# Patient Record
Sex: FEMALE | Race: WHITE | NOT HISPANIC OR LATINO | Employment: OTHER | URBAN - NONMETROPOLITAN AREA
[De-identification: names, ages, dates, MRNs, and addresses within clinical notes are randomized per-mention and may not be internally consistent; named-entity substitution may affect disease eponyms.]

---

## 2017-02-25 RX ORDER — AMLODIPINE BESYLATE 5 MG/1
TABLET ORAL
Qty: 30 TABLET | Refills: 6 | Status: CANCELLED | OUTPATIENT
Start: 2017-02-25

## 2017-02-27 RX ORDER — AMLODIPINE BESYLATE 5 MG/1
5 TABLET ORAL DAILY
Qty: 30 TABLET | Refills: 4 | Status: SHIPPED | OUTPATIENT
Start: 2017-02-27 | End: 2017-08-23 | Stop reason: SDUPTHER

## 2017-08-22 RX ORDER — AMLODIPINE BESYLATE 5 MG/1
TABLET ORAL
Qty: 30 TABLET | Refills: 4 | Status: CANCELLED | OUTPATIENT
Start: 2017-08-22

## 2017-08-23 RX ORDER — AMLODIPINE BESYLATE 5 MG/1
5 TABLET ORAL DAILY
Qty: 30 TABLET | Refills: 1 | Status: SHIPPED | OUTPATIENT
Start: 2017-08-23 | End: 2018-10-03

## 2017-11-20 ENCOUNTER — TRANSCRIBE ORDERS (OUTPATIENT)
Dept: PHYSICAL THERAPY | Facility: HOSPITAL | Age: 77
End: 2017-11-20

## 2017-11-20 DIAGNOSIS — M54.2 CERVICALGIA: Primary | ICD-10-CM

## 2017-11-30 ENCOUNTER — HOSPITAL ENCOUNTER (OUTPATIENT)
Dept: PHYSICAL THERAPY | Facility: HOSPITAL | Age: 77
Setting detail: THERAPIES SERIES
Discharge: HOME | End: 2017-11-30

## 2017-11-30 DIAGNOSIS — M54.2 CERVICALGIA: Primary | ICD-10-CM

## 2017-11-30 PROCEDURE — G8990 OTHER PT/OT CURRENT STATUS: HCPCS | Performed by: PHYSICAL THERAPIST

## 2017-11-30 PROCEDURE — G8991 OTHER PT/OT GOAL STATUS: HCPCS | Performed by: PHYSICAL THERAPIST

## 2017-11-30 PROCEDURE — 97012 MECHANICAL TRACTION THERAPY: CPT | Performed by: PHYSICAL THERAPIST

## 2017-11-30 PROCEDURE — 97162 PT EVAL MOD COMPLEX 30 MIN: CPT | Performed by: PHYSICAL THERAPIST

## 2017-12-01 NOTE — THERAPY EVALUATION
Outpatient Physical Therapy Ortho Initial Evaluation  [facility]     Patient Name: Carol Sullivan  : 1940  MRN: [id number]  Today's Date: 2017      Visit Date: 2017  Attendance:  (Medicare)  Subjective Improvement: n/a  Next MD Appt: ??  Recert Date: 17    Therapy Diagnosis: Cervicalgia    Patient Active Problem List   Diagnosis   • Hypertension   • Hyperlipidemia   • Near syncope   • GERD (gastroesophageal reflux disease)   • Palpitation   • PVC (premature ventricular contraction)        Past Medical History:   Diagnosis Date   • Arthritis    • Depression    • GERD (gastroesophageal reflux disease)    • Hyperlipidemia    • Hypertension    • Near syncope    • Vascular disease         No past surgical history on file.    Visit Dx:     ICD-10-CM ICD-9-CM   1. Cervicalgia M54.2 723.1             Patient History       17 0900       History    Chief Complaint Pain;Tingling;Muscle weakness  -     Date Current Problem(s) Began --   2017  -     Brief Description of Current Complaint Pt reports having left shoulder surgery for bone spurs on 3/2/17 and did not have any symptoms post-op. Starting in October, pt woke up with pain affecting top of left shoulder down to fingers. Since the pain began, the level of pain has remained the same. Shortly after initial pain, tingling in fingers began mildly and progressed to more noticeable which is still prevalent intermittently.  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 Moving it, lifting any object  -SS     What Performance Factors Make the Current Problem(s) BETTER? Pain medication, resting arm, holding arm still  -SS     Is your sleep disturbed? Yes  -SS     Is medication used to assist with sleep? No  -SS     Fall Risk Assessment    Any falls in the past year: Yes  -SS     Number of falls reported in the last 12 months 1  -SS     Factors that contributed to the fall: Slippery surface  -SS     Does patient have a fear of falling No  -SS     Daily Activities    Primary Language English  -SS     Safety    Are you being hurt, hit, or frightened by anyone at home or in your life? No  -SS     Are you being neglected by a caregiver No  -SS       User Key  (r) = Recorded By, (t) = Taken By, (c) = Cosigned By    Initials Name Provider Type    SS Dong Nelson, PT DPT Physical Therapist                PT Ortho       11/30/17 0900    Subjective Comments    Subjective Comments see patient history  -SS    Precautions and Contraindications    Precautions/Limitations no known precautions/limitations  -SS    Precautions none  -SS    Contraindications none  -SS    Subjective Pain    Able to rate subjective pain? yes  -SS    Pre-Treatment Pain Level 6  -SS    Post-Treatment Pain Level 0  -SS    Posture/Observations    Forward Head Increased;Mild  -SS    Thoracic Kyphosis Mild;Increased  -SS    Rounded Shoulders Mild;Bilateral:  -SS    Scapular winging Bilateral:;Mild  -SS    Lumbar lordosis Mild;Increased  -SS    Sensory Screen for Light Touch- Upper Quarter Clearing    C4 (posterior shoulder) Bilateral:;Intact  -SS    C5 (lateral upper arm) Bilateral:;Intact  -SS    C6 (tip of thumb) Bilateral:;Intact  -SS    C7 (tip of 3rd finger) Left:;Diminished;Right:;Intact  -SS    C8 (tip of 5th finger) Bilateral:;Intact  -SS    T1 (medial lower arm)  Bilateral:;Intact  -SS    Special Tests/Palpation    Special Tests/Palpation --   Tight posterior cervical muscles; Pos. cervical distraction  -SS    ROM (Range of Motion)    General ROM Detail all cervical motions hypomobile  -SS    Left Shoulder    Flexion AROM Deficit 120   shoulder pain  -SS    Extension AROM Deficit 121   shoulder pain  -SS    ABduction AROM Deficit 40   shoulder pain  -SS    External Rotation AROM Deficit 73   shoulder pain  -SS    Internal Rotation AROM Deficit 60   shoulder pain  -SS    Right Shoulder    Flexion AROM Deficit 147  -SS    Extension AROM Deficit 140  -SS    ABduction AROM Deficit 40  -SS    External Rotation AROM Deficit 75  -SS    Internal Rotation AROM Deficit 74  -SS    Left Shoulder    Flexion Gross Movement (4/5) good;(4+/5) good plus   shoulder pain  -SS    Extension Gross Movement (4+/5) good plus   shoulder pain  -SS    ABduction Gross Movement (4+/5) good plus   shoulder pain  -SS    Int Rotation Gross Movement (4+/5) good plus   shoulder pain  -SS    Ext Rotation Gross Movement (4+/5) good plus   shoulder pain  -SS    Right Shoulder    Flexion Gross Movement (4+/5) good plus  -SS    Extension Gross Movement (4+/5) good plus  -SS    ABduction Gross Movement (5/5) normal  -SS    Int Rotation Gross Movement (4+/5) good plus  -SS    Ext Rotation Gross Movement (5/5) normal  -SS      User Key  (r) = Recorded By, (t) = Taken By, (c) = Cosigned By    Initials Name Provider Type    ARGELIA Nelson, CHRIS DPT Physical Therapist                            Therapy Education       11/30/17 0900          Therapy Education    Given Other (comment)   therapy POC  -SS      How Provided Verbal  -SS      Provided to Patient  -SS      Level of Understanding Verbalized  -SS        User Key  (r) = Recorded By, (t) = Taken By, (c) = Cosigned By    Initials Name Provider Type    CHRIS HurtadoT Physical Therapist                PT OP Goals       11/30/17 0900       PT  Short Term Goals    STG Date to Achieve 12/21/17  -     STG 1 Decrease NDI score to </= 20/50=40%  -     STG 2 L shoulder flexion >/= 130 degrees  -     STG 3 L shoulder abduction >/= 130 degrees  -     STG 4 Report 30% subjective improvement  -     Long Term Goals    LTG Date to Achieve 01/11/18  -     LTG 1 Decrease NDI score to </= 10/50 = 20%  -     LTG 2 Independent with HEP.  -     LTG 3 Sleep with no disturbances.  -SS     LTG 4 L shoulder AROM WNL  -SS     LTG 5 L Shoulder MMT 5/5  -     LTG 6 CROM WNLs  -     Time Calculation    PT Goal Re-Cert Due Date 12/21/17  -       User Key  (r) = Recorded By, (t) = Taken By, (c) = Cosigned By    Initials Name Provider Type     Dong Nelson, PT DPT Physical Therapist                PT Assessment/Plan       11/30/17 1500       PT Assessment    Functional Limitations Limitations in functional capacity and performance;Limitation in home management;Decreased safety during functional activities;Limitations in community activities  -     Impairments Pain;Range of motion;Muscle strength;Posture  -     Assessment Comments Pt diagnosed with cervicalgia with increased pain symptoms in neck and shoulder. Pt would benefit from skilled physical therapy in order to increase range of motion, increase strength, and attenuation of pain in order to perform daily activities without limitation.  -     Rehab Potential Good  -     Patient/caregiver participated in establishment of treatment plan and goals Yes  -     Patient would benefit from skilled therapy intervention Yes  -SS     PT Plan    PT Frequency 2x/week  -     Predicted Duration of Therapy Intervention (days/wks) 6 weeks  -     Planned CPT's? PT EVAL MOD COMPLEXITY: 18640;PT THER PROC EA 15 MIN: 11436;PT THER ACT EA 15 MIN: 13728;PT MANUAL THERAPY EA 15 MIN: 37592;PT HOT OR COLD PACK TREAT MCARE;PT ELECTRICAL STIM UNATTEND: ;PT TRACTION CERVICAL: 59867;PT THER SUPP EA 15 MIN   -SS     Physical Therapy Interventions (Optional Details) modalities;strengthening;stretching;ROM (Range of Motion);postural re-education;manual therapy techniques;home exercise program  -SS     PT Plan Comments Cervical traction, shoulder stretching, ROM, and strengthening, heat as needed for pain, manual techniques, IFC estim as needed for pain  -SS       User Key  (r) = Recorded By, (t) = Taken By, (c) = Cosigned By    Initials Name Provider Type     Dong Nelson, CHRIS DPT Physical Therapist                Modalities       11/30/17 0900       Moist Heat    MH Applied      Location cervical spine concurrent with traction  -SS     Rx Minutes 10 mins  -SS     Traction 01727    Traction Type Cervical  -SS     Rx Minutes 10 minutes  -SS     Duration      Position Supine  -SS     Weight --   15# hold, 8# rest  -SS     Hold 60  -SS     Relax 20  -SS     Progression 2  -SS     Regression 2  -SS       User Key  (r) = Recorded By, (t) = Taken By, (c) = Cosigned By    Initials Name Provider Type     Dong Nelson, PT DPT Physical Therapist              Exercises       11/30/17 0900       Subjective Comments    Subjective Comments see patient history  -     Subjective Pain    Able to rate subjective pain? yes  -SS     Pre-Treatment Pain Level 6  -SS     Post-Treatment Pain Level 0  -SS       User Key  (r) = Recorded By, (t) = Taken By, (c) = Cosigned By    Initials Name Provider Type     Dong Nelson, CHRIS REYNA Physical Therapist                              Outcome Measures       11/30/17 0900          Neck Disability Index    Section 1 - Pain Intensity 3  -SS      Section 2 - Personal Care 1  -SS      Section 3 - Lifting 3  -SS      Section 4 - Work 3  -SS      Section 5 - Headaches 2  -SS      Section 6 - Concentration 0  -SS      Section 7 - Sleeping 5  -SS      Section 8 - Driving 4  -SS      Section 9 - Reading 2  -SS      Section 10 - Recreation 3  -SS      Neck Disability Index Score 26   -SS      Functional Assessment    Outcome Measure Options Neck Disability Index (NDI)  -        User Key  (r) = Recorded By, (t) = Taken By, (c) = Cosigned By    Initials Name Provider Type    SS Dong Nelson, PT DPT Physical Therapist            Time Calculation:   Start Time: 0920  Stop Time: 1025  Time Calculation (min): 65 min     Therapy Charges for Today     Code Description Service Date Service Provider Modifiers Qty    97152326833 HC PT OTHER PRIME FUNCT CURRENT 11/30/2017 Dong Nelson, PT DPT GP, CK 1    22931204648 HC PT OTHER PRIME FUNCT PROJECTED 11/30/2017 Dong Nelson PT DPT GP, CI 1    76250568496 HC PT TRACTION CERVICAL 11/30/2017 Dong Nelson PT DPT GP 1    58510963286 HC PT EVAL MOD COMPLEXITY 3 11/30/2017 Dong Nelson PT DPT GP 1          PT G-Codes  Outcome Measure Options: Neck Disability Index (NDI)  Score: 26/50 = 52%  Functional Limitation: Other PT primary  Other PT Primary Current Status (): At least 40 percent but less than 60 percent impaired, limited or restricted  Other PT Primary Goal Status (): At least 1 percent but less than 20 percent impaired, limited or restricted         Dong Nelson, PT, DPT, CHT  11/30/2017

## 2017-12-04 ENCOUNTER — HOSPITAL ENCOUNTER (OUTPATIENT)
Dept: PHYSICAL THERAPY | Facility: HOSPITAL | Age: 77
Setting detail: THERAPIES SERIES
Discharge: HOME | End: 2017-12-04

## 2017-12-04 DIAGNOSIS — M54.2 CERVICALGIA: Primary | ICD-10-CM

## 2017-12-04 PROCEDURE — 97110 THERAPEUTIC EXERCISES: CPT

## 2017-12-04 PROCEDURE — 97012 MECHANICAL TRACTION THERAPY: CPT

## 2017-12-04 NOTE — PROGRESS NOTES
Outpatient Physical Therapy Ortho Treatment Note   [facility]     Patient Name: Carol Sullivan  : 1940  MRN: [id number]  Today's Date: 2017      Visit Date: 2017    Subjective Improvement:    0%   Attendance:   Approved:   Per Medicare cap        MD follow up:      ?      date:  17       Visit Dx:    ICD-10-CM ICD-9-CM   1. Cervicalgia M54.2 723.1       Patient Active Problem List   Diagnosis   • Hypertension   • Hyperlipidemia   • Near syncope   • GERD (gastroesophageal reflux disease)   • Palpitation   • PVC (premature ventricular contraction)        Past Medical History:   Diagnosis Date   • Arthritis    • Depression    • GERD (gastroesophageal reflux disease)    • Hyperlipidemia    • Hypertension    • Near syncope    • Vascular disease         No past surgical history on file.          PT Ortho       17 1500    Subjective Comments    Subjective Comments Reports no pain currently, but tends to come and go.  -TM    Precautions and Contraindications    Precautions/Limitations no known precautions/limitations  -TM    Precautions none  -TM    Contraindications none  -TM    Subjective Pain    Able to rate subjective pain? yes  -TM    Pre-Treatment Pain Level 0  -TM    Post-Treatment Pain Level 0  -TM      User Key  (r) = Recorded By, (t) = Taken By, (c) = Cosigned By    Initials Name Provider Type    TM Kandis Mcgrath PTA Physical Therapy Assistant                            PT Assessment/Plan       17 1500       PT Assessment    Functional Limitations Limitations in functional capacity and performance;Limitation in home management;Decreased safety during functional activities;Limitations in community activities  -TM     Impairments Pain;Range of motion;Muscle strength;Posture  -TM     Assessment Comments Pt tolerated therex well.  Responded positively to manual treatment & traction.    -TM     Rehab Potential Good  -TM     Patient/caregiver participated  in establishment of treatment plan and goals Yes  -TM     Patient would benefit from skilled therapy intervention Yes  -TM     PT Plan    PT Frequency 2x/week  -TM     Predicted Duration of Therapy Intervention (days/wks) 6 weeks  -TM     PT Plan Comments wand shoulder Abd & flex  -TM       User Key  (r) = Recorded By, (t) = Taken By, (c) = Cosigned By    Initials Name Provider Type     Kandis Mcgrath PTA Physical Therapy Assistant                Modalities       12/04/17 1500          Moist Heat    Location cervical spine concurrent with traction  -TM      Rx Minutes 10 mins  -TM      Traction 44198    Traction Type Cervical  -TM      Rx Minutes 10 minutes  -TM      Position Supine  -TM      Weight --   18#  -TM      Hold 60  -TM      Relax 10  -TM        User Key  (r) = Recorded By, (t) = Taken By, (c) = Cosigned By    Initials Name Provider Type     Kandis Mcgrath PTA Physical Therapy Assistant                Exercises       12/04/17 1500          Subjective Comments    Subjective Comments Reports no pain currently, but tends to come and go.  -TM      Subjective Pain    Able to rate subjective pain? yes  -TM      Pre-Treatment Pain Level 0  -TM      Post-Treatment Pain Level 0  -TM      Exercise 1    Exercise Name 1 UT stretch B  -TM      Reps 1 2  -TM      Time (Seconds) 1 30  -TM      Exercise 2    Exercise Name 2 levator stretch  -TM      Reps 2 2  -TM      Time (Seconds) 2 30  -TM      Exercise 3    Exercise Name 3 AROM cervical rotation  -TM      Sets 3 1  -TM      Reps 3 10  -TM      Exercise 4    Exercise Name 4 chin tucks  -TM      Sets 4 1  -TM      Reps 4 15  -TM        User Key  (r) = Recorded By, (t) = Taken By, (c) = Cosigned By    Initials Name Provider Type     Kandis Mcgrath PTA Physical Therapy Assistant                        Manual Rx (last 36 hours)      Manual Treatments       12/04/17 1500          Manual Rx 1    Manual Rx 1 Location cervical  -TM      Manual Rx 1 Type  suboccipital release  -TM      Manual Rx 1 Duration 5 min  -TM        User Key  (r) = Recorded By, (t) = Taken By, (c) = Cosigned By    Initials Name Provider Type     Kandis Mcgarth PTA Physical Therapy Assistant                PT OP Goals       12/04/17 1500       PT Short Term Goals    STG Date to Achieve 12/21/17  -TM     STG 1 Decrease NDI score to </= 20/50=40%  -TM     STG 1 Progress Ongoing  -TM     STG 2 L shoulder flexion >/= 130 degrees  -TM     STG 2 Progress Ongoing  -TM     STG 3 L shoulder abduction >/= 130 degrees  -TM     STG 3 Progress Ongoing  -TM     STG 4 Report 30% subjective improvement  -TM     STG 4 Progress Ongoing  -TM     Long Term Goals    LTG Date to Achieve 01/11/18  -TM     LTG 1 Decrease NDI score to </= 10/50 = 20%  -TM     LTG 1 Progress Ongoing  -TM     LTG 2 Independent with HEP.  -TM     LTG 2 Progress Ongoing  -TM     LTG 3 Sleep with no disturbances.  -TM     LTG 3 Progress Ongoing  -TM     LTG 4 L shoulder AROM WNL  -TM     LTG 4 Progress Ongoing  -TM     LTG 5 L Shoulder MMT 5/5  -TM     LTG 5 Progress Ongoing  -TM     LTG 6 CROM WNLs  -TM     LTG 6 Progress Ongoing  -TM       User Key  (r) = Recorded By, (t) = Taken By, (c) = Cosigned By    Initials Name Provider Type    TM Kandis Mcgrath PTA Physical Therapy Assistant                Therapy Education       12/04/17 1500          Therapy Education    Education Details HEP: UT & levator stretch, chin tuck, AROM   -TM      Given HEP  -TM      How Provided Demonstration;Written  -TM      Provided to Patient  -TM      Level of Understanding Teach back education performed  -TM        User Key  (r) = Recorded By, (t) = Taken By, (c) = Cosigned By    Initials Name Provider Type     Kandis Mcgrath PTA Physical Therapy Assistant                Time Calculation:   Start Time: 1515  Stop Time: 1600  Time Calculation (min): 45 min  Total Timed Code Minutes- PT: 45 minute(s)    Therapy Charges for Today     Code  Description Service Date Service Provider Modifiers Qty    69579601287 HC PT THER PROC EA 15 MIN 12/4/2017 Kandis Mcgrath, PTA GP 2    05018696692 HC PT TRACTION CERVICAL 12/4/2017 Kandis Mcgrath, PTA GP 1                    Kandis Mcgrath, PTA  12/4/2017

## 2017-12-07 ENCOUNTER — HOSPITAL ENCOUNTER (OUTPATIENT)
Dept: PHYSICAL THERAPY | Facility: HOSPITAL | Age: 77
Setting detail: THERAPIES SERIES
Discharge: HOME | End: 2017-12-07

## 2017-12-07 DIAGNOSIS — M54.2 CERVICALGIA: Primary | ICD-10-CM

## 2017-12-07 PROCEDURE — 97012 MECHANICAL TRACTION THERAPY: CPT

## 2017-12-07 PROCEDURE — 97110 THERAPEUTIC EXERCISES: CPT

## 2017-12-07 NOTE — PROGRESS NOTES
Outpatient Physical Therapy Ortho Treatment Note   [facility]     Patient Name: Carol Sullivan  : 1940  MRN: [id number]  Today's Date: 2017      Visit Date: 2017    Subjective Improvement:  20%    Attendance: 3/3  Approved:   Per Medicare cap        MD follow up:      17      date:   17      Visit Dx:    ICD-10-CM ICD-9-CM   1. Cervicalgia M54.2 723.1       Patient Active Problem List   Diagnosis   • Hypertension   • Hyperlipidemia   • Near syncope   • GERD (gastroesophageal reflux disease)   • Palpitation   • PVC (premature ventricular contraction)        Past Medical History:   Diagnosis Date   • Arthritis    • Depression    • GERD (gastroesophageal reflux disease)    • Hyperlipidemia    • Hypertension    • Near syncope    • Vascular disease         No past surgical history on file.          PT Ortho       17 1000    Subjective Comments    Subjective Comments Has been sore this week.  -TM    Precautions and Contraindications    Precautions/Limitations no known precautions/limitations  -TM    Subjective Pain    Able to rate subjective pain? yes  -TM    Pre-Treatment Pain Level 0  -TM      17 1500    Subjective Comments    Subjective Comments Reports no pain currently, but tends to come and go.  -TM    Precautions and Contraindications    Precautions/Limitations no known precautions/limitations  -TM    Precautions none  -TM    Contraindications none  -TM    Subjective Pain    Able to rate subjective pain? yes  -TM    Pre-Treatment Pain Level 0  -TM    Post-Treatment Pain Level 0  -TM      User Key  (r) = Recorded By, (t) = Taken By, (c) = Cosigned By    Initials Name Provider Type    TM Kandis Mcgrath PTA Physical Therapy Assistant                            PT Assessment/Plan       17 1000       PT Assessment    Functional Limitations Limitations in functional capacity and performance;Limitation in home management;Decreased safety during  functional activities;Limitations in community activities  -TM     Impairments Pain;Range of motion;Muscle strength;Posture  -TM     Assessment Comments Pt required cues for levator stretch.  Did well with therex.  Showing improvement in postural awareness.  -TM     Rehab Potential Good  -TM     Patient/caregiver participated in establishment of treatment plan and goals Yes  -TM     Patient would benefit from skilled therapy intervention Yes  -TM     PT Plan    PT Frequency 2x/week  -TM     Predicted Duration of Therapy Intervention (days/wks) 6 weeks  -TM     PT Plan Comments wall slides flex, Abd  -TM       User Key  (r) = Recorded By, (t) = Taken By, (c) = Cosigned By    Initials Name Provider Type     Kandis Mcgrath PTA Physical Therapy Assistant                Modalities       12/07/17 1000          Traction 20475    Traction Type Cervical  -TM      Rx Minutes 10 minutes  -TM      Position Supine  -TM      Weight 20  -TM      Hold 60  -TM      Relax 10  -TM        User Key  (r) = Recorded By, (t) = Taken By, (c) = Cosigned By    Initials Name Provider Type     Kandis Mcgrath PTA Physical Therapy Assistant                Exercises       12/07/17 1000          Subjective Comments    Subjective Comments Has been sore this week.  -TM      Subjective Pain    Able to rate subjective pain? yes  -TM      Pre-Treatment Pain Level 0  -TM      Exercise 1    Exercise Name 1 UT stretch B  -TM      Reps 1 2  -TM      Time (Seconds) 1 30  -TM      Exercise 2    Exercise Name 2 levator stretch  -TM      Reps 2 2  -TM      Time (Seconds) 2 30  -TM      Exercise 3    Exercise Name 3 AROM cervical rotation  -TM      Sets 3 1  -TM      Reps 3 10  -TM      Exercise 4    Exercise Name 4 chin tucks  -TM      Sets 4 1  -TM      Reps 4 15  -TM      Exercise 5    Exercise Name 5 AAROM wand shoulder flexion  -TM      Sets 5 2  -TM      Reps 5 10  -TM      Exercise 6    Exercise Name 6 AAROM wand shoulder Abd B  -TM       Sets 6 2  -TM      Reps 6 10  -TM      Exercise 7    Exercise Name 7 scap squeezes  -TM      Sets 7 2  -TM      Reps 7 10  -TM        User Key  (r) = Recorded By, (t) = Taken By, (c) = Cosigned By    Initials Name Provider Type    TM Kandis Mcgrath PTA Physical Therapy Assistant                               PT OP Goals       12/07/17 1000       PT Short Term Goals    STG Date to Achieve 12/21/17  -TM     STG 1 Decrease NDI score to </= 20/50=40%  -TM     STG 1 Progress Ongoing  -TM     STG 2 L shoulder flexion >/= 130 degrees  -TM     STG 2 Progress Ongoing  -TM     STG 3 L shoulder abduction >/= 130 degrees  -TM     STG 3 Progress Ongoing  -TM     STG 4 Report 30% subjective improvement  -TM     STG 4 Progress Ongoing  -TM     Long Term Goals    LTG Date to Achieve 01/11/18  -TM     LTG 1 Decrease NDI score to </= 10/50 = 20%  -TM     LTG 1 Progress Ongoing  -TM     LTG 2 Independent with HEP.  -TM     LTG 2 Progress Ongoing  -TM     LTG 3 Sleep with no disturbances.  -TM     LTG 3 Progress Ongoing  -TM     LTG 4 L shoulder AROM WNL  -TM     LTG 4 Progress Progressing  -TM     LTG 5 L Shoulder MMT 5/5  -TM     LTG 5 Progress Ongoing  -TM     LTG 6 CROM WNLs  -TM     LTG 6 Progress Progressing  -TM       User Key  (r) = Recorded By, (t) = Taken By, (c) = Cosigned By    Initials Name Provider Type    TM Kandis Mcgrath PTA Physical Therapy Assistant                    Time Calculation:   Start Time: 1015  Stop Time: 1055  Time Calculation (min): 40 min  Total Timed Code Minutes- PT: 40 minute(s)    Therapy Charges for Today     Code Description Service Date Service Provider Modifiers Qty    57369044297 HC PT TRACTION CERVICAL 12/7/2017 Kandis Mcgrath, PTA GP 1    65367919565 HC PT THER PROC EA 15 MIN 12/7/2017 Kandis Mcgrath PTA GP 2                    Kandis Mcgrath PTA  12/7/2017

## 2017-12-11 ENCOUNTER — HOSPITAL ENCOUNTER (OUTPATIENT)
Dept: PHYSICAL THERAPY | Facility: HOSPITAL | Age: 77
Setting detail: THERAPIES SERIES
Discharge: HOME | End: 2017-12-11

## 2017-12-11 DIAGNOSIS — M54.2 CERVICALGIA: Primary | ICD-10-CM

## 2017-12-11 PROCEDURE — 97110 THERAPEUTIC EXERCISES: CPT

## 2017-12-11 PROCEDURE — 97012 MECHANICAL TRACTION THERAPY: CPT

## 2017-12-11 NOTE — PROGRESS NOTES
Outpatient Physical Therapy Ortho Treatment Note   Juliocesar Back     Patient Name: Carol Sullivan  : 1940  MRN: [id number]  Today's Date: 2017      Visit Date: 2017    Subjective Improvement:    20%   Attendance:   Approved:   Per Medicare cap        MD follow up:   17         date:    17     Visit Dx:    ICD-10-CM ICD-9-CM   1. Cervicalgia M54.2 723.1       Patient Active Problem List   Diagnosis   • Hypertension   • Hyperlipidemia   • Near syncope   • GERD (gastroesophageal reflux disease)   • Palpitation   • PVC (premature ventricular contraction)        Past Medical History:   Diagnosis Date   • Arthritis    • Depression    • GERD (gastroesophageal reflux disease)    • Hyperlipidemia    • Hypertension    • Near syncope    • Vascular disease         No past surgical history on file.          PT Ortho       17 0900    Subjective Comments    Subjective Comments Had a rough weekend.  Neck & L UE pain.  Did get better when she does exercises.   -TM    Precautions and Contraindications    Precautions/Limitations no known precautions/limitations  -TM    Subjective Pain    Pre-Treatment Pain Level 5  -    Pain Assessment    Pain Assessment --   L UE pain  -TM    Left Shoulder    Flexion AROM Deficit 140  -TM    ABduction AROM Deficit 110  -TM      User Key  (r) = Recorded By, (t) = Taken By, (c) = Cosigned By    Initials Name Provider Type     Kandis Mcgrath PTA Physical Therapy Assistant                            PT Assessment/Plan       17 0900       PT Assessment    Functional Limitations Limitations in functional capacity and performance;Limitation in home management;Decreased safety during functional activities;Limitations in community activities  -TM     Impairments Pain;Range of motion;Muscle strength;Posture  -TM     Assessment Comments Pt tolerated all therex well.  Has made nice improvement in shoulder AROM.    -TM     Rehab Potential  Good  -TM     Patient/caregiver participated in establishment of treatment plan and goals Yes  -TM     Patient would benefit from skilled therapy intervention Yes  -TM     PT Plan    PT Frequency 2x/week  -TM     Predicted Duration of Therapy Intervention (days/wks) 6 weeks  -TM     PT Plan Comments T Band rows  -TM       User Key  (r) = Recorded By, (t) = Taken By, (c) = Cosigned By    Initials Name Provider Type    TM Kandis Mcgrath PTA Physical Therapy Assistant                Modalities       12/11/17 0900          Moist Heat    Location cervical spine concurrent with traction  -TM      Rx Minutes Other:   14 min  -TM      Traction 94280    Traction Type Cervical  -TM      Rx Minutes 14 min  -TM      Position Hook-lying  -TM      Weight 22  -TM      Hold 60  -TM      Relax 10  -TM        User Key  (r) = Recorded By, (t) = Taken By, (c) = Cosigned By    Initials Name Provider Type    TM Kandis Jennifer Mcgrath PTA Physical Therapy Assistant                Exercises       12/11/17 0900          Subjective Comments    Subjective Comments Had a rough weekend.  Neck & L UE pain.  Did get better when she does exercises.   -TM      Subjective Pain    Pre-Treatment Pain Level 5  -TM      Exercise 1    Exercise Name 1 UT stretch B  -TM      Reps 1 2  -TM      Time (Seconds) 1 30  -TM      Exercise 2    Exercise Name 2 levator stretch  -TM      Reps 2 2  -TM      Time (Seconds) 2 30  -TM      Exercise 3    Exercise Name 3 AROM cervical rotation  -TM      Sets 3 1  -TM      Reps 3 15  -TM      Exercise 4    Exercise Name 4 chin tucks  -TM      Sets 4 1  -TM      Reps 4 15  -TM      Exercise 5    Exercise Name 5 scap squeezes  -TM      Sets 5 2  -TM      Reps 5 10  -TM      Exercise 6    Exercise Name 6 AAROM shoulder flex slides  -TM      Sets 6 1  -TM      Reps 6 15  -TM      Exercise 7    Exercise Name 7 AAROM shoulder Abd  -TM      Sets 7 1  -TM      Reps 7 15  -TM        User Key  (r) = Recorded By, (t) = Taken By,  (c) = Cosigned By    Initials Name Provider Type    TM Kandis Mcgrath PTA Physical Therapy Assistant                               PT OP Goals       12/11/17 0900       PT Short Term Goals    STG Date to Achieve 12/21/17  -TM     STG 1 Decrease NDI score to </= 20/50=40%  -TM     STG 1 Progress Ongoing  -TM     STG 2 L shoulder flexion >/= 130 degrees  -TM     STG 2 Progress Met  -TM     STG 3 L shoulder abduction >/= 130 degrees  -TM     STG 3 Progress Progressing  -TM     STG 4 Report 30% subjective improvement  -TM     STG 4 Progress Progressing  -TM     Long Term Goals    LTG Date to Achieve 01/11/18  -TM     LTG 1 Decrease NDI score to </= 10/50 = 20%  -TM     LTG 1 Progress Ongoing  -TM     LTG 2 Independent with HEP.  -TM     LTG 2 Progress Ongoing  -TM     LTG 3 Sleep with no disturbances.  -TM     LTG 3 Progress Ongoing  -TM     LTG 4 L shoulder AROM WNL  -TM     LTG 4 Progress Progressing  -TM     LTG 5 L Shoulder MMT 5/5  -TM     LTG 5 Progress Ongoing  -TM     LTG 6 CROM WNLs  -TM     LTG 6 Progress Progressing  -TM       User Key  (r) = Recorded By, (t) = Taken By, (c) = Cosigned By    Initials Name Provider Type    TM Kandis Mcgrath PTA Physical Therapy Assistant                    Time Calculation:   Start Time: 0930  Stop Time: 1008  Time Calculation (min): 38 min  Total Timed Code Minutes- PT: 38 minute(s)    Therapy Charges for Today     Code Description Service Date Service Provider Modifiers Qty    26659918453 HC PT THER PROC EA 15 MIN 12/11/2017 Kandis Mcgrath PTA GP 2    44724017805 HC PT TRACTION CERVICAL 12/11/2017 Kandis Mcgrath PTA GP 1                    Kandis Mcgrath, AUDREY  12/11/2017

## 2017-12-14 ENCOUNTER — HOSPITAL ENCOUNTER (OUTPATIENT)
Dept: PHYSICAL THERAPY | Facility: HOSPITAL | Age: 77
Setting detail: THERAPIES SERIES
Discharge: HOME | End: 2017-12-14

## 2017-12-14 DIAGNOSIS — M54.2 CERVICALGIA: Primary | ICD-10-CM

## 2017-12-14 PROCEDURE — 97012 MECHANICAL TRACTION THERAPY: CPT

## 2017-12-14 PROCEDURE — 97110 THERAPEUTIC EXERCISES: CPT

## 2017-12-14 NOTE — PROGRESS NOTES
Outpatient Physical Therapy Ortho Treatment Note   Juliocesar Back     Patient Name: Carol Sullivan  : 1940  MRN: [id number]  Today's Date: 2017      Visit Date: 2017    Subjective Improvement:  20%     Attendance:   Approved:   Per Medicare cap        MD follow up:  17   date:  17    Visit Dx:    ICD-10-CM ICD-9-CM   1. Cervicalgia M54.2 723.1       Patient Active Problem List   Diagnosis   • Hypertension   • Hyperlipidemia   • Near syncope   • GERD (gastroesophageal reflux disease)   • Palpitation   • PVC (premature ventricular contraction)        Past Medical History:   Diagnosis Date   • Arthritis    • Depression    • GERD (gastroesophageal reflux disease)    • Hyperlipidemia    • Hypertension    • Near syncope    • Vascular disease         No past surgical history on file.          PT Ortho       17 0900    Subjective Comments    Subjective Comments Didn't do anything yesterday because she waas hurting & had a headache.  -TM    Precautions and Contraindications    Precautions/Limitations no known precautions/limitations  -TM    Subjective Pain    Able to rate subjective pain? yes  -TM    Pre-Treatment Pain Level 0  -TM      User Key  (r) = Recorded By, (t) = Taken By, (c) = Cosigned By    Initials Name Provider Type    TM Kandis Mcgrath PTA Physical Therapy Assistant                            PT Assessment/Plan       17 09       PT Assessment    Functional Limitations Limitations in functional capacity and performance;Limitation in home management;Decreased safety during functional activities;Limitations in community activities  -TM     Impairments Pain;Range of motion;Muscle strength;Posture  -TM     Assessment Comments Pt conts to report occasional pain.  Usually occurs after she has been really active.  Tolerated therex well today with addition of T Band.  -TM     Rehab Potential Good  -TM     Patient/caregiver participated in  establishment of treatment plan and goals Yes  -TM     Patient would benefit from skilled therapy intervention Yes  -TM     PT Plan    PT Frequency 2x/week  -TM     Predicted Duration of Therapy Intervention (days/wks) 6 weeks  -TM     PT Plan Comments AROM B shoulders 3 way  -TM       User Key  (r) = Recorded By, (t) = Taken By, (c) = Cosigned By    Initials Name Provider Type    TM Kandis Mcgrath PTA Physical Therapy Assistant                Modalities       12/14/17 0900          Moist Heat    Location cervical spine concurrent with traction  -TM      Rx Minutes 15 mins  -TM      Traction 83565    Traction Type Cervical  -TM      Rx Minutes 15 min  -TM      Position Supine  -TM      Weight 22  -TM      Hold 60  -TM      Relax 10  -TM        User Key  (r) = Recorded By, (t) = Taken By, (c) = Cosigned By    Initials Name Provider Type    TM Kandis Rodriguez AUDREY Mcgrath Physical Therapy Assistant                Exercises       12/14/17 0900          Subjective Comments    Subjective Comments Didn't do anything yesterday because she waas hurting & had a headache.  -TM      Subjective Pain    Able to rate subjective pain? yes  -TM      Pre-Treatment Pain Level 0  -TM      Exercise 1    Exercise Name 1 UT stretch B  -TM      Reps 1 2  -TM      Time (Seconds) 1 30  -TM      Exercise 2    Exercise Name 2 levator stretch  -TM      Reps 2 2  -TM      Time (Seconds) 2 30  -TM      Exercise 3    Exercise Name 3 AROM cervical rotation  -TM      Sets 3 1  -TM      Reps 3 15  -TM      Exercise 4    Exercise Name 4 T Band rows  -TM      Sets 4 2  -TM      Reps 4 10  -TM      Additional Comments red  -TM      Exercise 5    Exercise Name 5 T Band ext  -TM      Sets 5 2  -TM      Reps 5 10  -TM      Additional Comments red  -TM      Exercise 6    Exercise Name 6 AAROM shoulder flex slides  -TM      Sets 6 1  -TM      Reps 6 15  -TM      Exercise 7    Exercise Name 7 AAROM shoulder Abd  -TM      Sets 7 1  -TM      Reps 7 15  -TM         User Key  (r) = Recorded By, (t) = Taken By, (c) = Cosigned By    Initials Name Provider Type    TM Kandis Mcgrath PTA Physical Therapy Assistant                               PT OP Goals       12/14/17 0900       PT Short Term Goals    STG Date to Achieve 12/21/17  -TM     STG 1 Decrease NDI score to </= 20/50=40%  -TM     STG 1 Progress Ongoing  -TM     STG 2 L shoulder flexion >/= 130 degrees  -TM     STG 2 Progress Met  -TM     STG 3 L shoulder abduction >/= 130 degrees  -TM     STG 3 Progress Progressing  -TM     STG 4 Report 30% subjective improvement  -TM     STG 4 Progress Progressing  -TM     Long Term Goals    LTG Date to Achieve 01/11/18  -TM     LTG 1 Decrease NDI score to </= 10/50 = 20%  -TM     LTG 1 Progress Ongoing  -TM     LTG 2 Independent with HEP.  -TM     LTG 2 Progress Ongoing  -TM     LTG 3 Sleep with no disturbances.  -TM     LTG 3 Progress Ongoing  -TM     LTG 4 L shoulder AROM WNL  -TM     LTG 4 Progress Progressing  -TM     LTG 5 L Shoulder MMT 5/5  -TM     LTG 5 Progress Ongoing  -TM     LTG 6 CROM WNLs  -TM     LTG 6 Progress Progressing  -TM       User Key  (r) = Recorded By, (t) = Taken By, (c) = Cosigned By    Initials Name Provider Type    TM Kandis Mcgrath PTA Physical Therapy Assistant                         Time Calculation:   Start Time: 0925  Stop Time: 1005  Time Calculation (min): 40 min  Total Timed Code Minutes- PT: 25 minute(s)    Therapy Charges for Today     Code Description Service Date Service Provider Modifiers Qty    56033917694 HC PT TRACTION CERVICAL 12/14/2017 Kandis Mcgrath PTA GP 1    15440900493 HC PT THER PROC EA 15 MIN 12/14/2017 Kandis Mcgrath PTA GP 2                    Kandis Mcgrath PTA  12/14/2017

## 2017-12-18 ENCOUNTER — HOSPITAL ENCOUNTER (OUTPATIENT)
Dept: PHYSICAL THERAPY | Facility: HOSPITAL | Age: 77
Setting detail: THERAPIES SERIES
Discharge: HOME | End: 2017-12-18

## 2017-12-18 DIAGNOSIS — M54.2 CERVICALGIA: Primary | ICD-10-CM

## 2017-12-18 PROCEDURE — 97012 MECHANICAL TRACTION THERAPY: CPT

## 2017-12-18 PROCEDURE — 97110 THERAPEUTIC EXERCISES: CPT

## 2017-12-18 NOTE — PROGRESS NOTES
Outpatient Physical Therapy Ortho Treatment Note   Juliocesar Back     Patient Name: Carol uSllivan  : 1940  MRN: [id number]  Today's Date: 2017      Visit Date: 2017    Subjective Improvement:    50%   Attendance:  per Medicare cap  MD follow up:17   date:  17     Visit Dx:    ICD-10-CM ICD-9-CM   1. Cervicalgia M54.2 723.1       Patient Active Problem List   Diagnosis   • Hypertension   • Hyperlipidemia   • Near syncope   • GERD (gastroesophageal reflux disease)   • Palpitation   • PVC (premature ventricular contraction)        Past Medical History:   Diagnosis Date   • Arthritis    • Depression    • GERD (gastroesophageal reflux disease)    • Hyperlipidemia    • Hypertension    • Near syncope    • Vascular disease         No past surgical history on file.          PT Ortho       17 0900    Subjective Comments    Subjective Comments Had a good weekend.  Cleaned house & didn't have pain.  -TM    Precautions and Contraindications    Precautions/Limitations no known precautions/limitations  -TM    Subjective Pain    Able to rate subjective pain? yes  -TM    Pre-Treatment Pain Level 0  -TM      User Key  (r) = Recorded By, (t) = Taken By, (c) = Cosigned By    Initials Name Provider Type    TM Kandis Mcgrath PTA Physical Therapy Assistant                            PT Assessment/Plan       17 0900       PT Assessment    Functional Limitations Limitations in functional capacity and performance;Limitation in home management;Decreased safety during functional activities;Limitations in community activities  -TM     Impairments Pain;Range of motion;Muscle strength;Posture  -TM     Assessment Comments Pt reporting improved functional ability without resulting in pain.    -TM     Rehab Potential Good  -TM     Patient/caregiver participated in establishment of treatment plan and goals Yes  -TM     Patient would benefit from skilled therapy intervention Yes  -TM      PT Plan    PT Frequency 2x/week  -TM     Predicted Duration of Therapy Intervention (days/wks) 6 weeks  -TM     PT Plan Comments wall push ups  -TM       User Key  (r) = Recorded By, (t) = Taken By, (c) = Cosigned By    Initials Name Provider Type    TM Kandis Mcgrath PTA Physical Therapy Assistant                    Exercises       12/18/17 0900          Subjective Comments    Subjective Comments Had a good weekend.  Cleaned house & didn't have pain.  -TM      Subjective Pain    Able to rate subjective pain? yes  -TM      Pre-Treatment Pain Level 0  -TM      Exercise 1    Exercise Name 1 UT stretch B  -TM      Reps 1 2  -TM      Time (Seconds) 1 30  -TM      Exercise 2    Exercise Name 2 levator stretch  -TM      Reps 2 2  -TM      Time (Seconds) 2 30  -TM      Exercise 3    Exercise Name 3 AROM cervical rotation  -TM      Sets 3 1  -TM      Reps 3 15  -TM      Exercise 4    Exercise Name 4 no money  -TM      Sets 4 2  -TM      Reps 4 10  -TM      Exercise 5    Exercise Name 5 T Band ext  -TM      Sets 5 2  -TM      Reps 5 10  -TM      Additional Comments red  -TM      Exercise 6    Exercise Name 6 T Band rows  -TM      Sets 6 2  -TM      Reps 6 10  -TM      Additional Comments red  -TM      Exercise 7    Exercise Name 7 AROM shoulder flex to 90°  -TM      Reps 7 10  -TM      Exercise 8    Exercise Name 8 AROM shoulder ext  -TM      Reps 8 10  -TM        User Key  (r) = Recorded By, (t) = Taken By, (c) = Cosigned By    Initials Name Provider Type    TM Kandis Mcgrath PTA Physical Therapy Assistant                               PT OP Goals       12/18/17 1000       PT Short Term Goals    STG Date to Achieve 12/21/17  -TM     STG 1 Decrease NDI score to </= 20/50=40%  -TM     STG 1 Progress Ongoing  -TM     STG 2 L shoulder flexion >/= 130 degrees  -TM     STG 2 Progress Met  -TM     STG 3 L shoulder abduction >/= 130 degrees  -TM     STG 3 Progress Progressing  -TM     STG 4 Report 30% subjective  improvement  -TM     STG 4 Progress Progressing  -TM     Long Term Goals    LTG Date to Achieve 01/11/18  -TM     LTG 1 Decrease NDI score to </= 10/50 = 20%  -TM     LTG 1 Progress Ongoing  -TM     LTG 2 Independent with HEP.  -TM     LTG 2 Progress Ongoing  -TM     LTG 3 Sleep with no disturbances.  -TM     LTG 3 Progress Ongoing  -TM     LTG 4 L shoulder AROM WNL  -TM     LTG 4 Progress Progressing  -TM     LTG 5 L Shoulder MMT 5/5  -TM     LTG 5 Progress Ongoing  -TM     LTG 6 CROM WNLs  -TM     LTG 6 Progress Progressing  -TM       User Key  (r) = Recorded By, (t) = Taken By, (c) = Cosigned By    Initials Name Provider Type    TM Kandis Mcgrath PTA Physical Therapy Assistant                         Time Calculation:   Start Time: 0930  Stop Time: 1016  Time Calculation (min): 46 min  Total Timed Code Minutes- PT: 46 minute(s)    Therapy Charges for Today     Code Description Service Date Service Provider Modifiers Qty    99984534089 HC PT THER PROC EA 15 MIN 12/18/2017 Kandis Mcgrath PTA GP 2    83610851959 HC PT TRACTION CERVICAL 12/18/2017 Kandis Mcgrath PTA GP 1                    Kandis Mcgrath PTA  12/18/2017

## 2017-12-21 ENCOUNTER — HOSPITAL ENCOUNTER (OUTPATIENT)
Dept: PHYSICAL THERAPY | Facility: HOSPITAL | Age: 77
Setting detail: THERAPIES SERIES
Discharge: HOME | End: 2017-12-21

## 2017-12-21 DIAGNOSIS — M54.2 CERVICALGIA: Primary | ICD-10-CM

## 2017-12-21 PROCEDURE — 97110 THERAPEUTIC EXERCISES: CPT

## 2017-12-21 NOTE — PROGRESS NOTES
Outpatient Physical Therapy Ortho Treatment Note   Juliocesar Back     Patient Name: Carol Sullivan  : 1940  MRN: [id number]  Today's Date: 2017      Visit Date: 2017    Subjective Improvement:    50%   Attendance:   Approved:     Per Medicare cap      MD follow up:     17       date:   17      Visit Dx:    ICD-10-CM ICD-9-CM   1. Cervicalgia M54.2 723.1       Patient Active Problem List   Diagnosis   • Hypertension   • Hyperlipidemia   • Near syncope   • GERD (gastroesophageal reflux disease)   • Palpitation   • PVC (premature ventricular contraction)        Past Medical History:   Diagnosis Date   • Arthritis    • Depression    • GERD (gastroesophageal reflux disease)    • Hyperlipidemia    • Hypertension    • Near syncope    • Vascular disease         No past surgical history on file.          PT Ortho       17 0900    Subjective Pain    Post-Treatment Pain Level 0  -TM    Left Shoulder    Flexion AROM Deficit 140°  -TM    ABduction AROM Deficit 130°  -TM      User Key  (r) = Recorded By, (t) = Taken By, (c) = Cosigned By    Initials Name Provider Type    TM Kandis Mcgrath PTA Physical Therapy Assistant                            PT Assessment/Plan       17 0900       PT Assessment    Functional Limitations Limitations in functional capacity and performance;Limitation in home management;Decreased safety during functional activities;Limitations in community activities  -TM     Impairments Pain;Range of motion;Muscle strength;Posture  -TM     Assessment Comments Pt has met ROM goals.  Tolerated therex well.  -TM     Rehab Potential Good  -TM     Patient/caregiver participated in establishment of treatment plan and goals Yes  -TM     Patient would benefit from skilled therapy intervention Yes  -TM     PT Plan    PT Frequency 2x/week  -TM     Predicted Duration of Therapy Intervention (days/wks) 6 weeks  -TM     PT Plan Comments wall push ups  -TM        User Key  (r) = Recorded By, (t) = Taken By, (c) = Cosigned By    Initials Name Provider Type    TM Kandis Mcgrath PTA Physical Therapy Assistant                Modalities       12/21/17 0900          Moist Heat    Location cervical spine concurrent with traction  -TM      Rx Minutes 15 mins  -TM      Traction 87325    Traction Type Cervical  -TM      Rx Minutes 15 min  -TM      Position Supine  -TM      Weight 22  -TM      Hold 60  -TM      Relax 10  -TM        User Key  (r) = Recorded By, (t) = Taken By, (c) = Cosigned By    Initials Name Provider Type    TM Kandis Martinfrankie Mcgrath PTA Physical Therapy Assistant                Exercises       12/21/17 0900          Subjective Comments    Subjective Comments MD appt has been cancelled & she has to reschedule.  -TM      Subjective Pain    Able to rate subjective pain? yes  -TM      Pre-Treatment Pain Level 0  -TM      Post-Treatment Pain Level 0  -TM      Exercise 1    Exercise Name 1 PRO II UE fwd/bwd for ROM  -TM      Time (Minutes) 1 8  -TM      Additional Comments level 1  -TM      Exercise 2    Exercise Name 2 levator stretch  -TM      Reps 2 2  -TM      Time (Seconds) 2 30  -TM      Exercise 3    Exercise Name 3 UT stretch  -TM      Reps 3 2  -TM      Time (Seconds) 3 30  -TM      Exercise 4    Exercise Name 4 AROM cervical rotation  -TM      Exercise 5    Exercise Name 5 no money  -TM      Sets 5 2  -TM      Reps 5 10  -TM      Exercise 6    Exercise Name 6 T Band rows  -TM      Sets 6 2  -TM      Reps 6 10  -TM      Additional Comments red  -TM      Exercise 7    Exercise Name 7 T Band shoulder ext  -TM      Sets 7 2  -TM      Reps 7 10  -TM      Additional Comments red  -TM      Exercise 8    Exercise Name 8 AROM shoulder flex to 90°  -TM      Sets 8 2  -TM      Reps 8 10  -TM      Exercise 9    Exercise Name 9 AROM shoulder Abd  -TM      Sets 9 2  -TM      Reps 9 10  -TM        User Key  (r) = Recorded By, (t) = Taken By, (c) = Cosigned By    Initials  Name Provider Type    TM Kandis Mcgrath PTA Physical Therapy Assistant                               PT OP Goals       12/21/17 0900       PT Short Term Goals    STG Date to Achieve 12/21/17  -TM     STG 1 Decrease NDI score to </= 20/50=40%  -TM     STG 1 Progress Ongoing  -TM     STG 2 L shoulder flexion >/= 130 degrees  -TM     STG 2 Progress Met  -TM     STG 3 L shoulder abduction >/= 130 degrees  -TM     STG 3 Progress Met  -TM     STG 4 Report 30% subjective improvement  -TM     STG 4 Progress Met  -TM     Long Term Goals    LTG Date to Achieve 01/11/18  -TM     LTG 1 Decrease NDI score to </= 10/50 = 20%  -TM     LTG 1 Progress Ongoing  -TM     LTG 2 Independent with HEP.  -TM     LTG 2 Progress Ongoing  -TM     LTG 3 Sleep with no disturbances.  -TM     LTG 3 Progress Ongoing  -TM     LTG 4 L shoulder AROM WNL  -TM     LTG 4 Progress Progressing  -TM     LTG 5 L Shoulder MMT 5/5  -TM     LTG 5 Progress Ongoing  -TM     LTG 6 CROM WNLs  -TM     LTG 6 Progress Progressing  -TM       User Key  (r) = Recorded By, (t) = Taken By, (c) = Cosigned By    Initials Name Provider Type    TM Kandis Mcgrath PTA Physical Therapy Assistant                         Time Calculation:   Start Time: 0930  Stop Time: 1015  Time Calculation (min): 45 min  Total Timed Code Minutes- PT: 45 minute(s)    Therapy Charges for Today     Code Description Service Date Service Provider Modifiers Qty    52722794211 HC PT THER PROC EA 15 MIN 12/21/2017 Kandis Mcgrath PTA GP 3                    Kandis Mcgrath PTA  12/21/2017

## 2017-12-28 ENCOUNTER — HOSPITAL ENCOUNTER (OUTPATIENT)
Dept: PHYSICAL THERAPY | Facility: HOSPITAL | Age: 77
Setting detail: THERAPIES SERIES
Discharge: HOME | End: 2017-12-28

## 2017-12-28 DIAGNOSIS — M54.2 CERVICALGIA: Primary | ICD-10-CM

## 2017-12-28 PROCEDURE — 97110 THERAPEUTIC EXERCISES: CPT | Performed by: PHYSICAL THERAPIST

## 2017-12-28 PROCEDURE — 97012 MECHANICAL TRACTION THERAPY: CPT

## 2017-12-28 PROCEDURE — 97110 THERAPEUTIC EXERCISES: CPT

## 2017-12-28 NOTE — PROGRESS NOTES
Outpatient Physical Therapy Ortho Progress Note  [facility]     Patient Name: Carol Sullivan  : 1940  MRN: [id number]  Today's Date: 2017      Visit Date: 2017      Attendance    Authorized Medicare   Pre Rx pain    Post Rx pain    % improvement 50%   MD follow up 18   Recert date            Visit Dx:    ICD-10-CM ICD-9-CM   1. Cervicalgia M54.2 723.1       Patient Active Problem List   Diagnosis   • Hypertension   • Hyperlipidemia   • Near syncope   • GERD (gastroesophageal reflux disease)   • Palpitation   • PVC (premature ventricular contraction)        Past Medical History:   Diagnosis Date   • Arthritis    • Depression    • GERD (gastroesophageal reflux disease)    • Hyperlipidemia    • Hypertension    • Near syncope    • Vascular disease         No past surgical history on file.          PT Ortho       17 1600    ROM (Range of Motion)    General ROM Detail cervical rotation 64/56. ER 80 in scapular plane, IR 65. Cervical lateral flexion 37/36 degrees  -DD    Left Shoulder    Flexion AROM Deficit 155  -DD    ABduction AROM Deficit 135  -DD    MMT (Manual Muscle Testing)    General MMT Assessment Detail  63 Right and 50 left handheld dynamometer  -DD    Left Shoulder    Flexion Gross Movement (4/5) good  -DD    Extension Gross Movement (4+/5) good plus  -DD    ABduction Gross Movement (4+/5) good plus  -DD    Int Rotation Gross Movement (4+/5) good plus  -DD    Ext Rotation Gross Movement (4+/5) good plus  -DD      User Key  (r) = Recorded By, (t) = Taken By, (c) = Cosigned By    Initials Name Provider Type    LUIS ANTONIO Parisi, PT Physical Therapist                            PT Assessment/Plan       17 1627       PT Assessment    Impairments Pain;Muscle strength  -DD     Assessment Comments Pt has progressed well with ROM cervical and shoulder. Pain has not changed. MD appointment was changed to next week,he is out of office tomorrow.  -DD      Please refer to paper survey for additional self-reported information Yes  -DD     Rehab Potential Good  -DD     Patient/caregiver participated in establishment of treatment plan and goals Yes  -DD     Patient would benefit from skilled therapy intervention Yes  -DD     PT Plan    PT Frequency 2x/week  -DD     Predicted Duration of Therapy Intervention (days/wks) 3 weeks  -DD     PT Plan Comments Traction, manual, stretching, postural /scapular stabilization. modalities prn  -DD       User Key  (r) = Recorded By, (t) = Taken By, (c) = Cosigned By    Initials Name Provider Type    DD Nallely Parisi, PT Physical Therapist                Modalities       12/28/17 1500          Traction 45307    Traction Type Cervical  -TM      Rx Minutes 15 min  -TM      Position Supine  -TM      Weight 22  -TM      Hold 60  -TM      Relax 10  -TM        User Key  (r) = Recorded By, (t) = Taken By, (c) = Cosigned By    Initials Name Provider Type    TM Kandis Mcgrath, PTA Physical Therapy Assistant                Exercises       12/28/17 1600          Exercise 1    Exercise Name 1 PRO II UE fwd/bwd for ROM  -DD      Time (Minutes) 1 8  -DD      Exercise 2    Exercise Name 2 levator stretch  -DD      Reps 2 2  -DD      Time (Seconds) 2 30  -DD      Exercise 3    Exercise Name 3 UT stretch  -DD      Reps 3 2  -DD      Time (Seconds) 3 30  -DD      Exercise 4    Exercise Name 4 AROM cervical rotation  -TM      Sets 4 1  -TM      Reps 4 15  -TM      Exercise 5    Exercise Name 5 no money  -TM      Sets 5 2  -TM      Reps 5 10  -TM      Additional Comments red  -TM      Exercise 6    Exercise Name 6 T Band rows  -TM      Sets 6 2  -TM      Reps 6 10  -TM      Additional Comments red  -TM      Exercise 7    Exercise Name 7 T Band shoulder ext  -TM      Sets 7 2  -TM      Reps 7 10  -TM      Additional Comments red  -TM      Exercise 8    Exercise Name 8 Cervical ROM  -DD      Time (Minutes) 8 3  -DD      Exercise 9    Exercise  Name 9 isometric resisted MMT  -DD      Time (Minutes) 9 3  -DD      Exercise 10    Exercise Name 10 AROM shoulder flex to 90°  -TM      Sets 10 2  -TM      Reps 10 10  -TM      Exercise 11    Exercise Name 11 AROM shoulder Abd to 90°  -TM      Sets 11 2  -TM      Reps 11 10  -TM        User Key  (r) = Recorded By, (t) = Taken By, (c) = Cosigned By    Initials Name Provider Type    TM Kandis Mcgrath PTA Physical Therapy Assistant    DD Nallely Parisi, PT Physical Therapist                        Manual Rx (last 36 hours)      Manual Treatments       12/28/17 1500          Manual Rx 1    Manual Rx 1 Location cervical   -TM      Manual Rx 1 Type PA  & lateral glides  -TM      Manual Rx 1 Duration 5 min  -TM        User Key  (r) = Recorded By, (t) = Taken By, (c) = Cosigned By    Initials Name Provider Type    TM Kandis Jennifer Mcgrath PTA Physical Therapy Assistant                PT OP Goals       12/28/17 1629 12/28/17 1600    PT Short Term Goals    STG Date to Achieve  12/21/17  -DD    STG 1  Decrease NDI score to </= 20/50=40%  -DD    STG 1 Progress  Ongoing  -DD    STG 1 Progress Comments  46%  -DD    STG 2  L shoulder flexion >/= 130 degrees  -DD    STG 2 Progress  Met  -DD    STG 2 Progress Comments  155  -DD    STG 3  L shoulder abduction >/= 130 degrees  -DD    STG 3 Progress  Met  -DD    STG 3 Progress Comments  135  -DD    STG 4  Report 30% subjective improvement  -DD    STG 4 Progress  Met  -DD    Long Term Goals    LTG Date to Achieve  01/11/18  -DD    LTG 1  Decrease NDI score to </= 10/50 = 20%  -DD    LTG 1 Progress  Ongoing  -DD    LTG 2  Independent with HEP.  -DD    LTG 2 Progress  Ongoing  -DD    LTG 3  Sleep with no disturbances.  -DD    LTG 3 Progress  Ongoing  -DD    LTG 4  L shoulder AROM WNL  -DD    LTG 4 Progress  Progressing  -DD    LTG 5  L Shoulder MMT 5/5  -DD    LTG 5 Progress  Not Met  -DD    LTG 6  CROM WNLs  -DD    LTG 6 Progress  Progressing  -DD    Time Calculation    PT  Goal Re-Cert Due Date 01/18/18  -LUIS ANTONIO       User Key  (r) = Recorded By, (t) = Taken By, (c) = Cosigned By    Initials Name Provider Type    DD Nallely Parisi PT Physical Therapist               Outcome Measure Options: Neck Disability Index (NDI)  Neck Disability Index  Section 1 - Pain Intensity: The pain is very mild at the moment.  Section 2 - Personal Care: I can look after myself normally, but it causes extra pain.  Section 3 - Lifting: I can lift only very light weights.  Section 4 - Work: I can do most of my usual work, but no more  Section 5 - Headaches: I have moderate headaches that come frequently  Section 6 - Concentration: I can concentrate fully without difficulty.  Section 7 - Sleeping: My sleep is completely disturbed for up to 5-7 hours.  Section 8 - Driving: I can drive as long as I want with slight neck pain.  Section 9 - Reading: I can't read as much as I want because of severe neck pain.  Section 10 - Recreation: I have some neck pain with a few recreational activities.  Neck Disability Index Score: 23  Neck Disability Index Comments: 46%      Time Calculation:   Start Time: 1600  Stop Time: 1702  Time Calculation (min): 62 min  Total Timed Code Minutes- PT: 42 minute(s)    Therapy Charges for Today     Code Description Service Date Service Provider Modifiers Qty    48110397013 HC PT THER PROC EA 15 MIN 12/28/2017 Nallely Parisi, PT GP 1        Kandis Mcgrath traction and therapeutic exercises for remaining treatment.  PT G-Codes  Outcome Measure Options: Neck Disability Index (NDI)         Nallely Parisi, PT, ATC, DPT  12/28/2017

## 2018-02-12 ENCOUNTER — DOCUMENTATION (OUTPATIENT)
Dept: PHYSICAL THERAPY | Facility: HOSPITAL | Age: 78
End: 2018-02-12

## 2018-10-01 ENCOUNTER — CONSULT (OUTPATIENT)
Dept: SURGERY | Facility: CLINIC | Age: 78
End: 2018-10-01

## 2018-10-01 VITALS
HEIGHT: 65 IN | HEART RATE: 76 BPM | BODY MASS INDEX: 26.64 KG/M2 | SYSTOLIC BLOOD PRESSURE: 142 MMHG | WEIGHT: 159.9 LBS | TEMPERATURE: 97.7 F | DIASTOLIC BLOOD PRESSURE: 84 MMHG

## 2018-10-01 DIAGNOSIS — R92.1 BREAST CALCIFICATIONS ON MAMMOGRAM: Primary | ICD-10-CM

## 2018-10-01 PROCEDURE — 99203 OFFICE O/P NEW LOW 30 MIN: CPT | Performed by: SURGERY

## 2018-10-01 RX ORDER — GABAPENTIN 300 MG/1
1 CAPSULE ORAL 3 TIMES DAILY
Refills: 2 | COMMUNITY
Start: 2018-09-26

## 2018-10-01 RX ORDER — METHYLPREDNISOLONE 4 MG/1
TABLET ORAL
COMMUNITY
Start: 2018-09-24 | End: 2019-04-30

## 2018-10-01 RX ORDER — LIDOCAINE HYDROCHLORIDE AND EPINEPHRINE 10; 10 MG/ML; UG/ML
30 INJECTION, SOLUTION INFILTRATION; PERINEURAL ONCE
Status: CANCELLED | OUTPATIENT
Start: 2018-10-02 | End: 2018-10-02

## 2018-10-01 RX ORDER — ESOMEPRAZOLE MAGNESIUM 40 MG/1
40 CAPSULE, DELAYED RELEASE ORAL DAILY
COMMUNITY
End: 2018-10-03

## 2018-10-01 RX ORDER — ALBUTEROL SULFATE 90 UG/1
2 AEROSOL, METERED RESPIRATORY (INHALATION)
COMMUNITY
Start: 2018-04-26 | End: 2019-04-27

## 2018-10-01 NOTE — PROGRESS NOTES
Chief Complaint   Patient presents with   • Abnormal Breast Imaging     Abnormal breast and Mammograms.        HPI  No palpable masses, skin dimpling, nipple discharge, axillary adenopathy.       Suspicious findings BI-RADS 4.     Recommendation:  Stereotactic right breast biopsy.    [id number]   Result Narrative   DIAGNOSTIC DIGITAL RIGHT MAMMOGRAPHY AND LIMITED RIGHT BREAST ULTRASOUND    Indication:  Increasing microcalcifications in the right breast with nodular asymmetry noted.    Breast Composition:  The breast tissue is heterogeneously dense which lowers mammographic sensitivity.      Technique:  The exam was done with digital mammography.    Findings:  Magnification views show the group of slightly irregular clustered calcifications in the right breast. These are slightly pleomorphic and have slightly increased in number compared with previous mammography. No associated mass is seen. This   may represent benign fibrocystic disease, however, DCIS should be excluded and stereotactic biopsy is recommended.    The diagnostic views show the previously noted asymmetry is less apparent and essentially resolves. Targeted right breast ultrasound demonstrates vague shadowing in the breast that is not reproducible. No solid nor cystic mass is seen. No suspicious   sonographic findings are evident.    The results of the study were discussed with the patient as well as recommendations for stereotactic right breast biopsy. The patient reports she currently takes no anticoagulants.   ( I have personally reviewed the breast imaging and concur with the findings of the radiologist- BiRADS 4)    Past Medical History:   Diagnosis Date   • Arthritis    • Depression    • GERD (gastroesophageal reflux disease)    • Hyperlipidemia    • Hypertension    • Near syncope    • Vascular disease        Past Surgical History:   Procedure Laterality Date   • BLADDER SURGERY     • GALLBLADDER SURGERY     • PARTIAL HYSTERECTOMY            Current Outpatient Prescriptions:   •  esomeprazole (NexIUM) 40 MG capsule, Take 40 mg by mouth Every Morning Before Breakfast., Disp: , Rfl:   •  furosemide (LASIX) 20 MG tablet, Take 20 mg by mouth 2 (Two) Times a Day., Disp: , Rfl:   •  gabapentin (NEURONTIN) 300 MG capsule, Take 1 capsule by mouth 3 (Three) Times a Day., Disp: , Rfl: 2  •  losartan-hydrochlorothiazide (HYZAAR) 100-25 MG per tablet, Take 1 tablet by mouth Daily., Disp: , Rfl:   •  meclizine (ANTIVERT) 25 MG tablet, Take 25 mg by mouth 3 (Three) Times a Day As Needed for dizziness., Disp: , Rfl:   •  MethylPREDNISolone (MEDROL, NAVNEET,) 4 MG tablet, follow package directions, Disp: , Rfl:   •  potassium chloride (KLOR-CON) 20 MEQ packet, Take 20 mEq by mouth 2 (Two) Times a Day., Disp: , Rfl:   •  traMADol (ULTRAM) 50 MG tablet, , Disp: , Rfl: 2  •  venlafaxine XR (EFFEXOR-XR) 150 MG 24 hr capsule, Take 150 mg by mouth Daily., Disp: , Rfl:   •  albuterol (PROVENTIL HFA;VENTOLIN HFA) 108 (90 Base) MCG/ACT inhaler, Inhale 2 puffs., Disp: , Rfl:   No current facility-administered medications for this visit.     Allergies   Allergen Reactions   • Lortab [Hydrocodone-Acetaminophen] GI Intolerance   • Tuberculin Tests Swelling       Family History   Problem Relation Age of Onset   • Hypertension Mother    • Diabetes Paternal Aunt    • Diabetes Paternal Grandmother    • Hypertension Paternal Grandmother    • Hypertension Paternal Grandfather        Social History     Social History   • Marital status:      Spouse name: N/A   • Number of children: N/A   • Years of education: N/A     Occupational History   • Not on file.     Social History Main Topics   • Smoking status: Never Smoker   • Smokeless tobacco: Never Used   • Alcohol use No   • Drug use: No   • Sexual activity: Not on file     Other Topics Concern   • Not on file     Social History Narrative   • No narrative on file       Review of Systems   Constitutional: Negative.    HENT:  Negative.    Eyes: Negative.    Respiratory: Negative.    Cardiovascular: Negative.    Gastrointestinal:        Heartburn.   Endocrine:        Hot  Flashes.   Genitourinary: Negative.    Musculoskeletal: Negative.    Skin: Negative.    Allergic/Immunologic: Negative.    Neurological: Negative.    Hematological: Negative.    Psychiatric/Behavioral: Negative.        Physical Exam   Constitutional: She is oriented to person, place, and time. She appears well-developed and well-nourished. No distress.   HENT:   Head: Normocephalic and atraumatic.   Mouth/Throat: No oropharyngeal exudate.   Eyes: Pupils are equal, round, and reactive to light. EOM are normal. No scleral icterus.   Neck: Normal range of motion. Neck supple. No JVD present. No tracheal deviation present. No thyromegaly present.   Cardiovascular: Normal rate, regular rhythm and normal heart sounds.    Pulmonary/Chest: Effort normal and breath sounds normal. No stridor. No respiratory distress. She has no wheezes. She has no rales. She exhibits no tenderness. Right breast exhibits no inverted nipple, no mass, no nipple discharge, no skin change and no tenderness. Left breast exhibits no inverted nipple, no mass, no nipple discharge, no skin change and no tenderness. Breasts are symmetrical. There is no breast swelling.   Abdominal: Soft. Bowel sounds are normal. She exhibits no distension and no mass. There is no tenderness. There is no rebound and no guarding. No hernia.   Genitourinary: No breast tenderness, discharge or bleeding.   Musculoskeletal: Normal range of motion. She exhibits no edema, tenderness or deformity.   Lymphadenopathy:     She has no cervical adenopathy.     She has no axillary adenopathy.   Neurological: She is alert and oriented to person, place, and time.   Skin: Skin is warm and dry. No rash noted. She is not diaphoretic. No erythema. No pallor.   Psychiatric: She has a normal mood and affect. Her behavior is normal. Judgment  normal.   Vitals reviewed.        ASSESSMENT    Carol was seen today for abnormal breast imaging.    Diagnoses and all orders for this visit:    Breast calcifications on mammogram  -     Cancel: Obtain Informed Consent; Standing  -     Mammo Stereotactic Breast Biopsy Initial With & Without Device Right; Future  -     Tissue Pathology Exam; Standing  -     lidocaine-EPINEPHrine (XYLOCAINE W/EPI) 1 %-1:[id number] injection 30 mL; 30 mL by Infiltration route 1 (One) Time.        PLAN    1. Stereotactic mammotome RIGHT breast    What are the indications that have led your doctor to the opinion that an operation is necessary?    Breast imaging has determined an abnormal area requiring biopsy.    What, if any, alternative treatments are available for your condition?    Alternatively, open biopsy in the operating room may be performed under sedation or general anesthesia. Observation is not a good option.    What will be the likely result if you don't have the operation?    There is a possibility of missing a breast cancer diagnosis.    What are the basic procedures involved in the operation?    The patient will be placed prone for the procedure. A small incision will be made under local anesthesia, and a special, large needle will be used to perform the biopsy.   A permanent titanium clip will be placed in the breast to alissa the site of biopsy should further surgery be necessary.    What are the risks?    The risks of bleeding, infection, the possible need for open biopsy or other procedure, scarring, and poor wound healing are explained. Bruising almost always occurs. There is a low transfusion risk. The risks of chronic pain are, likewise, low.     How is the operation expected to improve your health or quality of life?    The lesion can usually be determined to be benign or malignant. Follow up xrays or further open excision may be necessary depending on the pathology.    Is hospitalization necessary and, if so, how long  can you expect to be hospitalized?    This procedure is performed on an outpatient basis.    What can you expect during your recovery period?    Minimal pain is usually controlled with non-narcotic analgesia.    When can you expect to resume normal activities?    Normal activity may be resumed the following day.    Are there likely to be residual effects from the operation?    Usually, there are no residual effects following biiopsy.    .   All questions were answered. The patient agrees to operation.              This document has been electronically signed by Dimitri Peterson MD on October 3, 2018 6:58 AM

## 2018-10-01 NOTE — PATIENT INSTRUCTIONS

## 2018-10-03 ENCOUNTER — HOSPITAL ENCOUNTER (OUTPATIENT)
Dept: MAMMOGRAPHY | Facility: HOSPITAL | Age: 78
Discharge: HOME | End: 2018-10-03
Admitting: SURGERY

## 2018-10-03 VITALS
BODY MASS INDEX: 26.41 KG/M2 | HEIGHT: 65 IN | OXYGEN SATURATION: 93 % | WEIGHT: 158.51 LBS | RESPIRATION RATE: 18 BRPM | HEART RATE: 64 BPM | SYSTOLIC BLOOD PRESSURE: 196 MMHG | DIASTOLIC BLOOD PRESSURE: 80 MMHG | TEMPERATURE: 98 F

## 2018-10-03 DIAGNOSIS — R92.1 BREAST CALCIFICATIONS ON MAMMOGRAM: ICD-10-CM

## 2018-10-03 PROCEDURE — 63710000001 OXYCODONE-ACETAMINOPHEN 5-325 MG TABLET: Performed by: SURGERY

## 2018-10-03 PROCEDURE — A9270 NON-COVERED ITEM OR SERVICE: HCPCS | Performed by: SURGERY

## 2018-10-03 PROCEDURE — 76098 X-RAY EXAM SURGICAL SPECIMEN: CPT | Performed by: SURGERY

## 2018-10-03 PROCEDURE — 63710000001 DIAZEPAM 5 MG TABLET: Performed by: SURGERY

## 2018-10-03 PROCEDURE — 88305 TISSUE EXAM BY PATHOLOGIST: CPT | Performed by: PATHOLOGY

## 2018-10-03 PROCEDURE — 88305 TISSUE EXAM BY PATHOLOGIST: CPT | Performed by: SURGERY

## 2018-10-03 PROCEDURE — 19081 BX BREAST 1ST LESION STRTCTC: CPT | Performed by: SURGERY

## 2018-10-03 PROCEDURE — A4648 IMPLANTABLE TISSUE MARKER: HCPCS

## 2018-10-03 RX ORDER — DIAZEPAM 5 MG/1
5 TABLET ORAL ONCE
Status: COMPLETED | OUTPATIENT
Start: 2018-10-03 | End: 2018-10-03

## 2018-10-03 RX ORDER — OXYCODONE HYDROCHLORIDE AND ACETAMINOPHEN 5; 325 MG/1; MG/1
1 TABLET ORAL ONCE
Status: COMPLETED | OUTPATIENT
Start: 2018-10-03 | End: 2018-10-03

## 2018-10-03 RX ORDER — LIDOCAINE HYDROCHLORIDE AND EPINEPHRINE 10; 10 MG/ML; UG/ML
20 INJECTION, SOLUTION INFILTRATION; PERINEURAL ONCE
Status: COMPLETED | OUTPATIENT
Start: 2018-10-03 | End: 2018-10-03

## 2018-10-03 RX ADMIN — LIDOCAINE HYDROCHLORIDE,EPINEPHRINE BITARTRATE 20 ML: 10; .01 INJECTION, SOLUTION INFILTRATION; PERINEURAL at 07:39

## 2018-10-03 RX ADMIN — OXYCODONE HYDROCHLORIDE AND ACETAMINOPHEN 1 TABLET: 5; 325 TABLET ORAL at 06:11

## 2018-10-03 RX ADMIN — DIAZEPAM 5 MG: 5 TABLET ORAL at 06:11

## 2018-10-04 LAB
LAB AP CASE REPORT: NORMAL
PATH REPORT.FINAL DX SPEC: NORMAL
PATH REPORT.GROSS SPEC: NORMAL

## 2018-10-08 ENCOUNTER — OFFICE VISIT (OUTPATIENT)
Dept: SURGERY | Facility: CLINIC | Age: 78
End: 2018-10-08

## 2018-10-08 VITALS
TEMPERATURE: 97 F | HEIGHT: 65 IN | BODY MASS INDEX: 26.56 KG/M2 | HEART RATE: 82 BPM | DIASTOLIC BLOOD PRESSURE: 78 MMHG | WEIGHT: 159.4 LBS | SYSTOLIC BLOOD PRESSURE: 122 MMHG

## 2018-10-08 DIAGNOSIS — R92.1 BREAST CALCIFICATIONS ON MAMMOGRAM: Primary | ICD-10-CM

## 2018-10-08 PROCEDURE — 99212 OFFICE O/P EST SF 10 MIN: CPT | Performed by: SURGERY

## 2018-10-08 NOTE — PATIENT INSTRUCTIONS

## 2018-10-10 DIAGNOSIS — R92.8 ABNORMAL MAMMOGRAM: Primary | ICD-10-CM

## 2018-10-12 NOTE — PROGRESS NOTES
CHIEF COMPLAINT:   Chief Complaint   Patient presents with   • Follow-up     Recheck right breast Mammotome.       HPI: This patient presents for a post-operative visit after undergoing a right stereotactic or needle biopsy. Patient reports no problems.  Minimal pain.  Minimal bruising.    PATHOLOGY:   Tissue Pathology Exam   Order: [id number]   Status:  Final result   Visible to patient:  No (Not Released)   Dx:  Breast calcifications on mammogram   Component 8d ago   Final Diagnosis   MAMMOTOME CORES, RIGHT BREAST:  LARGE CALCIFIC DEPOSITS INVOLVING BENIGN DUCTS AND LOBULES AND FOCAL FIBROSIS OF STROMA.   Electronically signed by Alistair Thorne MD on 10/4/2018 at 1421             Physical Exam   Pulmonary/Chest: Right breast exhibits no inverted nipple, no mass, no nipple discharge, no skin change and no tenderness. Left breast exhibits no inverted nipple, no mass, no nipple discharge, no skin change and no tenderness. Breasts are symmetrical. There is no breast swelling.   Genitourinary: No breast tenderness, discharge or bleeding.       ASSESSMENT:    Carol was seen today for follow-up.    Diagnoses and all orders for this visit:    Breast calcifications on mammogram  Comments:  Biopsy proven benign        PLAN:    1. The patient will follow-up in 6 week for repeat imaging  2. May shower.   3. May return to normal activity without restrictions.          This document has been electronically signed by Dimitri Peterson MD on October 11, 2018 7:54 PM

## 2018-11-21 DIAGNOSIS — R92.8 ABNORMAL MAMMOGRAM: ICD-10-CM

## 2018-11-28 ENCOUNTER — OFFICE VISIT (OUTPATIENT)
Dept: SURGERY | Facility: CLINIC | Age: 78
End: 2018-11-28

## 2018-11-28 VITALS
BODY MASS INDEX: 26.53 KG/M2 | HEART RATE: 88 BPM | TEMPERATURE: 98 F | DIASTOLIC BLOOD PRESSURE: 80 MMHG | HEIGHT: 65 IN | SYSTOLIC BLOOD PRESSURE: 180 MMHG

## 2018-11-28 DIAGNOSIS — R92.1 BREAST CALCIFICATIONS ON MAMMOGRAM: Primary | ICD-10-CM

## 2018-11-28 PROCEDURE — 99212 OFFICE O/P EST SF 10 MIN: CPT | Performed by: SURGERY

## 2018-11-28 RX ORDER — CEFPROZIL 500 MG/1
TABLET, FILM COATED ORAL
Refills: 0 | COMMUNITY
Start: 2018-11-20 | End: 2019-04-30

## 2018-11-28 RX ORDER — DEXTROMETHORPHAN HYDROBROMIDE AND PROMETHAZINE HYDROCHLORIDE 15; 6.25 MG/5ML; MG/5ML
SYRUP ORAL
Refills: 0 | COMMUNITY
Start: 2018-11-20 | End: 2019-04-30

## 2018-12-02 NOTE — PROGRESS NOTES
Chief Complaint   Patient presents with   • Follow-up     Recheck breast and mammograms        HPI  No newly palpable masses, skin dimpling, nipple discharge, axillary adenopathy. Benign mammotome 10/2018  Mammograms:  BiRADS 2- personally reviewed  Past Medical History:   Diagnosis Date   • Arthritis    • Depression    • GERD (gastroesophageal reflux disease)    • Hyperlipidemia    • Hypertension    • Near syncope    • Vascular disease        Past Surgical History:   Procedure Laterality Date   • BLADDER SURGERY     • GALLBLADDER SURGERY     • PARTIAL HYSTERECTOMY           Current Outpatient Medications:   •  albuterol (PROVENTIL HFA;VENTOLIN HFA) 108 (90 Base) MCG/ACT inhaler, Inhale 2 puffs., Disp: , Rfl:   •  cefprozil (CEFZIL) 500 MG tablet, , Disp: , Rfl: 0  •  esomeprazole (NexIUM) 40 MG capsule, Take 40 mg by mouth Every Morning Before Breakfast., Disp: , Rfl:   •  furosemide (LASIX) 20 MG tablet, Take 20 mg by mouth 2 (Two) Times a Day., Disp: , Rfl:   •  gabapentin (NEURONTIN) 300 MG capsule, Take 1 capsule by mouth 3 (Three) Times a Day., Disp: , Rfl: 2  •  losartan-hydrochlorothiazide (HYZAAR) 100-25 MG per tablet, Take 1 tablet by mouth Daily., Disp: , Rfl:   •  meclizine (ANTIVERT) 25 MG tablet, Take 25 mg by mouth 3 (Three) Times a Day As Needed for dizziness., Disp: , Rfl:   •  MethylPREDNISolone (MEDROL, NAVNEET,) 4 MG tablet, follow package directions, Disp: , Rfl:   •  MUCINEX 600 MG 12 hr tablet, , Disp: , Rfl: 0  •  potassium chloride (KLOR-CON) 20 MEQ packet, Take 20 mEq by mouth 2 (Two) Times a Day., Disp: , Rfl:   •  promethazine-dextromethorphan (PROMETHAZINE-DM) 6.25-15 MG/5ML syrup, , Disp: , Rfl: 0  •  traMADol (ULTRAM) 50 MG tablet, , Disp: , Rfl: 2  •  venlafaxine XR (EFFEXOR-XR) 150 MG 24 hr capsule, Take 150 mg by mouth Daily., Disp: , Rfl:     Allergies   Allergen Reactions   • Lortab [Hydrocodone-Acetaminophen] GI Intolerance   • Tuberculin Tests Swelling       Family History   Problem  Relation Age of Onset   • Hypertension Mother    • Diabetes Paternal Aunt    • Diabetes Paternal Grandmother    • Hypertension Paternal Grandmother    • Hypertension Paternal Grandfather        Social History     Socioeconomic History   • Marital status:      Spouse name: Not on file   • Number of children: Not on file   • Years of education: Not on file   • Highest education level: Not on file   Social Needs   • Financial resource strain: Not on file   • Food insecurity - worry: Not on file   • Food insecurity - inability: Not on file   • Transportation needs - medical: Not on file   • Transportation needs - non-medical: Not on file   Occupational History   • Not on file   Tobacco Use   • Smoking status: Never Smoker   • Smokeless tobacco: Never Used   Substance and Sexual Activity   • Alcohol use: No   • Drug use: No   • Sexual activity: Not on file   Other Topics Concern   • Not on file   Social History Narrative   • Not on file       Physical Exam   Pulmonary/Chest: Right breast exhibits no inverted nipple, no mass, no nipple discharge, no skin change and no tenderness. Left breast exhibits no inverted nipple, no mass, no nipple discharge, no skin change and no tenderness. Breasts are symmetrical. There is no breast swelling.   Genitourinary: No breast tenderness, discharge or bleeding.         ASSESSMENT    Carol was seen today for follow-up.    Diagnoses and all orders for this visit:    Breast calcifications on mammogram        PLAN    1. Recheck yearly mammograms and exam              This document has been electronically signed by Dimitri Peterson MD on December 1, 2018 10:39 PM

## 2019-04-30 RX ORDER — SUCRALFATE 1 G/1
1 TABLET ORAL 4 TIMES DAILY
COMMUNITY
End: 2020-07-17

## 2019-04-30 RX ORDER — AMLODIPINE BESYLATE 5 MG/1
5 TABLET ORAL DAILY
COMMUNITY
End: 2020-07-17

## 2019-04-30 RX ORDER — RABEPRAZOLE SODIUM 20 MG/1
20 TABLET, DELAYED RELEASE ORAL DAILY
COMMUNITY
End: 2020-07-17

## 2019-05-06 ENCOUNTER — ANESTHESIA EVENT (OUTPATIENT)
Dept: GASTROENTEROLOGY | Facility: HOSPITAL | Age: 79
End: 2019-05-06

## 2019-05-06 ENCOUNTER — HOSPITAL ENCOUNTER (OUTPATIENT)
Facility: HOSPITAL | Age: 79
Setting detail: HOSPITAL OUTPATIENT SURGERY
Discharge: HOME | End: 2019-05-06
Attending: INTERNAL MEDICINE | Admitting: INTERNAL MEDICINE

## 2019-05-06 ENCOUNTER — ANESTHESIA (OUTPATIENT)
Dept: GASTROENTEROLOGY | Facility: HOSPITAL | Age: 79
End: 2019-05-06

## 2019-05-06 VITALS
OXYGEN SATURATION: 94 % | HEIGHT: 65 IN | SYSTOLIC BLOOD PRESSURE: 166 MMHG | BODY MASS INDEX: 26.63 KG/M2 | TEMPERATURE: 97.2 F | RESPIRATION RATE: 18 BRPM | DIASTOLIC BLOOD PRESSURE: 79 MMHG | HEART RATE: 74 BPM | WEIGHT: 159.83 LBS

## 2019-05-06 DIAGNOSIS — K21.9 ESOPHAGEAL REFLUX: ICD-10-CM

## 2019-05-06 PROCEDURE — 25010000002 PROPOFOL 10 MG/ML EMULSION: Performed by: NURSE ANESTHETIST, CERTIFIED REGISTERED

## 2019-05-06 PROCEDURE — 88305 TISSUE EXAM BY PATHOLOGIST: CPT | Performed by: INTERNAL MEDICINE

## 2019-05-06 PROCEDURE — 88305 TISSUE EXAM BY PATHOLOGIST: CPT | Performed by: PATHOLOGY

## 2019-05-06 RX ORDER — PROPOFOL 10 MG/ML
VIAL (ML) INTRAVENOUS AS NEEDED
Status: DISCONTINUED | OUTPATIENT
Start: 2019-05-06 | End: 2019-05-06 | Stop reason: SURG

## 2019-05-06 RX ORDER — DEXTROSE AND SODIUM CHLORIDE 5; .45 G/100ML; G/100ML
20 INJECTION, SOLUTION INTRAVENOUS CONTINUOUS
Status: DISCONTINUED | OUTPATIENT
Start: 2019-05-06 | End: 2019-05-06 | Stop reason: HOSPADM

## 2019-05-06 RX ORDER — LIDOCAINE HYDROCHLORIDE 10 MG/ML
INJECTION, SOLUTION INFILTRATION; PERINEURAL AS NEEDED
Status: DISCONTINUED | OUTPATIENT
Start: 2019-05-06 | End: 2019-05-06 | Stop reason: SURG

## 2019-05-06 RX ADMIN — PROPOFOL 90 MG: 10 INJECTION, EMULSION INTRAVENOUS at 16:13

## 2019-05-06 RX ADMIN — LIDOCAINE HYDROCHLORIDE 80 MG: 10 INJECTION, SOLUTION INFILTRATION; PERINEURAL at 16:13

## 2019-05-06 RX ADMIN — DEXTROSE AND SODIUM CHLORIDE 20 ML/HR: 5; 450 INJECTION, SOLUTION INTRAVENOUS at 14:59

## 2019-05-06 NOTE — H&P
" Rodriguez Morris DO,[facility]  Gastroenterology  Hepatology  Endoscopy  Board Certified in Internal Medicine and gastroenterology  [address], [address]  [place]. [address]  - [phone]   F - [phone]     GASTROENTEROLOGY HISTORY AND PHYSICAL  NOTE   RODRIGUEZ MORRIS DO.         SUBJECTIVE:   5/6/2019    Name: Carol Sullivan  DOD: 1940      Chief Complaint:     Subjective : Dysphasia and odynophagia    Patient is 78 y.o. female presents with desire for elective EGD with biopsies and dilation.      ROS/HISTORY/ CURRENT MEDICATIONS/OBJECTIVE/VS/PE:   Review of Systems:  All systems unremarkable unless specified below.  Constitutional   HENT  Eyes   Respiratory    Cardiovascular  Gastrointestinal   Endocrine  Genitourinary    Musculoskeletal   Skin  Allergic/Immunologic    Neurological    Hematological  Psychiatric/Behavioral    History:     Past Medical History:   Diagnosis Date   • Arthritis    • Depression    • GERD (gastroesophageal reflux disease)    • Hyperlipidemia    • Hypertension    • Near syncope    • Vascular disease      Past Surgical History:   Procedure Laterality Date   • BLADDER SURGERY     • GALLBLADDER SURGERY     • PARTIAL HYSTERECTOMY     • SHOULDER SURGERY      \"bone spurs\"     Family History   Problem Relation Age of Onset   • Hypertension Mother    • Diabetes Paternal Aunt    • Diabetes Paternal Grandmother    • Hypertension Paternal Grandmother    • Hypertension Paternal Grandfather      Social History     Tobacco Use   • Smoking status: Never Smoker   • Smokeless tobacco: Never Used   Substance Use Topics   • Alcohol use: No   • Drug use: No     Medications Prior to Admission   Medication Sig Dispense Refill Last Dose   • amLODIPine (NORVASC) 5 MG tablet Take 5 mg by mouth Daily.   5/6/2019 at Unknown time   • furosemide (LASIX) 20 MG tablet Take 20 mg by mouth 2 (Two) Times a Day.   5/5/2019 at Unknown time   • gabapentin (NEURONTIN) 300 MG capsule Take 1 capsule " by mouth 3 (Three) Times a Day.  2 5/5/2019 at Unknown time   • losartan-hydrochlorothiazide (HYZAAR) 100-25 MG per tablet Take 1 tablet by mouth Daily.   5/5/2019 at Unknown time   • meclizine (ANTIVERT) 25 MG tablet Take 25 mg by mouth 3 (Three) Times a Day As Needed for dizziness.   5/5/2019 at Unknown time   • potassium chloride (KLOR-CON) 20 MEQ packet Take 20 mEq by mouth 2 (Two) Times a Day.   5/5/2019 at Unknown time   • RABEprazole (ACIPHEX) 20 MG EC tablet Take 20 mg by mouth Daily.   5/5/2019 at Unknown time   • sucralfate (CARAFATE) 1 g tablet Take 1 g by mouth 4 (Four) Times a Day.   5/5/2019 at Unknown time   • traMADol (ULTRAM) 50 MG tablet Take 50 mg by mouth Every 8 (Eight) Hours As Needed for Moderate Pain .  2 5/5/2019 at Unknown time   • venlafaxine XR (EFFEXOR-XR) 150 MG 24 hr capsule Take 150 mg by mouth Daily.   5/6/2019 at Unknown time     Allergies:  Lortab [hydrocodone-acetaminophen] and Tuberculin tests    I have reviewed the patients medical history, surgical history and family history in the available medical record system.     Current Medications:     Current Facility-Administered Medications   Medication Dose Route Frequency Provider Last Rate Last Dose   • dextrose 5 % and sodium chloride 0.45 % infusion  20 mL/hr Intravenous Continuous Alberto Sanchez DO 20 mL/hr at 05/06/19 1459 20 mL/hr at 05/06/19 1459       Objective     Physical Exam:   Temp:  [97.6 °F (36.4 °C)] 97.6 °F (36.4 °C)  Heart Rate:  [72] 72  Resp:  [18] 18  BP: (194)/(85) 194/85    Physical Exam:  General Appearance:    Alert, cooperative, in no acute distress   Head:    Normocephalic, without obvious abnormality, atraumatic   Eyes:            Lids and lashes normal, conjunctivae and sclerae normal, no   icterus, no pallor, corneas clear, PERRLA   Ears:    Ears appear intact with no abnormalities noted   Throat:   No oral lesions, no thrush, oral mucosa moist   Neck:   No adenopathy, supple, trachea midline, no  thyromegaly, no     carotid bruit, no JVD   Back:     No kyphosis present, no scoliosis present, no skin lesions,       erythema or scars, no tenderness to percussion or                   palpation,   range of motion normal   Lungs:     Clear to auscultation,respirations regular, even and                   unlabored    Heart:    Regular rhythm and normal rate, normal S1 and S2, no            murmur, no gallop, no rub, no click   Breast Exam:    Deferred   Abdomen:     Normal bowel sounds, no masses, no organomegaly, soft        non-tender, non-distended, no guarding, no rebound                 tenderness   Genitalia:    Deferred   Extremities:   Moves all extremities well, no edema, no cyanosis, no              redness   Pulses:   Pulses palpable and equal bilaterally   Skin:   No bleeding, bruising or rash   Lymph nodes:   No palpable adenopathy   Neurologic:   Cranial nerves 2 - 12 grossly intact, sensation intact, DTR        present and equal bilaterally      Results Review:     Lab Results   Component Value Date    WBC 7.2 09/30/2015    HGB 12.2 09/30/2015    HCT 35.8 09/30/2015     09/30/2015             No results found for: LIPASE  No results found for: INR       Radiology Review:  Imaging Results (last 72 hours)     ** No results found for the last 72 hours. **           I reviewed the patient's new clinical results.  I reviewed the patient's new imaging results and agree with the interpretation.     ASSESSMENT/PLAN:   ASSESSMENT:  1.  Dysphagia  2.  Odynophagia, probably related to acid reflux.  Exclude the possibility of infectious esophagitis or neoplasm    PLAN:  1.  Esophagogastroduodenoscopy with biopsies and dilation as required    Risk and benefits associated with the procedure are reviewed with the patient.  The patient wished to proceed     Alberto Sanchez DO  05/06/19  3:06 PM      [id number]

## 2019-05-06 NOTE — ANESTHESIA POSTPROCEDURE EVALUATION
Patient: Carol Sullivan    Procedure Summary     Date:  05/06/19 Room / Location:  [facility] ENDOSCOPY 2 / [facility] ENDOSCOPY    Anesthesia Start:  1611 Anesthesia Stop:  1626    Procedure:  ESOPHAGOGASTRODUODENOSCOPY (N/A ) Diagnosis:       Esophageal reflux      (Esophageal reflux [K21.9])    Surgeon:  Alberto Sanchez DO Provider:  Isaías Gomez CRNA    Anesthesia Type:  MAC ASA Status:  3          Anesthesia Type: MAC  Last vitals  BP   (!) 194/85 (05/06/19 1448)   Temp   97.6 °F (36.4 °C) (05/06/19 1448)   Pulse   72 (05/06/19 1448)   Resp   18 (05/06/19 1448)     SpO2   96 % (05/06/19 1448)     Post Anesthesia Care and Evaluation    Patient location during evaluation: bedside  Patient participation: complete - patient participated  Level of consciousness: awake  Pain score: 0  Pain management: adequate  Airway patency: patent  Anesthetic complications: No anesthetic complications  PONV Status: none  Cardiovascular status: acceptable  Respiratory status: acceptable  Hydration status: acceptable

## 2019-05-06 NOTE — ANESTHESIA PREPROCEDURE EVALUATION
Anesthesia Evaluation     Patient summary reviewed and Nursing notes reviewed   NPO Solid Status: > 8 hours  NPO Liquid Status: > 2 hours           Airway   No difficulty expected  Dental      Pulmonary - negative pulmonary ROS and normal exam   Cardiovascular     Rhythm: regular  Rate: normal        Neuro/Psych  GI/Hepatic/Renal/Endo      Musculoskeletal (-) negative ROS    Abdominal    Substance History      OB/GYN          Other                        Anesthesia Plan    ASA 3     MAC     intravenous induction   Anesthetic plan, all risks, benefits, and alternatives have been provided, discussed and informed consent has been obtained with: patient.    Plan discussed with CRNA.

## 2019-05-08 LAB
LAB AP CASE REPORT: NORMAL
PATH REPORT.FINAL DX SPEC: NORMAL
PATH REPORT.GROSS SPEC: NORMAL

## 2020-01-08 DIAGNOSIS — Z12.31 SCREENING MAMMOGRAM FOR HIGH-RISK PATIENT: Primary | ICD-10-CM

## 2020-01-27 ENCOUNTER — OFFICE VISIT (OUTPATIENT)
Dept: SURGERY | Facility: CLINIC | Age: 80
End: 2020-01-27

## 2020-01-27 VITALS
WEIGHT: 162 LBS | DIASTOLIC BLOOD PRESSURE: 88 MMHG | SYSTOLIC BLOOD PRESSURE: 162 MMHG | BODY MASS INDEX: 26.99 KG/M2 | TEMPERATURE: 98.2 F | HEIGHT: 65 IN | HEART RATE: 66 BPM

## 2020-01-27 DIAGNOSIS — Z12.39 ENCOUNTER FOR SCREENING BREAST EXAMINATION: ICD-10-CM

## 2020-01-27 DIAGNOSIS — Z12.31 ENCOUNTER FOR SCREENING MAMMOGRAM FOR MALIGNANT NEOPLASM OF BREAST: Primary | ICD-10-CM

## 2020-01-27 PROCEDURE — 99212 OFFICE O/P EST SF 10 MIN: CPT | Performed by: NURSE PRACTITIONER

## 2020-01-27 RX ORDER — LOSARTAN POTASSIUM 100 MG/1
TABLET ORAL
COMMUNITY
Start: 2019-12-07 | End: 2020-08-19 | Stop reason: SDUPTHER

## 2020-01-27 RX ORDER — LANSOPRAZOLE 30 MG/1
CAPSULE, DELAYED RELEASE ORAL
COMMUNITY
Start: 2020-01-08 | End: 2021-10-08 | Stop reason: SDUPTHER

## 2020-01-27 RX ORDER — MIRABEGRON 25 MG/1
TABLET, FILM COATED, EXTENDED RELEASE ORAL
COMMUNITY
Start: 2019-12-23 | End: 2020-07-17

## 2020-01-27 RX ORDER — KETOCONAZOLE 20 MG/ML
SHAMPOO TOPICAL
COMMUNITY
Start: 2019-12-10 | End: 2020-07-17

## 2020-01-27 RX ORDER — ALBUTEROL SULFATE 90 UG/1
AEROSOL, METERED RESPIRATORY (INHALATION)
COMMUNITY
Start: 2019-10-23

## 2020-01-27 RX ORDER — DULOXETIN HYDROCHLORIDE 60 MG/1
CAPSULE, DELAYED RELEASE ORAL
COMMUNITY
Start: 2020-01-20

## 2020-01-27 NOTE — PROGRESS NOTES
"Chief Complaint: This is a 79 y.o. woman seen in consultation for routine followup benign breast disease    History of Present Illness:  Patient has noted no new masses, skin changes, nipple discharge, nipple changes prior to her most recent imaging.  Her most recent imaging was brought in by disc from [facility].  She does have a history of benign mammotome imaging findings suggest the following from report:    Fibroglandular pattern is stable. No new or increasing suspicious mass, calcification or area of architectural distortion is demonstrated.  There has been no significant interval change. Marker clip from prior biopsy again noted in the   posterior right breast. A few scattered groups of benign-appearing calcifications remain stable in both breasts.    The breast tissue is heterogeneously dense.    ( I have personally reviewed the breast imaging along with Dr. Peterson and concur with the findings of the radiologist- BiRADS 2)        Past Medical History:   Diagnosis Date   • Arthritis    • Depression    • GERD (gastroesophageal reflux disease)    • Hyperlipidemia    • Hypertension    • Near syncope    • Vascular disease      Past Surgical History:   Procedure Laterality Date   • BLADDER SURGERY     • ENDOSCOPY N/A 5/6/2019    Procedure: ESOPHAGOGASTRODUODENOSCOPY;  Surgeon: Alberto Sanchez DO;  Location: [facility] ENDOSCOPY;  Service: Gastroenterology   • GALLBLADDER SURGERY     • SHOULDER SURGERY      \"bone spurs\"   • SUBTOTAL HYSTERECTOMY       Prior to Admission medications    Medication Sig Start Date End Date Taking? Authorizing Provider   amLODIPine (NORVASC) 5 MG tablet Take 5 mg by mouth Daily.    ProviderRubi MD   DULoxetine (CYMBALTA) 60 MG capsule  1/20/20   ProviderRubi MD   furosemide (LASIX) 20 MG tablet Take 20 mg by mouth 2 (Two) Times a Day.    Rubi Seymour MD   gabapentin (NEURONTIN) 300 MG capsule Take 1 capsule by mouth 3 (Three) Times a Day. 9/26/18   " Rubi Seymour MD   ketoconazole (NIZORAL) 2 % shampoo  12/10/19   Rubi Seymour MD   lansoprazole (PREVACID) 30 MG capsule  1/8/20   Rubi Seymour MD   losartan (COZAAR) 100 MG tablet  12/7/19   Rubi Seymour MD   meclizine (ANTIVERT) 25 MG tablet Take 25 mg by mouth 3 (Three) Times a Day As Needed for dizziness.    Rubi Seymour MD   MYRBETRIQ 25 MG tablet sustained-release 24 hour 24 hr tablet  12/23/19   Rubi Seymuor MD   potassium chloride (KLOR-CON) 20 MEQ packet Take 20 mEq by mouth 2 (Two) Times a Day.    Rubi Seymour MD   RABEprazole (ACIPHEX) 20 MG EC tablet Take 20 mg by mouth Daily.    Rubi Seymour MD   sucralfate (CARAFATE) 1 g tablet Take 1 g by mouth 4 (Four) Times a Day.    Rubi Seymour MD   traMADol (ULTRAM) 50 MG tablet Take 50 mg by mouth Every 8 (Eight) Hours As Needed for Moderate Pain . 9/15/16   Rubi Seymour MD   venlafaxine XR (EFFEXOR-XR) 150 MG 24 hr capsule Take 150 mg by mouth Daily.    Rubi Seymour MD   VENTOLIN  (90 Base) MCG/ACT inhaler  10/23/19   Rubi Seymour MD   losartan-hydrochlorothiazide (HYZAAR) 100-25 MG per tablet Take 1 tablet by mouth Daily.  1/27/20  Rubi Seymour MD     Allergies   Allergen Reactions   • Tuberculin Tests Swelling   • Lortab [Hydrocodone-Acetaminophen] GI Intolerance     Family History   Problem Relation Age of Onset   • Hypertension Mother    • Diabetes Paternal Aunt    • Diabetes Paternal Grandmother    • Hypertension Paternal Grandmother    • Hypertension Paternal Grandfather      Social History     Socioeconomic History   • Marital status:      Spouse name: Not on file   • Number of children: Not on file   • Years of education: Not on file   • Highest education level: Not on file   Tobacco Use   • Smoking status: Never Smoker   • Smokeless tobacco: Never Used   Substance and Sexual Activity   • Alcohol use: No   • Drug use: No   •  Sexual activity: Defer     Review of Systems   Constitutional: Negative for activity change, chills and fatigue.   HENT: Negative for sore throat and trouble swallowing.    Respiratory: Negative for chest tightness and shortness of breath.    Cardiovascular: Negative for chest pain and leg swelling.   Gastrointestinal: Negative for abdominal distention, constipation and diarrhea.   Endocrine: Negative for cold intolerance and heat intolerance.   Musculoskeletal: Negative for arthralgias and myalgias.   Skin: Negative for rash and wound.   Neurological: Negative for dizziness and syncope.   Hematological: Negative for adenopathy. Does not bruise/bleed easily.   Psychiatric/Behavioral: Negative for self-injury. The patient is not nervous/anxious.      Physical Exam   Constitutional: She is oriented to person, place, and time. She appears well-developed and well-nourished.   HENT:   Head: Normocephalic.   Eyes: No scleral icterus.   Neck: Normal range of motion. No tracheal deviation present. No thyromegaly present.   Cardiovascular: Normal rate and regular rhythm. Exam reveals no friction rub.   No murmur heard.  Pulmonary/Chest: Effort normal and breath sounds normal. She has no wheezes. She exhibits no tenderness. Right breast exhibits no inverted nipple, no mass, no nipple discharge, no skin change and no tenderness. Left breast exhibits no inverted nipple, no mass, no nipple discharge, no skin change and no tenderness. No breast swelling, tenderness, discharge or bleeding. Breasts are symmetrical.   Abdominal: Soft. She exhibits no distension. There is no tenderness.   Musculoskeletal: Normal range of motion. She exhibits no edema or deformity.   Lymphadenopathy:     She has no cervical adenopathy.     She has no axillary adenopathy.   Neurological: She is alert and oriented to person, place, and time.   Skin: Skin is warm and dry. No erythema. No pallor.   Psychiatric: She has a normal mood and affect.   Vitals  reviewed.    Vitals:    01/27/20 1019   BP: 162/88   Pulse: 66   Temp: 98.2 °F (36.8 °C)     Assessment:    Carol was seen today for follow-up.    Diagnoses and all orders for this visit:    Encounter for screening mammogram for malignant neoplasm of breast    Encounter for screening breast examination        Plan:  1.  Encourage self breast exams  2.  Continue annual mammography and follow-up exam              This document has been electronically signed by COY Parson on January 27, 2020 11:45 AM

## 2020-05-31 ENCOUNTER — APPOINTMENT (OUTPATIENT)
Dept: GENERAL RADIOLOGY | Facility: HOSPITAL | Age: 80
End: 2020-05-31

## 2020-05-31 ENCOUNTER — HOSPITAL ENCOUNTER (EMERGENCY)
Facility: HOSPITAL | Age: 80
Discharge: ANOTHER HOSPITAL | End: 2020-05-31
Attending: EMERGENCY MEDICINE | Admitting: EMERGENCY MEDICINE

## 2020-05-31 ENCOUNTER — APPOINTMENT (OUTPATIENT)
Dept: CT IMAGING | Facility: HOSPITAL | Age: 80
End: 2020-05-31

## 2020-05-31 VITALS
HEIGHT: 65 IN | WEIGHT: 170.5 LBS | BODY MASS INDEX: 28.41 KG/M2 | TEMPERATURE: 99.1 F | OXYGEN SATURATION: 97 % | RESPIRATION RATE: 18 BRPM | SYSTOLIC BLOOD PRESSURE: 116 MMHG | DIASTOLIC BLOOD PRESSURE: 56 MMHG | HEART RATE: 88 BPM

## 2020-05-31 DIAGNOSIS — I71.019 ACUTE THORACIC AORTIC DISSECTION (HCC): Primary | ICD-10-CM

## 2020-05-31 LAB
ALBUMIN SERPL-MCNC: 4 G/DL (ref 3.5–5.2)
ALBUMIN/GLOB SERPL: 1.3 G/DL
ALP SERPL-CCNC: 131 U/L (ref 39–117)
ALT SERPL W P-5'-P-CCNC: 9 U/L (ref 1–33)
ANION GAP SERPL CALCULATED.3IONS-SCNC: 11 MMOL/L (ref 5–15)
AST SERPL-CCNC: 21 U/L (ref 1–32)
BASOPHILS # BLD AUTO: 0.09 10*3/MM3 (ref 0–0.2)
BASOPHILS NFR BLD AUTO: 0.8 % (ref 0–1.5)
BILIRUB SERPL-MCNC: 0.2 MG/DL (ref 0.2–1.2)
BUN BLD-MCNC: 13 MG/DL (ref 8–23)
BUN/CREAT SERPL: 16.5 (ref 7–25)
CALCIUM SPEC-SCNC: 8.7 MG/DL (ref 8.6–10.5)
CHLORIDE SERPL-SCNC: 104 MMOL/L (ref 98–107)
CO2 SERPL-SCNC: 25 MMOL/L (ref 22–29)
CREAT BLD-MCNC: 0.79 MG/DL (ref 0.57–1)
DEPRECATED RDW RBC AUTO: 42.5 FL (ref 37–54)
EOSINOPHIL # BLD AUTO: 0.4 10*3/MM3 (ref 0–0.4)
EOSINOPHIL NFR BLD AUTO: 3.5 % (ref 0.3–6.2)
ERYTHROCYTE [DISTWIDTH] IN BLOOD BY AUTOMATED COUNT: 13.8 % (ref 12.3–15.4)
GFR SERPL CREATININE-BSD FRML MDRD: 70 ML/MIN/1.73
GLOBULIN UR ELPH-MCNC: 3 GM/DL
GLUCOSE BLD-MCNC: 167 MG/DL (ref 65–99)
HCT VFR BLD AUTO: 38.3 % (ref 34–46.6)
HGB BLD-MCNC: 12.5 G/DL (ref 12–15.9)
HOLD SPECIMEN: NORMAL
IMM GRANULOCYTES # BLD AUTO: 0.04 10*3/MM3 (ref 0–0.05)
IMM GRANULOCYTES NFR BLD AUTO: 0.3 % (ref 0–0.5)
LYMPHOCYTES # BLD AUTO: 1.52 10*3/MM3 (ref 0.7–3.1)
LYMPHOCYTES NFR BLD AUTO: 13.3 % (ref 19.6–45.3)
MCH RBC QN AUTO: 27.5 PG (ref 26.6–33)
MCHC RBC AUTO-ENTMCNC: 32.6 G/DL (ref 31.5–35.7)
MCV RBC AUTO: 84.4 FL (ref 79–97)
MONOCYTES # BLD AUTO: 1.11 10*3/MM3 (ref 0.1–0.9)
MONOCYTES NFR BLD AUTO: 9.7 % (ref 5–12)
NEUTROPHILS # BLD AUTO: 8.3 10*3/MM3 (ref 1.7–7)
NEUTROPHILS NFR BLD AUTO: 72.4 % (ref 42.7–76)
NRBC BLD AUTO-RTO: 0 /100 WBC (ref 0–0.2)
NT-PROBNP SERPL-MCNC: 288.7 PG/ML (ref 5–1800)
PLATELET # BLD AUTO: 425 10*3/MM3 (ref 140–450)
PMV BLD AUTO: 9.3 FL (ref 6–12)
POTASSIUM BLD-SCNC: 3.8 MMOL/L (ref 3.5–5.2)
PROT SERPL-MCNC: 7 G/DL (ref 6–8.5)
RBC # BLD AUTO: 4.54 10*6/MM3 (ref 3.77–5.28)
SODIUM BLD-SCNC: 140 MMOL/L (ref 136–145)
TROPONIN T SERPL-MCNC: <0.01 NG/ML (ref 0–0.03)
WBC NRBC COR # BLD: 11.46 10*3/MM3 (ref 3.4–10.8)
WHOLE BLOOD HOLD SPECIMEN: NORMAL

## 2020-05-31 PROCEDURE — 25010000002 HYDRALAZINE PER 20 MG: Performed by: EMERGENCY MEDICINE

## 2020-05-31 PROCEDURE — 80053 COMPREHEN METABOLIC PANEL: CPT | Performed by: EMERGENCY MEDICINE

## 2020-05-31 PROCEDURE — 25010000002 MORPHINE PER 10 MG: Performed by: EMERGENCY MEDICINE

## 2020-05-31 PROCEDURE — 99285 EMERGENCY DEPT VISIT HI MDM: CPT

## 2020-05-31 PROCEDURE — 74174 CTA ABD&PLVS W/CONTRAST: CPT

## 2020-05-31 PROCEDURE — 96376 TX/PRO/DX INJ SAME DRUG ADON: CPT

## 2020-05-31 PROCEDURE — 71275 CT ANGIOGRAPHY CHEST: CPT

## 2020-05-31 PROCEDURE — 93010 ELECTROCARDIOGRAM REPORT: CPT | Performed by: INTERNAL MEDICINE

## 2020-05-31 PROCEDURE — 96374 THER/PROPH/DIAG INJ IV PUSH: CPT

## 2020-05-31 PROCEDURE — 96375 TX/PRO/DX INJ NEW DRUG ADDON: CPT

## 2020-05-31 PROCEDURE — 85025 COMPLETE CBC W/AUTO DIFF WBC: CPT | Performed by: EMERGENCY MEDICINE

## 2020-05-31 PROCEDURE — 0 IOPAMIDOL PER 1 ML: Performed by: EMERGENCY MEDICINE

## 2020-05-31 PROCEDURE — 93005 ELECTROCARDIOGRAM TRACING: CPT | Performed by: EMERGENCY MEDICINE

## 2020-05-31 PROCEDURE — 25010000002 ONDANSETRON PER 1 MG: Performed by: EMERGENCY MEDICINE

## 2020-05-31 PROCEDURE — 84484 ASSAY OF TROPONIN QUANT: CPT | Performed by: EMERGENCY MEDICINE

## 2020-05-31 PROCEDURE — 83880 ASSAY OF NATRIURETIC PEPTIDE: CPT | Performed by: EMERGENCY MEDICINE

## 2020-05-31 PROCEDURE — 71045 X-RAY EXAM CHEST 1 VIEW: CPT

## 2020-05-31 RX ORDER — ONDANSETRON 2 MG/ML
4 INJECTION INTRAMUSCULAR; INTRAVENOUS ONCE
Status: COMPLETED | OUTPATIENT
Start: 2020-05-31 | End: 2020-05-31

## 2020-05-31 RX ORDER — HYDRALAZINE HYDROCHLORIDE 20 MG/ML
10 INJECTION INTRAMUSCULAR; INTRAVENOUS ONCE
Status: COMPLETED | OUTPATIENT
Start: 2020-05-31 | End: 2020-05-31

## 2020-05-31 RX ORDER — LABETALOL HYDROCHLORIDE 5 MG/ML
20 INJECTION, SOLUTION INTRAVENOUS ONCE
Status: COMPLETED | OUTPATIENT
Start: 2020-05-31 | End: 2020-05-31

## 2020-05-31 RX ORDER — SODIUM CHLORIDE 0.9 % (FLUSH) 0.9 %
10 SYRINGE (ML) INJECTION AS NEEDED
Status: DISCONTINUED | OUTPATIENT
Start: 2020-05-31 | End: 2020-05-31 | Stop reason: HOSPADM

## 2020-05-31 RX ORDER — SODIUM CHLORIDE 9 MG/ML
INJECTION, SOLUTION INTRAVENOUS
Status: DISCONTINUED
Start: 2020-05-31 | End: 2020-05-31 | Stop reason: HOSPADM

## 2020-05-31 RX ADMIN — MORPHINE SULFATE 4 MG: 4 INJECTION INTRAVENOUS at 04:49

## 2020-05-31 RX ADMIN — ONDANSETRON 4 MG: 2 INJECTION INTRAMUSCULAR; INTRAVENOUS at 05:36

## 2020-05-31 RX ADMIN — HYDRALAZINE HYDROCHLORIDE 10 MG: 20 INJECTION INTRAMUSCULAR; INTRAVENOUS at 04:32

## 2020-05-31 RX ADMIN — ONDANSETRON 4 MG: 2 INJECTION INTRAMUSCULAR; INTRAVENOUS at 04:48

## 2020-05-31 RX ADMIN — MORPHINE SULFATE 4 MG: 4 INJECTION INTRAVENOUS at 05:37

## 2020-05-31 RX ADMIN — IOPAMIDOL 90 ML: 755 INJECTION, SOLUTION INTRAVENOUS at 05:34

## 2020-05-31 RX ADMIN — LABETALOL HYDROCHLORIDE 20 MG: 5 INJECTION INTRAVENOUS at 06:11

## 2020-07-17 ENCOUNTER — OFFICE VISIT (OUTPATIENT)
Dept: CARDIOLOGY | Facility: CLINIC | Age: 80
End: 2020-07-17

## 2020-07-17 VITALS
SYSTOLIC BLOOD PRESSURE: 144 MMHG | DIASTOLIC BLOOD PRESSURE: 90 MMHG | OXYGEN SATURATION: 93 % | HEIGHT: 65 IN | BODY MASS INDEX: 27.16 KG/M2 | WEIGHT: 163 LBS | HEART RATE: 64 BPM

## 2020-07-17 DIAGNOSIS — I48.91 ATRIAL FIBRILLATION, UNSPECIFIED TYPE (HCC): Primary | ICD-10-CM

## 2020-07-17 DIAGNOSIS — I48.0 PAROXYSMAL ATRIAL FIBRILLATION (HCC): ICD-10-CM

## 2020-07-17 DIAGNOSIS — I10 ESSENTIAL HYPERTENSION: ICD-10-CM

## 2020-07-17 DIAGNOSIS — R00.2 PALPITATION: ICD-10-CM

## 2020-07-17 DIAGNOSIS — R55 NEAR SYNCOPE: ICD-10-CM

## 2020-07-17 DIAGNOSIS — E78.5 HYPERLIPIDEMIA, UNSPECIFIED HYPERLIPIDEMIA TYPE: ICD-10-CM

## 2020-07-17 DIAGNOSIS — I49.3 PVC (PREMATURE VENTRICULAR CONTRACTION): Primary | ICD-10-CM

## 2020-07-17 PROCEDURE — 99204 OFFICE O/P NEW MOD 45 MIN: CPT | Performed by: INTERNAL MEDICINE

## 2020-07-17 PROCEDURE — 93000 ELECTROCARDIOGRAM COMPLETE: CPT | Performed by: INTERNAL MEDICINE

## 2020-07-17 RX ORDER — POTASSIUM CHLORIDE 20 MEQ/1
TABLET, EXTENDED RELEASE ORAL
COMMUNITY
Start: 2020-07-16

## 2020-07-17 RX ORDER — ROSUVASTATIN CALCIUM 40 MG/1
TABLET, COATED ORAL
COMMUNITY
Start: 2020-06-03 | End: 2020-08-19 | Stop reason: SDUPTHER

## 2020-07-17 RX ORDER — METOPROLOL SUCCINATE 50 MG/1
50 TABLET, EXTENDED RELEASE ORAL DAILY
COMMUNITY
Start: 2020-06-04 | End: 2020-08-19 | Stop reason: SDUPTHER

## 2020-07-17 RX ORDER — APIXABAN 5 MG/1
TABLET, FILM COATED ORAL
COMMUNITY
Start: 2020-06-03 | End: 2020-08-19 | Stop reason: SDUPTHER

## 2020-07-17 RX ORDER — NIFEDIPINE 60 MG/1
TABLET, EXTENDED RELEASE ORAL
COMMUNITY
Start: 2020-06-03 | End: 2020-08-26 | Stop reason: SDUPTHER

## 2020-07-17 RX ORDER — ISOSORBIDE MONONITRATE 30 MG/1
TABLET, EXTENDED RELEASE ORAL
COMMUNITY
Start: 2020-06-03 | End: 2020-08-19 | Stop reason: SDUPTHER

## 2020-07-17 NOTE — PROGRESS NOTES
Carol Sullivan  79 y.o. female    07/17/2020  1. PVC (premature ventricular contraction)    2. Palpitation    3. Near syncope    4. Hyperlipidemia, unspecified hyperlipidemia type    5. Essential hypertension    6. Paroxysmal atrial fibrillation (CMS/HCC)        History of Present Illness:  Body mass index is 27.12 kg/m². BMI is above normal parameters. Recommendations include: exercise counseling, nutrition counseling and referral to primary care.    79 years old patient with background history of hypertension, hyperlipidemia, premature ventricular complex with history of postural dizziness was life flighted in May 21, 2022 [facility] after diagnosis at our facility proximal aortic dissection on CT chest.  CT was read at [facility] and have their own CT scan performed no dissection was reported there is some calcified plaque and moderate CAD.  During hospitalization patient developed paroxysmal atrial fibrillation spontaneously converted to sinus rhythm she is on oral anticoagulations.  She want to follow-up with our office as she was last evaluated by me in 2016.  No orthopnea no PND no fever cough or chills dysuria hematuria was reported.  No claudications palpitation fluttering.  She is hemodynamically stable at the time of evaluations.  EG sinus rhythm without acute ST-T wave changes and normal interval.    CT coronary angiogram at [facility] 5/31/2020  IMPRESSION:    Aorta: No evidence of thoracic aortic dissection or intramural   hematoma. 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 Advanced atherosclerotic disease within   the descending thoracic aorta and transverse arch.    Coronary arteries: CAD-RADS 3    Degree of maximal stenosis: 50%-69%  Moderate stenosis  Consider functional evaluation and serial troponin levels    Additional Findings:  1.  Nonobstructing nephrolith within the left kidney.  2.  Large hiatal hernia containing majority of the fundus of  "stomach.  3.  Numerous additional chronic and incidental findings as noted   above.    Electronically signed by  Reynaldo Urban on 5/31/2020 11:31 AM  CT scan of the chest 5/31/2020  IMPRESSION:     1.  Juan Pablo A dissection involving ascending thoracic aorta as  above.  Preliminary report was called to Dr. Hays upon  interpretation.     2.  Diffuse calcified and noncalcified atheromatous plaques  without hemodynamically significant stenosis otherwise as above.     3.  Incidental mesenteric fat stranding and shotty adenopathy.   3-6 month follow-up abdominal CT is recommended to ensure  stability.     4.  Incidental moderate hiatal hernia and nonobstructing left  renal stone.    Lipids 7/16/2020    Total Cholesterol  <200 mg/dL 129    Triglycerides  30 - 150 mg/dL 145    HDL Cholesterol  39 - 96 mg/dL 36Low     LDL Cholesterol   5 - 99 mg/dL 68    Chol/HDL Ratio  3.5 - 5.3 3.6        SUBJECTIVE:    Allergies   Allergen Reactions   • Tuberculin Tests Swelling   • Lortab [Hydrocodone-Acetaminophen] GI Intolerance         Past Medical History:   Diagnosis Date   • Arthritis    • Depression    • GERD (gastroesophageal reflux disease)    • Hyperlipidemia    • Hypertension    • Near syncope    • Vascular disease          Past Surgical History:   Procedure Laterality Date   • BLADDER SURGERY     • ENDOSCOPY N/A 5/6/2019    Procedure: ESOPHAGOGASTRODUODENOSCOPY;  Surgeon: Alberto Sanchez DO;  Location: [facility] ENDOSCOPY;  Service: Gastroenterology   • GALLBLADDER SURGERY     • SHOULDER SURGERY      \"bone spurs\"   • SUBTOTAL HYSTERECTOMY           Family History   Problem Relation Age of Onset   • Hypertension Mother    • Diabetes Paternal Aunt    • Diabetes Paternal Grandmother    • Hypertension Paternal Grandmother    • Hypertension Paternal Grandfather          Social History     Socioeconomic History   • Marital status:      Spouse name: Not on file   • Number of children: Not on file   • Years of education: " Not on file   • Highest education level: Not on file   Tobacco Use   • Smoking status: Never Smoker   • Smokeless tobacco: Never Used   Substance and Sexual Activity   • Alcohol use: No   • Drug use: No   • Sexual activity: Defer         Current Outpatient Medications   Medication Sig Dispense Refill   • DULoxetine (CYMBALTA) 60 MG capsule      • ELIQUIS 5 MG tablet tablet      • furosemide (LASIX) 20 MG tablet Take 20 mg by mouth 2 (Two) Times a Day.     • gabapentin (NEURONTIN) 300 MG capsule Take 1 capsule by mouth 3 (Three) Times a Day.  2   • isosorbide mononitrate (IMDUR) 30 MG 24 hr tablet      • lansoprazole (PREVACID) 30 MG capsule      • losartan (COZAAR) 100 MG tablet      • metoprolol succinate XL (TOPROL-XL) 50 MG 24 hr tablet Take 50 mg by mouth Daily.     • NIFEdipine XL (PROCARDIA XL) 60 MG 24 hr tablet      • potassium chloride (K-DUR,KLOR-CON) 20 MEQ CR tablet TAKE ONE TABLET BY MOUTH EVERY DAY     • rosuvastatin (CRESTOR) 40 MG tablet      • VENTOLIN  (90 Base) MCG/ACT inhaler        No current facility-administered medications for this visit.            Review of Systems:     Constitutional:  Denies recent weight loss, weight gain, fever or chills, no change in exercise tolerance.     HENT:  Denies any hearing loss, epistaxis, hoarseness, or difficulty speaking.     Eyes: No blurred    Respiratory:  Denies dyspnea with exertion,no cough, wheezing, or hemoptysis.     Cardiovascular: See H&P    Gastrointestinal:  Denies change in bowel habits, dyspepsia, ulcer disease, hematochezia, or melena.     Endocrine: Negative for cold intolerance, heat intolerance, polydipsia, polyphagia and polyuria. Denies any history of weight change, polydipsia, polyuria.     Genitourinary: Negative.      Musculoskeletal: Denies any history of arthritic symptoms or back problems.     Skin:  Denies any change in hair or nails, rashes, or skin lesions.     Allergic/Immunologic: Negative.  Negative for environmental  "allergies, food allergies and immunocompromised state.     Neurological:  Denies any history of recurrent headaches, strokes, TIA, or seizure disorder.     Hematological: Denies any food allergies, seasonal allergies, bleeding disorders, or lymphadenopathy.     Psychiatric/Behavioral: Denies any history of depression, substance abuse, or change in cognitive function.       OBJECTIVE:    /90 (BP Location: Right arm, Patient Position: Sitting, Cuff Size: Adult)   Pulse 64   Ht 165.1 cm (65\")   Wt 73.9 kg (163 lb)   SpO2 93%   BMI 27.12 kg/m²       Physical Exam:     Constitutional: Cooperative, alert and oriented, well-developed, well-nourished, in no acute distress.     HENT:   Head: Normocephalic, normal hair patterns, no masses or tenderness.  Ears, Nose, and Throat: No gross abnormalities. No pallor or cyanosis. Dentition good.   Eyes: EOMS intact, PERRL, conjunctivae and lids unremarkable. Fundoscopic exam and visual fields not performed.   Neck: No palpable masses or adenopathy, no thyromegaly, no JVD, carotid pulses are full and equal bilaterally and without  Bruits.     Cardiovascular: Regular rhythm, S1 and S2 normal, no S3 or S4. Apical impulse not displaced. No murmurs, gallops, or rubs detected.     Pulmonary/Chest: Chest: normal symmetry, no tenderness to palpation, normal respiratory excursion, no intercostal retraction, no use of accessory muscles. Pulmonary: Normal breath sounds. No rales or rhonchi.    Abdominal: Abdomen soft, bowel sounds normoactive, no masses, no hepatosplenomegaly, non-tender, no bruits.     Musculoskeletal: No deformities, clubbing, cyanosis, erythema, or edema observed. There are no spinal abnormalities noted. Normal muscle strength and tone. Pulses full and equal in all extremities, no bruits auscultated.     Neurological: No gross motor or sensory deficits noted, affect appropriate, oriented to time, person, place.     Skin: Warm and dry to the touch, no apparent " skin lesions or masses noted.     Psychiatric: She has a normal mood and affect. Her behavior is normal. Judgment and thought content normal.         Procedures      Lab Results   Component Value Date    WBC 11.46 (H) 05/31/2020    HGB 12.5 05/31/2020    HCT 38.3 05/31/2020    MCV 84.4 05/31/2020     05/31/2020     Lab Results   Component Value Date    GLUCOSE 167 (H) 05/31/2020    BUN 13 05/31/2020    CREATININE 0.79 05/31/2020    EGFRIFNONA 70 05/31/2020    BCR 16.5 05/31/2020    CO2 25.0 05/31/2020    CALCIUM 8.7 05/31/2020    ALBUMIN 4.00 05/31/2020    AST 21 05/31/2020    ALT 9 05/31/2020     Lab Results   Component Value Date    CHOL 129 07/16/2020     Lab Results   Component Value Date    TRIG 145 07/16/2020    TRIG 84 05/31/2020     Lab Results   Component Value Date    HDL 36 (L) 07/16/2020    HDL 42 (L) 05/31/2020     No components found for: LDLCALC  Lab Results   Component Value Date    LDL 68 07/16/2020     05/31/2020     No results found for: HDLLDLRATIO  No components found for: CHOLHDL  Lab Results   Component Value Date    HGBA1C 6.5 (H) 07/16/2020     Lab Results   Component Value Date    TSH 1.359 05/31/2020           ASSESSMENT AND PLAN:  #1 paroxysmal atrial fibrillation    2 hypertension with hypertensive heart disease    3 hyperlipidemia    4  history of premature ventricular complex    79 years old patient who [facility]light at [place] after suspected diagnosis of type aortic dissection when she presented to [facility].  The CT scan was read at [place] and there is on further evaluations reveals no evidence of aortic dissection there was atherosclerotic plaque.  Patient also developed paroxysmal atrial fibrillation during hospitalization but spontaneously converted to sinus rhythm started on oral anticoagulation.  She preferred to be followed up with our office as she was previously evaluated by me in 2016.  Her current medications Eliquis to decrease risk of  cardiac embolization, isosorbide with history of nonobstructive CAD on CT coronary angiogram, metoprolol for management of hypertension with hypertensive heart disease along with the losartan.  Crestor for management of hyperlipidemia with good lipid status.  Significantly low carbohydrate, low-fat, DASH diet discussed with the patient.  We will see her back in 6 months undependable patient clinical condition      Ivnicole was seen today for follow-up.    Diagnoses and all orders for this visit:    PVC (premature ventricular contraction)    Palpitation    Near syncope    Hyperlipidemia, unspecified hyperlipidemia type  -     Adult Transthoracic Echo Complete W/ Cont if Necessary Per Protocol; Future    Essential hypertension  -     Adult Transthoracic Echo Complete W/ Cont if Necessary Per Protocol; Future    Paroxysmal atrial fibrillation (CMS/HCC)  -     Adult Transthoracic Echo Complete W/ Cont if Necessary Per Protocol; Future          Odette Alves MD  7/17/2020  08:46

## 2020-08-18 ENCOUNTER — TELEPHONE (OUTPATIENT)
Dept: CARDIOLOGY | Facility: CLINIC | Age: 80
End: 2020-08-18

## 2020-08-18 NOTE — TELEPHONE ENCOUNTER
Called patient to talk about what medication she needed refilled and to move her appointment.     There was no answer. Left voice mail

## 2020-08-19 ENCOUNTER — DOCUMENTATION (OUTPATIENT)
Dept: CARDIOLOGY | Facility: CLINIC | Age: 80
End: 2020-08-19

## 2020-08-19 RX ORDER — ISOSORBIDE MONONITRATE 30 MG/1
30 TABLET, EXTENDED RELEASE ORAL DAILY
Qty: 30 TABLET | Refills: 3 | Status: SHIPPED | OUTPATIENT
Start: 2020-08-19 | End: 2020-12-01

## 2020-08-19 RX ORDER — APIXABAN 5 MG/1
5 TABLET, FILM COATED ORAL EVERY 12 HOURS SCHEDULED
Qty: 60 TABLET | Refills: 9 | Status: SHIPPED | OUTPATIENT
Start: 2020-08-19 | End: 2021-06-21

## 2020-08-19 RX ORDER — ROSUVASTATIN CALCIUM 40 MG/1
40 TABLET, COATED ORAL DAILY
Qty: 30 TABLET | Refills: 3 | Status: SHIPPED | OUTPATIENT
Start: 2020-08-19 | End: 2020-12-01

## 2020-08-19 RX ORDER — METOPROLOL SUCCINATE 50 MG/1
50 TABLET, EXTENDED RELEASE ORAL DAILY
Qty: 30 TABLET | Refills: 11 | Status: SHIPPED | OUTPATIENT
Start: 2020-08-19 | End: 2021-07-19

## 2020-08-19 RX ORDER — LOSARTAN POTASSIUM 100 MG/1
100 TABLET ORAL DAILY
Qty: 30 TABLET | Refills: 3 | Status: SHIPPED | OUTPATIENT
Start: 2020-08-19

## 2020-08-19 RX ORDER — FUROSEMIDE 20 MG/1
20 TABLET ORAL 2 TIMES DAILY
Qty: 60 TABLET | Refills: 3 | Status: SHIPPED | OUTPATIENT
Start: 2020-08-19 | End: 2021-06-21

## 2020-08-19 NOTE — PROGRESS NOTES
Patient called needing her medications refilled.   She made an appointment for Friday, but was just seen in July.   Will refill the meds Dr. Alves allows and then her PCP needs to fill the rest.

## 2020-08-26 RX ORDER — NIFEDIPINE 60 MG/1
60 TABLET, EXTENDED RELEASE ORAL DAILY
Qty: 30 TABLET | Refills: 1 | Status: SHIPPED | OUTPATIENT
Start: 2020-08-26 | End: 2020-09-23

## 2020-09-11 ENCOUNTER — TELEPHONE (OUTPATIENT)
Dept: CARDIOLOGY | Facility: CLINIC | Age: 80
End: 2020-09-11

## 2020-09-23 RX ORDER — NIFEDIPINE 60 MG/1
60 TABLET, EXTENDED RELEASE ORAL DAILY
Qty: 30 TABLET | Refills: 1 | Status: SHIPPED | OUTPATIENT
Start: 2020-09-23 | End: 2020-12-01

## 2020-11-17 ENCOUNTER — OFFICE VISIT (OUTPATIENT)
Dept: CARDIOLOGY | Facility: CLINIC | Age: 80
End: 2020-11-17

## 2020-11-17 VITALS
HEART RATE: 56 BPM | OXYGEN SATURATION: 96 % | WEIGHT: 160 LBS | BODY MASS INDEX: 26.66 KG/M2 | DIASTOLIC BLOOD PRESSURE: 70 MMHG | HEIGHT: 65 IN | SYSTOLIC BLOOD PRESSURE: 130 MMHG

## 2020-11-17 DIAGNOSIS — I10 ESSENTIAL HYPERTENSION: Primary | ICD-10-CM

## 2020-11-17 DIAGNOSIS — R00.2 PALPITATION: ICD-10-CM

## 2020-11-17 DIAGNOSIS — I48.0 PAROXYSMAL ATRIAL FIBRILLATION (HCC): ICD-10-CM

## 2020-11-17 DIAGNOSIS — E78.5 HYPERLIPIDEMIA, UNSPECIFIED HYPERLIPIDEMIA TYPE: ICD-10-CM

## 2020-11-17 DIAGNOSIS — I49.3 PVC (PREMATURE VENTRICULAR CONTRACTION): ICD-10-CM

## 2020-11-17 DIAGNOSIS — R55 NEAR SYNCOPE: ICD-10-CM

## 2020-11-17 PROCEDURE — 99213 OFFICE O/P EST LOW 20 MIN: CPT | Performed by: INTERNAL MEDICINE

## 2020-11-17 NOTE — PROGRESS NOTES
Carol Sullivan  80 y.o. female      1. Essential hypertension    2. Hyperlipidemia, unspecified hyperlipidemia type    3. Near syncope    4. PVC (premature ventricular contraction)    5. Paroxysmal atrial fibrillation (CMS/HCC)    6. Palpitation        History of Present Illness:  Body mass index is 26.63 kg/m². BMI is above normal parameters. Recommendations include: exercise counseling, nutrition counseling and referral to primary care.    79 years old patient had a routine follow-up and no symptoms distal cardiac decompensation reported by the patient with background history of hypertension, hyperlipidemia, premature ventricular complex with history of postural dizziness was life flighted in May 21, 2020 to [place] after diagnosis at our facility proximal aortic dissection on CT chest.  CT was read at [facility] and have their own CT scan performed no dissection was reported there is some calcified plaque and moderate CAD.  During hospitalization patient developed paroxysmal atrial fibrillation spontaneously converted to sinus rhythm she is on oral anticoagulations.  She want to follow-up with our office as she was last evaluated by me in 2016.  No orthopnea no PND no fever cough or chills dysuria hematuria was reported.  No claudications palpitation fluttering.  She is hemodynamically stable at the time of evaluations.  EG sinus rhythm without acute ST-T wave changes and normal interval.    CT coronary angiogram at [facility] 5/31/2020  IMPRESSION:    Aorta: No evidence of thoracic aortic dissection or intramural   hematoma. 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 Advanced atherosclerotic disease within   the descending thoracic aorta and transverse arch.    Coronary arteries: CAD-RADS 3    Degree of maximal stenosis: 50%-69%  Moderate stenosis  Consider functional evaluation and serial troponin levels    Additional Findings:  1.  Nonobstructing nephrolith  "within the left kidney.  2.  Large hiatal hernia containing majority of the fundus of stomach.  3.  Numerous additional chronic and incidental findings as noted   above.    Electronically signed by  Reynaldo Urban on 5/31/2020 11:31 AM  CT scan of the chest 5/31/2020  IMPRESSION:     1.  Juan Pablo A dissection involving ascending thoracic aorta as  above.  Preliminary report was called to Dr. Hays upon  interpretation.     2.  Diffuse calcified and noncalcified atheromatous plaques  without hemodynamically significant stenosis otherwise as above.     3.  Incidental mesenteric fat stranding and shotty adenopathy.   3-6 month follow-up abdominal CT is recommended to ensure  stability.     4.  Incidental moderate hiatal hernia and nonobstructing left  renal stone.    Lipids 7/16/2020    Total Cholesterol  <200 mg/dL 129    Triglycerides  30 - 150 mg/dL 145    HDL Cholesterol  39 - 96 mg/dL 36Low     LDL Cholesterol   5 - 99 mg/dL 68    Chol/HDL Ratio  3.5 - 5.3 3.6        SUBJECTIVE:    Allergies   Allergen Reactions   • Tuberculin Tests Swelling   • Lortab [Hydrocodone-Acetaminophen] GI Intolerance         Past Medical History:   Diagnosis Date   • Arthritis    • Depression    • GERD (gastroesophageal reflux disease)    • Hyperlipidemia    • Hypertension    • Near syncope    • Vascular disease          Past Surgical History:   Procedure Laterality Date   • BLADDER SURGERY     • ENDOSCOPY N/A 5/6/2019    Procedure: ESOPHAGOGASTRODUODENOSCOPY;  Surgeon: Alberto Sanchez DO;  Location: [facility] ENDOSCOPY;  Service: Gastroenterology   • GALLBLADDER SURGERY     • SHOULDER SURGERY      \"bone spurs\"   • SUBTOTAL HYSTERECTOMY           Family History   Problem Relation Age of Onset   • Hypertension Mother    • Diabetes Paternal Aunt    • Diabetes Paternal Grandmother    • Hypertension Paternal Grandmother    • Hypertension Paternal Grandfather          Social History     Socioeconomic History   • Marital status:      " Spouse name: Not on file   • Number of children: Not on file   • Years of education: Not on file   • Highest education level: Not on file   Tobacco Use   • Smoking status: Never Smoker   • Smokeless tobacco: Never Used   Substance and Sexual Activity   • Alcohol use: No   • Drug use: No   • Sexual activity: Defer         Current Outpatient Medications   Medication Sig Dispense Refill   • DULoxetine (CYMBALTA) 60 MG capsule      • ELIQUIS 5 MG tablet tablet Take 1 tablet by mouth Every 12 (Twelve) Hours. 60 tablet 9   • furosemide (LASIX) 20 MG tablet Take 1 tablet by mouth 2 (Two) Times a Day. 60 tablet 3   • gabapentin (NEURONTIN) 300 MG capsule Take 1 capsule by mouth 3 (Three) Times a Day.  2   • isosorbide mononitrate (IMDUR) 30 MG 24 hr tablet Take 1 tablet by mouth Daily. 30 tablet 3   • lansoprazole (PREVACID) 30 MG capsule      • losartan (COZAAR) 100 MG tablet Take 1 tablet by mouth Daily. 30 tablet 3   • metoprolol succinate XL (TOPROL-XL) 50 MG 24 hr tablet Take 1 tablet by mouth Daily. 30 tablet 11   • NIFEdipine XL (PROCARDIA XL) 60 MG 24 hr tablet TAKE 1 TABLET BY MOUTH DAILY. 30 tablet 1   • potassium chloride (K-DUR,KLOR-CON) 20 MEQ CR tablet TAKE ONE TABLET BY MOUTH EVERY DAY     • rosuvastatin (CRESTOR) 40 MG tablet Take 1 tablet by mouth Daily. 30 tablet 3   • VENTOLIN  (90 Base) MCG/ACT inhaler        No current facility-administered medications for this visit.            Review of Systems:     Constitutional:  Denies recent weight loss, weight gain, fever or chills, no change in exercise tolerance.     HENT:  Denies any hearing loss, epistaxis, hoarseness, or difficulty speaking.     Eyes: No blurred    Respiratory:  Denies dyspnea with exertion,no cough, wheezing, or hemoptysis.     Cardiovascular: See H&P    Gastrointestinal:  Denies change in bowel habits, dyspepsia, ulcer disease, hematochezia, or melena.     Endocrine: Negative for cold intolerance, heat intolerance, polydipsia,  "polyphagia and polyuria. Denies any history of weight change, polydipsia, polyuria.     Genitourinary: Negative.      Musculoskeletal: Denies any history of arthritic symptoms or back problems.     Skin:  Denies any change in hair or nails, rashes, or skin lesions.     Allergic/Immunologic: Negative.  Negative for environmental allergies, food allergies and immunocompromised state.     Neurological:  Denies any history of recurrent headaches, strokes, TIA, or seizure disorder.     Hematological: Denies any food allergies, seasonal allergies, bleeding disorders, or lymphadenopathy.     Psychiatric/Behavioral: Denies any history of depression, substance abuse, or change in cognitive function.       OBJECTIVE:    /70 (BP Location: Left arm, Patient Position: Sitting, Cuff Size: Adult)   Pulse 56   Ht 165.1 cm (65\")   Wt 72.6 kg (160 lb)   SpO2 96%   BMI 26.63 kg/m²       Physical Exam:     Constitutional: Cooperative, alert and oriented, well-developed, well-nourished, in no acute distress.     HENT:   Head: Normocephalic, normal hair patterns, no masses or tenderness.  Ears, Nose, and Throat: No gross abnormalities. No pallor or cyanosis. Dentition good.   Eyes: EOMS intact, PERRL, conjunctivae and lids unremarkable. Fundoscopic exam and visual fields not performed.   Neck: No palpable masses or adenopathy, no thyromegaly, no JVD, carotid pulses are full and equal bilaterally and without  Bruits.     Cardiovascular: Regular rhythm, S1 and S2 normal, no S3 or S4. Apical impulse not displaced. No murmurs, gallops, or rubs detected.     Pulmonary/Chest: Chest: normal symmetry, no tenderness to palpation, normal respiratory excursion, no intercostal retraction, no use of accessory muscles. Pulmonary: Normal breath sounds. No rales or rhonchi.    Abdominal: Abdomen soft, bowel sounds normoactive, no masses, no hepatosplenomegaly, non-tender, no bruits.     Musculoskeletal: No deformities, clubbing, cyanosis, " erythema, or edema observed. There are no spinal abnormalities noted. Normal muscle strength and tone. Pulses full and equal in all extremities, no bruits auscultated.     Neurological: No gross motor or sensory deficits noted, affect appropriate, oriented to time, person, place.     Skin: Warm and dry to the touch, no apparent skin lesions or masses noted.     Psychiatric: She has a normal mood and affect. Her behavior is normal. Judgment and thought content normal.         Procedures      Lab Results   Component Value Date    WBC 11.46 (H) 05/31/2020    HGB 12.5 05/31/2020    HCT 38.3 05/31/2020    MCV 84.4 05/31/2020     05/31/2020     Lab Results   Component Value Date    GLUCOSE 167 (H) 05/31/2020    BUN 13 05/31/2020    CREATININE 0.79 05/31/2020    EGFRIFNONA 70 05/31/2020    BCR 16.5 05/31/2020    CO2 25.0 05/31/2020    CALCIUM 8.7 05/31/2020    ALBUMIN 4.00 05/31/2020    AST 21 05/31/2020    ALT 9 05/31/2020     Lab Results   Component Value Date    CHOL 129 07/16/2020     Lab Results   Component Value Date    TRIG 145 07/16/2020    TRIG 84 05/31/2020     Lab Results   Component Value Date    HDL 36 (L) 07/16/2020    HDL 42 (L) 05/31/2020     No components found for: LDLCALC  Lab Results   Component Value Date    LDL 68 07/16/2020     05/31/2020     No results found for: HDLLDLRATIO  No components found for: CHOLHDL  Lab Results   Component Value Date    HGBA1C 6.5 (H) 07/16/2020     Lab Results   Component Value Date    TSH 1.359 05/31/2020           ASSESSMENT AND PLAN:  #1 paroxysmal atrial fibrillation directors and she will continue Eliquis to decrease risk of cardiac embolization    2 hypertension with hypertensive heart disease good blood pressure will continue losartan 100 mg, metoprolol 50 mg    3 hyperlipidemia continue Crestor and will check the hepatic and lipid profile prior to the next visit with good lipid profile in July 2020    4  history of premature ventricular complex for  the records she is on metoprolol    80 years old patient was previously life flighted to [place] with possible dialysis and stenosis of aortic dissection and further review of the CT scan and repeat CT scan at [facility] no evidence of dissection there was plaque the CT scan was read at [facility] , reveals no evidence of aortic dissection  was atherosclerotic plaque.  Patient also developed paroxysmal atrial fibrillation during hospitalization but spontaneously converted to sinus rhythm started on oral anticoagulation.  She preferred to be followed up with our office as she was previously evaluated by me in 2016.  Her current medications Eliquis to decrease risk of cardiac embolization, isosorbide with history of nonobstructive CAD on CT coronary angiogram, metoprolol for management of hypertension with hypertensive heart disease along with the losartan.  Crestor for management of hyperlipidemia with good lipid status.  Significantly low carbohydrate, low-fat, DASH diet discussed with the patient.  We will see her back in 6 month or depending upon patient clinical condition      Diagnoses and all orders for this visit:    1. Essential hypertension (Primary)    2. Hyperlipidemia, unspecified hyperlipidemia type    3. Near syncope    4. PVC (premature ventricular contraction)    5. Paroxysmal atrial fibrillation (CMS/HCC)    6. Palpitation          Odette Alves MD  11/17/2020  13:30 CST

## 2020-12-01 RX ORDER — ROSUVASTATIN CALCIUM 40 MG/1
40 TABLET, COATED ORAL DAILY
Qty: 30 TABLET | Refills: 3 | Status: SHIPPED | OUTPATIENT
Start: 2020-12-01 | End: 2021-03-24

## 2020-12-01 RX ORDER — ISOSORBIDE MONONITRATE 30 MG/1
30 TABLET, EXTENDED RELEASE ORAL DAILY
Qty: 30 TABLET | Refills: 3 | Status: SHIPPED | OUTPATIENT
Start: 2020-12-01 | End: 2021-03-26 | Stop reason: SDUPTHER

## 2020-12-01 RX ORDER — NIFEDIPINE 60 MG/1
60 TABLET, EXTENDED RELEASE ORAL DAILY
Qty: 30 TABLET | Refills: 1 | Status: SHIPPED | OUTPATIENT
Start: 2020-12-01 | End: 2021-01-29

## 2021-01-18 ENCOUNTER — OFFICE VISIT (OUTPATIENT)
Dept: SURGERY | Facility: CLINIC | Age: 81
End: 2021-01-18

## 2021-01-18 VITALS
BODY MASS INDEX: 26.96 KG/M2 | HEART RATE: 73 BPM | WEIGHT: 161.8 LBS | HEIGHT: 65 IN | TEMPERATURE: 98.4 F | DIASTOLIC BLOOD PRESSURE: 82 MMHG | SYSTOLIC BLOOD PRESSURE: 140 MMHG

## 2021-01-18 DIAGNOSIS — I73.9 PERIPHERAL VASCULAR DISEASE, UNSPECIFIED (HCC): ICD-10-CM

## 2021-01-18 DIAGNOSIS — E11.69 TYPE 2 DIABETES MELLITUS WITH OTHER SPECIFIED COMPLICATION, UNSPECIFIED WHETHER LONG TERM INSULIN USE (HCC): ICD-10-CM

## 2021-01-18 DIAGNOSIS — N60.12 FIBROCYSTIC DISEASE OF BOTH BREASTS: Primary | ICD-10-CM

## 2021-01-18 DIAGNOSIS — I48.0 PAROXYSMAL ATRIAL FIBRILLATION (HCC): ICD-10-CM

## 2021-01-18 DIAGNOSIS — N60.11 FIBROCYSTIC DISEASE OF BOTH BREASTS: Primary | ICD-10-CM

## 2021-01-18 DIAGNOSIS — I50.32 CHRONIC HEART FAILURE WITH PRESERVED EJECTION FRACTION (HCC): ICD-10-CM

## 2021-01-18 PROCEDURE — 99213 OFFICE O/P EST LOW 20 MIN: CPT | Performed by: SURGERY

## 2021-01-18 RX ORDER — DOXYCYCLINE 100 MG/1
CAPSULE ORAL DAILY
COMMUNITY
Start: 2020-12-28 | End: 2022-05-25

## 2021-01-18 RX ORDER — TRAMADOL HYDROCHLORIDE 50 MG/1
50 TABLET ORAL AS NEEDED
COMMUNITY
Start: 2020-12-23

## 2021-01-18 NOTE — PATIENT INSTRUCTIONS
"BMI for Adults  What is BMI?  Body mass index (BMI) is a number that is calculated from a person's weight and height. BMI can help estimate how much of a person's weight is composed of fat. BMI does not measure body fat directly. Rather, it is an alternative to procedures that directly measure body fat, which can be difficult and expensive.  BMI can help identify people who may be at higher risk for certain medical problems.  What are BMI measurements used for?  BMI is used as a screening tool to identify possible weight problems. It helps determine whether a person is obese, overweight, a healthy weight, or underweight.  BMI is useful for:  · Identifying a weight problem that may be related to a medical condition or may increase the risk for medical problems.  · Promoting changes, such as changes in diet and exercise, to help reach a healthy weight. BMI screening can be repeated to see if these changes are working.  How is BMI calculated?  BMI involves measuring your weight in relation to your height. Both height and weight are measured, and the BMI is calculated from those numbers. This can be done either in English (U.S.) or metric measurements. Note that charts and online BMI calculators are available to help you find your BMI quickly and easily without having to do these calculations yourself.  To calculate your BMI in English (U.S.) measurements:    1. Measure your weight in pounds (lb).  2. Multiply the number of pounds by 703.  ? For example, for a person who weighs 180 lb, multiply that number by 703, which equals 126,540.  3. Measure your height in inches. Then multiply that number by itself to get a measurement called \"inches squared.\"  ? For example, for a person who is 70 inches tall, the \"inches squared\" measurement is 70 inches x 70 inches, which equals 4,900 inches squared.  4. Divide the total from step 2 (number of lb x 703) by the total from step 3 (inches squared): 126,540 ÷ 4,900 = 25.8. This is " "your BMI.  To calculate your BMI in metric measurements:  1. Measure your weight in kilograms (kg).  2. Measure your height in meters (m). Then multiply that number by itself to get a measurement called \"meters squared.\"  ? For example, for a person who is 1.75 m tall, the \"meters squared\" measurement is 1.75 m x 1.75 m, which is equal to 3.1 meters squared.  3. Divide the number of kilograms (your weight) by the meters squared number. In this example: 70 ÷ 3.1 = 22.6. This is your BMI.  What do the results mean?  BMI charts are used to identify whether you are underweight, normal weight, overweight, or obese. The following guidelines will be used:  · Underweight: BMI less than 18.5.  · Normal weight: BMI between 18.5 and 24.9.  · Overweight: BMI between 25 and 29.9.  · Obese: BMI of 30 or above.  Keep these notes in mind:  · Weight includes both fat and muscle, so someone with a muscular build, such as an athlete, may have a BMI that is higher than 24.9. In cases like these, BMI is not an accurate measure of body fat.  · To determine if excess body fat is the cause of a BMI of 25 or higher, further assessments may need to be done by a health care provider.  · BMI is usually interpreted in the same way for men and women.  Where to find more information  For more information about BMI, including tools to quickly calculate your BMI, go to these websites:  · Centers for Disease Control and Prevention: www.cdc.gov  · American Heart Association: www.heart.org  · National Heart, Lung, and Blood [place]: www.nhlbi.nih.gov  Summary  · Body mass index (BMI) is a number that is calculated from a person's weight and height.  · BMI may help estimate how much of a person's weight is composed of fat. BMI can help identify those who may be at higher risk for certain medical problems.  · BMI can be measured using English measurements or metric measurements.  · BMI charts are used to identify whether you are underweight, normal " weight, overweight, or obese.  This information is not intended to replace advice given to you by your health care provider. Make sure you discuss any questions you have with your health care provider.  Document Revised: 09/09/2020 Document Reviewed: 07/17/2020  Elsevier Patient Education © 2020 [employer] Inc.

## 2021-01-18 NOTE — PROGRESS NOTES
"Chief Complaint   Patient presents with   • Follow-up     Recheck Breast and Mammogram        HPI  This woman is 80 years old and is seen for routine breast check and mammogram check.  Over the last several weeks, she has developed desquamation of the left breast as well as redness and pain.  She was recently treated for COVID-19.  She has no other skin changes on any other part of her body.  Most recent imaging demonstrates the following:    Result Impression      Benign findings (BI-RADS 2).     Recommendation:  Normal return date in one year unless otherwise clinically indicated.      [id number]   Result Narrative   SCREENING DIGITAL BILATERAL MAMMOGRAPHY WITH DIGITAL BREAST TOMOSYNTHESIS (2D/3D)    Indication:  Routine screening evaluation.     Comparison:  12/18/2019, 11/20/2018, 6/26/2017, 12/11/2012.    Breast Composition:  The breast tissue is heterogeneously dense which lowers mammographic sensitivity.      Technique:  The exam was done with digital mammography (2D), plus tomography (3D), and was reviewed with computer-aided detection (CAD).    Findings:  No dominant or spiculated mass is seen in either breast.  No suspicious clustered microcalcifications or architectural distortion is identified.  The glandular pattern is stable. There has been no suspicious change. Biopsy clip is again seen   in the right breast.     I have personally reviewed the imaging and concur with the radiologist's findings.    Past Medical History:   Diagnosis Date   • Arthritis    • Depression    • GERD (gastroesophageal reflux disease)    • Hyperlipidemia    • Hypertension    • Near syncope    • Vascular disease        Past Surgical History:   Procedure Laterality Date   • BLADDER SURGERY     • ENDOSCOPY N/A 5/6/2019    Procedure: ESOPHAGOGASTRODUODENOSCOPY;  Surgeon: Alberto Sanchez DO;  Location: [facility] ENDOSCOPY;  Service: Gastroenterology   • GALLBLADDER SURGERY     • SHOULDER SURGERY      \"bone spurs\"   • SUBTOTAL " HYSTERECTOMY           Current Outpatient Medications:   •  doxycycline (MONODOX) 100 MG capsule, Daily., Disp: , Rfl:   •  DULoxetine (CYMBALTA) 60 MG capsule, , Disp: , Rfl:   •  ELIQUIS 5 MG tablet tablet, Take 1 tablet by mouth Every 12 (Twelve) Hours., Disp: 60 tablet, Rfl: 9  •  furosemide (LASIX) 20 MG tablet, Take 1 tablet by mouth 2 (Two) Times a Day., Disp: 60 tablet, Rfl: 3  •  gabapentin (NEURONTIN) 300 MG capsule, Take 1 capsule by mouth 3 (Three) Times a Day., Disp: , Rfl: 2  •  isosorbide mononitrate (IMDUR) 30 MG 24 hr tablet, TAKE 1 TABLET BY MOUTH DAILY., Disp: 30 tablet, Rfl: 3  •  lansoprazole (PREVACID) 30 MG capsule, , Disp: , Rfl:   •  losartan (COZAAR) 100 MG tablet, Take 1 tablet by mouth Daily., Disp: 30 tablet, Rfl: 3  •  metoprolol succinate XL (TOPROL-XL) 50 MG 24 hr tablet, Take 1 tablet by mouth Daily., Disp: 30 tablet, Rfl: 11  •  NIFEdipine XL (PROCARDIA XL) 60 MG 24 hr tablet, TAKE 1 TABLET BY MOUTH DAILY., Disp: 30 tablet, Rfl: 1  •  potassium chloride (K-DUR,KLOR-CON) 20 MEQ CR tablet, TAKE ONE TABLET BY MOUTH EVERY DAY, Disp: , Rfl:   •  rosuvastatin (CRESTOR) 40 MG tablet, TAKE 1 TABLET BY MOUTH DAILY., Disp: 30 tablet, Rfl: 3  •  traMADol (ULTRAM) 50 MG tablet, Take 50 mg by mouth As Needed., Disp: , Rfl:   •  VENTOLIN  (90 Base) MCG/ACT inhaler, , Disp: , Rfl:     Allergies   Allergen Reactions   • Tuberculin Tests Swelling   • Lortab [Hydrocodone-Acetaminophen] GI Intolerance       Family History   Problem Relation Age of Onset   • Hypertension Mother    • Diabetes Paternal Aunt    • Diabetes Paternal Grandmother    • Hypertension Paternal Grandmother    • Hypertension Paternal Grandfather        Social History     Socioeconomic History   • Marital status:      Spouse name: Not on file   • Number of children: Not on file   • Years of education: Not on file   • Highest education level: Not on file   Tobacco Use   • Smoking status: Never Smoker   • Smokeless  tobacco: Never Used   Substance and Sexual Activity   • Alcohol use: No   • Drug use: No   • Sexual activity: Defer       Review of Systems   Constitutional: Negative for appetite change, chills, fever and unexpected weight change.   HENT: Negative for hearing loss, nosebleeds and trouble swallowing.    Eyes: Negative for visual disturbance.   Respiratory: Negative for apnea, cough, choking, chest tightness, shortness of breath, wheezing and stridor.    Cardiovascular: Negative for chest pain, palpitations and leg swelling.   Gastrointestinal: Negative for abdominal distention, abdominal pain, blood in stool, constipation, diarrhea, nausea and vomiting.   Endocrine: Negative for cold intolerance, heat intolerance, polydipsia, polyphagia and polyuria.   Genitourinary: Negative for difficulty urinating, dysuria, frequency, hematuria and urgency.   Musculoskeletal: Negative for arthralgias, back pain, myalgias and neck pain.   Skin: Negative for color change, pallor and rash.   Allergic/Immunologic: Negative for immunocompromised state.   Neurological: Negative for dizziness, seizures, syncope, light-headedness, numbness and headaches.   Hematological: Negative for adenopathy.   Psychiatric/Behavioral: Negative for suicidal ideas. The patient is not nervous/anxious.        Physical Exam  Vitals signs and nursing note reviewed. Exam conducted with a chaperone present.   Constitutional:       Appearance: Normal appearance.   HENT:      Head: Atraumatic.      Nose: Nose normal.   Eyes:      Extraocular Movements: Extraocular movements intact.      Pupils: Pupils are equal, round, and reactive to light.   Neck:      Musculoskeletal: Normal range of motion and neck supple.   Cardiovascular:      Rate and Rhythm: Normal rate and regular rhythm.   Pulmonary:      Effort: Pulmonary effort is normal. No respiratory distress.   Abdominal:      General: There is no distension.   Musculoskeletal: Normal range of motion.          General: No swelling.   Skin:     General: Skin is warm and dry.      Coloration: Skin is not jaundiced or pale.   Neurological:      General: No focal deficit present.      Mental Status: She is alert and oriented to person, place, and time.   Psychiatric:         Mood and Affect: Mood normal.         Behavior: Behavior normal.           ASSESSMENT    Diagnoses and all orders for this visit:    1. Fibrocystic disease of both breasts (Primary)    2. Chronic heart failure with preserved ejection fraction (CMS/HCC)    3. Peripheral vascular disease, unspecified (CMS/HCC)    4. Paroxysmal atrial fibrillation (CMS/[facility])    5. Type 2 diabetes mellitus with other specified complication, unspecified whether long term insulin use (CMS/[facility])        PLAN    1.  Cortisone cream to the breast    2.  Please recheck in 2 weeks              This document has been electronically signed by Dimitri Peterson MD on January 19, 2021 21:46 CST

## 2021-01-19 PROBLEM — E11.69 TYPE 2 DIABETES MELLITUS WITH OTHER SPECIFIED COMPLICATION: Status: ACTIVE | Noted: 2021-01-19

## 2021-01-19 PROBLEM — I73.9 PERIPHERAL VASCULAR DISEASE, UNSPECIFIED: Status: ACTIVE | Noted: 2021-01-19

## 2021-01-19 PROBLEM — I50.32 CHRONIC HEART FAILURE WITH PRESERVED EJECTION FRACTION: Status: ACTIVE | Noted: 2021-01-19

## 2021-01-29 ENCOUNTER — TELEPHONE (OUTPATIENT)
Dept: CARDIOLOGY | Facility: CLINIC | Age: 81
End: 2021-01-29

## 2021-01-29 DIAGNOSIS — I50.32 CHRONIC HEART FAILURE WITH PRESERVED EJECTION FRACTION (HCC): Primary | ICD-10-CM

## 2021-01-29 RX ORDER — NIFEDIPINE 60 MG/1
60 TABLET, EXTENDED RELEASE ORAL DAILY
Qty: 30 TABLET | Refills: 1 | Status: SHIPPED | OUTPATIENT
Start: 2021-01-29 | End: 2021-04-13

## 2021-01-29 NOTE — TELEPHONE ENCOUNTER
Called patient to let her know that we will have some samples upfront for her. Left voicemail       ----- Message from Marry Gaona sent at 1/29/2021 10:39 AM [facility] -----  Contact: [phone]  Do you have any samples of Eloquis?      [facility] number [phone]

## 2021-02-03 ENCOUNTER — OFFICE VISIT (OUTPATIENT)
Dept: SURGERY | Facility: CLINIC | Age: 81
End: 2021-02-03

## 2021-02-03 VITALS
SYSTOLIC BLOOD PRESSURE: 130 MMHG | WEIGHT: 161.6 LBS | DIASTOLIC BLOOD PRESSURE: 68 MMHG | OXYGEN SATURATION: 97 % | HEIGHT: 65 IN | HEART RATE: 65 BPM | TEMPERATURE: 98.8 F | BODY MASS INDEX: 26.92 KG/M2

## 2021-02-03 DIAGNOSIS — Z12.31 SCREENING MAMMOGRAM FOR HIGH-RISK PATIENT: Primary | ICD-10-CM

## 2021-02-03 DIAGNOSIS — R21 RASH, SKIN: Primary | ICD-10-CM

## 2021-02-03 PROCEDURE — 99212 OFFICE O/P EST SF 10 MIN: CPT | Performed by: SURGERY

## 2021-02-03 NOTE — PATIENT INSTRUCTIONS
"BMI for Adults  What is BMI?  Body mass index (BMI) is a number that is calculated from a person's weight and height. BMI can help estimate how much of a person's weight is composed of fat. BMI does not measure body fat directly. Rather, it is an alternative to procedures that directly measure body fat, which can be difficult and expensive.  BMI can help identify people who may be at higher risk for certain medical problems.  What are BMI measurements used for?  BMI is used as a screening tool to identify possible weight problems. It helps determine whether a person is obese, overweight, a healthy weight, or underweight.  BMI is useful for:  · Identifying a weight problem that may be related to a medical condition or may increase the risk for medical problems.  · Promoting changes, such as changes in diet and exercise, to help reach a healthy weight. BMI screening can be repeated to see if these changes are working.  How is BMI calculated?  BMI involves measuring your weight in relation to your height. Both height and weight are measured, and the BMI is calculated from those numbers. This can be done either in English (U.S.) or metric measurements. Note that charts and online BMI calculators are available to help you find your BMI quickly and easily without having to do these calculations yourself.  To calculate your BMI in English ([place].S.) measurements:    1. Measure your weight in pounds (lb).  2. Multiply the number of pounds by 703.  ? For example, for a person who weighs 180 lb, multiply that number by 703, which equals 126,540.  3. Measure your height in inches. Then multiply that number by itself to get a measurement called \"inches squared.\"  ? For example, for a person who is 70 inches tall, the \"inches squared\" measurement is 70 inches x 70 inches, which equals 4,900 inches squared.  4. Divide the total from step 2 (number of lb x 703) by the total from step 3 (inches squared): 126,540 ÷ 4,900 = 25.8. This is " "your BMI.  To calculate your BMI in metric measurements:  1. Measure your weight in kilograms (kg).  2. Measure your height in meters (m). Then multiply that number by itself to get a measurement called \"meters squared.\"  ? For example, for a person who is 1.75 m tall, the \"meters squared\" measurement is 1.75 m x 1.75 m, which is equal to 3.1 meters squared.  3. Divide the number of kilograms (your weight) by the meters squared number. In this example: 70 ÷ 3.1 = 22.6. This is your BMI.  What do the results mean?  BMI charts are used to identify whether you are underweight, normal weight, overweight, or obese. The following guidelines will be used:  · Underweight: BMI less than 18.5.  · Normal weight: BMI between 18.5 and 24.9.  · Overweight: BMI between 25 and 29.9.  · Obese: BMI of 30 or above.  Keep these notes in mind:  · Weight includes both fat and muscle, so someone with a muscular build, such as an athlete, may have a BMI that is higher than 24.9. In cases like these, BMI is not an accurate measure of body fat.  · To determine if excess body fat is the cause of a BMI of 25 or higher, further assessments may need to be done by a health care provider.  · BMI is usually interpreted in the same way for men and women.  Where to find more information  For more information about BMI, including tools to quickly calculate your BMI, go to these websites:  · Centers for Disease Control and Prevention: www.cdc.gov  · American Heart Association: www.heart.org  · National Heart, Lung, and Blood [place]: www.nhlbi.nih.gov  Summary  · Body mass index (BMI) is a number that is calculated from a person's weight and height.  · BMI may help estimate how much of a person's weight is composed of fat. BMI can help identify those who may be at higher risk for certain medical problems.  · BMI can be measured using English measurements or metric measurements.  · BMI charts are used to identify whether you are underweight, normal " weight, overweight, or obese.  This information is not intended to replace advice given to you by your health care provider. Make sure you discuss any questions you have with your health care provider.  Document Revised: 09/09/2020 Document Reviewed: 07/17/2020  Elsevier Patient Education © 2020 [employer]vier Inc.

## 2021-02-05 ENCOUNTER — TELEPHONE (OUTPATIENT)
Dept: CARDIOLOGY | Facility: CLINIC | Age: 81
End: 2021-02-05

## 2021-02-05 NOTE — TELEPHONE ENCOUNTER
Called patient to see what was going on with her eliquis.   She has a high out of pocket with her insurance being the first of the year,   Will try to figure out the best option for her.  She was understanding     ----- Message from Roman Wyatt sent at 2/5/2021  9:30 AM CST -----  Regarding: NABEEL PT  Contact: [phone]  Rachael with Dr. Killian asking for you to return her call. [phone]

## 2021-02-07 NOTE — PROGRESS NOTES
"Chief Complaint   Patient presents with   • Follow-up     Left Breast mammogram        HPI  80-year-old woman seen last month for routine breast check and mammogram.  She was noted to have a rash on her left breast that has remarkably improved with cortisone.  She is having no other masses noted.  Past Medical History:   Diagnosis Date   • Arthritis    • Depression    • GERD (gastroesophageal reflux disease)    • Hyperlipidemia    • Hypertension    • Near syncope    • Vascular disease        Past Surgical History:   Procedure Laterality Date   • BLADDER SURGERY     • ENDOSCOPY N/A 5/6/2019    Procedure: ESOPHAGOGASTRODUODENOSCOPY;  Surgeon: Alberto Sanchez DO;  Location: [facility] ENDOSCOPY;  Service: Gastroenterology   • GALLBLADDER SURGERY     • SHOULDER SURGERY      \"bone spurs\"   • SUBTOTAL HYSTERECTOMY           Current Outpatient Medications:   •  doxycycline (MONODOX) 100 MG capsule, Daily., Disp: , Rfl:   •  DULoxetine (CYMBALTA) 60 MG capsule, , Disp: , Rfl:   •  ELIQUIS 5 MG tablet tablet, Take 1 tablet by mouth Every 12 (Twelve) Hours., Disp: 60 tablet, Rfl: 9  •  furosemide (LASIX) 20 MG tablet, Take 1 tablet by mouth 2 (Two) Times a Day., Disp: 60 tablet, Rfl: 3  •  gabapentin (NEURONTIN) 300 MG capsule, Take 1 capsule by mouth 3 (Three) Times a Day., Disp: , Rfl: 2  •  isosorbide mononitrate (IMDUR) 30 MG 24 hr tablet, TAKE 1 TABLET BY MOUTH DAILY., Disp: 30 tablet, Rfl: 3  •  lansoprazole (PREVACID) 30 MG capsule, , Disp: , Rfl:   •  losartan (COZAAR) 100 MG tablet, Take 1 tablet by mouth Daily., Disp: 30 tablet, Rfl: 3  •  metoprolol succinate XL (TOPROL-XL) 50 MG 24 hr tablet, Take 1 tablet by mouth Daily., Disp: 30 tablet, Rfl: 11  •  NIFEdipine XL (PROCARDIA XL) 60 MG 24 hr tablet, TAKE 1 TABLET BY MOUTH DAILY., Disp: 30 tablet, Rfl: 1  •  potassium chloride (K-DUR,KLOR-CON) 20 MEQ CR tablet, TAKE ONE TABLET BY MOUTH EVERY DAY, Disp: , Rfl:   •  rosuvastatin (CRESTOR) 40 MG tablet, TAKE 1 TABLET " BY MOUTH DAILY., Disp: 30 tablet, Rfl: 3  •  traMADol (ULTRAM) 50 MG tablet, Take 50 mg by mouth As Needed., Disp: , Rfl:   •  VENTOLIN  (90 Base) MCG/ACT inhaler, , Disp: , Rfl:     Allergies   Allergen Reactions   • Tuberculin Tests Swelling   • Lortab [Hydrocodone-Acetaminophen] GI Intolerance       Family History   Problem Relation Age of Onset   • Hypertension Mother    • Diabetes Paternal Aunt    • Diabetes Paternal Grandmother    • Hypertension Paternal Grandmother    • Hypertension Paternal Grandfather        Social History     Socioeconomic History   • Marital status:      Spouse name: Not on file   • Number of children: Not on file   • Years of education: Not on file   • Highest education level: Not on file   Tobacco Use   • Smoking status: Never Smoker   • Smokeless tobacco: Never Used   Substance and Sexual Activity   • Alcohol use: No   • Drug use: No   • Sexual activity: Defer       Physical Exam  Chest:      Breasts: Breasts are symmetrical.         Right: No inverted nipple, mass, nipple discharge, skin change or tenderness.         Left: No inverted nipple, mass, nipple discharge, skin change or tenderness.     Previously noted rash has completely resolved    ASSESSMENT    Diagnoses and all orders for this visit:    1. Rash, skin (Primary)        PLAN    1.  Recheck 1 year mammograms and examination              This document has been electronically signed by Dimitri Peterson MD on February 6, 2021 18:04 CST

## 2021-03-24 RX ORDER — ROSUVASTATIN CALCIUM 40 MG/1
40 TABLET, COATED ORAL DAILY
Qty: 30 TABLET | Refills: 3 | Status: SHIPPED | OUTPATIENT
Start: 2021-03-24 | End: 2021-07-19

## 2021-03-26 RX ORDER — ISOSORBIDE MONONITRATE 30 MG/1
30 TABLET, EXTENDED RELEASE ORAL DAILY
Qty: 30 TABLET | Refills: 3 | Status: SHIPPED | OUTPATIENT
Start: 2021-03-26 | End: 2021-07-19

## 2021-04-06 DIAGNOSIS — I50.32 CHRONIC HEART FAILURE WITH PRESERVED EJECTION FRACTION (HCC): ICD-10-CM

## 2021-04-13 RX ORDER — NIFEDIPINE 60 MG/1
60 TABLET, EXTENDED RELEASE ORAL DAILY
Qty: 30 TABLET | Refills: 1 | Status: SHIPPED | OUTPATIENT
Start: 2021-04-13 | End: 2021-05-24

## 2021-05-17 PROBLEM — I50.32 CHRONIC HEART FAILURE WITH PRESERVED EJECTION FRACTION: Status: RESOLVED | Noted: 2021-01-19 | Resolved: 2021-05-17

## 2021-05-17 NOTE — PROGRESS NOTES
"Atrial Fibrillation (chief complaint)      History of Present Illness    Ms. Sullivan is a 80 year old patient who is seen today in the absence of Dr. Alves for AF and CHF.   She was last seen 11/2020 following discharge from [facility]. She had went into AF while there and spontaneously converted prior to DC. She was started on Eliquis and referred to Dr. Alves. While there she had abnormal CTA coronary and LHC was preformed. This was without obstructive disease but some moderate disease was noted. She did have elevated filling pressures at that time. She was told she had a heart attack, sounds like a Type II NSTEMI.     She has been without cardiac issues. Denies chest pain, palpitations, orthopnea, and syncope.   She is on Eliquis 5mg BID, Losartan 100mg, Idur 30mg, Toprol XL 50mg, Crestor 40mg, and Procardia 60mg.     EKG today with some PVCs. HR is 58. This is consistent with her HR at home. No symptoms.     Cardiac Risk Factors:  The ASCVD Risk score (Kayley HUMA Jr., et al., 2013) failed to calculate for the following reasons:    The valid total cholesterol range is 130 to 320 mg/dL      Past Medical History:   Diagnosis Date   • Arthritis    • Depression    • GERD (gastroesophageal reflux disease)    • Hyperlipidemia    • Hypertension    • Near syncope    • Vascular disease      Past Surgical History:   Procedure Laterality Date   • BLADDER SURGERY     • ENDOSCOPY N/A 5/6/2019    Procedure: ESOPHAGOGASTRODUODENOSCOPY;  Surgeon: Alberto Sanchez DO;  Location: [facility] ENDOSCOPY;  Service: Gastroenterology   • GALLBLADDER SURGERY     • SHOULDER SURGERY      \"bone spurs\"   • SUBTOTAL HYSTERECTOMY       Social History     Socioeconomic History   • Marital status:      Spouse name: Not on file   • Number of children: Not on file   • Years of education: Not on file   • Highest education level: Not on file   Tobacco Use   • Smoking status: Never Smoker   • Smokeless tobacco: Never Used   Substance and Sexual " Activity   • Alcohol use: No   • Drug use: No   • Sexual activity: Defer     Family History   Problem Relation Age of Onset   • Hypertension Mother    • Diabetes Paternal Aunt    • Diabetes Paternal Grandmother    • Hypertension Paternal Grandmother    • Hypertension Paternal Grandfather        ALLERGIES:  Allergies   Allergen Reactions   • Tuberculin Tests Swelling   • Lortab [Hydrocodone-Acetaminophen] GI Intolerance       Advance Care Planning   ACP discussion was declined by the patient. Patient does not have an advance directive, declines further assistance.       Review of Systems   Constitutional: Negative for chills, decreased appetite and fever.   HENT: Negative.    Eyes: Negative.    Cardiovascular: Negative for chest pain, claudication, dyspnea on exertion, irregular heartbeat, leg swelling and palpitations.   Respiratory: Negative for cough, shortness of breath and wheezing.    Endocrine: Negative.    Skin: Negative for dry skin, flushing and rash.   Musculoskeletal: Negative for falls and myalgias.   Gastrointestinal: Negative for abdominal pain, change in bowel habit and melena.   Genitourinary: Negative for frequency and hematuria.   Neurological: Negative for dizziness, light-headedness, loss of balance and weakness.   Psychiatric/Behavioral: Negative for altered mental status and memory loss. The patient is not nervous/anxious.        Current Outpatient Medications   Medication Sig Dispense Refill   • doxycycline (MONODOX) 100 MG capsule Daily.     • DULoxetine (CYMBALTA) 60 MG capsule      • ELIQUIS 5 MG tablet tablet Take 1 tablet by mouth Every 12 (Twelve) Hours. 60 tablet 9   • furosemide (LASIX) 20 MG tablet Take 1 tablet by mouth 2 (Two) Times a Day. 60 tablet 3   • gabapentin (NEURONTIN) 300 MG capsule Take 1 capsule by mouth 3 (Three) Times a Day.  2   • isosorbide mononitrate (IMDUR) 30 MG 24 hr tablet TAKE 1 TABLET BY MOUTH DAILY. 30 tablet 3   • lansoprazole (PREVACID) 30 MG capsule     "  • losartan (COZAAR) 100 MG tablet Take 1 tablet by mouth Daily. 30 tablet 3   • metoprolol succinate XL (TOPROL-XL) 50 MG 24 hr tablet Take 1 tablet by mouth Daily. 30 tablet 11   • Myrbetriq 25 MG tablet sustained-release 24 hour 24 hr tablet      • NIFEdipine XL (PROCARDIA XL) 60 MG 24 hr tablet TAKE 1 TABLET BY MOUTH DAILY. 30 tablet 1   • potassium chloride (K-DUR,KLOR-CON) 20 MEQ CR tablet TAKE ONE TABLET BY MOUTH EVERY DAY     • rosuvastatin (CRESTOR) 40 MG tablet TAKE 1 TABLET BY MOUTH DAILY 30 tablet 3   • traMADol (ULTRAM) 50 MG tablet Take 50 mg by mouth As Needed.     • VENTOLIN  (90 Base) MCG/ACT inhaler        No current facility-administered medications for this visit.       OBJECTIVE:    Physical Exam:   Constitutional:       General: Not in acute distress.     Appearance: Well-developed.   HENT:      Head: Normocephalic and atraumatic.   Neck:      Vascular: No JVD.   Pulmonary:      Effort: Pulmonary effort is normal. No respiratory distress.      Breath sounds: Normal breath sounds.   Cardiovascular:      Normal rate. Regular rhythm.   Pulses:     Intact distal pulses.   Abdominal:      General: Bowel sounds are normal.      Palpations: Abdomen is soft.   Musculoskeletal: Normal range of motion.      Cervical back: Normal range of motion. Skin:     General: Skin is warm and dry.      Findings: No erythema.   Neurological:      Mental Status: Alert and oriented to person, place, and time.   Psychiatric:         Behavior: Behavior normal.         Thought Content: Thought content normal.         Judgment: Judgment normal.       Vitals:    05/18/21 1104   Pulse: 72   Temp: 97.3 °F (36.3 °C)   SpO2: 93%   Weight: 71.7 kg (158 lb)   Height: 165.1 cm (65\")       DATA REVIEWED by me:     ECG 12 Lead  Order: [id number]  Status:  Preliminary result   Visible to patient:  No (not released)   Next appt:  None   Dx:  Paroxysmal atrial fibrillation (CMS/HCC)  Component   Ref Range & Units 5/18/21 1113 "   QT Interval   ms 386 P    QTC Interval   ms 378 P       Narrative & Impression    Test Reason : AFIB  Blood Pressure :   */*   mmHG  Vent. Rate :  58 BPM     Atrial Rate :  58 BPM     P-R Int : 188 ms          QRS Dur :  78 ms      QT Int : 386 ms       P-R-T Axes :  11  51  28 degrees     QTc Int : 378 ms     Sinus bradycardia with occasional Premature ventricular complexes  Septal infarct , age undetermined  Abnormal ECG  When compared with ECG of 17-JUL-2020 08:05,  Premature ventricular complexes are now Present                Results for orders placed during the hospital encounter of 08/26/20    Adult Transthoracic Echo Complete W/ Cont if Necessary Per Protocol    Interpretation Summary  · Estimated EF = 61%.  · Left ventricular systolic function is normal.  · Left ventricular diastolic dysfunction (grade I) consistent with impaired relaxation.  · Left atrial cavity size is mildly dilated.  · Normal left atrial volume noted. Left atrial cavity size is mildly dilated. No evidence of a left atrial thrombus present. No evidence of a left atrial mass present. No evidence of a patent foramen ovale. Saline test results are negative.      No radiology results for the last 30 days.    CTA Coronary at [facility]  CT coronary angiogram at [place] 5/31/2020  IMPRESSION:    Aorta: No evidence of thoracic aortic dissection or intramural   hematoma. 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 Advanced atherosclerotic disease within   the descending thoracic aorta and transverse arch.    Coronary arteries: CAD-RADS 3    Degree of maximal stenosis: 50%-69%  Moderate stenosis  Consider functional evaluation and serial troponin levels     Labs Reviewed by me: BMP, CBC, LIPID, TSH  Lab Results   Component Value Date    GLUCOSE 167 (H) 05/31/2020    CALCIUM 8.7 05/31/2020     05/31/2020    K 3.8 05/31/2020    CO2 25.0 05/31/2020     05/31/2020    BUN 13 05/31/2020     CREATININE 0.79 2020    EGFRIFNONA 70 2020    BCR 16.5 2020    ANIONGAP 11.0 2020     Lab Results   Component Value Date    WBC 11.46 (H) 2020    HGB 12.5 2020    HCT 38.3 2020    MCV 84.4 2020     2020     Lab Results   Component Value Date    CHOL 129 2020     Lab Results   Component Value Date    TRIG 145 2020    TRIG 84 2020     Lab Results   Component Value Date    HDL 36 (L) 2020    HDL 42 (L) 2020     No components found for: LDLCALC  Lab Results   Component Value Date    LDL 68 2020     2020     No results found for: HDLLDLRATIO  No components found for: CHOLHDL  Lab Results   Component Value Date    TSH 1.359 2020     Lab Results   Component Value Date    PROBNP 288.7 2020     [facility] at [facility]        The following portions of the patient's history were reviewed and updated as appropriate: allergies, current medications, past family history, past medical history, past social history, past surgical history and problem list.  Old records that were reviewed and pertinent information is included in the above objective data.     ASSESSMENT/PLAN:       Diagnosis Plan   1. Paroxysmal atrial fibrillation (CMS/[facility])  Paroxysmal atrial fibrillation undetermined  CHADSVASC: HTN, Age >75 (2) and Female  EF is 60%. LA size is normal   There is not evidence of structural heart disease    Rate is controlled at this time. Patient is in NSR    Anticoagulation:ordered  - Eliquis 5mg BID   - co pay high with her Medicare. We sample when we can.  - Tier exception was granted due to pandemic. It will  21 and go back to tier 3 and then can consider patient assistance as she also take Myrbetric which is expensive.     Rate control agents:ordered  - Toprol XL 50mg  - Procardia 60mg    Rhythm control agents:not indicated       2. HTN/HLD   - stable on current medications.   - no ASA due to stomach issues    - Crestor 40mg    Lab Results   Component Value Date    CHOL 129 07/16/2020    CHLPL 160 05/31/2020    TRIG 145 07/16/2020    HDL 36 (L) 07/16/2020    LDL 68 07/16/2020          Patient's Body mass index is 26.29 kg/m². indicating that she is within normal range (BMI 18.5-24.9). No BMI management plan needed..    Follow up in 6 months with Dr. Alves.             This document has been electronically signed by COY Abernathy on May 18, 2021 11:08 CDT

## 2021-05-18 ENCOUNTER — OFFICE VISIT (OUTPATIENT)
Dept: CARDIOLOGY | Facility: CLINIC | Age: 81
End: 2021-05-18

## 2021-05-18 VITALS
TEMPERATURE: 97.3 F | HEART RATE: 72 BPM | OXYGEN SATURATION: 93 % | SYSTOLIC BLOOD PRESSURE: 115 MMHG | DIASTOLIC BLOOD PRESSURE: 70 MMHG | BODY MASS INDEX: 26.33 KG/M2 | HEIGHT: 65 IN | WEIGHT: 158 LBS

## 2021-05-18 DIAGNOSIS — I48.0 PAROXYSMAL A-FIB (HCC): ICD-10-CM

## 2021-05-18 DIAGNOSIS — I48.0 PAROXYSMAL ATRIAL FIBRILLATION (HCC): Primary | ICD-10-CM

## 2021-05-18 PROCEDURE — 99214 OFFICE O/P EST MOD 30 MIN: CPT | Performed by: NURSE PRACTITIONER

## 2021-05-18 PROCEDURE — 93000 ELECTROCARDIOGRAM COMPLETE: CPT | Performed by: INTERNAL MEDICINE

## 2021-05-18 RX ORDER — MIRABEGRON 25 MG/1
TABLET, FILM COATED, EXTENDED RELEASE ORAL
COMMUNITY
Start: 2021-04-27 | End: 2023-03-06 | Stop reason: DRUGHIGH

## 2021-05-19 LAB
QT INTERVAL: 386 MS
QTC INTERVAL: 378 MS

## 2021-05-21 DIAGNOSIS — I50.32 CHRONIC HEART FAILURE WITH PRESERVED EJECTION FRACTION (HCC): ICD-10-CM

## 2021-05-24 RX ORDER — NIFEDIPINE 60 MG/1
60 TABLET, EXTENDED RELEASE ORAL DAILY
Qty: 30 TABLET | Refills: 1 | Status: SHIPPED | OUTPATIENT
Start: 2021-05-24 | End: 2021-07-19

## 2021-06-21 RX ORDER — FUROSEMIDE 20 MG/1
TABLET ORAL
Qty: 60 TABLET | Refills: 3 | Status: SHIPPED | OUTPATIENT
Start: 2021-06-21

## 2021-06-21 RX ORDER — APIXABAN 5 MG/1
TABLET, FILM COATED ORAL
Qty: 60 TABLET | Refills: 9 | Status: SHIPPED | OUTPATIENT
Start: 2021-06-21 | End: 2021-12-01 | Stop reason: SDUPTHER

## 2021-07-19 DIAGNOSIS — I50.32 CHRONIC HEART FAILURE WITH PRESERVED EJECTION FRACTION (HCC): ICD-10-CM

## 2021-07-21 RX ORDER — ISOSORBIDE MONONITRATE 30 MG/1
30 TABLET, EXTENDED RELEASE ORAL DAILY
Qty: 30 TABLET | Refills: 4 | Status: SHIPPED | OUTPATIENT
Start: 2021-07-21 | End: 2022-08-18

## 2021-07-21 RX ORDER — NIFEDIPINE 60 MG/1
60 TABLET, EXTENDED RELEASE ORAL DAILY
Qty: 30 TABLET | Refills: 4 | Status: SHIPPED | OUTPATIENT
Start: 2021-07-21 | End: 2022-01-31

## 2021-07-21 RX ORDER — METOPROLOL SUCCINATE 50 MG/1
50 TABLET, EXTENDED RELEASE ORAL DAILY
Qty: 30 TABLET | Refills: 4 | Status: SHIPPED | OUTPATIENT
Start: 2021-07-21 | End: 2022-01-31

## 2021-07-21 RX ORDER — ROSUVASTATIN CALCIUM 40 MG/1
40 TABLET, COATED ORAL DAILY
Qty: 30 TABLET | Refills: 4 | Status: SHIPPED | OUTPATIENT
Start: 2021-07-21 | End: 2022-01-31

## 2021-07-27 ENCOUNTER — HOSPITAL ENCOUNTER (EMERGENCY)
Facility: HOSPITAL | Age: 81
Discharge: HOME | End: 2021-07-28
Attending: EMERGENCY MEDICINE | Admitting: EMERGENCY MEDICINE

## 2021-07-27 ENCOUNTER — APPOINTMENT (OUTPATIENT)
Dept: GENERAL RADIOLOGY | Facility: HOSPITAL | Age: 81
End: 2021-07-27

## 2021-07-27 VITALS
SYSTOLIC BLOOD PRESSURE: 134 MMHG | DIASTOLIC BLOOD PRESSURE: 65 MMHG | BODY MASS INDEX: 26.74 KG/M2 | TEMPERATURE: 98.4 F | HEART RATE: 63 BPM | WEIGHT: 160.5 LBS | OXYGEN SATURATION: 94 % | RESPIRATION RATE: 18 BRPM | HEIGHT: 65 IN

## 2021-07-27 DIAGNOSIS — N39.0 UTI (URINARY TRACT INFECTION), BACTERIAL: ICD-10-CM

## 2021-07-27 DIAGNOSIS — M54.16 LUMBAR RADICULOPATHY: Primary | ICD-10-CM

## 2021-07-27 DIAGNOSIS — A49.9 UTI (URINARY TRACT INFECTION), BACTERIAL: ICD-10-CM

## 2021-07-27 LAB
BASOPHILS # BLD AUTO: 0.09 10*3/MM3 (ref 0–0.2)
BASOPHILS NFR BLD AUTO: 1.1 % (ref 0–1.5)
BILIRUB UR QL STRIP: NEGATIVE
CLARITY UR: ABNORMAL
COLOR UR: YELLOW
DEPRECATED RDW RBC AUTO: 42.8 FL (ref 37–54)
EOSINOPHIL # BLD AUTO: 0.44 10*3/MM3 (ref 0–0.4)
EOSINOPHIL NFR BLD AUTO: 5.3 % (ref 0.3–6.2)
ERYTHROCYTE [DISTWIDTH] IN BLOOD BY AUTOMATED COUNT: 13.4 % (ref 12.3–15.4)
GLUCOSE UR STRIP-MCNC: NEGATIVE MG/DL
HCT VFR BLD AUTO: 38 % (ref 34–46.6)
HGB BLD-MCNC: 12.2 G/DL (ref 12–15.9)
HGB UR QL STRIP.AUTO: ABNORMAL
IMM GRANULOCYTES # BLD AUTO: 0.03 10*3/MM3 (ref 0–0.05)
IMM GRANULOCYTES NFR BLD AUTO: 0.4 % (ref 0–0.5)
KETONES UR QL STRIP: NEGATIVE
LEUKOCYTE ESTERASE UR QL STRIP.AUTO: ABNORMAL
LYMPHOCYTES # BLD AUTO: 2.36 10*3/MM3 (ref 0.7–3.1)
LYMPHOCYTES NFR BLD AUTO: 28.5 % (ref 19.6–45.3)
MCH RBC QN AUTO: 28 PG (ref 26.6–33)
MCHC RBC AUTO-ENTMCNC: 32.1 G/DL (ref 31.5–35.7)
MCV RBC AUTO: 87.2 FL (ref 79–97)
MONOCYTES # BLD AUTO: 0.91 10*3/MM3 (ref 0.1–0.9)
MONOCYTES NFR BLD AUTO: 11 % (ref 5–12)
NEUTROPHILS NFR BLD AUTO: 4.45 10*3/MM3 (ref 1.7–7)
NEUTROPHILS NFR BLD AUTO: 53.7 % (ref 42.7–76)
NITRITE UR QL STRIP: NEGATIVE
NRBC BLD AUTO-RTO: 0 /100 WBC (ref 0–0.2)
PH UR STRIP.AUTO: 5.5 [PH] (ref 5–9)
PLATELET # BLD AUTO: 370 10*3/MM3 (ref 140–450)
PMV BLD AUTO: 9.1 FL (ref 6–12)
PROT UR QL STRIP: ABNORMAL
RBC # BLD AUTO: 4.36 10*6/MM3 (ref 3.77–5.28)
SP GR UR STRIP: 1.01 (ref 1–1.03)
UROBILINOGEN UR QL STRIP: ABNORMAL
WBC # BLD AUTO: 8.28 10*3/MM3 (ref 3.4–10.8)

## 2021-07-27 PROCEDURE — 99283 EMERGENCY DEPT VISIT LOW MDM: CPT

## 2021-07-27 PROCEDURE — 81001 URINALYSIS AUTO W/SCOPE: CPT | Performed by: EMERGENCY MEDICINE

## 2021-07-27 PROCEDURE — 72100 X-RAY EXAM L-S SPINE 2/3 VWS: CPT

## 2021-07-27 PROCEDURE — 85025 COMPLETE CBC W/AUTO DIFF WBC: CPT | Performed by: EMERGENCY MEDICINE

## 2021-07-27 RX ORDER — LIDOCAINE 50 MG/G
1 PATCH TOPICAL EVERY 24 HOURS
Qty: 5 PATCH | Refills: 0 | Status: SHIPPED | OUTPATIENT
Start: 2021-07-27 | End: 2021-11-23

## 2021-07-27 RX ORDER — SULFAMETHOXAZOLE AND TRIMETHOPRIM 800; 160 MG/1; MG/1
1 TABLET ORAL 2 TIMES DAILY
Qty: 6 TABLET | Refills: 0 | Status: SHIPPED | OUTPATIENT
Start: 2021-07-27 | End: 2021-07-30

## 2021-07-27 RX ORDER — METHOCARBAMOL 500 MG/1
500 TABLET, FILM COATED ORAL 2 TIMES DAILY PRN
Qty: 15 TABLET | Refills: 0 | Status: SHIPPED | OUTPATIENT
Start: 2021-07-27 | End: 2021-10-05

## 2021-07-27 RX ORDER — TRAMADOL HYDROCHLORIDE 50 MG/1
50 TABLET ORAL ONCE
Status: COMPLETED | OUTPATIENT
Start: 2021-07-27 | End: 2021-07-27

## 2021-07-27 RX ADMIN — TRAMADOL HYDROCHLORIDE 50 MG: 50 TABLET, FILM COATED ORAL at 23:13

## 2021-07-28 LAB
BACTERIA UR QL AUTO: ABNORMAL /HPF
HOLD SPECIMEN: NORMAL
HYALINE CASTS UR QL AUTO: ABNORMAL /LPF
RBC # UR: ABNORMAL /HPF
REF LAB TEST METHOD: ABNORMAL
SQUAMOUS #/AREA URNS HPF: ABNORMAL /HPF
WBC UR QL AUTO: ABNORMAL /HPF

## 2021-10-05 ENCOUNTER — OFFICE VISIT (OUTPATIENT)
Dept: SURGERY | Facility: CLINIC | Age: 81
End: 2021-10-05

## 2021-10-05 VITALS
BODY MASS INDEX: 25.79 KG/M2 | HEIGHT: 65 IN | WEIGHT: 154.8 LBS | DIASTOLIC BLOOD PRESSURE: 70 MMHG | TEMPERATURE: 98.4 F | SYSTOLIC BLOOD PRESSURE: 136 MMHG | HEART RATE: 64 BPM

## 2021-10-05 DIAGNOSIS — N61.0 CELLULITIS OF LEFT BREAST: Primary | ICD-10-CM

## 2021-10-05 PROCEDURE — 99212 OFFICE O/P EST SF 10 MIN: CPT | Performed by: NURSE PRACTITIONER

## 2021-10-05 RX ORDER — SULFAMETHOXAZOLE AND TRIMETHOPRIM 800; 160 MG/1; MG/1
1 TABLET ORAL 2 TIMES DAILY
Qty: 10 TABLET | Refills: 0 | Status: SHIPPED | OUTPATIENT
Start: 2021-10-05 | End: 2021-10-08 | Stop reason: SDUPTHER

## 2021-10-05 RX ORDER — TRAMADOL HYDROCHLORIDE 50 MG/1
50 TABLET ORAL TAKE AS DIRECTED
COMMUNITY
Start: 2021-08-17 | End: 2021-10-05 | Stop reason: SDUPTHER

## 2021-10-05 RX ORDER — ACETAMINOPHEN 500 MG
500 TABLET ORAL AS NEEDED
COMMUNITY

## 2021-10-05 RX ORDER — LANSOPRAZOLE 30 MG/1
1 CAPSULE, DELAYED RELEASE ORAL EVERY 12 HOURS
COMMUNITY
Start: 2021-06-21

## 2021-10-05 NOTE — PATIENT INSTRUCTIONS
"BMI for Adults  What is BMI?  Body mass index (BMI) is a number that is calculated from a person's weight and height. BMI can help estimate how much of a person's weight is composed of fat. BMI does not measure body fat directly. Rather, it is an alternative to procedures that directly measure body fat, which can be difficult and expensive.  BMI can help identify people who may be at higher risk for certain medical problems.  What are BMI measurements used for?  BMI is used as a screening tool to identify possible weight problems. It helps determine whether a person is obese, overweight, a healthy weight, or underweight.  BMI is useful for:  · Identifying a weight problem that may be related to a medical condition or may increase the risk for medical problems.  · Promoting changes, such as changes in diet and exercise, to help reach a healthy weight. BMI screening can be repeated to see if these changes are working.  How is BMI calculated?  BMI involves measuring your weight in relation to your height. Both height and weight are measured, and the BMI is calculated from those numbers. This can be done either in English ([place] or metric measurements. Note that charts and online BMI calculators are available to help you find your BMI quickly and easily without having to do these calculations yourself.  To calculate your BMI in English ([place] measurements:    1. Measure your weight in pounds (lb).  2. Multiply the number of pounds by 703.  ? For example, for a person who weighs 180 lb, multiply that number by 703, which equals 126,540.  3. Measure your height in inches. Then multiply that number by itself to get a measurement called \"inches squared.\"  ? For example, for a person who is 70 inches tall, the \"inches squared\" measurement is 70 inches x 70 inches, which equals 4,900 inches squared.  4. Divide the total from step 2 (number of lb x 703) by the total from step 3 (inches squared): 126,540 ÷ 4,900 = 25.8. This is " "your BMI.    To calculate your BMI in metric measurements:  1. Measure your weight in kilograms (kg).  2. Measure your height in meters (m). Then multiply that number by itself to get a measurement called \"meters squared.\"  ? For example, for a person who is 1.75 m tall, the \"meters squared\" measurement is 1.75 m x 1.75 m, which is equal to 3.1 meters squared.  3. Divide the number of kilograms (your weight) by the meters squared number. In this example: 70 ÷ 3.1 = 22.6. This is your BMI.  What do the results mean?  BMI charts are used to identify whether you are underweight, normal weight, overweight, or obese. The following guidelines will be used:  · Underweight: BMI less than 18.5.  · Normal weight: BMI between 18.5 and 24.9.  · Overweight: BMI between 25 and 29.9.  · Obese: BMI of 30 or above.  Keep these notes in mind:  · Weight includes both fat and muscle, so someone with a muscular build, such as an athlete, may have a BMI that is higher than 24.9. In cases like these, BMI is not an accurate measure of body fat.  · To determine if excess body fat is the cause of a BMI of 25 or higher, further assessments may need to be done by a health care provider.  · BMI is usually interpreted in the same way for men and women.  Where to find more information  For more information about BMI, including tools to quickly calculate your BMI, go to these websites:  · Centers for Disease Control and Prevention: www.cdc.gov  · American Heart Association: www.heart.org  · National Heart, Lung, and Blood [place]: www.nhlbi.nih.gov  Summary  · Body mass index (BMI) is a number that is calculated from a person's weight and height.  · BMI may help estimate how much of a person's weight is composed of fat. BMI can help identify those who may be at higher risk for certain medical problems.  · BMI can be measured using English measurements or metric measurements.  · BMI charts are used to identify whether you are underweight, normal " weight, overweight, or obese.  This information is not intended to replace advice given to you by your health care provider. Make sure you discuss any questions you have with your health care provider.  Document Revised: 09/09/2020 Document Reviewed: 07/17/2020  Elsevier Patient Education © 2021 [employer]    Calorie Counting for Weight Loss  Calories are units of energy. Your body needs a certain number of calories from food to keep going throughout the day. When you eat or drink more calories than your body needs, your body stores the extra calories mostly as fat. When you eat or drink fewer calories than your body needs, your body burns fat to get the energy it needs.  Calorie counting means keeping track of how many calories you eat and drink each day. Calorie counting can be helpful if you need to lose weight. If you eat fewer calories than your body needs, you should lose weight. Ask your health care provider what a healthy weight is for you.  For calorie counting to work, you will need to eat the right number of calories each day to lose a healthy amount of weight per week. A dietitian can help you figure out how many calories you need in a day and will suggest ways to reach your calorie goal.  · A healthy amount of weight to lose each week is usually 1-2 lb (0.5-0.9 kg). This usually means that your daily calorie intake should be reduced by 500-750 calories.  · Eating 1,200-1,500 calories a day can help most women lose weight.  · Eating 1,500-1,800 calories a day can help most men lose weight.  What do I need to know about calorie counting?  Work with your health care provider or dietitian to determine how many calories you should get each day. To meet your daily calorie goal, you will need to:  · Find out how many calories are in each food that you would like to eat. Try to do this before you eat.  · Decide how much of the food you plan to eat.  · Keep a food log. Do this by writing down what you ate and  how many calories it had.  To successfully lose weight, it is important to balance calorie counting with a healthy lifestyle that includes regular activity.  Where do I find calorie information?    The number of calories in a food can be found on a Nutrition Facts label. If a food does not have a Nutrition Facts label, try to look up the calories online or ask your dietitian for help.  Remember that calories are listed per serving. If you choose to have more than one serving of a food, you will have to multiply the calories per serving by the number of servings you plan to eat. For example, the label on a package of bread might say that a serving size is 1 slice and that there are 90 calories in a serving. If you eat 1 slice, you will have eaten 90 calories. If you eat 2 slices, you will have eaten 180 calories.  How do I keep a food log?  After each time that you eat, record the following in your food log as soon as possible:  · What you ate. Be sure to include toppings, sauces, and other extras on the food.  · How much you ate. This can be measured in cups, ounces, or number of items.  · How many calories were in each food and drink.  · The total number of calories in the food you ate.  Keep your food log near you, such as in a pocket-sized notebook or on an mary or website on your mobile phone. Some programs will calculate calories for you and show you how many calories you have left to meet your daily goal.  What are some portion-control tips?  · Know how many calories are in a serving. This will help you know how many servings you can have of a certain food.  · Use a measuring cup to measure serving sizes. You could also try weighing out portions on a kitchen scale. With time, you will be able to estimate serving sizes for some foods.  · Take time to put servings of different foods on your favorite plates or in your favorite bowls and cups so you know what a serving looks like.  · Try not to eat straight from a  food's packaging, such as from a bag or box. Eating straight from the package makes it hard to see how much you are eating and can lead to overeating. Put the amount you would like to eat in a cup or on a plate to make sure you are eating the right portion.  · Use smaller plates, glasses, and bowls for smaller portions and to prevent overeating.  · Try not to multitask. For example, avoid watching TV or using your computer while eating. If it is time to eat, sit down at a table and enjoy your food. This will help you recognize when you are full. It will also help you be more mindful of what and how much you are eating.  What are tips for following this plan?  Reading food labels  · Check the calorie count compared with the serving size. The serving size may be smaller than what you are used to eating.  · Check the source of the calories. Try to choose foods that are high in protein, fiber, and vitamins, and low in saturated fat, trans fat, and sodium.  Shopping  · Read nutrition labels while you shop. This will help you make healthy decisions about which foods to buy.  · Pay attention to nutrition labels for low-fat or fat-free foods. These foods sometimes have the same number of calories or more calories than the full-fat versions. They also often have added sugar, starch, or salt to make up for flavor that was removed with the fat.  · Make a grocery list of lower-calorie foods and stick to it.  Cooking  · Try to cook your favorite foods in a healthier way. For example, try baking instead of frying.  · Use low-fat dairy products.  Meal planning  · Use more fruits and vegetables. One-half of your plate should be fruits and vegetables.  · Include lean proteins, such as chicken, turkey, and fish.  Lifestyle  Each week, aim to do one of the following:  · 150 minutes of moderate exercise, such as walking.  · 75 minutes of vigorous exercise, such as running.  General information  · Know how many calories are in the foods  you eat most often. This will help you calculate calorie counts faster.  · Find a way of tracking calories that works for you. Get creative. Try different apps or programs if writing down calories does not work for you.  What foods should I eat?    · Eat nutritious foods. It is better to have a nutritious, high-calorie food, such as an avocado, than a food with few nutrients, such as a bag of potato chips.  · Use your calories on foods and drinks that will fill you up and will not leave you hungry soon after eating.  ? Examples of foods that fill you up are nuts and nut butters, vegetables, lean proteins, and high-fiber foods such as whole grains. High-fiber foods are foods with more than 5 g of fiber per serving.  · Pay attention to calories in drinks. Low-calorie drinks include water and unsweetened drinks.  The items listed above may not be a complete list of foods and beverages you can eat. Contact a dietitian for more information.  What foods should I limit?  Limit foods or drinks that are not good sources of vitamins, minerals, or protein or that are high in unhealthy fats. These include:  · Candy.  · Other sweets.  · Sodas, specialty coffee drinks, alcohol, and juice.  The items listed above may not be a complete list of foods and beverages you should avoid. Contact a dietitian for more information.  How do I count calories when eating out?  · Pay attention to portions. Often, portions are much larger when eating out. Try these tips to keep portions smaller:  ? Consider sharing a meal instead of getting your own.  ? If you get your own meal, eat only half of it. Before you start eating, ask for a container and put half of your meal into it.  ? When available, consider ordering smaller portions from the menu instead of full portions.  · Pay attention to your food and drink choices. Knowing the way food is cooked and what is included with the meal can help you eat fewer calories.  ? If calories are listed on  the menu, choose the lower-calorie options.  ? Choose dishes that include vegetables, fruits, whole grains, low-fat dairy products, and lean proteins.  ? Choose items that are boiled, broiled, grilled, or steamed. Avoid items that are buttered, battered, fried, or served with cream sauce. Items labeled as crispy are usually fried, unless stated otherwise.  ? Choose water, low-fat milk, unsweetened iced tea, or other drinks without added sugar. If you want an alcoholic beverage, choose a lower-calorie option, such as a glass of wine or light beer.  ? Ask for dressings, sauces, and syrups on the side. These are usually high in calories, so you should limit the amount you eat.  ? If you want a salad, choose a garden salad and ask for grilled meats. Avoid extra toppings such as luke, cheese, or fried items. Ask for the dressing on the side, or ask for olive oil and vinegar or lemon to use as dressing.  · Estimate how many servings of a food you are given. Knowing serving sizes will help you be aware of how much food you are eating at restaurants.  Where to find more information  · Centers for Disease Control and Prevention: www.cdc.gov  · [place]. Department of Agriculture: myplate.gov  Summary  · Calorie counting means keeping track of how many calories you eat and drink each day. If you eat fewer calories than your body needs, you should lose weight.  · A healthy amount of weight to lose per week is usually 1-2 lb (0.5-0.9 kg). This usually means reducing your daily calorie intake by 500-750 calories.  · The number of calories in a food can be found on a Nutrition Facts label. If a food does not have a Nutrition Facts label, try to look up the calories online or ask your dietitian for help.  · Use smaller plates, glasses, and bowls for smaller portions and to prevent overeating.  · Use your calories on foods and drinks that will fill you up and not leave you hungry shortly after a meal.  This information is not intended  to replace advice given to you by your health care provider. Make sure you discuss any questions you have with your health care provider.  Document Revised: 01/28/2021 Document Reviewed: 01/28/2021  Elsevier Patient Education © 2021 [employer].

## 2021-10-05 NOTE — PROGRESS NOTES
"Chief Complaint  Breast Pain (Possible Left Breast Infection)    Subjective          Carol Sullivan presents to [facility] GENERAL SUR[facility]ERY  History of Present Illness  Ms. Sullivan presents today for concerns of left breast infection. She states 2 days ago she woke up with fever and chills and noticed her left breast was red and warm. She reports fever up to 101. She states the breast is very tender to palpation. She states she has had infection in the left breast several times before.  She does have a history of benign breast biopsies in past, last mammography in December 2020 was reported as benign.    Denies any nipple discharge, lumps, masses or lymph node enlargement.       Objective   Vital Signs:   /70   Pulse 64   Temp 98.4 °F (36.9 °C) (Oral)   Ht 165.1 cm (65\")   Wt 70.2 kg (154 lb 12.8 oz)   BMI 25.76 kg/m²     Physical Exam  Vitals reviewed.   Constitutional:       General: She is not in acute distress.     Appearance: Normal appearance. She is not ill-appearing, toxic-appearing or diaphoretic.   HENT:      Head: Normocephalic and atraumatic.   Chest:      Breasts:         Left: Swelling, skin change and tenderness present. No bleeding, inverted nipple, mass or nipple discharge.       Lymphadenopathy:      Upper Body:      Left upper body: No supraclavicular or axillary adenopathy.   Skin:     General: Skin is warm and dry.   Neurological:      General: No focal deficit present.      Mental Status: She is alert and oriented to person, place, and time.   Psychiatric:         Mood and Affect: Mood normal.         Behavior: Behavior normal.         Thought Content: Thought content normal.         Judgment: Judgment normal.     Patient's Body mass index is 25.76 kg/m². indicating that she is overweight (BMI 25-29.9). Obesity-related health conditions include the following: hypertension, diabetes mellitus, dyslipidemias, GERD and peripheral vascular disease. Obesity is " unchanged. BMI is is above average; BMI management plan is completed. Information added to AVS.        Result Review :       Data reviewed: Radiologic studies prior mammography           Assessment and Plan    Diagnoses and all orders for this visit:    1. Cellulitis of left breast (Primary)  -     sulfamethoxazole-trimethoprim (Bactrim DS) 800-160 MG per tablet; Take 1 tablet by mouth 2 (Two) Times a Day for 5 days.  Dispense: 10 tablet; Refill: 0    Begin antibiotics as prescribed.  May apply warm compresses to area. Site is marked for reference. If fever greater than 100 returns or persists or symptoms worsen she is instructed to return to office prior to appointment or present to ED.     I spent 18 minutes caring for Ivyal on this date of service. This time includes time spent by me in the following activities:preparing for the visit, reviewing tests, obtaining and/or reviewing a separately obtained history, counseling and educating the patient/family/caregiver, documenting information in the medical record and care coordination  Follow Up   Return in about 2 days (around 10/7/2021).  Patient was given instructions and counseling regarding her condition or for health maintenance advice. Please see specific information pulled into the AVS if appropriate.                 This document has been electronically signed by COY Parson on October 5, 2021 14:53 CDT

## 2021-10-08 ENCOUNTER — HOSPITAL ENCOUNTER (OUTPATIENT)
Dept: ULTRASOUND IMAGING | Facility: HOSPITAL | Age: 81
Discharge: HOME | End: 2021-10-08
Admitting: NURSE PRACTITIONER

## 2021-10-08 ENCOUNTER — OFFICE VISIT (OUTPATIENT)
Dept: SURGERY | Facility: CLINIC | Age: 81
End: 2021-10-08

## 2021-10-08 VITALS
DIASTOLIC BLOOD PRESSURE: 64 MMHG | WEIGHT: 156.8 LBS | SYSTOLIC BLOOD PRESSURE: 122 MMHG | TEMPERATURE: 98.7 F | HEIGHT: 65 IN | BODY MASS INDEX: 26.12 KG/M2 | HEART RATE: 69 BPM

## 2021-10-08 DIAGNOSIS — N61.0 CELLULITIS OF LEFT BREAST: ICD-10-CM

## 2021-10-08 DIAGNOSIS — N61.0 CELLULITIS OF LEFT BREAST: Primary | ICD-10-CM

## 2021-10-08 PROCEDURE — 76642 ULTRASOUND BREAST LIMITED: CPT

## 2021-10-08 PROCEDURE — 99212 OFFICE O/P EST SF 10 MIN: CPT | Performed by: NURSE PRACTITIONER

## 2021-10-08 RX ORDER — SULFAMETHOXAZOLE AND TRIMETHOPRIM 800; 160 MG/1; MG/1
1 TABLET ORAL 2 TIMES DAILY
Qty: 6 TABLET | Refills: 0 | Status: SHIPPED | OUTPATIENT
Start: 2021-10-08 | End: 2021-10-11

## 2021-10-08 NOTE — PATIENT INSTRUCTIONS
"BMI for Adults  What is BMI?  Body mass index (BMI) is a number that is calculated from a person's weight and height. BMI can help estimate how much of a person's weight is composed of fat. BMI does not measure body fat directly. Rather, it is an alternative to procedures that directly measure body fat, which can be difficult and expensive.  BMI can help identify people who may be at higher risk for certain medical problems.  What are BMI measurements used for?  BMI is used as a screening tool to identify possible weight problems. It helps determine whether a person is obese, overweight, a healthy weight, or underweight.  BMI is useful for:  · Identifying a weight problem that may be related to a medical condition or may increase the risk for medical problems.  · Promoting changes, such as changes in diet and exercise, to help reach a healthy weight. BMI screening can be repeated to see if these changes are working.  How is BMI calculated?  BMI involves measuring your weight in relation to your height. Both height and weight are measured, and the BMI is calculated from those numbers. This can be done either in English ([place] or metric measurements. Note that charts and online BMI calculators are available to help you find your BMI quickly and easily without having to do these calculations yourself.  To calculate your BMI in English ([place] measurements:    1. Measure your weight in pounds (lb).  2. Multiply the number of pounds by 703.  ? For example, for a person who weighs 180 lb, multiply that number by 703, which equals 126,540.  3. Measure your height in inches. Then multiply that number by itself to get a measurement called \"inches squared.\"  ? For example, for a person who is 70 inches tall, the \"inches squared\" measurement is 70 inches x 70 inches, which equals 4,900 inches squared.  4. Divide the total from step 2 (number of lb x 703) by the total from step 3 (inches squared): 126,540 ÷ 4,900 = 25.8. This is " "your BMI.    To calculate your BMI in metric measurements:  1. Measure your weight in kilograms (kg).  2. Measure your height in meters (m). Then multiply that number by itself to get a measurement called \"meters squared.\"  ? For example, for a person who is 1.75 m tall, the \"meters squared\" measurement is 1.75 m x 1.75 m, which is equal to 3.1 meters squared.  3. Divide the number of kilograms (your weight) by the meters squared number. In this example: 70 ÷ 3.1 = 22.6. This is your BMI.  What do the results mean?  BMI charts are used to identify whether you are underweight, normal weight, overweight, or obese. The following guidelines will be used:  · Underweight: BMI less than 18.5.  · Normal weight: BMI between 18.5 and 24.9.  · Overweight: BMI between 25 and 29.9.  · Obese: BMI of 30 or above.  Keep these notes in mind:  · Weight includes both fat and muscle, so someone with a muscular build, such as an athlete, may have a BMI that is higher than 24.9. In cases like these, BMI is not an accurate measure of body fat.  · To determine if excess body fat is the cause of a BMI of 25 or higher, further assessments may need to be done by a health care provider.  · BMI is usually interpreted in the same way for men and women.  Where to find more information  For more information about BMI, including tools to quickly calculate your BMI, go to these websites:  · Centers for Disease Control and Prevention: www.cdc.gov  · American Heart Association: www.heart.org  · National Heart, Lung, and Blood [place]: www.nhlbi.nih.gov  Summary  · Body mass index (BMI) is a number that is calculated from a person's weight and height.  · BMI may help estimate how much of a person's weight is composed of fat. BMI can help identify those who may be at higher risk for certain medical problems.  · BMI can be measured using English measurements or metric measurements.  · BMI charts are used to identify whether you are underweight, normal " weight, overweight, or obese.  This information is not intended to replace advice given to you by your health care provider. Make sure you discuss any questions you have with your health care provider.  Document Revised: 09/09/2020 Document Reviewed: 07/17/2020  Elsevier Patient Education © 2021 [employer]

## 2021-10-08 NOTE — PROGRESS NOTES
"Chief Complaint  Follow-up (Recheck Left Breast)    Subjective          Ivnicole Jamison Sullivan presents to [facility]  History of Present Illness  Ms. Sullivan presents today for recheck of left breast cellulitis . She has been taking Bactrim for 3 days now. She states it is feeling much better. Claims she did have fever of up to 99.0 two days ago but none reported since. Denies any drainage from breast or nipple.        Study Result  Narrative & Impression   Procedure: Ultrasound left breast   Reason for exam: Mastitis. Possible abscess.   FINDINGS: Ultrasound of the left breast was performed in region   of concern. Normal breast parenchyma is seen. No suspicious mass   or fluid collection identified.   IMPRESSION:   Normal ultrasound of left breast with no suspicious   mass or fluid collection identified.   Electronically signed by: Brock Gtz MD 10/8/2021 12:20 PM CDT   Workstation: [id number]         Objective   Vital Signs:   /64   Pulse 69   Temp 98.7 °F (37.1 °C) (Oral)   Ht 165.1 cm (65\")   Wt 71.1 kg (156 lb 12.8 oz)   BMI 26.09 kg/m²     Physical Exam  Vitals reviewed.   Constitutional:       General: She is not in acute distress.     Appearance: Normal appearance. She is not ill-appearing, toxic-appearing or diaphoretic.   HENT:      Head: Normocephalic and atraumatic.   Chest:      Breasts:         Left: Swelling, skin change and tenderness present. No bleeding, inverted nipple, mass or nipple discharge.       Lymphadenopathy:      Upper Body:      Left upper body: No supraclavicular or axillary adenopathy.   Skin:     General: Skin is warm and dry.   Neurological:      General: No focal deficit present.      Mental Status: She is alert and oriented to person, place, and time.   Psychiatric:         Mood and Affect: Mood normal.         Behavior: Behavior normal.         Thought Content: Thought content normal.         Judgment: Judgment normal.      "   Result Review :                 Assessment and Plan    Diagnoses and all orders for this visit:    1. Cellulitis of left breast (Primary)  -     US Breast Left Limited; Future  -     sulfamethoxazole-trimethoprim (Bactrim DS) 800-160 MG per tablet; Take 1 tablet by mouth 2 (Two) Times a Day for 3 days.  Dispense: 6 tablet; Refill: 0      I spent 19 minutes caring for Ivyal on this date of service. This time includes time spent by me in the following activities:preparing for the visit, reviewing tests, performing a medically appropriate examination and/or evaluation , ordering medications, tests, or procedures, referring and communicating with other health care professionals , documenting information in the medical record and care coordination  Follow Up   Return in about 4 days (around 10/12/2021), or if symptoms worsen or fail to improve. Continue warm compresses. 3 additional days of Bactrim. Follow up in 4 days or sooner if concerns arise.      Patient was given instructions and counseling regarding her condition or for health maintenance advice. Please see specific information pulled into the AVS if appropriate.                 This document has been electronically signed by COY Parson on October 8, 2021 12:55 CDT

## 2021-10-13 ENCOUNTER — OFFICE VISIT (OUTPATIENT)
Dept: SURGERY | Facility: CLINIC | Age: 81
End: 2021-10-13

## 2021-10-13 VITALS
TEMPERATURE: 98.1 F | SYSTOLIC BLOOD PRESSURE: 120 MMHG | BODY MASS INDEX: 26.52 KG/M2 | WEIGHT: 159.2 LBS | HEIGHT: 65 IN | HEART RATE: 66 BPM | DIASTOLIC BLOOD PRESSURE: 72 MMHG

## 2021-10-13 DIAGNOSIS — N61.0 CELLULITIS OF LEFT BREAST: Primary | ICD-10-CM

## 2021-10-13 PROCEDURE — 99212 OFFICE O/P EST SF 10 MIN: CPT | Performed by: NURSE PRACTITIONER

## 2021-10-13 NOTE — PATIENT INSTRUCTIONS
"BMI for Adults  What is BMI?  Body mass index (BMI) is a number that is calculated from a person's weight and height. BMI can help estimate how much of a person's weight is composed of fat. BMI does not measure body fat directly. Rather, it is an alternative to procedures that directly measure body fat, which can be difficult and expensive.  BMI can help identify people who may be at higher risk for certain medical problems.  What are BMI measurements used for?  BMI is used as a screening tool to identify possible weight problems. It helps determine whether a person is obese, overweight, a healthy weight, or underweight.  BMI is useful for:  · Identifying a weight problem that may be related to a medical condition or may increase the risk for medical problems.  · Promoting changes, such as changes in diet and exercise, to help reach a healthy weight. BMI screening can be repeated to see if these changes are working.  How is BMI calculated?  BMI involves measuring your weight in relation to your height. Both height and weight are measured, and the BMI is calculated from those numbers. This can be done either in English (U[place] or metric measurements. Note that charts and online BMI calculators are available to help you find your BMI quickly and easily without having to do these calculations yourself.  To calculate your BMI in English (U.S[place] measurements:    1. Measure your weight in pounds (lb).  2. Multiply the number of pounds by 703.  ? For example, for a person who weighs 180 lb, multiply that number by 703, which equals 126,540.  3. Measure your height in inches. Then multiply that number by itself to get a measurement called \"inches squared.\"  ? For example, for a person who is 70 inches tall, the \"inches squared\" measurement is 70 inches x 70 inches, which equals 4,900 inches squared.  4. Divide the total from step 2 (number of lb x 703) by the total from step 3 (inches squared): 126,540 ÷ 4,900 = 25.8. This is " "your BMI.    To calculate your BMI in metric measurements:  1. Measure your weight in kilograms (kg).  2. Measure your height in meters (m). Then multiply that number by itself to get a measurement called \"meters squared.\"  ? For example, for a person who is 1.75 m tall, the \"meters squared\" measurement is 1.75 m x 1.75 m, which is equal to 3.1 meters squared.  3. Divide the number of kilograms (your weight) by the meters squared number. In this example: 70 ÷ 3.1 = 22.6. This is your BMI.  What do the results mean?  BMI charts are used to identify whether you are underweight, normal weight, overweight, or obese. The following guidelines will be used:  · Underweight: BMI less than 18.5.  · Normal weight: BMI between 18.5 and 24.9.  · Overweight: BMI between 25 and 29.9.  · Obese: BMI of 30 or above.  Keep these notes in mind:  · Weight includes both fat and muscle, so someone with a muscular build, such as an athlete, may have a BMI that is higher than 24.9. In cases like these, BMI is not an accurate measure of body fat.  · To determine if excess body fat is the cause of a BMI of 25 or higher, further assessments may need to be done by a health care provider.  · BMI is usually interpreted in the same way for men and women.  Where to find more information  For more information about BMI, including tools to quickly calculate your BMI, go to these websites:  · Centers for Disease Control and Prevention: www.cdc.gov  · American Heart Association: www.heart.org  · National Heart, Lung, and Blood [place]: www.nhlbi.nih.gov  Summary  · Body mass index (BMI) is a number that is calculated from a person's weight and height.  · BMI may help estimate how much of a person's weight is composed of fat. BMI can help identify those who may be at higher risk for certain medical problems.  · BMI can be measured using English measurements or metric measurements.  · BMI charts are used to identify whether you are underweight, normal " weight, overweight, or obese.  This information is not intended to replace advice given to you by your health care provider. Make sure you discuss any questions you have with your health care provider.  Document Revised: 09/09/2020 Document Reviewed: 07/17/2020  Elsevier Patient Education © 2021 [employer]

## 2021-10-13 NOTE — PROGRESS NOTES
"Chief Complaint  Follow-up (Recheck Left Breast )    Subjective          Carol Jamison Sullivan presents to [facility]  History of Present Illness    Ms. Sullivan presents today for recheck of left breast cellulitis . She has 1 more day of Bactrim. She states she is now feeling much better and the redness and tenderness has resolved. Denies fever since being seen last. Denies any drainage from breast or nipple.      Objective   Vital Signs:   /72   Pulse 66   Temp 98.1 °F (36.7 °C) (Temporal)   Ht 165.1 cm (65\")   Wt 72.2 kg (159 lb 3.2 oz)   BMI 26.49 kg/m²     Physical Exam  Vitals reviewed.   Constitutional:       General: She is not in acute distress.     Appearance: Normal appearance. She is not ill-appearing, toxic-appearing or diaphoretic.   HENT:      Head: Normocephalic and atraumatic.   Chest:   Breasts:      Left: No swelling, bleeding, inverted nipple, mass, nipple discharge, skin change, tenderness, axillary adenopathy or supraclavicular adenopathy.        Comments: No further cellulitis noted.    Lymphadenopathy:      Upper Body:      Left upper body: No supraclavicular or axillary adenopathy.   Skin:     General: Skin is warm and dry.   Neurological:      General: No focal deficit present.      Mental Status: She is alert and oriented to person, place, and time.   Psychiatric:         Mood and Affect: Mood normal.         Behavior: Behavior normal.         Thought Content: Thought content normal.         Judgment: Judgment normal.        Result Review :                 Assessment and Plan    Diagnoses and all orders for this visit:    1. Cellulitis of left breast (Primary)    Obtain screening mammogram and breast exam in December as ordered. Continue self breast exams. Follow up in 2 months or sooner if concerns arise.      I spent 10 minutes caring for Carol on this date of service. This time includes time spent by me in the following activities:preparing " for the visit, performing a medically appropriate examination and/or evaluation , counseling and educating the patient/family/caregiver, documenting information in the medical record and care coordination  Follow Up   No follow-ups on file.  Patient was given instructions and counseling regarding her condition or for health maintenance advice. Please see specific information pulled into the AVS if appropriate.                 This document has been electronically signed by COY Parson on October 13, 2021 10:04 CDT

## 2021-11-23 ENCOUNTER — OFFICE VISIT (OUTPATIENT)
Dept: CARDIOLOGY | Facility: CLINIC | Age: 81
End: 2021-11-23

## 2021-11-23 VITALS
BODY MASS INDEX: 26.66 KG/M2 | SYSTOLIC BLOOD PRESSURE: 140 MMHG | OXYGEN SATURATION: 94 % | WEIGHT: 160 LBS | HEART RATE: 55 BPM | HEIGHT: 65 IN | DIASTOLIC BLOOD PRESSURE: 70 MMHG

## 2021-11-23 DIAGNOSIS — I10 ESSENTIAL HYPERTENSION: Primary | ICD-10-CM

## 2021-11-23 DIAGNOSIS — R55 NEAR SYNCOPE: ICD-10-CM

## 2021-11-23 DIAGNOSIS — I49.3 PVC (PREMATURE VENTRICULAR CONTRACTION): ICD-10-CM

## 2021-11-23 DIAGNOSIS — E78.5 HYPERLIPIDEMIA, UNSPECIFIED HYPERLIPIDEMIA TYPE: ICD-10-CM

## 2021-11-23 DIAGNOSIS — I48.0 PAROXYSMAL ATRIAL FIBRILLATION (HCC): ICD-10-CM

## 2021-11-23 DIAGNOSIS — I10 PRIMARY HYPERTENSION: ICD-10-CM

## 2021-11-23 PROCEDURE — 99213 OFFICE O/P EST LOW 20 MIN: CPT | Performed by: INTERNAL MEDICINE

## 2021-11-23 PROCEDURE — 93000 ELECTROCARDIOGRAM COMPLETE: CPT | Performed by: INTERNAL MEDICINE

## 2021-11-23 NOTE — PROGRESS NOTES
2 boxes of eliquis 5mg given to patient as well as patient assistant forms. Went over what is needed for patient forms as well.

## 2021-11-23 NOTE — PROGRESS NOTES
Carol Sullivan  81 y.o. female    11/23/2021  1. Essential hypertension    2. Primary hypertension    3. Hyperlipidemia, unspecified hyperlipidemia type    4. Near syncope    5. PVC (premature ventricular contraction)    6. Paroxysmal atrial fibrillation (HCC)        History of Present Illness:  Body mass index is 26.63 kg/m². BMI is above normal parameters. Recommendations include: exercise counseling, nutrition counseling and referral to primary care.       81 years old who presented for follow-up EKG confirmed the sinus rhythm no symptom of cardiac decompensation such orthopnea PND chest pain intermittent claudication or syncope reported.  The patient has a background history of questionable aortic dissection was life flighted to [place] in 2020 CT scan at that facility reported no evidence of any aortic dissection.  She does have history of paroxysmal atrial fibrillation she spontaneously converted to sinus rhythm on long-term oral anticoagulation's.      CT coronary angiogram at [facility] 5/31/2020  IMPRESSION:    Aorta: No evidence of thoracic aortic dissection or intramural   hematoma. 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 Advanced atherosclerotic disease within   the descending thoracic aorta and transverse arch.    Coronary arteries: CAD-RADS 3    Degree of maximal stenosis: 50%-69%  Moderate stenosis  Consider functional evaluation and serial troponin levels    Additional Findings:  1.  Nonobstructing nephrolith within the left kidney.  2.  Large hiatal hernia containing majority of the fundus of stomach.  3.  Numerous additional chronic and incidental findings as noted   above.    Electronically signed by  Reynaldo Urban on 5/31/2020 11:31 AM  CT scan of the chest 5/31/2020  IMPRESSION:     1.  Amenia A dissection involving ascending thoracic aorta as  above.  Preliminary report was called to Dr. Hays upon  interpretation.     2.  Diffuse  "calcified and noncalcified atheromatous plaques  without hemodynamically significant stenosis otherwise as above.     3.  Incidental mesenteric fat stranding and shotty adenopathy.   3-6 month follow-up abdominal CT is recommended to ensure  stability.     4.  Incidental moderate hiatal hernia and nonobstructing left  renal stone.    Lipids 7/16/2020    Total Cholesterol  <200 mg/dL 129    Triglycerides  30 - 150 mg/dL 145    HDL Cholesterol  39 - 96 mg/dL 36Low     LDL Cholesterol   5 - 99 mg/dL 68    Chol/HDL Ratio  3.5 - 5.3 3.6        SUBJECTIVE:    Allergies   Allergen Reactions   • Tuberculin Tests Swelling   • Hydrocodone Nausea Only   • Lortab [Hydrocodone-Acetaminophen] GI Intolerance         Past Medical History:   Diagnosis Date   • Arthritis    • Depression    • GERD (gastroesophageal reflux disease)    • Hyperlipidemia    • Hypertension    • Near syncope    • Vascular disease          Past Surgical History:   Procedure Laterality Date   • BLADDER SURGERY     • ENDOSCOPY N/A 5/6/2019    Procedure: ESOPHAGOGASTRODUODENOSCOPY;  Surgeon: Alberto Sanchez DO;  Location: [facility] ENDOSCOPY;  Service: Gastroenterology   • GALLBLADDER SURGERY     • SHOULDER SURGERY      \"bone spurs\"   • SUBTOTAL HYSTERECTOMY           Family History   Problem Relation Age of Onset   • Hypertension Mother    • Diabetes Paternal Aunt    • Diabetes Paternal Grandmother    • Hypertension Paternal Grandmother    • Hypertension Paternal Grandfather          Social History     Socioeconomic History   • Marital status:    Tobacco Use   • Smoking status: Never Smoker   • Smokeless tobacco: Never Used   Substance and Sexual Activity   • Alcohol use: No   • Drug use: No   • Sexual activity: Defer         Current Outpatient Medications   Medication Sig Dispense Refill   • acetaminophen (TYLENOL) 500 MG tablet Take 500 mg by mouth As Needed.     • doxycycline (MONODOX) 100 MG capsule Daily.     • DULoxetine (CYMBALTA) 60 MG capsule   "    • Eliquis 5 MG tablet tablet TAKE ONE TABLET BY MOUTH EVERY 12 HOURS 60 tablet 9   • furosemide (LASIX) 20 MG tablet TAKE ONE TABLET BY MOUTH TWO TIMES A DAY 60 tablet 3   • gabapentin (NEURONTIN) 300 MG capsule Take 1 capsule by mouth 3 (Three) Times a Day.  2   • isosorbide mononitrate (IMDUR) 30 MG 24 hr tablet TAKE 1 TABLET BY MOUTH DAILY. 30 tablet 4   • lansoprazole (Prevacid) 30 MG capsule Take 1 capsule by mouth Every 12 (Twelve) Hours.     • losartan (COZAAR) 100 MG tablet Take 1 tablet by mouth Daily. 30 tablet 3   • metoprolol succinate XL (TOPROL-XL) 50 MG 24 hr tablet TAKE 1 TABLET BY MOUTH DAILY. 30 tablet 4   • Myrbetriq 25 MG tablet sustained-release 24 hour 24 hr tablet      • NIFEdipine XL (PROCARDIA XL) 60 MG 24 hr tablet TAKE 1 TABLET BY MOUTH DAILY 30 tablet 4   • potassium chloride (K-DUR,KLOR-CON) 20 MEQ CR tablet TAKE ONE TABLET BY MOUTH EVERY DAY     • rosuvastatin (CRESTOR) 40 MG tablet TAKE 1 TABLET BY MOUTH DAILY 30 tablet 4   • traMADol (ULTRAM) 50 MG tablet Take 50 mg by mouth As Needed.     • VENTOLIN  (90 Base) MCG/ACT inhaler        No current facility-administered medications for this visit.           Review of Systems:     Constitutional:  Denies recent weight loss, weight gain,no change in exercise tolerance.     HENT:  Denies any hearing loss, epistaxis, hoarseness,   Eyes: No blurred    Respiratory:  Denies dyspnea with exertion,no cough,     Cardiovascular: See H&P    Gastrointestinal:  Denies change in bowel habits, dyspepsia, ulcer disease,    Endocrine: Negative for cold intolerance, heat intolerance, polydipsia, polyphagia and polyuria..     Genitourinary: Negative.      Musculoskeletal: Denies  arthritic symptoms or back problems.     Skin:  Denies  rashes, or skin lesions.     Allergic/Immunologic: Negative.  Negative for environmental allergies, food allergies    Neurological:  Denies  recurrent headaches, strokes, TIA, or seizure disorder.     Hematological:  "Denies any food allergies, seasonal allergies, bleeding disorders    Psychiatric/Behavioral: Denies any history of depression,      OBJECTIVE:    /70   Pulse 55   Ht 165.1 cm (65\")   Wt 72.6 kg (160 lb)   SpO2 94%   BMI 26.63 kg/m²       Physical Exam:     Constitutional: Cooperative, alert and oriented, well-developed, well-nourished, in no acute distress.     HENT:   Head: Normocephalic, conjunctive is pink thyroid is nonpalpable no jugular is distention    Cardiovascular: Regular rhythm, S1 and S2 normal, no S3 or S4. Apical impulse not displaced. No murmurs, gallops, or rubs detected.     Pulmonary/Chest: Chest: Rales and  Abdominal: Abdomen soft, bowel sounds normoactive, no masses,    Musculoskeletal: No deformities, peripheral pulses no    Neurological: No gross motor or sensory deficits noted, affect appropriate, oriented to time, person, place.     Skin: Warm and dry to the touch, no apparent skin lesions or masses noted.     Psychiatric: She has a normal mood and affect. Her behavior is normal. Judgment and thought content normal.         Procedures      Lab Results   Component Value Date    WBC 8.28 07/27/2021    HGB 12.2 07/27/2021    HCT 38.0 07/27/2021    MCV 87.2 07/27/2021     07/27/2021     Lab Results   Component Value Date    GLUCOSE 167 (H) 05/31/2020    BUN 13 05/31/2020    CREATININE 0.79 05/31/2020    EGFRIFNONA 70 05/31/2020    BCR 16.5 05/31/2020    CO2 25.0 05/31/2020    CALCIUM 8.7 05/31/2020    ALBUMIN 4.00 05/31/2020    AST 21 05/31/2020    ALT 9 05/31/2020     Lab Results   Component Value Date    CHOL 129 07/16/2020     Lab Results   Component Value Date    TRIG 145 07/16/2020    TRIG 84 05/31/2020     Lab Results   Component Value Date    HDL 36 (L) 07/16/2020    HDL 42 (L) 05/31/2020     No components found for: LDLCALC  Lab Results   Component Value Date    LDL 68 07/16/2020     05/31/2020     No results found for: HDLLDLRATIO  No components found for: " CHOLHDL  Lab Results   Component Value Date    HGBA1C 6.5 (H) 07/16/2020     Lab Results   Component Value Date    TSH 1.359 05/31/2020           ASSESSMENT AND PLAN:  #1 paroxysmal atrial fibrillation    [facility]ads vascular is 4 continue oral anticoagulation    Continue metoprolol has had no further recurrence     2 hypertension  good blood pressure will continue losartan 100 mg, metoprolol 50 mg continue Procardia and isosorbide    3 hyperlipidemia continue Crestor follow-up with your family doctor    4  history of premature ventricular complex    Asymptomatic at the time of evaluation continue metoprolol        Diagnoses and all orders for this visit:    1. Essential hypertension (Primary)  -     ECG 12 Lead    2. Primary hypertension    3. Hyperlipidemia, unspecified hyperlipidemia type    4. Near syncope    5. PVC (premature ventricular contraction)    6. Paroxysmal atrial fibrillation (HCC)          Odette Alves MD  11/23/2021  11:21 CST

## 2021-11-30 LAB
QT INTERVAL: 406 MS
QTC INTERVAL: 388 MS

## 2021-12-01 RX ORDER — APIXABAN 5 MG/1
5 TABLET, FILM COATED ORAL EVERY 12 HOURS
Qty: 180 TABLET | Refills: 3 | Status: SHIPPED | OUTPATIENT
Start: 2021-12-01 | End: 2022-01-11

## 2022-01-11 ENCOUNTER — TELEPHONE (OUTPATIENT)
Dept: CARDIOLOGY | Facility: CLINIC | Age: 82
End: 2022-01-11

## 2022-01-11 DIAGNOSIS — I48.0 PAROXYSMAL ATRIAL FIBRILLATION: Primary | ICD-10-CM

## 2022-01-11 RX ORDER — WARFARIN SODIUM 5 MG/1
5 TABLET ORAL
Qty: 30 TABLET | Refills: 1 | Status: SHIPPED | OUTPATIENT
Start: 2022-01-11 | End: 2022-01-24 | Stop reason: DRUGHIGH

## 2022-01-11 NOTE — TELEPHONE ENCOUNTER
----- Message from Roman Wyatt Rep sent at 1/11/2022  1:48 PM CST -----  Contact: [phone]  Pt is requesting something other than eliquis. We dont have samples and she cant afford price.  ----- Message -----  From: Idania Duke RegSched Rep  Sent: 1/11/2022  12:55 PM CST  To: Kendall Epley, MA    Pt requesting eliquis samples. Shes here waiting.

## 2022-01-11 NOTE — TELEPHONE ENCOUNTER
Contacted patient to let her know that per Dr. Alves she can switch to Warfin 5 mg and begin coumdin clinic for monitoring. She voiced her understanding.

## 2022-01-12 ENCOUNTER — DOCUMENTATION (OUTPATIENT)
Dept: CARDIAC SURGERY | Facility: CLINIC | Age: 82
End: 2022-01-12

## 2022-01-12 NOTE — PROGRESS NOTES
We received a referral from Dr Alves to switch pt from Eliquis to Coumadin. I called pt who stated she will take last Eliquis Saturday night. I consulted with COY Stephenson who states pt can start coumadin Thursday night if she will finish Eliquis Saturday night. I called pt and instructed her to continue Eliquis through Saturday night, start coumadin tomorrow night, and appt made for Monday; pt repeated back correctly. Findings reported by Hannah Gutierrez RN.

## 2022-01-17 ENCOUNTER — ANTICOAGULATION VISIT (OUTPATIENT)
Dept: CARDIAC SURGERY | Facility: CLINIC | Age: 82
End: 2022-01-17

## 2022-01-17 VITALS — OXYGEN SATURATION: 95 % | HEART RATE: 61 BPM

## 2022-01-17 DIAGNOSIS — Z79.01 LONG TERM (CURRENT) USE OF ANTICOAGULANTS: ICD-10-CM

## 2022-01-17 DIAGNOSIS — Z51.81 ENCOUNTER FOR THERAPEUTIC DRUG MONITORING: ICD-10-CM

## 2022-01-17 DIAGNOSIS — I48.0 PAROXYSMAL ATRIAL FIBRILLATION: Primary | ICD-10-CM

## 2022-01-17 LAB — INR PPP: 2.6 (ref 0.9–1.1)

## 2022-01-17 PROCEDURE — 85610 PROTHROMBIN TIME: CPT | Performed by: NURSE PRACTITIONER

## 2022-01-17 PROCEDURE — 36416 COLLJ CAPILLARY BLOOD SPEC: CPT | Performed by: NURSE PRACTITIONER

## 2022-01-17 PROCEDURE — 93793 ANTICOAG MGMT PT WARFARIN: CPT | Performed by: NURSE PRACTITIONER

## 2022-01-19 ENCOUNTER — ANTICOAGULATION VISIT (OUTPATIENT)
Dept: CARDIAC SURGERY | Facility: CLINIC | Age: 82
End: 2022-01-19

## 2022-01-19 VITALS — HEART RATE: 65 BPM | OXYGEN SATURATION: 94 %

## 2022-01-19 DIAGNOSIS — Z79.01 LONG TERM (CURRENT) USE OF ANTICOAGULANTS: ICD-10-CM

## 2022-01-19 DIAGNOSIS — I48.0 PAROXYSMAL ATRIAL FIBRILLATION: Primary | ICD-10-CM

## 2022-01-19 DIAGNOSIS — Z51.81 ENCOUNTER FOR THERAPEUTIC DRUG MONITORING: ICD-10-CM

## 2022-01-19 LAB — INR PPP: 3.1 (ref 0.9–1.1)

## 2022-01-19 PROCEDURE — 36416 COLLJ CAPILLARY BLOOD SPEC: CPT | Performed by: NURSE PRACTITIONER

## 2022-01-19 PROCEDURE — 93793 ANTICOAG MGMT PT WARFARIN: CPT | Performed by: NURSE PRACTITIONER

## 2022-01-19 PROCEDURE — 85610 PROTHROMBIN TIME: CPT | Performed by: NURSE PRACTITIONER

## 2022-01-19 NOTE — PROGRESS NOTES
Pt states no changes to meds or diet.  Pt denies bleeding issues.  Instructed pt on weekly coumadin dose and marked calendar for green intake days.  Recheck INR Monday.  Pt verbalizes.  Patient instructed regarding medication; results given and questions answered. Nutritional counseling given.  Dietary factors affecting therapy addressed.  Patient instructed to monitor for excessive bruising or bleeding.  Findings reported by Nerissa Farmer RN.   Today's INR is Lab Results - Last 18 Months   Lab Units 01/19/22  0000   INR  3.10*

## 2022-01-24 ENCOUNTER — ANTICOAGULATION VISIT (OUTPATIENT)
Dept: CARDIAC SURGERY | Facility: CLINIC | Age: 82
End: 2022-01-24

## 2022-01-24 VITALS — OXYGEN SATURATION: 94 % | HEART RATE: 71 BPM

## 2022-01-24 DIAGNOSIS — Z79.01 LONG TERM (CURRENT) USE OF ANTICOAGULANTS: ICD-10-CM

## 2022-01-24 DIAGNOSIS — I48.0 PAROXYSMAL ATRIAL FIBRILLATION: Primary | ICD-10-CM

## 2022-01-24 DIAGNOSIS — Z51.81 ENCOUNTER FOR THERAPEUTIC DRUG MONITORING: ICD-10-CM

## 2022-01-24 LAB — INR PPP: 3.7 (ref 0.9–1.1)

## 2022-01-24 PROCEDURE — 85610 PROTHROMBIN TIME: CPT | Performed by: NURSE PRACTITIONER

## 2022-01-24 PROCEDURE — 93793 ANTICOAG MGMT PT WARFARIN: CPT | Performed by: NURSE PRACTITIONER

## 2022-01-24 PROCEDURE — 36416 COLLJ CAPILLARY BLOOD SPEC: CPT | Performed by: NURSE PRACTITIONER

## 2022-01-24 RX ORDER — WARFARIN SODIUM 2.5 MG/1
2.5 TABLET ORAL
COMMUNITY
End: 2022-06-28 | Stop reason: SDUPTHER

## 2022-01-24 NOTE — PROGRESS NOTES
Pt states no changes to meds or diet.  Pt denies bleeding issues.  Adjusted coumadin dose and instructed pt to increase Vit K intake today with a green pea serving.  Recheck INR this Thursday.  Pt verbalizes.  I did change patient's coumadin strength to 2.5mg instead of 5mg; new prescription called to [facility] pharmacy.  Patient instructed regarding medication; results given and questions answered. Nutritional counseling given.  Dietary factors affecting therapy addressed.  Patient instructed to monitor for excessive bruising or bleeding.  Findings reported by Nerissa Farmer RN.   Today's INR is Lab Results - Last 18 Months   Lab Units 01/24/22  0000   INR  3.70*

## 2022-01-27 ENCOUNTER — ANTICOAGULATION VISIT (OUTPATIENT)
Dept: CARDIAC SURGERY | Facility: CLINIC | Age: 82
End: 2022-01-27

## 2022-01-27 DIAGNOSIS — I48.0 PAROXYSMAL ATRIAL FIBRILLATION: Primary | ICD-10-CM

## 2022-01-27 DIAGNOSIS — Z79.01 LONG TERM (CURRENT) USE OF ANTICOAGULANTS: ICD-10-CM

## 2022-01-27 DIAGNOSIS — Z51.81 ENCOUNTER FOR THERAPEUTIC DRUG MONITORING: ICD-10-CM

## 2022-01-27 LAB — INR PPP: 2.7 (ref 0.9–1.1)

## 2022-01-27 PROCEDURE — 93793 ANTICOAG MGMT PT WARFARIN: CPT | Performed by: NURSE PRACTITIONER

## 2022-01-27 PROCEDURE — 36416 COLLJ CAPILLARY BLOOD SPEC: CPT | Performed by: NURSE PRACTITIONER

## 2022-01-27 PROCEDURE — 85610 PROTHROMBIN TIME: CPT | Performed by: NURSE PRACTITIONER

## 2022-01-27 NOTE — PROGRESS NOTES
Pt denies med changes or bleeding problems. Pt states she followed dosing instructions and ate green peas on Monday. Pt was instructed on dosing and to have a serving of green veggies tomorrow; pt verbalized. Patient instructed regarding medication; results given and questions answered. Nutritional counseling given.  Dietary factors affecting therapy addressed.  Patient instructed to monitor for excessive bruising or bleeding. Will recheck in 5 days. Findings reported by Hannah Gutierrez RN.    Today's INR is Lab Results - Last 18 Months   Lab Units 01/27/22  0000   INR  2.70*

## 2022-01-31 DIAGNOSIS — I50.32 CHRONIC HEART FAILURE WITH PRESERVED EJECTION FRACTION: ICD-10-CM

## 2022-01-31 RX ORDER — ROSUVASTATIN CALCIUM 40 MG/1
40 TABLET, COATED ORAL DAILY
Qty: 30 TABLET | Refills: 4 | Status: SHIPPED | OUTPATIENT
Start: 2022-01-31 | End: 2022-06-10

## 2022-01-31 RX ORDER — METOPROLOL SUCCINATE 50 MG/1
50 TABLET, EXTENDED RELEASE ORAL DAILY
Qty: 30 TABLET | Refills: 4 | Status: SHIPPED | OUTPATIENT
Start: 2022-01-31 | End: 2022-06-10

## 2022-01-31 RX ORDER — NIFEDIPINE 60 MG/1
60 TABLET, EXTENDED RELEASE ORAL DAILY
Qty: 30 TABLET | Refills: 4 | Status: SHIPPED | OUTPATIENT
Start: 2022-01-31 | End: 2022-06-10

## 2022-02-01 ENCOUNTER — ANTICOAGULATION VISIT (OUTPATIENT)
Dept: CARDIAC SURGERY | Facility: CLINIC | Age: 82
End: 2022-02-01

## 2022-02-01 VITALS — HEART RATE: 61 BPM | OXYGEN SATURATION: 95 %

## 2022-02-01 DIAGNOSIS — I48.0 PAROXYSMAL ATRIAL FIBRILLATION: Primary | ICD-10-CM

## 2022-02-01 DIAGNOSIS — Z51.81 ENCOUNTER FOR THERAPEUTIC DRUG MONITORING: ICD-10-CM

## 2022-02-01 DIAGNOSIS — Z79.01 LONG TERM (CURRENT) USE OF ANTICOAGULANTS: ICD-10-CM

## 2022-02-01 LAB — INR PPP: 2.6 (ref 0.9–1.1)

## 2022-02-01 PROCEDURE — 36416 COLLJ CAPILLARY BLOOD SPEC: CPT | Performed by: NURSE PRACTITIONER

## 2022-02-01 PROCEDURE — 85610 PROTHROMBIN TIME: CPT | Performed by: NURSE PRACTITIONER

## 2022-02-01 PROCEDURE — 93793 ANTICOAG MGMT PT WARFARIN: CPT | Performed by: NURSE PRACTITIONER

## 2022-02-08 ENCOUNTER — ANTICOAGULATION VISIT (OUTPATIENT)
Dept: CARDIAC SURGERY | Facility: CLINIC | Age: 82
End: 2022-02-08

## 2022-02-08 VITALS — OXYGEN SATURATION: 94 % | HEART RATE: 63 BPM

## 2022-02-08 DIAGNOSIS — Z79.01 LONG TERM (CURRENT) USE OF ANTICOAGULANTS: ICD-10-CM

## 2022-02-08 DIAGNOSIS — Z51.81 ENCOUNTER FOR THERAPEUTIC DRUG MONITORING: ICD-10-CM

## 2022-02-08 DIAGNOSIS — I48.0 PAROXYSMAL ATRIAL FIBRILLATION: Primary | ICD-10-CM

## 2022-02-08 LAB — INR PPP: 1.6 (ref 0.9–1.1)

## 2022-02-08 PROCEDURE — 93793 ANTICOAG MGMT PT WARFARIN: CPT | Performed by: NURSE PRACTITIONER

## 2022-02-08 PROCEDURE — 85610 PROTHROMBIN TIME: CPT | Performed by: NURSE PRACTITIONER

## 2022-02-08 PROCEDURE — 36416 COLLJ CAPILLARY BLOOD SPEC: CPT | Performed by: NURSE PRACTITIONER

## 2022-02-08 NOTE — PROGRESS NOTES
Pt denies med changes, bleeding problems, missed doses, or excessive vitamin K intake. Pt states she only ate green veggies once last Thursday. Unable to determine cause for subtherapeutic INR. Dose adjusted and pt instructed to hold green veggies until Saturday; pt verbalized. Patient instructed regarding medication; results given and questions answered. Nutritional counseling given.  Dietary factors affecting therapy addressed.  Patient instructed to monitor for excessive bruising or bleeding. Will recheck next week. Findings reported by Hannah Gutierrez RN.    Today's INR is Lab Results - Last 18 Months   Lab Units 02/08/22  0000   INR  1.60*

## 2022-02-15 ENCOUNTER — ANTICOAGULATION VISIT (OUTPATIENT)
Dept: CARDIAC SURGERY | Facility: CLINIC | Age: 82
End: 2022-02-15

## 2022-02-15 VITALS — OXYGEN SATURATION: 93 % | DIASTOLIC BLOOD PRESSURE: 60 MMHG | SYSTOLIC BLOOD PRESSURE: 140 MMHG | HEART RATE: 67 BPM

## 2022-02-15 DIAGNOSIS — Z79.01 LONG TERM (CURRENT) USE OF ANTICOAGULANTS: ICD-10-CM

## 2022-02-15 DIAGNOSIS — I48.0 PAROXYSMAL ATRIAL FIBRILLATION: Primary | ICD-10-CM

## 2022-02-15 DIAGNOSIS — Z51.81 ENCOUNTER FOR THERAPEUTIC DRUG MONITORING: ICD-10-CM

## 2022-02-15 LAB — INR PPP: 1.5 (ref 0.9–1.1)

## 2022-02-15 PROCEDURE — 93793 ANTICOAG MGMT PT WARFARIN: CPT | Performed by: NURSE PRACTITIONER

## 2022-02-15 PROCEDURE — 36416 COLLJ CAPILLARY BLOOD SPEC: CPT | Performed by: NURSE PRACTITIONER

## 2022-02-15 PROCEDURE — 85610 PROTHROMBIN TIME: CPT | Performed by: NURSE PRACTITIONER

## 2022-02-15 NOTE — PROGRESS NOTES
Pt denies missed coumadin doses or consuming too much green.  Pt states medications have not changed; no bleeding issues.  Adjusted coumadin dose and instructed pt to limit green intake until this Saturday.  Recheck INR next wk.  Pt verbalizes.  Patient instructed regarding medication; results given and questions answered. Nutritional counseling given.  Dietary factors affecting therapy addressed.  Patient instructed to monitor for excessive bruising or bleeding.  Findings reported by Nerissa Farmer RN.   Today's INR is Lab Results - Last 18 Months   Lab Units 02/15/22  0000   INR  1.50*

## 2022-02-23 ENCOUNTER — ANTICOAGULATION VISIT (OUTPATIENT)
Dept: CARDIAC SURGERY | Facility: CLINIC | Age: 82
End: 2022-02-23

## 2022-02-23 ENCOUNTER — OFFICE VISIT (OUTPATIENT)
Dept: SURGERY | Facility: CLINIC | Age: 82
End: 2022-02-23

## 2022-02-23 VITALS
BODY MASS INDEX: 26.82 KG/M2 | DIASTOLIC BLOOD PRESSURE: 70 MMHG | OXYGEN SATURATION: 96 % | HEIGHT: 65 IN | SYSTOLIC BLOOD PRESSURE: 138 MMHG | HEART RATE: 51 BPM | WEIGHT: 161 LBS

## 2022-02-23 VITALS — HEART RATE: 52 BPM | OXYGEN SATURATION: 93 %

## 2022-02-23 DIAGNOSIS — Z51.81 ENCOUNTER FOR THERAPEUTIC DRUG MONITORING: ICD-10-CM

## 2022-02-23 DIAGNOSIS — Z79.01 LONG TERM (CURRENT) USE OF ANTICOAGULANTS: ICD-10-CM

## 2022-02-23 DIAGNOSIS — I48.0 PAROXYSMAL ATRIAL FIBRILLATION: Primary | ICD-10-CM

## 2022-02-23 DIAGNOSIS — Z12.31 SCREENING MAMMOGRAM FOR HIGH-RISK PATIENT: Primary | ICD-10-CM

## 2022-02-23 DIAGNOSIS — R92.1 BREAST CALCIFICATIONS ON MAMMOGRAM: Primary | ICD-10-CM

## 2022-02-23 LAB — INR PPP: 2 (ref 0.9–1.1)

## 2022-02-23 PROCEDURE — 85610 PROTHROMBIN TIME: CPT | Performed by: NURSE PRACTITIONER

## 2022-02-23 PROCEDURE — 36416 COLLJ CAPILLARY BLOOD SPEC: CPT | Performed by: NURSE PRACTITIONER

## 2022-02-23 PROCEDURE — 93793 ANTICOAG MGMT PT WARFARIN: CPT | Performed by: NURSE PRACTITIONER

## 2022-02-23 PROCEDURE — 99213 OFFICE O/P EST LOW 20 MIN: CPT | Performed by: SURGERY

## 2022-02-23 NOTE — PROGRESS NOTES
Patient states no med changes or bleeding problems or unexplained bruising. Patient instructed to continue current dosing schedule. Verbalizes understanding. Will recheck 1 week.  Patient instructed regarding medication; results given and questions answered. Nutritional counseling given.  Dietary factors affecting therapy addressed.  Patient instructed to monitor for excessive bruising or bleeding.  Findings reported by Nerissa Farmer RN.   Today's INR is Lab Results - Last 18 Months   Lab Units 02/23/22  0000   INR  2.00*

## 2022-02-23 NOTE — PATIENT INSTRUCTIONS
"BMI for Adults  What is BMI?  Body mass index (BMI) is a number that is calculated from a person's weight and height. BMI can help estimate how much of a person's weight is composed of fat. BMI does not measure body fat directly. Rather, it is an alternative to procedures that directly measure body fat, which can be difficult and expensive.  BMI can help identify people who may be at higher risk for certain medical problems.  What are BMI measurements used for?  BMI is used as a screening tool to identify possible weight problems. It helps determine whether a person is obese, overweight, a healthy weight, or underweight.  BMI is useful for:  · Identifying a weight problem that may be related to a medical condition or may increase the risk for medical problems.  · Promoting changes, such as changes in diet and exercise, to help reach a healthy weight. BMI screening can be repeated to see if these changes are working.  How is BMI calculated?  BMI involves measuring your weight in relation to your height. Both height and weight are measured, and the BMI is calculated from those numbers. This can be done either in English (U[place] or metric measurements. Note that charts and online BMI calculators are available to help you find your BMI quickly and easily without having to do these calculations yourself.  To calculate your BMI in English (U.S[place] measurements:    1. Measure your weight in pounds (lb).  2. Multiply the number of pounds by 703.  ? For example, for a person who weighs 180 lb, multiply that number by 703, which equals 126,540.  3. Measure your height in inches. Then multiply that number by itself to get a measurement called \"inches squared.\"  ? For example, for a person who is 70 inches tall, the \"inches squared\" measurement is 70 inches x 70 inches, which equals 4,900 inches squared.  4. Divide the total from step 2 (number of lb x 703) by the total from step 3 (inches squared): 126,540 ÷ 4,900 = 25.8. This is " "your BMI.    To calculate your BMI in metric measurements:  1. Measure your weight in kilograms (kg).  2. Measure your height in meters (m). Then multiply that number by itself to get a measurement called \"meters squared.\"  ? For example, for a person who is 1.75 m tall, the \"meters squared\" measurement is 1.75 m x 1.75 m, which is equal to 3.1 meters squared.  3. Divide the number of kilograms (your weight) by the meters squared number. In this example: 70 ÷ 3.1 = 22.6. This is your BMI.  What do the results mean?  BMI charts are used to identify whether you are underweight, normal weight, overweight, or obese. The following guidelines will be used:  · Underweight: BMI less than 18.5.  · Normal weight: BMI between 18.5 and 24.9.  · Overweight: BMI between 25 and 29.9.  · Obese: BMI of 30 or above.  Keep these notes in mind:  · Weight includes both fat and muscle, so someone with a muscular build, such as an athlete, may have a BMI that is higher than 24.9. In cases like these, BMI is not an accurate measure of body fat.  · To determine if excess body fat is the cause of a BMI of 25 or higher, further assessments may need to be done by a health care provider.  · BMI is usually interpreted in the same way for men and women.  Where to find more information  For more information about BMI, including tools to quickly calculate your BMI, go to these websites:  · Centers for Disease Control and Prevention: www.cdc.gov  · American Heart Association: www.heart.org  · National Heart, Lung, and Blood [place]: www.nhlbi.nih.gov  Summary  · Body mass index (BMI) is a number that is calculated from a person's weight and height.  · BMI may help estimate how much of a person's weight is composed of fat. BMI can help identify those who may be at higher risk for certain medical problems.  · BMI can be measured using English measurements or metric measurements.  · BMI charts are used to identify whether you are underweight, normal " weight, overweight, or obese.  This information is not intended to replace advice given to you by your health care provider. Make sure you discuss any questions you have with your health care provider.  Document Revised: 09/09/2020 Document Reviewed: 07/17/2020  Elsevier Patient Education © 2021 [employer]

## 2022-02-23 NOTE — PROGRESS NOTES
"Chief Complaint   Patient presents with   • Follow-up     recheck breast and mammogram        HPI  81-year-old woman here for routine breast check and mammogram.  She has had no newly palpable masses, skin dimpling, nipple discharge, or axillary adenopathy.  Most recent imaging demonstrates the following:    I have personally reviewed the imaging and concur with the radiologist's findings.    Past Medical History:   Diagnosis Date   • Arthritis    • Depression    • GERD (gastroesophageal reflux disease)    • Hyperlipidemia    • Hypertension    • Near syncope    • Vascular disease        Past Surgical History:   Procedure Laterality Date   • BLADDER SURGERY     • ENDOSCOPY N/A 5/6/2019    Procedure: ESOPHAGOGASTRODUODENOSCOPY;  Surgeon: Alberto Sanchez DO;  Location: [facility] ENDOSCOPY;  Service: Gastroenterology   • GALLBLADDER SURGERY     • SHOULDER SURGERY      \"bone spurs\"   • SUBTOTAL HYSTERECTOMY           Current Outpatient Medications:   •  doxycycline (MONODOX) 100 MG capsule, Daily., Disp: , Rfl:   •  DULoxetine (CYMBALTA) 60 MG capsule, , Disp: , Rfl:   •  furosemide (LASIX) 20 MG tablet, TAKE ONE TABLET BY MOUTH TWO TIMES A DAY, Disp: 60 tablet, Rfl: 3  •  gabapentin (NEURONTIN) 300 MG capsule, Take 1 capsule by mouth 3 (Three) Times a Day., Disp: , Rfl: 2  •  isosorbide mononitrate (IMDUR) 30 MG 24 hr tablet, TAKE 1 TABLET BY MOUTH DAILY., Disp: 30 tablet, Rfl: 4  •  lansoprazole (Prevacid) 30 MG capsule, Take 1 capsule by mouth Every 12 (Twelve) Hours., Disp: , Rfl:   •  losartan (COZAAR) 100 MG tablet, Take 1 tablet by mouth Daily., Disp: 30 tablet, Rfl: 3  •  metoprolol succinate XL (TOPROL-XL) 50 MG 24 hr tablet, TAKE 1 TABLET BY MOUTH DAILY, Disp: 30 tablet, Rfl: 4  •  Myrbetriq 25 MG tablet sustained-release 24 hour 24 hr tablet, , Disp: , Rfl:   •  NIFEdipine XL (PROCARDIA XL) 60 MG 24 hr tablet, TAKE 1 TABLET BY MOUTH DAILY, Disp: 30 tablet, Rfl: 4  •  potassium chloride (K-DUR,KLOR-CON) 20 MEQ " CR tablet, TAKE ONE TABLET BY MOUTH EVERY DAY, Disp: , Rfl:   •  rosuvastatin (CRESTOR) 40 MG tablet, TAKE 1 TABLET BY MOUTH DAILY, Disp: 30 tablet, Rfl: 4  •  traMADol (ULTRAM) 50 MG tablet, Take 50 mg by mouth As Needed., Disp: , Rfl:   •  VENTOLIN  (90 Base) MCG/ACT inhaler, , Disp: , Rfl:   •  warfarin (COUMADIN) 2.5 MG tablet, Take 2.5 mg by mouth Daily., Disp: , Rfl:   •  acetaminophen (TYLENOL) 500 MG tablet, Take 500 mg by mouth As Needed., Disp: , Rfl:     Allergies   Allergen Reactions   • Tuberculin Tests Swelling   • Hydrocodone Nausea Only   • Lortab [Hydrocodone-Acetaminophen] GI Intolerance       Family History   Problem Relation Age of Onset   • Hypertension Mother    • Diabetes Paternal Aunt    • Diabetes Paternal Grandmother    • Hypertension Paternal Grandmother    • Hypertension Paternal Grandfather        Social History     Socioeconomic History   • Marital status:    Tobacco Use   • Smoking status: Never Smoker   • Smokeless tobacco: Never Used   Substance and Sexual Activity   • Alcohol use: No   • Drug use: No   • Sexual activity: Defer           Physical Exam  Chest:   Breasts:      Right: Normal. No swelling, bleeding, inverted nipple, mass, nipple discharge, skin change or tenderness.      Left: Normal. No swelling, bleeding, inverted nipple, mass, nipple discharge, skin change or tenderness.               ASSESSMENT    Diagnoses and all orders for this visit:    1. Breast calcifications on mammogram (Primary)        PLAN    1.  Recheck 1 year mammograms and exam              This document has been electronically signed by Dimitri Peterson MD on February 23, 2022 14:47 CST

## 2022-03-02 ENCOUNTER — ANTICOAGULATION VISIT (OUTPATIENT)
Dept: CARDIAC SURGERY | Facility: CLINIC | Age: 82
End: 2022-03-02

## 2022-03-02 VITALS — HEART RATE: 67 BPM | OXYGEN SATURATION: 98 %

## 2022-03-02 DIAGNOSIS — I48.0 PAROXYSMAL ATRIAL FIBRILLATION: Primary | ICD-10-CM

## 2022-03-02 DIAGNOSIS — Z79.01 LONG TERM (CURRENT) USE OF ANTICOAGULANTS: ICD-10-CM

## 2022-03-02 DIAGNOSIS — Z51.81 ENCOUNTER FOR THERAPEUTIC DRUG MONITORING: ICD-10-CM

## 2022-03-02 LAB — INR PPP: 2.1 (ref 0.9–1.1)

## 2022-03-02 PROCEDURE — 85610 PROTHROMBIN TIME: CPT | Performed by: NURSE PRACTITIONER

## 2022-03-02 PROCEDURE — 93793 ANTICOAG MGMT PT WARFARIN: CPT | Performed by: NURSE PRACTITIONER

## 2022-03-02 PROCEDURE — 36416 COLLJ CAPILLARY BLOOD SPEC: CPT | Performed by: NURSE PRACTITIONER

## 2022-03-02 NOTE — PROGRESS NOTES
Patient states no med changes or bleeding problems or unexplained bruising. Patient instructed to continue current dosing schedule and green veggies once weekly. Verbalizes understanding. Will recheck in 2 weeks. Patient instructed regarding medication; results given and questions answered. Nutritional counseling given.  Dietary factors affecting therapy addressed.  Patient instructed to monitor for excessive bruising or bleeding.  Findings reported by Hannah Gutierrez RN.    Today's INR is Lab Results - Last 18 Months   Lab Units 03/02/22  0000   INR  2.10*

## 2022-03-15 ENCOUNTER — ANTICOAGULATION VISIT (OUTPATIENT)
Dept: CARDIAC SURGERY | Facility: CLINIC | Age: 82
End: 2022-03-15

## 2022-03-15 VITALS — OXYGEN SATURATION: 96 % | HEART RATE: 57 BPM

## 2022-03-15 DIAGNOSIS — I48.0 PAROXYSMAL ATRIAL FIBRILLATION: Primary | ICD-10-CM

## 2022-03-15 DIAGNOSIS — Z79.01 LONG TERM (CURRENT) USE OF ANTICOAGULANTS: ICD-10-CM

## 2022-03-15 DIAGNOSIS — Z51.81 ENCOUNTER FOR THERAPEUTIC DRUG MONITORING: ICD-10-CM

## 2022-03-15 LAB — INR PPP: 2.5 (ref 0.9–1.1)

## 2022-03-15 PROCEDURE — 93793 ANTICOAG MGMT PT WARFARIN: CPT | Performed by: NURSE PRACTITIONER

## 2022-03-15 PROCEDURE — 85610 PROTHROMBIN TIME: CPT | Performed by: NURSE PRACTITIONER

## 2022-03-15 PROCEDURE — 36416 COLLJ CAPILLARY BLOOD SPEC: CPT | Performed by: NURSE PRACTITIONER

## 2022-03-15 NOTE — PROGRESS NOTES
Patient states no med changes or bleeding problems or unexplained bruising. Patient instructed to continue current dosing schedule. Verbalizes understanding. Will recheck in 3 weeks. Patient instructed regarding medication; results given and questions answered. Nutritional counseling given.  Dietary factors affecting therapy addressed.  Patient instructed to monitor for excessive bruising or bleeding. Findings reported by Hannah Gutierrez RN.    Today's INR is Lab Results - Last 18 Months   Lab Units 03/15/22  0000   INR  2.50*

## 2022-04-05 ENCOUNTER — ANTICOAGULATION VISIT (OUTPATIENT)
Dept: CARDIAC SURGERY | Facility: CLINIC | Age: 82
End: 2022-04-05

## 2022-04-05 VITALS — HEART RATE: 67 BPM | OXYGEN SATURATION: 98 %

## 2022-04-05 DIAGNOSIS — Z51.81 ENCOUNTER FOR THERAPEUTIC DRUG MONITORING: ICD-10-CM

## 2022-04-05 DIAGNOSIS — Z79.01 LONG TERM (CURRENT) USE OF ANTICOAGULANTS: ICD-10-CM

## 2022-04-05 DIAGNOSIS — I48.0 PAROXYSMAL ATRIAL FIBRILLATION: Primary | ICD-10-CM

## 2022-04-05 LAB — INR PPP: 1.7 (ref 0.9–1.1)

## 2022-04-05 PROCEDURE — 36416 COLLJ CAPILLARY BLOOD SPEC: CPT | Performed by: NURSE PRACTITIONER

## 2022-04-05 PROCEDURE — 85610 PROTHROMBIN TIME: CPT | Performed by: NURSE PRACTITIONER

## 2022-04-05 PROCEDURE — 93793 ANTICOAG MGMT PT WARFARIN: CPT | Performed by: NURSE PRACTITIONER

## 2022-04-05 NOTE — PROGRESS NOTES
Pt states she finished an antibiotic last Thursday and had a steroid shot the week before last but forgot to call the [facility]. Pt denies bleeding problems, missed doses, or excessive vitamin K intake. Pt instructed on dosing and to hold green veggies 3 days; pt verbalized. Patient instructed regarding medication; results given and questions answered. Nutritional counseling given.  Dietary factors affecting therapy addressed.  Patient instructed to monitor for excessive bruising or bleeding. Will recheck next week. Pt will begin holding coumadin 5 nights on 4/15 for spinal injection per Dr Hussein. Findings reported by Hannah Gutierrez RN.    Today's INR is Lab Results - Last 18 Months   Lab Units 04/05/22  0000   INR  1.70*

## 2022-04-13 ENCOUNTER — ANTICOAGULATION VISIT (OUTPATIENT)
Dept: CARDIAC SURGERY | Facility: CLINIC | Age: 82
End: 2022-04-13

## 2022-04-13 VITALS — HEART RATE: 66 BPM | OXYGEN SATURATION: 95 %

## 2022-04-13 DIAGNOSIS — Z51.81 ENCOUNTER FOR THERAPEUTIC DRUG MONITORING: ICD-10-CM

## 2022-04-13 DIAGNOSIS — Z79.01 LONG TERM (CURRENT) USE OF ANTICOAGULANTS: ICD-10-CM

## 2022-04-13 DIAGNOSIS — I48.0 PAROXYSMAL ATRIAL FIBRILLATION: Primary | ICD-10-CM

## 2022-04-13 LAB — INR PPP: 1.9 (ref 0.9–1.1)

## 2022-04-13 PROCEDURE — 36416 COLLJ CAPILLARY BLOOD SPEC: CPT | Performed by: NURSE PRACTITIONER

## 2022-04-13 PROCEDURE — 85610 PROTHROMBIN TIME: CPT | Performed by: NURSE PRACTITIONER

## 2022-04-13 PROCEDURE — 93793 ANTICOAG MGMT PT WARFARIN: CPT | Performed by: NURSE PRACTITIONER

## 2022-04-13 NOTE — PROGRESS NOTES
Pt denies bleeding med changes or bleeding issues.  Pt states medications have not changed.  Pt will begin holding coumadin this Friday for 5 days for spinal injection on the 20th.  I adjusted coumadin dose and will recheck INR 1 week post procedure.  Pt verbalizes.  Patient instructed regarding medication; results given and questions answered. Nutritional counseling given.  Dietary factors affecting therapy addressed.  Patient instructed to monitor for excessive bruising or bleeding.  Findings reported by Nerissa Farmer RN.   Today's INR is Lab Results - Last 18 Months   Lab Units 04/13/22  0000   INR  1.90*

## 2022-04-27 ENCOUNTER — ANTICOAGULATION VISIT (OUTPATIENT)
Dept: CARDIAC SURGERY | Facility: CLINIC | Age: 82
End: 2022-04-27

## 2022-04-27 VITALS — OXYGEN SATURATION: 98 % | HEART RATE: 58 BPM

## 2022-04-27 DIAGNOSIS — Z79.01 LONG TERM (CURRENT) USE OF ANTICOAGULANTS: ICD-10-CM

## 2022-04-27 DIAGNOSIS — Z51.81 ENCOUNTER FOR THERAPEUTIC DRUG MONITORING: ICD-10-CM

## 2022-04-27 DIAGNOSIS — I48.0 PAROXYSMAL ATRIAL FIBRILLATION: Primary | ICD-10-CM

## 2022-04-27 LAB — INR PPP: 1.4 (ref 0.9–1.1)

## 2022-04-27 PROCEDURE — 93793 ANTICOAG MGMT PT WARFARIN: CPT | Performed by: NURSE PRACTITIONER

## 2022-04-27 PROCEDURE — 85610 PROTHROMBIN TIME: CPT | Performed by: NURSE PRACTITIONER

## 2022-04-27 PROCEDURE — 36416 COLLJ CAPILLARY BLOOD SPEC: CPT | Performed by: NURSE PRACTITIONER

## 2022-04-27 NOTE — PROGRESS NOTES
Pt denies med changes or bleeding problems. Pt states she was instructed by  that performed injection last week to restart coumadin on Thursday rather than Wednesday; calendar updated. Dose adjusted and pt instructed to hold green veggies until next week; pt verbalized. Patient instructed regarding medication; results given and questions answered. Nutritional counseling given.  Dietary factors affecting therapy addressed.  Patient instructed to monitor for excessive bruising or bleeding. Will recheck in 12 days. Findings reported by Hannah Gutierrez RN.    Today's INR is Lab Results - Last 18 Months   Lab Units 04/27/22  0000   INR  1.40*

## 2022-05-09 ENCOUNTER — ANTICOAGULATION VISIT (OUTPATIENT)
Dept: CARDIAC SURGERY | Facility: CLINIC | Age: 82
End: 2022-05-09

## 2022-05-09 VITALS — OXYGEN SATURATION: 95 % | HEART RATE: 65 BPM

## 2022-05-09 DIAGNOSIS — Z79.01 LONG TERM (CURRENT) USE OF ANTICOAGULANTS: ICD-10-CM

## 2022-05-09 DIAGNOSIS — I48.0 PAROXYSMAL ATRIAL FIBRILLATION: Primary | ICD-10-CM

## 2022-05-09 DIAGNOSIS — Z51.81 ENCOUNTER FOR THERAPEUTIC DRUG MONITORING: ICD-10-CM

## 2022-05-09 LAB — INR PPP: 1.1 (ref 0.9–1.1)

## 2022-05-09 PROCEDURE — 93793 ANTICOAG MGMT PT WARFARIN: CPT | Performed by: NURSE PRACTITIONER

## 2022-05-09 PROCEDURE — 36416 COLLJ CAPILLARY BLOOD SPEC: CPT | Performed by: NURSE PRACTITIONER

## 2022-05-09 PROCEDURE — 85610 PROTHROMBIN TIME: CPT | Performed by: NURSE PRACTITIONER

## 2022-05-09 NOTE — PROGRESS NOTES
Pt denies missed coumadin doses.  Pt states medications have not changed; no bleeding issues.  Adjusted coumadin dose and instructed pt to limit green intake for now.  Recheck INR next week.  Instructed pt to bring in all medications for review.  Pt verbalizes.  Patient instructed regarding medication; results given and questions answered. Nutritional counseling given.  Dietary factors affecting therapy addressed.  Patient instructed to monitor for excessive bruising or bleeding.  Findings reported by Nerissa Farmer RN.   Today's INR is Lab Results - Last 18 Months   Lab Units 05/09/22  0000   INR  1.10

## 2022-05-16 ENCOUNTER — DOCUMENTATION (OUTPATIENT)
Dept: CARDIAC SURGERY | Facility: CLINIC | Age: 82
End: 2022-05-16

## 2022-05-16 NOTE — PROGRESS NOTES
Called pt to remind her of appt tomorrow and pt stated she has been holding coumadin since last Saturday night for procedure on Thursday 5/19. Pt instructed to restart coumadin Thursday night unless instructed otherwise by Dr Hussein and take 5mg nightly except on Sunday take 2.5mg; pt verbalized. Pt states she is going to stick with taking the green 2.5mg tablets.  Appt made for 5/25 when pt sees Dr Alves. Findings reported by Hannah Gutierrez RN.

## 2022-05-25 ENCOUNTER — OFFICE VISIT (OUTPATIENT)
Dept: CARDIOLOGY | Facility: CLINIC | Age: 82
End: 2022-05-25

## 2022-05-25 ENCOUNTER — ANTICOAGULATION VISIT (OUTPATIENT)
Dept: CARDIAC SURGERY | Facility: CLINIC | Age: 82
End: 2022-05-25

## 2022-05-25 VITALS
BODY MASS INDEX: 26.69 KG/M2 | DIASTOLIC BLOOD PRESSURE: 78 MMHG | WEIGHT: 160.2 LBS | HEIGHT: 65 IN | SYSTOLIC BLOOD PRESSURE: 142 MMHG | OXYGEN SATURATION: 95 % | HEART RATE: 60 BPM

## 2022-05-25 VITALS — OXYGEN SATURATION: 98 % | HEART RATE: 65 BPM

## 2022-05-25 DIAGNOSIS — I10 ESSENTIAL HYPERTENSION: Primary | ICD-10-CM

## 2022-05-25 DIAGNOSIS — I48.0 PAROXYSMAL ATRIAL FIBRILLATION: ICD-10-CM

## 2022-05-25 DIAGNOSIS — Z79.01 LONG TERM (CURRENT) USE OF ANTICOAGULANTS: ICD-10-CM

## 2022-05-25 DIAGNOSIS — Z51.81 ENCOUNTER FOR THERAPEUTIC DRUG MONITORING: ICD-10-CM

## 2022-05-25 DIAGNOSIS — I49.3 PVC (PREMATURE VENTRICULAR CONTRACTION): ICD-10-CM

## 2022-05-25 DIAGNOSIS — R55 NEAR SYNCOPE: ICD-10-CM

## 2022-05-25 DIAGNOSIS — E78.5 HYPERLIPIDEMIA, UNSPECIFIED HYPERLIPIDEMIA TYPE: ICD-10-CM

## 2022-05-25 DIAGNOSIS — I48.0 PAROXYSMAL ATRIAL FIBRILLATION: Primary | ICD-10-CM

## 2022-05-25 DIAGNOSIS — I10 PRIMARY HYPERTENSION: ICD-10-CM

## 2022-05-25 LAB — INR PPP: 1.3 (ref 0.9–1.1)

## 2022-05-25 PROCEDURE — 36416 COLLJ CAPILLARY BLOOD SPEC: CPT | Performed by: NURSE PRACTITIONER

## 2022-05-25 PROCEDURE — 85610 PROTHROMBIN TIME: CPT | Performed by: NURSE PRACTITIONER

## 2022-05-25 PROCEDURE — 99213 OFFICE O/P EST LOW 20 MIN: CPT | Performed by: INTERNAL MEDICINE

## 2022-05-25 NOTE — PROGRESS NOTES
Pt here today seeing Dr Alves. Pt denies med changes or bleeding problems. Pt states she was able to restart coumadin on 5/20. Dose adjusted and pt instructed to hold green veggies 5 days; pt verbalized. Patient instructed regarding medication; results given and questions answered. Nutritional counseling given.  Dietary factors affecting therapy addressed.  Patient instructed to monitor for excessive bruising or bleeding. Will recheck next week. Findings reported by Hannah Gutierrez RN.    Today's INR is Lab Results - Last 18 Months   Lab Units 05/25/22  0000   INR  1.30*

## 2022-05-25 NOTE — PROGRESS NOTES
Carol Sullivan  81 y.o. female    5/25/2022  1. Essential hypertension    2. Paroxysmal atrial fibrillation (HCC)    3. PVC (premature ventricular contraction)    4. Near syncope    5. Hyperlipidemia, unspecified hyperlipidemia type    6. Primary hypertension        History of Present Illness:  Body mass index is 26.66 kg/m². BMI is above normal parameters. Recommendations include: exercise counseling, nutrition counseling and referral to primary care.      81 years old patient presented for routine follow-up no symptom of cardiac decompensation such orthopnea PND reported by the patient she is a pleased with clinical outcome.  She is remained physically active good mental status no symptom of cardiac decompensation such orthopnea PND chest pain intermittent claudication or syncope reported.  The patient has a background history of questionable aortic dissection was life flighted to [facility] in 2020 CT scan at that facility reported no evidence of any aortic dissection.  She does have history of paroxysmal atrial fibrillation she spontaneously converted to sinus rhythm on long-term oral anticoagulation's.      CT coronary angiogram at [facility] 5/31/2020  IMPRESSION:    Aorta: No evidence of thoracic aortic dissection or intramural   hematoma. 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 Advanced atherosclerotic disease within   the descending thoracic aorta and transverse arch.    Coronary arteries: CAD-RADS 3    Degree of maximal stenosis: 50%-69%  Moderate stenosis  Consider functional evaluation and serial troponin levels    Additional Findings:  1.  Nonobstructing nephrolith within the left kidney.  2.  Large hiatal hernia containing majority of the fundus of stomach.  3.  Numerous additional chronic and incidental findings as noted   above.    Electronically signed by  Reynaldo Urban on 5/31/2020 11:31 AM  CT scan of the chest 5/31/2020  IMPRESSION:     1.   "Juan Pablo A dissection involving ascending thoracic aorta as  above.  Preliminary report was called to Dr. Hays upon  interpretation.     2.  Diffuse calcified and noncalcified atheromatous plaques  without hemodynamically significant stenosis otherwise as above.     3.  Incidental mesenteric fat stranding and shotty adenopathy.   3-6 month follow-up abdominal CT is recommended to ensure  stability.     4.  Incidental moderate hiatal hernia and nonobstructing left  renal stone.    Lipids 7/16/2020    Total Cholesterol  <200 mg/dL 129    Triglycerides  30 - 150 mg/dL 145    HDL Cholesterol  39 - 96 mg/dL 36Low     LDL Cholesterol   5 - 99 mg/dL 68    Chol/HDL Ratio  3.5 - 5.3 3.6        SUBJECTIVE:    Allergies   Allergen Reactions   • Tuberculin Tests Swelling   • Hydrocodone Nausea Only   • Lortab [Hydrocodone-Acetaminophen] GI Intolerance         Past Medical History:   Diagnosis Date   • Arthritis    • Depression    • GERD (gastroesophageal reflux disease)    • Hyperlipidemia    • Hypertension    • Near syncope    • Vascular disease          Past Surgical History:   Procedure Laterality Date   • BLADDER SURGERY     • ENDOSCOPY N/A 5/6/2019    Procedure: ESOPHAGOGASTRODUODENOSCOPY;  Surgeon: Alberto Sanchez DO;  Location: [facility] ENDOSCOPY;  Service: Gastroenterology   • GALLBLADDER SURGERY     • SHOULDER SURGERY      \"bone spurs\"   • SUBTOTAL HYSTERECTOMY           Family History   Problem Relation Age of Onset   • Hypertension Mother    • Diabetes Paternal Aunt    • Diabetes Paternal Grandmother    • Hypertension Paternal Grandmother    • Hypertension Paternal Grandfather          Social History     Socioeconomic History   • Marital status:    Tobacco Use   • Smoking status: Never Smoker   • Smokeless tobacco: Never Used   Substance and Sexual Activity   • Alcohol use: No   • Drug use: No   • Sexual activity: Defer         Current Outpatient Medications   Medication Sig Dispense Refill   • acetaminophen " (TYLENOL) 500 MG tablet Take 500 mg by mouth As Needed.     • DULoxetine (CYMBALTA) 60 MG capsule      • furosemide (LASIX) 20 MG tablet TAKE ONE TABLET BY MOUTH TWO TIMES A DAY 60 tablet 3   • gabapentin (NEURONTIN) 300 MG capsule Take 1 capsule by mouth 3 (Three) Times a Day.  2   • isosorbide mononitrate (IMDUR) 30 MG 24 hr tablet TAKE 1 TABLET BY MOUTH DAILY. 30 tablet 4   • lansoprazole (PREVACID) 30 MG capsule Take 1 capsule by mouth Every 12 (Twelve) Hours.     • losartan (COZAAR) 100 MG tablet Take 1 tablet by mouth Daily. 30 tablet 3   • metoprolol succinate XL (TOPROL-XL) 50 MG 24 hr tablet TAKE 1 TABLET BY MOUTH DAILY 30 tablet 4   • Myrbetriq 25 MG tablet sustained-release 24 hour 24 hr tablet      • NIFEdipine XL (PROCARDIA XL) 60 MG 24 hr tablet TAKE 1 TABLET BY MOUTH DAILY 30 tablet 4   • potassium chloride (K-DUR,KLOR-CON) 20 MEQ CR tablet TAKE ONE TABLET BY MOUTH EVERY DAY     • rosuvastatin (CRESTOR) 40 MG tablet TAKE 1 TABLET BY MOUTH DAILY 30 tablet 4   • traMADol (ULTRAM) 50 MG tablet Take 50 mg by mouth As Needed.     • VENTOLIN  (90 Base) MCG/ACT inhaler      • warfarin (COUMADIN) 2.5 MG tablet Take 2.5 mg by mouth Daily.       No current facility-administered medications for this visit.           Review of Systems:     Constitutional:  Denies recent weight loss, weight gain,no change in exercise tolerance.     HENT:  Denies any hearing loss, epistaxis, hoarseness,   Eyes: No blurred    Respiratory:  Denies dyspnea with exertion,no cough,     Cardiovascular: See H&P    Gastrointestinal:  Denies change in bowel habits, dyspepsia, ulcer disease,    Endocrine: Negative for cold intolerance, heat intolerance, polydipsia, polyphagia and polyuria..     Genitourinary: Negative.      Musculoskeletal: Denies  arthritic symptoms or back problems.     Skin:  Denies  rashes, or skin lesions.     Allergic/Immunologic: Negative.  Negative for environmental allergies, food allergies    Neurological:   "Denies  recurrent headaches, strokes, TIA, or seizure disorder.     Hematological: Denies any food allergies, seasonal allergies, bleeding disorders    Psychiatric/Behavioral: Denies any history of depression,      OBJECTIVE:    /78 (BP Location: Left arm, Patient Position: Sitting, Cuff Size: Adult)   Pulse 60   Ht 165.1 cm (65\")   Wt 72.7 kg (160 lb 3.2 oz)   SpO2 95%   BMI 26.66 kg/m²       Physical Exam:     Constitutional: Cooperative, alert and oriented, well-developed, well-nourished, in no acute distress.     HENT:   Head: Normocephalic, conjunctive is pink thyroid is nonpalpable no jugular is distention    Cardiovascular: Regular rhythm, S1 and S2 normal, no S3 or S4. Apical impulse not displaced. No murmurs, gallops, or rubs detected.     Pulmonary/Chest: Chest: Rales and  Abdominal: Abdomen soft, bowel sounds normoactive, no masses,    Musculoskeletal: No deformities, peripheral pulses no    Neurological: No gross motor or sensory deficits noted, affect appropriate, oriented to time, person, place.     Skin: Warm and dry to the touch, no apparent skin lesions or masses noted.     Psychiatric: She has a normal mood and affect. Her behavior is normal. Judgment and thought content normal.         Procedures      Lab Results   Component Value Date    WBC 8.28 07/27/2021    HGB 12.2 07/27/2021    HCT 38.0 07/27/2021    MCV 87.2 07/27/2021     07/27/2021     Lab Results   Component Value Date    GLUCOSE 167 (H) 05/31/2020    BUN 18 06/03/2020    CREATININE 0.93 06/03/2020    EGFRIFNONA 70 05/31/2020    BCR 16.5 05/31/2020    CO2 21 (L) 06/03/2020    CALCIUM 8.8 06/03/2020    ALBUMIN 4.00 05/31/2020    AST 22 05/31/2020    ALT 13 05/31/2020     Lab Results   Component Value Date    CHOL 129 07/16/2020     Lab Results   Component Value Date    TRIG 145 07/16/2020    TRIG 84 05/31/2020     Lab Results   Component Value Date    HDL 36 (L) 07/16/2020    HDL 42 (L) 05/31/2020     No components found " for: LDLCALC  Lab Results   Component Value Date    LDL 68 07/16/2020     05/31/2020     No results found for: HDLLDLRATIO  No components found for: CHOLHDL  Lab Results   Component Value Date    HGBA1C 6.5 (H) 07/16/2020     Lab Results   Component Value Date    TSH 1.359 05/31/2020           ASSESSMENT AND PLAN:  #1 paroxysmal atrial fibrillation    [id number] is 4 continue oral anticoagulation    Continue metoprolol has had no further recurrence     2 hypertension  good blood pressure will continue losartan 100 mg, metoprolol 50 mg and Procardia and isosorbide    3 hyperlipidemia continue Crestor follow-up with your family doctor    4  history of premature ventricular complex    Asymptomatic at the time of evaluation continue metoprolol    I spent 15 minutes caring for Ivyal on this date of service. This time includes time spent by me of counseling/coordination of care as relates to the presenting problem and any ordered procedures/tests as outlined above.           This document has been electronically signed by Odette Alves MD on May 25, 2022 09:21 CDT      Diagnoses and all orders for this visit:    1. Essential hypertension (Primary)  -     ECG 12 Lead    2. Paroxysmal atrial fibrillation (HCC)    3. PVC (premature ventricular contraction)    4. Near syncope    5. Hyperlipidemia, unspecified hyperlipidemia type    6. Primary hypertension          Odette Alves MD  5/25/2022  09:14 CDT

## 2022-06-01 ENCOUNTER — ANTICOAGULATION VISIT (OUTPATIENT)
Dept: CARDIAC SURGERY | Facility: CLINIC | Age: 82
End: 2022-06-01

## 2022-06-01 VITALS — HEART RATE: 89 BPM | OXYGEN SATURATION: 94 %

## 2022-06-01 DIAGNOSIS — Z79.01 LONG TERM (CURRENT) USE OF ANTICOAGULANTS: ICD-10-CM

## 2022-06-01 DIAGNOSIS — I48.0 PAROXYSMAL ATRIAL FIBRILLATION: Primary | ICD-10-CM

## 2022-06-01 DIAGNOSIS — Z51.81 ENCOUNTER FOR THERAPEUTIC DRUG MONITORING: ICD-10-CM

## 2022-06-01 LAB — INR PPP: 3.4 (ref 0.9–1.1)

## 2022-06-01 PROCEDURE — 93793 ANTICOAG MGMT PT WARFARIN: CPT | Performed by: NURSE PRACTITIONER

## 2022-06-01 PROCEDURE — 85610 PROTHROMBIN TIME: CPT | Performed by: NURSE PRACTITIONER

## 2022-06-01 PROCEDURE — 36416 COLLJ CAPILLARY BLOOD SPEC: CPT | Performed by: NURSE PRACTITIONER

## 2022-06-01 NOTE — PROGRESS NOTES
Pt states no changes to meds or diet.  No bleeding issues.  Pt did bring in medications for review with no apparent discrepancies.   Adjusted coumadin dose and instructed pt to increase Vit K intake today with a turnip green serving.  Recheck INR next wk.  Pt verbalizes.  Patient instructed regarding medication; results given and questions answered. Nutritional counseling given.  Dietary factors affecting therapy addressed.  Patient instructed to monitor for excessive bruising or bleeding.  Findings reported by Nerissa Farmer RN.   Today's INR is Lab Results - Last 18 Months   Lab Units 06/01/22  0000   INR  3.40*

## 2022-06-08 ENCOUNTER — ANTICOAGULATION VISIT (OUTPATIENT)
Dept: CARDIAC SURGERY | Facility: CLINIC | Age: 82
End: 2022-06-08

## 2022-06-08 VITALS — HEART RATE: 67 BPM | OXYGEN SATURATION: 97 %

## 2022-06-08 DIAGNOSIS — Z51.81 ENCOUNTER FOR THERAPEUTIC DRUG MONITORING: ICD-10-CM

## 2022-06-08 DIAGNOSIS — I48.0 PAROXYSMAL ATRIAL FIBRILLATION: Primary | ICD-10-CM

## 2022-06-08 DIAGNOSIS — Z79.01 LONG TERM (CURRENT) USE OF ANTICOAGULANTS: ICD-10-CM

## 2022-06-08 LAB — INR PPP: 3.7 (ref 0.9–1.1)

## 2022-06-08 PROCEDURE — 85610 PROTHROMBIN TIME: CPT | Performed by: NURSE PRACTITIONER

## 2022-06-08 PROCEDURE — 36416 COLLJ CAPILLARY BLOOD SPEC: CPT | Performed by: NURSE PRACTITIONER

## 2022-06-08 PROCEDURE — 93793 ANTICOAG MGMT PT WARFARIN: CPT | Performed by: NURSE PRACTITIONER

## 2022-06-08 NOTE — PROGRESS NOTES
Pt states she started Cefprozil yesterday for 10 days for cough and congestion.  Pt denies bleeding issues.  Adjusted coumadin dose and instructed pt to increase Vit K intake today with a turnip green serving.  Recheck INR next wk.  Pt verbalizes.  Patient instructed regarding medication; results given and questions answered. Nutritional counseling given.  Dietary factors affecting therapy addressed.  Patient instructed to monitor for excessive bruising or bleeding.  Findings reported by Nerissa Farmer RN.   Today's INR is Lab Results - Last 18 Months   Lab Units 06/08/22  0000   INR  3.70*

## 2022-06-10 DIAGNOSIS — I50.32 CHRONIC HEART FAILURE WITH PRESERVED EJECTION FRACTION: ICD-10-CM

## 2022-06-10 RX ORDER — NIFEDIPINE 60 MG/1
60 TABLET, EXTENDED RELEASE ORAL DAILY
Qty: 30 TABLET | Refills: 4 | Status: SHIPPED | OUTPATIENT
Start: 2022-06-10 | End: 2022-11-11

## 2022-06-10 RX ORDER — METOPROLOL SUCCINATE 50 MG/1
50 TABLET, EXTENDED RELEASE ORAL DAILY
Qty: 30 TABLET | Refills: 4 | Status: SHIPPED | OUTPATIENT
Start: 2022-06-10 | End: 2022-11-11

## 2022-06-10 RX ORDER — ROSUVASTATIN CALCIUM 40 MG/1
40 TABLET, COATED ORAL DAILY
Qty: 30 TABLET | Refills: 4 | Status: SHIPPED | OUTPATIENT
Start: 2022-06-10 | End: 2022-11-11

## 2022-06-13 ENCOUNTER — ANTICOAGULATION VISIT (OUTPATIENT)
Dept: CARDIAC SURGERY | Facility: CLINIC | Age: 82
End: 2022-06-13

## 2022-06-13 DIAGNOSIS — Z51.81 ENCOUNTER FOR THERAPEUTIC DRUG MONITORING: ICD-10-CM

## 2022-06-13 DIAGNOSIS — I48.0 PAROXYSMAL ATRIAL FIBRILLATION: Primary | ICD-10-CM

## 2022-06-13 DIAGNOSIS — Z79.01 LONG TERM (CURRENT) USE OF ANTICOAGULANTS: ICD-10-CM

## 2022-06-13 LAB — INR PPP: 2.8 (ref 0.9–1.1)

## 2022-06-13 PROCEDURE — 36416 COLLJ CAPILLARY BLOOD SPEC: CPT | Performed by: NURSE PRACTITIONER

## 2022-06-13 PROCEDURE — 93793 ANTICOAG MGMT PT WARFARIN: CPT | Performed by: NURSE PRACTITIONER

## 2022-06-13 PROCEDURE — 85610 PROTHROMBIN TIME: CPT | Performed by: NURSE PRACTITIONER

## 2022-06-13 NOTE — PROGRESS NOTES
Pt states she will complete AB this Thursday.  Pt denies bleeding issues.  Instructed pt on weekly coumadin dose and will recheck INR in one wk.  Pt verbalizes.  Patient instructed regarding medication; results given and questions answered. Nutritional counseling given.  Dietary factors affecting therapy addressed.  Patient instructed to monitor for excessive bruising or bleeding.  Findings reported by Nerissa Farmer RN.   Today's INR is Lab Results - Last 18 Months   Lab Units 06/13/22  0000   INR  2.80*

## 2022-06-15 ENCOUNTER — TRANSCRIBE ORDERS (OUTPATIENT)
Dept: PODIATRY | Facility: CLINIC | Age: 82
End: 2022-06-15

## 2022-06-15 DIAGNOSIS — S99.922A INJURY OF LEFT FOOT, INITIAL ENCOUNTER: Primary | ICD-10-CM

## 2022-06-15 DIAGNOSIS — S92.355A CLOSED NONDISPLACED FRACTURE OF FIFTH METATARSAL BONE OF LEFT FOOT, INITIAL ENCOUNTER: ICD-10-CM

## 2022-06-22 ENCOUNTER — ANTICOAGULATION VISIT (OUTPATIENT)
Dept: CARDIAC SURGERY | Facility: CLINIC | Age: 82
End: 2022-06-22

## 2022-06-22 ENCOUNTER — OFFICE VISIT (OUTPATIENT)
Dept: PODIATRY | Facility: CLINIC | Age: 82
End: 2022-06-22

## 2022-06-22 VITALS — HEART RATE: 67 BPM | OXYGEN SATURATION: 95 %

## 2022-06-22 VITALS — OXYGEN SATURATION: 97 % | HEART RATE: 75 BPM | HEIGHT: 65 IN | WEIGHT: 160 LBS | BODY MASS INDEX: 26.66 KG/M2

## 2022-06-22 VITALS — HEART RATE: 64 BPM | OXYGEN SATURATION: 93 %

## 2022-06-22 DIAGNOSIS — I48.0 PAROXYSMAL ATRIAL FIBRILLATION: Primary | ICD-10-CM

## 2022-06-22 DIAGNOSIS — M79.672 LEFT FOOT PAIN: Primary | ICD-10-CM

## 2022-06-22 DIAGNOSIS — S92.352A CLOSED DISPLACED FRACTURE OF FIFTH METATARSAL BONE OF LEFT FOOT, INITIAL ENCOUNTER: ICD-10-CM

## 2022-06-22 DIAGNOSIS — Z79.01 LONG TERM (CURRENT) USE OF ANTICOAGULANTS: ICD-10-CM

## 2022-06-22 DIAGNOSIS — Z51.81 ENCOUNTER FOR THERAPEUTIC DRUG MONITORING: ICD-10-CM

## 2022-06-22 LAB — INR PPP: 1.4 (ref 0.9–1.1)

## 2022-06-22 PROCEDURE — 93793 ANTICOAG MGMT PT WARFARIN: CPT | Performed by: NURSE PRACTITIONER

## 2022-06-22 PROCEDURE — 85610 PROTHROMBIN TIME: CPT | Performed by: NURSE PRACTITIONER

## 2022-06-22 PROCEDURE — 28470 CLTX METATARSAL FX WO MNP EA: CPT | Performed by: PODIATRIST

## 2022-06-22 PROCEDURE — 99203 OFFICE O/P NEW LOW 30 MIN: CPT | Performed by: PODIATRIST

## 2022-06-22 PROCEDURE — 36416 COLLJ CAPILLARY BLOOD SPEC: CPT | Performed by: NURSE PRACTITIONER

## 2022-06-22 NOTE — PROGRESS NOTES
Pt denies missed coumadin doses or consuming too much green.  Pt states medications have not changed; no bleeding issues.  Adjusted coumadin dose and instructed pt to limit green intake for three days.  Recheck INR next wk.  Pt verbalizes.  Patient instructed regarding medication; results given and questions answered. Nutritional counseling given.  Dietary factors affecting therapy addressed.  Patient instructed to monitor for excessive bruising or bleeding.  Findings reported by Nerissa Farmer RN.   Today's INR is Lab Results - Last 18 Months   Lab Units 06/22/22  0000   INR  1.40*

## 2022-06-22 NOTE — PROGRESS NOTES
"Carol Sullivan  1940  81 y.o. female      06/22/2022    Chief Complaint   Patient presents with   • Left Foot - Pain       History of Present Illness    Carol Sullivan is a 81 y.o.female presents to clinic today with left foot pain. States she injured her foot on 6/8/22 and pain is a 5. Xrays obtained today.        Past Medical History:   Diagnosis Date   • Arthritis    • Depression    • GERD (gastroesophageal reflux disease)    • Hyperlipidemia    • Hypertension    • Near syncope    • Vascular disease          Past Surgical History:   Procedure Laterality Date   • BLADDER SURGERY     • ENDOSCOPY N/A 5/6/2019    Procedure: ESOPHAGOGASTRODUODENOSCOPY;  Surgeon: Alberto Sanchez DO;  Location: [facility] ENDOSCOPY;  Service: Gastroenterology   • GALLBLADDER SURGERY     • SHOULDER SURGERY      \"bone spurs\"   • SUBTOTAL HYSTERECTOMY           Family History   Problem Relation Age of Onset   • Hypertension Mother    • Diabetes Paternal Aunt    • Diabetes Paternal Grandmother    • Hypertension Paternal Grandmother    • Hypertension Paternal Grandfather        Allergies   Allergen Reactions   • Tuberculin Tests Swelling   • Hydrocodone Nausea Only   • Lortab [Hydrocodone-Acetaminophen] GI Intolerance       Social History     Socioeconomic History   • Marital status:    Tobacco Use   • Smoking status: Never Smoker   • Smokeless tobacco: Never Used   Substance and Sexual Activity   • Alcohol use: No   • Drug use: No   • Sexual activity: Defer         Current Outpatient Medications   Medication Sig Dispense Refill   • acetaminophen (TYLENOL) 500 MG tablet Take 500 mg by mouth As Needed.     • DULoxetine (CYMBALTA) 60 MG capsule      • furosemide (LASIX) 20 MG tablet TAKE ONE TABLET BY MOUTH TWO TIMES A DAY 60 tablet 3   • gabapentin (NEURONTIN) 300 MG capsule Take 1 capsule by mouth 3 (Three) Times a Day.  2   • isosorbide mononitrate (IMDUR) 30 MG 24 hr tablet TAKE 1 TABLET BY MOUTH DAILY. 30 tablet 4   • " "lansoprazole (PREVACID) 30 MG capsule Take 1 capsule by mouth Every 12 (Twelve) Hours.     • losartan (COZAAR) 100 MG tablet Take 1 tablet by mouth Daily. 30 tablet 3   • metoprolol succinate XL (TOPROL-XL) 50 MG 24 hr tablet TAKE 1 TABLET BY MOUTH DAILY 30 tablet 4   • Myrbetriq 25 MG tablet sustained-release 24 hour 24 hr tablet      • NIFEdipine XL (PROCARDIA XL) 60 MG 24 hr tablet TAKE 1 TABLET BY MOUTH DAILY 30 tablet 4   • potassium chloride (K-DUR,KLOR-CON) 20 MEQ CR tablet TAKE ONE TABLET BY MOUTH EVERY DAY     • rosuvastatin (CRESTOR) 40 MG tablet TAKE 1 TABLET BY MOUTH DAILY 30 tablet 4   • traMADol (ULTRAM) 50 MG tablet Take 50 mg by mouth As Needed.     • VENTOLIN  (90 Base) MCG/ACT inhaler      • warfarin (COUMADIN) 2.5 MG tablet Take 2.5 mg by mouth Daily.       No current facility-administered medications for this visit.       Review of Systems   Constitutional: Negative.    HENT: Negative.    Eyes: Negative.    Respiratory: Negative.    Cardiovascular: Negative.    Gastrointestinal: Negative.    Endocrine: Negative.    Genitourinary: Negative.    Musculoskeletal:        Foot pain     Skin: Negative.    Allergic/Immunologic: Negative.    Neurological: Negative.    Hematological: Negative.    Psychiatric/Behavioral: Negative.          OBJECTIVE    Pulse 75   Ht 165.1 cm (65\")   Wt 72.6 kg (160 lb)   SpO2 97%   BMI 26.63 kg/m²     Physical Exam  Vitals reviewed.   Constitutional:       General: She is not in acute distress.     Appearance: She is well-developed.   HENT:      Head: Normocephalic and atraumatic.      Nose: Nose normal.   Eyes:      Conjunctiva/sclera: Conjunctivae normal.      Pupils: Pupils are equal, round, and reactive to light.   Pulmonary:      Effort: Pulmonary effort is normal. No respiratory distress.      Breath sounds: No wheezing.   Musculoskeletal:         General: No deformity. Normal range of motion.        Feet:    Feet:      Left foot:      Skin integrity: Skin " integrity normal.   Skin:     General: Skin is warm and dry.      Capillary Refill: Capillary refill takes less than 2 seconds.   Neurological:      Mental Status: She is alert and oriented to person, place, and time.   Psychiatric:         Behavior: Behavior normal.         Thought Content: Thought content normal.              Procedures        ASSESSMENT AND PLAN    Diagnoses and all orders for this visit:    1. Left foot pain (Primary)  -     XR Foot 3+ View Left    2. Closed displaced fracture of fifth metatarsal bone of left foot, initial encounter        - Patient examined and evaluated  -Radiographs taken reviewed.  Avulsion fracture to the styloid process of the fifth metatarsal.  -Dispensed cam boot.  Weight-bear as tolerated in cam boot.  - All questions were answered   - RTC 2 to 3 weeks with repeat radiographs            This document has been electronically signed by Ezequiel Ramos DPM on June 22, 2022 15:37 CDT     6/22/2022  15:37 CDT

## 2022-06-28 ENCOUNTER — DOCUMENTATION (OUTPATIENT)
Dept: CARDIAC SURGERY | Facility: CLINIC | Age: 82
End: 2022-06-28

## 2022-06-28 ENCOUNTER — ANTICOAGULATION VISIT (OUTPATIENT)
Dept: CARDIAC SURGERY | Facility: CLINIC | Age: 82
End: 2022-06-28

## 2022-06-28 VITALS — HEART RATE: 62 BPM | OXYGEN SATURATION: 98 %

## 2022-06-28 DIAGNOSIS — I48.0 PAROXYSMAL ATRIAL FIBRILLATION: Primary | ICD-10-CM

## 2022-06-28 DIAGNOSIS — Z79.01 LONG TERM (CURRENT) USE OF ANTICOAGULANTS: ICD-10-CM

## 2022-06-28 DIAGNOSIS — Z51.81 ENCOUNTER FOR THERAPEUTIC DRUG MONITORING: ICD-10-CM

## 2022-06-28 LAB — INR PPP: 1.7 (ref 0.9–1.1)

## 2022-06-28 PROCEDURE — 36416 COLLJ CAPILLARY BLOOD SPEC: CPT | Performed by: NURSE PRACTITIONER

## 2022-06-28 PROCEDURE — 93793 ANTICOAG MGMT PT WARFARIN: CPT | Performed by: NURSE PRACTITIONER

## 2022-06-28 PROCEDURE — 85610 PROTHROMBIN TIME: CPT | Performed by: NURSE PRACTITIONER

## 2022-06-28 RX ORDER — WARFARIN SODIUM 2.5 MG/1
2.5 TABLET ORAL
Qty: 45 TABLET | Refills: 2 | Status: SHIPPED | OUTPATIENT
Start: 2022-06-28 | End: 2022-09-12 | Stop reason: SDUPTHER

## 2022-06-28 NOTE — PROGRESS NOTES
Pt denied med changes or bleeding problems. Dose adjusted and pt instructed to hold green veggies for 3 days; pt verbalized. Patient instructed regarding medication; results given and questions answered. Nutritional counseling given.  Dietary factors affecting therapy addressed.  Patient instructed to monitor for excessive bruising or bleeding. Will recheck next week. Findings reported by Hannah Gutierrez RN.    Today's INR is Lab Results - Last 18 Months   Lab Units 06/28/22  0000   INR  1.70*

## 2022-06-28 NOTE — PROGRESS NOTES
Pt called back to report starting Bactrim DS bid today for 10 days. Due to major interaction pt was instructed to cut Saturday's coumadin dose to 2.5mg and eat a serving of green veggies Saturday and Monday; pt verbalized. Pt also instructed to keep appt Tuesday; pt verbalized. Findings reported by Hannah Gutierrez RN.

## 2022-07-05 ENCOUNTER — ANTICOAGULATION VISIT (OUTPATIENT)
Dept: CARDIAC SURGERY | Facility: CLINIC | Age: 82
End: 2022-07-05

## 2022-07-05 VITALS — OXYGEN SATURATION: 97 % | HEART RATE: 74 BPM

## 2022-07-05 DIAGNOSIS — Z51.81 ENCOUNTER FOR THERAPEUTIC DRUG MONITORING: ICD-10-CM

## 2022-07-05 DIAGNOSIS — Z79.01 LONG TERM (CURRENT) USE OF ANTICOAGULANTS: ICD-10-CM

## 2022-07-05 DIAGNOSIS — I48.0 PAROXYSMAL ATRIAL FIBRILLATION: Primary | ICD-10-CM

## 2022-07-05 LAB — INR PPP: 2.9 (ref 0.9–1.1)

## 2022-07-05 PROCEDURE — 36416 COLLJ CAPILLARY BLOOD SPEC: CPT | Performed by: NURSE PRACTITIONER

## 2022-07-05 PROCEDURE — 93793 ANTICOAG MGMT PT WARFARIN: CPT | Performed by: NURSE PRACTITIONER

## 2022-07-05 PROCEDURE — 85610 PROTHROMBIN TIME: CPT | Performed by: NURSE PRACTITIONER

## 2022-07-05 NOTE — PROGRESS NOTES
Pt continues Septra through Thursday. Pt states she did not eaten any green veggies as instructed. Pt instructed on dosing and to eat 1/2 cup of turnip greens today; pt verbalized. Patient instructed regarding medication; results given and questions answered. Nutritional counseling given.  Dietary factors affecting therapy addressed.  Patient instructed to monitor for excessive bruising or bleeding. Will recheck next week. Findings reported by Hannah Gutierrez RN.    Today's INR is Lab Results - Last 18 Months   Lab Units 07/05/22  0000   INR  2.90*

## 2022-07-13 ENCOUNTER — ANTICOAGULATION VISIT (OUTPATIENT)
Dept: CARDIAC SURGERY | Facility: CLINIC | Age: 82
End: 2022-07-13

## 2022-07-13 VITALS — OXYGEN SATURATION: 96 % | HEART RATE: 66 BPM

## 2022-07-13 DIAGNOSIS — Z51.81 ENCOUNTER FOR THERAPEUTIC DRUG MONITORING: ICD-10-CM

## 2022-07-13 DIAGNOSIS — Z79.01 LONG TERM (CURRENT) USE OF ANTICOAGULANTS: ICD-10-CM

## 2022-07-13 DIAGNOSIS — I48.0 PAROXYSMAL ATRIAL FIBRILLATION: Primary | ICD-10-CM

## 2022-07-13 LAB — INR PPP: 1.9 (ref 0.9–1.1)

## 2022-07-13 PROCEDURE — 85610 PROTHROMBIN TIME: CPT | Performed by: NURSE PRACTITIONER

## 2022-07-13 PROCEDURE — 93793 ANTICOAG MGMT PT WARFARIN: CPT | Performed by: NURSE PRACTITIONER

## 2022-07-13 PROCEDURE — 36416 COLLJ CAPILLARY BLOOD SPEC: CPT | Performed by: NURSE PRACTITIONER

## 2022-07-13 NOTE — PROGRESS NOTES
Patient states she completed AB. No bleeding problems or unexplained bruising. Patient instructed to continue current dosing schedule. Verbalizes understanding. Will recheck 1 week when pt is in town for another appt. Instructed pt to limit green intake for two days.  Pt verbalized instructions.  Patient instructed regarding medication; results given and questions answered. Nutritional counseling given.  Dietary factors affecting therapy addressed.  Patient instructed to monitor for excessive bruising or bleeding.  Findings reported by Nerissa Farmer RN.   Today's INR is Lab Results - Last 18 Months   Lab Units 07/13/22  0000   INR  1.90*

## 2022-07-20 ENCOUNTER — OFFICE VISIT (OUTPATIENT)
Dept: PODIATRY | Facility: CLINIC | Age: 82
End: 2022-07-20

## 2022-07-20 ENCOUNTER — ANTICOAGULATION VISIT (OUTPATIENT)
Dept: CARDIAC SURGERY | Facility: CLINIC | Age: 82
End: 2022-07-20

## 2022-07-20 VITALS — OXYGEN SATURATION: 96 % | HEART RATE: 64 BPM

## 2022-07-20 VITALS — OXYGEN SATURATION: 95 % | HEART RATE: 71 BPM | BODY MASS INDEX: 26.66 KG/M2 | WEIGHT: 160 LBS | HEIGHT: 65 IN

## 2022-07-20 DIAGNOSIS — I48.0 PAROXYSMAL ATRIAL FIBRILLATION: Primary | ICD-10-CM

## 2022-07-20 DIAGNOSIS — S92.352D CLOSED DISPLACED FRACTURE OF FIFTH METATARSAL BONE OF LEFT FOOT WITH ROUTINE HEALING, SUBSEQUENT ENCOUNTER: Primary | ICD-10-CM

## 2022-07-20 DIAGNOSIS — Z79.01 LONG TERM (CURRENT) USE OF ANTICOAGULANTS: ICD-10-CM

## 2022-07-20 DIAGNOSIS — Z51.81 ENCOUNTER FOR THERAPEUTIC DRUG MONITORING: ICD-10-CM

## 2022-07-20 LAB — INR PPP: 1.6 (ref 0.9–1.1)

## 2022-07-20 PROCEDURE — 99024 POSTOP FOLLOW-UP VISIT: CPT | Performed by: PODIATRIST

## 2022-07-20 PROCEDURE — 85610 PROTHROMBIN TIME: CPT | Performed by: NURSE PRACTITIONER

## 2022-07-20 PROCEDURE — 36416 COLLJ CAPILLARY BLOOD SPEC: CPT | Performed by: NURSE PRACTITIONER

## 2022-07-20 PROCEDURE — 93793 ANTICOAG MGMT PT WARFARIN: CPT | Performed by: NURSE PRACTITIONER

## 2022-07-20 NOTE — PROGRESS NOTES
Pt denied med changes or bleeding problems. Dose adjusted and pt instructed to hold green veggies until Sunday; pt verbalized. Patient instructed regarding medication; results given and questions answered. Nutritional counseling given.  Dietary factors affecting therapy addressed.  Patient instructed to monitor for excessive bruising or bleeding. Will recheck next week. Findings reported by Hannah Gutierrez RN.    Today's INR is Lab Results - Last 18 Months   Lab Units 07/20/22  0000   INR  1.60*

## 2022-07-20 NOTE — PROGRESS NOTES
"Carol Sullivan  1940  81 y.o. female      7/20/2022    Chief Complaint   Patient presents with   • Left Foot - Follow-up       History of Present Illness    Carol Sullivan is a 81 y.o.female patient returns to clinic for a recheck of left foot fracture. Xray obtained today.       Past Medical History:   Diagnosis Date   • Arthritis    • Depression    • GERD (gastroesophageal reflux disease)    • Hyperlipidemia    • Hypertension    • Near syncope    • Vascular disease          Past Surgical History:   Procedure Laterality Date   • BLADDER SURGERY     • ENDOSCOPY N/A 5/6/2019    Procedure: ESOPHAGOGASTRODUODENOSCOPY;  Surgeon: Alberto Sanchez DO;  Location: [facility] ENDOSCOPY;  Service: Gastroenterology   • GALLBLADDER SURGERY     • SHOULDER SURGERY      \"bone spurs\"   • SUBTOTAL HYSTERECTOMY           Family History   Problem Relation Age of Onset   • Hypertension Mother    • Diabetes Paternal Aunt    • Diabetes Paternal Grandmother    • Hypertension Paternal Grandmother    • Hypertension Paternal Grandfather        Allergies   Allergen Reactions   • Tuberculin Tests Swelling   • Hydrocodone Nausea Only   • Lortab [Hydrocodone-Acetaminophen] GI Intolerance       Social History     Socioeconomic History   • Marital status:    Tobacco Use   • Smoking status: Never Smoker   • Smokeless tobacco: Never Used   Substance and Sexual Activity   • Alcohol use: No   • Drug use: No   • Sexual activity: Defer         Current Outpatient Medications   Medication Sig Dispense Refill   • acetaminophen (TYLENOL) 500 MG tablet Take 500 mg by mouth As Needed.     • DULoxetine (CYMBALTA) 60 MG capsule      • furosemide (LASIX) 20 MG tablet TAKE ONE TABLET BY MOUTH TWO TIMES A DAY 60 tablet 3   • gabapentin (NEURONTIN) 300 MG capsule Take 1 capsule by mouth 3 (Three) Times a Day.  2   • isosorbide mononitrate (IMDUR) 30 MG 24 hr tablet TAKE 1 TABLET BY MOUTH DAILY. 30 tablet 4   • lansoprazole (PREVACID) 30 MG capsule " "Take 1 capsule by mouth Every 12 (Twelve) Hours.     • losartan (COZAAR) 100 MG tablet Take 1 tablet by mouth Daily. 30 tablet 3   • metoprolol succinate XL (TOPROL-XL) 50 MG 24 hr tablet TAKE 1 TABLET BY MOUTH DAILY 30 tablet 4   • Myrbetriq 25 MG tablet sustained-release 24 hour 24 hr tablet      • NIFEdipine XL (PROCARDIA XL) 60 MG 24 hr tablet TAKE 1 TABLET BY MOUTH DAILY 30 tablet 4   • potassium chloride (K-DUR,KLOR-CON) 20 MEQ CR tablet TAKE ONE TABLET BY MOUTH EVERY DAY     • rosuvastatin (CRESTOR) 40 MG tablet TAKE 1 TABLET BY MOUTH DAILY 30 tablet 4   • traMADol (ULTRAM) 50 MG tablet Take 50 mg by mouth As Needed.     • VENTOLIN  (90 Base) MCG/ACT inhaler      • warfarin (COUMADIN) 2.5 MG tablet Take 1 tablet by mouth Daily. Take 1 tablet nightly except on Tuesday, Thursday, and Saturday take 2 tablets OR AS DIRECTED 45 tablet 2     No current facility-administered medications for this visit.       Review of Systems   Constitutional: Negative.    HENT: Negative.    Eyes: Negative.    Respiratory: Negative.    Cardiovascular: Negative.    Gastrointestinal: Negative.    Endocrine: Negative.    Genitourinary: Negative.    Musculoskeletal:        Foot pain     Skin: Negative.    Allergic/Immunologic: Negative.    Neurological: Negative.    Hematological: Negative.    Psychiatric/Behavioral: Negative.          OBJECTIVE    Pulse 71   Ht 165.1 cm (65\")   Wt 72.6 kg (160 lb)   SpO2 95%   BMI 26.63 kg/m²     Physical Exam  Vitals reviewed.   Constitutional:       General: She is not in acute distress.     Appearance: She is well-developed.   HENT:      Head: Normocephalic and atraumatic.      Nose: Nose normal.   Eyes:      Conjunctiva/sclera: Conjunctivae normal.      Pupils: Pupils are equal, round, and reactive to light.   Pulmonary:      Effort: Pulmonary effort is normal. No respiratory distress.      Breath sounds: No wheezing.   Musculoskeletal:         General: No deformity. Normal range of " motion.        Feet:    Feet:      Left foot:      Skin integrity: Skin integrity normal.   Skin:     General: Skin is warm and dry.      Capillary Refill: Capillary refill takes less than 2 seconds.   Neurological:      Mental Status: She is alert and oriented to person, place, and time.   Psychiatric:         Behavior: Behavior normal.         Thought Content: Thought content normal.              Procedures        ASSESSMENT AND PLAN    Diagnoses and all orders for this visit:    1. Closed displaced fracture of fifth metatarsal bone of left foot with routine healing, subsequent encounter (Primary)  -     XR Foot 3+ View Left        - Reviewed radiographs taken reviewed.  Okay to transition out of cam boot into athletic shoe gear.  - All questions were answered   - RTC 4 weeks, repeat radiographs            This document has been electronically signed by Ezequiel Ramos DPM on July 22, 2022 16:41 CDT     7/22/2022  16:41 CDT

## 2022-07-26 ENCOUNTER — ANTICOAGULATION VISIT (OUTPATIENT)
Dept: CARDIAC SURGERY | Facility: CLINIC | Age: 82
End: 2022-07-26

## 2022-07-26 VITALS — OXYGEN SATURATION: 95 % | HEART RATE: 49 BPM

## 2022-07-26 DIAGNOSIS — Z79.01 LONG TERM (CURRENT) USE OF ANTICOAGULANTS: ICD-10-CM

## 2022-07-26 DIAGNOSIS — Z51.81 ENCOUNTER FOR THERAPEUTIC DRUG MONITORING: ICD-10-CM

## 2022-07-26 DIAGNOSIS — I48.0 PAROXYSMAL ATRIAL FIBRILLATION: Primary | ICD-10-CM

## 2022-07-26 LAB — INR PPP: 2.8 (ref 0.9–1.1)

## 2022-07-26 PROCEDURE — 93793 ANTICOAG MGMT PT WARFARIN: CPT | Performed by: NURSE PRACTITIONER

## 2022-07-26 PROCEDURE — 36416 COLLJ CAPILLARY BLOOD SPEC: CPT | Performed by: NURSE PRACTITIONER

## 2022-07-26 PROCEDURE — 85610 PROTHROMBIN TIME: CPT | Performed by: NURSE PRACTITIONER

## 2022-07-26 NOTE — PROGRESS NOTES
Patient states no med changes or bleeding problems or unexplained bruising. Pt states she did not eat green veggies Sunday. Patient instructed to continue current dosing schedule and eat a serving of green veggies today. Verbalizes understanding. Will recheck next week. Patient instructed regarding medication; results given and questions answered. Nutritional counseling given.  Dietary factors affecting therapy addressed.  Patient instructed to monitor for excessive bruising or bleeding. Findings reported by Hannah Gutierrez RN.    Today's INR is Lab Results - Last 18 Months   Lab Units 07/26/22  0000   INR  2.80*

## 2022-08-02 ENCOUNTER — ANTICOAGULATION VISIT (OUTPATIENT)
Dept: CARDIAC SURGERY | Facility: CLINIC | Age: 82
End: 2022-08-02

## 2022-08-02 VITALS — HEART RATE: 55 BPM | OXYGEN SATURATION: 96 %

## 2022-08-02 DIAGNOSIS — Z51.81 ENCOUNTER FOR THERAPEUTIC DRUG MONITORING: ICD-10-CM

## 2022-08-02 DIAGNOSIS — I48.0 PAROXYSMAL ATRIAL FIBRILLATION: Primary | ICD-10-CM

## 2022-08-02 DIAGNOSIS — Z79.01 LONG TERM (CURRENT) USE OF ANTICOAGULANTS: ICD-10-CM

## 2022-08-02 LAB — INR PPP: 2.7 (ref 0.9–1.1)

## 2022-08-02 PROCEDURE — 85610 PROTHROMBIN TIME: CPT | Performed by: NURSE PRACTITIONER

## 2022-08-02 PROCEDURE — 93793 ANTICOAG MGMT PT WARFARIN: CPT | Performed by: NURSE PRACTITIONER

## 2022-08-02 PROCEDURE — 36416 COLLJ CAPILLARY BLOOD SPEC: CPT | Performed by: NURSE PRACTITIONER

## 2022-08-02 NOTE — PROGRESS NOTES
Patient states no med changes or bleeding problems or unexplained bruising. Patient instructed to continue current dosing schedule and to eat green veggies once weekly. Verbalizes understanding. Will recheck in 2 weeks. Patient instructed regarding medication; results given and questions answered. Nutritional counseling given.  Dietary factors affecting therapy addressed.  Patient instructed to monitor for excessive bruising or bleeding. Findings reported by Hannah Gutierrez RN.    Today's INR is Lab Results - Last 18 Months   Lab Units 08/02/22  0000   INR  2.70*

## 2022-08-17 ENCOUNTER — OFFICE VISIT (OUTPATIENT)
Dept: PODIATRY | Facility: CLINIC | Age: 82
End: 2022-08-17

## 2022-08-17 ENCOUNTER — ANTICOAGULATION VISIT (OUTPATIENT)
Dept: CARDIAC SURGERY | Facility: CLINIC | Age: 82
End: 2022-08-17

## 2022-08-17 VITALS — HEART RATE: 80 BPM | OXYGEN SATURATION: 96 %

## 2022-08-17 VITALS — OXYGEN SATURATION: 97 % | HEART RATE: 85 BPM | WEIGHT: 160 LBS | BODY MASS INDEX: 26.66 KG/M2 | HEIGHT: 65 IN

## 2022-08-17 DIAGNOSIS — S92.352D CLOSED DISPLACED FRACTURE OF FIFTH METATARSAL BONE OF LEFT FOOT WITH ROUTINE HEALING, SUBSEQUENT ENCOUNTER: Primary | ICD-10-CM

## 2022-08-17 DIAGNOSIS — Z51.81 ENCOUNTER FOR THERAPEUTIC DRUG MONITORING: ICD-10-CM

## 2022-08-17 DIAGNOSIS — Z79.01 LONG TERM (CURRENT) USE OF ANTICOAGULANTS: ICD-10-CM

## 2022-08-17 DIAGNOSIS — I48.0 PAROXYSMAL ATRIAL FIBRILLATION: Primary | ICD-10-CM

## 2022-08-17 LAB — INR PPP: 4.3 (ref 0.9–1.1)

## 2022-08-17 PROCEDURE — 85610 PROTHROMBIN TIME: CPT | Performed by: NURSE PRACTITIONER

## 2022-08-17 PROCEDURE — 99024 POSTOP FOLLOW-UP VISIT: CPT | Performed by: PODIATRIST

## 2022-08-17 PROCEDURE — 93793 ANTICOAG MGMT PT WARFARIN: CPT | Performed by: NURSE PRACTITIONER

## 2022-08-17 PROCEDURE — 36416 COLLJ CAPILLARY BLOOD SPEC: CPT | Performed by: NURSE PRACTITIONER

## 2022-08-17 NOTE — PROGRESS NOTES
"Carol Sullivan  1940  81 y.o. female      08/17/2022      Chief Complaint   Patient presents with   • Left Foot - Follow-up       History of Present Illness    Carol Sullivan is a 81 y.o.female patient returns to clinic for a recheck of left foot fracture. Injury 6/8/22. Xray obtained today.       Past Medical History:   Diagnosis Date   • Arthritis    • Depression    • GERD (gastroesophageal reflux disease)    • Hyperlipidemia    • Hypertension    • Near syncope    • Vascular disease          Past Surgical History:   Procedure Laterality Date   • BLADDER SURGERY     • ENDOSCOPY N/A 5/6/2019    Procedure: ESOPHAGOGASTRODUODENOSCOPY;  Surgeon: Alberto Sanchez DO;  Location: [facility] ENDOSCOPY;  Service: Gastroenterology   • GALLBLADDER SURGERY     • SHOULDER SURGERY      \"bone spurs\"   • SUBTOTAL HYSTERECTOMY           Family History   Problem Relation Age of Onset   • Hypertension Mother    • Diabetes Paternal Aunt    • Diabetes Paternal Grandmother    • Hypertension Paternal Grandmother    • Hypertension Paternal Grandfather        Allergies   Allergen Reactions   • Tuberculin Tests Swelling   • Hydrocodone Nausea Only   • Lortab [Hydrocodone-Acetaminophen] GI Intolerance       Social History     Socioeconomic History   • Marital status:    Tobacco Use   • Smoking status: Never Smoker   • Smokeless tobacco: Never Used   Substance and Sexual Activity   • Alcohol use: No   • Drug use: No   • Sexual activity: Defer         Current Outpatient Medications   Medication Sig Dispense Refill   • acetaminophen (TYLENOL) 500 MG tablet Take 500 mg by mouth As Needed.     • DULoxetine (CYMBALTA) 60 MG capsule      • furosemide (LASIX) 20 MG tablet TAKE ONE TABLET BY MOUTH TWO TIMES A DAY 60 tablet 3   • gabapentin (NEURONTIN) 300 MG capsule Take 1 capsule by mouth 3 (Three) Times a Day.  2   • isosorbide mononitrate (IMDUR) 30 MG 24 hr tablet TAKE 1 TABLET BY MOUTH DAILY. 30 tablet 4   • lansoprazole (PREVACID) " "30 MG capsule Take 1 capsule by mouth Every 12 (Twelve) Hours.     • losartan (COZAAR) 100 MG tablet Take 1 tablet by mouth Daily. 30 tablet 3   • metoprolol succinate XL (TOPROL-XL) 50 MG 24 hr tablet TAKE 1 TABLET BY MOUTH DAILY 30 tablet 4   • Myrbetriq 25 MG tablet sustained-release 24 hour 24 hr tablet      • NIFEdipine XL (PROCARDIA XL) 60 MG 24 hr tablet TAKE 1 TABLET BY MOUTH DAILY 30 tablet 4   • potassium chloride (K-DUR,KLOR-CON) 20 MEQ CR tablet TAKE ONE TABLET BY MOUTH EVERY DAY     • rosuvastatin (CRESTOR) 40 MG tablet TAKE 1 TABLET BY MOUTH DAILY 30 tablet 4   • traMADol (ULTRAM) 50 MG tablet Take 50 mg by mouth As Needed.     • VENTOLIN  (90 Base) MCG/ACT inhaler      • warfarin (COUMADIN) 2.5 MG tablet Take 1 tablet by mouth Daily. Take 1 tablet nightly except on Tuesday, Thursday, and Saturday take 2 tablets OR AS DIRECTED 45 tablet 2     No current facility-administered medications for this visit.       Review of Systems   Constitutional: Negative.    HENT: Negative.    Eyes: Negative.    Respiratory: Negative.    Cardiovascular: Negative.    Gastrointestinal: Negative.    Endocrine: Negative.    Genitourinary: Negative.    Musculoskeletal:        Foot pain     Skin: Negative.    Allergic/Immunologic: Negative.    Neurological: Negative.    Hematological: Negative.    Psychiatric/Behavioral: Negative.          OBJECTIVE    Pulse 85   Ht 165.1 cm (65\")   Wt 72.6 kg (160 lb)   SpO2 97%   BMI 26.63 kg/m²     Physical Exam  Vitals reviewed.   Constitutional:       General: She is not in acute distress.     Appearance: She is well-developed.   HENT:      Head: Normocephalic and atraumatic.      Nose: Nose normal.   Eyes:      Conjunctiva/sclera: Conjunctivae normal.      Pupils: Pupils are equal, round, and reactive to light.   Pulmonary:      Effort: Pulmonary effort is normal. No respiratory distress.      Breath sounds: No wheezing.   Musculoskeletal:         General: No deformity. Normal " range of motion.        Feet:    Feet:      Left foot:      Skin integrity: Skin integrity normal.   Skin:     General: Skin is warm and dry.      Capillary Refill: Capillary refill takes less than 2 seconds.   Neurological:      Mental Status: She is alert and oriented to person, place, and time.   Psychiatric:         Behavior: Behavior normal.         Thought Content: Thought content normal.              Procedures        ASSESSMENT AND PLAN    Diagnoses and all orders for this visit:    1. Closed displaced fracture of fifth metatarsal bone of left foot with routine healing, subsequent encounter (Primary)  -     XR Foot 3+ View Left        - Left foot pain has significantly improved.  Patient is currently ambulating unassisted in regular shoe gear.  Repeat radiographs taken and reviewed today.  Patient discharged from care at this time.  Recheck as needed.          This document has been electronically signed by Ezequiel Ramos DPM on August 17, 2022 13:16 CDT     8/17/2022  13:16 CDT

## 2022-08-17 NOTE — PROGRESS NOTES
Pt states no meds changes or bleeding problems or unexplained bruising. Pt instructed to hold dose tonight and have 1/2 cup turnip greens today. Pt verbalizes understanding. Will recheck in 5 days. Notify provider if you experience excessive bleeding from the nose, cuts, gums, rectum, urinary tract, or vagina. Reddish or brown urine or stool. Vomiting of blood or hemorrhoidal bleeding. If major injury occurs present to the Emergency Department. Patient instructed regarding medication; results given and questions answered. Nutritional counseling given.  Dietary factors affecting therapy addressed.  Patient instructed to monitor for excessive bruising or bleeding.  Findings reported by Nina Cohen RN  Today's INR is Lab Results - Last 18 Months   Lab Units 08/17/22  0000   INR  4.30*

## 2022-08-18 RX ORDER — ISOSORBIDE MONONITRATE 30 MG/1
30 TABLET, EXTENDED RELEASE ORAL DAILY
Qty: 30 TABLET | Refills: 4 | Status: SHIPPED | OUTPATIENT
Start: 2022-08-18

## 2022-08-23 ENCOUNTER — ANTICOAGULATION VISIT (OUTPATIENT)
Dept: CARDIAC SURGERY | Facility: CLINIC | Age: 82
End: 2022-08-23

## 2022-08-23 VITALS — OXYGEN SATURATION: 97 % | HEART RATE: 54 BPM

## 2022-08-23 DIAGNOSIS — I48.0 PAROXYSMAL ATRIAL FIBRILLATION: Primary | ICD-10-CM

## 2022-08-23 DIAGNOSIS — Z51.81 ENCOUNTER FOR THERAPEUTIC DRUG MONITORING: ICD-10-CM

## 2022-08-23 DIAGNOSIS — Z79.01 LONG TERM (CURRENT) USE OF ANTICOAGULANTS: ICD-10-CM

## 2022-08-23 LAB — INR PPP: 2.5 (ref 0.9–1.1)

## 2022-08-23 PROCEDURE — 36416 COLLJ CAPILLARY BLOOD SPEC: CPT | Performed by: NURSE PRACTITIONER

## 2022-08-23 PROCEDURE — 85610 PROTHROMBIN TIME: CPT | Performed by: NURSE PRACTITIONER

## 2022-08-23 PROCEDURE — 93793 ANTICOAG MGMT PT WARFARIN: CPT | Performed by: NURSE PRACTITIONER

## 2022-08-23 NOTE — PROGRESS NOTES
Pt states no meds changes or bleeding problems or unexplained bruising. Has not had green veggies since 8/17.  Pt dose adjusted and simplified. Instructed to avoid green veggies for 3-4 days. Pt verbalizes understanding. Will recheck in 8 days.  Patient instructed regarding medication; results given and questions answered. Nutritional counseling given.  Dietary factors affecting therapy addressed.  Patient instructed to monitor for excessive bruising or bleeding.  Findings reported by Nina Cohen RN    Pt called back and states she is starting Bactrim bid today. Appt made for 2 days.  Today's INR is Lab Results - Last 18 Months   Lab Units 08/23/22  0000   INR  2.50*

## 2022-08-25 ENCOUNTER — ANTICOAGULATION VISIT (OUTPATIENT)
Dept: CARDIAC SURGERY | Facility: CLINIC | Age: 82
End: 2022-08-25

## 2022-08-25 VITALS — OXYGEN SATURATION: 95 % | HEART RATE: 61 BPM

## 2022-08-25 DIAGNOSIS — I48.0 PAROXYSMAL ATRIAL FIBRILLATION: Primary | ICD-10-CM

## 2022-08-25 DIAGNOSIS — Z79.01 LONG TERM (CURRENT) USE OF ANTICOAGULANTS: ICD-10-CM

## 2022-08-25 DIAGNOSIS — Z51.81 ENCOUNTER FOR THERAPEUTIC DRUG MONITORING: ICD-10-CM

## 2022-08-25 LAB — INR PPP: 3.1 (ref 0.9–1.1)

## 2022-08-25 PROCEDURE — 93793 ANTICOAG MGMT PT WARFARIN: CPT | Performed by: NURSE PRACTITIONER

## 2022-08-25 PROCEDURE — 36416 COLLJ CAPILLARY BLOOD SPEC: CPT | Performed by: NURSE PRACTITIONER

## 2022-08-25 PROCEDURE — 85610 PROTHROMBIN TIME: CPT | Performed by: NURSE PRACTITIONER

## 2022-08-25 NOTE — PROGRESS NOTES
Pt states no bleeding problems or unexplained bruising. Pt currently on Bactrim BID and denies having and greens this week.  Pt dose adjusted and told to have serving of green veggies today. Pt verbalizes understanding. Will recheck in 4 days. Patient instructed regarding medication; results given and questions answered. Nutritional counseling given.  Dietary factors affecting therapy addressed.  Patient instructed to monitor for excessive bruising or bleeding.  Findings reported by Nina Cohen RN  Today's INR is Lab Results - Last 18 Months   Lab Units 08/25/22  0000   INR  3.10*

## 2022-08-29 ENCOUNTER — ANTICOAGULATION VISIT (OUTPATIENT)
Dept: CARDIAC SURGERY | Facility: CLINIC | Age: 82
End: 2022-08-29

## 2022-08-29 VITALS — OXYGEN SATURATION: 94 % | HEART RATE: 58 BPM

## 2022-08-29 DIAGNOSIS — Z79.01 LONG TERM (CURRENT) USE OF ANTICOAGULANTS: ICD-10-CM

## 2022-08-29 DIAGNOSIS — I48.0 PAROXYSMAL ATRIAL FIBRILLATION: Primary | ICD-10-CM

## 2022-08-29 DIAGNOSIS — Z51.81 ENCOUNTER FOR THERAPEUTIC DRUG MONITORING: ICD-10-CM

## 2022-08-29 LAB — INR PPP: 4.6 (ref 0.9–1.1)

## 2022-08-29 PROCEDURE — 36416 COLLJ CAPILLARY BLOOD SPEC: CPT | Performed by: NURSE PRACTITIONER

## 2022-08-29 PROCEDURE — 85610 PROTHROMBIN TIME: CPT | Performed by: NURSE PRACTITIONER

## 2022-08-29 PROCEDURE — 93793 ANTICOAG MGMT PT WARFARIN: CPT | Performed by: NURSE PRACTITIONER

## 2022-08-29 NOTE — PROGRESS NOTES
Pt will complete Bactrim tomorrow.  Pt denies bleeding issues.  Adjusted coumadin dose and instructed pt to increase Vit K intake today with a turnip green serving.  Notify provider if you experience excessive bleeding from the nose, cuts, gums, rectum, urinary tract, or vagina. Reddish or brown urine or stool. Vomiting of blood or hemorrhoidal bleeding. If major injury occurs present to the Emergency Department. Recheck INR this Wednesday.  Pt verbalizes.  Findings reported by Nerissa Farmer RN.   Today's INR is Lab Results - Last 18 Months   Lab Units 08/29/22  0000   INR  4.60*

## 2022-09-01 ENCOUNTER — DOCUMENTATION (OUTPATIENT)
Dept: CARDIAC SURGERY | Facility: CLINIC | Age: 82
End: 2022-09-01

## 2022-09-01 NOTE — PROGRESS NOTES
Pt called to cancel appt again today. Pt states she took 2.5mg when cancelling yesterday. Pt reports she did finish Bactrim Tuesday. Pt reports having headache and poor appetite. Pt offered curbside appt but declined. I advised pt that due to the holiday the earliest we can see her is Tuesday; pt accepted appt for Tuesday. Due to recent elevation, inability to obtain INR, and pt having a-fib dx pt was instructed to hold coumadin tonight, eat a cup of turnip greens today, and starting tomorrow take 2.5mg nightly; pt repeated back correctly. Findings reported by Hannah Gutierrez RN.

## 2022-09-06 ENCOUNTER — ANTICOAGULATION VISIT (OUTPATIENT)
Dept: CARDIAC SURGERY | Facility: CLINIC | Age: 82
End: 2022-09-06

## 2022-09-06 DIAGNOSIS — Z79.01 LONG TERM (CURRENT) USE OF ANTICOAGULANTS: ICD-10-CM

## 2022-09-06 DIAGNOSIS — Z51.81 ENCOUNTER FOR THERAPEUTIC DRUG MONITORING: ICD-10-CM

## 2022-09-06 DIAGNOSIS — I48.0 PAROXYSMAL ATRIAL FIBRILLATION: Primary | ICD-10-CM

## 2022-09-06 LAB — INR PPP: 1.5 (ref 0.9–1.1)

## 2022-09-06 PROCEDURE — 85610 PROTHROMBIN TIME: CPT | Performed by: NURSE PRACTITIONER

## 2022-09-06 PROCEDURE — 36416 COLLJ CAPILLARY BLOOD SPEC: CPT | Performed by: NURSE PRACTITIONER

## 2022-09-06 PROCEDURE — 93793 ANTICOAG MGMT PT WARFARIN: CPT | Performed by: NURSE PRACTITIONER

## 2022-09-07 NOTE — PROGRESS NOTES
Pt states she will be starting Cefdinir tonight bid for 7 days. Pt denied bleeding problems. Pt instructed on dosing and advised to hold green veggies several days; pt verbalized. Patient instructed regarding medication; results given and questions answered. Nutritional counseling given.  Dietary factors affecting therapy addressed.  Patient instructed to monitor for excessive bruising or bleeding. Appt made for next week. Findings reported by Hannah Gutierrez RN.    Today's INR is Lab Results - Last 18 Months   Lab Units 09/06/22  0000   INR  1.50*

## 2022-09-12 ENCOUNTER — ANTICOAGULATION VISIT (OUTPATIENT)
Dept: CARDIAC SURGERY | Facility: CLINIC | Age: 82
End: 2022-09-12

## 2022-09-12 VITALS — HEART RATE: 65 BPM | OXYGEN SATURATION: 95 %

## 2022-09-12 DIAGNOSIS — Z51.81 ENCOUNTER FOR THERAPEUTIC DRUG MONITORING: ICD-10-CM

## 2022-09-12 DIAGNOSIS — I48.0 PAROXYSMAL ATRIAL FIBRILLATION: Primary | ICD-10-CM

## 2022-09-12 DIAGNOSIS — Z79.01 LONG TERM (CURRENT) USE OF ANTICOAGULANTS: ICD-10-CM

## 2022-09-12 LAB — INR PPP: 3 (ref 0.9–1.1)

## 2022-09-12 PROCEDURE — 85610 PROTHROMBIN TIME: CPT | Performed by: NURSE PRACTITIONER

## 2022-09-12 PROCEDURE — 93793 ANTICOAG MGMT PT WARFARIN: CPT | Performed by: NURSE PRACTITIONER

## 2022-09-12 PROCEDURE — 36416 COLLJ CAPILLARY BLOOD SPEC: CPT | Performed by: NURSE PRACTITIONER

## 2022-09-12 RX ORDER — WARFARIN SODIUM 2.5 MG/1
TABLET ORAL
Qty: 40 TABLET | Refills: 2 | Status: SHIPPED | OUTPATIENT
Start: 2022-09-12 | End: 2022-10-26 | Stop reason: SDUPTHER

## 2022-09-12 NOTE — PROGRESS NOTES
Pt states no meds changes or bleeding problems or unexplained bruising. Dose not adjusted but rearranged due to today's INR. Instructed pt to have serving of green veggies today. Pt verbalizes understanding. Will recheck in 1 week. Patient instructed regarding medication; results given and questions answered. Nutritional counseling given.  Dietary factors affecting therapy addressed.  Patient instructed to monitor for excessive bruising or bleeding.  Findings reported by Nina Cohen RN  Today's INR is Lab Results - Last 18 Months   Lab Units 09/12/22  0000   INR  3.00*           DVT: Lovenox  Diet: dash-tlc, carb consistent

## 2022-09-13 ENCOUNTER — DOCUMENTATION (OUTPATIENT)
Dept: CARDIAC SURGERY | Facility: CLINIC | Age: 82
End: 2022-09-13

## 2022-09-13 NOTE — PROGRESS NOTES
Pt called to report starting Augmentin today for 10 days. Pt instructed to cut Wednesday dose to 2.5mg and eat green veggies again on Friday; pt verbalized. Will keep appt next week. Findings reported by Hannah Gutierrez RN.

## 2022-09-21 ENCOUNTER — ANTICOAGULATION VISIT (OUTPATIENT)
Dept: CARDIAC SURGERY | Facility: CLINIC | Age: 82
End: 2022-09-21

## 2022-09-21 VITALS — HEART RATE: 61 BPM | OXYGEN SATURATION: 99 %

## 2022-09-21 DIAGNOSIS — Z51.81 ENCOUNTER FOR THERAPEUTIC DRUG MONITORING: ICD-10-CM

## 2022-09-21 DIAGNOSIS — I48.0 PAROXYSMAL ATRIAL FIBRILLATION: Primary | ICD-10-CM

## 2022-09-21 DIAGNOSIS — Z79.01 LONG TERM (CURRENT) USE OF ANTICOAGULANTS: ICD-10-CM

## 2022-09-21 LAB — INR PPP: 1.2 (ref 0.9–1.1)

## 2022-09-21 PROCEDURE — 85610 PROTHROMBIN TIME: CPT | Performed by: NURSE PRACTITIONER

## 2022-09-21 PROCEDURE — 93793 ANTICOAG MGMT PT WARFARIN: CPT | Performed by: NURSE PRACTITIONER

## 2022-09-21 PROCEDURE — 36416 COLLJ CAPILLARY BLOOD SPEC: CPT | Performed by: NURSE PRACTITIONER

## 2022-09-21 NOTE — PROGRESS NOTES
Pt denies any medication changes or bleeding problems.  Pt states she has been taking 1/2 pill daily and full pill on Wednesdays.  Instructed dose was full pill daily and 2 pills on Wednesdays. Dose adjusted and explained to pt. Instructed to avoid green veggies for 4 days. Verbalized understanding. Will recheck in 1 week. Patient instructed regarding medication; results given and questions answered. Nutritional counseling given.  Dietary factors affecting therapy addressed.  Patient instructed to monitor for excessive bruising or bleeding.  Findings reported by Nina Cohen RN  Today's INR is Lab Results - Last 18 Months   Lab Units 09/21/22  0000   INR  1.20*

## 2022-09-28 ENCOUNTER — ANTICOAGULATION VISIT (OUTPATIENT)
Dept: CARDIAC SURGERY | Facility: CLINIC | Age: 82
End: 2022-09-28

## 2022-09-28 VITALS — HEART RATE: 56 BPM | OXYGEN SATURATION: 95 %

## 2022-09-28 DIAGNOSIS — Z79.01 LONG TERM (CURRENT) USE OF ANTICOAGULANTS: ICD-10-CM

## 2022-09-28 DIAGNOSIS — Z51.81 ENCOUNTER FOR THERAPEUTIC DRUG MONITORING: ICD-10-CM

## 2022-09-28 DIAGNOSIS — I48.0 PAROXYSMAL ATRIAL FIBRILLATION: Primary | ICD-10-CM

## 2022-09-28 LAB — INR PPP: 1.4 (ref 0.9–1.1)

## 2022-09-28 PROCEDURE — 93793 ANTICOAG MGMT PT WARFARIN: CPT | Performed by: NURSE PRACTITIONER

## 2022-09-28 PROCEDURE — 36416 COLLJ CAPILLARY BLOOD SPEC: CPT | Performed by: NURSE PRACTITIONER

## 2022-09-28 PROCEDURE — 85610 PROTHROMBIN TIME: CPT | Performed by: NURSE PRACTITIONER

## 2022-09-28 NOTE — PROGRESS NOTES
Pt states she did take the correct coumadin dose this time.  Pt denies medication changes or bleeding issues.  I adjusted coumadin dose and instructed pt to limit green intake for now.  Recheck INR next wk.  Pt verbalizes.  Patient instructed regarding medication; results given and questions answered. Nutritional counseling given.  Dietary factors affecting therapy addressed.  Patient instructed to monitor for excessive bruising or bleeding.  Findings reported by Nerissa Farmer RN.   Today's INR is Lab Results - Last 18 Months   Lab Units 09/28/22  0000   INR  1.40*

## 2022-10-05 ENCOUNTER — ANTICOAGULATION VISIT (OUTPATIENT)
Dept: CARDIAC SURGERY | Facility: CLINIC | Age: 82
End: 2022-10-05

## 2022-10-05 VITALS — OXYGEN SATURATION: 94 % | HEART RATE: 56 BPM

## 2022-10-05 DIAGNOSIS — Z79.01 LONG TERM (CURRENT) USE OF ANTICOAGULANTS: ICD-10-CM

## 2022-10-05 DIAGNOSIS — I48.0 PAROXYSMAL ATRIAL FIBRILLATION: Primary | ICD-10-CM

## 2022-10-05 DIAGNOSIS — Z51.81 ENCOUNTER FOR THERAPEUTIC DRUG MONITORING: ICD-10-CM

## 2022-10-05 LAB — INR PPP: 1.7 (ref 0.9–1.1)

## 2022-10-05 PROCEDURE — 85610 PROTHROMBIN TIME: CPT | Performed by: NURSE PRACTITIONER

## 2022-10-05 PROCEDURE — 36416 COLLJ CAPILLARY BLOOD SPEC: CPT | Performed by: NURSE PRACTITIONER

## 2022-10-05 PROCEDURE — 93793 ANTICOAG MGMT PT WARFARIN: CPT | Performed by: NURSE PRACTITIONER

## 2022-10-05 NOTE — PROGRESS NOTES
Pt denies med changes or bleeding problems. Pt states she ate green veggies once on Monday as instructed. Dose adjusted and pt instructed to continue green veggies on Mondays; pt verbalized. Patient instructed regarding medication; results given and questions answered. Nutritional counseling given.  Dietary factors affecting therapy addressed.  Patient instructed to monitor for excessive bruising or bleeding. Will recheck next week. Findings reported by Hannah Gutierrez RN.    Today's INR is Lab Results - Last 18 Months   Lab Units 10/05/22  0000   INR  1.70*

## 2022-10-13 ENCOUNTER — ANTICOAGULATION VISIT (OUTPATIENT)
Dept: CARDIAC SURGERY | Facility: CLINIC | Age: 82
End: 2022-10-13

## 2022-10-13 VITALS — HEART RATE: 67 BPM | OXYGEN SATURATION: 96 %

## 2022-10-13 DIAGNOSIS — I48.0 PAROXYSMAL ATRIAL FIBRILLATION: Primary | ICD-10-CM

## 2022-10-13 DIAGNOSIS — Z51.81 ENCOUNTER FOR THERAPEUTIC DRUG MONITORING: ICD-10-CM

## 2022-10-13 DIAGNOSIS — Z79.01 LONG TERM (CURRENT) USE OF ANTICOAGULANTS: ICD-10-CM

## 2022-10-13 LAB — INR PPP: 2.4 (ref 0.9–1.1)

## 2022-10-13 PROCEDURE — 93793 ANTICOAG MGMT PT WARFARIN: CPT | Performed by: NURSE PRACTITIONER

## 2022-10-13 PROCEDURE — 85610 PROTHROMBIN TIME: CPT | Performed by: NURSE PRACTITIONER

## 2022-10-13 PROCEDURE — 36416 COLLJ CAPILLARY BLOOD SPEC: CPT | Performed by: NURSE PRACTITIONER

## 2022-10-13 NOTE — PROGRESS NOTES
Pt denies med changes or bleeding problems. Pt states she ate green veggies once this week. Pt instructed on dosing and to continue green veggies once weekly; pt verbalized. Patient instructed regarding medication; results given and questions answered. Nutritional counseling given.  Dietary factors affecting therapy addressed.  Patient instructed to monitor for excessive bruising or bleeding. Will recheck next week. Findings reported by Hannah Gutierrez RN.    Today's INR is Lab Results - Last 18 Months   Lab Units 10/13/22  0000   INR  2.40*

## 2022-10-20 ENCOUNTER — ANTICOAGULATION VISIT (OUTPATIENT)
Dept: CARDIAC SURGERY | Facility: CLINIC | Age: 82
End: 2022-10-20

## 2022-10-20 VITALS — HEART RATE: 65 BPM | OXYGEN SATURATION: 99 %

## 2022-10-20 DIAGNOSIS — I48.0 PAROXYSMAL ATRIAL FIBRILLATION: Primary | ICD-10-CM

## 2022-10-20 DIAGNOSIS — Z51.81 ENCOUNTER FOR THERAPEUTIC DRUG MONITORING: ICD-10-CM

## 2022-10-20 DIAGNOSIS — Z79.01 LONG TERM (CURRENT) USE OF ANTICOAGULANTS: ICD-10-CM

## 2022-10-20 LAB — INR PPP: 2.7 (ref 0.9–1.1)

## 2022-10-20 PROCEDURE — 85610 PROTHROMBIN TIME: CPT | Performed by: NURSE PRACTITIONER

## 2022-10-20 PROCEDURE — 93793 ANTICOAG MGMT PT WARFARIN: CPT | Performed by: NURSE PRACTITIONER

## 2022-10-20 PROCEDURE — 36416 COLLJ CAPILLARY BLOOD SPEC: CPT | Performed by: NURSE PRACTITIONER

## 2022-10-20 NOTE — PROGRESS NOTES
Pt states no meds changes or bleeding problems or unexplained bruising. Pt instructed to continue current dosage schedule and weekly serving of green veggies. Pt verbalizes understanding. Will recheck in 2 weeks. Patient instructed regarding medication; results given and questions answered. Nutritional counseling given.  Dietary factors affecting therapy addressed.  Patient instructed to monitor for excessive bruising or bleeding.  Findings reported by Nina Cohen RN  Today's INR is Lab Results - Last 18 Months   Lab Units 10/20/22  0000   INR  2.70*

## 2022-10-26 RX ORDER — WARFARIN SODIUM 2.5 MG/1
TABLET ORAL
Qty: 45 TABLET | Refills: 2 | Status: SHIPPED | OUTPATIENT
Start: 2022-10-26 | End: 2022-12-21 | Stop reason: SDUPTHER

## 2022-11-03 ENCOUNTER — ANTICOAGULATION VISIT (OUTPATIENT)
Dept: CARDIAC SURGERY | Facility: CLINIC | Age: 82
End: 2022-11-03

## 2022-11-03 VITALS — HEART RATE: 61 BPM | OXYGEN SATURATION: 97 %

## 2022-11-03 DIAGNOSIS — Z79.01 LONG TERM (CURRENT) USE OF ANTICOAGULANTS: ICD-10-CM

## 2022-11-03 DIAGNOSIS — I48.0 PAROXYSMAL ATRIAL FIBRILLATION: Primary | ICD-10-CM

## 2022-11-03 DIAGNOSIS — Z51.81 ENCOUNTER FOR THERAPEUTIC DRUG MONITORING: ICD-10-CM

## 2022-11-03 LAB — INR PPP: 3 (ref 0.9–1.1)

## 2022-11-03 PROCEDURE — 36416 COLLJ CAPILLARY BLOOD SPEC: CPT | Performed by: NURSE PRACTITIONER

## 2022-11-03 PROCEDURE — 85610 PROTHROMBIN TIME: CPT | Performed by: NURSE PRACTITIONER

## 2022-11-03 PROCEDURE — 93793 ANTICOAG MGMT PT WARFARIN: CPT | Performed by: NURSE PRACTITIONER

## 2022-11-03 NOTE — PROGRESS NOTES
Pt states no meds changes or bleeding problems or unexplained bruising. Pt reports she had 1/4 cup pf broccoli this week.  Instructed pt that a serving of broccoli is 3/4 cup. Pt instructed to continue current dosage schedule and can have serving of green veggies today and then weekly. Pt verbalizes understanding. Will recheck in 3 weeks. Patient instructed regarding medication; results given and questions answered. Nutritional counseling given.  Dietary factors affecting therapy addressed.  Patient instructed to monitor for excessive bruising or bleeding.  Findings reported by Nina Cohen RN  Today's INR is Lab Results - Last 18 Months   Lab Units 11/03/22  0000   INR  3.00*

## 2022-11-11 DIAGNOSIS — I50.32 CHRONIC HEART FAILURE WITH PRESERVED EJECTION FRACTION: ICD-10-CM

## 2022-11-11 RX ORDER — METOPROLOL SUCCINATE 50 MG/1
50 TABLET, EXTENDED RELEASE ORAL DAILY
Qty: 30 TABLET | Refills: 4 | Status: SHIPPED | OUTPATIENT
Start: 2022-11-11 | End: 2023-04-06

## 2022-11-11 RX ORDER — ROSUVASTATIN CALCIUM 40 MG/1
40 TABLET, COATED ORAL DAILY
Qty: 30 TABLET | Refills: 1 | Status: SHIPPED | OUTPATIENT
Start: 2022-11-11 | End: 2023-01-17

## 2022-11-11 RX ORDER — NIFEDIPINE 60 MG/1
60 TABLET, EXTENDED RELEASE ORAL DAILY
Qty: 30 TABLET | Refills: 4 | Status: SHIPPED | OUTPATIENT
Start: 2022-11-11 | End: 2023-04-06

## 2022-11-29 ENCOUNTER — ANTICOAGULATION VISIT (OUTPATIENT)
Dept: CARDIAC SURGERY | Facility: CLINIC | Age: 82
End: 2022-11-29

## 2022-11-29 ENCOUNTER — OFFICE VISIT (OUTPATIENT)
Dept: CARDIOLOGY | Facility: CLINIC | Age: 82
End: 2022-11-29

## 2022-11-29 VITALS
WEIGHT: 156.4 LBS | OXYGEN SATURATION: 100 % | HEIGHT: 65 IN | DIASTOLIC BLOOD PRESSURE: 72 MMHG | BODY MASS INDEX: 26.06 KG/M2 | SYSTOLIC BLOOD PRESSURE: 136 MMHG | HEART RATE: 59 BPM

## 2022-11-29 VITALS — HEART RATE: 67 BPM | OXYGEN SATURATION: 95 %

## 2022-11-29 DIAGNOSIS — I10 PRIMARY HYPERTENSION: ICD-10-CM

## 2022-11-29 DIAGNOSIS — I48.0 PAROXYSMAL ATRIAL FIBRILLATION: ICD-10-CM

## 2022-11-29 DIAGNOSIS — R55 NEAR SYNCOPE: ICD-10-CM

## 2022-11-29 DIAGNOSIS — I48.0 PAROXYSMAL ATRIAL FIBRILLATION: Primary | ICD-10-CM

## 2022-11-29 DIAGNOSIS — E78.5 HYPERLIPIDEMIA, UNSPECIFIED HYPERLIPIDEMIA TYPE: ICD-10-CM

## 2022-11-29 DIAGNOSIS — Z79.01 LONG TERM (CURRENT) USE OF ANTICOAGULANTS: ICD-10-CM

## 2022-11-29 DIAGNOSIS — I49.3 PVC (PREMATURE VENTRICULAR CONTRACTION): ICD-10-CM

## 2022-11-29 DIAGNOSIS — I10 ESSENTIAL HYPERTENSION: Primary | ICD-10-CM

## 2022-11-29 DIAGNOSIS — Z51.81 ENCOUNTER FOR THERAPEUTIC DRUG MONITORING: ICD-10-CM

## 2022-11-29 LAB — INR PPP: 4 (ref 0.9–1.1)

## 2022-11-29 PROCEDURE — 99214 OFFICE O/P EST MOD 30 MIN: CPT | Performed by: INTERNAL MEDICINE

## 2022-11-29 PROCEDURE — 93000 ELECTROCARDIOGRAM COMPLETE: CPT | Performed by: INTERNAL MEDICINE

## 2022-11-29 PROCEDURE — 36416 COLLJ CAPILLARY BLOOD SPEC: CPT | Performed by: NURSE PRACTITIONER

## 2022-11-29 PROCEDURE — 85610 PROTHROMBIN TIME: CPT | Performed by: NURSE PRACTITIONER

## 2022-11-29 NOTE — PROGRESS NOTES
Carol Sullivan  82 y.o. female    11/29/2022  1. Essential hypertension    2. Paroxysmal atrial fibrillation (HCC)    3. PVC (premature ventricular contraction)    4. Near syncope    5. Hyperlipidemia, unspecified hyperlipidemia type    6. Primary hypertension        History of Present Illness:  Body mass index is 26.03 kg/m². BMI is above normal parameters. Recommendations include: exercise counseling, nutrition counseling and referral to primary care.    82 years old patient presented today dated 11/29/2022 for routine follow-up with out symptom of cardiac decompensation such orthopnea PND chest pain or dizziness.  She had concurrent medical problem of paroxysmal atrial fibrillation on oral anticoagulation, hypertension, hyperlipidemia and previous history of questionable aortic dissection was life flighted to [facility] in 2020 and CT scan at that facility no evidence of aortic dissection.      CT coronary angiogram at [facility] 5/31/2020  IMPRESSION:    Aorta: No evidence of thoracic aortic dissection or intramural   hematoma. 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 Advanced atherosclerotic disease within   the descending thoracic aorta and transverse arch.    Coronary arteries: CAD-RADS 3    Degree of maximal stenosis: 50%-69%  Moderate stenosis  Consider functional evaluation and serial troponin levels    Additional Findings:  1.  Nonobstructing nephrolith within the left kidney.  2.  Large hiatal hernia containing majority of the fundus of stomach.  3.  Numerous additional chronic and incidental findings as noted   above.    Electronically signed by  Reynaldo Urban on 5/31/2020 11:31 AM  CT scan of the chest 5/31/2020  IMPRESSION:     1.  Narka A dissection involving ascending thoracic aorta as  above.  Preliminary report was called to Dr. Hays upon  interpretation.     2.  Diffuse calcified and noncalcified atheromatous plaques  without hemodynamically  "significant stenosis otherwise as above.     3.  Incidental mesenteric fat stranding and shotty adenopathy.   3-6 month follow-up abdominal CT is recommended to ensure  stability.     4.  Incidental moderate hiatal hernia and nonobstructing left  renal stone.    Lipids 7/16/2020    Total Cholesterol  <200 mg/dL 129    Triglycerides  30 - 150 mg/dL 145    HDL Cholesterol  39 - 96 mg/dL 36Low     LDL Cholesterol   5 - 99 mg/dL 68    Chol/HDL Ratio  3.5 - 5.3 3.6        SUBJECTIVE:    Allergies   Allergen Reactions   • Tuberculin Tests Swelling   • Hydrocodone Nausea Only   • Lortab [Hydrocodone-Acetaminophen] GI Intolerance         Past Medical History:   Diagnosis Date   • Arthritis    • Depression    • GERD (gastroesophageal reflux disease)    • Hyperlipidemia    • Hypertension    • Near syncope    • Vascular disease          Past Surgical History:   Procedure Laterality Date   • BLADDER SURGERY     • ENDOSCOPY N/A 5/6/2019    Procedure: ESOPHAGOGASTRODUODENOSCOPY;  Surgeon: Alberto Sanchez DO;  Location: [facility] ENDOSCOPY;  Service: Gastroenterology   • GALLBLADDER SURGERY     • SHOULDER SURGERY      \"bone spurs\"   • SUBTOTAL HYSTERECTOMY           Family History   Problem Relation Age of Onset   • Hypertension Mother    • Diabetes Paternal Aunt    • Diabetes Paternal Grandmother    • Hypertension Paternal Grandmother    • Hypertension Paternal Grandfather          Social History     Socioeconomic History   • Marital status:    Tobacco Use   • Smoking status: Never   • Smokeless tobacco: Never   Substance and Sexual Activity   • Alcohol use: No   • Drug use: No   • Sexual activity: Defer         Current Outpatient Medications   Medication Sig Dispense Refill   • acetaminophen (TYLENOL) 500 MG tablet Take 500 mg by mouth As Needed.     • DULoxetine (CYMBALTA) 60 MG capsule      • furosemide (LASIX) 20 MG tablet TAKE ONE TABLET BY MOUTH TWO TIMES A DAY 60 tablet 3   • gabapentin (NEURONTIN) 300 MG capsule " Take 1 capsule by mouth 3 (Three) Times a Day.  2   • isosorbide mononitrate (IMDUR) 30 MG 24 hr tablet TAKE 1 TABLET BY MOUTH DAILY 30 tablet 4   • lansoprazole (PREVACID) 30 MG capsule Take 1 capsule by mouth Every 12 (Twelve) Hours.     • losartan (COZAAR) 100 MG tablet Take 1 tablet by mouth Daily. 30 tablet 3   • metoprolol succinate XL (TOPROL-XL) 50 MG 24 hr tablet TAKE 1 TABLET BY MOUTH DAILY 30 tablet 4   • Myrbetriq 25 MG tablet sustained-release 24 hour 24 hr tablet      • NIFEdipine XL (PROCARDIA XL) 60 MG 24 hr tablet TAKE 1 TABLET BY MOUTH DAILY 30 tablet 4   • potassium chloride (K-DUR,KLOR-CON) 20 MEQ CR tablet TAKE ONE TABLET BY MOUTH EVERY DAY     • rosuvastatin (CRESTOR) 40 MG tablet TAKE 1 TABLET BY MOUTH DAILY 30 tablet 1   • traMADol (ULTRAM) 50 MG tablet Take 50 mg by mouth As Needed.     • VENTOLIN  (90 Base) MCG/ACT inhaler      • warfarin (COUMADIN) 2.5 MG tablet Take 1 tablet nightly except on Sunday, Wednesday, and Friday take 2 tablets OR AS DIRECTED 45 tablet 2     No current facility-administered medications for this visit.           Review of Systems:   Today dated 11/29/2022 no significant change was noted in the review of system  Constitutional:  Denies recent weight loss, weight gain,no change in exercise tolerance.     HENT:  Denies any hearing loss, epistaxis, hoarseness,   Eyes: No blurred    Respiratory:  Denies dyspnea with exertion,no cough,     Cardiovascular: See H&P    Gastrointestinal:  Denies change in bowel habits, dyspepsia, ulcer disease,    Endocrine: Negative for cold intolerance, heat intolerance, polydipsia, polyphagia and polyuria..     Genitourinary: Negative.      Musculoskeletal: Denies  arthritic symptoms or back problems.     Skin:  Denies  rashes, or skin lesions.     Allergic/Immunologic: Negative.  Negative for environmental allergies, food allergies    Neurological:  Denies  recurrent headaches, strokes, TIA, or seizure disorder.  "    Hematological: Denies any food allergies, seasonal allergies, bleeding disorders    Psychiatric/Behavioral: Denies any history of depression,      OBJECTIVE:    /72 (BP Location: Left arm, Patient Position: Sitting, Cuff Size: Adult)   Pulse 59   Ht 165.1 cm (65\")   Wt 70.9 kg (156 lb 6.4 oz)   SpO2 100%   BMI 26.03 kg/m²       Physical Exam:   No change was noted in physical exam  Constitutional: Cooperative, alert and oriented, well-developed, well-nourished, in no acute distress.     HENT:   Head: Normocephalic, conjunctive is pink thyroid is nonpalpable no jugular is distention    Cardiovascular: Regular rhythm, S1 and S2 normal, no S3 or S4. Apical impulse not displaced. No murmurs, gallops, or rubs detected.     Pulmonary/Chest: Chest: Rales and  Abdominal: Abdomen soft, bowel sounds normoactive, no masses,    Musculoskeletal: No deformities, peripheral pulses no    Neurological: No gross motor or sensory deficits noted, affect appropriate, oriented to time, person, place.     Skin: Warm and dry to the touch, no apparent skin lesions or masses noted.     Psychiatric: She has a normal mood and affect. Her behavior is normal. Judgment and thought content normal.         Procedures      Lab Results   Component Value Date    WBC 8.28 07/27/2021    HGB 12.2 07/27/2021    HCT 38.0 07/27/2021    MCV 87.2 07/27/2021     07/27/2021     Lab Results   Component Value Date    GLUCOSE 167 (H) 05/31/2020    BUN 27 (H) 10/27/2022    CREATININE 1.5 (H) 10/27/2022    EGFRIFNONA 70 05/31/2020    BCR 16.5 05/31/2020    CO2 30 10/27/2022    CALCIUM 9.1 10/27/2022    ALBUMIN 4.1 10/27/2022    AST 16 10/27/2022    ALT 11 10/27/2022     Lab Results   Component Value Date    CHOL 129 07/16/2020     Lab Results   Component Value Date    TRIG 145 07/16/2020    TRIG 84 05/31/2020     Lab Results   Component Value Date    HDL 36 (L) 07/16/2020    HDL 42 (L) 05/31/2020     No components found for: LDLCALC  Lab " Results   Component Value Date    LDL 68 07/16/2020     05/31/2020     No results found for: HDLLDLRATIO  No components found for: CHOLHDL  Lab Results   Component Value Date    HGBA1C 6.5 (H) 07/16/2020     Lab Results   Component Value Date    TSH 1.359 05/31/2020           ASSESSMENT AND PLAN:  #1 paroxysmal atrial fibrillation  Currently sinus rhythm at 55 bpm continue current dose of metoprolol    [id number] is 4 continue oral anticoagulation    Continue metoprolol has had no further recurrence     2 hypertension  good blood pressure will continue losartan 100 mg, metoprolol 50 mg and Procardia and isosorbide    3 hyperlipidemia continue Crestor follow-up with your family doctor    4  history of premature ventricular complex    Asymptomatic at the time of evaluation continue metoprolol    I spent 16minutes caring for Carol on this date of service. This time includes time spent by me of counseling/coordination of care as relates to the presenting problem and any ordered procedures/tests as outlined above.           This document has been electronically signed by Odette Alves MD on November 29, 2022 11:02 CST      Diagnoses and all orders for this visit:    1. Essential hypertension (Primary)  -     ECG 12 Lead    2. Paroxysmal atrial fibrillation (HCC)    3. PVC (premature ventricular contraction)    4. Near syncope    5. Hyperlipidemia, unspecified hyperlipidemia type    6. Primary hypertension          Odette Alves MD  11/29/2022  11:02 CST

## 2022-11-29 NOTE — PROGRESS NOTES
Pt states no changes to meds or diet.  Pt denies bleeding issues.  Unable to determine cause for elevation.  I adjusted warfarin dose and instructed pt to increase Vit K intake today with a turnip green serving.  Recheck INR this Thursday.  Notify provider if you experience excessive bleeding from the nose, cuts, gums, rectum, urinary tract, or vagina. Reddish or brown urine or stool. Vomiting of blood or hemorrhoidal bleeding. If major injury occurs present to the Emergency Department. Pt verbalises.  Pt seeing Dr. Alves today as well.  Findings reported by Nerissa Farmer RN.   Today's INR is Lab Results - Last 18 Months   Lab Units 11/29/22  0000   INR  4.00*

## 2022-11-30 LAB
QT INTERVAL: 396 MS
QTC INTERVAL: 378 MS

## 2022-12-01 ENCOUNTER — ANTICOAGULATION VISIT (OUTPATIENT)
Dept: CARDIAC SURGERY | Facility: CLINIC | Age: 82
End: 2022-12-01

## 2022-12-01 DIAGNOSIS — Z79.01 LONG TERM (CURRENT) USE OF ANTICOAGULANTS: ICD-10-CM

## 2022-12-01 DIAGNOSIS — I48.0 PAROXYSMAL ATRIAL FIBRILLATION: Primary | ICD-10-CM

## 2022-12-01 DIAGNOSIS — Z51.81 ENCOUNTER FOR THERAPEUTIC DRUG MONITORING: ICD-10-CM

## 2022-12-01 LAB — INR PPP: 2.4 (ref 0.9–1.1)

## 2022-12-01 PROCEDURE — 85610 PROTHROMBIN TIME: CPT | Performed by: NURSE PRACTITIONER

## 2022-12-01 PROCEDURE — 36416 COLLJ CAPILLARY BLOOD SPEC: CPT | Performed by: NURSE PRACTITIONER

## 2022-12-01 PROCEDURE — 93793 ANTICOAG MGMT PT WARFARIN: CPT | Performed by: NURSE PRACTITIONER

## 2022-12-01 NOTE — PROGRESS NOTES
Pt states no meds changes or bleeding problems or unexplained bruising. Pt instructed to continue current dosing. Pt verbalizes understanding. Will recheck in 1 week.   Patient instructed regarding medication; results given and questions answered. Nutritional counseling given.  Dietary factors affecting therapy addressed.  Patient instructed to monitor for excessive bruising or bleeding.  Findings reported by Nina Cohen RN  Today's INR is Lab Results - Last 18 Months   Lab Units 12/01/22  0000   INR  2.40*              This document has been electronically signed by COY Banerjee on December 22, 2022 14:08 CST

## 2022-12-07 ENCOUNTER — ANTICOAGULATION VISIT (OUTPATIENT)
Dept: CARDIAC SURGERY | Facility: CLINIC | Age: 82
End: 2022-12-07

## 2022-12-07 VITALS — HEART RATE: 58 BPM | OXYGEN SATURATION: 95 %

## 2022-12-07 DIAGNOSIS — Z51.81 ENCOUNTER FOR THERAPEUTIC DRUG MONITORING: ICD-10-CM

## 2022-12-07 DIAGNOSIS — Z79.01 LONG TERM (CURRENT) USE OF ANTICOAGULANTS: ICD-10-CM

## 2022-12-07 DIAGNOSIS — I48.0 PAROXYSMAL ATRIAL FIBRILLATION: Primary | ICD-10-CM

## 2022-12-07 LAB — INR PPP: 3.7 (ref 0.9–1.1)

## 2022-12-07 PROCEDURE — 85610 PROTHROMBIN TIME: CPT | Performed by: NURSE PRACTITIONER

## 2022-12-07 PROCEDURE — 36416 COLLJ CAPILLARY BLOOD SPEC: CPT | Performed by: NURSE PRACTITIONER

## 2022-12-07 PROCEDURE — 93793 ANTICOAG MGMT PT WARFARIN: CPT | Performed by: NURSE PRACTITIONER

## 2022-12-07 NOTE — PROGRESS NOTES
Pt states no meds changes or bleeding problems or unexplained bruising. Pt dose adjusted for today only and have serving of green veggies today. Pt verbalizes understanding. Will recheck in 1 week. Patient instructed regarding medication; results given and questions answered. Nutritional counseling given.  Dietary factors affecting therapy addressed.  Patient instructed to monitor for excessive bruising or bleeding.  Findings reported by Nina Cohen RN  Today's INR is Lab Results - Last 18 Months   Lab Units 12/07/22  0000   INR  3.70*

## 2022-12-14 ENCOUNTER — ANTICOAGULATION VISIT (OUTPATIENT)
Dept: CARDIAC SURGERY | Facility: CLINIC | Age: 82
End: 2022-12-14

## 2022-12-14 VITALS — HEART RATE: 69 BPM | OXYGEN SATURATION: 96 %

## 2022-12-14 DIAGNOSIS — Z51.81 ENCOUNTER FOR THERAPEUTIC DRUG MONITORING: ICD-10-CM

## 2022-12-14 DIAGNOSIS — I48.0 PAROXYSMAL ATRIAL FIBRILLATION: Primary | ICD-10-CM

## 2022-12-14 DIAGNOSIS — Z79.01 LONG TERM (CURRENT) USE OF ANTICOAGULANTS: ICD-10-CM

## 2022-12-14 LAB — INR PPP: 2.9 (ref 0.9–1.1)

## 2022-12-14 PROCEDURE — 36416 COLLJ CAPILLARY BLOOD SPEC: CPT | Performed by: NURSE PRACTITIONER

## 2022-12-14 PROCEDURE — 85610 PROTHROMBIN TIME: CPT | Performed by: NURSE PRACTITIONER

## 2022-12-14 PROCEDURE — 93793 ANTICOAG MGMT PT WARFARIN: CPT | Performed by: NURSE PRACTITIONER

## 2022-12-14 NOTE — PROGRESS NOTES
Pt states no meds changes or bleeding problems or unexplained bruising. Pt dose adjusted and instructed to have 1 serving of green weekly. Pt verbalizes understanding. Will recheck in 1 week. Patient instructed regarding medication; results given and questions answered. Nutritional counseling given.  Dietary factors affecting therapy addressed.  Patient instructed to monitor for excessive bruising or bleeding.  Findings reported by Nina Cohen RN  Today's INR is Lab Results - Last 18 Months   Lab Units 12/14/22  0000   INR  2.90*

## 2022-12-21 ENCOUNTER — ANTICOAGULATION VISIT (OUTPATIENT)
Dept: CARDIAC SURGERY | Facility: CLINIC | Age: 82
End: 2022-12-21

## 2022-12-21 VITALS — OXYGEN SATURATION: 97 % | HEART RATE: 54 BPM

## 2022-12-21 DIAGNOSIS — Z51.81 ENCOUNTER FOR THERAPEUTIC DRUG MONITORING: ICD-10-CM

## 2022-12-21 DIAGNOSIS — I48.0 PAROXYSMAL ATRIAL FIBRILLATION: Primary | ICD-10-CM

## 2022-12-21 DIAGNOSIS — Z79.01 LONG TERM (CURRENT) USE OF ANTICOAGULANTS: ICD-10-CM

## 2022-12-21 LAB — INR PPP: 2.7 (ref 0.9–1.1)

## 2022-12-21 PROCEDURE — 85610 PROTHROMBIN TIME: CPT | Performed by: NURSE PRACTITIONER

## 2022-12-21 PROCEDURE — 93793 ANTICOAG MGMT PT WARFARIN: CPT | Performed by: NURSE PRACTITIONER

## 2022-12-21 PROCEDURE — 36416 COLLJ CAPILLARY BLOOD SPEC: CPT | Performed by: NURSE PRACTITIONER

## 2022-12-21 RX ORDER — WARFARIN SODIUM 2.5 MG/1
TABLET ORAL
Qty: 40 TABLET | Refills: 2 | Status: SHIPPED | OUTPATIENT
Start: 2022-12-21

## 2022-12-21 NOTE — PROGRESS NOTES
Patient states no med changes or bleeding problems or unexplained bruising. Patient instructed to continue current dosing schedule and eating green veggies once weekly. Verbalizes understanding. Will recheck in 2 weeks. Patient instructed regarding medication; results given and questions answered. Nutritional counseling given.  Dietary factors affecting therapy addressed.  Patient instructed to monitor for excessive bruising or bleeding. Findings reported by Hannah Gutierrez RN.    Today's INR is Lab Results - Last 18 Months   Lab Units 12/21/22  0000   INR  2.70*

## 2023-01-05 ENCOUNTER — ANTICOAGULATION VISIT (OUTPATIENT)
Dept: CARDIAC SURGERY | Facility: CLINIC | Age: 83
End: 2023-01-05
Payer: MEDICARE

## 2023-01-05 VITALS — HEART RATE: 61 BPM | OXYGEN SATURATION: 97 %

## 2023-01-05 DIAGNOSIS — Z79.01 LONG TERM (CURRENT) USE OF ANTICOAGULANTS: ICD-10-CM

## 2023-01-05 DIAGNOSIS — Z51.81 ENCOUNTER FOR THERAPEUTIC DRUG MONITORING: ICD-10-CM

## 2023-01-05 DIAGNOSIS — I48.0 PAROXYSMAL ATRIAL FIBRILLATION: Primary | ICD-10-CM

## 2023-01-05 LAB — INR PPP: 2.3 (ref 0.9–1.1)

## 2023-01-05 PROCEDURE — 36416 COLLJ CAPILLARY BLOOD SPEC: CPT | Performed by: NURSE PRACTITIONER

## 2023-01-05 PROCEDURE — 85610 PROTHROMBIN TIME: CPT | Performed by: NURSE PRACTITIONER

## 2023-01-05 PROCEDURE — 93793 ANTICOAG MGMT PT WARFARIN: CPT | Performed by: NURSE PRACTITIONER

## 2023-01-05 NOTE — PROGRESS NOTES
Patient states no med changes or bleeding problems or unexplained bruising. Patient instructed to continue current dosing schedule. Verbalizes understanding. Will recheck 1 month.   Patient instructed regarding medication; results given and questions answered. Nutritional counseling given.  Dietary factors affecting therapy addressed.  Patient instructed to monitor for excessive bruising or bleeding.  Findings reported by Nerissa Farmer RN.   Today's INR is Lab Results - Last 18 Months   Lab Units 01/05/23  0000   INR  2.30*

## 2023-01-17 RX ORDER — ROSUVASTATIN CALCIUM 40 MG/1
40 TABLET, COATED ORAL DAILY
Qty: 30 TABLET | Refills: 11 | Status: SHIPPED | OUTPATIENT
Start: 2023-01-17

## 2023-01-25 ENCOUNTER — ANTICOAGULATION VISIT (OUTPATIENT)
Dept: CARDIAC SURGERY | Facility: CLINIC | Age: 83
End: 2023-01-25
Payer: MEDICARE

## 2023-01-25 VITALS — OXYGEN SATURATION: 99 % | HEART RATE: 80 BPM

## 2023-01-25 DIAGNOSIS — Z79.01 LONG TERM (CURRENT) USE OF ANTICOAGULANTS: ICD-10-CM

## 2023-01-25 DIAGNOSIS — Z51.81 ENCOUNTER FOR THERAPEUTIC DRUG MONITORING: ICD-10-CM

## 2023-01-25 DIAGNOSIS — I48.0 PAROXYSMAL ATRIAL FIBRILLATION: Primary | ICD-10-CM

## 2023-01-25 LAB — INR PPP: 5.2 (ref 0.9–1.1)

## 2023-01-25 PROCEDURE — 36416 COLLJ CAPILLARY BLOOD SPEC: CPT | Performed by: NURSE PRACTITIONER

## 2023-01-25 PROCEDURE — 85610 PROTHROMBIN TIME: CPT | Performed by: NURSE PRACTITIONER

## 2023-01-25 PROCEDURE — 93793 ANTICOAG MGMT PT WARFARIN: CPT | Performed by: NURSE PRACTITIONER

## 2023-01-25 NOTE — PROGRESS NOTES
Elissa Marcello  [facility]      Juan A Payton O.D.  [facility]  [address] [place] [address]. [place]  [address] [phone]        Pt denies med changes or bleeding problems. Pt states she has not eaten her green veggies this past week. Pt does not wish to go to lab for venipuncture as she has to get to an appt with her PCP. Pt was instructed to hold coumadin tonight and eat a can of spinach or other greens; pt verbalized. Patient instructed regarding medication; results given and questions answered. Nutritional counseling given.  Dietary factors affecting therapy addressed.  Patient instructed to monitor for excessive bruising or bleeding. Notify provider if you experience excessive bleeding from the nose, cuts, gums, rectum, urinary tract, or vagina. Reddish or brown urine or stool. Vomiting of blood or hemorrhoidal bleeding. If major injury occurs present to the Emergency Department. Will recheck tomorrow. Findings reported by Hannah Gutierrez RN.    Today's INR is Lab Results - Last 18 Months   Lab Units 01/25/23  0000   INR  5.20*

## 2023-01-26 ENCOUNTER — ANTICOAGULATION VISIT (OUTPATIENT)
Dept: CARDIAC SURGERY | Facility: CLINIC | Age: 83
End: 2023-01-26
Payer: MEDICARE

## 2023-01-26 DIAGNOSIS — I48.0 PAROXYSMAL ATRIAL FIBRILLATION: Primary | ICD-10-CM

## 2023-01-26 DIAGNOSIS — Z51.81 ENCOUNTER FOR THERAPEUTIC DRUG MONITORING: ICD-10-CM

## 2023-01-26 DIAGNOSIS — Z79.01 LONG TERM (CURRENT) USE OF ANTICOAGULANTS: ICD-10-CM

## 2023-01-26 LAB — INR PPP: 4 (ref 0.9–1.1)

## 2023-01-26 PROCEDURE — 93793 ANTICOAG MGMT PT WARFARIN: CPT | Performed by: NURSE PRACTITIONER

## 2023-01-26 PROCEDURE — 36416 COLLJ CAPILLARY BLOOD SPEC: CPT | Performed by: NURSE PRACTITIONER

## 2023-01-26 PROCEDURE — 85610 PROTHROMBIN TIME: CPT | Performed by: NURSE PRACTITIONER

## 2023-01-26 NOTE — PROGRESS NOTES
Pt denies med changes. Pt states she did have a little blood in her stool yesterday that she reported to Dr Seo who told her it was likely due to supra therapeutic INR. Pt states she held coumadin and ate a can of spinach yesterday. Dose adjusted and pt instructed to have a serving of broccoli or turnip greens today; pt verbalized. Patient instructed regarding medication; results given and questions answered. Nutritional counseling given.  Dietary factors affecting therapy addressed.  Patient instructed to monitor for excessive bruising or bleeding. Will recheck next week. Findings reported by Hannah Gutierrez RN.    Today's INR is Lab Results - Last 18 Months   Lab Units 01/26/23  0000   INR  4.00*

## 2023-02-02 ENCOUNTER — DOCUMENTATION (OUTPATIENT)
Dept: CARDIAC SURGERY | Facility: CLINIC | Age: 83
End: 2023-02-02
Payer: MEDICARE

## 2023-02-02 NOTE — PROGRESS NOTES
Pt was not pamela to make it in today due to ice at her house. Pt  was given written instructions for her dosing, for her to eat green veggies on Monday, and appt made for Wednesday with him; he states he will given it to her. Findings reported by Hannah Gutierrez RN.

## 2023-02-08 ENCOUNTER — ANTICOAGULATION VISIT (OUTPATIENT)
Dept: CARDIAC SURGERY | Facility: CLINIC | Age: 83
End: 2023-02-08
Payer: MEDICARE

## 2023-02-08 VITALS — OXYGEN SATURATION: 98 % | HEART RATE: 59 BPM

## 2023-02-08 DIAGNOSIS — I48.0 PAROXYSMAL ATRIAL FIBRILLATION: Primary | ICD-10-CM

## 2023-02-08 DIAGNOSIS — Z79.01 LONG TERM (CURRENT) USE OF ANTICOAGULANTS: ICD-10-CM

## 2023-02-08 DIAGNOSIS — Z51.81 ENCOUNTER FOR THERAPEUTIC DRUG MONITORING: ICD-10-CM

## 2023-02-08 LAB — INR PPP: 1.9 (ref 0.9–1.1)

## 2023-02-08 PROCEDURE — 85610 PROTHROMBIN TIME: CPT | Performed by: NURSE PRACTITIONER

## 2023-02-08 PROCEDURE — 36416 COLLJ CAPILLARY BLOOD SPEC: CPT | Performed by: NURSE PRACTITIONER

## 2023-02-08 PROCEDURE — 93793 ANTICOAG MGMT PT WARFARIN: CPT | Performed by: NURSE PRACTITIONER

## 2023-02-08 NOTE — PROGRESS NOTES
Pt denied med changes or bleeding problems. Pt states she at broccoli Monday as instructed. Dose slightly adjusted and pt instructed to have a serving of green veggies on Sunday; pt verbalized. Patient instructed regarding medication; results given and questions answered. Nutritional counseling given.  Dietary factors affecting therapy addressed.  Patient instructed to monitor for excessive bruising or bleeding. Will recheck next week. Findings reported by Hannah Gutierrez RN.    Today's INR is Lab Results - Last 18 Months   Lab Units 02/08/23  0000   INR  1.90*

## 2023-02-10 DIAGNOSIS — Z12.31 SCREENING MAMMOGRAM FOR HIGH-RISK PATIENT: ICD-10-CM

## 2023-02-15 ENCOUNTER — OFFICE VISIT (OUTPATIENT)
Dept: SURGERY | Facility: CLINIC | Age: 83
End: 2023-02-15
Payer: MEDICARE

## 2023-02-15 ENCOUNTER — ANTICOAGULATION VISIT (OUTPATIENT)
Dept: CARDIAC SURGERY | Facility: CLINIC | Age: 83
End: 2023-02-15
Payer: MEDICARE

## 2023-02-15 VITALS — HEART RATE: 61 BPM | OXYGEN SATURATION: 96 %

## 2023-02-15 VITALS
SYSTOLIC BLOOD PRESSURE: 100 MMHG | WEIGHT: 160 LBS | HEIGHT: 65 IN | DIASTOLIC BLOOD PRESSURE: 60 MMHG | BODY MASS INDEX: 26.66 KG/M2 | TEMPERATURE: 97.8 F

## 2023-02-15 DIAGNOSIS — Z51.81 ENCOUNTER FOR THERAPEUTIC DRUG MONITORING: ICD-10-CM

## 2023-02-15 DIAGNOSIS — I48.0 PAROXYSMAL ATRIAL FIBRILLATION: Primary | ICD-10-CM

## 2023-02-15 DIAGNOSIS — Z79.01 LONG TERM (CURRENT) USE OF ANTICOAGULANTS: ICD-10-CM

## 2023-02-15 DIAGNOSIS — Z87.898 HX OF ABNORMAL MAMMOGRAM: Primary | ICD-10-CM

## 2023-02-15 LAB — INR PPP: 2.8 (ref 0.9–1.1)

## 2023-02-15 PROCEDURE — 93793 ANTICOAG MGMT PT WARFARIN: CPT | Performed by: NURSE PRACTITIONER

## 2023-02-15 PROCEDURE — 99213 OFFICE O/P EST LOW 20 MIN: CPT | Performed by: SURGERY

## 2023-02-15 PROCEDURE — 36416 COLLJ CAPILLARY BLOOD SPEC: CPT | Performed by: NURSE PRACTITIONER

## 2023-02-15 PROCEDURE — 85610 PROTHROMBIN TIME: CPT | Performed by: NURSE PRACTITIONER

## 2023-02-15 NOTE — PROGRESS NOTES
Pt states no meds changes or bleeding problems or unexplained bruising. Pt instructed to continue current dosage schedule. Pt verbalizes understanding. Will recheck in 1 week. Patient instructed regarding medication; results given and questions answered. Nutritional counseling given.  Dietary factors affecting therapy addressed.  Patient instructed to monitor for excessive bruising or bleeding.  Findings reported by Nina Cohen RN  Today's INR is Lab Results - Last 18 Months   Lab Units 02/15/23  0000   INR  2.80*

## 2023-02-17 DIAGNOSIS — Z12.31 SCREENING MAMMOGRAM FOR HIGH-RISK PATIENT: Primary | ICD-10-CM

## 2023-02-19 NOTE — PROGRESS NOTES
"Chief Complaint   Patient presents with   • Follow-up     Follow up gabriel screening        HPI  Routine breast check and mammogram. No newly palpable masses skin dimpling, nipple discharge, axillary adenopathy.  Impression       Benign findings (BI-RADS 2).     Recommendation:  Normal return date in one year unless otherwise   clinically indicated.       [id number]  Narrative    SCREENING DIGITAL BILATERAL MAMMOGRAPHY (2D) WITH DIGITAL BREAST   TOMOSYNTHESIS (3D)     Indication:  Screening.     Comparison:  12/18/2019, 12/22/2020, 02/09/2022     Breast Composition:  The breast tissue is heterogeneously dense which   lowers mammographic sensitivity.     Technique:  The exam was done with digital mammography (2D) and was   reviewed with computer-aided detection (CAD).  In addition, digital breast   tomosynthesis (3D) was also performed.     Findings:  Fibroglandular pattern is stable. No new or increasing   suspicious mass, calcification or area of architectural distortion is   demonstrated.  There has been no significant interval change.  Exam End: 02/10/23 12:02    Specimen Collected: 02/10/23 15:34 Last Resulted: 02/10/23 15:40   Received From: [facility]  Result Received: 02/15/23 10:40     I have personally reviewed the imaging and concur with the radiologist's findings.    Past Medical History:   Diagnosis Date   • Arthritis    • Depression    • GERD (gastroesophageal reflux disease)    • Hyperlipidemia    • Hypertension    • Near syncope    • Vascular disease        Past Surgical History:   Procedure Laterality Date   • BLADDER SURGERY     • ENDOSCOPY N/A 5/6/2019    Procedure: ESOPHAGOGASTRODUODENOSCOPY;  Surgeon: Alberto Sanchez DO;  Location: [facility] ENDOSCOPY;  Service: Gastroenterology   • GALLBLADDER SURGERY     • SHOULDER SURGERY      \"bone spurs\"   • SUBTOTAL HYSTERECTOMY           Current Outpatient Medications:   •  acetaminophen (TYLENOL) 500 MG tablet, Take 500 mg by mouth As Needed., " Disp: , Rfl:   •  DULoxetine (CYMBALTA) 60 MG capsule, , Disp: , Rfl:   •  furosemide (LASIX) 20 MG tablet, TAKE ONE TABLET BY MOUTH TWO TIMES A DAY, Disp: 60 tablet, Rfl: 3  •  gabapentin (NEURONTIN) 300 MG capsule, Take 1 capsule by mouth 3 (Three) Times a Day., Disp: , Rfl: 2  •  isosorbide mononitrate (IMDUR) 30 MG 24 hr tablet, TAKE 1 TABLET BY MOUTH DAILY, Disp: 30 tablet, Rfl: 4  •  lansoprazole (PREVACID) 30 MG capsule, Take 1 capsule by mouth Every 12 (Twelve) Hours., Disp: , Rfl:   •  losartan (COZAAR) 100 MG tablet, Take 1 tablet by mouth Daily., Disp: 30 tablet, Rfl: 3  •  metoprolol succinate XL (TOPROL-XL) 50 MG 24 hr tablet, TAKE 1 TABLET BY MOUTH DAILY, Disp: 30 tablet, Rfl: 4  •  Myrbetriq 25 MG tablet sustained-release 24 hour 24 hr tablet, , Disp: , Rfl:   •  NIFEdipine XL (PROCARDIA XL) 60 MG 24 hr tablet, TAKE 1 TABLET BY MOUTH DAILY, Disp: 30 tablet, Rfl: 4  •  potassium chloride (K-DUR,KLOR-CON) 20 MEQ CR tablet, TAKE ONE TABLET BY MOUTH EVERY DAY, Disp: , Rfl:   •  rosuvastatin (CRESTOR) 40 MG tablet, TAKE 1 TABLET BY MOUTH DAILY, Disp: 30 tablet, Rfl: 11  •  warfarin (COUMADIN) 2.5 MG tablet, Take 1 tablet nightly except on Monday and Friday take 2 tablets OR AS DIRECTED, Disp: 40 tablet, Rfl: 2  •  traMADol (ULTRAM) 50 MG tablet, Take 50 mg by mouth As Needed., Disp: , Rfl:   •  VENTOLIN  (90 Base) MCG/ACT inhaler, , Disp: , Rfl:     Allergies   Allergen Reactions   • Tuberculin Tests Swelling   • Hydrocodone Nausea Only   • Lortab [Hydrocodone-Acetaminophen] GI Intolerance       Family History   Problem Relation Age of Onset   • Hypertension Mother    • Diabetes Paternal Aunt    • Diabetes Paternal Grandmother    • Hypertension Paternal Grandmother    • Hypertension Paternal Grandfather        Social History     Socioeconomic History   • Marital status:    Tobacco Use   • Smoking status: Never   • Smokeless tobacco: Never   Substance and Sexual Activity   • Alcohol use: No    • Drug use: No   • Sexual activity: Defer           Physical Exam  Chest:   Breasts:     Breasts are symmetrical.      Right: No inverted nipple, mass, nipple discharge, skin change or tenderness.      Left: No inverted nipple, mass, nipple discharge, skin change or tenderness.           ASSESSMENT    Diagnoses and all orders for this visit:    1. Hx of abnormal mammogram (Primary)        PLAN    1.Recheck in 1 year mammogram and exam              This document has been electronically signed by Dimitri Peterson MD on February 19, 2023 12:27 CST

## 2023-02-22 ENCOUNTER — ANTICOAGULATION VISIT (OUTPATIENT)
Dept: CARDIAC SURGERY | Facility: CLINIC | Age: 83
End: 2023-02-22
Payer: MEDICARE

## 2023-02-22 VITALS — HEART RATE: 73 BPM | OXYGEN SATURATION: 96 %

## 2023-02-22 DIAGNOSIS — Z51.81 ENCOUNTER FOR THERAPEUTIC DRUG MONITORING: ICD-10-CM

## 2023-02-22 DIAGNOSIS — Z79.01 LONG TERM (CURRENT) USE OF ANTICOAGULANTS: ICD-10-CM

## 2023-02-22 DIAGNOSIS — I48.0 PAROXYSMAL ATRIAL FIBRILLATION: Primary | ICD-10-CM

## 2023-02-22 LAB — INR PPP: 4 (ref 0.9–1.1)

## 2023-02-22 PROCEDURE — 85610 PROTHROMBIN TIME: CPT | Performed by: NURSE PRACTITIONER

## 2023-02-22 PROCEDURE — 36416 COLLJ CAPILLARY BLOOD SPEC: CPT | Performed by: NURSE PRACTITIONER

## 2023-02-22 PROCEDURE — 93793 ANTICOAG MGMT PT WARFARIN: CPT | Performed by: NURSE PRACTITIONER

## 2023-02-22 NOTE — PROGRESS NOTES
Pt states no bleeding problems or unexplained bruising. Pt finished a 3 day dose of oral steroids on 2/19 and is currently taking Cefuroxime (moderate interaction) until 2/26. Pt instructed to HOLD dose tonight and have serving of turnip greens today and again in 2-3 days. Pt verbalizes understanding. Will recheck in 5 days. Patient instructed regarding medication; results given and questions answered. Nutritional counseling given.  Dietary factors affecting therapy addressed.  Patient instructed to monitor for excessive bruising or bleeding.  Findings reported by Nina Cohen RN  Today's INR is Lab Results - Last 18 Months   Lab Units 02/22/23  0000   INR  4.00*

## 2023-02-28 ENCOUNTER — ANTICOAGULATION VISIT (OUTPATIENT)
Dept: CARDIAC SURGERY | Facility: CLINIC | Age: 83
End: 2023-02-28
Payer: MEDICARE

## 2023-02-28 VITALS — OXYGEN SATURATION: 98 % | HEART RATE: 65 BPM

## 2023-02-28 DIAGNOSIS — I48.0 PAROXYSMAL ATRIAL FIBRILLATION: Primary | ICD-10-CM

## 2023-02-28 DIAGNOSIS — Z51.81 ENCOUNTER FOR THERAPEUTIC DRUG MONITORING: ICD-10-CM

## 2023-02-28 DIAGNOSIS — Z79.01 LONG TERM (CURRENT) USE OF ANTICOAGULANTS: ICD-10-CM

## 2023-02-28 LAB — INR PPP: 4.5 (ref 0.9–1.1)

## 2023-02-28 PROCEDURE — 85610 PROTHROMBIN TIME: CPT | Performed by: NURSE PRACTITIONER

## 2023-02-28 PROCEDURE — 93793 ANTICOAG MGMT PT WARFARIN: CPT | Performed by: NURSE PRACTITIONER

## 2023-02-28 PROCEDURE — 36416 COLLJ CAPILLARY BLOOD SPEC: CPT | Performed by: NURSE PRACTITIONER

## 2023-02-28 NOTE — PROGRESS NOTES
Pt states no changes to meds or diet.  No bleeding issues.  Adjusted warfarin dose and instructed pt to increase Vit K intake today with a cup serving of turnip greens.  Notify provider if you experience excessive bleeding from the nose, cuts, gums, rectum, urinary tract, or vagina. Reddish or brown urine or stool. Vomiting of blood or hemorrhoidal bleeding. If major injury occurs present to the Emergency Department.  Pt denies bleeding issues.  Recheck INR next Monday when pt will be back in town.  Pt verbalized instructions.  Findings reported by Nerissa Farmer RN.   Today's INR is Lab Results - Last 18 Months   Lab Units 02/28/23  0000   INR  4.50*

## 2023-03-06 ENCOUNTER — ANTICOAGULATION VISIT (OUTPATIENT)
Dept: CARDIAC SURGERY | Facility: CLINIC | Age: 83
End: 2023-03-06
Payer: MEDICARE

## 2023-03-06 VITALS — OXYGEN SATURATION: 97 % | HEART RATE: 66 BPM

## 2023-03-06 DIAGNOSIS — Z51.81 ENCOUNTER FOR THERAPEUTIC DRUG MONITORING: ICD-10-CM

## 2023-03-06 DIAGNOSIS — Z79.01 LONG TERM (CURRENT) USE OF ANTICOAGULANTS: ICD-10-CM

## 2023-03-06 DIAGNOSIS — I48.0 PAROXYSMAL ATRIAL FIBRILLATION: Primary | ICD-10-CM

## 2023-03-06 LAB — INR PPP: 1.7 (ref 0.9–1.1)

## 2023-03-06 PROCEDURE — 93793 ANTICOAG MGMT PT WARFARIN: CPT | Performed by: NURSE PRACTITIONER

## 2023-03-06 PROCEDURE — 36416 COLLJ CAPILLARY BLOOD SPEC: CPT | Performed by: NURSE PRACTITIONER

## 2023-03-06 PROCEDURE — 85610 PROTHROMBIN TIME: CPT | Performed by: NURSE PRACTITIONER

## 2023-03-06 NOTE — PROGRESS NOTES
Pt denies med changes or bleeding problems. Pt had only been off antibiotics for 2 days prior to last visit. Pt was instructed on dosing and to eat green veggies once weekly on Fridays; pt verbalized. Patient instructed regarding medication; results given and questions answered. Nutritional counseling given.  Dietary factors affecting therapy addressed.  Patient instructed to monitor for excessive bruising or bleeding. Will recheck in 2 weeks. Findings reported by Hannah Gutierrez RN.    Today's INR is Lab Results - Last 18 Months   Lab Units 03/06/23  0000   INR  1.70*

## 2023-03-07 ENCOUNTER — DOCUMENTATION (OUTPATIENT)
Dept: CARDIAC SURGERY | Facility: CLINIC | Age: 83
End: 2023-03-07
Payer: MEDICARE

## 2023-03-07 NOTE — PROGRESS NOTES
Pt called to say she is starting Cefdinir 300mg BID today.  I instructed pt to take 2.5mg warfarin on Friday instead of 3.75.  CC appt made for next Monday the 13th.  Pt verbalized instructions.  Findings reported by Nerissa Farmer RN.

## 2023-03-13 ENCOUNTER — ANTICOAGULATION VISIT (OUTPATIENT)
Dept: CARDIAC SURGERY | Facility: CLINIC | Age: 83
End: 2023-03-13
Payer: MEDICARE

## 2023-03-13 VITALS — OXYGEN SATURATION: 94 % | HEART RATE: 71 BPM

## 2023-03-13 DIAGNOSIS — Z79.01 LONG TERM (CURRENT) USE OF ANTICOAGULANTS: ICD-10-CM

## 2023-03-13 DIAGNOSIS — Z51.81 ENCOUNTER FOR THERAPEUTIC DRUG MONITORING: ICD-10-CM

## 2023-03-13 DIAGNOSIS — I48.0 PAROXYSMAL ATRIAL FIBRILLATION: Primary | ICD-10-CM

## 2023-03-13 LAB — INR PPP: 2.5 (ref 0.9–1.1)

## 2023-03-13 PROCEDURE — 85610 PROTHROMBIN TIME: CPT | Performed by: NURSE PRACTITIONER

## 2023-03-13 PROCEDURE — 93793 ANTICOAG MGMT PT WARFARIN: CPT | Performed by: NURSE PRACTITIONER

## 2023-03-13 PROCEDURE — 36416 COLLJ CAPILLARY BLOOD SPEC: CPT | Performed by: NURSE PRACTITIONER

## 2023-03-13 NOTE — PROGRESS NOTES
Pt states she will continue Cefdinir for 1 month.  Pt denies bleeding issues.  Instructed pt on weekly warfarin dose and will recheck INR next wk.  Pt verbalizes.  Patient instructed regarding medication; results given and questions answered. Nutritional counseling given.  Dietary factors affecting therapy addressed.  Patient instructed to monitor for excessive bruising or bleeding.  Findings reported by Nerissa Farmer RN.   Today's INR is Lab Results - Last 18 Months   Lab Units 03/13/23  0000   INR  2.50*

## 2023-03-20 ENCOUNTER — ANTICOAGULATION VISIT (OUTPATIENT)
Dept: CARDIAC SURGERY | Facility: CLINIC | Age: 83
End: 2023-03-20
Payer: MEDICARE

## 2023-03-20 DIAGNOSIS — I48.0 PAROXYSMAL ATRIAL FIBRILLATION: Primary | ICD-10-CM

## 2023-03-20 DIAGNOSIS — Z79.01 LONG TERM (CURRENT) USE OF ANTICOAGULANTS: ICD-10-CM

## 2023-03-20 DIAGNOSIS — Z51.81 ENCOUNTER FOR THERAPEUTIC DRUG MONITORING: ICD-10-CM

## 2023-03-20 LAB — INR PPP: 4 (ref 0.9–1.1)

## 2023-03-20 PROCEDURE — 93793 ANTICOAG MGMT PT WARFARIN: CPT | Performed by: NURSE PRACTITIONER

## 2023-03-20 PROCEDURE — 85610 PROTHROMBIN TIME: CPT | Performed by: NURSE PRACTITIONER

## 2023-03-20 PROCEDURE — 36416 COLLJ CAPILLARY BLOOD SPEC: CPT | Performed by: NURSE PRACTITIONER

## 2023-03-20 RX ORDER — NITROFURANTOIN MACROCRYSTALS 100 MG/1
100 CAPSULE ORAL DAILY
COMMUNITY

## 2023-03-20 NOTE — PROGRESS NOTES
Pt stopped Cefdinir as of yesterday and started Macrobid daily today for at least a month. Pt denied bleeding problems. Dose adjusted today and pt instructed to have a serving of green veggies today and Friday; pt verbalized. Patient instructed regarding medication; results given and questions answered. Nutritional counseling given.  Dietary factors affecting therapy addressed.  Patient instructed to monitor for excessive bruising or bleeding. Will recheck next week. Findings reported by Hannah Gutierrez RN.    Today's INR is Lab Results - Last 18 Months   Lab Units 03/20/23  0000   INR  4.00*

## 2023-03-28 ENCOUNTER — ANTICOAGULATION VISIT (OUTPATIENT)
Dept: CARDIAC SURGERY | Facility: CLINIC | Age: 83
End: 2023-03-28
Payer: MEDICARE

## 2023-03-28 VITALS — OXYGEN SATURATION: 97 % | HEART RATE: 65 BPM

## 2023-03-28 DIAGNOSIS — Z51.81 ENCOUNTER FOR THERAPEUTIC DRUG MONITORING: ICD-10-CM

## 2023-03-28 DIAGNOSIS — I48.0 PAROXYSMAL ATRIAL FIBRILLATION: Primary | ICD-10-CM

## 2023-03-28 DIAGNOSIS — Z79.01 LONG TERM (CURRENT) USE OF ANTICOAGULANTS: ICD-10-CM

## 2023-03-28 LAB — INR PPP: 2.8 (ref 0.9–1.1)

## 2023-03-28 PROCEDURE — 36416 COLLJ CAPILLARY BLOOD SPEC: CPT | Performed by: NURSE PRACTITIONER

## 2023-03-28 PROCEDURE — 85610 PROTHROMBIN TIME: CPT | Performed by: NURSE PRACTITIONER

## 2023-03-28 PROCEDURE — 93793 ANTICOAG MGMT PT WARFARIN: CPT | Performed by: NURSE PRACTITIONER

## 2023-03-28 NOTE — PROGRESS NOTES
Pt continues Macrobid daily.  Pt denies bleeding issues.  Instructed pt on weekly warfarin dose and will recheck INR next wk.  Pt verbalizes.  Patient instructed regarding medication; results given and questions answered. Nutritional counseling given.  Dietary factors affecting therapy addressed.  Patient instructed to monitor for excessive bruising or bleeding.  Findings reported by Nerissa Farmer RN.   Today's INR is Lab Results - Last 18 Months   Lab Units 03/28/23  0000   INR  2.80*

## 2023-04-06 DIAGNOSIS — I50.32 CHRONIC HEART FAILURE WITH PRESERVED EJECTION FRACTION: ICD-10-CM

## 2023-04-06 RX ORDER — NIFEDIPINE 60 MG/1
60 TABLET, EXTENDED RELEASE ORAL DAILY
Qty: 30 TABLET | Refills: 4 | Status: SHIPPED | OUTPATIENT
Start: 2023-04-06

## 2023-04-06 RX ORDER — METOPROLOL SUCCINATE 50 MG/1
50 TABLET, EXTENDED RELEASE ORAL DAILY
Qty: 30 TABLET | Refills: 4 | Status: SHIPPED | OUTPATIENT
Start: 2023-04-06 | End: 2024-04-06

## 2023-04-12 ENCOUNTER — ANTICOAGULATION VISIT (OUTPATIENT)
Dept: CARDIAC SURGERY | Facility: CLINIC | Age: 83
End: 2023-04-12
Payer: MEDICARE

## 2023-04-12 VITALS — HEART RATE: 62 BPM | OXYGEN SATURATION: 99 %

## 2023-04-12 DIAGNOSIS — I48.0 PAROXYSMAL ATRIAL FIBRILLATION: Primary | ICD-10-CM

## 2023-04-12 DIAGNOSIS — Z51.81 ENCOUNTER FOR THERAPEUTIC DRUG MONITORING: ICD-10-CM

## 2023-04-12 DIAGNOSIS — Z79.01 LONG TERM (CURRENT) USE OF ANTICOAGULANTS: ICD-10-CM

## 2023-04-12 LAB — INR PPP: 2.2 (ref 0.9–1.1)

## 2023-04-12 PROCEDURE — 36416 COLLJ CAPILLARY BLOOD SPEC: CPT | Performed by: NURSE PRACTITIONER

## 2023-04-12 PROCEDURE — 93793 ANTICOAG MGMT PT WARFARIN: CPT | Performed by: NURSE PRACTITIONER

## 2023-04-12 PROCEDURE — 85610 PROTHROMBIN TIME: CPT | Performed by: NURSE PRACTITIONER

## 2023-04-12 NOTE — PROGRESS NOTES
Pt is on day 6 of 10 of Cefuroxime. Pt denied bleeding problems. Pt was instructed to continue current dose and eating green veggies once weekly on Mondays; pt verbalized. Patient instructed regarding medication; results given and questions answered. Nutritional counseling given.  Dietary factors affecting therapy addressed.  Patient instructed to monitor for excessive bruising or bleeding. Findings reported by Hannah Gutierrez RN.    Today's INR is Lab Results - Last 18 Months   Lab Units 04/12/23  0000   INR  2.20*

## 2023-04-24 ENCOUNTER — ANTICOAGULATION VISIT (OUTPATIENT)
Dept: CARDIAC SURGERY | Facility: CLINIC | Age: 83
End: 2023-04-24
Payer: MEDICARE

## 2023-04-24 VITALS — OXYGEN SATURATION: 98 % | HEART RATE: 57 BPM

## 2023-04-24 DIAGNOSIS — Z51.81 ENCOUNTER FOR THERAPEUTIC DRUG MONITORING: ICD-10-CM

## 2023-04-24 DIAGNOSIS — Z79.01 LONG TERM (CURRENT) USE OF ANTICOAGULANTS: ICD-10-CM

## 2023-04-24 DIAGNOSIS — I48.0 PAROXYSMAL ATRIAL FIBRILLATION: Primary | ICD-10-CM

## 2023-04-24 LAB — INR PPP: 2.1 (ref 0.9–1.1)

## 2023-04-24 PROCEDURE — 93793 ANTICOAG MGMT PT WARFARIN: CPT | Performed by: NURSE PRACTITIONER

## 2023-04-24 PROCEDURE — 36416 COLLJ CAPILLARY BLOOD SPEC: CPT | Performed by: NURSE PRACTITIONER

## 2023-04-24 PROCEDURE — 85610 PROTHROMBIN TIME: CPT | Performed by: NURSE PRACTITIONER

## 2023-04-24 NOTE — PROGRESS NOTES
Patient states no med changes or bleeding problems or unexplained bruising. Patient instructed to continue current dosing schedule. Verbalizes understanding. Will recheck in 3 weeks. Patient instructed regarding medication; results given and questions answered. Nutritional counseling given.  Dietary factors affecting therapy addressed.  Patient instructed to monitor for excessive bruising or bleeding. Findings reported by Hannah Gutierrez RN.    Today's INR is Lab Results - Last 18 Months   Lab Units 04/24/23  0000   INR  2.10*

## 2023-05-15 ENCOUNTER — ANTICOAGULATION VISIT (OUTPATIENT)
Dept: CARDIAC SURGERY | Facility: CLINIC | Age: 83
End: 2023-05-15
Payer: MEDICARE

## 2023-05-15 VITALS — HEART RATE: 77 BPM | OXYGEN SATURATION: 98 %

## 2023-05-15 DIAGNOSIS — I48.0 PAROXYSMAL ATRIAL FIBRILLATION: Primary | ICD-10-CM

## 2023-05-15 DIAGNOSIS — Z51.81 ENCOUNTER FOR THERAPEUTIC DRUG MONITORING: ICD-10-CM

## 2023-05-15 DIAGNOSIS — Z79.01 LONG TERM (CURRENT) USE OF ANTICOAGULANTS: ICD-10-CM

## 2023-05-15 LAB — INR PPP: 1.7 (ref 0.9–1.1)

## 2023-05-15 PROCEDURE — 36416 COLLJ CAPILLARY BLOOD SPEC: CPT | Performed by: NURSE PRACTITIONER

## 2023-05-15 PROCEDURE — 93793 ANTICOAG MGMT PT WARFARIN: CPT | Performed by: NURSE PRACTITIONER

## 2023-05-15 PROCEDURE — 85610 PROTHROMBIN TIME: CPT | Performed by: NURSE PRACTITIONER

## 2023-05-15 RX ORDER — WARFARIN SODIUM 2.5 MG/1
TABLET ORAL
Qty: 100 TABLET | Refills: 1 | Status: SHIPPED | OUTPATIENT
Start: 2023-05-15

## 2023-05-15 NOTE — PROGRESS NOTES
Pt denies med changes or bleeding problems. Dose adjusted and pt instructed to hold green veggies until Thursday; pt verbalized. Patient instructed regarding medication; results given and questions answered. Nutritional counseling given.  Dietary factors affecting therapy addressed.  Patient instructed to monitor for excessive bruising or bleeding. Pt calendar marked for greens on Thursdays. Will recheck in 2 weeks. Findings reported by Hannah Gutierrez RN.    Today's INR is Lab Results - Last 18 Months   Lab Units 05/15/23  0000   INR  1.70*

## 2023-05-30 ENCOUNTER — OFFICE VISIT (OUTPATIENT)
Dept: CARDIOLOGY | Facility: CLINIC | Age: 83
End: 2023-05-30

## 2023-05-30 ENCOUNTER — ANTICOAGULATION VISIT (OUTPATIENT)
Dept: CARDIAC SURGERY | Facility: CLINIC | Age: 83
End: 2023-05-30

## 2023-05-30 VITALS
HEART RATE: 64 BPM | DIASTOLIC BLOOD PRESSURE: 70 MMHG | BODY MASS INDEX: 26.66 KG/M2 | HEIGHT: 65 IN | WEIGHT: 160 LBS | OXYGEN SATURATION: 96 % | SYSTOLIC BLOOD PRESSURE: 122 MMHG

## 2023-05-30 DIAGNOSIS — R55 NEAR SYNCOPE: ICD-10-CM

## 2023-05-30 DIAGNOSIS — I49.3 PVC (PREMATURE VENTRICULAR CONTRACTION): ICD-10-CM

## 2023-05-30 DIAGNOSIS — Z51.81 ENCOUNTER FOR THERAPEUTIC DRUG MONITORING: ICD-10-CM

## 2023-05-30 DIAGNOSIS — I10 PRIMARY HYPERTENSION: ICD-10-CM

## 2023-05-30 DIAGNOSIS — Z79.01 LONG TERM (CURRENT) USE OF ANTICOAGULANTS: ICD-10-CM

## 2023-05-30 DIAGNOSIS — I48.0 PAROXYSMAL ATRIAL FIBRILLATION: Primary | ICD-10-CM

## 2023-05-30 LAB — INR PPP: 2.8 (ref 0.9–1.1)

## 2023-05-30 PROCEDURE — 85610 PROTHROMBIN TIME: CPT | Performed by: NURSE PRACTITIONER

## 2023-05-30 PROCEDURE — 36416 COLLJ CAPILLARY BLOOD SPEC: CPT | Performed by: NURSE PRACTITIONER

## 2023-05-30 PROCEDURE — 1159F MED LIST DOCD IN RCRD: CPT | Performed by: INTERNAL MEDICINE

## 2023-05-30 PROCEDURE — 1160F RVW MEDS BY RX/DR IN RCRD: CPT | Performed by: INTERNAL MEDICINE

## 2023-05-30 PROCEDURE — 3074F SYST BP LT 130 MM HG: CPT | Performed by: INTERNAL MEDICINE

## 2023-05-30 PROCEDURE — 99213 OFFICE O/P EST LOW 20 MIN: CPT | Performed by: INTERNAL MEDICINE

## 2023-05-30 PROCEDURE — 3078F DIAST BP <80 MM HG: CPT | Performed by: INTERNAL MEDICINE

## 2023-05-30 NOTE — PROGRESS NOTES
Carol Sullivan  82 y.o. female    5/30/2023  1. Paroxysmal atrial fibrillation    2. Primary hypertension    3. Near syncope    4. PVC (premature ventricular contraction)        History of Present Illness:  Body mass index is 26.63 kg/m². BMI is above normal parameters. Recommendations include: exercise counseling, nutrition counseling and referral to primary care.  2 years old patient presented for routine follow-up.  No symptoms of cardiac decompensation such orthopnea PND chest pain or dizziness reported patient does have postural dizziness concurrent medical problem of paroxysmal atrial fibrillation on oral anticoagulation, hypertension, hyperlipidemia and previous history of questionable aortic dissection was life flighted to [place] in 2020 and CT scan at that facility no evidence of aortic dissection.      CT coronary angiogram at [place] 5/31/2020  IMPRESSION:    Aorta: No evidence of thoracic aortic dissection or intramural   hematoma. 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 Advanced atherosclerotic disease within   the descending thoracic aorta and transverse arch.    Coronary arteries: CAD-RADS 3    Degree of maximal stenosis: 50%-69%  Moderate stenosis  Consider functional evaluation and serial troponin levels    Additional Findings:  1.  Nonobstructing nephrolith within the left kidney.  2.  Large hiatal hernia containing majority of the fundus of stomach.  3.  Numerous additional chronic and incidental findings as noted   above.    Electronically signed by  Reynaldo Urban on 5/31/2020 11:31 AM  CT scan of the chest 5/31/2020  IMPRESSION:     1.  Juan Pablo A dissection involving ascending thoracic aorta as  above.  Preliminary report was called to Dr. Hays upon  interpretation.     2.  Diffuse calcified and noncalcified atheromatous plaques  without hemodynamically significant stenosis otherwise as above.     3.  Incidental mesenteric fat  "stranding and shotty adenopathy.   3-6 month follow-up abdominal CT is recommended to ensure  stability.     4.  Incidental moderate hiatal hernia and nonobstructing left  renal stone.    Lipids 7/16/2020    Total Cholesterol  <200 mg/dL 129    Triglycerides  30 - 150 mg/dL 145    HDL Cholesterol  39 - 96 mg/dL 36Low     LDL Cholesterol   5 - 99 mg/dL 68    Chol/HDL Ratio  3.5 - 5.3 3.6        SUBJECTIVE:    Allergies   Allergen Reactions   • Tuberculin Tests Swelling   • Hydrocodone Nausea Only   • Lortab [Hydrocodone-Acetaminophen] GI Intolerance         Past Medical History:   Diagnosis Date   • Arthritis    • Depression    • GERD (gastroesophageal reflux disease)    • Hyperlipidemia    • Hypertension    • Near syncope    • Vascular disease          Past Surgical History:   Procedure Laterality Date   • BLADDER SURGERY     • ENDOSCOPY N/A 5/6/2019    Procedure: ESOPHAGOGASTRODUODENOSCOPY;  Surgeon: Alberto Sanchez DO;  Location: [facility] ENDOSCOPY;  Service: Gastroenterology   • GALLBLADDER SURGERY     • SHOULDER SURGERY      \"bone spurs\"   • SUBTOTAL HYSTERECTOMY           Family History   Problem Relation Age of Onset   • Hypertension Mother    • Diabetes Paternal Aunt    • Diabetes Paternal Grandmother    • Hypertension Paternal Grandmother    • Hypertension Paternal Grandfather          Social History     Socioeconomic History   • Marital status:    Tobacco Use   • Smoking status: Never   • Smokeless tobacco: Never   Substance and Sexual Activity   • Alcohol use: No   • Drug use: No   • Sexual activity: Defer         Current Outpatient Medications   Medication Sig Dispense Refill   • acetaminophen (TYLENOL) 500 MG tablet Take 1 tablet by mouth As Needed.     • DULoxetine (CYMBALTA) 60 MG capsule      • furosemide (LASIX) 20 MG tablet TAKE ONE TABLET BY MOUTH TWO TIMES A DAY 60 tablet 3   • gabapentin (NEURONTIN) 300 MG capsule Take 1 capsule by mouth 3 (Three) Times a Day.  2   • isosorbide " mononitrate (IMDUR) 30 MG 24 hr tablet TAKE 1 TABLET BY MOUTH DAILY 30 tablet 4   • lansoprazole (PREVACID) 30 MG capsule Take 1 capsule by mouth Every 12 (Twelve) Hours.     • losartan (COZAAR) 100 MG tablet Take 1 tablet by mouth Daily. 30 tablet 3   • metoprolol succinate XL (TOPROL-XL) 50 MG 24 hr tablet TAKE 1 TABLET BY MOUTH DAILY 30 tablet 4   • Mirabegron ER (MYRBETRIQ) 50 MG tablet sustained-release 24 hour 24 hr tablet Take 50 mg by mouth Daily.     • NIFEdipine XL (PROCARDIA XL) 60 MG 24 hr tablet TAKE 1 TABLET BY MOUTH DAILY 30 tablet 4   • potassium chloride (K-DUR,KLOR-CON) 20 MEQ CR tablet TAKE ONE TABLET BY MOUTH EVERY DAY     • rosuvastatin (CRESTOR) 40 MG tablet TAKE 1 TABLET BY MOUTH DAILY 30 tablet 11   • traMADol (ULTRAM) 50 MG tablet Take 1 tablet by mouth As Needed.     • VENTOLIN  (90 Base) MCG/ACT inhaler      • warfarin (COUMADIN) 2.5 MG tablet Take 1 tablet nightly except on Monday take 1.5 tablets OR AS DIRECTED 100 tablet 1     No current facility-administered medications for this visit.           Review of Systems:   Today dated 11/29/2022 no significant change was noted in the review of system  Constitutional:  Denies recent weight loss, weight gain,no change in exercise tolerance.     HENT:  Denies any hearing loss, epistaxis, hoarseness,   Eyes: No blurred    Respiratory:  Denies dyspnea with exertion,no cough,     Cardiovascular: See H&P    Gastrointestinal:  Denies change in bowel habits, dyspepsia, ulcer disease,    Endocrine: Negative for cold intolerance, heat intolerance, polydipsia, polyphagia and polyuria..     Genitourinary: Negative.      Musculoskeletal: Denies  arthritic symptoms or back problems.     Skin:  Denies  rashes, or skin lesions.     Allergic/Immunologic: Negative.  Negative for environmental allergies, food allergies    Neurological:  Denies  recurrent headaches, strokes, TIA, or seizure disorder.     Hematological: Denies any food allergies, seasonal  "allergies, bleeding disorders    Psychiatric/Behavioral: Denies any history of depression,      OBJECTIVE:    /70 (BP Location: Right arm, Patient Position: Sitting, Cuff Size: Adult)   Pulse 64   Ht 165.1 cm (65\")   Wt 72.6 kg (160 lb)   SpO2 96%   BMI 26.63 kg/m²       Physical Exam:   No change was noted in physical exam  Constitutional: Cooperative, alert and oriented, well-developed, well-nourished, in no acute distress.     HENT:   Head: Normocephalic, conjunctive is pink thyroid is nonpalpable no jugular is distention    Cardiovascular: Regular rhythm, S1 and S2 normal, no S3 or S4. Apical impulse not displaced. No murmurs, gallops, or rubs detected.     Pulmonary/Chest: Chest: Rales and  Abdominal: Abdomen soft, bowel sounds normoactive, no masses,    Musculoskeletal: No deformities, peripheral pulses no    Neurological: No gross motor or sensory deficits noted, affect appropriate, oriented to time, person, place.     Skin: Warm and dry to the touch, no apparent skin lesions or masses noted.     Psychiatric: She has a normal mood and affect. Her behavior is normal. Judgment and thought content normal.         Procedures      Lab Results   Component Value Date    WBC 8.28 07/27/2021    HGB 12.2 07/27/2021    HCT 38.0 07/27/2021    MCV 87.2 07/27/2021     07/27/2021     Lab Results   Component Value Date    GLUCOSE 167 (H) 05/31/2020    BUN 27 (H) 10/27/2022    CREATININE 1.5 (H) 10/27/2022    EGFRIFNONA 70 05/31/2020    BCR 16.5 05/31/2020    CO2 30 10/27/2022    CALCIUM 9.1 10/27/2022    ALBUMIN 4.1 10/27/2022    AST 16 10/27/2022    ALT 11 10/27/2022     Lab Results   Component Value Date    CHOL 129 07/16/2020     Lab Results   Component Value Date    TRIG 145 07/16/2020    TRIG 84 05/31/2020     Lab Results   Component Value Date    HDL 36 (L) 07/16/2020    HDL 42 (L) 05/31/2020     No components found for: LDLCALC  Lab Results   Component Value Date    LDL 68 07/16/2020     " 05/31/2020     No results found for: HDLLDLRATIO  No components found for: CHOLHDL  Lab Results   Component Value Date    HGBA1C 6.5 (H) 07/16/2020     Lab Results   Component Value Date    TSH 1.359 05/31/2020           ASSESSMENT AND PLAN:  #1 paroxysmal atrial fibrillation  Currently sinus rhythm at 64 bpm continue current dose of metoprolol    [id number] is 4 continue oral anticoagulation    Continue metoprolol has had no further recurrence     2 hypertension  good blood pressure will continue losartan 100 mg, metoprolol 50 mg and Procardia and isosorbide    3 hyperlipidemia continue Crestor follow-up with your family doctor    4  history of premature ventricular complex    Asymptomatic at the time of evaluation continue metoprolol    I spent 16 minutes caring for Ivyal on this date of service. This time includes time spent by me of counseling/coordination of care as relates to the presenting problem and any ordered procedures/tests as outlined above.           This document has been electronically signed by Odette Alves MD on May 30, 2023 10:56 CDT      Diagnoses and all orders for this visit:    1. Paroxysmal atrial fibrillation (Primary)    2. Primary hypertension    3. Near syncope    4. PVC (premature ventricular contraction)          Odette Alves MD  5/30/2023  10:56 CDT

## 2023-05-30 NOTE — PROGRESS NOTES
Pt is here today seeing Dr Alves. Pt is no longer taking Macrodantin. Pt denied bleeding problems. Pt instructed to continue current dose and eat green veggies once weekly on Tuesdays; pt verbalized. Patient instructed regarding medication; results given and questions answered. Nutritional counseling given.  Dietary factors affecting therapy addressed.  Patient instructed to monitor for excessive bruising or bleeding. Findings reported by Hannah Gutierrez RN.    Today's INR is Lab Results - Last 18 Months   Lab Units 05/30/23  0000   INR  2.80*

## 2023-06-01 ENCOUNTER — DOCUMENTATION (OUTPATIENT)
Dept: CARDIAC SURGERY | Facility: CLINIC | Age: 83
End: 2023-06-01

## 2023-06-01 NOTE — PROGRESS NOTES
Pt called to report she is going back on Macrobid (she hasn't been off of it a week) long term. Pt was instructed to continue current dose and diet and keep appt on 6/12; pt verbalized. Findings reported by Hannah Gutierrez RN.

## 2023-06-02 DIAGNOSIS — I50.32 CHRONIC HEART FAILURE WITH PRESERVED EJECTION FRACTION: ICD-10-CM

## 2023-06-02 RX ORDER — POTASSIUM CHLORIDE 20 MEQ/1
20 TABLET, EXTENDED RELEASE ORAL DAILY
Qty: 90 TABLET | Refills: 3 | Status: SHIPPED | OUTPATIENT
Start: 2023-06-02

## 2023-06-02 RX ORDER — METOPROLOL SUCCINATE 50 MG/1
50 TABLET, EXTENDED RELEASE ORAL DAILY
Qty: 90 TABLET | Refills: 3 | Status: SHIPPED | OUTPATIENT
Start: 2023-06-02 | End: 2024-06-02

## 2023-06-02 RX ORDER — ISOSORBIDE MONONITRATE 30 MG/1
30 TABLET, EXTENDED RELEASE ORAL DAILY
Qty: 90 TABLET | Refills: 3 | Status: SHIPPED | OUTPATIENT
Start: 2023-06-02

## 2023-06-02 RX ORDER — FUROSEMIDE 20 MG/1
20 TABLET ORAL 2 TIMES DAILY
Qty: 180 TABLET | Refills: 3 | Status: SHIPPED | OUTPATIENT
Start: 2023-06-02

## 2023-06-02 RX ORDER — NIFEDIPINE 60 MG/1
60 TABLET, EXTENDED RELEASE ORAL DAILY
Qty: 90 TABLET | Refills: 3 | Status: SHIPPED | OUTPATIENT
Start: 2023-06-02

## 2023-06-02 RX ORDER — ROSUVASTATIN CALCIUM 40 MG/1
40 TABLET, COATED ORAL DAILY
Qty: 90 TABLET | Refills: 3 | Status: SHIPPED | OUTPATIENT
Start: 2023-06-02

## 2023-06-12 ENCOUNTER — ANTICOAGULATION VISIT (OUTPATIENT)
Dept: CARDIAC SURGERY | Facility: CLINIC | Age: 83
End: 2023-06-12
Payer: MEDICARE

## 2023-06-12 VITALS — OXYGEN SATURATION: 93 % | HEART RATE: 65 BPM

## 2023-06-12 DIAGNOSIS — Z79.01 LONG TERM (CURRENT) USE OF ANTICOAGULANTS: ICD-10-CM

## 2023-06-12 DIAGNOSIS — Z51.81 ENCOUNTER FOR THERAPEUTIC DRUG MONITORING: ICD-10-CM

## 2023-06-12 DIAGNOSIS — I48.0 PAROXYSMAL ATRIAL FIBRILLATION: Primary | ICD-10-CM

## 2023-06-12 LAB — INR PPP: 1.9 (ref 0.9–1.1)

## 2023-06-12 PROCEDURE — 85610 PROTHROMBIN TIME: CPT | Performed by: NURSE PRACTITIONER

## 2023-06-12 PROCEDURE — 36416 COLLJ CAPILLARY BLOOD SPEC: CPT | Performed by: NURSE PRACTITIONER

## 2023-06-12 PROCEDURE — 93793 ANTICOAG MGMT PT WARFARIN: CPT | Performed by: NURSE PRACTITIONER

## 2023-06-12 NOTE — PROGRESS NOTES
Patient states no med changes or bleeding problems or unexplained bruising. Patient instructed to continue current dosing schedule. Verbalizes understanding. Will recheck in 3 wks.  Patient instructed regarding medication; results given and questions answered. Nutritional counseling given.  Dietary factors affecting therapy addressed.  Patient instructed to monitor for excessive bruising or bleeding.  Findings reported by Nerissa Farmer RN.   Today's INR is   Lab Results - Last 18 Months   Lab Units 06/12/23  0000   INR  1.90*

## 2023-06-15 ENCOUNTER — DOCUMENTATION (OUTPATIENT)
Dept: CARDIAC SURGERY | Facility: CLINIC | Age: 83
End: 2023-06-15
Payer: MEDICARE

## 2023-06-15 NOTE — PROGRESS NOTES
Pt is to have a lumbar nerve ablation by Dr Hussein on unknown date. Pt will hold warfarin for 5-7 days depending on Dr Hussein's instructions. Pt will call once she gets date for procedure.   Findings reported by Nina Cohen RN

## 2023-07-28 ENCOUNTER — APPOINTMENT (OUTPATIENT)
Dept: CT IMAGING | Facility: HOSPITAL | Age: 83
DRG: 853 | End: 2023-07-28
Payer: MEDICARE

## 2023-07-28 ENCOUNTER — APPOINTMENT (OUTPATIENT)
Dept: GENERAL RADIOLOGY | Facility: HOSPITAL | Age: 83
DRG: 853 | End: 2023-07-28
Payer: MEDICARE

## 2023-07-28 ENCOUNTER — HOSPITAL ENCOUNTER (INPATIENT)
Facility: HOSPITAL | Age: 83
LOS: 18 days | Discharge: NURSING, REHAB OR LONG-TERM CARE | DRG: 853 | End: 2023-08-17
Attending: EMERGENCY MEDICINE | Admitting: INTERNAL MEDICINE
Payer: MEDICARE

## 2023-07-28 DIAGNOSIS — A41.9 SEPSIS, DUE TO UNSPECIFIED ORGANISM, UNSPECIFIED WHETHER ACUTE ORGAN DYSFUNCTION PRESENT: ICD-10-CM

## 2023-07-28 DIAGNOSIS — Z74.09 IMPAIRED FUNCTIONAL MOBILITY, BALANCE, GAIT, AND ENDURANCE: ICD-10-CM

## 2023-07-28 DIAGNOSIS — N61.0 MASTITIS: Primary | ICD-10-CM

## 2023-07-28 DIAGNOSIS — N13.39 OTHER HYDRONEPHROSIS: ICD-10-CM

## 2023-07-28 DIAGNOSIS — Z99.2 ESRD (END STAGE RENAL DISEASE) ON DIALYSIS: ICD-10-CM

## 2023-07-28 DIAGNOSIS — N18.6 ESRD (END STAGE RENAL DISEASE) ON DIALYSIS: ICD-10-CM

## 2023-07-28 DIAGNOSIS — R40.0 SOMNOLENCE: ICD-10-CM

## 2023-07-28 DIAGNOSIS — R13.11 ORAL PHASE DYSPHAGIA: ICD-10-CM

## 2023-07-28 DIAGNOSIS — Z74.09 IMPAIRED MOBILITY AND ADLS: ICD-10-CM

## 2023-07-28 DIAGNOSIS — Z78.9 IMPAIRED MOBILITY AND ADLS: ICD-10-CM

## 2023-07-28 DIAGNOSIS — J96.01 ACUTE RESPIRATORY FAILURE WITH HYPOXIA: ICD-10-CM

## 2023-07-28 LAB
ALBUMIN SERPL-MCNC: 3.7 G/DL (ref 3.5–5.2)
ALBUMIN/GLOB SERPL: 1.1 G/DL
ALP SERPL-CCNC: 121 U/L (ref 39–117)
ALT SERPL W P-5'-P-CCNC: 14 U/L (ref 1–33)
AMPHET+METHAMPHET UR QL: NEGATIVE
AMPHETAMINES UR QL: NEGATIVE
ANION GAP SERPL CALCULATED.3IONS-SCNC: 13 MMOL/L (ref 5–15)
APAP SERPL-MCNC: <5 MCG/ML (ref 0–30)
ARTERIAL PATENCY WRIST A: POSITIVE
AST SERPL-CCNC: 29 U/L (ref 1–32)
ATMOSPHERIC PRESS: 749 MMHG
BACTERIA UR QL AUTO: ABNORMAL /HPF
BARBITURATES UR QL SCN: NEGATIVE
BASE EXCESS BLDA CALC-SCNC: 1.5 MMOL/L (ref 0–2)
BASOPHILS # BLD AUTO: 0.09 10*3/MM3 (ref 0–0.2)
BASOPHILS NFR BLD AUTO: 0.6 % (ref 0–1.5)
BDY SITE: ABNORMAL
BENZODIAZ UR QL SCN: NEGATIVE
BILIRUB SERPL-MCNC: 0.6 MG/DL (ref 0–1.2)
BILIRUB UR QL STRIP: NEGATIVE
BUN SERPL-MCNC: 24 MG/DL (ref 8–23)
BUN/CREAT SERPL: 16.1 (ref 7–25)
BUPRENORPHINE SERPL-MCNC: NEGATIVE NG/ML
CALCIUM SPEC-SCNC: 8.5 MG/DL (ref 8.6–10.5)
CANNABINOIDS SERPL QL: NEGATIVE
CHLORIDE SERPL-SCNC: 96 MMOL/L (ref 98–107)
CLARITY UR: CLEAR
CO2 SERPL-SCNC: 25 MMOL/L (ref 22–29)
COCAINE UR QL: NEGATIVE
COLOR UR: ABNORMAL
CREAT SERPL-MCNC: 1.49 MG/DL (ref 0.57–1)
CRP SERPL-MCNC: 19.47 MG/DL (ref 0–0.5)
D-LACTATE SERPL-SCNC: 1.6 MMOL/L (ref 0.5–2)
DEPRECATED RDW RBC AUTO: 43.1 FL (ref 37–54)
EGFRCR SERPLBLD CKD-EPI 2021: 35.1 ML/MIN/1.73
EOSINOPHIL # BLD AUTO: 0.25 10*3/MM3 (ref 0–0.4)
EOSINOPHIL NFR BLD AUTO: 1.6 % (ref 0.3–6.2)
ERYTHROCYTE [DISTWIDTH] IN BLOOD BY AUTOMATED COUNT: 14.7 % (ref 12.3–15.4)
ETHANOL BLD-MCNC: <10 MG/DL (ref 0–10)
ETHANOL UR QL: <0.01 %
FENTANYL UR-MCNC: NEGATIVE NG/ML
FLUAV SUBTYP SPEC NAA+PROBE: NOT DETECTED
FLUBV RNA ISLT QL NAA+PROBE: NOT DETECTED
GAS FLOW AIRWAY: 2 LPM
GLOBULIN UR ELPH-MCNC: 3.3 GM/DL
GLUCOSE SERPL-MCNC: 121 MG/DL (ref 65–99)
GLUCOSE UR STRIP-MCNC: NEGATIVE MG/DL
HCO3 BLDA-SCNC: 25.4 MMOL/L (ref 20–26)
HCT VFR BLD AUTO: 38.3 % (ref 34–46.6)
HGB BLD-MCNC: 12.9 G/DL (ref 12–15.9)
HGB UR QL STRIP.AUTO: NEGATIVE
HOLD SPECIMEN: NORMAL
HYALINE CASTS UR QL AUTO: ABNORMAL /LPF
IMM GRANULOCYTES # BLD AUTO: 0.16 10*3/MM3 (ref 0–0.05)
IMM GRANULOCYTES NFR BLD AUTO: 1 % (ref 0–0.5)
INR PPP: 1.78 (ref 0.8–1.2)
KETONES UR QL STRIP: NEGATIVE
LEUKOCYTE ESTERASE UR QL STRIP.AUTO: ABNORMAL
LYMPHOCYTES # BLD AUTO: 0.72 10*3/MM3 (ref 0.7–3.1)
LYMPHOCYTES NFR BLD AUTO: 4.5 % (ref 19.6–45.3)
Lab: ABNORMAL
MAGNESIUM SERPL-MCNC: 1.6 MG/DL (ref 1.6–2.4)
MCH RBC QN AUTO: 27.5 PG (ref 26.6–33)
MCHC RBC AUTO-ENTMCNC: 33.7 G/DL (ref 31.5–35.7)
MCV RBC AUTO: 81.7 FL (ref 79–97)
METHADONE UR QL SCN: NEGATIVE
MODALITY: ABNORMAL
MONOCYTES # BLD AUTO: 0.87 10*3/MM3 (ref 0.1–0.9)
MONOCYTES NFR BLD AUTO: 5.5 % (ref 5–12)
NEUTROPHILS NFR BLD AUTO: 13.87 10*3/MM3 (ref 1.7–7)
NEUTROPHILS NFR BLD AUTO: 86.8 % (ref 42.7–76)
NITRITE UR QL STRIP: NEGATIVE
NRBC BLD AUTO-RTO: 0 /100 WBC (ref 0–0.2)
NT-PROBNP SERPL-MCNC: 9052 PG/ML (ref 0–1800)
OPIATES UR QL: NEGATIVE
OXYCODONE UR QL SCN: NEGATIVE
PCO2 BLDA: 36.7 MM HG (ref 35–45)
PCP UR QL SCN: NEGATIVE
PH BLDA: 7.45 PH UNITS (ref 7.35–7.45)
PH UR STRIP.AUTO: 7.5 [PH] (ref 5–9)
PLATELET # BLD AUTO: 284 10*3/MM3 (ref 140–450)
PMV BLD AUTO: 9.8 FL (ref 6–12)
PO2 BLDA: 71.3 MM HG (ref 83–108)
POTASSIUM SERPL-SCNC: 4.2 MMOL/L (ref 3.5–5.2)
PROCALCITONIN SERPL-MCNC: 7.84 NG/ML (ref 0–0.25)
PROPOXYPH UR QL: NEGATIVE
PROT SERPL-MCNC: 7 G/DL (ref 6–8.5)
PROT UR QL STRIP: ABNORMAL
PROTHROMBIN TIME: 20.5 SECONDS (ref 11.1–15.3)
QT INTERVAL: 328 MS
QTC INTERVAL: 396 MS
RBC # BLD AUTO: 4.69 10*6/MM3 (ref 3.77–5.28)
RBC # UR STRIP: ABNORMAL /HPF
REF LAB TEST METHOD: ABNORMAL
SALICYLATES SERPL-MCNC: <0.3 MG/DL
SAO2 % BLDCOA: 96.2 % (ref 94–99)
SARS-COV-2 RNA RESP QL NAA+PROBE: NOT DETECTED
SODIUM SERPL-SCNC: 134 MMOL/L (ref 136–145)
SP GR UR STRIP: 1.02 (ref 1–1.03)
SQUAMOUS #/AREA URNS HPF: ABNORMAL /HPF
T4 FREE SERPL-MCNC: 0.99 NG/DL (ref 0.93–1.7)
TRICYCLICS UR QL SCN: NEGATIVE
TROPONIN T SERPL HS-MCNC: 32 NG/L
TROPONIN T SERPL HS-MCNC: 37 NG/L
TSH SERPL DL<=0.05 MIU/L-ACNC: 1.18 UIU/ML (ref 0.27–4.2)
UROBILINOGEN UR QL STRIP: ABNORMAL
VENTILATOR MODE: ABNORMAL
WBC # UR STRIP: ABNORMAL /HPF
WBC NRBC COR # BLD: 15.96 10*3/MM3 (ref 3.4–10.8)
YEAST URNS QL MICRO: ABNORMAL /HPF

## 2023-07-28 PROCEDURE — 87040 BLOOD CULTURE FOR BACTERIA: CPT | Performed by: EMERGENCY MEDICINE

## 2023-07-28 PROCEDURE — 81001 URINALYSIS AUTO W/SCOPE: CPT | Performed by: EMERGENCY MEDICINE

## 2023-07-28 PROCEDURE — 99285 EMERGENCY DEPT VISIT HI MDM: CPT

## 2023-07-28 PROCEDURE — 80053 COMPREHEN METABOLIC PANEL: CPT | Performed by: EMERGENCY MEDICINE

## 2023-07-28 PROCEDURE — 87636 SARSCOV2 & INF A&B AMP PRB: CPT | Performed by: EMERGENCY MEDICINE

## 2023-07-28 PROCEDURE — 86140 C-REACTIVE PROTEIN: CPT | Performed by: NURSE PRACTITIONER

## 2023-07-28 PROCEDURE — 83880 ASSAY OF NATRIURETIC PEPTIDE: CPT | Performed by: EMERGENCY MEDICINE

## 2023-07-28 PROCEDURE — 93005 ELECTROCARDIOGRAM TRACING: CPT | Performed by: EMERGENCY MEDICINE

## 2023-07-28 PROCEDURE — 80307 DRUG TEST PRSMV CHEM ANLYZR: CPT | Performed by: EMERGENCY MEDICINE

## 2023-07-28 PROCEDURE — 71250 CT THORAX DX C-: CPT

## 2023-07-28 PROCEDURE — 85610 PROTHROMBIN TIME: CPT | Performed by: EMERGENCY MEDICINE

## 2023-07-28 PROCEDURE — G0378 HOSPITAL OBSERVATION PER HR: HCPCS

## 2023-07-28 PROCEDURE — 36415 COLL VENOUS BLD VENIPUNCTURE: CPT | Performed by: EMERGENCY MEDICINE

## 2023-07-28 PROCEDURE — 87077 CULTURE AEROBIC IDENTIFY: CPT | Performed by: EMERGENCY MEDICINE

## 2023-07-28 PROCEDURE — 70450 CT HEAD/BRAIN W/O DYE: CPT

## 2023-07-28 PROCEDURE — 99214 OFFICE O/P EST MOD 30 MIN: CPT | Performed by: STUDENT IN AN ORGANIZED HEALTH CARE EDUCATION/TRAINING PROGRAM

## 2023-07-28 PROCEDURE — 80143 DRUG ASSAY ACETAMINOPHEN: CPT | Performed by: EMERGENCY MEDICINE

## 2023-07-28 PROCEDURE — 84145 PROCALCITONIN (PCT): CPT | Performed by: NURSE PRACTITIONER

## 2023-07-28 PROCEDURE — 25010000002 VANCOMYCIN 10 G RECONSTITUTED SOLUTION: Performed by: EMERGENCY MEDICINE

## 2023-07-28 PROCEDURE — 80179 DRUG ASSAY SALICYLATE: CPT | Performed by: EMERGENCY MEDICINE

## 2023-07-28 PROCEDURE — 84439 ASSAY OF FREE THYROXINE: CPT | Performed by: EMERGENCY MEDICINE

## 2023-07-28 PROCEDURE — 83735 ASSAY OF MAGNESIUM: CPT | Performed by: EMERGENCY MEDICINE

## 2023-07-28 PROCEDURE — 84484 ASSAY OF TROPONIN QUANT: CPT | Performed by: EMERGENCY MEDICINE

## 2023-07-28 PROCEDURE — 25010000002 CEFTRIAXONE PER 250 MG: Performed by: EMERGENCY MEDICINE

## 2023-07-28 PROCEDURE — 82077 ASSAY SPEC XCP UR&BREATH IA: CPT | Performed by: EMERGENCY MEDICINE

## 2023-07-28 PROCEDURE — 87186 SC STD MICRODIL/AGAR DIL: CPT | Performed by: EMERGENCY MEDICINE

## 2023-07-28 PROCEDURE — 36415 COLL VENOUS BLD VENIPUNCTURE: CPT

## 2023-07-28 PROCEDURE — 82803 BLOOD GASES ANY COMBINATION: CPT

## 2023-07-28 PROCEDURE — 84443 ASSAY THYROID STIM HORMONE: CPT | Performed by: EMERGENCY MEDICINE

## 2023-07-28 PROCEDURE — 87086 URINE CULTURE/COLONY COUNT: CPT | Performed by: EMERGENCY MEDICINE

## 2023-07-28 PROCEDURE — 83605 ASSAY OF LACTIC ACID: CPT | Performed by: EMERGENCY MEDICINE

## 2023-07-28 PROCEDURE — 85025 COMPLETE CBC W/AUTO DIFF WBC: CPT | Performed by: EMERGENCY MEDICINE

## 2023-07-28 PROCEDURE — 36600 WITHDRAWAL OF ARTERIAL BLOOD: CPT

## 2023-07-28 RX ORDER — AMOXICILLIN 250 MG
2 CAPSULE ORAL 2 TIMES DAILY
Status: DISCONTINUED | OUTPATIENT
Start: 2023-07-28 | End: 2023-07-29

## 2023-07-28 RX ORDER — FLUCONAZOLE 2 MG/ML
200 INJECTION, SOLUTION INTRAVENOUS EVERY 24 HOURS
Status: DISCONTINUED | OUTPATIENT
Start: 2023-07-28 | End: 2023-07-28

## 2023-07-28 RX ORDER — POLYETHYLENE GLYCOL 3350 17 G/17G
17 POWDER, FOR SOLUTION ORAL DAILY PRN
Status: DISCONTINUED | OUTPATIENT
Start: 2023-07-28 | End: 2023-07-29

## 2023-07-28 RX ORDER — ROSUVASTATIN CALCIUM 20 MG/1
40 TABLET, COATED ORAL DAILY
Status: DISCONTINUED | OUTPATIENT
Start: 2023-07-28 | End: 2023-08-17 | Stop reason: HOSPADM

## 2023-07-28 RX ORDER — BISACODYL 5 MG/1
5 TABLET, DELAYED RELEASE ORAL DAILY PRN
Status: DISCONTINUED | OUTPATIENT
Start: 2023-07-28 | End: 2023-07-29

## 2023-07-28 RX ORDER — NYSTATIN 100000 [USP'U]/G
POWDER TOPICAL EVERY 12 HOURS SCHEDULED
Status: DISCONTINUED | OUTPATIENT
Start: 2023-07-29 | End: 2023-08-17 | Stop reason: HOSPADM

## 2023-07-28 RX ORDER — SODIUM CHLORIDE 0.9 % (FLUSH) 0.9 %
10 SYRINGE (ML) INJECTION AS NEEDED
Status: DISCONTINUED | OUTPATIENT
Start: 2023-07-28 | End: 2023-08-03

## 2023-07-28 RX ORDER — ISOSORBIDE MONONITRATE 30 MG/1
30 TABLET, EXTENDED RELEASE ORAL DAILY
Status: DISCONTINUED | OUTPATIENT
Start: 2023-07-28 | End: 2023-08-05

## 2023-07-28 RX ORDER — SODIUM CHLORIDE 0.9 % (FLUSH) 0.9 %
10 SYRINGE (ML) INJECTION EVERY 12 HOURS SCHEDULED
Status: DISCONTINUED | OUTPATIENT
Start: 2023-07-28 | End: 2023-08-03

## 2023-07-28 RX ORDER — ACETAMINOPHEN 650 MG/1
650 SUPPOSITORY RECTAL ONCE
Status: COMPLETED | OUTPATIENT
Start: 2023-07-28 | End: 2023-07-28

## 2023-07-28 RX ORDER — SODIUM CHLORIDE 9 MG/ML
75 INJECTION, SOLUTION INTRAVENOUS CONTINUOUS
Status: DISCONTINUED | OUTPATIENT
Start: 2023-07-28 | End: 2023-07-30

## 2023-07-28 RX ORDER — ACETAMINOPHEN 500 MG
1000 TABLET ORAL EVERY 6 HOURS PRN
Status: DISCONTINUED | OUTPATIENT
Start: 2023-07-28 | End: 2023-08-06

## 2023-07-28 RX ORDER — NIFEDIPINE 60 MG/1
60 TABLET, EXTENDED RELEASE ORAL DAILY
Status: DISCONTINUED | OUTPATIENT
Start: 2023-07-28 | End: 2023-08-05

## 2023-07-28 RX ORDER — WARFARIN SODIUM 2.5 MG/1
2.5 TABLET ORAL
Status: DISCONTINUED | OUTPATIENT
Start: 2023-07-28 | End: 2023-07-30

## 2023-07-28 RX ORDER — VANCOMYCIN/0.9 % SOD CHLORIDE 1.5G/250ML
20 PLASTIC BAG, INJECTION (ML) INTRAVENOUS ONCE
Status: COMPLETED | OUTPATIENT
Start: 2023-07-28 | End: 2023-07-28

## 2023-07-28 RX ORDER — SODIUM CHLORIDE 9 MG/ML
40 INJECTION, SOLUTION INTRAVENOUS AS NEEDED
Status: DISCONTINUED | OUTPATIENT
Start: 2023-07-28 | End: 2023-08-03

## 2023-07-28 RX ORDER — METOPROLOL SUCCINATE 50 MG/1
50 TABLET, EXTENDED RELEASE ORAL DAILY
Status: DISCONTINUED | OUTPATIENT
Start: 2023-07-28 | End: 2023-08-07

## 2023-07-28 RX ORDER — NITROGLYCERIN 0.4 MG/1
0.4 TABLET SUBLINGUAL
Status: DISCONTINUED | OUTPATIENT
Start: 2023-07-28 | End: 2023-08-17 | Stop reason: HOSPADM

## 2023-07-28 RX ORDER — DULOXETIN HYDROCHLORIDE 60 MG/1
60 CAPSULE, DELAYED RELEASE ORAL DAILY
Status: DISCONTINUED | OUTPATIENT
Start: 2023-07-29 | End: 2023-08-09

## 2023-07-28 RX ORDER — BISACODYL 10 MG
10 SUPPOSITORY, RECTAL RECTAL DAILY PRN
Status: DISCONTINUED | OUTPATIENT
Start: 2023-07-28 | End: 2023-07-29

## 2023-07-28 RX ORDER — PANTOPRAZOLE SODIUM 40 MG/1
40 TABLET, DELAYED RELEASE ORAL
Status: DISCONTINUED | OUTPATIENT
Start: 2023-07-29 | End: 2023-07-30

## 2023-07-28 RX ORDER — ONDANSETRON 2 MG/ML
4 INJECTION INTRAMUSCULAR; INTRAVENOUS EVERY 6 HOURS PRN
Status: DISCONTINUED | OUTPATIENT
Start: 2023-07-28 | End: 2023-08-06

## 2023-07-28 RX ORDER — LOSARTAN POTASSIUM 50 MG/1
100 TABLET ORAL DAILY
Status: DISCONTINUED | OUTPATIENT
Start: 2023-07-28 | End: 2023-08-02

## 2023-07-28 RX ADMIN — SODIUM CHLORIDE 1000 ML: 9 INJECTION, SOLUTION INTRAVENOUS at 16:09

## 2023-07-28 RX ADMIN — METOPROLOL SUCCINATE 50 MG: 50 TABLET, EXTENDED RELEASE ORAL at 20:36

## 2023-07-28 RX ADMIN — ACETAMINOPHEN 650 MG: 650 SUPPOSITORY RECTAL at 16:08

## 2023-07-28 RX ADMIN — NYSTATIN: 100000 POWDER TOPICAL at 23:58

## 2023-07-28 RX ADMIN — Medication 10 ML: at 23:14

## 2023-07-28 RX ADMIN — LOSARTAN POTASSIUM 100 MG: 50 TABLET, FILM COATED ORAL at 20:36

## 2023-07-28 RX ADMIN — VANCOMYCIN HYDROCHLORIDE 1500 MG: 10 INJECTION, POWDER, LYOPHILIZED, FOR SOLUTION INTRAVENOUS at 17:07

## 2023-07-28 RX ADMIN — SODIUM CHLORIDE 75 ML/HR: 9 INJECTION, SOLUTION INTRAVENOUS at 20:35

## 2023-07-28 RX ADMIN — ISOSORBIDE MONONITRATE 30 MG: 30 TABLET, EXTENDED RELEASE ORAL at 20:36

## 2023-07-28 RX ADMIN — CEFTRIAXONE SODIUM 2000 MG: 2 INJECTION, POWDER, FOR SOLUTION INTRAMUSCULAR; INTRAVENOUS at 16:39

## 2023-07-28 RX ADMIN — NIFEDIPINE 60 MG: 60 TABLET, EXTENDED RELEASE ORAL at 20:37

## 2023-07-28 RX ADMIN — ACETAMINOPHEN 1000 MG: 500 TABLET, FILM COATED ORAL at 23:56

## 2023-07-28 RX ADMIN — ROSUVASTATIN CALCIUM 40 MG: 20 TABLET, FILM COATED ORAL at 20:36

## 2023-07-28 RX ADMIN — WARFARIN SODIUM 2.5 MG: 2.5 TABLET ORAL at 20:37

## 2023-07-28 NOTE — ED PROVIDER NOTES
"Subjective   History of Present Illness  This is an 82-year-old female who was brought in for lethargy.  Approximately 1 day duration.  The patient is a poor historian.  The patient has been noncompliant with her medication for 2 days.      Review of Systems   Unable to perform ROS: Mental status change   All other systems reviewed and are negative.    Past Medical History:   Diagnosis Date    Arthritis     Depression     GERD (gastroesophageal reflux disease)     Hyperlipidemia     Hypertension     Near syncope     Vascular disease        Allergies   Allergen Reactions    Tuberculin Tests Swelling    Hydrocodone Nausea Only    Lortab [Hydrocodone-Acetaminophen] GI Intolerance       Past Surgical History:   Procedure Laterality Date    BLADDER SURGERY      ENDOSCOPY N/A 5/6/2019    Procedure: ESOPHAGOGASTRODUODENOSCOPY;  Surgeon: Alberto Sanchez DO;  Location: [facility] ENDOSCOPY;  Service: Gastroenterology    GALLBLADDER SURGERY      SHOULDER SURGERY      \"bone spurs\"    SUBTOTAL HYSTERECTOMY         Family History   Problem Relation Age of Onset    Hypertension Mother     Diabetes Paternal Aunt     Diabetes Paternal Grandmother     Hypertension Paternal Grandmother     Hypertension Paternal Grandfather        Social History     Socioeconomic History    Marital status:    Tobacco Use    Smoking status: Never    Smokeless tobacco: Never   Vaping Use    Vaping Use: Never used   Substance and Sexual Activity    Alcohol use: No    Drug use: No    Sexual activity: Defer           Objective   Physical Exam  Vitals and nursing note reviewed.   Constitutional:       General: She is not in acute distress.     Appearance: She is ill-appearing.      Comments: Somnolent.  Alert to painful stimulus.   HENT:      Head: Normocephalic and atraumatic.      Nose: Nose normal.      Mouth/Throat:      Mouth: Mucous membranes are dry.   Eyes:      General: No scleral icterus.  Cardiovascular:      Rate and Rhythm: Normal rate. " "Rhythm irregular.      Pulses: Normal pulses.   Pulmonary:      Effort: Pulmonary effort is normal.      Breath sounds: Normal breath sounds.   Abdominal:      General: Abdomen is flat.      Tenderness: There is no abdominal tenderness.   Musculoskeletal:         General: No signs of injury.   Skin:     General: Skin is warm and dry.      Comments: Induration of the left breast.   Neurological:      Mental Status: She is oriented to person, place, and time.      Comments: Generalized 4 out of 5 strength.  No sensory deficit.       Procedures           ED Course  ED Course as of 07/28/23 2305 Fri Jul 28, 2023   1629 CBC & Differential(!)  Systemic leukocytosis [JV]   1629 Urinalysis With Culture If Indicated - Urine, Catheter(!)  Doubt UTI [JV]   1709 Blood Gas, Arterial -(!)  Normal pH [JV]   1709 Comprehensive Metabolic Panel(!)  Kidney disease roughly at baseline [JV]      ED Course User Index  [JV] Haroldo Paredes, DO BARRERA reviewed by Elias Lopez DO, Vitali, John N, DO       Medical Decision Making  The patient's recent past medical charts for this facility as well as outside facilities via the \"care everywhere\" application of epic reviewed.  No recent admissions or surgeries.  History of atrial fibrillation.  Patient does receive Ultram on a chronic basis for pain.    The differential diagnosis includes hypoglycemia, UTI, hyponatremia, and medication side effect, among others.    On final reevaluation the patient is in fair condition and family is agreeable to her being admitted.      Problems Addressed:  Mastitis: complicated acute illness or injury  Sepsis, due to unspecified organism, unspecified whether acute organ dysfunction present: complicated acute illness or injury  Somnolence: complicated acute illness or injury    Amount and/or Complexity of Data Reviewed  Independent Historian: EMS     Details: Additional history provided by EMS personnel.  Labs: ordered. " Decision-making details documented in ED Course.  Radiology: ordered and independent interpretation performed.     Details: CT chest without contrast: Interpreted by myself.  4:20 PM.  In addition to radiologist comments I do not see any evidence of subcutaneous fluid collections in the left breast.  ECG/medicine tests: ordered and independent interpretation performed.     Details: EKG interpretation: Interpreted myself.  Atrial fibrillation.  Rate 88.  Normal QRS and axis.  Normal QTc interval.  ST segments are normal.  Discussion of management or test interpretation with external provider(s): Case discussed with admitting hospitalist physician.    Risk  OTC drugs.  Prescription drug management.  Decision regarding hospitalization.        Final diagnoses:   Somnolence   Mastitis   Sepsis, due to unspecified organism, unspecified whether acute organ dysfunction present       ED Disposition  ED Disposition       ED Disposition   Decision to Admit    Condition   --    Comment   Level of Care: Telemetry [5]   Diagnosis: Sepsis [[id number]]   Admitting Physician: EDDIE NOONAN [[id number]]   Attending Physician: EDDIE NOONAN [[id number]]                 No follow-up provider specified.       Medication List      No changes were made to your prescriptions during this visit.            Haroldo Paredes,   07/28/23 9916

## 2023-07-28 NOTE — Clinical Note
Prepped: chest and neck. Prepped with: ChloraPrep. The site was clipped. The patient was draped in a sterile fashion.

## 2023-07-28 NOTE — ED NOTES
Nursing report ED to floor  Carol Sullivan  82 y.o.  female    HPI:   Chief Complaint   Patient presents with    Lethargic    High Blood Pressure       Admitting doctor:   No admitting provider for patient encounter.    Consulting provider(s):  Consults       No orders found from 6/29/2023 to 7/29/2023.             Admitting diagnosis:   The primary encounter diagnosis was Somnolence. Diagnoses of Mastitis and Sepsis, due to unspecified organism, unspecified whether acute organ dysfunction present were also pertinent to this visit.    Code status:   Current Code Status       Date Active Code Status Order ID Comments User Context       Not on file            Allergies:   Tuberculin tests, Hydrocodone, and Lortab [hydrocodone-acetaminophen]    Intake and Output    Intake/Output Summary (Last 24 hours) at 7/28/2023 1714  Last data filed at 7/28/2023 1706  Gross per 24 hour   Intake 100 ml   Output --   Net 100 ml       Weight:       07/28/23  1608   Weight: 74.8 kg (165 lb)       Most recent vitals:   Vitals:    07/28/23 1528 07/28/23 1608 07/28/23 1704 07/28/23 1707   BP: 171/76   151/73   BP Location:    Right arm   Patient Position:    Sitting   Pulse:    80   Resp:    20   Temp:   99.6 øF (37.6 øC)    TempSrc:   Oral    SpO2:    94%   Weight:  74.8 kg (165 lb)       Oxygen Therapy: 2L Nc    Active LDAs/IV Access:   Lines, Drains & Airways       Active LDAs       Name Placement date Placement time Site Days    Peripheral IV 05/31/20 0429 Right Forearm 05/31/20  0429  Forearm  1153    Peripheral IV 07/28/23 1606 Left Antecubital 07/28/23  1606  Antecubital  less than 1                    Labs (abnormal labs have a star):   Labs Reviewed   COMPREHENSIVE METABOLIC PANEL - Abnormal; Notable for the following components:       Result Value    Glucose 121 (*)     BUN 24 (*)     Creatinine 1.49 (*)     Sodium 134 (*)     Chloride 96 (*)     Calcium 8.5 (*)     Alkaline Phosphatase 121 (*)     eGFR 35.1 (*)     All other  components within normal limits    Narrative:     GFR Normal >60  Chronic Kidney Disease <60  Kidney Failure <15    The GFR formula is only valid for adults with stable renal function between ages 18 and 70.   URINALYSIS W/ CULTURE IF INDICATED - Abnormal; Notable for the following components:    Protein, UA >=300 mg/dL (3+) (*)     Leuk Esterase, UA Trace (*)     All other components within normal limits    Narrative:     In absence of clinical symptoms, the presence of pyuria, bacteria, and/or nitrites on the urinalysis result does not correlate with infection.   BNP (IN-HOUSE) - Abnormal; Notable for the following components:    proBNP 9,052.0 (*)     All other components within normal limits    Narrative:     Among patients with dyspnea, NT-proBNP is highly sensitive for the detection of acute congestive heart failure. In addition NT-proBNP of <300 pg/ml effectively rules out acute congestive heart failure with 99% negative predictive value.    Results may be falsely decreased if patient taking Biotin.     SINGLE HSTROPONIN T - Abnormal; Notable for the following components:    HS Troponin T 37 (*)     All other components within normal limits    Narrative:     High Sensitive Troponin T Reference Range:  <10.0 ng/L- Negative Female for AMI  <15.0 ng/L- Negative Male for AMI  >=10 - Abnormal Female indicating possible myocardial injury.  >=15 - Abnormal Male indicating possible myocardial injury.   Clinicians would have to utilize clinical acumen, EKG, Troponin, and serial changes to determine if it is an Acute Myocardial Infarction or myocardial injury due to an underlying chronic condition.        PROTIME-INR - Abnormal; Notable for the following components:    Protime 20.5 (*)     INR 1.78 (*)     All other components within normal limits    Narrative:     Therapeutic range for most indications is 2.0-3.0 INR,  or 2.5-3.5 for mechanical heart valves.   CBC WITH AUTO DIFFERENTIAL - Abnormal; Notable for the  following components:    WBC 15.96 (*)     Neutrophil % 86.8 (*)     Lymphocyte % 4.5 (*)     Immature Grans % 1.0 (*)     Neutrophils, Absolute 13.87 (*)     Immature Grans, Absolute 0.16 (*)     All other components within normal limits   URINALYSIS, MICROSCOPIC ONLY - Abnormal; Notable for the following components:    RBC, UA 0-2 (*)     WBC, UA 6-12 (*)     Bacteria, UA 2+ (*)     Squamous Epithelial Cells, UA 3-5 (*)     All other components within normal limits   BLOOD GAS, ARTERIAL - Abnormal; Notable for the following components:    pO2, Arterial 71.3 (*)     All other components within normal limits   COVID-19 AND FLU A/B, NP SWAB IN TRANSPORT MEDIA 8-12 HR TAT - Normal    Narrative:     Fact sheet for providers: https://www.fda.gov/media/160289/download    Fact sheet for patients: https://www.fda.gov/media/322198/download    Test performed by PCR.   LACTIC ACID, PLASMA - Normal   ACETAMINOPHEN LEVEL - Normal   URINE DRUG SCREEN - Normal    Narrative:     Cutoff For Drugs Screened:    Amphetamines               500 ng/ml  Barbiturates               200 ng/ml  Benzodiazepines            150 ng/ml  Cocaine                    150 ng/ml  Methadone                  200 ng/ml  Opiates                    100 ng/ml  Phencyclidine               25 ng/ml  THC                            50 ng/ml  Methamphetamine            500 ng/ml  Tricyclic Antidepressants  300 ng/ml  Oxycodone                  100 ng/ml  Propoxyphene               300 ng/ml  Buprenorphine               10 ng/ml    The normal value for all drugs tested is negative. This report includes unconfirmed screening results, with the cutoff values listed, to be used for medical treatment purposes only.  Unconfirmed results must not be used for non-medical purposes such as employment or legal testing.  Clinical consideration should be applied to any drug of abuse test, particularly when unconfirmed results are used.     SALICYLATE LEVEL - Normal   MAGNESIUM  - Normal   TSH - Normal   T4, FREE - Normal    Narrative:     Results may be falsely increased if patient taking Biotin.     FENTANYL, URINE - Normal    Narrative:     Negative Threshold:      Fentanyl 5 ng/mL     The normal value for the drug tested is negative. This report includes final unconfirmed screening results to be used for medical treatment purposes only. Unconfirmed results must not be used for non-medical purposes such as employment or legal testing. Clinical consideration should be applied to any drug of abuse test, particularly when unconfirmed results are used.          BLOOD CULTURE   BLOOD CULTURE   URINE CULTURE   ETHANOL   BLOOD GAS, ARTERIAL   POCT GLUCOSE FINGERSTICK   CBC AND DIFFERENTIAL    Narrative:     The following orders were created for panel order CBC & Differential.  Procedure                               Abnormality         Status                     ---------                               -----------         ------                     CBC Auto Differential[[id number]]        Abnormal            Final result                 Please view results for these tests on the individual orders.   EXTRA TUBES    Narrative:     The following orders were created for panel order Extra Tubes.  Procedure                               Abnormality         Status                     ---------                               -----------         ------                     Gold Top - SST[[id number]]                                   Final result                 Please view results for these tests on the individual orders.   GOLD TOP - SST       Meds given in ED:   Medications   vancomycin IVPB 1500 mg in 0.9% NaCl (Premix) 500 mL (1,500 mg Intravenous New Bag 7/28/23 1707)   sodium chloride 0.9 % bolus 1,000 mL (1,000 mL Intravenous New Bag 7/28/23 1609)   cefTRIAXone (ROCEPHIN) 2000 mg/100 mL 0.9% NS IVPB (MBP) (0 mg Intravenous Stopped 7/28/23 1706)   acetaminophen (TYLENOL) suppository 650 mg (650 mg  Rectal Given 7/28/23 3618)           NIH Stroke Scale:       Isolation/Infection(s):  No active isolations   COVID (rule out)     COVID Testing  Collected yes  Resulted yes    Nursing report ED to floor:  Mental status: axo  Ambulatory status: assist  Precautions: fall    ED nurse phone extentsion- [phone]

## 2023-07-28 NOTE — H&P
[facility] Medicine Admission      Date of Admission: 7/28/2023      Primary Care Physician: Len Seo MD      Chief Complaint: Confusion    HPI:This is an 82 year old female with PMH of PAF (on Coumadin), HTN, lumbar spondylosis and HLD that presented to [facility] on 7/28/2023 secondary to fever, chills, confusion and left breast redness.      Patient was febrile (103.1) on admission.  Creatinine elevated at 1.49, which may be chronic as it was 1.5 last year. Urine with 2+ bacteria, trace leukocytes and 1+ yeast. CT of the head showed no acute findings.  CT of the chest negative for abscess.      Concurrent Medical History:  has a past medical history of Arthritis, Depression, GERD (gastroesophageal reflux disease), Hyperlipidemia, Hypertension, Near syncope, and Vascular disease.    Past Surgical History:  has a past surgical history that includes Bladder surgery; Subtotal Hysterectomy; Gallbladder surgery; Shoulder surgery; and Esophagogastroduodenoscopy (N/A, 5/6/2019).    Family History: family history includes Diabetes in her paternal aunt and paternal grandmother; Hypertension in her mother, paternal grandfather, and paternal grandmother.    Social History:  reports that she has never smoked. She has never used smokeless tobacco. She reports that she does not drink alcohol and does not use drugs.    Allergies:   Allergies   Allergen Reactions    Tuberculin Tests Swelling    Hydrocodone Nausea Only    Lortab [Hydrocodone-Acetaminophen] GI Intolerance       Medications:   Prior to Admission medications    Medication Sig Start Date End Date Taking? Authorizing Provider   acetaminophen (TYLENOL) 500 MG tablet Take 1 tablet by mouth As Needed.   Yes ProviderRubi MD   DULoxetine (CYMBALTA) 60 MG capsule  1/20/20  Yes ProviderRubi MD   furosemide (LASIX) 20 MG tablet Take 1 tablet by mouth 2 (Two) Times a Day. 6/2/23  Yes Odette Alves MD   gabapentin (NEURONTIN) 300 MG  capsule Take 1 capsule by mouth 3 (Three) Times a Day. 9/26/18  Yes Rubi Seymour MD   isosorbide mononitrate (IMDUR) 30 MG 24 hr tablet Take 1 tablet by mouth Daily. 6/2/23  Yes Odette Alves MD   lansoprazole (PREVACID) 30 MG capsule Take 1 capsule by mouth Every 12 (Twelve) Hours. 6/21/21  Yes Rubi Seymour MD   losartan (COZAAR) 100 MG tablet Take 1 tablet by mouth Daily. 8/19/20  Yes Odette Alves MD   metoprolol succinate XL (TOPROL-XL) 50 MG 24 hr tablet Take 1 tablet by mouth Daily. 6/2/23 6/2/24 Yes Odette Alves MD   Mirabegron ER (MYRBETRIQ) 50 MG tablet sustained-release 24 hour 24 hr tablet Take 50 mg by mouth Daily.   Yes Rubi Seymour MD   NIFEdipine XL (PROCARDIA XL) 60 MG 24 hr tablet Take 1 tablet by mouth Daily. 6/2/23  Yes Odette Alves MD   potassium chloride (K-DUR,KLOR-CON) 20 MEQ CR tablet Take 1 tablet by mouth Daily. 6/2/23  Yes Odette Alves MD   rosuvastatin (CRESTOR) 40 MG tablet Take 1 tablet by mouth Daily. 6/2/23  Yes Odette Alves MD   traMADol (ULTRAM) 50 MG tablet Take 1 tablet by mouth As Needed. 12/23/20  Yes Rubi Seymour MD   VENTOLIN  (90 Base) MCG/ACT inhaler  10/23/19  Yes Rubi Seymour MD   warfarin (COUMADIN) 2.5 MG tablet Take 1 tablet nightly except on Monday take 1.5 tablets OR AS DIRECTED 5/15/23  Yes Shauna Casillas APRN       Review of Systems:  Review of Systems   Constitutional:  Positive for chills, diaphoresis and fever. Negative for activity change and fatigue.   HENT:  Negative for ear pain and sore throat.    Eyes:  Negative for pain and discharge.   Respiratory:  Negative for cough and shortness of breath.    Cardiovascular:  Negative for chest pain and palpitations.   Gastrointestinal:  Negative for abdominal pain and nausea.   Endocrine: Negative for cold intolerance and heat intolerance.   Genitourinary:  Negative for difficulty urinating and dysuria.   Musculoskeletal:  Negative for  arthralgias and gait problem.   Skin:  Positive for color change. Negative for rash.        Left breast   Neurological:  Negative for dizziness and weakness.   Psychiatric/Behavioral:  Positive for confusion. Negative for agitation.     Otherwise complete ROS is negative except as mentioned above.    Physical Exam:   Temp:  [99.6 øF (37.6 øC)-103.1 øF (39.5 øC)] 99.6 øF (37.6 øC)  Heart Rate:  [74-80] 80  Resp:  [18-20] 20  BP: (151-171)/(73-76) 151/73  Physical Exam  Constitutional:       Appearance: She is well-developed.   HENT:      Head: Normocephalic and atraumatic.   Eyes:      Pupils: Pupils are equal, round, and reactive to light.   Cardiovascular:      Rate and Rhythm: Normal rate and regular rhythm.   Pulmonary:      Effort: Pulmonary effort is normal.      Breath sounds: Normal breath sounds.   Abdominal:      General: Bowel sounds are normal.      Palpations: Abdomen is soft.   Musculoskeletal:         General: Normal range of motion.      Cervical back: Normal range of motion and neck supple.   Skin:     General: Skin is warm and dry.      Comments: Left breast:  Dark red erythema noted to the entire breast.    Neurological:      Mental Status: She is alert.   Psychiatric:         Behavior: Behavior normal.         Results Reviewed:  I have personally reviewed current lab, radiology, and data and agree with results.  Lab Results (last 24 hours)       Procedure Component Value Units Date/Time    Fentanyl, Urine - Urine, Clean Catch [[id number]]  (Normal) Collected: 07/28/23 1603    Specimen: Urine, Clean Catch Updated: 07/28/23 1711     Fentanyl, Urine Negative    Narrative:      Negative Threshold:      Fentanyl 5 ng/mL     The normal value for the drug tested is negative. This report includes final unconfirmed screening results to be used for medical treatment purposes only. Unconfirmed results must not be used for non-medical purposes such as employment or legal testing. Clinical consideration should  be applied to any drug of abuse test, particularly when unconfirmed results are used.           Magnesium [[id number]]  (Normal) Collected: 07/28/23 1600    Specimen: Blood Updated: 07/28/23 1703     Magnesium 1.6 mg/dL     Comprehensive Metabolic Panel [[id number]]  (Abnormal) Collected: 07/28/23 1600    Specimen: Blood Updated: 07/28/23 1703     Glucose 121 mg/dL      BUN 24 mg/dL      Creatinine 1.49 mg/dL      Sodium 134 mmol/L      Potassium 4.2 mmol/L      Comment: Slight hemolysis detected by analyzer. Results may be affected.        Chloride 96 mmol/L      CO2 25.0 mmol/L      Calcium 8.5 mg/dL      Total Protein 7.0 g/dL      Albumin 3.7 g/dL      ALT (SGPT) 14 U/L      AST (SGOT) 29 U/L      Comment: Slight hemolysis detected by analyzer. Results may be affected.        Alkaline Phosphatase 121 U/L      Total Bilirubin 0.6 mg/dL      Globulin 3.3 gm/dL      A/G Ratio 1.1 g/dL      BUN/Creatinine Ratio 16.1     Anion Gap 13.0 mmol/L      eGFR 35.1 mL/min/1.73     Narrative:      GFR Normal >60  Chronic Kidney Disease <60  Kidney Failure <15    The GFR formula is only valid for adults with stable renal function between ages 18 and 70.    Extra Tubes [[id number]] Collected: 07/28/23 1600    Specimen: Blood, Venous Line Updated: 07/28/23 1700    Narrative:      The following orders were created for panel order Extra Tubes.  Procedure                               Abnormality         Status                     ---------                               -----------         ------                     Gold Top - SST[[id number]]                                   Final result                 Please view results for these tests on the individual orders.    Gold Top - SST [[id number]] Collected: 07/28/23 1600    Specimen: Blood Updated: 07/28/23 1700     Extra Tube Hold for add-ons.     Comment: Auto resulted.       Protime-INR [[id number]]  (Abnormal) Collected: 07/28/23 1600    Specimen: Blood Updated: 07/28/23 3120      Protime 20.5 Seconds      INR 1.78    Narrative:      Therapeutic range for most indications is 2.0-3.0 INR,  or 2.5-3.5 for mechanical heart valves.    BNP [[id number]]  (Abnormal) Collected: 07/28/23 1600    Specimen: Blood Updated: 07/28/23 1653     proBNP 9,052.0 pg/mL     Narrative:      Among patients with dyspnea, NT-proBNP is highly sensitive for the detection of acute congestive heart failure. In addition NT-proBNP of <300 pg/ml effectively rules out acute congestive heart failure with 99% negative predictive value.    Results may be falsely decreased if patient taking Biotin.      T4, Free [[id number]]  (Normal) Collected: 07/28/23 1600    Specimen: Blood Updated: 07/28/23 1653     Free T4 0.99 ng/dL     Narrative:      Results may be falsely increased if patient taking Biotin.      TSH [[id number]]  (Normal) Collected: 07/28/23 1600    Specimen: Blood Updated: 07/28/23 1653     TSH 1.180 uIU/mL     Urinalysis, Microscopic Only - Urine, Catheter [[id number]]  (Abnormal) Collected: 07/28/23 1604    Specimen: Urine, Catheter Updated: 07/28/23 1652     RBC, UA 0-2 /HPF      WBC, UA 6-12 /HPF      Bacteria, UA 2+ /HPF      Squamous Epithelial Cells, UA 3-5 /HPF      Yeast, UA Small/1+ Budding Yeast /HPF      Hyaline Casts, UA None Seen /LPF      Methodology Manual Light Microscopy    Urine Culture - Urine, Urine, Catheter [[id number]] Collected: 07/28/23 1604    Specimen: Urine, Catheter Updated: 07/28/23 1652    Ethanol [[id number]] Collected: 07/28/23 1600    Specimen: Blood Updated: 07/28/23 1648     Ethanol <10 mg/dL      Ethanol % <0.010 %     Acetaminophen Level [[id number]]  (Normal) Collected: 07/28/23 1600    Specimen: Blood Updated: 07/28/23 1648     Acetaminophen <5.0 mcg/mL     Salicylate Level [[id number]]  (Normal) Collected: 07/28/23 1600    Specimen: Blood Updated: 07/28/23 1648     Salicylate <0.3 mg/dL     Lactic Acid, Plasma [[id number]]  (Normal) Collected: 07/28/23 1626    Specimen: Blood Updated:  07/28/23 1646     Lactate 1.6 mmol/L     Single High Sensitivity Troponin T [[id number]]  (Abnormal) Collected: 07/28/23 1600    Specimen: Blood Updated: 07/28/23 1644     HS Troponin T 37 ng/L     Narrative:      High Sensitive Troponin T Reference Range:  <10.0 ng/L- Negative Female for AMI  <15.0 ng/L- Negative Male for AMI  >=10 - Abnormal Female indicating possible myocardial injury.  >=15 - Abnormal Male indicating possible myocardial injury.   Clinicians would have to utilize clinical acumen, EKG, Troponin, and serial changes to determine if it is an Acute Myocardial Infarction or myocardial injury due to an underlying chronic condition.         COVID-19 and FLU A/B PCR - Swab, Nasopharynx [[id number]]  (Normal) Collected: 07/28/23 1605    Specimen: Swab from Nasopharynx Updated: 07/28/23 1642     COVID19 Not Detected     Influenza A PCR Not Detected     Influenza B PCR Not Detected    Narrative:      Fact sheet for providers: https://www.fda.gov/media/117562/download    Fact sheet for patients: https://www.fda.gov/media/875397/download    Test performed by PCR.    Blood Gas, Arterial - [[id number]]  (Abnormal) Collected: 07/28/23 1639    Specimen: Arterial Blood Updated: 07/28/23 1636     Site Right Brachial     Haroon's Test Positive     pH, Arterial 7.448 pH units      pCO2, Arterial 36.7 mm Hg      pO2, Arterial 71.3 mm Hg      Comment: 84 Value below reference range        HCO3, Arterial 25.4 mmol/L      Base Excess, Arterial 1.5 mmol/L      O2 Saturation, Arterial 96.2 %      Barometric Pressure for Blood Gas 749 mmHg      Modality Nasal Cannula     Flow Rate 2.0 lpm      Ventilator Mode NA     Collected by Wanda Saldivar     Comment: Meter: [id number]     :  [id number]       Urine Drug Screen - Urine, Clean Catch [[id number]]  (Normal) Collected: 07/28/23 1603    Specimen: Urine, Clean Catch Updated: 07/28/23 1636     THC, Screen, Urine Negative     Phencyclidine (PCP), Urine Negative      Cocaine Screen, Urine Negative     Methamphetamine, Ur Negative     Opiate Screen Negative     Amphetamine Screen, Urine Negative     Benzodiazepine Screen, Urine Negative     Tricyclic Antidepressants Screen Negative     Methadone Screen, Urine Negative     Barbiturates Screen, Urine Negative     Oxycodone Screen, Urine Negative     Propoxyphene Screen Negative     Buprenorphine, Screen, Urine Negative    Narrative:      Cutoff For Drugs Screened:    Amphetamines               500 ng/ml  Barbiturates               200 ng/ml  Benzodiazepines            150 ng/ml  Cocaine                    150 ng/ml  Methadone                  200 ng/ml  Opiates                    100 ng/ml  Phencyclidine               25 ng/ml  THC                            50 ng/ml  Methamphetamine            500 ng/ml  Tricyclic Antidepressants  300 ng/ml  Oxycodone                  100 ng/ml  Propoxyphene               300 ng/ml  Buprenorphine               10 ng/ml    The normal value for all drugs tested is negative. This report includes unconfirmed screening results, with the cutoff values listed, to be used for medical treatment purposes only.  Unconfirmed results must not be used for non-medical purposes such as employment or legal testing.  Clinical consideration should be applied to any drug of abuse test, particularly when unconfirmed results are used.      Blood Culture - Blood, Arm, Right [[id number]] Collected: 07/28/23 1626    Specimen: Blood from Arm, Right Updated: 07/28/23 1629    Blood Culture - Blood, Arm, Left [[id number]] Collected: 07/28/23 1626    Specimen: Blood from Arm, Left Updated: 07/28/23 1629    Urinalysis With Culture If Indicated - Urine, Catheter [[id number]]  (Abnormal) Collected: 07/28/23 1604    Specimen: Urine, Catheter Updated: 07/28/23 1628     Color, UA Dark Yellow     Appearance, UA Clear     pH, UA 7.5     Specific Gravity, UA 1.018     Glucose, UA Negative     Ketones, UA Negative     Bilirubin, UA  Negative     Blood, UA Negative     Protein, UA >=300 mg/dL (3+)     Leuk Esterase, UA Trace     Nitrite, UA Negative     Urobilinogen, UA 1.0 E.U./dL    Narrative:      In absence of clinical symptoms, the presence of pyuria, bacteria, and/or nitrites on the urinalysis result does not correlate with infection.    CBC & Differential [[id number]]  (Abnormal) Collected: 07/28/23 1600    Specimen: Blood Updated: 07/28/23 1620    Narrative:      The following orders were created for panel order CBC & Differential.  Procedure                               Abnormality         Status                     ---------                               -----------         ------                     CBC Auto Differential[[id number]]        Abnormal            Final result                 Please view results for these tests on the individual orders.    CBC Auto Differential [[id number]]  (Abnormal) Collected: 07/28/23 1600    Specimen: Blood Updated: 07/28/23 1620     WBC 15.96 10*3/mm3      RBC 4.69 10*6/mm3      Hemoglobin 12.9 g/dL      Hematocrit 38.3 %      MCV 81.7 fL      MCH 27.5 pg      MCHC 33.7 g/dL      RDW 14.7 %      RDW-SD 43.1 fl      MPV 9.8 fL      Platelets 284 10*3/mm3      Neutrophil % 86.8 %      Lymphocyte % 4.5 %      Monocyte % 5.5 %      Eosinophil % 1.6 %      Basophil % 0.6 %      Immature Grans % 1.0 %      Neutrophils, Absolute 13.87 10*3/mm3      Lymphocytes, Absolute 0.72 10*3/mm3      Monocytes, Absolute 0.87 10*3/mm3      Eosinophils, Absolute 0.25 10*3/mm3      Basophils, Absolute 0.09 10*3/mm3      Immature Grans, Absolute 0.16 10*3/mm3      nRBC 0.0 /100 WBC           Imaging Results (Last 24 Hours)       Procedure Component Value Units Date/Time    CT Chest Without Contrast Diagnostic [[id number]] Collected: 07/28/23 1603     Updated: 07/28/23 1619    Narrative:      INDICATION:  Possible breast abscess and uncertain renal function.    COMPARISON:  None relevant.    TECHNIQUE:  Helical CT of the chest  was performed without intravenous contrast.  Multiplanar  reformations were provided.    FINDINGS:  Cardiac size is mildly enlarged.  Diffuse vascular calcification.  Moderate  hiatal hernia.  No pleural effusion or pneumothorax.  No dense airspace  consolidation.  No worrisome pulmonary nodules.      Impression:      1.  No acute intrathoracic process.  2.  Moderate hiatal hernia.  3.  Diffuse vascular calcification.        CT Head Without Contrast [[id number]] Collected: 07/28/23 1602     Updated: 07/28/23 1619    Narrative:      INDICATION:  Mental status change, unknown cause.    COMPARISON:  None relevant.    TECHNIQUE:  Axial images were performed from the calvarium through the skull base followed  by 2D multiplanar reformats.  No contrast was administered.    FINDINGS:  No mass, mass effect or midline shift.  The ventricles are midline and  symmetric.  No abnormal extra-axial fluid collection.  Pituitary and posterior  fossa are within normal limits.  Calvarium is intact.  Mild scattered small  vessels can be changes..      Impression:      No acute intracranial process.                    Assessment:    Active Hospital Problems    Diagnosis     **Sepsis          Plan:   Sepsis secondary to left breast mastitis/ UTI:  Continue IVF and broad-spectrum antibiotics (Vancomycin/ Rocephin).  CT ruled out abscess.    Mastitis, left breast: No open wounds noted.  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 Peterson for this.  She denies any previous surgical intervention. General surgery and wound care consults appreciated.   Hypertension: Continue home Losartan, Procardia XL, Imdur and metoprolol.    Hyperlipidemia: Continue home statin.   Paroxsymal atrial fibrillation: Continue home beta-blocker, Procardia XL and Coumadin, pharmacy to dose.  INR daily.   Acute kidney injury, possibly chronic:  Nephrology consult appreciated.   Urinary tract infection with candida:   Continue IV Rocephin.  Diflucan 200  mg daily x 2 weeks. Urine culture pending.  GERD:  Continue PPI.      VTE PPX:  Coumadin     Medical Decision Making  Number and Complexity of problems: 8/High  Differential Diagnosis: N/A    Conditions and Status:        Condition is improving.     [facility] Data  External documents reviewed: Yes  My EKG interpretation: A fib/ pacemaker  My CT interpretation: As per radiology  Tests considered but not ordered: None     Decision rules/scores evaluated (example [id number], Wells, etc): N/A     Discussed with: Patient/ family     Treatment Plan  As above    Care Planning  Shared decision making: Yes  Code status and discussions: Full    Disposition  Social Determinants of Health that impact treatment or disposition: None  I expect the patient to be discharged to home in 2-3 days.     I discussed the patient's findings and my recommendations with: Patient    I confirmed that the patient's Advance Care Plan is present, code status is documented, or surrogate decision maker is listed in the patient's medical record.     I have utilized all available immediate resources to obtain, update, or review the patient's current medications (including all prescriptions, over-the-counter products, herbals, cannabis/cannabidiol products, and vitamin/mineral/dietary (nutritional) supplements).            This document has been electronically signed by COY Vázquez on July 28, 2023 17:54 CDT

## 2023-07-28 NOTE — Clinical Note
Level of Care: Telemetry [5]   Diagnosis: Sepsis [[id number]]   Admitting Physician: EDDIE NOONAN [[id number]]   Attending Physician: EDDIE NOONAN [[id number]]   Certification: I Certify That Inpatient Hospital Services Are Medically Necessary For Greater Than 2 Midnights

## 2023-07-29 LAB
ADV 40+41 DNA STL QL NAA+NON-PROBE: NOT DETECTED
ANION GAP SERPL CALCULATED.3IONS-SCNC: 17 MMOL/L (ref 5–15)
ASTRO TYP 1-8 RNA STL QL NAA+NON-PROBE: NOT DETECTED
BASOPHILS # BLD AUTO: 0.08 10*3/MM3 (ref 0–0.2)
BASOPHILS NFR BLD AUTO: 0.4 % (ref 0–1.5)
BUN SERPL-MCNC: 23 MG/DL (ref 8–23)
BUN/CREAT SERPL: 17.7 (ref 7–25)
C CAYETANENSIS DNA STL QL NAA+NON-PROBE: NOT DETECTED
C COLI+JEJ+UPSA DNA STL QL NAA+NON-PROBE: NOT DETECTED
C DIFF TOX GENS STL QL NAA+PROBE: POSITIVE
CALCIUM SPEC-SCNC: 8.5 MG/DL (ref 8.6–10.5)
CHLORIDE SERPL-SCNC: 99 MMOL/L (ref 98–107)
CO2 SERPL-SCNC: 20 MMOL/L (ref 22–29)
CREAT SERPL-MCNC: 1.3 MG/DL (ref 0.57–1)
CRYPTOSP DNA STL QL NAA+NON-PROBE: NOT DETECTED
DEPRECATED RDW RBC AUTO: 46.6 FL (ref 37–54)
E HISTOLYT DNA STL QL NAA+NON-PROBE: NOT DETECTED
EAEC PAA PLAS AGGR+AATA ST NAA+NON-PRB: NOT DETECTED
EC STX1+STX2 GENES STL QL NAA+NON-PROBE: NOT DETECTED
EGFRCR SERPLBLD CKD-EPI 2021: 41.4 ML/MIN/1.73
EOSINOPHIL # BLD AUTO: 0.19 10*3/MM3 (ref 0–0.4)
EOSINOPHIL NFR BLD AUTO: 0.9 % (ref 0.3–6.2)
EPEC EAE GENE STL QL NAA+NON-PROBE: NOT DETECTED
ERYTHROCYTE [DISTWIDTH] IN BLOOD BY AUTOMATED COUNT: 14.8 % (ref 12.3–15.4)
ETEC LTA+ST1A+ST1B TOX ST NAA+NON-PROBE: NOT DETECTED
G LAMBLIA DNA STL QL NAA+NON-PROBE: NOT DETECTED
GLUCOSE SERPL-MCNC: 129 MG/DL (ref 65–99)
HCT VFR BLD AUTO: 42.5 % (ref 34–46.6)
HGB BLD-MCNC: 13.6 G/DL (ref 12–15.9)
IMM GRANULOCYTES # BLD AUTO: 0.26 10*3/MM3 (ref 0–0.05)
IMM GRANULOCYTES NFR BLD AUTO: 1.3 % (ref 0–0.5)
INR PPP: 1.82 (ref 0.8–1.2)
LYMPHOCYTES # BLD AUTO: 1.21 10*3/MM3 (ref 0.7–3.1)
LYMPHOCYTES NFR BLD AUTO: 6 % (ref 19.6–45.3)
MCH RBC QN AUTO: 27.4 PG (ref 26.6–33)
MCHC RBC AUTO-ENTMCNC: 32 G/DL (ref 31.5–35.7)
MCV RBC AUTO: 85.5 FL (ref 79–97)
MONOCYTES # BLD AUTO: 0.87 10*3/MM3 (ref 0.1–0.9)
MONOCYTES NFR BLD AUTO: 4.3 % (ref 5–12)
NEUTROPHILS NFR BLD AUTO: 17.7 10*3/MM3 (ref 1.7–7)
NEUTROPHILS NFR BLD AUTO: 87.1 % (ref 42.7–76)
NOROVIRUS GI+II RNA STL QL NAA+NON-PROBE: NOT DETECTED
NRBC BLD AUTO-RTO: 0 /100 WBC (ref 0–0.2)
P SHIGELLOIDES DNA STL QL NAA+NON-PROBE: NOT DETECTED
PLATELET # BLD AUTO: 276 10*3/MM3 (ref 140–450)
PMV BLD AUTO: 10.2 FL (ref 6–12)
POTASSIUM SERPL-SCNC: 3.8 MMOL/L (ref 3.5–5.2)
PROTHROMBIN TIME: 20.8 SECONDS (ref 11.1–15.3)
RBC # BLD AUTO: 4.97 10*6/MM3 (ref 3.77–5.28)
RVA RNA STL QL NAA+NON-PROBE: NOT DETECTED
S ENT+BONG DNA STL QL NAA+NON-PROBE: NOT DETECTED
SAPO I+II+IV+V RNA STL QL NAA+NON-PROBE: NOT DETECTED
SHIGELLA SP+EIEC IPAH ST NAA+NON-PROBE: NOT DETECTED
SODIUM SERPL-SCNC: 136 MMOL/L (ref 136–145)
V CHOL+PARA+VUL DNA STL QL NAA+NON-PROBE: NOT DETECTED
V CHOLERAE DNA STL QL NAA+NON-PROBE: NOT DETECTED
WBC NRBC COR # BLD: 20.31 10*3/MM3 (ref 3.4–10.8)
Y ENTEROCOL DNA STL QL NAA+NON-PROBE: NOT DETECTED

## 2023-07-29 PROCEDURE — 80048 BASIC METABOLIC PNL TOTAL CA: CPT | Performed by: NURSE PRACTITIONER

## 2023-07-29 PROCEDURE — 25010000002 VANCOMYCIN 10 G RECONSTITUTED SOLUTION: Performed by: NURSE PRACTITIONER

## 2023-07-29 PROCEDURE — 87493 C DIFF AMPLIFIED PROBE: CPT | Performed by: NURSE PRACTITIONER

## 2023-07-29 PROCEDURE — 87507 IADNA-DNA/RNA PROBE TQ 12-25: CPT | Performed by: NURSE PRACTITIONER

## 2023-07-29 PROCEDURE — 85025 COMPLETE CBC W/AUTO DIFF WBC: CPT | Performed by: NURSE PRACTITIONER

## 2023-07-29 PROCEDURE — 25010000002 CEFTRIAXONE PER 250 MG: Performed by: NURSE PRACTITIONER

## 2023-07-29 PROCEDURE — 99232 SBSQ HOSP IP/OBS MODERATE 35: CPT | Performed by: STUDENT IN AN ORGANIZED HEALTH CARE EDUCATION/TRAINING PROGRAM

## 2023-07-29 PROCEDURE — G0378 HOSPITAL OBSERVATION PER HR: HCPCS

## 2023-07-29 PROCEDURE — 87449 NOS EACH ORGANISM AG IA: CPT | Performed by: NURSE PRACTITIONER

## 2023-07-29 PROCEDURE — 85610 PROTHROMBIN TIME: CPT | Performed by: NURSE PRACTITIONER

## 2023-07-29 RX ORDER — LANOLIN ALCOHOL/MO/W.PET/CERES
6 CREAM (GRAM) TOPICAL NIGHTLY PRN
Status: DISCONTINUED | OUTPATIENT
Start: 2023-07-29 | End: 2023-08-07

## 2023-07-29 RX ORDER — TRAMADOL HYDROCHLORIDE 50 MG/1
50 TABLET ORAL EVERY 6 HOURS PRN
Status: DISCONTINUED | OUTPATIENT
Start: 2023-07-29 | End: 2023-07-30

## 2023-07-29 RX ADMIN — Medication 10 ML: at 20:53

## 2023-07-29 RX ADMIN — ACETAMINOPHEN 1000 MG: 500 TABLET, FILM COATED ORAL at 05:27

## 2023-07-29 RX ADMIN — PANTOPRAZOLE SODIUM 40 MG: 40 TABLET, DELAYED RELEASE ORAL at 05:27

## 2023-07-29 RX ADMIN — NYSTATIN: 100000 POWDER TOPICAL at 20:54

## 2023-07-29 RX ADMIN — WARFARIN SODIUM 2.5 MG: 2.5 TABLET ORAL at 17:53

## 2023-07-29 RX ADMIN — VANCOMYCIN HYDROCHLORIDE 750 MG: 10 INJECTION, POWDER, LYOPHILIZED, FOR SOLUTION INTRAVENOUS at 17:53

## 2023-07-29 RX ADMIN — NYSTATIN: 100000 POWDER TOPICAL at 08:38

## 2023-07-29 RX ADMIN — DULOXETINE HYDROCHLORIDE 60 MG: 60 CAPSULE, DELAYED RELEASE ORAL at 08:38

## 2023-07-29 RX ADMIN — TRAMADOL HYDROCHLORIDE 50 MG: 50 TABLET ORAL at 21:46

## 2023-07-29 RX ADMIN — METOPROLOL SUCCINATE 50 MG: 50 TABLET, EXTENDED RELEASE ORAL at 08:38

## 2023-07-29 RX ADMIN — Medication 10 ML: at 08:38

## 2023-07-29 RX ADMIN — NIFEDIPINE 60 MG: 60 TABLET, EXTENDED RELEASE ORAL at 08:37

## 2023-07-29 RX ADMIN — ACETAMINOPHEN 1000 MG: 500 TABLET, FILM COATED ORAL at 19:58

## 2023-07-29 RX ADMIN — MELATONIN 6 MG: 3 TAB ORAL at 19:58

## 2023-07-29 RX ADMIN — ISOSORBIDE MONONITRATE 30 MG: 30 TABLET, EXTENDED RELEASE ORAL at 08:38

## 2023-07-29 RX ADMIN — CEFTRIAXONE SODIUM 2000 MG: 2 INJECTION, POWDER, FOR SOLUTION INTRAMUSCULAR; INTRAVENOUS at 16:43

## 2023-07-29 RX ADMIN — ACETAMINOPHEN 1000 MG: 500 TABLET, FILM COATED ORAL at 13:21

## 2023-07-29 RX ADMIN — ROSUVASTATIN CALCIUM 40 MG: 20 TABLET, FILM COATED ORAL at 08:38

## 2023-07-29 RX ADMIN — SODIUM CHLORIDE 75 ML/HR: 9 INJECTION, SOLUTION INTRAVENOUS at 08:43

## 2023-07-29 RX ADMIN — LOSARTAN POTASSIUM 100 MG: 50 TABLET, FILM COATED ORAL at 08:38

## 2023-07-29 NOTE — CONSULTS
"[facility]  [address] [place] [address]. [address]  T - [phone]  F  [phone]     Consultation         PATIENT  DEMOGRAPHICS   PATIENT NAME: Carol Kumarsaumya Noeldivya                      PHYSICIAN: COY Prakash  : 1940  MRN: [id number]    Subjective   SUBJECTIVE   Referring Provider: Dr. Lopez  Reason for Consultation: BLAINE/CKD  History of present illness:  This is an 82 year old female with PMH of PAF (on Coumadin), HTN, lumbar spondylosis and HLD. Pt reports that she began to run a fever, became confused, and had chills, and left breast redness which lead her to go the the ER. Patient was febrile on admission. She is currently on 2L O2 via [place], she is not on oxygen at home. Nephrology was consulted for Acute versus chronic kidney disease.     Past Medical History:   Diagnosis Date    Arthritis     Depression     GERD (gastroesophageal reflux disease)     Hyperlipidemia     Hypertension     Near syncope     Vascular disease      Past Surgical History:   Procedure Laterality Date    BLADDER SURGERY      ENDOSCOPY N/A 2019    Procedure: ESOPHAGOGASTRODUODENOSCOPY;  Surgeon: Alberto Sanchez DO;  Location: [facility] ENDOSCOPY;  Service: Gastroenterology    GALLBLADDER SURGERY      SHOULDER SURGERY      \"bone spurs\"    SUBTOTAL HYSTERECTOMY       Family History   Problem Relation Age of Onset    Hypertension Mother     Diabetes Paternal Aunt     Diabetes Paternal Grandmother     Hypertension Paternal Grandmother     Hypertension Paternal Grandfather      Social History     Tobacco Use    Smoking status: Never    Smokeless tobacco: Never   Vaping Use    Vaping Use: Never used   Substance Use Topics    Alcohol use: No    Drug use: No     Allergies:  Tuberculin tests, Hydrocodone, and Lortab [hydrocodone-acetaminophen]     REVIEW OF SYSTEMS    Review of Systems   Constitutional:  Positive for chills and fever.   Gastrointestinal:  Positive for nausea.   Psychiatric/Behavioral:  " "Positive for confusion.    All other systems reviewed and are negative.    Objective   OBJECTIVE   Vital Signs  Temp:  [97.1 øF (36.2 øC)-101.9 øF (38.8 øC)] 99.2 øF (37.3 øC)  Heart Rate:  [60-80] 75  Resp:  [16-20] 18  BP: (104-162)/(51-82) 107/54    Flowsheet Rows      Flowsheet Row First Filed Value   Admission Height 165.1 cm (65\") Documented at 07/28/2023 1900   Admission Weight 74.8 kg (165 lb) Documented at 07/28/2023 1608             I/O last 3 completed shifts:  In: 600 [IV Piggyback:600]  Out: -     PHYSICAL EXAM    Physical Exam  Vitals reviewed.   Constitutional:       Appearance: She is well-developed.   HENT:      Head: Normocephalic and atraumatic.   Eyes:      Conjunctiva/sclera: Conjunctivae normal.      Pupils: Pupils are equal, round, and reactive to light.   Cardiovascular:      Rate and Rhythm: Normal rate and regular rhythm.   Pulmonary:      Effort: Pulmonary effort is normal.      Breath sounds: Normal breath sounds.   Abdominal:      Palpations: Abdomen is soft.   Musculoskeletal:      Cervical back: Neck supple.      Right lower leg: No edema.      Left lower leg: No edema.   Skin:     General: Skin is warm and dry.   Neurological:      Mental Status: She is alert and oriented to person, place, and time.   Psychiatric:         Mood and Affect: Mood normal.         Behavior: Behavior normal.       RESULTS   Results Review:    Results from last 7 days   Lab Units 07/29/23  0508 07/28/23  1600   SODIUM mmol/L 136 134*   POTASSIUM mmol/L 3.8 4.2   CHLORIDE mmol/L 99 96*   CO2 mmol/L 20.0* 25.0   BUN mg/dL 23 24*   CREATININE mg/dL 1.30* 1.49*   CALCIUM mg/dL 8.5* 8.5*   BILIRUBIN mg/dL  --  0.6   ALK PHOS U/L  --  121*   ALT (SGPT) U/L  --  14   AST (SGOT) U/L  --  29   GLUCOSE mg/dL 129* 121*       Estimated Creatinine Clearance: 33.5 mL/min (A) (by C-G formula based on SCr of 1.3 mg/dL (H)).    Results from last 7 days   Lab Units 07/28/23  1600   MAGNESIUM mg/dL 1.6             Results from " last 7 days   Lab Units 07/29/23  0508 07/28/23  1600   WBC 10*3/mm3 20.31* 15.96*   HEMOGLOBIN g/dL 13.6 12.9   PLATELETS 10*3/mm3 276 284       Results from last 7 days   Lab Units 07/29/23  0508 07/28/23  1600   INR  1.82* 1.78*        MEDICATIONS    cefTRIAXone, 2,000 mg, Intravenous, Q24H  DULoxetine, 60 mg, Oral, Daily  isosorbide mononitrate, 30 mg, Oral, Daily  losartan, 100 mg, Oral, Daily  metoprolol succinate XL, 50 mg, Oral, Daily  NIFEdipine XL, 60 mg, Oral, Daily  nystatin, , Topical, Q12H  pantoprazole, 40 mg, Oral, Q AM  rosuvastatin, 40 mg, Oral, Daily  senna-docusate sodium, 2 tablet, Oral, BID  sodium chloride, 10 mL, Intravenous, Q12H  vancomycin, 750 mg, Intravenous, Q24H  warfarin, 2.5 mg, Oral, Daily      Pharmacy to dose vancomycin,   Pharmacy to dose warfarin,   sodium chloride, 75 mL/hr, Last Rate: 75 mL/hr (07/29/23 1321)      Medications Prior to Admission   Medication Sig Dispense Refill Last Dose    acetaminophen (TYLENOL) 500 MG tablet Take 1 tablet by mouth As Needed.   Past Week    DULoxetine (CYMBALTA) 60 MG capsule    Past Week    furosemide (LASIX) 20 MG tablet Take 1 tablet by mouth 2 (Two) Times a Day. 180 tablet 3 Past Week    gabapentin (NEURONTIN) 300 MG capsule Take 1 capsule by mouth 3 (Three) Times a Day.  2 Past Week    isosorbide mononitrate (IMDUR) 30 MG 24 hr tablet Take 1 tablet by mouth Daily. 90 tablet 3 Past Week    lansoprazole (PREVACID) 30 MG capsule Take 1 capsule by mouth Every 12 (Twelve) Hours.   Past Week    losartan (COZAAR) 100 MG tablet Take 1 tablet by mouth Daily. 30 tablet 3 Past Week    metoprolol succinate XL (TOPROL-XL) 50 MG 24 hr tablet Take 1 tablet by mouth Daily. 90 tablet 3 Past Week    Mirabegron ER (MYRBETRIQ) 50 MG tablet sustained-release 24 hour 24 hr tablet Take 50 mg by mouth Daily.   Past Week    NIFEdipine XL (PROCARDIA XL) 60 MG 24 hr tablet Take 1 tablet by mouth Daily. 90 tablet 3 Past Week    potassium chloride (K-DUR,KLOR-CON)  20 MEQ CR tablet Take 1 tablet by mouth Daily. 90 tablet 3 Past Week    rosuvastatin (CRESTOR) 40 MG tablet Take 1 tablet by mouth Daily. 90 tablet 3 Past Week    traMADol (ULTRAM) 50 MG tablet Take 1 tablet by mouth As Needed.   Past Week    VENTOLIN  (90 Base) MCG/ACT inhaler    Past Week    warfarin (COUMADIN) 2.5 MG tablet Take 1 tablet nightly except on Monday take 1.5 tablets OR AS DIRECTED 100 tablet 1 Past Week     Assessment & Plan   ASSESSMENT / PLAN      Sepsis    Acute kidney injury: may be chronic, baseline unknown. Creatinine was 1.5 in 10/2022. Creatinine now down to 1.3mg/dL from 1.5mg/dL. UA 3+ protein, trace leukocytes, 0-2 RBC, 6-12 WBC, 2+ bacteria. Check urine sodium and renal ultrasound. Continue IVF    Hypertension: acceptable     Metabolic acidosis: mild, monitoring    Sepsis    UTI: receiving abx     Thank you for this consult, we will continue to follow this patient aside you.         I discussed the patients findings and my recommendations with patient and family      This document has been electronically signed by COY Prakash on July 29, 2023 15:35 CDT

## 2023-07-29 NOTE — PROGRESS NOTES
GENERAL SURGERY PROGRESS NOTE     LOS: 1 day         Interval History:     Febrile overnight. Much improved today.     Medication Review:   cefTRIAXone, 2,000 mg, Intravenous, Q24H  DULoxetine, 60 mg, Oral, Daily  isosorbide mononitrate, 30 mg, Oral, Daily  losartan, 100 mg, Oral, Daily  metoprolol succinate XL, 50 mg, Oral, Daily  NIFEdipine XL, 60 mg, Oral, Daily  nystatin, , Topical, Q12H  pantoprazole, 40 mg, Oral, Q AM  rosuvastatin, 40 mg, Oral, Daily  senna-docusate sodium, 2 tablet, Oral, BID  sodium chloride, 10 mL, Intravenous, Q12H  warfarin, 2.5 mg, Oral, Daily        Pharmacy to dose vancomycin,   Pharmacy to dose warfarin,   sodium chloride, 75 mL/hr, Last Rate: 75 mL/hr (07/29/23 0843)    Objective     Vital Signs:  Temp:  [98.6 øF (37 øC)-103.1 øF (39.5 øC)] 98.8 øF (37.1 øC)  Heart Rate:  [60-80] 69  Resp:  [16-20] 16  BP: (104-171)/(51-82) 104/51    Intake/Output Summary (Last 24 hours) at 7/29/2023 0947  Last data filed at 7/28/2023 1843  Gross per 24 hour   Intake 600 ml   Output --   Net 600 ml       Physical Exam  Constitutional:       Appearance: Normal appearance.   HENT:      Head: Normocephalic and atraumatic.      Nose: Nose normal. No congestion.      Mouth/Throat:      Mouth: Mucous membranes are moist.      Pharynx: Oropharynx is clear.   Eyes:      General: No scleral icterus.     Pupils: Pupils are equal, round, and reactive to light.   Cardiovascular:      Rate and Rhythm: Normal rate and regular rhythm.   Pulmonary:      Effort: Pulmonary effort is normal. No respiratory distress.   Skin:     General: Skin is warm and dry.      Comments: Cellulitis improved   Neurological:      General: No focal deficit present.      Mental Status: She is alert and oriented to person, place, and time.   Psychiatric:         Mood and Affect: Mood normal.         Behavior: Behavior normal.       Results Review:    Results from last 7 days   Lab Units 07/29/23  0508 07/28/23  1600   SODIUM mmol/L 136  134*   POTASSIUM mmol/L 3.8 4.2   CHLORIDE mmol/L 99 96*   CO2 mmol/L 20.0* 25.0   BUN mg/dL 23 24*   CREATININE mg/dL 1.30* 1.49*   GLUCOSE mg/dL 129* 121*   CALCIUM mg/dL 8.5* 8.5*     Results from last 7 days   Lab Units 07/29/23  0508 07/28/23  1600   WBC 10*3/mm3 20.31* 15.96*   HEMOGLOBIN g/dL 13.6 12.9   HEMATOCRIT % 42.5 38.3   PLATELETS 10*3/mm3 276 284       Assessment:    Sepsis      83 yo woman with left breast cellulitis    Plan:    - Continue antibiotics  - No plans for surgery at the moment  - Will continue to follow along      This document has been electronically signed by Fox Roper MD on July 29, 2023 09:47 CDT        Fox Roper MD  07/29/23  09:47 CDT

## 2023-07-29 NOTE — CONSULTS
"General Surgery Consult Note      Consulted by: COY Baron  Consulted for: Breast cellulitis      Chief complaint left breast pain      HPI: 83 yo woman with recurrent left breast cellulitis. She has previously been seen by Dr. Peterson who has managed this as an outpatient. She presented with sepsis due to the infection. She has improved with fluids and antibiotics.         Review of Systems   Constitutional: Negative for activity change and unexpected weight change.   HENT: Negative for congestion and dental problem.    Eyes: Negative for discharge and itching.   Respiratory: Negative for apnea and chest tightness.    Cardiovascular: Negative for chest pain and palpitations.   Gastrointestinal: Negative for abdominal distention and abdominal pain.   Endocrine: Negative for polydipsia and polyuria.   Musculoskeletal: Negative for arthralgias and back pain.   Skin: Negative for color change and pallor.   Neurological: Negative for dizziness and facial asymmetry.   Psychiatric/Behavioral: Negative for agitation and behavioral problems.        Past Medical History:   Diagnosis Date    Arthritis     Depression     GERD (gastroesophageal reflux disease)     Hyperlipidemia     Hypertension     Near syncope     Vascular disease      Past Surgical History:   Procedure Laterality Date    BLADDER SURGERY      ENDOSCOPY N/A 5/6/2019    Procedure: ESOPHAGOGASTRODUODENOSCOPY;  Surgeon: Alberto Sanchez DO;  Location: [facility] ENDOSCOPY;  Service: Gastroenterology    GALLBLADDER SURGERY      SHOULDER SURGERY      \"bone spurs\"    SUBTOTAL HYSTERECTOMY       Family History   Problem Relation Age of Onset    Hypertension Mother     Diabetes Paternal Aunt     Diabetes Paternal Grandmother     Hypertension Paternal Grandmother     Hypertension Paternal Grandfather      Social History     Tobacco Use    Smoking status: Never    Smokeless tobacco: Never   Vaping Use    Vaping Use: Never used   Substance Use Topics    Alcohol use: No "    Drug use: No     Medications Prior to Admission   Medication Sig Dispense Refill Last Dose    acetaminophen (TYLENOL) 500 MG tablet Take 1 tablet by mouth As Needed.   Past Week    DULoxetine (CYMBALTA) 60 MG capsule    Past Week    furosemide (LASIX) 20 MG tablet Take 1 tablet by mouth 2 (Two) Times a Day. 180 tablet 3 Past Week    gabapentin (NEURONTIN) 300 MG capsule Take 1 capsule by mouth 3 (Three) Times a Day.  2 Past Week    isosorbide mononitrate (IMDUR) 30 MG 24 hr tablet Take 1 tablet by mouth Daily. 90 tablet 3 Past Week    lansoprazole (PREVACID) 30 MG capsule Take 1 capsule by mouth Every 12 (Twelve) Hours.   Past Week    losartan (COZAAR) 100 MG tablet Take 1 tablet by mouth Daily. 30 tablet 3 Past Week    metoprolol succinate XL (TOPROL-XL) 50 MG 24 hr tablet Take 1 tablet by mouth Daily. 90 tablet 3 Past Week    Mirabegron ER (MYRBETRIQ) 50 MG tablet sustained-release 24 hour 24 hr tablet Take 50 mg by mouth Daily.   Past Week    NIFEdipine XL (PROCARDIA XL) 60 MG 24 hr tablet Take 1 tablet by mouth Daily. 90 tablet 3 Past Week    potassium chloride (K-DUR,KLOR-CON) 20 MEQ CR tablet Take 1 tablet by mouth Daily. 90 tablet 3 Past Week    rosuvastatin (CRESTOR) 40 MG tablet Take 1 tablet by mouth Daily. 90 tablet 3 Past Week    traMADol (ULTRAM) 50 MG tablet Take 1 tablet by mouth As Needed.   Past Week    VENTOLIN  (90 Base) MCG/ACT inhaler    Past Week    warfarin (COUMADIN) 2.5 MG tablet Take 1 tablet nightly except on Monday take 1.5 tablets OR AS DIRECTED 100 tablet 1 Past Week     Allergies:  Tuberculin tests, Hydrocodone, and Lortab [hydrocodone-acetaminophen]    Objective      Vital Signs  Temp:  [99.6 øF (37.6 øC)-103.1 øF (39.5 øC)] 100 øF (37.8 øC)  Heart Rate:  [69-80] 69  Resp:  [18-20] 18  BP: (148-171)/(72-82) 162/72    Allergies   Allergen Reactions    Tuberculin Tests Swelling    Hydrocodone Nausea Only    Lortab [Hydrocodone-Acetaminophen] GI Intolerance       Prior to  Admission medications    Medication Sig Start Date End Date Taking? Authorizing Provider   acetaminophen (TYLENOL) 500 MG tablet Take 1 tablet by mouth As Needed.   Yes Rubi Seymour MD   DULoxetine (CYMBALTA) 60 MG capsule  1/20/20  Yes Rubi Seymour MD   furosemide (LASIX) 20 MG tablet Take 1 tablet by mouth 2 (Two) Times a Day. 6/2/23  Yes Odette Alves MD   gabapentin (NEURONTIN) 300 MG capsule Take 1 capsule by mouth 3 (Three) Times a Day. 9/26/18  Yes Rubi Seymour MD   isosorbide mononitrate (IMDUR) 30 MG 24 hr tablet Take 1 tablet by mouth Daily. 6/2/23  Yes Odette Alves MD   lansoprazole (PREVACID) 30 MG capsule Take 1 capsule by mouth Every 12 (Twelve) Hours. 6/21/21  Yes Rubi Seymour MD   losartan (COZAAR) 100 MG tablet Take 1 tablet by mouth Daily. 8/19/20  Yes Odette Alves MD   metoprolol succinate XL (TOPROL-XL) 50 MG 24 hr tablet Take 1 tablet by mouth Daily. 6/2/23 6/2/24 Yes Odette Alves MD   Mirabegron ER (MYRBETRIQ) 50 MG tablet sustained-release 24 hour 24 hr tablet Take 50 mg by mouth Daily.   Yes Rubi Seymour MD   NIFEdipine XL (PROCARDIA XL) 60 MG 24 hr tablet Take 1 tablet by mouth Daily. 6/2/23  Yes Odette Alves MD   potassium chloride (K-DUR,KLOR-CON) 20 MEQ CR tablet Take 1 tablet by mouth Daily. 6/2/23  Yes Odette Alves MD   rosuvastatin (CRESTOR) 40 MG tablet Take 1 tablet by mouth Daily. 6/2/23  Yes Odette Alves MD   traMADol (ULTRAM) 50 MG tablet Take 1 tablet by mouth As Needed. 12/23/20  Yes Rubi Seymour MD   VENTOLIN  (90 Base) MCG/ACT inhaler  10/23/19  Yes Rubi Seymour MD   warfarin (COUMADIN) 2.5 MG tablet Take 1 tablet nightly except on Monday take 1.5 tablets OR AS DIRECTED 5/15/23  Yes Shauna Casillas APRN       Physical Exam:  Constitutional: Sleepy, appears ill  Head: Normocephalic, atraumatic  Nose: No congestion, no rhinorrhea  Mouth: Moist, no exudate  Eyes: Conjunctivae  within normal limits, no scleral icterus  Neck: Supple, normal range of motion  Cardiovascular: Normal rate, no lower extremity edema  Pulmonary: Normal effort, symmetrical chest rise  Abdomen: Soft, nontender, nondistended  Skin: left breast cellulitis, no palpable abscess    Results Review:  Lab Results (last 48 hours)       Procedure Component Value Units Date/Time    Single High Sensitivity Troponin T [[id number]]  (Abnormal) Collected: 07/28/23 1820    Specimen: Blood Updated: 07/28/23 1842     HS Troponin T 32 ng/L     Narrative:      High Sensitive Troponin T Reference Range:  <10.0 ng/L- Negative Female for AMI  <15.0 ng/L- Negative Male for AMI  >=10 - Abnormal Female indicating possible myocardial injury.  >=15 - Abnormal Male indicating possible myocardial injury.   Clinicians would have to utilize clinical acumen, EKG, Troponin, and serial changes to determine if it is an Acute Myocardial Infarction or myocardial injury due to an underlying chronic condition.         Fentanyl, Urine - Urine, Clean Catch [[id number]]  (Normal) Collected: 07/28/23 1603    Specimen: Urine, Clean Catch Updated: 07/28/23 1711     Fentanyl, Urine Negative    Narrative:      Negative Threshold:      Fentanyl 5 ng/mL     The normal value for the drug tested is negative. This report includes final unconfirmed screening results to be used for medical treatment purposes only. Unconfirmed results must not be used for non-medical purposes such as employment or legal testing. Clinical consideration should be applied to any drug of abuse test, particularly when unconfirmed results are used.           Magnesium [[id number]]  (Normal) Collected: 07/28/23 1600    Specimen: Blood Updated: 07/28/23 1703     Magnesium 1.6 mg/dL     Comprehensive Metabolic Panel [[id number]]  (Abnormal) Collected: 07/28/23 1600    Specimen: Blood Updated: 07/28/23 1703     Glucose 121 mg/dL      BUN 24 mg/dL      Creatinine 1.49 mg/dL      Sodium 134 mmol/L       Potassium 4.2 mmol/L      Comment: Slight hemolysis detected by analyzer. Results may be affected.        Chloride 96 mmol/L      CO2 25.0 mmol/L      Calcium 8.5 mg/dL      Total Protein 7.0 g/dL      Albumin 3.7 g/dL      ALT (SGPT) 14 U/L      AST (SGOT) 29 U/L      Comment: Slight hemolysis detected by analyzer. Results may be affected.        Alkaline Phosphatase 121 U/L      Total Bilirubin 0.6 mg/dL      Globulin 3.3 gm/dL      A/G Ratio 1.1 g/dL      BUN/Creatinine Ratio 16.1     Anion Gap 13.0 mmol/L      eGFR 35.1 mL/min/1.73     Narrative:      GFR Normal >60  Chronic Kidney Disease <60  Kidney Failure <15    The GFR formula is only valid for adults with stable renal function between ages 18 and 70.    Extra Tubes [[id number]] Collected: 07/28/23 1600    Specimen: Blood, Venous Line Updated: 07/28/23 1700    Narrative:      The following orders were created for panel order Extra Tubes.  Procedure                               Abnormality         Status                     ---------                               -----------         ------                     Gold Top - SST[[id number]]                                   Final result                 Please view results for these tests on the individual orders.    Gold Top - SST [[id number]] Collected: 07/28/23 1600    Specimen: Blood Updated: 07/28/23 1700     Extra Tube Hold for add-ons.     Comment: Auto resulted.       Protime-INR [[id number]]  (Abnormal) Collected: 07/28/23 1600    Specimen: Blood Updated: 07/28/23 1659     Protime 20.5 Seconds      INR 1.78    Narrative:      Therapeutic range for most indications is 2.0-3.0 INR,  or 2.5-3.5 for mechanical heart valves.    BNP [[id number]]  (Abnormal) Collected: 07/28/23 1600    Specimen: Blood Updated: 07/28/23 1653     proBNP 9,052.0 pg/mL     Narrative:      Among patients with dyspnea, NT-proBNP is highly sensitive for the detection of acute congestive heart failure. In addition NT-proBNP of <300 pg/ml  effectively rules out acute congestive heart failure with 99% negative predictive value.    Results may be falsely decreased if patient taking Biotin.      T4, Free [[id number]]  (Normal) Collected: 07/28/23 1600    Specimen: Blood Updated: 07/28/23 1653     Free T4 0.99 ng/dL     Narrative:      Results may be falsely increased if patient taking Biotin.      TSH [[id number]]  (Normal) Collected: 07/28/23 1600    Specimen: Blood Updated: 07/28/23 1653     TSH 1.180 uIU/mL     Urinalysis, Microscopic Only - Urine, Catheter [[id number]]  (Abnormal) Collected: 07/28/23 1604    Specimen: Urine, Catheter Updated: 07/28/23 1652     RBC, UA 0-2 /HPF      WBC, UA 6-12 /HPF      Bacteria, UA 2+ /HPF      Squamous Epithelial Cells, UA 3-5 /HPF      Yeast, UA Small/1+ Budding Yeast /HPF      Hyaline Casts, UA None Seen /LPF      Methodology Manual Light Microscopy    Urine Culture - Urine, Urine, Catheter [[id number]] Collected: 07/28/23 1604    Specimen: Urine, Catheter Updated: 07/28/23 1652    Ethanol [[id number]] Collected: 07/28/23 1600    Specimen: Blood Updated: 07/28/23 1648     Ethanol <10 mg/dL      Ethanol % <0.010 %     Acetaminophen Level [[id number]]  (Normal) Collected: 07/28/23 1600    Specimen: Blood Updated: 07/28/23 1648     Acetaminophen <5.0 mcg/mL     Salicylate Level [[id number]]  (Normal) Collected: 07/28/23 1600    Specimen: Blood Updated: 07/28/23 1648     Salicylate <0.3 mg/dL     Lactic Acid, Plasma [[id number]]  (Normal) Collected: 07/28/23 1626    Specimen: Blood Updated: 07/28/23 1646     Lactate 1.6 mmol/L     Single High Sensitivity Troponin T [[id number]]  (Abnormal) Collected: 07/28/23 1600    Specimen: Blood Updated: 07/28/23 1644     HS Troponin T 37 ng/L     Narrative:      High Sensitive Troponin T Reference Range:  <10.0 ng/L- Negative Female for AMI  <15.0 ng/L- Negative Male for AMI  >=10 - Abnormal Female indicating possible myocardial injury.  >=15 - Abnormal Male indicating possible  myocardial injury.   Clinicians would have to utilize clinical acumen, EKG, Troponin, and serial changes to determine if it is an Acute Myocardial Infarction or myocardial injury due to an underlying chronic condition.         COVID-19 and FLU A/B PCR - Swab, Nasopharynx [[id number]]  (Normal) Collected: 07/28/23 1605    Specimen: Swab from Nasopharynx Updated: 07/28/23 1642     COVID19 Not Detected     Influenza A PCR Not Detected     Influenza B PCR Not Detected    Narrative:      Fact sheet for providers: https://www.fda.gov/media/466370/download    Fact sheet for patients: https://www.fda.gov/media/788237/download    Test performed by PCR.    Blood Gas, Arterial - [[id number]]  (Abnormal) Collected: 07/28/23 1639    Specimen: Arterial Blood Updated: 07/28/23 1636     Site Right Brachial     Haroon's Test Positive     pH, Arterial 7.448 pH units      pCO2, Arterial 36.7 mm Hg      pO2, Arterial 71.3 mm Hg      Comment: 84 Value below reference range        HCO3, Arterial 25.4 mmol/L      Base Excess, Arterial 1.5 mmol/L      O2 Saturation, Arterial 96.2 %      Barometric Pressure for Blood Gas 749 mmHg      Modality Nasal Cannula     Flow Rate 2.0 lpm      Ventilator Mode NA     Collected by Wanda Saldivar     Comment: Meter: [id number]     :  [id number]       Urine Drug Screen - Urine, Clean Catch [[id number]]  (Normal) Collected: 07/28/23 1603    Specimen: Urine, Clean Catch Updated: 07/28/23 1636     THC, Screen, Urine Negative     Phencyclidine (PCP), Urine Negative     Cocaine Screen, Urine Negative     Methamphetamine, Ur Negative     Opiate Screen Negative     Amphetamine Screen, Urine Negative     Benzodiazepine Screen, Urine Negative     Tricyclic Antidepressants Screen Negative     Methadone Screen, Urine Negative     Barbiturates Screen, Urine Negative     Oxycodone Screen, Urine Negative     Propoxyphene Screen Negative     Buprenorphine, Screen, Urine Negative    Narrative:      Cutoff For  Drugs Screened:    Amphetamines               500 ng/ml  Barbiturates               200 ng/ml  Benzodiazepines            150 ng/ml  Cocaine                    150 ng/ml  Methadone                  200 ng/ml  Opiates                    100 ng/ml  Phencyclidine               25 ng/ml  THC                            50 ng/ml  Methamphetamine            500 ng/ml  Tricyclic Antidepressants  300 ng/ml  Oxycodone                  100 ng/ml  Propoxyphene               300 ng/ml  Buprenorphine               10 ng/ml    The normal value for all drugs tested is negative. This report includes unconfirmed screening results, with the cutoff values listed, to be used for medical treatment purposes only.  Unconfirmed results must not be used for non-medical purposes such as employment or legal testing.  Clinical consideration should be applied to any drug of abuse test, particularly when unconfirmed results are used.      Blood Culture - Blood, Arm, Right [[id number]] Collected: 07/28/23 1626    Specimen: Blood from Arm, Right Updated: 07/28/23 1629    Blood Culture - Blood, Arm, Left [[id number]] Collected: 07/28/23 1626    Specimen: Blood from Arm, Left Updated: 07/28/23 1629    Urinalysis With Culture If Indicated - Urine, Catheter [[id number]]  (Abnormal) Collected: 07/28/23 1604    Specimen: Urine, Catheter Updated: 07/28/23 1628     Color, UA Dark Yellow     Appearance, UA Clear     pH, UA 7.5     Specific Gravity, UA 1.018     Glucose, UA Negative     Ketones, UA Negative     Bilirubin, UA Negative     Blood, UA Negative     Protein, UA >=300 mg/dL (3+)     Leuk Esterase, UA Trace     Nitrite, UA Negative     Urobilinogen, UA 1.0 E.U./dL    Narrative:      In absence of clinical symptoms, the presence of pyuria, bacteria, and/or nitrites on the urinalysis result does not correlate with infection.    CBC & Differential [[id number]]  (Abnormal) Collected: 07/28/23 1600    Specimen: Blood Updated: 07/28/23 1620    Narrative:       The following orders were created for panel order CBC & Differential.  Procedure                               Abnormality         Status                     ---------                               -----------         ------                     CBC Auto Differential[[id number]]        Abnormal            Final result                 Please view results for these tests on the individual orders.    CBC Auto Differential [[id number]]  (Abnormal) Collected: 07/28/23 1600    Specimen: Blood Updated: 07/28/23 1620     WBC 15.96 10*3/mm3      RBC 4.69 10*6/mm3      Hemoglobin 12.9 g/dL      Hematocrit 38.3 %      MCV 81.7 fL      MCH 27.5 pg      MCHC 33.7 g/dL      RDW 14.7 %      RDW-SD 43.1 fl      MPV 9.8 fL      Platelets 284 10*3/mm3      Neutrophil % 86.8 %      Lymphocyte % 4.5 %      Monocyte % 5.5 %      Eosinophil % 1.6 %      Basophil % 0.6 %      Immature Grans % 1.0 %      Neutrophils, Absolute 13.87 10*3/mm3      Lymphocytes, Absolute 0.72 10*3/mm3      Monocytes, Absolute 0.87 10*3/mm3      Eosinophils, Absolute 0.25 10*3/mm3      Basophils, Absolute 0.09 10*3/mm3      Immature Grans, Absolute 0.16 10*3/mm3      nRBC 0.0 /100 WBC           Imaging Results (Last 48 Hours)       Procedure Component Value Units Date/Time    CT Chest Without Contrast Diagnostic [[id number]] Collected: 07/28/23 1603     Updated: 07/28/23 1619    Narrative:      INDICATION:  Possible breast abscess and uncertain renal function.    COMPARISON:  None relevant.    TECHNIQUE:  Helical CT of the chest was performed without intravenous contrast.  Multiplanar  reformations were provided.    FINDINGS:  Cardiac size is mildly enlarged.  Diffuse vascular calcification.  Moderate  hiatal hernia.  No pleural effusion or pneumothorax.  No dense airspace  consolidation.  No worrisome pulmonary nodules.      Impression:      1.  No acute intrathoracic process.  2.  Moderate hiatal hernia.  3.  Diffuse vascular calcification.        CT Head Without  Contrast [[id number]] Collected: 07/28/23 1602     Updated: 07/28/23 1619    Narrative:      INDICATION:  Mental status change, unknown cause.    COMPARISON:  None relevant.    TECHNIQUE:  Axial images were performed from the calvarium through the skull base followed  by 2D multiplanar reformats.  No contrast was administered.    FINDINGS:  No mass, mass effect or midline shift.  The ventricles are midline and  symmetric.  No abnormal extra-axial fluid collection.  Pituitary and posterior  fossa are within normal limits.  Calvarium is intact.  Mild scattered small  vessels can be changes..      Impression:      No acute intracranial process.                   I reviewed the patient's new clinical results.  I reviewed the patient's new imaging results and agree with the interpretation.  I reviewed the patient's other test results and agree with the interpretation        Assessment & Plan       Sepsis    83 yo woman with left breast cellulitis    - no drainable abscess, agree with continued antibiotics. No plans for surgical intervention      I discussed the patients findings and my recommendations with patient    Fox Roper MD  07/28/23  19:24 CDT

## 2023-07-29 NOTE — PLAN OF CARE
Goal Outcome Evaluation:         Patient admitted to floor at beginning of shift. Patient has been febrile. Patient became red around the neck and face, chills were present, temp was 101.9. After administering tylenol fever decreased to 99.2, redness decreased and chills subsided. Dr advised me to monitor for Kay Syndrome. Patient currently resting.

## 2023-07-29 NOTE — PROGRESS NOTES
"Anticoagulation by Pharmacy - Warfarin    Carol Sullivan is a 82 y.o.female who has been consulted for warfarin for atrial fibrillation.     Home regimen: Warfarin 2.5 mg PO daily, except 3.75 mg every Monday   INR Goal: 2-3    Objective:  [Ht: 165.1 cm (65\"); Wt: 70.9 kg (156 lb 4.8 oz)]    Lab Results   Component Value Date    INR 1.82 (H) 07/29/2023    INR 1.78 (H) 07/28/2023    INR 1.70 (A) 07/19/2023    PROTIME 20.8 (H) 07/29/2023    PROTIME 20.5 (H) 07/28/2023    PROTIME 13.4 (H) 07/11/2023     Lab Results   Component Value Date    HGB 13.6 07/29/2023    HGB 12.9 07/28/2023    HGB 12.2 07/27/2021    HCT 42.5 07/29/2023    HCT 38.3 07/28/2023    HCT 38.0 07/27/2021     07/29/2023     07/28/2023     07/27/2021       Recent Warfarin Administrations       The 5 most recent administrations since 07/22/2023 are shown below each listed medication.    Warfarin Sodium         Order Dose Date Given     warfarin (COUMADIN) tablet 2.5 mg 2.5 mg 07/28/2023                    Assessment  H/H/plts WNL. No signs or symptoms of bleeding noted.   Interacting medications: ceftriaxone, duloxetine  INR is subtherapeutic. Continue home regimen.      Plan:  Give warfarin 2.5 mg PO @ 1800 tonight  PT/INR ordered daily.  Pharmacy will continue to follow    Thank you for this consult.     Hakan Scott, [facility]  07/29/23 14:21 CDT     "

## 2023-07-29 NOTE — PROGRESS NOTES
[facility] Medicine Services  INPATIENT PROGRESS NOTE    Length of Stay: 1  Date of Admission: 7/28/2023  Primary Care Physician: Len Seo MD    Subjective   Chief Complaint: No complaints    HPI:  This is an 82 year old female with PMH of PAF (on Coumadin), HTN, lumbar spondylosis and HLD that presented to [facility] on 7/28/2023 secondary to fever, chills, confusion and left breast redness.       Patient was febrile (103.1) on admission.  Creatinine elevated at 1.49, which may be chronic as it was 1.5 last year. Urine with 2+ bacteria, trace leukocytes and 1+ yeast. CT of the head showed no acute findings.  CT of the chest negative for abscess.      Review of Systems   Constitutional:  Negative for activity change and fatigue.   HENT:  Negative for ear pain and sore throat.    Eyes:  Negative for pain and discharge.   Respiratory:  Negative for cough and shortness of breath.    Cardiovascular:  Negative for chest pain and palpitations.   Gastrointestinal:  Negative for abdominal pain and nausea.   Endocrine: Negative for cold intolerance and heat intolerance.   Genitourinary:  Negative for difficulty urinating and dysuria.   Musculoskeletal:  Negative for arthralgias and gait problem.   Skin:  Negative for color change and rash.   Neurological:  Negative for dizziness and weakness.   Psychiatric/Behavioral:  Negative for agitation and confusion.           Objective    Temp:  [97.1 øF (36.2 øC)-103.1 øF (39.5 øC)] 97.1 øF (36.2 øC)  Heart Rate:  [60-80] 67  Resp:  [16-20] 18  BP: (104-171)/(51-82) 118/58    Physical Exam  Constitutional:       Appearance: She is well-developed.   HENT:      Head: Normocephalic and atraumatic.   Eyes:      Pupils: Pupils are equal, round, and reactive to light.   Cardiovascular:      Rate and Rhythm: Normal rate and regular rhythm.   Pulmonary:      Effort: Pulmonary effort is normal.      Breath sounds: Normal breath sounds.   Abdominal:      General: Bowel sounds  are normal.      Palpations: Abdomen is soft.   Musculoskeletal:         General: Normal range of motion.      Cervical back: Normal range of motion and neck supple.   Skin:     General: Skin is warm and dry.      Comments: Left breast:  Erythema improved from yesterday.    Neurological:      Mental Status: She is alert and oriented to person, place, and time.   Psychiatric:         Behavior: Behavior normal.       Results Review:  I have reviewed the labs, radiology results, and diagnostic studies.    Laboratory Data:   Results from last 7 days   Lab Units 07/29/23  0508 07/28/23  1600   SODIUM mmol/L 136 134*   POTASSIUM mmol/L 3.8 4.2   CHLORIDE mmol/L 99 96*   CO2 mmol/L 20.0* 25.0   BUN mg/dL 23 24*   CREATININE mg/dL 1.30* 1.49*   GLUCOSE mg/dL 129* 121*   CALCIUM mg/dL 8.5* 8.5*   BILIRUBIN mg/dL  --  0.6   ALK PHOS U/L  --  121*   ALT (SGPT) U/L  --  14   AST (SGOT) U/L  --  29   ANION GAP mmol/L 17.0* 13.0     Estimated Creatinine Clearance: 33.5 mL/min (A) (by C-G formula based on SCr of 1.3 mg/dL (H)).  Results from last 7 days   Lab Units 07/28/23  1600   MAGNESIUM mg/dL 1.6         Results from last 7 days   Lab Units 07/29/23  0508 07/28/23  1600   WBC 10*3/mm3 20.31* 15.96*   HEMOGLOBIN g/dL 13.6 12.9   HEMATOCRIT % 42.5 38.3   PLATELETS 10*3/mm3 276 284     Results from last 7 days   Lab Units 07/29/23  0508 07/28/23  1600   INR  1.82* 1.78*       Culture Data:   No results found for: BLOODCX  No results found for: URINECX  No results found for: RESPCX  No results found for: WOUNDCX  No results found for: STOOLCX  No components found for: BODYFLD    Radiology Data:   Imaging Results (Last 24 Hours)       Procedure Component Value Units Date/Time    CT Chest Without Contrast Diagnostic [[id number]] Collected: 07/28/23 1603     Updated: 07/28/23 1619    Narrative:      INDICATION:  Possible breast abscess and uncertain renal function.    COMPARISON:  None relevant.    TECHNIQUE:  Helical CT of the chest  was performed without intravenous contrast.  Multiplanar  reformations were provided.    FINDINGS:  Cardiac size is mildly enlarged.  Diffuse vascular calcification.  Moderate  hiatal hernia.  No pleural effusion or pneumothorax.  No dense airspace  consolidation.  No worrisome pulmonary nodules.      Impression:      1.  No acute intrathoracic process.  2.  Moderate hiatal hernia.  3.  Diffuse vascular calcification.        CT Head Without Contrast [[id number]] Collected: 07/28/23 1602     Updated: 07/28/23 1619    Narrative:      INDICATION:  Mental status change, unknown cause.    COMPARISON:  None relevant.    TECHNIQUE:  Axial images were performed from the calvarium through the skull base followed  by 2D multiplanar reformats.  No contrast was administered.    FINDINGS:  No mass, mass effect or midline shift.  The ventricles are midline and  symmetric.  No abnormal extra-axial fluid collection.  Pituitary and posterior  fossa are within normal limits.  Calvarium is intact.  Mild scattered small  vessels can be changes..      Impression:      No acute intracranial process.                  I have reviewed the patient's current medications.     Assessment/Plan     Active Hospital Problems    Diagnosis     **Sepsis        Plan:    Plan:   Sepsis secondary to left breast mastitis/ UTI:  Continue IVF and broad-spectrum antibiotics (Vancomycin/ Rocephin).  CT ruled out abscess.    Mastitis, left breast: No open wounds noted.  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 Peterson for this.  She denies any previous surgical intervention. General surgery and wound care consults appreciated.   Hypertension: Continue home Losartan, Procardia XL, Imdur and metoprolol.    Hyperlipidemia: Continue home statin.   Paroxsymal atrial fibrillation: Continue home beta-blocker, Procardia XL and Coumadin, pharmacy to dose.  INR daily.   Acute kidney injury, possibly chronic:  Nephrology consult appreciated.   Urinary  tract infection with candida:   Continue IV Rocephin. Urine culture pending.  GERD:  Continue PPI.       VTE PPX:  Coumadin      Medical Decision Making  Number and Complexity of problems: 8/High  Differential Diagnosis: N/A     Conditions and Status:        Condition is improving.     [facility] Data  External documents reviewed: Yes  My EKG interpretation: A fib/ pacemaker  My CT interpretation: As per radiology  Tests considered but not ordered: None     Decision rules/scores evaluated (example [id number], Wells, etc): N/A     Discussed with: Patient/ family     Treatment Plan  As above     Care Planning  Shared decision making: Yes  Code status and discussions: Full     Disposition  Social Determinants of Health that impact treatment or disposition: None  I expect the patient to be discharged to home in 2-3 days.      I discussed the patient's findings and my recommendations with: Patient     I confirmed that the patient's Advance Care Plan is present, code status is documented, or surrogate decision maker is listed in the patient's medical record.      I have utilized all available immediate resources to obtain, update, or review the patient's current medications (including all prescriptions, over-the-counter products, herbals, cannabis/cannabidiol products, and vitamin/mineral/dietary (nutritional) supplements).                      This document has been electronically signed by COY Vázquez on July 29, 2023 12:59 CDT

## 2023-07-29 NOTE — PROGRESS NOTES
"Pharmacokinetics by Pharmacy - Vancomycin    Carol Sullivan is a 82 y.o. female initiated on vancomycin (day 2) for SSTI.  Patient is also receiving ceftriaxone.    Objective:    Temp Readings from Last 1 Encounters:   07/29/23 97.1 øF (36.2 øC) (Axillary)     WBC   Date Value Ref Range Status   07/29/2023 20.31 (H) 3.40 - 10.80 10*3/mm3 Final   07/28/2023 15.96 (H) 3.40 - 10.80 10*3/mm3 Final      C-Reactive Protein   Date Value Ref Range Status   07/28/2023 19.47 (H) 0.00 - 0.50 mg/dL Final     Lactate   Date Value Ref Range Status   07/28/2023 1.6 0.5 - 2.0 mmol/L Final      Creatinine   Date Value Ref Range Status   07/29/2023 1.30 (H) 0.57 - 1.00 mg/dL Final   07/28/2023 1.49 (H) 0.57 - 1.00 mg/dL Final       No results found for: OTILIA, VANCSIDNEY, VANCORANDOM    Culture Results:  Microbiology Results (last 10 days)       Procedure Component Value - Date/Time    COVID-19 and FLU A/B PCR - Swab, Nasopharynx [[id number]]  (Normal) Collected: 07/28/23 1605    Lab Status: Final result Specimen: Swab from Nasopharynx Updated: 07/28/23 1642     COVID19 Not Detected     Influenza A PCR Not Detected     Influenza B PCR Not Detected    Narrative:      Fact sheet for providers: https://www.fda.gov/media/283881/download    Fact sheet for patients: https://www.fda.gov/media/705157/download    Test performed by PCR.          No results found for: RESPCX    [Ht: 165.1 cm (65\"); Wt: 70.9 kg (156 lb 4.8 oz)]   Body mass index is 26.01 kg/mý.  Ideal body weight: 57 kg (125 lb 10.6 oz)  Adjusted ideal body weight: 62.6 kg (137 lb 14.7 oz)      Assessment:  Mastitis. No surgical intervention at this time.    WBCs elevated, febrile yesterday, procal and CRP elevated  Creatinine 1.3 mcg/mL and improving. Baseline unknown.   Cultures  7/28 blood culture pending  7/28 urine culture pending   AUC and trough goals are 400-600 and 10-20 mcg/mL, respectively.     Plan:  Start Vancomycin 750 mg IV q24hrs, estimated AUC and trough " of 469 and 13.2 mcg/mL, respectively.  Vancomycin levels when clinically indicated. Consulted until 8/4.  Pharmacy will monitor renal function and adjust dose accordingly.    Thank you for this consult.     Hakan Scott [facility]   07/29/23 12:40 CDT

## 2023-07-30 ENCOUNTER — APPOINTMENT (OUTPATIENT)
Dept: ULTRASOUND IMAGING | Facility: HOSPITAL | Age: 83
DRG: 853 | End: 2023-07-30
Payer: MEDICARE

## 2023-07-30 ENCOUNTER — APPOINTMENT (OUTPATIENT)
Dept: GENERAL RADIOLOGY | Facility: HOSPITAL | Age: 83
DRG: 853 | End: 2023-07-30
Payer: MEDICARE

## 2023-07-30 LAB
ANION GAP SERPL CALCULATED.3IONS-SCNC: 13 MMOL/L (ref 5–15)
BACTERIA SPEC AEROBE CULT: ABNORMAL
BASOPHILS # BLD AUTO: 0.09 10*3/MM3 (ref 0–0.2)
BASOPHILS NFR BLD AUTO: 0.6 % (ref 0–1.5)
BUN SERPL-MCNC: 21 MG/DL (ref 8–23)
BUN/CREAT SERPL: 17.8 (ref 7–25)
C DIFF GDH + TOXINS A+B STL QL IA.RAPID: NEGATIVE
CALCIUM SPEC-SCNC: 8 MG/DL (ref 8.6–10.5)
CHLORIDE SERPL-SCNC: 100 MMOL/L (ref 98–107)
CO2 SERPL-SCNC: 21 MMOL/L (ref 22–29)
CREAT SERPL-MCNC: 1.18 MG/DL (ref 0.57–1)
CRP SERPL-MCNC: 25.85 MG/DL (ref 0–0.5)
DEPRECATED RDW RBC AUTO: 44.3 FL (ref 37–54)
EGFRCR SERPLBLD CKD-EPI 2021: 46.5 ML/MIN/1.73
EOSINOPHIL # BLD AUTO: 0.05 10*3/MM3 (ref 0–0.4)
EOSINOPHIL NFR BLD AUTO: 0.3 % (ref 0.3–6.2)
ERYTHROCYTE [DISTWIDTH] IN BLOOD BY AUTOMATED COUNT: 14.6 % (ref 12.3–15.4)
GLUCOSE SERPL-MCNC: 132 MG/DL (ref 65–99)
HCT VFR BLD AUTO: 34.1 % (ref 34–46.6)
HGB BLD-MCNC: 11.2 G/DL (ref 12–15.9)
IMM GRANULOCYTES # BLD AUTO: 0.15 10*3/MM3 (ref 0–0.05)
IMM GRANULOCYTES NFR BLD AUTO: 1 % (ref 0–0.5)
INR PPP: 2.51 (ref 0.8–1.2)
LYMPHOCYTES # BLD AUTO: 0.6 10*3/MM3 (ref 0.7–3.1)
LYMPHOCYTES NFR BLD AUTO: 3.9 % (ref 19.6–45.3)
MCH RBC QN AUTO: 27.1 PG (ref 26.6–33)
MCHC RBC AUTO-ENTMCNC: 32.8 G/DL (ref 31.5–35.7)
MCV RBC AUTO: 82.4 FL (ref 79–97)
MONOCYTES # BLD AUTO: 0.85 10*3/MM3 (ref 0.1–0.9)
MONOCYTES NFR BLD AUTO: 5.5 % (ref 5–12)
NEUTROPHILS NFR BLD AUTO: 13.61 10*3/MM3 (ref 1.7–7)
NEUTROPHILS NFR BLD AUTO: 88.7 % (ref 42.7–76)
NRBC BLD AUTO-RTO: 0 /100 WBC (ref 0–0.2)
PLATELET # BLD AUTO: 244 10*3/MM3 (ref 140–450)
PMV BLD AUTO: 9.7 FL (ref 6–12)
POTASSIUM SERPL-SCNC: 3.5 MMOL/L (ref 3.5–5.2)
PROTHROMBIN TIME: 26.5 SECONDS (ref 11.1–15.3)
RBC # BLD AUTO: 4.14 10*6/MM3 (ref 3.77–5.28)
SODIUM SERPL-SCNC: 134 MMOL/L (ref 136–145)
SODIUM UR-SCNC: 26 MMOL/L
WBC NRBC COR # BLD: 15.35 10*3/MM3 (ref 3.4–10.8)

## 2023-07-30 PROCEDURE — 93010 ELECTROCARDIOGRAM REPORT: CPT | Performed by: INTERNAL MEDICINE

## 2023-07-30 PROCEDURE — 84300 ASSAY OF URINE SODIUM: CPT

## 2023-07-30 PROCEDURE — 97162 PT EVAL MOD COMPLEX 30 MIN: CPT

## 2023-07-30 PROCEDURE — 76642 ULTRASOUND BREAST LIMITED: CPT

## 2023-07-30 PROCEDURE — 76775 US EXAM ABDO BACK WALL LIM: CPT

## 2023-07-30 PROCEDURE — 99232 SBSQ HOSP IP/OBS MODERATE 35: CPT | Performed by: STUDENT IN AN ORGANIZED HEALTH CARE EDUCATION/TRAINING PROGRAM

## 2023-07-30 PROCEDURE — 73502 X-RAY EXAM HIP UNI 2-3 VIEWS: CPT

## 2023-07-30 PROCEDURE — 80048 BASIC METABOLIC PNL TOTAL CA: CPT | Performed by: NURSE PRACTITIONER

## 2023-07-30 PROCEDURE — 93005 ELECTROCARDIOGRAM TRACING: CPT | Performed by: PHYSICIAN ASSISTANT

## 2023-07-30 PROCEDURE — 85610 PROTHROMBIN TIME: CPT | Performed by: NURSE PRACTITIONER

## 2023-07-30 PROCEDURE — G0378 HOSPITAL OBSERVATION PER HR: HCPCS

## 2023-07-30 PROCEDURE — 86140 C-REACTIVE PROTEIN: CPT | Performed by: NURSE PRACTITIONER

## 2023-07-30 PROCEDURE — 85025 COMPLETE CBC W/AUTO DIFF WBC: CPT | Performed by: NURSE PRACTITIONER

## 2023-07-30 PROCEDURE — 25010000002 PIPERACILLIN SOD-TAZOBACTAM PER 1 G: Performed by: NURSE PRACTITIONER

## 2023-07-30 RX ORDER — FAMOTIDINE 20 MG/1
20 TABLET, FILM COATED ORAL
Status: DISCONTINUED | OUTPATIENT
Start: 2023-07-30 | End: 2023-08-06

## 2023-07-30 RX ORDER — KETOTIFEN FUMARATE 0.35 MG/ML
1 SOLUTION/ DROPS OPHTHALMIC DAILY PRN
Status: DISCONTINUED | OUTPATIENT
Start: 2023-07-30 | End: 2023-08-17 | Stop reason: HOSPADM

## 2023-07-30 RX ORDER — TRAMADOL HYDROCHLORIDE 50 MG/1
50 TABLET ORAL EVERY 8 HOURS
Status: DISCONTINUED | OUTPATIENT
Start: 2023-07-30 | End: 2023-08-05

## 2023-07-30 RX ORDER — ENOXAPARIN SODIUM 100 MG/ML
1 INJECTION SUBCUTANEOUS EVERY 12 HOURS
Status: DISCONTINUED | OUTPATIENT
Start: 2023-07-31 | End: 2023-08-04

## 2023-07-30 RX ADMIN — PIPERACILLIN SODIUM AND TAZOBACTAM SODIUM 3.38 G: 3; .375 INJECTION, POWDER, LYOPHILIZED, FOR SOLUTION INTRAVENOUS at 17:57

## 2023-07-30 RX ADMIN — NIFEDIPINE 60 MG: 60 TABLET, EXTENDED RELEASE ORAL at 09:05

## 2023-07-30 RX ADMIN — VANCOMYCIN HYDROCHLORIDE 125 MG: KIT at 00:06

## 2023-07-30 RX ADMIN — VANCOMYCIN HYDROCHLORIDE 125 MG: KIT at 11:41

## 2023-07-30 RX ADMIN — METOPROLOL SUCCINATE 50 MG: 50 TABLET, EXTENDED RELEASE ORAL at 09:06

## 2023-07-30 RX ADMIN — ACETAMINOPHEN 1000 MG: 500 TABLET, FILM COATED ORAL at 09:13

## 2023-07-30 RX ADMIN — NYSTATIN: 100000 POWDER TOPICAL at 20:29

## 2023-07-30 RX ADMIN — Medication 10 ML: at 09:06

## 2023-07-30 RX ADMIN — DULOXETINE HYDROCHLORIDE 60 MG: 60 CAPSULE, DELAYED RELEASE ORAL at 09:05

## 2023-07-30 RX ADMIN — PIPERACILLIN SODIUM AND TAZOBACTAM SODIUM 3.38 G: 3; .375 INJECTION, POWDER, LYOPHILIZED, FOR SOLUTION INTRAVENOUS at 11:41

## 2023-07-30 RX ADMIN — FAMOTIDINE 20 MG: 20 TABLET ORAL at 09:05

## 2023-07-30 RX ADMIN — ROSUVASTATIN CALCIUM 40 MG: 20 TABLET, FILM COATED ORAL at 09:06

## 2023-07-30 RX ADMIN — VANCOMYCIN HYDROCHLORIDE 125 MG: KIT at 05:49

## 2023-07-30 RX ADMIN — FAMOTIDINE 20 MG: 20 TABLET ORAL at 17:57

## 2023-07-30 RX ADMIN — ISOSORBIDE MONONITRATE 30 MG: 30 TABLET, EXTENDED RELEASE ORAL at 09:06

## 2023-07-30 RX ADMIN — LOSARTAN POTASSIUM 100 MG: 50 TABLET, FILM COATED ORAL at 09:04

## 2023-07-30 RX ADMIN — KETOTIFEN FUMARATE 1 DROP: 0.25 SOLUTION OPHTHALMIC at 16:00

## 2023-07-30 RX ADMIN — Medication 10 ML: at 20:30

## 2023-07-30 RX ADMIN — TRAMADOL HYDROCHLORIDE 50 MG: 50 TABLET, COATED ORAL at 11:41

## 2023-07-30 RX ADMIN — ACETAMINOPHEN 1000 MG: 500 TABLET, FILM COATED ORAL at 18:41

## 2023-07-30 RX ADMIN — NYSTATIN: 100000 POWDER TOPICAL at 09:06

## 2023-07-30 RX ADMIN — TRAMADOL HYDROCHLORIDE 50 MG: 50 TABLET ORAL at 03:53

## 2023-07-30 RX ADMIN — SODIUM CHLORIDE 75 ML/HR: 9 INJECTION, SOLUTION INTRAVENOUS at 09:13

## 2023-07-30 NOTE — THERAPY EVALUATION
"Patient Name: Carol Sullivan  : 1940    MRN: [id number]                              Today's Date: 2023       Admit Date: 2023    Visit Dx:     ICD-10-CM ICD-9-CM   1. Somnolence  R40.0 780.09   2. Mastitis  N61.0 611.0   3. Sepsis, due to unspecified organism, unspecified whether acute organ dysfunction present  A41.9 038.9     995.91   4. Impaired functional mobility, balance, gait, and endurance [Z74.09 (ICD-10-CM)]  Z74.09 V49.89     Patient Active Problem List   Diagnosis    Hypertension    Hyperlipidemia    Near syncope    GERD (gastroesophageal reflux disease)    Palpitation    PVC (premature ventricular contraction)    Breast calcifications on mammogram    Paroxysmal atrial fibrillation    Peripheral vascular disease, unspecified    Type 2 diabetes mellitus with other specified complication    Long term (current) use of anticoagulants    Encounter for therapeutic drug monitoring    Sepsis     Past Medical History:   Diagnosis Date    Arthritis     Depression     GERD (gastroesophageal reflux disease)     Hyperlipidemia     Hypertension     Near syncope     Vascular disease      Past Surgical History:   Procedure Laterality Date    BLADDER SURGERY      ENDOSCOPY N/A 2019    Procedure: ESOPHAGOGASTRODUODENOSCOPY;  Surgeon: Alberto Sanchez DO;  Location: [facility] ENDOSCOPY;  Service: Gastroenterology    GALLBLADDER SURGERY      SHOULDER SURGERY      \"bone spurs\"    SUBTOTAL HYSTERECTOMY        General Information       Row Name 23 1353          Physical Therapy Time and Intention    Document Type evaluation  -CZ     Mode of Treatment physical therapy  -CZ       Row Name 23 1353          General Information    Patient Profile Reviewed yes  -CZ     Prior Level of Function independent:  -CZ     Existing Precautions/Restrictions fall  -CZ     Barriers to Rehab medically complex  -CZ       Row Name 23 1354          Living Environment    People in Home spouse  -CZ       Row " Name 07/30/23 135          Home Main Entrance    Number of Stairs, Main Entrance two  -CZ     Stair Railings, Main Entrance railings on both sides of stairs  -CZ       Row Name 07/30/23 135          Stairs Within Home, Primary    Stairs, Within Home, Primary (I) ambulation wihout an AD.  -CZ     Number of Stairs, Within Home, Primary none  -CZ       Row Name 07/30/23 1353          Cognition    Orientation Status (Cognition) oriented x 4  -CZ       Row Name 07/30/23 1353          Safety Issues, Functional Mobility    Impairments Affecting Function (Mobility) strength;endurance/activity tolerance;balance;pain  -CZ               User Key  (r) = Recorded By, (t) = Taken By, (c) = Cosigned By      Initials Name Provider Type    CZ Dionte Kendrick, PT Physical Therapist                   Mobility       Row Name 07/30/23 [facility]          Bed Mobility    Bed Mobility supine-sit;sit-supine  -CZ     Supine-Sit [place] (Bed Mobility) modified independence  -CZ     Sit-Supine [place] (Bed Mobility) modified independence  -CZ     Assistive Device (Bed Mobility) head of bed elevated  -CZ       Row Name 07/30/23 [facility]          Sit-Stand Transfer    Sit-Stand [place] (Transfers) contact guard  -CZ     Assistive Device (Sit-Stand Transfers) walker, front-wheeled  -CZ       Row Name 07/30/23 [facility]          Gait/Stairs (Locomotion)    [place] Level (Gait) contact guard  -CZ     Assistive Device (Gait) walker, front-wheeled  -CZ     Distance in Feet (Gait) 15'x1.  -CZ     Comment, (Gait/Stairs) Good use of walker, good posture, unsteady with turns.  -CZ               User Key  (r) = Recorded By, (t) = Taken By, (c) = Cosigned By      Initials Name Provider Type    CZ Dionte Kendrick, PT Physical Therapist                   Obj/Interventions       Row Name 07/30/23 1353          Range of Motion Comprehensive    General Range of Motion bilateral lower extremity ROM WFL  -CZ       Row Name 07/30/23 [facility]           Strength Comprehensive (MMT)    Comment, General Manual Muscle Testing (MMT) Assessment RLE: 3/5, limited by hip pain after recent falll. LLE: 3+/5 grossly.  -CZ       Row Name 07/30/23 [facility]          Sensory Assessment (Somatosensory)    Sensory Assessment (Somatosensory) bilateral LE  -CZ     Bilateral LE Sensory Assessment light touch awareness;impaired  -CZ               User Key  (r) = Recorded By, (t) = Taken By, (c) = Cosigned By      Initials Name Provider Type    CZ Dionte Kendrick, PT Physical Therapist                   Goals/Plan       Row Name 07/30/23 [facility]          Bed Mobility Goal 1 (PT)    Activity/Assistive Device (Bed Mobility Goal 1, PT) sit to supine/supine to sit  -CZ     [place] Level/Cues Needed (Bed Mobility Goal 1, PT) independent  -CZ     Time Frame (Bed Mobility Goal 1, PT) by discharge  -CZ     Progress/Outcomes (Bed Mobility Goal 1, PT) goal not met  -CZ       Row Name 07/30/23 [facility]          Transfer Goal 1 (PT)    Activity/Assistive Device (Transfer Goal 1, PT) sit-to-stand/stand-to-sit;bed-to-chair/chair-to-bed;walker, rolling  -CZ     [place] Level/Cues Needed (Transfer Goal 1, PT) modified independence  -CZ     Time Frame (Transfer Goal 1, PT) by discharge  -CZ     Progress/Outcome (Transfer Goal 1, PT) goal not met  -CZ       Row Name 07/30/23 [facility]          Gait Training Goal 1 (PT)    Activity/Assistive Device (Gait Training Goal 1, PT) walker, rolling  -CZ     [place] Level (Gait Training Goal 1, PT) modified independence  -CZ     Distance (Gait Training Goal 1, PT) 30'x3.  -CZ     Time Frame (Gait Training Goal 1, PT) by discharge  -CZ     Strategies/Barriers (Gait Training Goal 1, PT) May advance to (I) without an AD (PLOF).  -CZ     Progress/Outcome (Gait Training Goal 1, PT) goal not met  -CZ               User Key  (r) = Recorded By, (t) = Taken By, (c) = Cosigned By      Initials Name Provider Type    Dionte Smith, PT Physical Therapist                    Clinical Impression       Row Name 07/30/23 1356          Pain    Pretreatment Pain Rating 0/10 - no pain  -CZ     Posttreatment Pain Rating 3/10  -CZ     Pain Location - Side/Orientation Right  -CZ     Pain Location - hip  -CZ     Pre/Posttreatment Pain Comment Recent fall, hip pain, x-rays negative, no pain at rest.  -CZ     Pain Intervention(s) Medication (See MAR);Repositioned;Ambulation/increased activity;Distraction  -CZ       Row Name 07/30/23 135          Plan of Care Review    Plan of Care Reviewed With patient  -CZ     Outcome Evaluation Initial PT evaluation complete.  Patient is alert and cooperative. She demonstrates (I) with bed mobility, requires CGA with transfers and gait, ambulating 15'x1 with FWW, good posture, good use of walker, unsteady with turns.  Tinetti assessed: 19/28 or moderate fall risk.  Recommend use of FWW with gait as patient demonstrates poor stepping response to perturbation. Patient has a FWW at home, would benefit from HHPT to improve activity tolerance and lower fall risk.  Recent fall with R hip pain, no fracture.  Patient denies pain at rest (reports she just received pain medication), reports 3/10 R hip pain with gait.  Goals established, continue skilled I/P PT.  -CZ       Row Name 07/30/23 9038          Therapy Assessment/Plan (PT)    Rehab Potential (PT) good, to achieve stated therapy goals  -CZ     Criteria for Skilled Interventions Met (PT) yes;skilled treatment is necessary  -     Therapy Frequency (PT) 5 times/wk  -       Row Name 07/30/23 0407          Positioning and Restraints    Pre-Treatment Position in bed  -CZ     Post Treatment Position bed  -CZ     In Bed supine;call light within reach;encouraged to call for assist;exit alarm on  -CZ               User Key  (r) = Recorded By, (t) = Taken By, (c) = Cosigned By      Initials Name Provider Type    CZ Dionte Kendrick, PT Physical Therapist                   Outcome Measures       Row Name 07/30/23  "1353 07/30/23 0856       How much help from another person do you currently need...    Turning from your back to your side while in flat bed without using bedrails? 4  -CZ 4  -AG    Moving from lying on back to sitting on the side of a flat bed without bedrails? 4  -CZ 4  -AG    Moving to and from a bed to a chair (including a wheelchair)? 3  -CZ 3  -AG    Standing up from a chair using your arms (e.g., wheelchair, bedside chair)? 3  -CZ 3  -AG    Climbing 3-5 steps with a railing? 3  -CZ 3  -AG    To walk in hospital room? 3  -CZ 3  -AG    AM-PAC 6 Clicks Score (PT) 20  -CZ 20  -AG    Highest level of mobility 6 --> Walked 10 steps or more  -CZ 6 --> Walked 10 steps or more  -AG      Row Name 07/30/23 0988          Tinetti Assessment    Tinetti Assessment yes  -CZ     Sitting Balance 1  -CZ     Arises 2  -CZ     Attempts to Rise 2  -CZ     Immediate Standing Balance (first 5 sec) 2  -CZ     Standing Balance 1  -CZ     Sternal Nudge (feet close together) 0  -CZ     Eyes Closed (feet close together) 0  -CZ     Turning 360 Degrees- Steps 0  -CZ     Turning 360 Degrees- Steadiness 0  -CZ     Sitting Down 2  -CZ     Tinetti Balance Score 10  -CZ     Gait Initiation (immediate after told \"go\") 1  -CZ     Step Length- Right Swing 1  -CZ     Step Length- Left Swing 1  -CZ     Foot Clearance- Right Foot 1  -CZ     Foot Clearance- Left Foot 1  -CZ     Step Symmetry 1  -CZ     Step Continuity 1  -CZ     Path (excursion) 1  -CZ     Trunk 0  -CZ     Base of Support 1  -CZ     Gait Score 9  -CZ     Tinetti Total Score 19  -CZ     Tinetti Assistive Device rolling walker  -CZ     Tinetti Assessment Comments Decreased stepping response to perturbation.  -CZ       Row Name 07/30/23 5623          Functional Assessment    Outcome Measure Options AM-PAC 6 Clicks Basic Mobility (PT);Tinetti  -CZ               User Key  (r) = Recorded By, (t) = Taken By, (c) = Cosigned By      Initials Name Provider Type    Britni Allen RN " Registered Nurse    Dionte Smith, PT Physical Therapist                                 Physical Therapy Education       Title: PT OT SLP Therapies (In Progress)       Topic: Physical Therapy (In Progress)       Point: Mobility training (Not Started)       Learner Progress:  Not documented in this visit.              Point: Home exercise program (Not Started)       Learner Progress:  Not documented in this visit.              Point: Body mechanics (Not Started)       Learner Progress:  Not documented in this visit.              Point: Precautions (Done)       Learning Progress Summary             Patient Acceptance, E, VU by ASHER at 7/30/2023 3866    Comment: Xavier assessed: 19/28 or moderate fall risk, recommend FWW with gait.                                         User Key       Initials Effective Dates Name Provider Type Discipline    ASHER 07/11/23 -  Dionte Kendrick, PT Physical Therapist PT                  PT Recommendation and Plan     Plan of Care Reviewed With: patient  Outcome Evaluation: Initial PT evaluation complete.  Patient is alert and cooperative. She demonstrates (I) with bed mobility, requires CGA with transfers and gait, ambulating 15'x1 with FWW, good posture, good use of walker, unsteady with turns.  Tinetti assessed: 19/28 or moderate fall risk.  Recommend use of FWW with gait as patient demonstrates poor stepping response to perturbation. Patient has a FWW at home, would benefit from HHPT to improve activity tolerance and lower fall risk.  Recent fall with R hip pain, no fracture.  Patient denies pain at rest (reports she just received pain medication), reports 3/10 R hip pain with gait.  Goals established, continue skilled I/P PT.     Time Calculation:   PT Evaluation Complexity  History, PT Evaluation Complexity: 1-2 personal factors and/or comorbidities  Examination of Body Systems (PT Eval Complexity): total of 3 or more elements  Clinical Presentation (PT Evaluation Complexity):  evolving  Clinical Decision Making (PT Evaluation Complexity): moderate complexity  Overall Complexity (PT Evaluation Complexity): moderate complexity     PT Charges       Row Name 07/30/23 1441             Time Calculation    Start Time 1353  -CZ      Stop Time 1432  -CZ      Time Calculation (min) 39 min  -CZ      PT Received On 07/30/23  -CZ      PT Goal Re-Cert Due Date 08/12/23  -CZ         Untimed Charges    PT Eval/Re-eval Minutes 39  -CZ         Total Minutes    Untimed Charges Total Minutes 39  -CZ       Total Minutes 39  -CZ                User Key  (r) = Recorded By, (t) = Taken By, (c) = Cosigned By      Initials Name Provider Type    CZ Dionte Kendrick, PT Physical Therapist                  Therapy Charges for Today       Code Description Service Date Service Provider Modifiers Qty    89822303146 HC PT EVAL MOD COMPLEXITY 3 7/30/2023 Dionte Kendrick, PT GP 1            PT G-Codes  Outcome Measure Options: AM-PAC 6 Clicks Basic Mobility (PT), Tinetti  AM-PAC 6 Clicks Score (PT): 20  Tinetti Total Score: 19  PT Discharge Summary  Anticipated Discharge Disposition (PT): home with assist, home with home health    Dionte Kendrick, PT  7/30/2023

## 2023-07-30 NOTE — PROGRESS NOTES
Complaining of hip pain and takes tramadol at home.  Wrote for as needed tramadol.    Patient Vitals for the past 24 hrs:   BP Temp Temp src Pulse Resp SpO2   07/29/23 1937 131/63 99.3 øF (37.4 øC) Temporal 84 18 97 %   07/29/23 1750 -- 98.8 øF (37.1 øC) Oral -- -- --   07/29/23 1509 -- -- -- 67 -- --   07/29/23 1457 107/54 99.2 øF (37.3 øC) Oral 75 18 95 %   07/29/23 1111 118/58 97.1 øF (36.2 øC) Axillary 67 18 96 %   07/29/23 0740 -- -- -- 69 -- --   07/29/23 0731 104/51 98.8 øF (37.1 øC) Oral 66 16 95 %   07/29/23 0325 113/55 98.6 øF (37 øC) Temporal 60 16 91 %   07/29/23 0045 -- 99.2 øF (37.3 øC) Oral -- -- --   07/28/23 2345 -- (!) 101.9 øF (38.8 øC) Oral -- -- --      CBC          7/28/2023    16:00 7/29/2023    05:08   CBC   WBC 15.96  20.31    RBC 4.69  4.97    Hemoglobin 12.9  13.6    Hematocrit 38.3  42.5    MCV 81.7  85.5    MCH 27.5  27.4    MCHC 33.7  32.0    RDW 14.7  14.8    Platelets 284  276       BMP          10/27/2022    12:14 7/28/2023    16:00 7/29/2023    05:08   BMP   Glucose 104         BUN 27     24  23    Creatinine 1.5     1.49  1.30    Sodium 142     134  136    Potassium 4.1     4.2  3.8    Chloride 103     96  99    CO2 30     25.0  20.0    Calcium 9.1     8.5  8.5       Details          This result is from an external source.                Addendum 7/29/2023 11:07 PM  Stool PCR positive for C. difficile.  We will start p.o. vancomycin and place patient on seizure precautions.

## 2023-07-30 NOTE — PLAN OF CARE
Goal Outcome Evaluation:  Plan of Care Reviewed With: patient           Outcome Evaluation: Initial PT evaluation complete.  Patient is alert and cooperative. She demonstrates (I) with bed mobility, requires CGA with transfers and gait, ambulating 15'x1 with FWW, good posture, good use of walker, unsteady with turns.  Tinetti assessed: 19/28 or moderate fall risk.  Recommend use of FWW with gait as patient demonstrates poor stepping response to perturbation. Patient has a FWW at home, would benefit from HHPT to improve activity tolerance and lower fall risk.  Recent fall with R hip pain, no fracture.  Patient denies pain at rest (reports she just received pain medication), reports 3/10 R hip pain with gait.  Goals established, continue skilled I/P PT.      Anticipated Discharge Disposition (PT): home with assist, home with home health

## 2023-07-30 NOTE — PROGRESS NOTES
GENERAL SURGERY PROGRESS NOTE     LOS: 1 day         Interval History:     Feeling a bit better today. No nausea/vomiting. Worsening breast pain. Complaining of left hip pain    Medication Review:   cefTRIAXone, 2,000 mg, Intravenous, Q24H  DULoxetine, 60 mg, Oral, Daily  famotidine, 20 mg, Oral, BID AC  isosorbide mononitrate, 30 mg, Oral, Daily  losartan, 100 mg, Oral, Daily  metoprolol succinate XL, 50 mg, Oral, Daily  NIFEdipine XL, 60 mg, Oral, Daily  nystatin, , Topical, Q12H  rosuvastatin, 40 mg, Oral, Daily  sodium chloride, 10 mL, Intravenous, Q12H  vancomycin, 750 mg, Intravenous, Q24H  vancomycin, 125 mg, Oral, Q6H  warfarin, 2.5 mg, Oral, Daily        Pharmacy Consult,   Pharmacy to dose vancomycin,   Pharmacy to dose warfarin,   sodium chloride, 75 mL/hr, Last Rate: 75 mL/hr (07/29/23 1826)    Objective     Vital Signs:  Temp:  [97.1 øF (36.2 øC)-99.3 øF (37.4 øC)] 99.3 øF (37.4 øC)  Heart Rate:  [67-97] 93  Resp:  [18] 18  BP: (107-162)/(54-70) 162/70    Intake/Output Summary (Last 24 hours) at 7/30/2023 0859  Last data filed at 7/29/2023 1800  Gross per 24 hour   Intake 960 ml   Output --   Net 960 ml       Physical Exam  Skin:     Comments: Left breast cellulitis remains, decreased from the previous alissa. No palpable fluctuance       Results Review:    Results from last 7 days   Lab Units 07/30/23  0601 07/29/23  0508 07/28/23  1600   SODIUM mmol/L 134* 136 134*   POTASSIUM mmol/L 3.5 3.8 4.2   CHLORIDE mmol/L 100 99 96*   CO2 mmol/L 21.0* 20.0* 25.0   BUN mg/dL 21 23 24*   CREATININE mg/dL 1.18* 1.30* 1.49*   GLUCOSE mg/dL 132* 129* 121*   CALCIUM mg/dL 8.0* 8.5* 8.5*     Results from last 7 days   Lab Units 07/30/23  0601 07/29/23  0508 07/28/23  1600   WBC 10*3/mm3 15.35* 20.31* 15.96*   HEMOGLOBIN g/dL 11.2* 13.6 12.9   HEMATOCRIT % 34.1 42.5 38.3   PLATELETS 10*3/mm3 244 276 284       Assessment:    Sepsis      Plan:    - No palpable abscess, may need repeat ultrasound to evaluate for abscess if  no improvement  - Will get XR of the right hip given history of fall.       This document has been electronically signed by Fox Roper MD on July 30, 2023 08:59 CDT        Fox Roper MD  07/30/23  08:59 CDT

## 2023-07-30 NOTE — PLAN OF CARE
Goal Outcome Evaluation:         Patient VSS stable. Patient tested positive for C. Diff. Contact precautions initiated. Patient experiencing hip pain. Patient's family stated she had fallen PTA.

## 2023-07-30 NOTE — PROGRESS NOTES
[facility] Medicine Services  INPATIENT PROGRESS NOTE    Length of Stay: 1  Date of Admission: 7/28/2023  Primary Care Physician: Len Seo MD    Subjective   Chief Complaint: No complaints    HPI:  This is an 82 year old female with PMH of PAF (on Coumadin), HTN, lumbar spondylosis and HLD that presented to [facility] on 7/28/2023 secondary to fever, chills, confusion and left breast redness.       Patient was febrile (103.1) on admission.  Creatinine elevated at 1.49, which may be chronic as it was 1.5 last year. Urine with 2+ bacteria, trace leukocytes and 1+ yeast. CT of the head showed no acute findings.  CT of the chest negative for abscess.      7/30/2023:  Spoke with pharmacy, C-diff PCR was positive but antigen was negative. Will discontinue oral vancomycin as she is a carrier with no active infection.  There is increased redness noted to the left breast today.  Ultrasound indicates multiple hypoechoic areas could that could represent areas of phlegmon and developing abscess.     Review of Systems   Constitutional:  Negative for activity change and fatigue.   HENT:  Negative for ear pain and sore throat.    Eyes:  Negative for pain and discharge.   Respiratory:  Negative for cough and shortness of breath.    Cardiovascular:  Negative for chest pain and palpitations.   Gastrointestinal:  Negative for abdominal pain and nausea.   Endocrine: Negative for cold intolerance and heat intolerance.   Genitourinary:  Negative for difficulty urinating and dysuria.   Musculoskeletal:  Negative for arthralgias and gait problem.   Skin:  Negative for color change and rash.   Neurological:  Negative for dizziness and weakness.   Psychiatric/Behavioral:  Negative for agitation and confusion.           Objective    Temp:  [98.8 øF (37.1 øC)-100.5 øF (38.1 øC)] 98.8 øF (37.1 øC)  Heart Rate:  [67-97] 90  Resp:  [18] 18  BP: (107-162)/(54-70) 128/60    Physical Exam  Constitutional:       Appearance: She is  well-developed.   HENT:      Head: Normocephalic and atraumatic.   Eyes:      Pupils: Pupils are equal, round, and reactive to light.   Cardiovascular:      Rate and Rhythm: Normal rate and regular rhythm.   Pulmonary:      Effort: Pulmonary effort is normal.      Breath sounds: Normal breath sounds.   Abdominal:      General: Bowel sounds are normal.      Palpations: Abdomen is soft.   Musculoskeletal:         General: Normal range of motion.      Cervical back: Normal range of motion and neck supple.   Skin:     General: Skin is warm and dry.      Comments: Left breast:  Erythema improved from yesterday.    Neurological:      Mental Status: She is alert and oriented to person, place, and time.   Psychiatric:         Behavior: Behavior normal.       Results Review:  I have reviewed the labs, radiology results, and diagnostic studies.    Laboratory Data:   Results from last 7 days   Lab Units 07/30/23 0601 07/29/23  0508 07/28/23  1600   SODIUM mmol/L 134* 136 134*   POTASSIUM mmol/L 3.5 3.8 4.2   CHLORIDE mmol/L 100 99 96*   CO2 mmol/L 21.0* 20.0* 25.0   BUN mg/dL 21 23 24*   CREATININE mg/dL 1.18* 1.30* 1.49*   GLUCOSE mg/dL 132* 129* 121*   CALCIUM mg/dL 8.0* 8.5* 8.5*   BILIRUBIN mg/dL  --   --  0.6   ALK PHOS U/L  --   --  121*   ALT (SGPT) U/L  --   --  14   AST (SGOT) U/L  --   --  29   ANION GAP mmol/L 13.0 17.0* 13.0       Estimated Creatinine Clearance: 37 mL/min (A) (by C-G formula based on SCr of 1.18 mg/dL (H)).  Results from last 7 days   Lab Units 07/28/23  1600   MAGNESIUM mg/dL 1.6           Results from last 7 days   Lab Units 07/30/23  0601 07/29/23  0508 07/28/23  1600   WBC 10*3/mm3 15.35* 20.31* 15.96*   HEMOGLOBIN g/dL 11.2* 13.6 12.9   HEMATOCRIT % 34.1 42.5 38.3   PLATELETS 10*3/mm3 244 276 284       Results from last 7 days   Lab Units 07/30/23  0601 07/29/23  0508 07/28/23  1600   INR  2.51* 1.82* 1.78*         Culture Data:   Blood Culture   Date Value Ref Range Status   07/28/2023 No  growth at 24 hours  Preliminary   07/28/2023 No growth at 24 hours  Preliminary     Urine Culture   Date Value Ref Range Status   07/28/2023 >100,000 CFU/mL Enterococcus faecalis (A)  Final     No results found for: RESPCX  No results found for: WOUNDCX  No results found for: STOOLCX  No components found for: BODYFLD    Radiology Data:   Imaging Results (Last 24 Hours)       Procedure Component Value Units Date/Time    US Breast Left Limited [[id number]] Collected: 07/30/23 1243     Updated: 07/30/23 1333    Narrative:      INDICATION:  worsening mastitis/ assess for abscess.    COMPARISON:  None relevant.    TECHNIQUE:  Targeted high-resolution grayscale and color Doppler ultrasound was performed of  the left breast to assess for fluid collection in the emergency setting.    FINDINGS:  Multiple hypoechoic areas are present within the left breast measuring 2.5 x 2.1  cm 10 o'clock position, 2.4 x 1.3 cm at the 6 o'clock position, and 4.6 x 3.7 cm  12 o'clock position.    These areas could represent areas of phlegmon and developing abscess.      Of note: This ultrasound was performed in the emergent setting for evaluation  for a breast fluid collection/abscess. If there is clinical concern for a breast  neoplasm, this examination is inappropriate for such a workup. Proper  mammographic evaluation is recommended if there is clinical suspicion for a  breast neoplasm.        US Renal Bilateral [[id number]] Collected: 07/30/23 1242     Updated: 07/30/23 1250    Narrative:      HISTORY:  LBAINE    COMPARISON:  None    TECHNIQUE:  Real-time ultrasound imaging and color Doppler used to evaluate the kidneys and  bladder.    FINDINGS:  The right kidney measures 9.5 cm. The left kidney measures 9.8 cm.  Cortical  thickness and echotexture are within normal limits. There is no hydronephrosis  or evidence of mass. No calculus was seen.    The bladder was unremarkable.      Impression:      Normal renal ultrasound.      XR Hip With or  Without Pelvis 2 - 3 View Right [[id number]] Collected: 07/30/23 1105     Updated: 07/30/23 1108    Narrative:      HISTORY: Hip pain after fall    COMPARISON: None    FINDINGS:  AP and lateral views were obtained.    There is no fracture, dislocation or osseous destruction.      Impression:      No acute abnormality.            I have reviewed the patient's current medications.     Assessment/Plan     Active Hospital Problems    Diagnosis     **Sepsis        Plan:    Plan:   Sepsis secondary to left breast mastitis/ UTI:  Continue IVF and broad-spectrum antibiotics (Vancomycin/ Zoysn).  CT ruled out abscess.    Mastitis, left breast: No open wounds noted.  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 Peterson for this.  She denies any previous surgical intervention. General surgery and wound care consults appreciated. Left breast ultrasound shows possible areas of phlegmon and developing abscess.  Dr. Roper notified.    Hypertension: Continue home Losartan, Procardia XL, Imdur and metoprolol.    Hyperlipidemia: Continue home statin.   Paroxsymal atrial fibrillation: Continue home beta-blocker and Procardia XL.  Will hold Coumadin and start therapeutic Lovenox.    Acute kidney injury, possibly chronic:  Nephrology consult appreciated.   Urinary tract infection, enterococcus faecalis:   Continue IV Zosyn.  GERD:  Continue PPI.    Diarrhea:  Patient test positive for C-diff PCR, but negative for antigen.  Oral Vancomycin discontinued.      VTE PPX:  Lovenox     Medical Decision Making  Number and Complexity of problems: 8/High  Differential Diagnosis: N/A     Conditions and Status:        Condition is improving.     [facility] Data  External documents reviewed: Yes  My EKG interpretation: A fib/ pacemaker  My CT interpretation: As per radiology  Tests considered but not ordered: None     Decision rules/scores evaluated (example [id number], Wells, etc): N/A     Discussed with: Patient/ family     Treatment  Plan  As above     Care Planning  Shared decision making: Yes  Code status and discussions: Full     Disposition  Social Determinants of Health that impact treatment or disposition: None  I expect the patient to be discharged to home in 2-3 days.      I discussed the patient's findings and my recommendations with: Patient     I confirmed that the patient's Advance Care Plan is present, code status is documented, or surrogate decision maker is listed in the patient's medical record.      I have utilized all available immediate resources to obtain, update, or review the patient's current medications (including all prescriptions, over-the-counter products, herbals, cannabis/cannabidiol products, and vitamin/mineral/dietary (nutritional) supplements).                      This document has been electronically signed by COY Vázquez on July 30, 2023 14:07 CDT

## 2023-07-30 NOTE — PROGRESS NOTES
"[facility]  [address] [place] [address]. [address]  T - [phone]  F - [phone]     Progress Note          PATIENT  DEMOGRAPHICS   PATIENT NAME: Carol Sullivan                      PHYSICIAN: COY Prakash  : 1940  MRN: [id number]   LOS: 1 day    Patient Care Team:  Len Seo MD as PCP - General  DoniphanBlaire ribera APRN as Nurse Practitioner (General Surgery)  Subjective   SUBJECTIVE   No acute event overnight          Objective   OBJECTIVE   Vital Signs  Temp:  [98.8 øF (37.1 øC)-100.5 øF (38.1 øC)] 98.8 øF (37.1 øC)  Heart Rate:  [67-97] 90  Resp:  [18] 18  BP: (107-162)/(54-70) 128/60    Flowsheet Rows      Flowsheet Row First Filed Value   Admission Height 165.1 cm (65\") Documented at 2023 1900   Admission Weight 74.8 kg (165 lb) Documented at 2023 1608             I/O last 3 completed shifts:  In: 960 [P.O.:960]  Out: -     PHYSICAL EXAM    Physical Exam  Vitals reviewed.   Constitutional:       Appearance: She is well-developed.   HENT:      Head: Normocephalic and atraumatic.   Eyes:      Conjunctiva/sclera: Conjunctivae normal.      Pupils: Pupils are equal, round, and reactive to light.   Cardiovascular:      Rate and Rhythm: Normal rate and regular rhythm.   Pulmonary:      Effort: Pulmonary effort is normal.      Breath sounds: Normal breath sounds.   Abdominal:      Palpations: Abdomen is soft.   Musculoskeletal:      Cervical back: Neck supple.      Right lower leg: No edema.      Left lower leg: No edema.   Skin:     General: Skin is warm and dry.   Neurological:      Mental Status: She is alert and oriented to person, place, and time.   Psychiatric:         Mood and Affect: Mood normal.         Behavior: Behavior normal.       RESULTS   Results Review:    Results from last 7 days   Lab Units 23  0601 23  0508 23  1600   SODIUM mmol/L 134* 136 134*   POTASSIUM mmol/L 3.5 3.8 4.2   CHLORIDE mmol/L 100 99 96*   CO2 mmol/L " 21.0* 20.0* 25.0   BUN mg/dL 21 23 24*   CREATININE mg/dL 1.18* 1.30* 1.49*   CALCIUM mg/dL 8.0* 8.5* 8.5*   BILIRUBIN mg/dL  --   --  0.6   ALK PHOS U/L  --   --  121*   ALT (SGPT) U/L  --   --  14   AST (SGOT) U/L  --   --  29   GLUCOSE mg/dL 132* 129* 121*       Estimated Creatinine Clearance: 37 mL/min (A) (by C-G formula based on SCr of 1.18 mg/dL (H)).    Results from last 7 days   Lab Units 07/28/23  1600   MAGNESIUM mg/dL 1.6             Results from last 7 days   Lab Units 07/30/23  0601 07/29/23  0508 07/28/23  1600   WBC 10*3/mm3 15.35* 20.31* 15.96*   HEMOGLOBIN g/dL 11.2* 13.6 12.9   PLATELETS 10*3/mm3 244 276 284       Results from last 7 days   Lab Units 07/30/23  0601 07/29/23  0508 07/28/23  1600   INR  2.51* 1.82* 1.78*         Imaging Results (Last 24 Hours)       Procedure Component Value Units Date/Time    US Breast Left Limited [[id number]] Collected: 07/30/23 1243     Updated: 07/30/23 1244    Narrative:      INDICATION:  worsening mastitis/ assess for abscess.    COMPARISON:  None relevant.    TECHNIQUE:  Targeted high-resolution grayscale and color Doppler ultrasound was performed of  the left breast to assess for fluid collection in the emergency setting.    FINDINGS:  Multiple hypoechoic areas are present within the left breast measuring 2.5 x 2.1  cm 10 o'clock position, 2.4 x 1.3 cm at the 6 o'clock position, and 4.6 x 3.7 cm  12 o'clock position.    These areas could represent areas of phlegmon and developing abscess.      Of note: This ultrasound was performed in the emergent setting for evaluation  for a breast fluid collection/abscess. If there is clinical concern for a breast  neoplasm, this examination is inappropriate for such a workup. Proper  mammographic evaluation is recommended if there is clinical suspicion for a  breast neoplasm.        US Renal Bilateral [[id number]] Resulted: 07/30/23 1129     Updated: 07/30/23 1130    XR Hip With or Without Pelvis 2 - 3 View Right  [[id number]] Collected: 07/30/23 1105     Updated: 07/30/23 1108    Narrative:      HISTORY: Hip pain after fall    COMPARISON: None    FINDINGS:  AP and lateral views were obtained.    There is no fracture, dislocation or osseous destruction.      Impression:      No acute abnormality.             MEDICATIONS    DULoxetine, 60 mg, Oral, Daily  famotidine, 20 mg, Oral, BID AC  isosorbide mononitrate, 30 mg, Oral, Daily  losartan, 100 mg, Oral, Daily  metoprolol succinate XL, 50 mg, Oral, Daily  NIFEdipine XL, 60 mg, Oral, Daily  nystatin, , Topical, Q12H  piperacillin-tazobactam, 3.375 g, Intravenous, Q8H  rosuvastatin, 40 mg, Oral, Daily  sodium chloride, 10 mL, Intravenous, Q12H  traMADol, 50 mg, Oral, Q8H  vancomycin, 125 mg, Oral, Q6H  warfarin, 2.5 mg, Oral, Daily      Pharmacy Consult,   Pharmacy to dose warfarin,   Pharmacy to Dose Zosyn,   sodium chloride, 75 mL/hr, Last Rate: 75 mL/hr (07/30/23 1137)        Assessment & Plan   ASSESSMENT / PLAN      Sepsis    Acute kidney injury: may be chronic, baseline unknown. Creatinine was 1.5 in 10/2022. Creatinine now down to 1.18mg/dL from 1.5mg/dL. UA 3+ protein, trace leukocytes, 0-2 RBC, 6-12 WBC, 2+ bacteria. Check urine sodium. renal ultrasound unremarkable. Continue IVF     Hypertension: acceptable      Metabolic acidosis: mild, monitoring     Sepsis     UTI: receiving zosyn    Cdiff: on vanc     Hyponatremia: mild 134           This document has been electronically signed by COY Prakash on July 30, 2023 12:45 CDT

## 2023-07-30 NOTE — PLAN OF CARE
Goal Outcome Evaluation:  Plan of Care Reviewed With: patient           Outcome Evaluation: Vss, pain controlled, xray hip negative for fracture, breast and renal U/S obtained this shift, UA collected, abx given per order, iv fluids DC, patient resting well between care.

## 2023-07-31 ENCOUNTER — APPOINTMENT (OUTPATIENT)
Dept: GENERAL RADIOLOGY | Facility: HOSPITAL | Age: 83
DRG: 853 | End: 2023-07-31
Payer: MEDICARE

## 2023-07-31 PROBLEM — N61.0 MASTITIS: Status: ACTIVE | Noted: 2023-07-28

## 2023-07-31 LAB
ANION GAP SERPL CALCULATED.3IONS-SCNC: 13 MMOL/L (ref 5–15)
ARTERIAL PATENCY WRIST A: ABNORMAL
ATMOSPHERIC PRESS: 750 MMHG
BASE EXCESS BLDA CALC-SCNC: -3.9 MMOL/L (ref 0–2)
BASOPHILS # BLD AUTO: 0.16 10*3/MM3 (ref 0–0.2)
BASOPHILS NFR BLD AUTO: 1.1 % (ref 0–1.5)
BDY SITE: ABNORMAL
BUN SERPL-MCNC: 18 MG/DL (ref 8–23)
BUN/CREAT SERPL: 12.3 (ref 7–25)
CALCIUM SPEC-SCNC: 8.1 MG/DL (ref 8.6–10.5)
CHLORIDE SERPL-SCNC: 100 MMOL/L (ref 98–107)
CO2 SERPL-SCNC: 19 MMOL/L (ref 22–29)
CREAT SERPL-MCNC: 1.46 MG/DL (ref 0.57–1)
CRP SERPL-MCNC: 25.99 MG/DL (ref 0–0.5)
DEPRECATED RDW RBC AUTO: 43.3 FL (ref 37–54)
EGFRCR SERPLBLD CKD-EPI 2021: 36 ML/MIN/1.73
EOSINOPHIL # BLD AUTO: 0.08 10*3/MM3 (ref 0–0.4)
EOSINOPHIL NFR BLD AUTO: 0.6 % (ref 0.3–6.2)
ERYTHROCYTE [DISTWIDTH] IN BLOOD BY AUTOMATED COUNT: 14.7 % (ref 12.3–15.4)
GAS FLOW AIRWAY: 6 LPM
GLUCOSE SERPL-MCNC: 162 MG/DL (ref 65–99)
HCO3 BLDA-SCNC: 20.4 MMOL/L (ref 20–26)
HCT VFR BLD AUTO: 34.2 % (ref 34–46.6)
HGB BLD-MCNC: 11.5 G/DL (ref 12–15.9)
IMM GRANULOCYTES # BLD AUTO: 0.08 10*3/MM3 (ref 0–0.05)
IMM GRANULOCYTES NFR BLD AUTO: 0.6 % (ref 0–0.5)
INR PPP: 2.78 (ref 0.8–1.2)
LYMPHOCYTES # BLD AUTO: 0.49 10*3/MM3 (ref 0.7–3.1)
LYMPHOCYTES NFR BLD AUTO: 3.4 % (ref 19.6–45.3)
Lab: ABNORMAL
MAGNESIUM SERPL-MCNC: 1.5 MG/DL (ref 1.6–2.4)
MCH RBC QN AUTO: 27 PG (ref 26.6–33)
MCHC RBC AUTO-ENTMCNC: 33.6 G/DL (ref 31.5–35.7)
MCV RBC AUTO: 80.3 FL (ref 79–97)
MODALITY: ABNORMAL
MONOCYTES # BLD AUTO: 0.99 10*3/MM3 (ref 0.1–0.9)
MONOCYTES NFR BLD AUTO: 6.8 % (ref 5–12)
NEUTROPHILS NFR BLD AUTO: 12.7 10*3/MM3 (ref 1.7–7)
NEUTROPHILS NFR BLD AUTO: 87.5 % (ref 42.7–76)
NRBC BLD AUTO-RTO: 0 /100 WBC (ref 0–0.2)
PCO2 BLDA: 34 MM HG (ref 35–45)
PH BLDA: 7.39 PH UNITS (ref 7.35–7.45)
PLATELET # BLD AUTO: 290 10*3/MM3 (ref 140–450)
PMV BLD AUTO: 10 FL (ref 6–12)
PO2 BLDA: 51.2 MM HG (ref 83–108)
POTASSIUM SERPL-SCNC: 2.9 MMOL/L (ref 3.5–5.2)
POTASSIUM SERPL-SCNC: 3.3 MMOL/L (ref 3.5–5.2)
PROTHROMBIN TIME: 28.7 SECONDS (ref 11.1–15.3)
RBC # BLD AUTO: 4.26 10*6/MM3 (ref 3.77–5.28)
SAO2 % BLDCOA: 89.4 % (ref 94–99)
SODIUM SERPL-SCNC: 132 MMOL/L (ref 136–145)
VENTILATOR MODE: ABNORMAL
WBC NRBC COR # BLD: 14.5 10*3/MM3 (ref 3.4–10.8)

## 2023-07-31 PROCEDURE — 83735 ASSAY OF MAGNESIUM: CPT | Performed by: PHYSICIAN ASSISTANT

## 2023-07-31 PROCEDURE — 99232 SBSQ HOSP IP/OBS MODERATE 35: CPT | Performed by: STUDENT IN AN ORGANIZED HEALTH CARE EDUCATION/TRAINING PROGRAM

## 2023-07-31 PROCEDURE — 97530 THERAPEUTIC ACTIVITIES: CPT

## 2023-07-31 PROCEDURE — 82803 BLOOD GASES ANY COMBINATION: CPT

## 2023-07-31 PROCEDURE — 25010000002 PIPERACILLIN SOD-TAZOBACTAM PER 1 G: Performed by: NURSE PRACTITIONER

## 2023-07-31 PROCEDURE — 85025 COMPLETE CBC W/AUTO DIFF WBC: CPT | Performed by: NURSE PRACTITIONER

## 2023-07-31 PROCEDURE — 84132 ASSAY OF SERUM POTASSIUM: CPT | Performed by: NURSE PRACTITIONER

## 2023-07-31 PROCEDURE — 25010000002 ENOXAPARIN PER 10 MG: Performed by: NURSE PRACTITIONER

## 2023-07-31 PROCEDURE — 94660 CPAP INITIATION&MGMT: CPT

## 2023-07-31 PROCEDURE — 71045 X-RAY EXAM CHEST 1 VIEW: CPT

## 2023-07-31 PROCEDURE — 80048 BASIC METABOLIC PNL TOTAL CA: CPT | Performed by: NURSE PRACTITIONER

## 2023-07-31 PROCEDURE — 86140 C-REACTIVE PROTEIN: CPT | Performed by: NURSE PRACTITIONER

## 2023-07-31 PROCEDURE — 0 POTASSIUM CHLORIDE 10 MEQ/100ML SOLUTION: Performed by: PHYSICIAN ASSISTANT

## 2023-07-31 PROCEDURE — 25010000002 MAGNESIUM SULFATE IN D5W 1G/100ML (PREMIX) 1-5 GM/100ML-% SOLUTION: Performed by: INTERNAL MEDICINE

## 2023-07-31 PROCEDURE — 99221 1ST HOSP IP/OBS SF/LOW 40: CPT | Performed by: NURSE PRACTITIONER

## 2023-07-31 PROCEDURE — 36600 WITHDRAWAL OF ARTERIAL BLOOD: CPT

## 2023-07-31 PROCEDURE — 85610 PROTHROMBIN TIME: CPT | Performed by: NURSE PRACTITIONER

## 2023-07-31 PROCEDURE — 94799 UNLISTED PULMONARY SVC/PX: CPT

## 2023-07-31 PROCEDURE — 25010000002 FUROSEMIDE PER 20 MG: Performed by: INTERNAL MEDICINE

## 2023-07-31 RX ORDER — SODIUM CHLORIDE 9 MG/ML
INJECTION, SOLUTION INTRAVENOUS
Status: COMPLETED
Start: 2023-07-31 | End: 2023-07-31

## 2023-07-31 RX ORDER — MAGNESIUM SULFATE 1 G/100ML
1 INJECTION INTRAVENOUS
Status: COMPLETED | OUTPATIENT
Start: 2023-07-31 | End: 2023-08-01

## 2023-07-31 RX ORDER — POTASSIUM CHLORIDE 7.45 MG/ML
10 INJECTION INTRAVENOUS
Status: COMPLETED | OUTPATIENT
Start: 2023-07-31 | End: 2023-08-01

## 2023-07-31 RX ORDER — FUROSEMIDE 10 MG/ML
40 INJECTION INTRAMUSCULAR; INTRAVENOUS ONCE
Status: COMPLETED | OUTPATIENT
Start: 2023-07-31 | End: 2023-07-31

## 2023-07-31 RX ORDER — FUROSEMIDE 20 MG/1
20 TABLET ORAL 2 TIMES DAILY
Status: DISCONTINUED | OUTPATIENT
Start: 2023-07-31 | End: 2023-08-01

## 2023-07-31 RX ADMIN — NYSTATIN: 100000 POWDER TOPICAL at 21:38

## 2023-07-31 RX ADMIN — ACETAMINOPHEN 1000 MG: 500 TABLET, FILM COATED ORAL at 23:23

## 2023-07-31 RX ADMIN — TRAMADOL HYDROCHLORIDE 50 MG: 50 TABLET, COATED ORAL at 21:38

## 2023-07-31 RX ADMIN — ROSUVASTATIN CALCIUM 40 MG: 20 TABLET, FILM COATED ORAL at 08:06

## 2023-07-31 RX ADMIN — TRAMADOL HYDROCHLORIDE 50 MG: 50 TABLET, COATED ORAL at 11:49

## 2023-07-31 RX ADMIN — Medication 10 ML: at 08:04

## 2023-07-31 RX ADMIN — ENOXAPARIN SODIUM 70 MG: 80 INJECTION SUBCUTANEOUS at 21:38

## 2023-07-31 RX ADMIN — FUROSEMIDE 20 MG: 20 TABLET ORAL at 21:38

## 2023-07-31 RX ADMIN — NYSTATIN: 100000 POWDER TOPICAL at 08:04

## 2023-07-31 RX ADMIN — TRAMADOL HYDROCHLORIDE 50 MG: 50 TABLET, COATED ORAL at 03:03

## 2023-07-31 RX ADMIN — POTASSIUM CHLORIDE 10 MEQ: 7.46 INJECTION, SOLUTION INTRAVENOUS at 23:55

## 2023-07-31 RX ADMIN — ACETAMINOPHEN 1000 MG: 500 TABLET, FILM COATED ORAL at 08:05

## 2023-07-31 RX ADMIN — METOPROLOL SUCCINATE 50 MG: 50 TABLET, EXTENDED RELEASE ORAL at 08:06

## 2023-07-31 RX ADMIN — PIPERACILLIN SODIUM AND TAZOBACTAM SODIUM 3.38 G: 3; .375 INJECTION, POWDER, LYOPHILIZED, FOR SOLUTION INTRAVENOUS at 16:45

## 2023-07-31 RX ADMIN — FAMOTIDINE 20 MG: 20 TABLET ORAL at 16:44

## 2023-07-31 RX ADMIN — LOSARTAN POTASSIUM 100 MG: 50 TABLET, FILM COATED ORAL at 08:06

## 2023-07-31 RX ADMIN — FUROSEMIDE 40 MG: 10 INJECTION, SOLUTION INTRAVENOUS at 05:17

## 2023-07-31 RX ADMIN — ISOSORBIDE MONONITRATE 30 MG: 30 TABLET, EXTENDED RELEASE ORAL at 08:06

## 2023-07-31 RX ADMIN — METOPROLOL TARTRATE 5 MG: 5 INJECTION INTRAVENOUS at 04:19

## 2023-07-31 RX ADMIN — SODIUM CHLORIDE 40 ML: 9 INJECTION, SOLUTION INTRAVENOUS at 01:06

## 2023-07-31 RX ADMIN — Medication 10 ML: at 21:39

## 2023-07-31 RX ADMIN — FAMOTIDINE 20 MG: 20 TABLET ORAL at 08:06

## 2023-07-31 RX ADMIN — ENOXAPARIN SODIUM 70 MG: 80 INJECTION SUBCUTANEOUS at 10:52

## 2023-07-31 RX ADMIN — DULOXETINE HYDROCHLORIDE 60 MG: 60 CAPSULE, DELAYED RELEASE ORAL at 08:05

## 2023-07-31 RX ADMIN — MAGNESIUM SULFATE HEPTAHYDRATE 1 G: 1 INJECTION, SOLUTION INTRAVENOUS at 23:55

## 2023-07-31 RX ADMIN — MAGNESIUM SULFATE HEPTAHYDRATE 1 G: 1 INJECTION, SOLUTION INTRAVENOUS at 23:04

## 2023-07-31 RX ADMIN — FUROSEMIDE 20 MG: 20 TABLET ORAL at 11:49

## 2023-07-31 RX ADMIN — PIPERACILLIN SODIUM AND TAZOBACTAM SODIUM 3.38 G: 3; .375 INJECTION, POWDER, LYOPHILIZED, FOR SOLUTION INTRAVENOUS at 01:06

## 2023-07-31 RX ADMIN — PIPERACILLIN SODIUM AND TAZOBACTAM SODIUM 3.38 G: 3; .375 INJECTION, POWDER, LYOPHILIZED, FOR SOLUTION INTRAVENOUS at 10:45

## 2023-07-31 RX ADMIN — NIFEDIPINE 60 MG: 60 TABLET, EXTENDED RELEASE ORAL at 08:05

## 2023-07-31 NOTE — PROGRESS NOTES
"    [facility]  INPATIENT WOUND & OSTOMY CARE    PROGRESS NOTE    Today's Date: 07/31/23    Patient Name: Carol Sullivan  MRN: [id number]  CSN: [id number]  PCP: Len Seo MD  Referring Provider: Javy Barton MD  Attending Provider: Javy Barton MD  Length of Stay: 1    SUBJECTIVE   Chief Complaint: left breast mastitis    HPI: Carol Sullivan is a 82 year old female I was consulted on for wound care/assessment. Patient has past medical history of arthritis, GERD, depression, hyperlipidemia, and hypertension. Patient has significantly erythematic left breast concerning for mastitis. This has happened once in the past.       Visit Dx:    ICD-10-CM ICD-9-CM   1. Somnolence  R40.0 780.09   2. Mastitis  N61.0 611.0   3. Sepsis, due to unspecified organism, unspecified whether acute organ dysfunction present  A41.9 038.9     995.91   4. Impaired functional mobility, balance, gait, and endurance [Z74.09 (ICD-10-CM)]  Z74.09 V49.89       Hospital Problem List:     Sepsis      History:   Past Medical History:   Diagnosis Date    Arthritis     Depression     GERD (gastroesophageal reflux disease)     Hyperlipidemia     Hypertension     Near syncope     Vascular disease      Past Surgical History:   Procedure Laterality Date    BLADDER SURGERY      ENDOSCOPY N/A 5/6/2019    Procedure: ESOPHAGOGASTRODUODENOSCOPY;  Surgeon: Alberto Sanchez DO;  Location: [facility] ENDOSCOPY;  Service: Gastroenterology    GALLBLADDER SURGERY      SHOULDER SURGERY      \"bone spurs\"    SUBTOTAL HYSTERECTOMY       Social History     Socioeconomic History    Marital status:    Tobacco Use    Smoking status: Never    Smokeless tobacco: Never   Vaping Use    Vaping Use: Never used   Substance and Sexual Activity    Alcohol use: No    Drug use: No    Sexual activity: Defer       Allergies:  Allergies   Allergen Reactions    Tuberculin Tests Swelling    Hydrocodone Nausea Only    Lortab [Hydrocodone-Acetaminophen] " GI Intolerance       Medications:    Current Facility-Administered Medications:     acetaminophen (TYLENOL) tablet 1,000 mg, 1,000 mg, Oral, Q6H PRN, Fabio Albert MD, 1,000 mg at 07/31/23 0805    DULoxetine (CYMBALTA) DR capsule 60 mg, 60 mg, Oral, Daily, Levill, Jenniffer G, APRN, 60 mg at 07/31/23 0805    Enoxaparin Sodium (LOVENOX) syringe 70 mg, 1 mg/kg, Subcutaneous, Q12H, Levill, Jenniffer G, APRN, 70 mg at 07/31/23 1052    famotidine (PEPCID) tablet 20 mg, 20 mg, Oral, BID AC, Javy Barton MD, 20 mg at 07/31/23 0806    furosemide (LASIX) tablet 20 mg, 20 mg, Oral, BID, Levill, Jenniffer G, APRN, 20 mg at 07/31/23 1149    isosorbide mononitrate (IMDUR) 24 hr tablet 30 mg, 30 mg, Oral, Daily, Levill, Jenniffer G, APRN, 30 mg at 07/31/23 0806    ketotifen (ZADITOR) 0.025 % ophthalmic solution 1 drop, 1 drop, Both Eyes, Daily PRN, Levill, Jenniffer G, APRN, 1 drop at 07/30/23 1600    losartan (COZAAR) tablet 100 mg, 100 mg, Oral, Daily, Levill, Jenniffer G, APRN, 100 mg at 07/31/23 0806    melatonin tablet 6 mg, 6 mg, Oral, Nightly PRN, Fabio Albert MD, 6 mg at 07/29/23 1958    metoprolol succinate XL (TOPROL-XL) 24 hr tablet 50 mg, 50 mg, Oral, Daily, Levill, Jenniffer G, APRN, 50 mg at 07/31/23 0806    NIFEdipine XL (PROCARDIA XL) 24 hr tablet 60 mg, 60 mg, Oral, Daily, Levill, Jenniffer G, APRN, 60 mg at 07/31/23 0805    nitroglycerin (NITROSTAT) SL tablet 0.4 mg, 0.4 mg, Sublingual, Q5 Min PRN, Levill, Jenniffer G, APRN    nystatin (MYCOSTATIN) powder, , Topical, Q12H, Fabio Albert MD, Given at 07/31/23 0804    ondansetron (ZOFRAN) injection 4 mg, 4 mg, Intravenous, Q6H PRN, LevillJenniffer, APRN    Pharmacy Consult, , Does not apply, Continuous PRN, Javy Barton MD    Pharmacy to Dose enoxaparin (LOVENOX), , Does not apply, Continuous PRN, Levill, Jenniffer G, APRN    Pharmacy to Dose Zosyn, , Does not apply, Continuous PRN, Levill, Jenniffer G, APRN    piperacillin-tazobactam (ZOSYN) 3.375 g/100 mL 0.9% NS IVPB  (mbp), 3.375 g, Intravenous, Q8H, Levill, Jenniffer G, APRN, 3.375 g at 07/31/23 1045    rosuvastatin (CRESTOR) tablet 40 mg, 40 mg, Oral, Daily, Levill, Jenniffer G, APRN, 40 mg at 07/31/23 0806    sodium chloride 0.9 % flush 10 mL, 10 mL, Intravenous, Q12H, Levill, Jenniffer G, APRN, 10 mL at 07/31/23 0804    sodium chloride 0.9 % flush 10 mL, 10 mL, Intravenous, PRN, Levill, Jenniffer G, APRN    sodium chloride 0.9 % infusion 40 mL, 40 mL, Intravenous, PRN, Levill, Jenniffer G, APRN, 40 mL at 07/31/23 0106    traMADol (ULTRAM) tablet 50 mg, 50 mg, Oral, Q8H, Levill, Jenniffer G, APRN, 50 mg at 07/31/23 1149    Review of Systems:  Review of Systems      OBJECTIVE     Vitals:    07/31/23 1126   BP: 106/55   Pulse: 93   Resp: 16   Temp: 97.4 øF (36.3 øC)   SpO2: 90%       PHYSICAL EXAM.  Physical Exam  Physical Exam  Vitals reviewed. Nursing notes reviewed.  Constitutional:       Appearance: Well-developed.   Musculoskeletal:         Cervical back: Normal range of motion and neck supple.   Skin:     General: Skin is warm and dry.      Coloration: Skin is not pale.      Findings: No erythema or rash. Left breast redness  Neurological:      Mental Status: Pt is alert and oriented to person, place, and time.   Psychiatric:         Behavior: Behavior normal.         Thought Content: Thought content normal.         Judgment: Judgment normal.       Results Review:  Lab Results (last 48 hours)       Procedure Component Value Units Date/Time    Protime-INR [[id number]]  (Abnormal) Collected: 07/31/23 0639    Specimen: Blood Updated: 07/31/23 0830     Protime 28.7 Seconds      INR 2.78    Narrative:      Therapeutic range for most indications is 2.0-3.0 INR,  or 2.5-3.5 for mechanical heart valves.    Basic Metabolic Panel [[id number]]  (Abnormal) Collected: 07/31/23 0639    Specimen: Blood Updated: 07/31/23 0744     Glucose 162 mg/dL      BUN 18 mg/dL      Creatinine 1.46 mg/dL      Sodium 132 mmol/L      Potassium 3.3 mmol/L      Chloride 100  mmol/L      CO2 19.0 mmol/L      Calcium 8.1 mg/dL      BUN/Creatinine Ratio 12.3     Anion Gap 13.0 mmol/L      eGFR 36.0 mL/min/1.73     Narrative:      GFR Normal >60  Chronic Kidney Disease <60  Kidney Failure <15    The GFR formula is only valid for adults with stable renal function between ages 18 and 70.    C-reactive Protein [[id number]]  (Abnormal) Collected: 07/31/23 0639    Specimen: Blood Updated: 07/31/23 0744     C-Reactive Protein 25.99 mg/dL     CBC & Differential [[id number]]  (Abnormal) Collected: 07/31/23 0639    Specimen: Blood Updated: 07/31/23 0721    Narrative:      The following orders were created for panel order CBC & Differential.  Procedure                               Abnormality         Status                     ---------                               -----------         ------                     CBC Auto Differential[[id number]]        Abnormal            Final result                 Please view results for these tests on the individual orders.    CBC Auto Differential [[id number]]  (Abnormal) Collected: 07/31/23 0639    Specimen: Blood Updated: 07/31/23 0721     WBC 14.50 10*3/mm3      RBC 4.26 10*6/mm3      Hemoglobin 11.5 g/dL      Hematocrit 34.2 %      MCV 80.3 fL      MCH 27.0 pg      MCHC 33.6 g/dL      RDW 14.7 %      RDW-SD 43.3 fl      MPV 10.0 fL      Platelets 290 10*3/mm3      Neutrophil % 87.5 %      Lymphocyte % 3.4 %      Monocyte % 6.8 %      Eosinophil % 0.6 %      Basophil % 1.1 %      Immature Grans % 0.6 %      Neutrophils, Absolute 12.70 10*3/mm3      Lymphocytes, Absolute 0.49 10*3/mm3      Monocytes, Absolute 0.99 10*3/mm3      Eosinophils, Absolute 0.08 10*3/mm3      Basophils, Absolute 0.16 10*3/mm3      Immature Grans, Absolute 0.08 10*3/mm3      nRBC 0.0 /100 WBC     Blood Gas, Arterial - [[id number]]  (Abnormal) Collected: 07/31/23 0415    Specimen: Arterial Blood Updated: 07/31/23 0412     Site Left Radial     Haroon's Test N/A     pH, Arterial 7.387 pH units       pCO2, Arterial 34.0 mm Hg      Comment: 84 Value below reference range        pO2, Arterial 51.2 mm Hg      Comment: 85 Value below critical limit        HCO3, Arterial 20.4 mmol/L      Base Excess, Arterial -3.9 mmol/L      Comment: 84 Value below reference range        O2 Saturation, Arterial 89.4 %      Comment: 84 Value below reference range        Barometric Pressure for Blood Gas 750 mmHg      Modality Nasal Cannula     Flow Rate 6.0 lpm      Ventilator Mode NA     Collected by nc     Comment: Meter: [id number]     :  [id number]       Blood Culture - Blood, Arm, Left [[id number]]  (Normal) Collected: 07/28/23 1626    Specimen: Blood from Arm, Left Updated: 07/30/23 1631     Blood Culture No growth at 2 days    Blood Culture - Blood, Arm, Right [[id number]]  (Normal) Collected: 07/28/23 1626    Specimen: Blood from Arm, Right Updated: 07/30/23 1631     Blood Culture No growth at 2 days    Sodium, Urine, Random - Urine, Clean Catch [[id number]] Collected: 07/30/23 1516    Specimen: Urine, Clean Catch Updated: 07/30/23 1522     Sodium, Urine 26 mmol/L     Narrative:      Reference intervals for random urine have not been established.  Clinical usage is dependent upon physician's interpretation in combination with other laboratory tests.       Clostridioides difficile toxin Ag, Reflex - Stool, Per Rectum [[id number]]  (Normal) Collected: 07/29/23 2107    Specimen: Stool from Per Rectum Updated: 07/30/23 0814     C.diff Toxin Ag Negative    Narrative:      DNA from a toxigenic strain of C.difficile was detected, although the free toxin itself was not detected. These findings are consistent with C.difficile colonization and may not reflect actual C.difficile infection. Clinical correlation needed.    Basic Metabolic Panel [[id number]]  (Abnormal) Collected: 07/30/23 0601    Specimen: Blood Updated: 07/30/23 0646     Glucose 132 mg/dL      BUN 21 mg/dL      Creatinine 1.18 mg/dL      Sodium 134 mmol/L       Potassium 3.5 mmol/L      Chloride 100 mmol/L      CO2 21.0 mmol/L      Calcium 8.0 mg/dL      BUN/Creatinine Ratio 17.8     Anion Gap 13.0 mmol/L      eGFR 46.5 mL/min/1.73     Narrative:      GFR Normal >60  Chronic Kidney Disease <60  Kidney Failure <15    The GFR formula is only valid for adults with stable renal function between ages 18 and 70.    C-reactive Protein [[id number]]  (Abnormal) Collected: 07/30/23 0601    Specimen: Blood Updated: 07/30/23 0646     C-Reactive Protein 25.85 mg/dL     Protime-INR [[id number]]  (Abnormal) Collected: 07/30/23 0601    Specimen: Blood Updated: 07/30/23 0638     Protime 26.5 Seconds      INR 2.51    Narrative:      Therapeutic range for most indications is 2.0-3.0 INR,  or 2.5-3.5 for mechanical heart valves.    CBC & Differential [[id number]]  (Abnormal) Collected: 07/30/23 0601    Specimen: Blood Updated: 07/30/23 0633    Narrative:      The following orders were created for panel order CBC & Differential.  Procedure                               Abnormality         Status                     ---------                               -----------         ------                     CBC Auto Differential[[id number]]        Abnormal            Final result                 Please view results for these tests on the individual orders.    CBC Auto Differential [[id number]]  (Abnormal) Collected: 07/30/23 0601    Specimen: Blood Updated: 07/30/23 0633     WBC 15.35 10*3/mm3      RBC 4.14 10*6/mm3      Hemoglobin 11.2 g/dL      Hematocrit 34.1 %      MCV 82.4 fL      MCH 27.1 pg      MCHC 32.8 g/dL      RDW 14.6 %      RDW-SD 44.3 fl      MPV 9.7 fL      Platelets 244 10*3/mm3      Neutrophil % 88.7 %      Lymphocyte % 3.9 %      Monocyte % 5.5 %      Eosinophil % 0.3 %      Basophil % 0.6 %      Immature Grans % 1.0 %      Neutrophils, Absolute 13.61 10*3/mm3      Lymphocytes, Absolute 0.60 10*3/mm3      Monocytes, Absolute 0.85 10*3/mm3      Eosinophils, Absolute 0.05 10*3/mm3       Basophils, Absolute 0.09 10*3/mm3      Immature Grans, Absolute 0.15 10*3/mm3      nRBC 0.0 /100 WBC     Urine Culture - Urine, Urine, Catheter [[id number]]  (Abnormal)  (Susceptibility) Collected: 07/28/23 1604    Specimen: Urine, Catheter Updated: 07/30/23 0428     Urine Culture >100,000 CFU/mL Enterococcus faecalis    Narrative:      Colonization of the urinary tract without infection is common. Treatment is discouraged unless the patient is symptomatic, pregnant, or undergoing an invasive urologic procedure.    Susceptibility        Enterococcus faecalis      NATALI      Ampicillin Susceptible      Levofloxacin Susceptible      Nitrofurantoin Intermediate      Tetracycline Resistant      Vancomycin Susceptible                           Gastrointestinal Panel, PCR - Stool, Per Rectum [[id number]]  (Normal) Collected: 07/29/23 2107    Specimen: Stool from Per Rectum Updated: 07/29/23 2255     Campylobacter Not Detected     Plesiomonas shigelloides Not Detected     Salmonella Not Detected     Vibrio Not Detected     Vibrio cholerae Not Detected     Yersinia enterocolitica Not Detected     Enteroaggregative E. coli (EAEC) Not Detected     Enteropathogenic E. coli (EPEC) Not Detected     Enterotoxigenic E. coli (ETEC) lt/st Not Detected     Shiga-like toxin-producing E. coli (STEC) stx1/stx2 Not Detected     Shigella/Enteroinvasive E. coli (EIEC) Not Detected     Cryptosporidium Not Detected     Cyclospora cayetanensis Not Detected     Entamoeba histolytica Not Detected     Giardia lamblia Not Detected     Adenovirus F40/41 Not Detected     Astrovirus Not Detected     Norovirus GI/GII Not Detected     Rotavirus A Not Detected     Sapovirus (I, II, IV or V) Not Detected    Narrative:      Testing performed by multiplex PCR system.    Clostridioides difficile Toxin - Stool, Per Rectum [[id number]]  (Abnormal) Collected: 07/29/23 2107    Specimen: Stool from Per Rectum Updated: 07/29/23 2221    Narrative:      The following  orders were created for panel order Clostridioides difficile Toxin - Stool, Per Rectum.  Procedure                               Abnormality         Status                     ---------                               -----------         ------                     Clostridioides difficile...[[id number]]  Abnormal            Final result                 Please view results for these tests on the individual orders.    Clostridioides difficile Toxin, PCR - Stool, Per Rectum [[id number]]  (Abnormal) Collected: 07/29/23 2107    Specimen: Stool from Per Rectum Updated: 07/29/23 2221     Toxigenic C. difficile by PCR Positive     Comment: CONTACT PRECAUTION       Narrative:      Performed by real-time polymerase chain reaction (qPCR).  DNA from a toxigenic strain of C.difficile has been detected. Antigen testing for the presence of free C.difficile toxin is currently in progress, to help determine the clinical significance of this PCR result.           Imaging Results (Last 72 Hours)       Procedure Component Value Units Date/Time    XR Chest 1 View [[id number]] Collected: 07/31/23 0432     Updated: 07/31/23 0436    Narrative:      Comparison:  CT thorax 7/28/2023    FINDINGS:  There may be a small left pleural effusion.  Mild central pulmonary vascular  congestion noted.  No pneumothorax.  Cardiomediastinal silhouette is  unremarkable.      US Breast Left Limited [[id number]] Collected: 07/30/23 1243     Updated: 07/30/23 1333    Narrative:      INDICATION:  worsening mastitis/ assess for abscess.    COMPARISON:  None relevant.    TECHNIQUE:  Targeted high-resolution grayscale and color Doppler ultrasound was performed of  the left breast to assess for fluid collection in the emergency setting.    FINDINGS:  Multiple hypoechoic areas are present within the left breast measuring 2.5 x 2.1  cm 10 o'clock position, 2.4 x 1.3 cm at the 6 o'clock position, and 4.6 x 3.7 cm  12 o'clock position.    These areas could represent areas  of phlegmon and developing abscess.      Of note: This ultrasound was performed in the emergent setting for evaluation  for a breast fluid collection/abscess. If there is clinical concern for a breast  neoplasm, this examination is inappropriate for such a workup. Proper  mammographic evaluation is recommended if there is clinical suspicion for a  breast neoplasm.        US Renal Bilateral [[id number]] Collected: 07/30/23 1242     Updated: 07/30/23 1250    Narrative:      HISTORY:  BLAINE    COMPARISON:  None    TECHNIQUE:  Real-time ultrasound imaging and color Doppler used to evaluate the kidneys and  bladder.    FINDINGS:  The right kidney measures 9.5 cm. The left kidney measures 9.8 cm.  Cortical  thickness and echotexture are within normal limits. There is no hydronephrosis  or evidence of mass. No calculus was seen.    The bladder was unremarkable.      Impression:      Normal renal ultrasound.      XR Hip With or Without Pelvis 2 - 3 View Right [[id number]] Collected: 07/30/23 1105     Updated: 07/30/23 1108    Narrative:      HISTORY: Hip pain after fall    COMPARISON: None    FINDINGS:  AP and lateral views were obtained.    There is no fracture, dislocation or osseous destruction.      Impression:      No acute abnormality.    CT Chest Without Contrast Diagnostic [[id number]] Collected: 07/28/23 1603     Updated: 07/28/23 1619    Narrative:      INDICATION:  Possible breast abscess and uncertain renal function.    COMPARISON:  None relevant.    TECHNIQUE:  Helical CT of the chest was performed without intravenous contrast.  Multiplanar  reformations were provided.    FINDINGS:  Cardiac size is mildly enlarged.  Diffuse vascular calcification.  Moderate  hiatal hernia.  No pleural effusion or pneumothorax.  No dense airspace  consolidation.  No worrisome pulmonary nodules.      Impression:      1.  No acute intrathoracic process.  2.  Moderate hiatal hernia.  3.  Diffuse vascular calcification.        CT Head  Without Contrast [[id number]] Collected: 07/28/23 1602     Updated: 07/28/23 1619    Narrative:      INDICATION:  Mental status change, unknown cause.    COMPARISON:  None relevant.    TECHNIQUE:  Axial images were performed from the calvarium through the skull base followed  by 2D multiplanar reformats.  No contrast was administered.    FINDINGS:  No mass, mass effect or midline shift.  The ventricles are midline and  symmetric.  No abnormal extra-axial fluid collection.  Pituitary and posterior  fossa are within normal limits.  Calvarium is intact.  Mild scattered small  vessels can be changes..      Impression:      No acute intracranial process.                     ASSESSMENT/PLAN       Examination and evaluation of wound(s) was performed.    DIAGNOSIS:     Mastitis, left breast    PLAN:     -Continue IV antibiotics.  -Awaiting plan per general surgery.  -Will sign off.  -Call with questions.    Discussed findings and treatment plan including risks, benefits, and treatment options with patient in detail. Patient agreed with treatment plan.          This document has been electronically signed by COY Loyd on July 31, 2023 14:56 CDT

## 2023-07-31 NOTE — PLAN OF CARE
Problem: Adult Inpatient Plan of Care  Goal: Plan of Care Review  Recent Flowsheet Documentation  Taken 7/31/2023 1425 by Miryam Patel PTA  Progress: improving  Plan of Care Reviewed With:   patient   spouse  Outcome Evaluation: patient is pleasant and agreeable and requesting to use the restroom upon entering. she is intially impulsive and crusing furniture/walls for bed to restroom transfer. O2 sats decrease with transfers and gait and patient requires 1 minute with cues for PLB for recovery to 90% or higher. she is instructed in use of walker for transfers and is very receptive to cueing. she ambulates with FWW and SBA with improved steadiness within her room. she completes bed to chair transfer with FWW and SBA. she completes sit to stand with FWW and SBA. she demonstrates independence with all aspects of bed mobility. post treatment all needs met and in reach

## 2023-07-31 NOTE — PROGRESS NOTES
Patient reportedly desatting to 86% with ambulation.  Patient also requiring 5 L oxygen keep O2 sats above 90%.  Patient currently on therapeutic Lovenox.  Patient on therapeutic Lovenox, no current indication to order CTA chest overnight because positive findings for PE would not  overnight.  Recommend a.m. team consider CTA chest or VQ scan testing of patient to rule out pulmonary embolus.    Addendum 7/21/2023 3:13 AM  Just informed that patient's heart rate persistently staying in the 120 to 130 bpm range.  Patient has paroxysmal atrial fibrillation and on Lopressor succinate 50 mg daily at baseline.  Will administer Lopressor 5 mg IV x1 and then reevaluate.    Patient Vitals for the past 24 hrs:   BP Temp Temp src Pulse Resp SpO2   07/31/23 0311 -- -- -- -- -- 92 %   07/31/23 0309 135/64 98.2 øF (36.8 øC) Oral (!) 130 20 (!) 88 %   07/31/23 0240 -- -- -- (!) 130 -- --   07/30/23 2329 -- -- -- 113 -- --   07/30/23 1903 151/67 -- -- 91 20 92 %   07/30/23 1849 -- -- -- -- -- 92 %   07/30/23 1847 -- -- -- -- -- (!) 86 %   07/30/23 1529 -- -- -- 75 -- --   07/30/23 1511 136/63 97.3 øF (36.3 øC) Oral 81 20 100 %   07/30/23 1146 128/60 98.8 øF (37.1 øC) Oral 90 18 94 %   07/30/23 0853 162/70 100.5 øF (38.1 øC) -- 93 18 95 %   07/30/23 0722 -- -- -- 97 -- --        Addendum 433am    looked at CXR and ABG and pt appears fluid overloaded;  wrote for lasix 40 mg IV x 1 and placed pt on BIPAP.     Latest Reference Range & Units 07/31/23 04:15   pH, Arterial 7.350 - 7.450 pH units 7.387   pCO2, Arterial 35.0 - 45.0 mm Hg 34.0 (L)   pO2, Arterial 83.0 - 108.0 mm Hg 51.2 (L)   HCO3, Arterial 20.0 - 26.0 mmol/L 20.4   Base Excess 0.0 - 2.0 mmol/L -3.9 (L)   O2 Saturation, Arterial 94.0 - 99.0 % 89.4 (L)   (L): Data is abnormally low    US Renal Bilateral    Result Date: 7/30/2023  Normal renal ultrasound.     XR Hip With or Without Pelvis 2 - 3 View Right    Result Date: 7/30/2023  No acute abnormality.       7/31 pCXR: results pending but looks fluid overloaded per Dr Barton.

## 2023-07-31 NOTE — PLAN OF CARE
Goal Outcome Evaluation:      Patient VSS stable. EKG performed AFIB noted. Patient accidentally removed IV.

## 2023-07-31 NOTE — PROGRESS NOTES
"    [facility]  [address] [place] [address]. [address]  T - [phone]  F - [phone]     Progress Note          PATIENT  DEMOGRAPHICS   PATIENT NAME: Carol Sullivan                      PHYSICIAN: Cris Moreno MD  : 1940  MRN: [id number]   LOS: 1 day    Patient Care Team:  Len Seo MD as PCP - General  LouisaBlaire ribera APRN as Nurse Practitioner (General Surgery)  Subjective   SUBJECTIVE   No acute events overnight. Denied chest pain, SOB.          Objective   OBJECTIVE   Vital Signs  Temp:  [97 øF (36.1 øC)-98.2 øF (36.8 øC)] 97 øF (36.1 øC)  Heart Rate:  [] 85  Resp:  [16-24] 16  BP: (106-151)/(52-67) 113/59    Flowsheet Rows      Flowsheet Row First Filed Value   Admission Height 165.1 cm (65\") Documented at 2023 1900   Admission Weight 74.8 kg (165 lb) Documented at 2023 1608             I/O last 3 completed shifts:  In: 480 [P.O.:480]  Out: 100 [Urine:100]    PHYSICAL EXAM    Physical Exam  Vitals and nursing note reviewed.   Constitutional:       Appearance: Normal appearance. She is not ill-appearing.   Cardiovascular:      Rate and Rhythm: Normal rate and regular rhythm.      Heart sounds: Normal heart sounds. No murmur heard.    No friction rub. No gallop.   Pulmonary:      Effort: No respiratory distress.      Breath sounds: Normal breath sounds. No stridor. No wheezing, rhonchi or rales.   Abdominal:      General: Bowel sounds are normal. There is no distension.      Palpations: Abdomen is soft.      Tenderness: There is no abdominal tenderness.   Musculoskeletal:      Right lower leg: No edema.      Left lower leg: No edema.   Skin:     General: Skin is warm and dry.   Neurological:      Mental Status: She is alert.       RESULTS   Results Review:    Results from last 7 days   Lab Units 23  0639 23  0601 23  0508 23  1600   SODIUM mmol/L 132* 134* 136 134*   POTASSIUM mmol/L 3.3* 3.5 3.8 4.2   CHLORIDE " mmol/L 100 100 99 96*   CO2 mmol/L 19.0* 21.0* 20.0* 25.0   BUN mg/dL 18 21 23 24*   CREATININE mg/dL 1.46* 1.18* 1.30* 1.49*   CALCIUM mg/dL 8.1* 8.0* 8.5* 8.5*   BILIRUBIN mg/dL  --   --   --  0.6   ALK PHOS U/L  --   --   --  121*   ALT (SGPT) U/L  --   --   --  14   AST (SGOT) U/L  --   --   --  29   GLUCOSE mg/dL 162* 132* 129* 121*       Estimated Creatinine Clearance: 29.9 mL/min (A) (by C-G formula based on SCr of 1.46 mg/dL (H)).    Results from last 7 days   Lab Units 07/28/23  1600   MAGNESIUM mg/dL 1.6             Results from last 7 days   Lab Units 07/31/23  0639 07/30/23  0601 07/29/23  0508 07/28/23  1600   WBC 10*3/mm3 14.50* 15.35* 20.31* 15.96*   HEMOGLOBIN g/dL 11.5* 11.2* 13.6 12.9   PLATELETS 10*3/mm3 290 244 276 284       Results from last 7 days   Lab Units 07/31/23  0639 07/30/23  0601 07/29/23  0508 07/28/23  1600   INR  2.78* 2.51* 1.82* 1.78*         Imaging Results (Last 24 Hours)       Procedure Component Value Units Date/Time    XR Chest 1 View [[id number]] Collected: 07/31/23 0432     Updated: 07/31/23 0436    Narrative:      Comparison:  CT thorax 7/28/2023    FINDINGS:  There may be a small left pleural effusion.  Mild central pulmonary vascular  congestion noted.  No pneumothorax.  Cardiomediastinal silhouette is  unremarkable.               MEDICATIONS    DULoxetine, 60 mg, Oral, Daily  enoxaparin, 1 mg/kg, Subcutaneous, Q12H  famotidine, 20 mg, Oral, BID AC  furosemide, 20 mg, Oral, BID  isosorbide mononitrate, 30 mg, Oral, Daily  losartan, 100 mg, Oral, Daily  metoprolol succinate XL, 50 mg, Oral, Daily  NIFEdipine XL, 60 mg, Oral, Daily  nystatin, , Topical, Q12H  piperacillin-tazobactam, 3.375 g, Intravenous, Q8H  rosuvastatin, 40 mg, Oral, Daily  sodium chloride, 10 mL, Intravenous, Q12H  traMADol, 50 mg, Oral, Q8H      Pharmacy Consult,   Pharmacy to Dose enoxaparin (LOVENOX),   Pharmacy to Dose Zosyn,         Assessment & Plan   ASSESSMENT / PLAN      Sepsis     Mastitis    1. Acute kidney injury: baseline unknown.   - Creatinine was 1.5 in 10/2022.   - UA 3+ protein, trace leukocytes, 0-2 RBC, 6-12 WBC, 2+ bacteria. Urine sodium 26. Renal ultrasound unremarkable.   - Creatinine slightly up at 1.46 mg/dl. Off IVFs.      2. Hypertension:   - Blood pressure is acceptable      3. Metabolic acidosis:   - Mild. Will monitor.      4. Sepsis:  - On Zosyn     5. UTI:   - Receiving zosyn     6. Cdiff: on vanc      7. Hyponatremia:   - Sodium is slightly low at 132. Will monitor.     8. Anemia:  - Hemoglobin is acceptable at 11.5.           This document has been electronically signed by Cris Moreno MD on July 31, 2023 15:38 CDT

## 2023-07-31 NOTE — PROGRESS NOTES
GENERAL SURGERY PROGRESS NOTE     LOS: 1 day         Interval History:     Afib with RVR overnight. Improved today. Breast remains fairly erythematous and more painful    Medication Review:   DULoxetine, 60 mg, Oral, Daily  enoxaparin, 1 mg/kg, Subcutaneous, Q12H  famotidine, 20 mg, Oral, BID AC  furosemide, 20 mg, Oral, BID  isosorbide mononitrate, 30 mg, Oral, Daily  losartan, 100 mg, Oral, Daily  metoprolol succinate XL, 50 mg, Oral, Daily  NIFEdipine XL, 60 mg, Oral, Daily  nystatin, , Topical, Q12H  piperacillin-tazobactam, 3.375 g, Intravenous, Q8H  rosuvastatin, 40 mg, Oral, Daily  sodium chloride, 10 mL, Intravenous, Q12H  traMADol, 50 mg, Oral, Q8H        Pharmacy Consult,   Pharmacy to Dose enoxaparin (LOVENOX),   Pharmacy to Dose Zosyn,     Objective     Vital Signs:  Temp:  [97 øF (36.1 øC)-98.2 øF (36.8 øC)] 97 øF (36.1 øC)  Heart Rate:  [] 85  Resp:  [16-24] 16  BP: (106-151)/(52-67) 113/59    Intake/Output Summary (Last 24 hours) at 7/31/2023 1712  Last data filed at 7/31/2023 1300  Gross per 24 hour   Intake 600 ml   Output --   Net 600 ml       Physical Exam  Constitutional:       Appearance: Normal appearance.   HENT:      Head: Normocephalic and atraumatic.      Nose: Nose normal. No congestion.      Mouth/Throat:      Mouth: Mucous membranes are moist.      Pharynx: Oropharynx is clear.   Eyes:      General: No scleral icterus.     Pupils: Pupils are equal, round, and reactive to light.   Cardiovascular:      Rate and Rhythm: Normal rate and regular rhythm.   Pulmonary:      Effort: Pulmonary effort is normal. No respiratory distress.   Skin:     General: Skin is warm and dry.      Comments: Left breat with significant cellulitis, no palpable fluctuance   Neurological:      General: No focal deficit present.      Mental Status: She is alert and oriented to person, place, and time.   Psychiatric:         Mood and Affect: Mood normal.         Behavior: Behavior normal.       Results Review:     Results from last 7 days   Lab Units 07/31/23  0639 07/30/23  0601 07/29/23  0508   SODIUM mmol/L 132* 134* 136   POTASSIUM mmol/L 3.3* 3.5 3.8   CHLORIDE mmol/L 100 100 99   CO2 mmol/L 19.0* 21.0* 20.0*   BUN mg/dL 18 21 23   CREATININE mg/dL 1.46* 1.18* 1.30*   GLUCOSE mg/dL 162* 132* 129*   CALCIUM mg/dL 8.1* 8.0* 8.5*     Results from last 7 days   Lab Units 07/31/23  0639 07/30/23  0601 07/29/23  0508   WBC 10*3/mm3 14.50* 15.35* 20.31*   HEMOGLOBIN g/dL 11.5* 11.2* 13.6   HEMATOCRIT % 34.2 34.1 42.5   PLATELETS 10*3/mm3 290 244 276       Assessment:    Sepsis    Mastitis      83 yo woman with left breast cellulitis and possible abscess    Plan:    - Given lack of improvement on antibiotics, will plan to explore the left breast. The risks, benefits and alternatives of left breast incision and drainage were discussed with Ms. Sullivan and her  and they are agreeable to proceed.      This document has been electronically signed by Fox Roper MD on July 31, 2023 17:12 CDT        Fox Roper MD  07/31/23  17:12 CDT

## 2023-07-31 NOTE — THERAPY TREATMENT NOTE
"Patient Name: Carol Sullivan  : 1940    MRN: [id number]                              Today's Date: 2023     Physical Therapy Treatment Note    Admit Date: 2023    Visit Dx:     ICD-10-CM ICD-9-CM   1. Somnolence  R40.0 780.09   2. Mastitis  N61.0 611.0   3. Sepsis, due to unspecified organism, unspecified whether acute organ dysfunction present  A41.9 038.9     995.91   4. Impaired functional mobility, balance, gait, and endurance [Z74.09 (ICD-10-CM)]  Z74.09 V49.89     Patient Active Problem List   Diagnosis    Hypertension    Hyperlipidemia    Near syncope    GERD (gastroesophageal reflux disease)    Palpitation    PVC (premature ventricular contraction)    Breast calcifications on mammogram    Paroxysmal atrial fibrillation    Peripheral vascular disease, unspecified    Type 2 diabetes mellitus with other specified complication    Long term (current) use of anticoagulants    Encounter for therapeutic drug monitoring    Sepsis     Past Medical History:   Diagnosis Date    Arthritis     Depression     GERD (gastroesophageal reflux disease)     Hyperlipidemia     Hypertension     Near syncope     Vascular disease      Past Surgical History:   Procedure Laterality Date    BLADDER SURGERY      ENDOSCOPY N/A 2019    Procedure: ESOPHAGOGASTRODUODENOSCOPY;  Surgeon: Alberto Sanchez DO;  [facility] ENDOSCOPY;  Service: Gastroenterology    GALLBLADDER SURGERY      SHOULDER SURGERY      \"bone spurs\"    SUBTOTAL HYSTERECTOMY        General Information       Row Name 23 1424          Physical Therapy Time and Intention    Document Type therapy note (daily note)  -     Mode of Treatment individual therapy;physical therapy  -       Row Name 23 1424          General Information    Patient Profile Reviewed yes  -     Existing Precautions/Restrictions fall  -       Row Name 23 1424          Cognition    Orientation Status (Cognition) oriented x 4  -       Row Name " 07/31/23 1424          Safety Issues, Functional Mobility    Impairments Affecting Function (Mobility) strength;endurance/activity tolerance;balance;pain  -               User Key  (r) = Recorded By, (t) = Taken By, (c) = Cosigned By      Initials Name Provider Type     Miryam Patel PTA Physical Therapist Assistant                   Mobility       Row Name 07/31/23 1458          Bed Mobility    Bed Mobility bed mobility (all) activities  -     All Activities, [place] (Bed Mobility) independent  -     Comment, (Bed Mobility) bed flat and no bedrails  -       Row Name 07/31/23 1458          Bed-Chair Transfer    Bed-Chair [place] (Transfers) standby assist  -     Assistive Device (Bed-Chair Transfers) walker, front-wheeled  -       Row Name 07/31/23 1458          Sit-Stand Transfer    Sit-Stand [place] (Transfers) standby assist  -     Assistive Device (Sit-Stand Transfers) walker, front-wheeled  -     Comment, (Sit-Stand Transfer) sit to/from stand x 10 with appropriate hand placement  -       Row Name 07/31/23 1458          Gait/Stairs (Locomotion)    [place] Level (Gait) stand assist  -     Assistive Device (Gait) walker, front-wheeled  -     Distance in Feet (Gait) 6 feet + 6 feet + 16 feet + 16 feet  -     Deviations/Abnormal Patterns (Gait) bilateral deviations;gait speed decreased;stride length decreased  -               User Key  (r) = Recorded By, (t) = Taken By, (c) = Cosigned By      Initials Name Provider Type     Miryam Patel PTA Physical Therapist Assistant                   Obj/Interventions    No documentation.                  Goals/Plan       Row Name 07/31/23 1501          Bed Mobility Goal 1 (PT)    Activity/Assistive Device (Bed Mobility Goal 1, PT) sit to supine/supine to sit  -     [place] Level/Cues Needed (Bed Mobility Goal 1, PT) independent  -     Time Frame (Bed Mobility Goal 1, PT) by discharge  -     Progress/Outcomes  (Bed Mobility Goal 1, PT) goal met   -       Row Name 07/31/23 1501          Transfer Goal 1 (PT)    Activity/Assistive Device (Transfer Goal 1, PT) sit-to-stand/stand-to-sit;bed-to-chair/chair-to-bed;walker, rolling  -     [place] Level/Cues Needed (Transfer Goal 1, PT) modified independence  -MH     Time Frame (Transfer Goal 1, PT) by discharge  -     Progress/Outcome (Transfer Goal 1, PT) goal not met;continuing progress toward goal  -       Row Name 07/31/23 1501          Gait Training Goal 1 (PT)    Activity/Assistive Device (Gait Training Goal 1, PT) walker, rolling  -     [place] Level (Gait Training Goal 1, PT) modified independence  -MH     Distance (Gait Training Goal 1, PT) 30'x3.  -     Time Frame (Gait Training Goal 1, PT) by discharge  -     Strategies/Barriers (Gait Training Goal 1, PT) May advance to (I) without an AD (PLOF).  -     Progress/Outcome (Gait Training Goal 1, PT) goal not met;continuing progress toward goal  -[facility] Name 07/31/23 1501          Problem Specific Goal 1 (PT)    Problem Specific Goal 1 (PT) Score 25/28 on Tinetti fall risk assessment.  -     Time Frame (Problem Specific Goal 1, PT) by discharge  -     Strategies/Barriers (Problem Specific Goal 1, PT) Decreased stepping response to perturbation.  -     Progress/Outcome (Problem Specific Goal 1, PT) goal not met  -               User Key  (r) = Recorded By, (t) = Taken By, (c) = Cosigned By      Initials Name Provider Type     Miryam Patel PTA Physical Therapist Assistant                   Clinical Impression       [facility] Name 07/31/23 7149          Pain    Pretreatment Pain Rating 0/10 - no pain  -     Posttreatment Pain Rating 0/10 - no pain  -     Pre/Posttreatment Pain Comment --  -[facility] Name 07/31/23 3727          Plan of Care Review    Plan of Care Reviewed With patient;spouse  -     Progress improving  -     Outcome Evaluation patient is pleasant and agreeable and  requesting to use the restroom upon entering. she is intially impulsive and crusing furniture/walls for bed to restroom transfer. O2 sats decrease with transfers and gait and patient requires 1 minute with cues for PLB for recovery to 90% or higher. she is instructed in use of walker for transfers and is very receptive to cueing. she ambulates with FWW and SBA with improved steadiness within her room. she completes bed to chair transfer with FWW and SBA. she completes sit to stand with FWW and SBA. she demonstrates independence with all aspects of bed mobility. post treatment all needs met and in reach  -       Row Name 07/31/23 1425          Therapy Assessment/Plan (PT)    Rehab Potential (PT) good, to achieve stated therapy goals  -     Criteria for Skilled Interventions Met (PT) yes;skilled treatment is necessary  -     Therapy Frequency (PT) 5 times/wk  -       Row Name 07/31/23 1425          Vital Signs    Intratreatment Heart Rate (beats/min) 96  -     Intra SpO2 (%) 88  RECOVERS TO 90% IN 1 MINUTE  -     O2 Delivery Intra Treatment nasal cannula  -     Post SpO2 (%) 87  up to 93% with PLB in 1 minute  -     O2 Delivery Post Treatment nasal cannula  -MH     Intra Patient Position Sitting  -     Post Patient Position Supine  -     Recovery Time recovers within 1 minute with each trip of gait and with transfers  -       Row Name 07/31/23 1425          Positioning and Restraints    Pre-Treatment Position in bed  -     Post Treatment Position bed  -     In Bed fowlers;call light within reach;encouraged to call for assist;exit alarm on;with family/caregiver  -               User Key  (r) = Recorded By, (t) = Taken By, (c) = Cosigned By      Initials Name Provider Type     Miryam Patel PTA Physical Therapist Assistant                   Outcome Measures       Row Name 07/31/23 1501 07/31/23 0946       How much help from another person do you currently need...    Turning from your back  to your side while in flat bed without using bedrails? 4  - 4  -SC    Moving from lying on back to sitting on the side of a flat bed without bedrails? 4  -MH 4  -SC    Moving to and from a bed to a chair (including a wheelchair)? 4  -MH 3  -SC    Standing up from a chair using your arms (e.g., wheelchair, bedside chair)? 4  -MH 3  -SC    Climbing 3-5 steps with a railing? 3  -MH 2  -SC    To walk in hospital room? 4  -MH 3  -SC    AM-PAC 6 Clicks Score (PT) 23  -MH 19  -SC    Highest level of mobility 7 --> Walked 25 feet or more  -MH 6 --> Walked 10 steps or more  -SC              User Key  (r) = Recorded By, (t) = Taken By, (c) = Cosigned By      Initials Name Provider Type     Miryam Patel, PTA Physical Therapist Assistant    Ayana Carrizales RN Registered Nurse                                 Physical Therapy Education       Title: PT OT SLP Therapies (In Progress)       Topic: Physical Therapy (In Progress)       Point: Mobility training (Not Started)       Learner Progress:  Not documented in this visit.              Point: Home exercise program (Not Started)       Learner Progress:  Not documented in this visit.              Point: Body mechanics (Not Started)       Learner Progress:  Not documented in this visit.              Point: Precautions (Done)       Learning Progress Summary             Patient Acceptance, E, VU by  at 7/30/2023 4359    Comment: Xavier assessed: 19/28 or moderate fall risk, recommend FWW with gait.                                         User Key       Initials Effective Dates Name Provider Type Discipline     07/11/23 -  Dionte Kendrick, PT Physical Therapist PT                  PT Recommendation and Plan     Plan of Care Reviewed With: patient, spouse  Progress: improving  Outcome Evaluation: patient is pleasant and agreeable and requesting to use the restroom upon entering. she is intially impulsive and crusing furniture/walls for bed to restroom transfer. O2 sats  decrease with transfers and gait and patient requires 1 minute with cues for PLB for recovery to 90% or higher. she is instructed in use of walker for transfers and is very receptive to cueing. she ambulates with FWW and SBA with improved steadiness within her room. she completes bed to chair transfer with FWW and SBA. she completes sit to stand with FWW and SBA. she demonstrates independence with all aspects of bed mobility. post treatment all needs met and in reach     Time Calculation:         PT Charges       Row Name 07/31/23 1457             Time Calculation    Start Time 1413  -      Stop Time 1457  -      Time Calculation (min) 44 min  -      PT Received On 07/31/23  -         Time Calculation- PT    Total Timed Code Minutes- PT 44 minute(s)  -MH         Timed Charges    54232 - PT Therapeutic Activity Minutes 44  -MH         Total Minutes    Timed Charges Total Minutes 44  -MH       Total Minutes 44  -MH                User Key  (r) = Recorded By, (t) = Taken By, (c) = Cosigned By      Initials Name Provider Type     Miryam Patel PTA Physical Therapist Assistant                  Therapy Charges for Today       Code Description Service Date Service Provider Modifiers Qty    05101831475  PT THERAPEUTIC ACT EA 15 MIN 7/31/2023 Miryam Paetl PTA GP 3            PT G-Codes  Outcome Measure Options: AM-PAC 6 Clicks Basic Mobility (PT), [place]  AM-PAC 6 Clicks Score (PT): 23  [place] Total Score: 19  PT Discharge Summary  Anticipated Discharge Disposition (PT): home with assist, home with home health    Miryam Patel PTA  7/31/2023

## 2023-07-31 NOTE — PROGRESS NOTES
[facility] Medicine Services  INPATIENT PROGRESS NOTE    Length of Stay: 1  Date of Admission: 7/28/2023  Primary Care Physician: Len Seo MD    Subjective   Chief Complaint: No complaints    HPI:  This is an 82 year old female with PMH of PAF (on Coumadin), HTN, lumbar spondylosis and HLD that presented to [facility] on 7/28/2023 secondary to fever, chills, confusion and left breast redness.       Patient was febrile (103.1) on admission.  Creatinine elevated at 1.49, which may be chronic as it was 1.5 last year. Urine with 2+ bacteria, trace leukocytes and 1+ yeast. CT of the head showed no acute findings.  CT of the chest negative for abscess.      7/30/2023:  Spoke with pharmacy, C-diff PCR was positive but antigen was negative. Will discontinue oral vancomycin as she is a carrier with no active infection.  There is increased redness noted to the left breast today.  Ultrasound indicates multiple hypoechoic areas could that could represent areas of phlegmon and developing abscess.     7/31/2023: Patient developed fluid overload during the night with A-fib RVR.  IV fluids were discontinued and the patient was given IV Lopressor and Lasix.  She is now rate controlled.  Home Lasix has been restarted.    Review of Systems   Constitutional:  Negative for activity change and fatigue.   HENT:  Negative for ear pain and sore throat.    Eyes:  Negative for pain and discharge.   Respiratory:  Negative for cough and shortness of breath.    Cardiovascular:  Negative for chest pain and palpitations.   Gastrointestinal:  Negative for abdominal pain and nausea.   Endocrine: Negative for cold intolerance and heat intolerance.   Genitourinary:  Negative for difficulty urinating and dysuria.   Musculoskeletal:  Negative for arthralgias and gait problem.   Skin:  Negative for color change and rash.   Neurological:  Negative for dizziness and weakness.   Psychiatric/Behavioral:  Negative for agitation and confusion.            Objective    Temp:  [97 øF (36.1 øC)-98.2 øF (36.8 øC)] 97 øF (36.1 øC)  Heart Rate:  [] 85  Resp:  [16-24] 16  BP: (106-151)/(52-67) 113/59    Physical Exam  Constitutional:       Appearance: She is well-developed.   HENT:      Head: Normocephalic and atraumatic.   Eyes:      Pupils: Pupils are equal, round, and reactive to light.   Cardiovascular:      Rate and Rhythm: Normal rate and regular rhythm.   Pulmonary:      Effort: Pulmonary effort is normal.      Breath sounds: Normal breath sounds.   Abdominal:      General: Bowel sounds are normal.      Palpations: Abdomen is soft.   Musculoskeletal:         General: Normal range of motion.      Cervical back: Normal range of motion and neck supple.   Skin:     General: Skin is warm and dry.      Comments: Left breast:  Erythema improved from yesterday.    Neurological:      Mental Status: She is alert and oriented to person, place, and time.   Psychiatric:         Behavior: Behavior normal.       Results Review:  I have reviewed the labs, radiology results, and diagnostic studies.    Laboratory Data:   Results from last 7 days   Lab Units 07/31/23  0639 07/30/23  0601 07/29/23  0508 07/28/23  1600   SODIUM mmol/L 132* 134* 136 134*   POTASSIUM mmol/L 3.3* 3.5 3.8 4.2   CHLORIDE mmol/L 100 100 99 96*   CO2 mmol/L 19.0* 21.0* 20.0* 25.0   BUN mg/dL 18 21 23 24*   CREATININE mg/dL 1.46* 1.18* 1.30* 1.49*   GLUCOSE mg/dL 162* 132* 129* 121*   CALCIUM mg/dL 8.1* 8.0* 8.5* 8.5*   BILIRUBIN mg/dL  --   --   --  0.6   ALK PHOS U/L  --   --   --  121*   ALT (SGPT) U/L  --   --   --  14   AST (SGOT) U/L  --   --   --  29   ANION GAP mmol/L 13.0 13.0 17.0* 13.0       Estimated Creatinine Clearance: 29.9 mL/min (A) (by C-G formula based on SCr of 1.46 mg/dL (H)).  Results from last 7 days   Lab Units 07/28/23  1600   MAGNESIUM mg/dL 1.6           Results from last 7 days   Lab Units 07/31/23  0639 07/30/23  0601 07/29/23  0508 07/28/23  1600   WBC 10*3/mm3  14.50* 15.35* 20.31* 15.96*   HEMOGLOBIN g/dL 11.5* 11.2* 13.6 12.9   HEMATOCRIT % 34.2 34.1 42.5 38.3   PLATELETS 10*3/mm3 290 244 276 284       Results from last 7 days   Lab Units 07/31/23  0639 07/30/23  0601 07/29/23  0508 07/28/23  1600   INR  2.78* 2.51* 1.82* 1.78*         Culture Data:   Blood Culture   Date Value Ref Range Status   07/28/2023 No growth at 2 days  Preliminary   07/28/2023 No growth at 2 days  Preliminary     No results found for: URINECX    No results found for: RESPCX  No results found for: WOUNDCX  No results found for: STOOLCX  No components found for: BODYFLD    Radiology Data:   Imaging Results (Last 24 Hours)       Procedure Component Value Units Date/Time    XR Chest 1 View [[id number]] Collected: 07/31/23 0432     Updated: 07/31/23 0436    Narrative:      Comparison:  CT thorax 7/28/2023    FINDINGS:  There may be a small left pleural effusion.  Mild central pulmonary vascular  congestion noted.  No pneumothorax.  Cardiomediastinal silhouette is  unremarkable.              I have reviewed the patient's current medications.     Assessment/Plan     Active Hospital Problems    Diagnosis     **Sepsis     Mastitis        Plan:   Sepsis secondary to left breast mastitis/ UTI:  Continue Zoysn.      Mastitis, left breast: No open wounds noted.  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 Peterson for this.  She denies any previous surgical intervention. General surgery and wound care consults appreciated. Left breast ultrasound shows possible areas of phlegmon and developing abscess.  Dr. Roper notified.    Hypertension: Continue home Losartan, Procardia XL, Imdur and metoprolol.    Hyperlipidemia: Continue home statin.   Paroxsymal atrial fibrillation: Continue home beta-blocker and Procardia XL.  Will hold Coumadin and start therapeutic Lovenox.    Acute kidney injury, possibly chronic:  Nephrology consult appreciated.   Urinary tract infection, enterococcus faecalis:    Continue IV Zosyn.  GERD:  Continue PPI.    Diarrhea:  Patient test positive for C-diff PCR, but negative for antigen.  Oral Vancomycin discontinued.    Pulmonary edema:  IVF discontinued.  Home lasix restarted.  Echo pending.   Hypokalemia:  Potassium protocol in place.      VTE PPX:  Lovenox     Medical Decision Making  Number and Complexity of problems: 10/High  Differential Diagnosis: N/A     Conditions and Status:        Condition is improving.     MDM Data  External documents reviewed: Yes  My EKG interpretation: A fib/ pacemaker  My CT interpretation: As per radiology  Tests considered but not ordered: None     Decision rules/scores evaluated (example [id number], Wells, etc): N/A     Discussed with: Patient/ family     Treatment Plan  As above     Care Planning  Shared decision making: Yes  Code status and discussions: Full     Disposition  Social Determinants of Health that impact treatment or disposition: None  I expect the patient to be discharged to home in 2-3 days.      I discussed the patient's findings and my recommendations with: Patient     I confirmed that the patient's Advance Care Plan is present, code status is documented, or surrogate decision maker is listed in the patient's medical record.      I have utilized all available immediate resources to obtain, update, or review the patient's current medications (including all prescriptions, over-the-counter products, herbals, cannabis/cannabidiol products, and vitamin/mineral/dietary (nutritional) supplements).                      This document has been electronically signed by COY Vázquez on July 31, 2023 16:22 CDT

## 2023-08-01 ENCOUNTER — APPOINTMENT (OUTPATIENT)
Dept: CARDIOLOGY | Facility: HOSPITAL | Age: 83
DRG: 853 | End: 2023-08-01
Payer: MEDICARE

## 2023-08-01 ENCOUNTER — ANESTHESIA EVENT (OUTPATIENT)
Dept: PERIOP | Facility: HOSPITAL | Age: 83
DRG: 853 | End: 2023-08-01
Payer: MEDICARE

## 2023-08-01 ENCOUNTER — APPOINTMENT (OUTPATIENT)
Dept: ULTRASOUND IMAGING | Facility: HOSPITAL | Age: 83
DRG: 853 | End: 2023-08-01
Payer: MEDICARE

## 2023-08-01 ENCOUNTER — APPOINTMENT (OUTPATIENT)
Dept: GENERAL RADIOLOGY | Facility: HOSPITAL | Age: 83
DRG: 853 | End: 2023-08-01
Payer: MEDICARE

## 2023-08-01 ENCOUNTER — APPOINTMENT (OUTPATIENT)
Dept: INTERVENTIONAL RADIOLOGY/VASCULAR | Facility: HOSPITAL | Age: 83
DRG: 853 | End: 2023-08-01
Payer: MEDICARE

## 2023-08-01 ENCOUNTER — ANESTHESIA (OUTPATIENT)
Dept: PERIOP | Facility: HOSPITAL | Age: 83
DRG: 853 | End: 2023-08-01
Payer: MEDICARE

## 2023-08-01 LAB
ANION GAP SERPL CALCULATED.3IONS-SCNC: 14 MMOL/L (ref 5–15)
ARTERIAL PATENCY WRIST A: ABNORMAL
ATMOSPHERIC PRESS: 753 MMHG
BASE EXCESS BLDA CALC-SCNC: -5.3 MMOL/L (ref 0–2)
BASOPHILS # BLD AUTO: 0.17 10*3/MM3 (ref 0–0.2)
BASOPHILS NFR BLD AUTO: 1.8 % (ref 0–1.5)
BDY SITE: ABNORMAL
BH CV ECHO MEAS - ACS: 1.8 CM
BH CV ECHO MEAS - AO MAX PG: 11.6 MMHG
BH CV ECHO MEAS - AO MEAN PG: 7 MMHG
BH CV ECHO MEAS - AO ROOT DIAM: 3.2 CM
BH CV ECHO MEAS - AO V2 MAX: 170 CM/SEC
BH CV ECHO MEAS - AO V2 VTI: 25.4 CM
BH CV ECHO MEAS - AVA(I,D): 4.1 CM2
BH CV ECHO MEAS - EDV(CUBED): 32 ML
BH CV ECHO MEAS - EDV(MOD-SP2): 33.7 ML
BH CV ECHO MEAS - EDV(MOD-SP4): 38.9 ML
BH CV ECHO MEAS - EF(MOD-BP): 57.4 %
BH CV ECHO MEAS - EF(MOD-SP2): 47.5 %
BH CV ECHO MEAS - EF(MOD-SP4): 65.3 %
BH CV ECHO MEAS - ESV(CUBED): 8.5 ML
BH CV ECHO MEAS - ESV(MOD-SP2): 17.7 ML
BH CV ECHO MEAS - ESV(MOD-SP4): 13.5 ML
BH CV ECHO MEAS - FS: 35.8 %
BH CV ECHO MEAS - IVS/LVPW: 1.09 CM
BH CV ECHO MEAS - IVSD: 1.55 CM
BH CV ECHO MEAS - LA DIMENSION: 4.6 CM
BH CV ECHO MEAS - LAT PEAK E' VEL: 13.2 CM/SEC
BH CV ECHO MEAS - LV DIASTOLIC VOL/BSA (35-75): 21.9 CM2
BH CV ECHO MEAS - LV MASS(C)D: 167.3 GRAMS
BH CV ECHO MEAS - LV MAX PG: 5.8 MMHG
BH CV ECHO MEAS - LV MEAN PG: 3.2 MMHG
BH CV ECHO MEAS - LV SYSTOLIC VOL/BSA (12-30): 7.6 CM2
BH CV ECHO MEAS - LV V1 MAX: 120.8 CM/SEC
BH CV ECHO MEAS - LV V1 VTI: 27.1 CM
BH CV ECHO MEAS - LVIDD: 3.2 CM
BH CV ECHO MEAS - LVIDS: 2.04 CM
BH CV ECHO MEAS - LVOT AREA: 3.8 CM2
BH CV ECHO MEAS - LVOT DIAM: 2.2 CM
BH CV ECHO MEAS - LVPWD: 1.42 CM
BH CV ECHO MEAS - MED PEAK E' VEL: 7.7 CM/SEC
BH CV ECHO MEAS - MR MAX PG: 83.5 MMHG
BH CV ECHO MEAS - MR MAX VEL: 457 CM/SEC
BH CV ECHO MEAS - MV A MAX VEL: 54.8 CM/SEC
BH CV ECHO MEAS - MV DEC SLOPE: 902.2 CM/SEC2
BH CV ECHO MEAS - MV E MAX VEL: 116 CM/SEC
BH CV ECHO MEAS - MV E/A: 2.12
BH CV ECHO MEAS - MV MAX PG: 9.1 MMHG
BH CV ECHO MEAS - MV MEAN PG: 2.7 MMHG
BH CV ECHO MEAS - MV P1/2T: 49 MSEC
BH CV ECHO MEAS - MV V2 VTI: 22.1 CM
BH CV ECHO MEAS - MVA(P1/2T): 4.5 CM2
BH CV ECHO MEAS - MVA(VTI): 4.7 CM2
BH CV ECHO MEAS - PA V2 MAX: 103.2 CM/SEC
BH CV ECHO MEAS - RAP SYSTOLE: 3 MMHG
BH CV ECHO MEAS - RVDD: 3.1 CM
BH CV ECHO MEAS - RVSP: 31.3 MMHG
BH CV ECHO MEAS - SI(MOD-SP2): 9 ML/M2
BH CV ECHO MEAS - SI(MOD-SP4): 14.3 ML/M2
BH CV ECHO MEAS - SV(LVOT): 103.5 ML
BH CV ECHO MEAS - SV(MOD-SP2): 16 ML
BH CV ECHO MEAS - SV(MOD-SP4): 25.4 ML
BH CV ECHO MEAS - TAPSE (>1.6): 1.55 CM
BH CV ECHO MEAS - TR MAX PG: 28.3 MMHG
BH CV ECHO MEAS - TR MAX VEL: 266 CM/SEC
BH CV ECHO MEASUREMENTS AVERAGE E/E' RATIO: 11.1
BUN SERPL-MCNC: 18 MG/DL (ref 8–23)
BUN/CREAT SERPL: 10.6 (ref 7–25)
CALCIUM SPEC-SCNC: 8.5 MG/DL (ref 8.6–10.5)
CHLORIDE SERPL-SCNC: 98 MMOL/L (ref 98–107)
CO2 SERPL-SCNC: 22 MMOL/L (ref 22–29)
CREAT SERPL-MCNC: 1.7 MG/DL (ref 0.57–1)
CRP SERPL-MCNC: 26.88 MG/DL (ref 0–0.5)
DEPRECATED RDW RBC AUTO: 43.7 FL (ref 37–54)
EGFRCR SERPLBLD CKD-EPI 2021: 30 ML/MIN/1.73
EOSINOPHIL # BLD AUTO: 0.22 10*3/MM3 (ref 0–0.4)
EOSINOPHIL NFR BLD AUTO: 2.3 % (ref 0.3–6.2)
ERYTHROCYTE [DISTWIDTH] IN BLOOD BY AUTOMATED COUNT: 14.8 % (ref 12.3–15.4)
GAS FLOW AIRWAY: 6 LPM
GLUCOSE BLDC GLUCOMTR-MCNC: 129 MG/DL (ref 70–130)
GLUCOSE BLDC GLUCOMTR-MCNC: 140 MG/DL (ref 70–130)
GLUCOSE SERPL-MCNC: 125 MG/DL (ref 65–99)
HCO3 BLDA-SCNC: 20.7 MMOL/L (ref 20–26)
HCT VFR BLD AUTO: 31.2 % (ref 34–46.6)
HGB BLD-MCNC: 10.7 G/DL (ref 12–15.9)
IMM GRANULOCYTES # BLD AUTO: 0.04 10*3/MM3 (ref 0–0.05)
IMM GRANULOCYTES NFR BLD AUTO: 0.4 % (ref 0–0.5)
INR PPP: 3.44 (ref 0.8–1.2)
LEFT ATRIUM VOLUME INDEX: 34.8 ML/M2
LYMPHOCYTES # BLD AUTO: 0.99 10*3/MM3 (ref 0.7–3.1)
LYMPHOCYTES NFR BLD AUTO: 10.3 % (ref 19.6–45.3)
Lab: ABNORMAL
MAGNESIUM SERPL-MCNC: 2.5 MG/DL (ref 1.6–2.4)
MCH RBC QN AUTO: 27.6 PG (ref 26.6–33)
MCHC RBC AUTO-ENTMCNC: 34.3 G/DL (ref 31.5–35.7)
MCV RBC AUTO: 80.4 FL (ref 79–97)
MODALITY: ABNORMAL
MONOCYTES # BLD AUTO: 0.98 10*3/MM3 (ref 0.1–0.9)
MONOCYTES NFR BLD AUTO: 10.2 % (ref 5–12)
NEUTROPHILS NFR BLD AUTO: 7.17 10*3/MM3 (ref 1.7–7)
NEUTROPHILS NFR BLD AUTO: 75 % (ref 42.7–76)
NRBC BLD AUTO-RTO: 0 /100 WBC (ref 0–0.2)
PCO2 BLDA: 41.5 MM HG (ref 35–45)
PH BLDA: 7.31 PH UNITS (ref 7.35–7.45)
PLATELET # BLD AUTO: 298 10*3/MM3 (ref 140–450)
PMV BLD AUTO: 9.8 FL (ref 6–12)
PO2 BLDA: 55.1 MM HG (ref 83–108)
POTASSIUM SERPL-SCNC: 3.2 MMOL/L (ref 3.5–5.2)
POTASSIUM SERPL-SCNC: 3.5 MMOL/L (ref 3.5–5.2)
PROTHROMBIN TIME: 33.7 SECONDS (ref 11.1–15.3)
RBC # BLD AUTO: 3.88 10*6/MM3 (ref 3.77–5.28)
SAO2 % BLDCOA: 88.2 % (ref 94–99)
SODIUM SERPL-SCNC: 134 MMOL/L (ref 136–145)
VENTILATOR MODE: ABNORMAL
WBC NRBC COR # BLD: 9.57 10*3/MM3 (ref 3.4–10.8)

## 2023-08-01 PROCEDURE — 25010000002 ATROPINE SULFATE: Performed by: FAMILY MEDICINE

## 2023-08-01 PROCEDURE — 93010 ELECTROCARDIOGRAM REPORT: CPT | Performed by: INTERNAL MEDICINE

## 2023-08-01 PROCEDURE — 0H9U0ZZ DRAINAGE OF LEFT BREAST, OPEN APPROACH: ICD-10-PCS | Performed by: STUDENT IN AN ORGANIZED HEALTH CARE EDUCATION/TRAINING PROGRAM

## 2023-08-01 PROCEDURE — 94799 UNLISTED PULMONARY SVC/PX: CPT

## 2023-08-01 PROCEDURE — 85610 PROTHROMBIN TIME: CPT | Performed by: NURSE PRACTITIONER

## 2023-08-01 PROCEDURE — 86140 C-REACTIVE PROTEIN: CPT | Performed by: NURSE PRACTITIONER

## 2023-08-01 PROCEDURE — 93306 TTE W/DOPPLER COMPLETE: CPT | Performed by: INTERNAL MEDICINE

## 2023-08-01 PROCEDURE — 25010000002 SUCCINYLCHOLINE PER 20 MG

## 2023-08-01 PROCEDURE — 82948 REAGENT STRIP/BLOOD GLUCOSE: CPT

## 2023-08-01 PROCEDURE — 84132 ASSAY OF SERUM POTASSIUM: CPT | Performed by: INTERNAL MEDICINE

## 2023-08-01 PROCEDURE — 93005 ELECTROCARDIOGRAM TRACING: CPT | Performed by: FAMILY MEDICINE

## 2023-08-01 PROCEDURE — 99232 SBSQ HOSP IP/OBS MODERATE 35: CPT | Performed by: STUDENT IN AN ORGANIZED HEALTH CARE EDUCATION/TRAINING PROGRAM

## 2023-08-01 PROCEDURE — 83735 ASSAY OF MAGNESIUM: CPT | Performed by: INTERNAL MEDICINE

## 2023-08-01 PROCEDURE — 93306 TTE W/DOPPLER COMPLETE: CPT

## 2023-08-01 PROCEDURE — 36410 VNPNXR 3YR/> PHY/QHP DX/THER: CPT

## 2023-08-01 PROCEDURE — 25010000002 PROPOFOL 200 MG/20ML EMULSION

## 2023-08-01 PROCEDURE — 87176 TISSUE HOMOGENIZATION CULTR: CPT | Performed by: STUDENT IN AN ORGANIZED HEALTH CARE EDUCATION/TRAINING PROGRAM

## 2023-08-01 PROCEDURE — 25010000002 FENTANYL CITRATE (PF) 100 MCG/2ML SOLUTION

## 2023-08-01 PROCEDURE — 87070 CULTURE OTHR SPECIMN AEROBIC: CPT | Performed by: STUDENT IN AN ORGANIZED HEALTH CARE EDUCATION/TRAINING PROGRAM

## 2023-08-01 PROCEDURE — 94640 AIRWAY INHALATION TREATMENT: CPT

## 2023-08-01 PROCEDURE — 25010000002 ENOXAPARIN PER 10 MG: Performed by: NURSE PRACTITIONER

## 2023-08-01 PROCEDURE — 25010000002 PIPERACILLIN SOD-TAZOBACTAM PER 1 G: Performed by: NURSE PRACTITIONER

## 2023-08-01 PROCEDURE — 71045 X-RAY EXAM CHEST 1 VIEW: CPT

## 2023-08-01 PROCEDURE — 94760 N-INVAS EAR/PLS OXIMETRY 1: CPT

## 2023-08-01 PROCEDURE — 36600 WITHDRAWAL OF ARTERIAL BLOOD: CPT

## 2023-08-01 PROCEDURE — 82803 BLOOD GASES ANY COMBINATION: CPT

## 2023-08-01 PROCEDURE — 76937 US GUIDE VASCULAR ACCESS: CPT

## 2023-08-01 PROCEDURE — 25010000002 NALOXONE HCL 2 MG/2ML SOLUTION PREFILLED SYRINGE: Performed by: FAMILY MEDICINE

## 2023-08-01 PROCEDURE — 87015 SPECIMEN INFECT AGNT CONCNTJ: CPT | Performed by: STUDENT IN AN ORGANIZED HEALTH CARE EDUCATION/TRAINING PROGRAM

## 2023-08-01 PROCEDURE — C1751 CATH, INF, PER/CENT/MIDLINE: HCPCS

## 2023-08-01 PROCEDURE — 25010000002 MAGNESIUM SULFATE IN D5W 1G/100ML (PREMIX) 1-5 GM/100ML-% SOLUTION: Performed by: INTERNAL MEDICINE

## 2023-08-01 PROCEDURE — 19020 MASTOTOMY EXPL DRG ABSC DP: CPT | Performed by: STUDENT IN AN ORGANIZED HEALTH CARE EDUCATION/TRAINING PROGRAM

## 2023-08-01 PROCEDURE — 25010000002 DEXAMETHASONE PER 1 MG

## 2023-08-01 PROCEDURE — 80048 BASIC METABOLIC PNL TOTAL CA: CPT | Performed by: NURSE PRACTITIONER

## 2023-08-01 PROCEDURE — 25010000002 ONDANSETRON PER 1 MG

## 2023-08-01 PROCEDURE — 25010000002 PIPERACILLIN SOD-TAZOBACTAM PER 1 G: Performed by: STUDENT IN AN ORGANIZED HEALTH CARE EDUCATION/TRAINING PROGRAM

## 2023-08-01 PROCEDURE — 0 POTASSIUM CHLORIDE 10 MEQ/100ML SOLUTION: Performed by: PHYSICIAN ASSISTANT

## 2023-08-01 PROCEDURE — 85025 COMPLETE CBC W/AUTO DIFF WBC: CPT | Performed by: NURSE PRACTITIONER

## 2023-08-01 PROCEDURE — 25010000002 ENOXAPARIN PER 10 MG: Performed by: STUDENT IN AN ORGANIZED HEALTH CARE EDUCATION/TRAINING PROGRAM

## 2023-08-01 PROCEDURE — 94660 CPAP INITIATION&MGMT: CPT

## 2023-08-01 PROCEDURE — 25010000002 FUROSEMIDE PER 20 MG: Performed by: STUDENT IN AN ORGANIZED HEALTH CARE EDUCATION/TRAINING PROGRAM

## 2023-08-01 PROCEDURE — 87075 CULTR BACTERIA EXCEPT BLOOD: CPT | Performed by: STUDENT IN AN ORGANIZED HEALTH CARE EDUCATION/TRAINING PROGRAM

## 2023-08-01 PROCEDURE — 87205 SMEAR GRAM STAIN: CPT | Performed by: STUDENT IN AN ORGANIZED HEALTH CARE EDUCATION/TRAINING PROGRAM

## 2023-08-01 RX ORDER — DEXAMETHASONE SODIUM PHOSPHATE 4 MG/ML
INJECTION, SOLUTION INTRA-ARTICULAR; INTRALESIONAL; INTRAMUSCULAR; INTRAVENOUS; SOFT TISSUE AS NEEDED
Status: DISCONTINUED | OUTPATIENT
Start: 2023-08-01 | End: 2023-08-01 | Stop reason: SURG

## 2023-08-01 RX ORDER — MIDAZOLAM HYDROCHLORIDE 1 MG/ML
10 INJECTION INTRAMUSCULAR; INTRAVENOUS ONCE
Status: DISCONTINUED | OUTPATIENT
Start: 2023-08-01 | End: 2023-08-01

## 2023-08-01 RX ORDER — PROPOFOL 10 MG/ML
INJECTION, EMULSION INTRAVENOUS AS NEEDED
Status: DISCONTINUED | OUTPATIENT
Start: 2023-08-01 | End: 2023-08-01 | Stop reason: SURG

## 2023-08-01 RX ORDER — ONDANSETRON 2 MG/ML
INJECTION INTRAMUSCULAR; INTRAVENOUS AS NEEDED
Status: DISCONTINUED | OUTPATIENT
Start: 2023-08-01 | End: 2023-08-01 | Stop reason: SURG

## 2023-08-01 RX ORDER — POTASSIUM CHLORIDE 1.5 G/1.58G
40 POWDER, FOR SOLUTION ORAL ONCE
Status: DISCONTINUED | OUTPATIENT
Start: 2023-08-01 | End: 2023-08-03

## 2023-08-01 RX ORDER — POTASSIUM CHLORIDE 750 MG/1
40 CAPSULE, EXTENDED RELEASE ORAL ONCE
Status: COMPLETED | OUTPATIENT
Start: 2023-08-01 | End: 2023-08-01

## 2023-08-01 RX ORDER — FUROSEMIDE 10 MG/ML
20 INJECTION INTRAMUSCULAR; INTRAVENOUS EVERY 12 HOURS
Status: DISCONTINUED | OUTPATIENT
Start: 2023-08-01 | End: 2023-08-03

## 2023-08-01 RX ORDER — MIDAZOLAM HYDROCHLORIDE 2 MG/2ML
10 INJECTION, SOLUTION INTRAMUSCULAR; INTRAVENOUS ONCE
Status: DISCONTINUED | OUTPATIENT
Start: 2023-08-01 | End: 2023-08-01

## 2023-08-01 RX ORDER — ALBUTEROL SULFATE 2.5 MG/3ML
2.5 SOLUTION RESPIRATORY (INHALATION) ONCE
Status: COMPLETED | OUTPATIENT
Start: 2023-08-01 | End: 2023-08-01

## 2023-08-01 RX ORDER — LIDOCAINE HYDROCHLORIDE 20 MG/ML
INJECTION, SOLUTION EPIDURAL; INFILTRATION; INTRACAUDAL; PERINEURAL AS NEEDED
Status: DISCONTINUED | OUTPATIENT
Start: 2023-08-01 | End: 2023-08-01 | Stop reason: SURG

## 2023-08-01 RX ORDER — BUPIVACAINE HYDROCHLORIDE AND EPINEPHRINE 2.5; 5 MG/ML; UG/ML
INJECTION, SOLUTION EPIDURAL; INFILTRATION; INTRACAUDAL; PERINEURAL AS NEEDED
Status: DISCONTINUED | OUTPATIENT
Start: 2023-08-01 | End: 2023-08-01 | Stop reason: HOSPADM

## 2023-08-01 RX ORDER — SUCCINYLCHOLINE CHLORIDE 20 MG/ML
INJECTION INTRAMUSCULAR; INTRAVENOUS AS NEEDED
Status: DISCONTINUED | OUTPATIENT
Start: 2023-08-01 | End: 2023-08-01 | Stop reason: SURG

## 2023-08-01 RX ORDER — NALOXONE HYDROCHLORIDE 1 MG/ML
INJECTION INTRAMUSCULAR; INTRAVENOUS; SUBCUTANEOUS
Status: COMPLETED | OUTPATIENT
Start: 2023-08-01 | End: 2023-08-01

## 2023-08-01 RX ORDER — METOPROLOL TARTRATE 5 MG/5ML
5 INJECTION INTRAVENOUS EVERY 6 HOURS PRN
Status: DISCONTINUED | OUTPATIENT
Start: 2023-08-01 | End: 2023-08-17 | Stop reason: HOSPADM

## 2023-08-01 RX ORDER — LABETALOL HYDROCHLORIDE 5 MG/ML
5 INJECTION, SOLUTION INTRAVENOUS
Status: DISCONTINUED | OUTPATIENT
Start: 2023-08-01 | End: 2023-08-01

## 2023-08-01 RX ORDER — ONDANSETRON 2 MG/ML
4 INJECTION INTRAMUSCULAR; INTRAVENOUS ONCE AS NEEDED
Status: DISCONTINUED | OUTPATIENT
Start: 2023-08-01 | End: 2023-08-01

## 2023-08-01 RX ORDER — SODIUM CHLORIDE 9 MG/ML
30 INJECTION, SOLUTION INTRAVENOUS CONTINUOUS
Status: DISCONTINUED | OUTPATIENT
Start: 2023-08-01 | End: 2023-08-05

## 2023-08-01 RX ORDER — FENTANYL CITRATE 50 UG/ML
INJECTION, SOLUTION INTRAMUSCULAR; INTRAVENOUS AS NEEDED
Status: DISCONTINUED | OUTPATIENT
Start: 2023-08-01 | End: 2023-08-01 | Stop reason: SURG

## 2023-08-01 RX ADMIN — POTASSIUM CHLORIDE 40 MEQ: 750 CAPSULE, EXTENDED RELEASE ORAL at 17:33

## 2023-08-01 RX ADMIN — POTASSIUM CHLORIDE 10 MEQ: 7.46 INJECTION, SOLUTION INTRAVENOUS at 00:43

## 2023-08-01 RX ADMIN — ROSUVASTATIN CALCIUM 40 MG: 20 TABLET, FILM COATED ORAL at 09:33

## 2023-08-01 RX ADMIN — NALOXONE HYDROCHLORIDE 1 MG: 1 INJECTION PARENTERAL at 14:02

## 2023-08-01 RX ADMIN — DULOXETINE HYDROCHLORIDE 60 MG: 60 CAPSULE, DELAYED RELEASE ORAL at 09:32

## 2023-08-01 RX ADMIN — NYSTATIN: 100000 POWDER TOPICAL at 09:35

## 2023-08-01 RX ADMIN — FAMOTIDINE 20 MG: 20 TABLET ORAL at 17:04

## 2023-08-01 RX ADMIN — NYSTATIN: 100000 POWDER TOPICAL at 22:17

## 2023-08-01 RX ADMIN — DEXAMETHASONE SODIUM PHOSPHATE 4 MG: 4 INJECTION, SOLUTION INTRAMUSCULAR; INTRAVENOUS at 12:15

## 2023-08-01 RX ADMIN — SODIUM CHLORIDE: 9 INJECTION, SOLUTION INTRAVENOUS at 12:01

## 2023-08-01 RX ADMIN — ISOSORBIDE MONONITRATE 30 MG: 30 TABLET, EXTENDED RELEASE ORAL at 09:33

## 2023-08-01 RX ADMIN — PROPOFOL 140 MG: 10 INJECTION, EMULSION INTRAVENOUS at 12:10

## 2023-08-01 RX ADMIN — FUROSEMIDE 20 MG: 20 TABLET ORAL at 09:33

## 2023-08-01 RX ADMIN — FENTANYL CITRATE 25 MCG: 50 INJECTION, SOLUTION INTRAMUSCULAR; INTRAVENOUS at 12:20

## 2023-08-01 RX ADMIN — Medication 10 ML: at 09:34

## 2023-08-01 RX ADMIN — NIFEDIPINE 60 MG: 60 TABLET, EXTENDED RELEASE ORAL at 09:33

## 2023-08-01 RX ADMIN — LOSARTAN POTASSIUM 100 MG: 50 TABLET, FILM COATED ORAL at 09:33

## 2023-08-01 RX ADMIN — ATROPINE SULFATE 1 MG: 0.1 INJECTION PARENTERAL at 13:53

## 2023-08-01 RX ADMIN — Medication 10 ML: at 22:17

## 2023-08-01 RX ADMIN — PIPERACILLIN SODIUM AND TAZOBACTAM SODIUM 3.38 G: 3; .375 INJECTION, POWDER, LYOPHILIZED, FOR SOLUTION INTRAVENOUS at 09:34

## 2023-08-01 RX ADMIN — POTASSIUM CHLORIDE 10 MEQ: 7.46 INJECTION, SOLUTION INTRAVENOUS at 05:17

## 2023-08-01 RX ADMIN — PIPERACILLIN SODIUM AND TAZOBACTAM SODIUM 3.38 G: 3; .375 INJECTION, POWDER, LYOPHILIZED, FOR SOLUTION INTRAVENOUS at 02:11

## 2023-08-01 RX ADMIN — TRAMADOL HYDROCHLORIDE 50 MG: 50 TABLET, COATED ORAL at 19:49

## 2023-08-01 RX ADMIN — LIDOCAINE HYDROCHLORIDE 100 MG: 20 INJECTION, SOLUTION EPIDURAL; INFILTRATION; INTRACAUDAL; PERINEURAL at 12:10

## 2023-08-01 RX ADMIN — ALBUTEROL SULFATE 2.5 MG: 2.5 SOLUTION RESPIRATORY (INHALATION) at 13:06

## 2023-08-01 RX ADMIN — PIPERACILLIN SODIUM AND TAZOBACTAM SODIUM 3.38 G: 3; .375 INJECTION, POWDER, LYOPHILIZED, FOR SOLUTION INTRAVENOUS at 17:52

## 2023-08-01 RX ADMIN — MAGNESIUM SULFATE HEPTAHYDRATE 1 G: 1 INJECTION, SOLUTION INTRAVENOUS at 01:39

## 2023-08-01 RX ADMIN — FUROSEMIDE 20 MG: 10 INJECTION, SOLUTION INTRAVENOUS at 22:16

## 2023-08-01 RX ADMIN — SUCCINYLCHOLINE CHLORIDE 160 MG: 20 INJECTION, SOLUTION INTRAMUSCULAR; INTRAVENOUS at 12:26

## 2023-08-01 RX ADMIN — ENOXAPARIN SODIUM 70 MG: 80 INJECTION SUBCUTANEOUS at 22:17

## 2023-08-01 RX ADMIN — ENOXAPARIN SODIUM 70 MG: 80 INJECTION SUBCUTANEOUS at 09:34

## 2023-08-01 RX ADMIN — ONDANSETRON 4 MG: 2 INJECTION INTRAMUSCULAR; INTRAVENOUS at 12:15

## 2023-08-01 RX ADMIN — FAMOTIDINE 20 MG: 20 TABLET ORAL at 09:33

## 2023-08-01 RX ADMIN — POTASSIUM CHLORIDE 10 MEQ: 7.46 INJECTION, SOLUTION INTRAVENOUS at 03:23

## 2023-08-01 RX ADMIN — METOPROLOL SUCCINATE 50 MG: 50 TABLET, EXTENDED RELEASE ORAL at 09:33

## 2023-08-01 RX ADMIN — POTASSIUM CHLORIDE 10 MEQ: 7.46 INJECTION, SOLUTION INTRAVENOUS at 04:27

## 2023-08-01 RX ADMIN — METOPROLOL TARTRATE 5 MG: 5 INJECTION INTRAVENOUS at 14:27

## 2023-08-01 RX ADMIN — POTASSIUM CHLORIDE 10 MEQ: 7.46 INJECTION, SOLUTION INTRAVENOUS at 02:11

## 2023-08-01 NOTE — SIGNIFICANT NOTE
08/01/23 1110   OTHER   Discipline physical therapy assistant   Rehab Time/Intention   Session Not Performed patient unavailable for treatment  (pt is off floor in same day surgery per nsg.)   Therapy Assessment/Plan (PT)   Criteria for Skilled Interventions Met (PT) yes;skilled treatment is necessary

## 2023-08-01 NOTE — BRIEF OP NOTE
BREAST ABSCESS INCISION AND DRAINAGE  Progress Note    Carol Sullivan  8/1/2023    Pre-op Diagnosis:   Mastitis [N61.0]       Post-Op Diagnosis Codes:     * Mastitis [N61.0]    Procedure/CPTr Codes:        Procedure(s):  BREAST ABSCESS INCISION AND DRAINAGE              Surgeon(s):  Fox Roper MD    Anesthesia: Monitored Anesthesia Care    Staff:   Circulator: Trish Vásquez RN  Scrub Person: Monica Starks; Nai Costa RN  Assistant: Shelley Delcid  Assistant: Shelley Delcid      Estimated Blood Loss: minimal    Urine Voided: * No values recorded between 8/1/2023 12:00 AM and 8/1/2023 12:40 PM *    Specimens:                A: left breast tissue for culture          Drains: * No LDAs found *    Findings: inflamed left breast        Complications: none    Assistant: Shelley Delcid  was responsible for performing the following activities: Retraction, Suction, Irrigation, and Placing Dressing and their skilled assistance was necessary for the success of this case.    Fox Roper MD     Date: 8/1/2023  Time: 12:40 CDT

## 2023-08-01 NOTE — SIGNIFICANT NOTE
08/01/23 1324   OTHER   Discipline physical therapy assistant   Rehab Time/Intention   Session Not Performed patient unavailable for treatment  (pt is off floor for surgical procedure)   Therapy Assessment/Plan (PT)   Criteria for Skilled Interventions Met (PT) yes;skilled treatment is necessary

## 2023-08-01 NOTE — PROGRESS NOTES
[facility] Medicine Services  INPATIENT PROGRESS NOTE    Length of Stay: 2  Date of Admission: 7/28/2023  Primary Care Physician: Len Seo MD    Subjective   Chief Complaint: No complaints    HPI:  This is an 82 year old female with PMH of PAF (on Coumadin), HTN, lumbar spondylosis and HLD that presented to [facility] on 7/28/2023 secondary to fever, chills, confusion and left breast redness.       Patient was febrile (103.1) on admission.  Creatinine elevated at 1.49, which may be chronic as it was 1.5 last year. Urine with 2+ bacteria, trace leukocytes and 1+ yeast. CT of the head showed no acute findings.  CT of the chest negative for abscess.      7/30/2023:  Spoke with pharmacy, C-diff PCR was positive but antigen was negative. Will discontinue oral vancomycin as she is a carrier with no active infection.  There is increased redness noted to the left breast today.  Ultrasound indicates multiple hypoechoic areas could that could represent areas of phlegmon and developing abscess.     7/31/2023: Patient developed fluid overload during the night with A-fib RVR.  IV fluids were discontinued and the patient was given IV Lopressor and Lasix.  She is now rate controlled.  Home Lasix has been restarted.    8/1/2023: The patient went down for an I&D of the left breast.  Upon arrival back from surgery, the patient developed respiratory distress and a rapid response was called.  Patient's heart rate dropped to 24 and atropine was given with good results.  The patient was given ventilation through Ambu bag and maintained adequate saturations.  She was transferred to ICU.  Family was updated.    Review of Systems   Constitutional:  Negative for activity change and fatigue.   HENT:  Negative for ear pain and sore throat.    Eyes:  Negative for pain and discharge.   Respiratory:  Negative for cough and shortness of breath.    Cardiovascular:  Negative for chest pain and palpitations.   Gastrointestinal:   Negative for abdominal pain and nausea.   Endocrine: Negative for cold intolerance and heat intolerance.   Genitourinary:  Negative for difficulty urinating and dysuria.   Musculoskeletal:  Negative for arthralgias and gait problem.   Skin:  Negative for color change and rash.   Neurological:  Negative for dizziness and weakness.   Psychiatric/Behavioral:  Negative for agitation and confusion.           Objective    Temp:  [97 øF (36.1 øC)-98.8 øF (37.1 øC)] 97.6 øF (36.4 øC)  Heart Rate:  [] 157  Resp:  [16-22] 20  BP: (113-154)/(58-76) 120/74    Physical Exam  Constitutional:       Appearance: She is well-developed.   HENT:      Head: Normocephalic and atraumatic.   Eyes:      Pupils: Pupils are equal, round, and reactive to light.   Cardiovascular:      Rate and Rhythm: Normal rate and regular rhythm.   Pulmonary:      Effort: Pulmonary effort is normal.      Breath sounds: Normal breath sounds.   Abdominal:      General: Bowel sounds are normal.      Palpations: Abdomen is soft.   Musculoskeletal:         General: Normal range of motion.      Cervical back: Normal range of motion and neck supple.   Skin:     General: Skin is warm and dry.      Comments: Left breast:  Erythema improved from yesterday.    Neurological:      Mental Status: She is alert and oriented to person, place, and time.   Psychiatric:         Behavior: Behavior normal.       Results Review:  I have reviewed the labs, radiology results, and diagnostic studies.    Laboratory Data:   Results from last 7 days   Lab Units 08/01/23  0551 07/31/23 2012 07/31/23  0639 07/30/23  0601 07/29/23  0508 07/28/23  1600   SODIUM mmol/L 134*  --  132* 134*   < > 134*   POTASSIUM mmol/L 3.2* 2.9* 3.3* 3.5   < > 4.2   CHLORIDE mmol/L 98  --  100 100   < > 96*   CO2 mmol/L 22.0  --  19.0* 21.0*   < > 25.0   BUN mg/dL 18  --  18 21   < > 24*   CREATININE mg/dL 1.70*  --  1.46* 1.18*   < > 1.49*   GLUCOSE mg/dL 125*  --  162* 132*   < > 121*   CALCIUM mg/dL  8.5*  --  8.1* 8.0*   < > 8.5*   BILIRUBIN mg/dL  --   --   --   --   --  0.6   ALK PHOS U/L  --   --   --   --   --  121*   ALT (SGPT) U/L  --   --   --   --   --  14   AST (SGOT) U/L  --   --   --   --   --  29   ANION GAP mmol/L 14.0  --  13.0 13.0   < > 13.0    < > = values in this interval not displayed.       Estimated Creatinine Clearance: 25.6 mL/min (A) (by C-G formula based on SCr of 1.7 mg/dL (H)).  Results from last 7 days   Lab Units 08/01/23  0551 07/31/23 2012 07/28/23  1600   MAGNESIUM mg/dL 2.5* 1.5* 1.6           Results from last 7 days   Lab Units 08/01/23  0551 07/31/23  0639 07/30/23  0601 07/29/23  0508 07/28/23  1600   WBC 10*3/mm3 9.57 14.50* 15.35* 20.31* 15.96*   HEMOGLOBIN g/dL 10.7* 11.5* 11.2* 13.6 12.9   HEMATOCRIT % 31.2* 34.2 34.1 42.5 38.3   PLATELETS 10*3/mm3 298 290 244 276 284       Results from last 7 days   Lab Units 08/01/23  0551 07/31/23  0639 07/30/23  0601 07/29/23  0508 07/28/23  1600   INR  3.44* 2.78* 2.51* 1.82* 1.78*         Culture Data:   No results found for: BLOODCX    No results found for: URINECX    No results found for: RESPCX  No results found for: WOUNDCX  No results found for: STOOLCX  No components found for: BODYFLD    Radiology Data:   Imaging Results (Last 24 Hours)       ** No results found for the last 24 hours. **            I have reviewed the patient's current medications.     Assessment/Plan     Active Hospital Problems    Diagnosis     **Sepsis     Mastitis        Plan:   Sepsis secondary to left breast mastitis/ UTI:  Continue Zoysn.      Mastitis, left breast: No open wounds noted.  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 Peterson for this.  She denies any previous surgical intervention. General surgery and wound care consults appreciated. Left breast ultrasound shows possible areas of phlegmon and developing abscess.  Dr. Roper did an I&D of the left breast on 8/1/23.    Hypertension: Continue home Losartan, Procardia XL,  Imdur and metoprolol.    Hyperlipidemia: Continue home statin.   Paroxsymal atrial fibrillation: Continue home beta-blocker and Procardia XL.  Continue therapeutic Lovenox.    Acute kidney injury, possibly chronic:  Nephrology consult appreciated.   Urinary tract infection, enterococcus faecalis:   Continue IV Zosyn.  GERD:  Continue PPI.    Diarrhea:  Patient test positive for C-diff PCR, but negative for antigen.  Oral Vancomycin discontinued.    Pulmonary edema:  Continue IV Lasix.  Echo pending.   Hypokalemia:  Potassium protocol in place.    Acute respiratory failure:  Patient was transferred to ICU.       VTE PPX:  Lovenox     Medical Decision Making  Number and Complexity of problems: 12/High  Differential Diagnosis: N/A     Conditions and Status:        Condition is improving.     [facility] Data  External documents reviewed: Yes  My EKG interpretation: A fib/ pacemaker  My CT interpretation: As per radiology  Tests considered but not ordered: None     Decision rules/scores evaluated (example [id number], Wells, etc): N/A     Discussed with: Patient/ family     Treatment Plan  As above     Care Planning  Shared decision making: Yes  Code status and discussions: Full     Disposition  Social Determinants of Health that impact treatment or disposition: None  I expect the patient to be discharged to home in 2-3 days.      I discussed the patient's findings and my recommendations with: Patient     I confirmed that the patient's Advance Care Plan is present, code status is documented, or surrogate decision maker is listed in the patient's medical record.      I have utilized all available immediate resources to obtain, update, or review the patient's current medications (including all prescriptions, over-the-counter products, herbals, cannabis/cannabidiol products, and vitamin/mineral/dietary (nutritional) supplements).                      This document has been electronically signed by COY Vázquez on August  1, 2023 14:10 CDT

## 2023-08-01 NOTE — SIGNIFICANT NOTE
08/01/23 1542   OTHER   Discipline physical therapy assistant   Rehab Time/Intention   Session Not Performed unable to treat, medical status change  (pt with rapid response, transferred down to CCU. note left for MD to restart orders.)

## 2023-08-01 NOTE — NURSING NOTE
Received patient from floor at 1500- disoriented and lethargic. Also in afib RVR. Lopressor 5mg given x 1. Patient gradually became oriented and alert with repeated stimulation, answering all questions appropriately by 1530.  Patient continues to be lethargic, falling asleep mid-conversation. Left breast dressing intact with old drainage. Entire left breast red and warm to touch. Family at bedside.

## 2023-08-01 NOTE — PROGRESS NOTES
TWO PATIENT IDENTIFIERS WERE USED. THE PATIENT WAS DRAPED WITH A FULL BODY DRAPE AND THE PATIENT'S RIGHT ARM WAS PREPPED WITH CHLORA PREP. ULTRASOUND WAS USED TO LOCALIZE THERIGHT CEPHALIC VEIN. SUBCUTANEOUS TISSUE AT THE CATHETER SITE WAS INFILTRATED WITH 2% LIDOCAINE. UNDER ULTRASOUND GUIDANCE, THE VEIN WAS ACCESSED WITH A 21 GAUGE  NEEDLE. AN 0.018 WIRE WAS THEN THREADED THROUGH THE NEEDLE. THE 21 GAUGE NEEDLE WAS REMOVED AND A 4 [place] SHEATH WAS PLACED OVER THE WIRE INTO THE VEIN.THE MIDLINE CATHETER WAS TRIMMED TO 16CM. THE MIDLINE CATHETER WAS THEN PLACED OVER THE WIRE INTO THE VEIN, THE SHEATH WAS PEELED AWAY, WIRE WAS REMOVED. CATHETER WAS FLUSHED WITH NORMAL SALINE AND CATHETER TIP APPLIED. BIOPATCH PLACED. CATHETER SECURED WITH STAT LOCK AND TEGADERM. PATIENT TOLERATED PROCEDURE WELL. THIS WAS DONE AT BEDSIDE      IMPRESSION:SUCCESSFUL PLACEMENT OF DUAL LUMEN MIDLINE.           Idania Alcantara RN  8/1/2023  15:10 CDT

## 2023-08-01 NOTE — PROGRESS NOTES
GENERAL SURGERY PROGRESS NOTE     LOS: 2 days         Interval History:     No acute events overnight.     Medication Review:   DULoxetine, 60 mg, Oral, Daily  enoxaparin, 1 mg/kg, Subcutaneous, Q12H  famotidine, 20 mg, Oral, BID AC  furosemide, 20 mg, Oral, BID  isosorbide mononitrate, 30 mg, Oral, Daily  losartan, 100 mg, Oral, Daily  metoprolol succinate XL, 50 mg, Oral, Daily  NIFEdipine XL, 60 mg, Oral, Daily  nystatin, , Topical, Q12H  piperacillin-tazobactam, 3.375 g, Intravenous, Q8H  rosuvastatin, 40 mg, Oral, Daily  sodium chloride, 10 mL, Intravenous, Q12H  traMADol, 50 mg, Oral, Q8H        Pharmacy Consult,   Pharmacy to Dose enoxaparin (LOVENOX),   Pharmacy to Dose Zosyn,     Objective     Vital Signs:  Temp:  [97 øF (36.1 øC)-97.9 øF (36.6 øC)] 97.5 øF (36.4 øC)  Heart Rate:  [] 97  Resp:  [16-22] 18  BP: (106-141)/(52-65) 123/58    Intake/Output Summary (Last 24 hours) at 8/1/2023 0748  Last data filed at 7/31/2023 2335  Gross per 24 hour   Intake 840 ml   Output --   Net 840 ml       Physical Exam  Constitutional:       Appearance: Normal appearance.   HENT:      Head: Normocephalic and atraumatic.      Nose: Nose normal. No congestion.      Mouth/Throat:      Mouth: Mucous membranes are moist.      Pharynx: Oropharynx is clear.   Eyes:      General: No scleral icterus.     Pupils: Pupils are equal, round, and reactive to light.   Cardiovascular:      Rate and Rhythm: Normal rate and regular rhythm.   Pulmonary:      Effort: Pulmonary effort is normal. No respiratory distress.   Skin:     General: Skin is warm and dry.      Comments: Erythematous left breast   Neurological:      General: No focal deficit present.      Mental Status: She is alert and oriented to person, place, and time.   Psychiatric:         Mood and Affect: Mood normal.         Behavior: Behavior normal.       Results Review:    Results from last 7 days   Lab Units 08/01/23  0551 07/31/23 2012 07/31/23  0639 07/30/23  0601    SODIUM mmol/L 134*  --  132* 134*   POTASSIUM mmol/L 3.2* 2.9* 3.3* 3.5   CHLORIDE mmol/L 98  --  100 100   CO2 mmol/L 22.0  --  19.0* 21.0*   BUN mg/dL 18  --  18 21   CREATININE mg/dL 1.70*  --  1.46* 1.18*   GLUCOSE mg/dL 125*  --  162* 132*   CALCIUM mg/dL 8.5*  --  8.1* 8.0*     Results from last 7 days   Lab Units 08/01/23  0551 07/31/23  0639 07/30/23  0601   WBC 10*3/mm3 9.57 14.50* 15.35*   HEMOGLOBIN g/dL 10.7* 11.5* 11.2*   HEMATOCRIT % 31.2* 34.2 34.1   PLATELETS 10*3/mm3 298 290 244       Assessment:    Sepsis    Mastitis      Plan:    - Will plan on incision and drainage of the left breast today in the OR.  I have counseled the patient about the nature of the problem, the natural history of the disease, the risks and benefits of surgery, the expected recovery, and the alternatives to surgery.  After this discussion, they were given an opportunity to ask questions, have an understanding of diagnosis and treatment options, and wish to proceed with surgery.         This document has been electronically signed by Fox Roper MD on August 1, 2023 07:48 CDT        Fox Roper MD  08/01/23  07:48 CDT

## 2023-08-01 NOTE — SIGNIFICANT NOTE
Patient came back from surgery A/O x4 but groggy. VS: Temp 97.6, , /74, O2Sat 94% on 6L/M via NC, RR 20. Patient talking, answering questions, and joking with staff. Patient states she needs to use the BR. Went to get a Purewik since patient was so groggy. While getting supplies received a call from patient's room that she was having trouble breathing. Went straight to her room and 3 other nurses in the room. Patient was unresponsive and color was blue/gray. Rapid Response called at this time. Hospitalist staff responded quickly to the room. Patient transferred to CCU Rm 1. Patient's niece at bedside and updated on care.

## 2023-08-02 LAB
ANION GAP SERPL CALCULATED.3IONS-SCNC: 14 MMOL/L (ref 5–15)
BACTERIA SPEC AEROBE CULT: NORMAL
BACTERIA SPEC AEROBE CULT: NORMAL
BASOPHILS # BLD AUTO: 0.02 10*3/MM3 (ref 0–0.2)
BASOPHILS NFR BLD AUTO: 0.3 % (ref 0–1.5)
BUN SERPL-MCNC: 16 MG/DL (ref 8–23)
BUN/CREAT SERPL: 9.9 (ref 7–25)
CALCIUM SPEC-SCNC: 8.2 MG/DL (ref 8.6–10.5)
CHLORIDE SERPL-SCNC: 101 MMOL/L (ref 98–107)
CO2 SERPL-SCNC: 23 MMOL/L (ref 22–29)
CREAT SERPL-MCNC: 1.61 MG/DL (ref 0.57–1)
CRP SERPL-MCNC: 19.74 MG/DL (ref 0–0.5)
DEPRECATED RDW RBC AUTO: 46.6 FL (ref 37–54)
EGFRCR SERPLBLD CKD-EPI 2021: 32 ML/MIN/1.73
EOSINOPHIL # BLD AUTO: 0 10*3/MM3 (ref 0–0.4)
EOSINOPHIL NFR BLD AUTO: 0 % (ref 0.3–6.2)
ERYTHROCYTE [DISTWIDTH] IN BLOOD BY AUTOMATED COUNT: 15.2 % (ref 12.3–15.4)
GLUCOSE SERPL-MCNC: 133 MG/DL (ref 65–99)
HCT VFR BLD AUTO: 32.9 % (ref 34–46.6)
HGB BLD-MCNC: 10.7 G/DL (ref 12–15.9)
IMM GRANULOCYTES # BLD AUTO: 0.09 10*3/MM3 (ref 0–0.05)
IMM GRANULOCYTES NFR BLD AUTO: 1.1 % (ref 0–0.5)
INR PPP: 3.41 (ref 0.8–1.2)
LYMPHOCYTES # BLD AUTO: 0.44 10*3/MM3 (ref 0.7–3.1)
LYMPHOCYTES NFR BLD AUTO: 5.6 % (ref 19.6–45.3)
MCH RBC QN AUTO: 26.9 PG (ref 26.6–33)
MCHC RBC AUTO-ENTMCNC: 32.5 G/DL (ref 31.5–35.7)
MCV RBC AUTO: 82.7 FL (ref 79–97)
MONOCYTES # BLD AUTO: 0.58 10*3/MM3 (ref 0.1–0.9)
MONOCYTES NFR BLD AUTO: 7.4 % (ref 5–12)
NEUTROPHILS NFR BLD AUTO: 6.73 10*3/MM3 (ref 1.7–7)
NEUTROPHILS NFR BLD AUTO: 85.6 % (ref 42.7–76)
NRBC BLD AUTO-RTO: 0 /100 WBC (ref 0–0.2)
PLATELET # BLD AUTO: 370 10*3/MM3 (ref 140–450)
PMV BLD AUTO: 9.7 FL (ref 6–12)
POTASSIUM SERPL-SCNC: 3.8 MMOL/L (ref 3.5–5.2)
PROTHROMBIN TIME: 33.4 SECONDS (ref 11.1–15.3)
RBC # BLD AUTO: 3.98 10*6/MM3 (ref 3.77–5.28)
SODIUM SERPL-SCNC: 138 MMOL/L (ref 136–145)
WBC NRBC COR # BLD: 7.86 10*3/MM3 (ref 3.4–10.8)

## 2023-08-02 PROCEDURE — 94799 UNLISTED PULMONARY SVC/PX: CPT

## 2023-08-02 PROCEDURE — 25010000002 FUROSEMIDE PER 20 MG: Performed by: STUDENT IN AN ORGANIZED HEALTH CARE EDUCATION/TRAINING PROGRAM

## 2023-08-02 PROCEDURE — 86140 C-REACTIVE PROTEIN: CPT | Performed by: STUDENT IN AN ORGANIZED HEALTH CARE EDUCATION/TRAINING PROGRAM

## 2023-08-02 PROCEDURE — 94761 N-INVAS EAR/PLS OXIMETRY MLT: CPT

## 2023-08-02 PROCEDURE — 85025 COMPLETE CBC W/AUTO DIFF WBC: CPT | Performed by: STUDENT IN AN ORGANIZED HEALTH CARE EDUCATION/TRAINING PROGRAM

## 2023-08-02 PROCEDURE — 99024 POSTOP FOLLOW-UP VISIT: CPT | Performed by: STUDENT IN AN ORGANIZED HEALTH CARE EDUCATION/TRAINING PROGRAM

## 2023-08-02 PROCEDURE — 25010000002 ENOXAPARIN PER 10 MG: Performed by: STUDENT IN AN ORGANIZED HEALTH CARE EDUCATION/TRAINING PROGRAM

## 2023-08-02 PROCEDURE — 85610 PROTHROMBIN TIME: CPT | Performed by: STUDENT IN AN ORGANIZED HEALTH CARE EDUCATION/TRAINING PROGRAM

## 2023-08-02 PROCEDURE — 94660 CPAP INITIATION&MGMT: CPT

## 2023-08-02 PROCEDURE — 97166 OT EVAL MOD COMPLEX 45 MIN: CPT

## 2023-08-02 PROCEDURE — 80048 BASIC METABOLIC PNL TOTAL CA: CPT | Performed by: STUDENT IN AN ORGANIZED HEALTH CARE EDUCATION/TRAINING PROGRAM

## 2023-08-02 PROCEDURE — 25010000002 PIPERACILLIN SOD-TAZOBACTAM PER 1 G: Performed by: STUDENT IN AN ORGANIZED HEALTH CARE EDUCATION/TRAINING PROGRAM

## 2023-08-02 RX ORDER — HYDROCODONE BITARTRATE AND ACETAMINOPHEN 5; 325 MG/1; MG/1
1 TABLET ORAL EVERY 6 HOURS PRN
Status: DISCONTINUED | OUTPATIENT
Start: 2023-08-02 | End: 2023-08-07

## 2023-08-02 RX ORDER — LOSARTAN POTASSIUM 50 MG/1
50 TABLET ORAL DAILY
Status: DISCONTINUED | OUTPATIENT
Start: 2023-08-03 | End: 2023-08-04

## 2023-08-02 RX ADMIN — PIPERACILLIN SODIUM AND TAZOBACTAM SODIUM 3.38 G: 3; .375 INJECTION, POWDER, LYOPHILIZED, FOR SOLUTION INTRAVENOUS at 00:51

## 2023-08-02 RX ADMIN — ENOXAPARIN SODIUM 70 MG: 80 INJECTION SUBCUTANEOUS at 22:50

## 2023-08-02 RX ADMIN — PIPERACILLIN SODIUM AND TAZOBACTAM SODIUM 3.38 G: 3; .375 INJECTION, POWDER, LYOPHILIZED, FOR SOLUTION INTRAVENOUS at 17:06

## 2023-08-02 RX ADMIN — DULOXETINE HYDROCHLORIDE 60 MG: 60 CAPSULE, DELAYED RELEASE ORAL at 08:23

## 2023-08-02 RX ADMIN — NYSTATIN: 100000 POWDER TOPICAL at 08:29

## 2023-08-02 RX ADMIN — FUROSEMIDE 20 MG: 10 INJECTION, SOLUTION INTRAVENOUS at 11:45

## 2023-08-02 RX ADMIN — METOPROLOL SUCCINATE 50 MG: 50 TABLET, EXTENDED RELEASE ORAL at 08:24

## 2023-08-02 RX ADMIN — LOSARTAN POTASSIUM 100 MG: 50 TABLET, FILM COATED ORAL at 08:24

## 2023-08-02 RX ADMIN — PIPERACILLIN SODIUM AND TAZOBACTAM SODIUM 3.38 G: 3; .375 INJECTION, POWDER, LYOPHILIZED, FOR SOLUTION INTRAVENOUS at 09:55

## 2023-08-02 RX ADMIN — ROSUVASTATIN CALCIUM 40 MG: 20 TABLET, FILM COATED ORAL at 08:30

## 2023-08-02 RX ADMIN — TRAMADOL HYDROCHLORIDE 50 MG: 50 TABLET, COATED ORAL at 11:45

## 2023-08-02 RX ADMIN — ISOSORBIDE MONONITRATE 30 MG: 30 TABLET, EXTENDED RELEASE ORAL at 08:23

## 2023-08-02 RX ADMIN — FAMOTIDINE 20 MG: 20 TABLET ORAL at 17:06

## 2023-08-02 RX ADMIN — NIFEDIPINE 60 MG: 60 TABLET, EXTENDED RELEASE ORAL at 08:30

## 2023-08-02 RX ADMIN — Medication 10 ML: at 20:53

## 2023-08-02 RX ADMIN — FUROSEMIDE 20 MG: 10 INJECTION, SOLUTION INTRAVENOUS at 22:50

## 2023-08-02 RX ADMIN — FAMOTIDINE 20 MG: 20 TABLET ORAL at 08:24

## 2023-08-02 RX ADMIN — TRAMADOL HYDROCHLORIDE 50 MG: 50 TABLET, COATED ORAL at 20:46

## 2023-08-02 RX ADMIN — ENOXAPARIN SODIUM 70 MG: 80 INJECTION SUBCUTANEOUS at 09:56

## 2023-08-02 RX ADMIN — Medication 10 ML: at 08:30

## 2023-08-02 RX ADMIN — NYSTATIN 1 APPLICATION: 100000 POWDER TOPICAL at 20:52

## 2023-08-02 NOTE — PLAN OF CARE
Goal Outcome Evaluation:  Plan of Care Reviewed With: patient, spouse           Outcome Evaluation: OT eval complete. Pt supine upon arrival and agreeable to therapy. Pt perf sup>sit with Guevara. Pt dependent to don socks sitting EOB. Pts BUE ROM was WFL. Pts LUE 4-/5 except shld flex 3+/5 grossly. RUE observed to be 3/5 grossly, Pt doffed/donned hospital gown with modA. Nsg present and changed dressing. Pt perf sit<>stand, toilet t/f, and functional mobility of 4'x2 with CGA and FWW. Pt dependent for toileting skills. Pts SPO2 >95% on 6LPM throughout session, nsg present and adjusted supplemental O2 to 4 LPM. Pt returned to EOB, and was supervision for sit>sup, SPO2 91% at end of session. Pt demonstrated decreased independence with ADLs, strength, and transfer safety. Cont inpatient OT. Recommend home with assist and home health OT at d/c with medical stability. Pt would also benefit from shower chair and AD for dressing.      Anticipated Discharge Disposition (OT): home with assist, home with home health

## 2023-08-02 NOTE — THERAPY EVALUATION
"Patient Name: Carol Sullivan  : 1940    MRN: [id number]                              Today's Date: 2023       Admit Date: 2023    Visit Dx:     ICD-10-CM ICD-9-CM   1. Mastitis  N61.0 611.0   2. Somnolence  R40.0 780.09   3. Sepsis, due to unspecified organism, unspecified whether acute organ dysfunction present  A41.9 038.9     995.91   4. Impaired functional mobility, balance, gait, and endurance [Z74.09 (ICD-10-CM)]  Z74.09 V49.89   5. Impaired mobility and ADLs [Z74.09, Z78.9 (ICD-10-CM)]  Z74.09 V49.89    Z78.9      Patient Active Problem List   Diagnosis    Hypertension    Hyperlipidemia    Near syncope    GERD (gastroesophageal reflux disease)    Palpitation    PVC (premature ventricular contraction)    Breast calcifications on mammogram    Paroxysmal atrial fibrillation    Peripheral vascular disease, unspecified    Type 2 diabetes mellitus with other specified complication    Long term (current) use of anticoagulants    Encounter for therapeutic drug monitoring    Sepsis    Mastitis     Past Medical History:   Diagnosis Date    Arthritis     Depression     GERD (gastroesophageal reflux disease)     Hyperlipidemia     Hypertension     Near syncope     Vascular disease      Past Surgical History:   Procedure Laterality Date    BLADDER SURGERY      ENDOSCOPY N/A 2019    Procedure: ESOPHAGOGASTRODUODENOSCOPY;  Surgeon: Alberto Sanchez DO;  Location: [facility] ENDOSCOPY;  Service: Gastroenterology    GALLBLADDER SURGERY      SHOULDER SURGERY      \"bone spurs\"    SUBTOTAL HYSTERECTOMY        General Information       Row Name 23 1455          OT Time and Intention    Document Type evaluation  -RW     Mode of Treatment individual therapy;occupational therapy  -RW       Row Name 23 1455          General Information    Patient Profile Reviewed yes  -RW     Prior Level of Function independent:;feeding;grooming;dressing;bathing;cooking;cleaning;driving;shopping  -RW     Existing " Precautions/Restrictions fall  -RW     Barriers to Rehab medically complex  -       Row Name 08/02/23 1455          Living Environment    People in Home spouse  -       Row Name 08/02/23 1455          Home Main Entrance    Number of Stairs, Main Entrance two  -RW     Stair Railings, Main Entrance railing on right side (ascending)  -       Row Name 08/02/23 1455          Stairs Within Home, Primary    Stairs, Within Home, Primary (I) ambulation, no AD. tub shower, regular toilet,  -RW     Number of Stairs, Within Home, Primary none  -RW       Row Name 08/02/23 1455          Cognition    Orientation Status (Cognition) oriented x 4  -RW       Row Name 08/02/23 1455          Safety Issues, Functional Mobility    Impairments Affecting Function (Mobility) strength;endurance/activity tolerance;balance;pain  -RW               User Key  (r) = Recorded By, (t) = Taken By, (c) = Cosigned By      Initials Name Provider Type    RW Ana Vela OT Occupational Therapist                     Mobility/ADL's       Row Name 08/02/23 1455          Bed Mobility    Bed Mobility supine-sit;sit-supine  -     Supine-Sit [place] (Bed Mobility) minimum assist (75% patient effort)  -     Sit-Supine [place] (Bed Mobility) supervision  -     Assistive Device (Bed Mobility) head of bed elevated;bed rails  -       Row Name 08/02/23 1455          Transfers    Transfers sit-stand transfer;stand-sit transfer;toilet transfer  -       Row Name 08/02/23 1455          Sit-Stand Transfer    Sit-Stand [place] (Transfers) contact guard  -     Assistive Device (Sit-Stand Transfers) walker, front-wheeled  -       Row Name 08/02/23 1455          Stand-Sit Transfer    Stand-Sit [place] (Transfers) contact guard  -RW     Assistive Device (Stand-Sit Transfers) walker, front-wheeled  -       Row Name 08/02/23 1455          Toilet Transfer    Type (Toilet Transfer) sit-stand;stand-sit  -     [place] Level  (Toilet Transfer) contact guard  -RW     Assistive Device (Toilet Transfer) commode, bedside without drop arms  -RW       Row Name 08/02/23 1455          Activities of Daily Living    BADL Assessment/Intervention lower body dressing;toileting;upper body dressing  -RW       Row Name 08/02/23 1455          Lower Body Dressing Assessment/Training    [place] Level (Lower Body Dressing) don;socks;dependent (less than 25% patient effort)  -RW     Position (Lower Body Dressing) edge of bed sitting  -RW       Row Name 08/02/23 1455          Toileting Assessment/Training    [place] Level (Toileting) toileting skills;dependent (less than 25% patient effort)  -RW     Assistive Devices (Toileting) commode, bedside without drop arms  -RW     Position (Toileting) supported standing  -RW       Row Name 08/02/23 1455          Upper Body Dressing Assessment/Training    [place] Level (Upper Body Dressing) doff;don;other (see comments);moderate assist (50% patient effort)  hospital gown  -RW     Position (Upper Body Dressing) edge of bed sitting  -RW               User Key  (r) = Recorded By, (t) = Taken By, (c) = Cosigned By      Initials Name Provider Type    RW Ana Vela, OT Occupational Therapist                   Obj/Interventions       Row Name 08/02/23 1455          Range of Motion Comprehensive    General Range of Motion bilateral upper extremity ROM WFL  -RW       Row Name 08/02/23 1455          Strength Comprehensive (MMT)    General Manual Muscle Testing (MMT) Assessment upper extremity strength deficits identified  -RW     Comment, General Manual Muscle Testing (MMT) Assessment LUE 4-/5 except shld flex 3+/5 grossly. RUE observed to be 3/5 grossly  -RW               User Key  (r) = Recorded By, (t) = Taken By, (c) = Cosigned By      Initials Name Provider Type    RW Ana Vela, OT Occupational Therapist                   Goals/Plan       Row Name 08/02/23 1455          Transfer Goal 1 (OT)     Activity/Assistive Device (Transfer Goal 1, OT) transfers, all  -RW     [place] Level/Cues Needed (Transfer Goal 1, OT) modified independence  -RW     Time Frame (Transfer Goal 1, OT) long term goal (LTG);by discharge  -RW     Progress/Outcome (Transfer Goal 1, OT) goal not met  -RW       Row Name 08/02/23 1454          Bathing Goal 1 (OT)    Activity/Device (Bathing Goal 1, OT) lower body bathing  -RW     [place] Level/Cues Needed (Bathing Goal 1, OT) modified independence  -RW     Time Frame (Bathing Goal 1, OT) long term goal (LTG);by discharge  -RW     Progress/Outcomes (Bathing Goal 1, OT) goal not met  -RW       Row Name 08/02/23 1455          Dressing Goal 1 (OT)    Activity/Device (Dressing Goal 1, OT) lower body dressing  -RW     [place]/Cues Needed (Dressing Goal 1, OT) modified independence  -RW     Time Frame (Dressing Goal 1, OT) long term goal (LTG);by discharge  -RW     Progress/Outcome (Dressing Goal 1, OT) goal not met  -RW       Row Name 08/02/23 1451          Toileting Goal 1 (OT)    Activity/Device (Toileting Goal 1, OT) toileting skills, all  -RW     [place] Level/Cues Needed (Toileting Goal 1, OT) modified independence  -RW     Time Frame (Toileting Goal 1, OT) long term goal (LTG);by discharge  -RW     Progress/Outcome (Toileting Goal 1, OT) goal not met  -       Row Name 08/02/23 145          Problem Specific Goal 1 (OT)    Problem Specific Goal 1 (OT) Pt will perform OOB ax for 20 minutes with SBA to increase ax tolerance for ADLs.  -RW     Time Frame (Problem Specific Goal 1, OT) long term goal (LTG);by discharge  -RW     Progress/Outcome (Problem Specific Goal 1, OT) goal not met  -       Row Name 08/02/23 0418          Therapy Assessment/Plan (OT)    Planned Therapy Interventions (OT) activity tolerance training;manual therapy/joint mobilization;patient/caregiver education/training;adaptive equipment training;neuromuscular control/coordination retraining;BADL  retraining;ROM/therapeutic exercise;cognitive/visual perception retraining;occupation/activity based interventions;strengthening exercise;functional balance retraining;edema control/reduction;IADL retraining;passive ROM/stretching;transfer/mobility retraining  -               User Key  (r) = Recorded By, (t) = Taken By, (c) = Cosigned By      Initials Name Provider Type    RW Ana Vela, [place] Occupational Therapist                   Clinical Impression       Row Name 08/02/23 1455          Pain Assessment    Pretreatment Pain Rating 0/10 - no pain  -RW     Posttreatment Pain Rating 0/10 - no pain  -RW       Row Name 08/02/23 1455          Plan of Care Review    Plan of Care Reviewed With patient;spouse  -RW     Outcome Evaluation OT eval complete. Pt supine upon arrival and agreeable to therapy. Pt perf sup>sit with Guevara. Pt dependent to don socks sitting EOB. Pts BUE ROM was WFL. Pts LUE 4-/5 except shld flex 3+/5 grossly. RUE observed to be 3/5 grossly, Pt doffed/donned hospital gown with modA. Nsg present and changed dressing. Pt perf sit<>stand, toilet t/f, and functional mobility of 4'x2 with CGA and FWW. Pt dependent for toileting skills. Pts SPO2 >95% on 6LPM throughout session, nsg present and adjusted supplemental O2 to 4 LPM. Pt returned to EOB, and was supervision for sit>sup, SPO2 91% at end of session. Pt demonstrated decreased independence with ADLs, strength, and transfer safety. Cont inpatient OT. Recommend home with assist and home health OT at d/c with medical stability. Pt would also benefit from shower chair and AD for dressing.  -RW       Row Name 08/02/23 1455          Therapy Assessment/Plan (OT)    Patient/Family Therapy Goal Statement (OT) to return home  -RW     Rehab Potential (OT) good, to achieve stated therapy goals  -RW     Criteria for Skilled Therapeutic Interventions Met (OT) yes;skilled treatment is necessary  -RW     Therapy Frequency (OT) other (see comments)  5-7 d/wk  -RW      Predicted Duration of Therapy Intervention (OT) until all goals met or d/c  -RW       Row Name 08/02/23 1455          Therapy Plan Review/Discharge Plan (OT)    Equipment Needs Upon Discharge (OT) shower chair  -RW     Anticipated Discharge Disposition (OT) home with assist;home with home health  -RW       Row Name 08/02/23 1455          Vital Signs    Pre Systolic BP Rehab 114  -RW     Pre Treatment Diastolic BP 60  -RW     Pretreatment Heart Rate (beats/min) 97  -RW     Pre SpO2 (%) 96  -RW     O2 Delivery Pre Treatment nasal cannula  6 LPM  -RW     Post SpO2 (%) 91  4 LPM  -RW     O2 Delivery Post Treatment nasal cannula  -RW     Pre Patient Position Supine  -RW     Post Patient Position Supine  -RW       Row Name 08/02/23 1453          Positioning and Restraints    Pre-Treatment Position in bed  -RW     Post Treatment Position bed  -RW     In Bed notified nsg;supine;call light within reach;encouraged to call for assist;with family/caregiver;patient within staff view  -RW               User Key  (r) = Recorded By, (t) = Taken By, (c) = Cosigned By      Initials Name Provider Type    RW Ana Vela, [place] Occupational Therapist                   Outcome Measures       Row Name 08/02/23 0990          How much help from another is currently needed...    Putting on and taking off regular lower body clothing? 2  -RW     Bathing (including washing, rinsing, and drying) 2  -RW     Toileting (which includes using toilet bed pan or urinal) 3  -RW     Putting on and taking off regular upper body clothing 3  -RW     Taking care of personal grooming (such as brushing teeth) 4  -RW     Eating meals 4  -RW     AM-PAC 6 Clicks Score (OT) 18  -RW       Row Name 08/02/23 0800          How much help from another person do you currently need...    Turning from your back to your side while in flat bed without using bedrails? 4  -RWA (r)  AF (c)     Moving from lying on back to sitting on the side of a flat bed without  bedrails? 3  -RWA (r)  AF (c)     Moving to and from a bed to a chair (including a wheelchair)? 3  -RWA (r)  AF (c)     Standing up from a chair using your arms (e.g., wheelchair, bedside chair)? 3  -RWA (r)  AF (c)     Climbing 3-5 steps with a railing? 3  -RWA (r)  AF (c)     To walk in hospital room? 3  -RWA (r)  AF (c)     AM-PAC 6 Clicks Score (PT) 19  -RWA     Highest level of mobility 6 --> Walked 10 steps or more  -[facility]       Row Name 08/02/23 1455          Functional Assessment    Outcome Measure Options AM-PAC 6 Clicks Daily Activity (OT)  -               User Key  (r) = Recorded By, (t) = Taken By, (c) = Cosigned By      Initials Name Provider Type    Isidro Liriano RNA Registered Nurse    Zarina Marquez, RN Registered Nurse    Ana Gonzalez, OT Occupational Therapist                    Occupational Therapy Education       Title: PT OT SLP Therapies (In Progress)       Topic: Occupational Therapy (In Progress)       Point: ADL training (In Progress)       Description:   Instruct learner(s) on proper safety adaptation and remediation techniques during self care or transfers.   Instruct in proper use of assistive devices.                  Learning Progress Summary             Patient Acceptance, E,TB, NR by RW at 8/2/2023 1608    Comment: POC, Role of OT, transfer training                         Point: Home exercise program (Not Started)       Description:   Instruct learner(s) on appropriate technique for monitoring, assisting and/or progressing therapeutic exercises/activities.                  Learner Progress:  Not documented in this visit.              Point: Precautions (In Progress)       Description:   Instruct learner(s) on prescribed precautions during self-care and functional transfers.                  Learning Progress Summary             Patient Acceptance, E,TB, NR by RW at 8/2/2023 1608    Comment: POC, Role of OT, transfer training                         Point: Body  mechanics (In Progress)       Description:   Instruct learner(s) on proper positioning and spine alignment during self-care, functional mobility activities and/or exercises.                  Learning Progress Summary             Patient Acceptance, E,TB, NR by RW at 8/2/2023 0688    Comment: POC, Role of OT, transfer training                                         User Key       Initials Effective Dates Name Provider Type Discipline     09/22/22 -  Ana Vela OT Occupational Therapist OT                  OT Recommendation and Plan  Planned Therapy Interventions (OT): activity tolerance training, manual therapy/joint mobilization, patient/caregiver education/training, adaptive equipment training, neuromuscular control/coordination retraining, BADL retraining, ROM/therapeutic exercise, cognitive/visual perception retraining, occupation/activity based interventions, strengthening exercise, functional balance retraining, edema control/reduction, IADL retraining, passive ROM/stretching, transfer/mobility retraining  Therapy Frequency (OT): other (see comments) (5-7 d/wk)  Plan of Care Review  Plan of Care Reviewed With: patient, spouse  Outcome Evaluation: OT eval complete. Pt supine upon arrival and agreeable to therapy. Pt perf sup>sit with Guevara. Pt dependent to don socks sitting EOB. Pts BUE ROM was WFL. Pts LUE 4-/5 except shld flex 3+/5 grossly. RUE observed to be 3/5 grossly, Pt doffed/donned hospital gown with modA. Nsg present and changed dressing. Pt perf sit<>stand, toilet t/f, and functional mobility of 4'x2 with CGA and FWW. Pt dependent for toileting skills. Pts SPO2 >95% on 6LPM throughout session, nsg present and adjusted supplemental O2 to 4 LPM. Pt returned to EOB, and was supervision for sit>sup, SPO2 91% at end of session. Pt demonstrated decreased independence with ADLs, strength, and transfer safety. Cont inpatient OT. Recommend home with assist and home health OT at d/c with medical  stability. Pt would also benefit from shower chair and AD for dressing.     Time Calculation:   Evaluation Complexity (OT)  Review Occupational Profile/Medical/Therapy History Complexity: expanded/moderate complexity  Assessment, Occupational Performance/Identification of Deficit Complexity: 3-5 performance deficits  Clinical Decision Making Complexity (OT): detailed assessment/moderate complexity  Overall Complexity of Evaluation (OT): moderate complexity     Time Calculation- OT       Row Name 08/02/23 1609             Time Calculation- OT    OT Start Time 1455  -RW      OT Stop Time 1550  -RW      OT Time Calculation (min) 55 min  -RW      OT Received On 08/02/23  -RW      OT Goal Re-Cert Due Date 08/15/23  -RW         Untimed Charges    OT Eval/Re-eval Minutes 55  -RW         Total Minutes    Untimed Charges Total Minutes 55  -RW       Total Minutes 55  -RW                User Key  (r) = Recorded By, (t) = Taken By, (c) = Cosigned By      Initials Name Provider Type    RW Ana Vela OT Occupational Therapist                  Therapy Charges for Today       Code Description Service Date Service Provider Modifiers Qty    [id number] HC OT EVAL MOD COMPLEXITY 4 8/2/2023 Ana Vela OT GO 1                 Ana Vela OT  8/2/2023

## 2023-08-02 NOTE — PLAN OF CARE
Problem: Fall Injury Risk  Goal: Absence of Fall and Fall-Related Injury  Outcome: Ongoing, Progressing  Goal: Absence of Fall and Fall-Related Injury  Outcome: Ongoing, Progressing     Problem: Adjustment to Illness (Sepsis/Septic Shock)  Goal: Optimal Coping  Outcome: Ongoing, Progressing     Problem: Bleeding (Sepsis/Septic Shock)  Goal: Absence of Bleeding  Outcome: Ongoing, Progressing     Problem: Glycemic Control Impaired (Sepsis/Septic Shock)  Goal: Blood Glucose Level Within Desired Range  Outcome: Ongoing, Progressing     Problem: Infection Progression (Sepsis/Septic Shock)  Goal: Absence of Infection Signs and Symptoms  Outcome: Ongoing, Progressing     Problem: Nutrition Impaired (Sepsis/Septic Shock)  Goal: Optimal Nutrition Intake  Outcome: Ongoing, Progressing     Problem: Skin Injury Risk Increased  Goal: Skin Health and Integrity  Outcome: Ongoing, Progressing     Problem: Adult Inpatient Plan of Care  Goal: Plan of Care Review  Outcome: Ongoing, Progressing  Goal: Patient-Specific Goal (Individualized)  Outcome: Ongoing, Progressing  Goal: Absence of Hospital-Acquired Illness or Injury  Outcome: Ongoing, Progressing  Goal: Optimal Comfort and Wellbeing  Outcome: Ongoing, Progressing  Goal: Readiness for Transition of Care  Outcome: Ongoing, Progressing     Problem: Skin or Soft Tissue Infection  Goal: Absence of Infection Signs and Symptoms  Outcome: Ongoing, Progressing     Problem: Pain Acute  Goal: Acceptable Pain Control and Functional Ability  Outcome: Ongoing, Progressing   Goal Outcome Evaluation:

## 2023-08-02 NOTE — PROGRESS NOTES
Rapid response called earlier in the day patient noted to become unresponsive.  Patient was found to have a heart rate in the 30s and 20s.  She was given atropine with good response and eventually noted to have fast heart rate.  Blood pressure was noted to be elevated but patient was still unresponsive at this time.  Patient was noted to be turning somewhat cyanotic and her supplemental oxygen via nasal cannula was removed and she was bagged.  She had appropriate response with this.  Decision was made to transfer the patient down to CCU for likely intubation.  On arrival to the CCU patient was transferred over to the ICU bed and arrangements were being made for her to be intubated at which point in time she became more responsive and awake and was able to follow commands.  Patient was found to have A-fib with RVR.  She has not been able to take any of her p.o. medications today due to procedure earlier in the day likely affecting this as well as the atropine she received earlier.  Lopressor is available as needed for now and we will plan on observing her closely in the CCU setting for tonight.  Patient's daughter was updated after events.  For now no urgent need for intubation.    Juanito Harrington MD

## 2023-08-02 NOTE — PROGRESS NOTES
"    [facility]  [address] [place] [address]. [address]  T - [phone]  F - [phone]     Progress Note          PATIENT  DEMOGRAPHICS   PATIENT NAME: Carol Sullivan                      PHYSICIAN: Stephanie Gómez MD  : 1940  MRN: [id number]   LOS: 3 days    Patient Care Team:  Len Seo MD as PCP - General  [place] ribera APRN as Nurse Practitioner (General Surgery)  Subjective   SUBJECTIVE   No acute events overnight. Denied chest pain, now more alert  Events reviewed - came to icu post op after persistent drowsiness         Objective   OBJECTIVE   Vital Signs  Temp:  [97.1 øF (36.2 øC)-98.3 øF (36.8 øC)] 98.1 øF (36.7 øC)  Heart Rate:  [] 101  Resp:  [10-22] 18  BP: (106-217)/(55-95) 116/56    Flowsheet Rows      Flowsheet Row First Filed Value   Admission Height 165.1 cm (65\") Documented at 2023 1900   Admission Weight 74.8 kg (165 lb) Documented at 2023 1608             I/O last 3 completed shifts:  In: 370 [P.O.:120; I.V.:50; IV Piggyback:200]  Out: 800 [Urine:800]    PHYSICAL EXAM    Physical Exam  Vitals and nursing note reviewed.   Constitutional:       Appearance: Normal appearance. She is not ill-appearing.   Cardiovascular:      Rate and Rhythm: Normal rate and regular rhythm.      Heart sounds: Normal heart sounds. No murmur heard.    No friction rub. No gallop.   Pulmonary:      Effort: No respiratory distress.      Breath sounds: Normal breath sounds. No stridor. No wheezing, rhonchi or rales.   Abdominal:      General: Bowel sounds are normal. There is no distension.      Palpations: Abdomen is soft.      Tenderness: There is no abdominal tenderness.   Musculoskeletal:      Right lower leg: No edema.      Left lower leg: No edema.   Skin:     General: Skin is warm and dry.   Neurological:      Mental Status: She is alert.       RESULTS   Results Review:    Results from last 7 days   Lab Units 23  0415 23  1455 23  0551 " 07/31/23 2012 07/31/23 0639 07/29/23  0508 07/28/23  1600   SODIUM mmol/L 138  --  134*  --  132*   < > 134*   POTASSIUM mmol/L 3.8 3.5 3.2*   < > 3.3*   < > 4.2   CHLORIDE mmol/L 101  --  98  --  100   < > 96*   CO2 mmol/L 23.0  --  22.0  --  19.0*   < > 25.0   BUN mg/dL 16  --  18  --  18   < > 24*   CREATININE mg/dL 1.61*  --  1.70*  --  1.46*   < > 1.49*   CALCIUM mg/dL 8.2*  --  8.5*  --  8.1*   < > 8.5*   BILIRUBIN mg/dL  --   --   --   --   --   --  0.6   ALK PHOS U/L  --   --   --   --   --   --  121*   ALT (SGPT) U/L  --   --   --   --   --   --  14   AST (SGOT) U/L  --   --   --   --   --   --  29   GLUCOSE mg/dL 133*  --  125*  --  162*   < > 121*    < > = values in this interval not displayed.         Estimated Creatinine Clearance: 27.3 mL/min (A) (by C-G formula based on SCr of 1.61 mg/dL (H)).    Results from last 7 days   Lab Units 08/01/23 0551 07/31/23 2012 07/28/23  1600   MAGNESIUM mg/dL 2.5* 1.5* 1.6               Results from last 7 days   Lab Units 08/02/23 0415 08/01/23 0551 07/31/23 0639 07/30/23 0601 07/29/23  0508   WBC 10*3/mm3 7.86 9.57 14.50* 15.35* 20.31*   HEMOGLOBIN g/dL 10.7* 10.7* 11.5* 11.2* 13.6   PLATELETS 10*3/mm3 370 298 290 244 276         Results from last 7 days   Lab Units 08/02/23 0415 08/01/23 0551 07/31/23 0639 07/30/23 0601 07/29/23  0508   INR  3.41* 3.44* 2.78* 2.51* 1.82*           Imaging Results (Last 24 Hours)       Procedure Component Value Units Date/Time    XR Chest 1 View [[id number]] Collected: 08/01/23 1458     Updated: 08/01/23 1529    Narrative:      FINDINGS:  Mild central vascular prominence.  There are low lung volumes.  Heart size is  upper limits of normal.  No significant change from 07/31/2023.    US Guided Vascular Access [[id number]] Collected: 08/01/23 1509     Updated: 08/01/23 1512    Narrative:      Ultrasound guidance vascular    FINDINGS:  Real-time ultrasound imaging was provided for vascular access.  Please refer to  procedure  note for real-time exam findings.  The right cephalicVein was found to  be patent and compressible.  Access under direct sonographic visualization.  Permanent saved images sent to the PACS for documentation.      IR Insert Midline Without Port Pump 5 Plus [[id number]] Resulted: 08/01/23 1511     Updated: 08/01/23 1511    Narrative:      This procedure was auto-finalized with no dictation required.             MEDICATIONS    DULoxetine, 60 mg, Oral, Daily  enoxaparin, 1 mg/kg, Subcutaneous, Q12H  famotidine, 20 mg, Oral, BID AC  furosemide, 20 mg, Intravenous, Q12H  isosorbide mononitrate, 30 mg, Oral, Daily  losartan, 100 mg, Oral, Daily  metoprolol succinate XL, 50 mg, Oral, Daily  NIFEdipine XL, 60 mg, Oral, Daily  nystatin, , Topical, Q12H  piperacillin-tazobactam, 3.375 g, Intravenous, Q8H  potassium chloride, 40 mEq, Oral, Once  rosuvastatin, 40 mg, Oral, Daily  sodium chloride, 10 mL, Intravenous, Q12H  traMADol, 50 mg, Oral, Q8H      Pharmacy Consult,   Pharmacy to Dose enoxaparin (LOVENOX),   Pharmacy to Dose Zosyn,   sodium chloride, 30 mL/hr, Last Rate: 30 mL/hr (08/01/23 1201)        Assessment & Plan   ASSESSMENT / PLAN      Sepsis    Mastitis    1. Acute kidney injury: baseline unknown.   - Creatinine was 1.5 in 10/2022.   - UA 3+ protein, trace leukocytes, 0-2 RBC, 6-12 WBC, 2+ bacteria. Urine sodium 26. Renal ultrasound unremarkable.   - Creatinine at 1.6 today and we will observe      2. Hypertension:   - Blood pressure is acceptable and on low side and will lower losartan to 50mg     3. Metabolic acidosis:   - Mild. Will monitor.      4. Sepsis:  - On Zosyn     5. UTI:   - Receiving zosyn     6. Cdiff: not on po vanc now      7. Hyponatremia:   - Sodium is slightly low . Will monitor.     8. Anemia:  - Hemoglobin is acceptable            This document has been electronically signed by Stephanie Gómez MD on August 2, 2023 10:49 CDT

## 2023-08-02 NOTE — PLAN OF CARE
Goal Outcome Evaluation:      Pt resting in bed on 12L highflow NC. A&OX4. Afebrile. UOP adequate. A-fib. VSS. All needs met at this time; will continue to monitor.

## 2023-08-02 NOTE — PROGRESS NOTES
[facility] Medicine Services  INPATIENT PROGRESS NOTE      Length of Stay: 3  Date of Admission: 7/28/2023  Primary Care Physician: Len Seo MD    Subjective   Chief Complaint: No new complaints  HPI:  Seen and examined with no new complaints.  Requiring high flow O2 during my eval but denies any SOA.  No fever or chills. Breast tenderness improved as well. Aox4.     Review of Systems   All pertinent negatives and positives are as above. All other systems have been reviewed and are negative unless otherwise stated.     Objective    As of today 08/02/23  Temp:  [98 øF (36.7 øC)-98.3 øF (36.8 øC)] 98.1 øF (36.7 øC)  Heart Rate:  [] 92  Resp:  [16-22] 16  BP: (106-134)/(55-68) 122/57    Physical Exam  Constitutional:       General: She is not in acute distress.     Appearance: She is not toxic-appearing.   HENT:      Head: Normocephalic and atraumatic.      Right Ear: External ear normal.      Left Ear: External ear normal.      Nose: Nose normal.      Mouth/Throat:      Pharynx: Oropharynx is clear.   Eyes:      Conjunctiva/sclera: Conjunctivae normal.   Cardiovascular:      Rate and Rhythm: Normal rate. Rhythm irregular.      Heart sounds: Normal heart sounds.   Pulmonary:      Effort: Pulmonary effort is normal. No respiratory distress.      Breath sounds: Normal breath sounds.   Abdominal:      General: Bowel sounds are normal.      Palpations: Abdomen is soft.      Tenderness: There is no abdominal tenderness.   Musculoskeletal:         General: No deformity.   Skin:     General: Skin is warm and dry.      Capillary Refill: Capillary refill takes less than 2 seconds.   Neurological:      General: No focal deficit present.      Mental Status: She is alert and oriented to person, place, and time. Mental status is at baseline.   Psychiatric:         Behavior: Behavior normal.         Results Review:  I have reviewed the labs, radiology results, and  diagnostic studies.    Laboratory Data:   Results from last 7 days   Lab Units 08/02/23  0415 08/01/23  1455 08/01/23  0551 07/31/23 2012 07/31/23  0639 07/29/23  0508 07/28/23  1600   SODIUM mmol/L 138  --  134*  --  132*   < > 134*   POTASSIUM mmol/L 3.8 3.5 3.2*   < > 3.3*   < > 4.2   CHLORIDE mmol/L 101  --  98  --  100   < > 96*   CO2 mmol/L 23.0  --  22.0  --  19.0*   < > 25.0   BUN mg/dL 16  --  18  --  18   < > 24*   CREATININE mg/dL 1.61*  --  1.70*  --  1.46*   < > 1.49*   GLUCOSE mg/dL 133*  --  125*  --  162*   < > 121*   CALCIUM mg/dL 8.2*  --  8.5*  --  8.1*   < > 8.5*   BILIRUBIN mg/dL  --   --   --   --   --   --  0.6   ALK PHOS U/L  --   --   --   --   --   --  121*   ALT (SGPT) U/L  --   --   --   --   --   --  14   AST (SGOT) U/L  --   --   --   --   --   --  29   ANION GAP mmol/L 14.0  --  14.0  --  13.0   < > 13.0    < > = values in this interval not displayed.     Estimated Creatinine Clearance: 27.3 mL/min (A) (by C-G formula based on SCr of 1.61 mg/dL (H)).  Results from last 7 days   Lab Units 08/01/23  0551 07/31/23 2012 07/28/23  1600   MAGNESIUM mg/dL 2.5* 1.5* 1.6         Results from last 7 days   Lab Units 08/02/23  0415 08/01/23 0551 07/31/23 0639 07/30/23  0601 07/29/23  0508   WBC 10*3/mm3 7.86 9.57 14.50* 15.35* 20.31*   HEMOGLOBIN g/dL 10.7* 10.7* 11.5* 11.2* 13.6   HEMATOCRIT % 32.9* 31.2* 34.2 34.1 42.5   PLATELETS 10*3/mm3 370 298 290 244 276     Results from last 7 days   Lab Units 08/02/23  0415 08/01/23  0551 07/31/23  0639 07/30/23  0601 07/29/23  0508   INR  3.41* 3.44* 2.78* 2.51* 1.82*       Culture Data:   No results found for: BLOODCX  No results found for: URINECX  No results found for: RESPCX  No results found for: WOUNDCX  No results found for: STOOLCX  No components found for: BODYFLD    Radiology Data:   Imaging Results (Last 24 Hours)       Procedure Component Value Units Date/Time    XR Chest 1 View [[id number]] Collected: 08/01/23 1458     Updated: 08/01/23  1529    Narrative:      FINDINGS:  Mild central vascular prominence.  There are low lung volumes.  Heart size is  upper limits of normal.  No significant change from 07/31/2023.    US Guided Vascular Access [[id number]] Collected: 08/01/23 1509     Updated: 08/01/23 1512    Narrative:      Ultrasound guidance vascular    FINDINGS:  Real-time ultrasound imaging was provided for vascular access.  Please refer to  procedure note for real-time exam findings.  The right cephalicVein was found to  be patent and compressible.  Access under direct sonographic visualization.  Permanent saved images sent to the PACS for documentation.      IR Insert Midline Without Port Pump 5 Plus [[id number]] Resulted: 08/01/23 1511     Updated: 08/01/23 1511    Narrative:      This procedure was auto-finalized with no dictation required.            I have reviewed the patient's current medications.     Assessment/Plan     Principal Problem:    Sepsis  Active Problems:    Mastitis  Sepsis secondary to left breast mastitis  Hypertension  Paroxysmal atrial fibrillation  BLAINE  UTI due to Enterococcus faecalis  Acute respiratory failure with hypoxia    Plan:  - Continue high flow oxygen and wean as tolerated  - Heart rate appears improved, she did have some A-fib with RVR yesterday but overall rate is much better.  We will just plan to resume her p.o. medications for now and observe.  Could entertain the idea of giving her an extra dose of IV Lopressor if needed  - Continue antibiotic coverage with Zosyn for now  - Continue home medications as appropriate  - Once heart rate and O2 requirements improve then we will plan to resume PT and OT  - Should her oxygen requirements and heart rate stabilizes she can also potentially transfer to the floor later today  - Continue IV Lasix 20 mg twice daily.  Suspect underlying respiratory failure is likely related to some volume overload at this time.  - Monitor I's and O's and daily weights  - Monitor renal  function and electrolytes  - DVT prophylaxis with Lovenox treatment dose for now  - CODE STATUS: Full    31 minutes of critical care provided. This time excludes other billable procedures. Time does include preparation of documents, medical consultations, review of old records, and direct bedside care.       Medical Decision Making  Number and Complexity of problems: 5 high complexity    Conditions and Status:        Condition is improving.     MDM Data  Tests considered but not ordered: None     Decision rules/scores evaluated (example [id number], Wells, etc): None     Discussed with: Patient and family     Treatment Plan  As above    Care Planning  Shared decision making: Patient  Code status and discussions: Full    Disposition  Social Determinants of Health that impact treatment or disposition: none  I expect the patient to be discharged to home in 2-3 days.       I have utilized all available immediate resources to obtain, update, or review the patient's current medications (including all prescriptions, over-the-counter products, herbals, cannabis/cannabidiol products, and vitamin/mineral/dietary (nutritional) supplements).   I confirmed that the patient's Advance Care Plan is present, code status is documented, or surrogate decision maker is listed in the patient's medical record.      Juanito Harrington MD

## 2023-08-03 LAB
ANION GAP SERPL CALCULATED.3IONS-SCNC: 11 MMOL/L (ref 5–15)
BASOPHILS # BLD AUTO: 0.07 10*3/MM3 (ref 0–0.2)
BASOPHILS NFR BLD AUTO: 1.2 % (ref 0–1.5)
BUN SERPL-MCNC: 16 MG/DL (ref 8–23)
BUN/CREAT SERPL: 10.3 (ref 7–25)
CALCIUM SPEC-SCNC: 8.6 MG/DL (ref 8.6–10.5)
CHLORIDE SERPL-SCNC: 98 MMOL/L (ref 98–107)
CO2 SERPL-SCNC: 28 MMOL/L (ref 22–29)
CREAT SERPL-MCNC: 1.56 MG/DL (ref 0.57–1)
CRP SERPL-MCNC: 9.13 MG/DL (ref 0–0.5)
DEPRECATED RDW RBC AUTO: 44.7 FL (ref 37–54)
EGFRCR SERPLBLD CKD-EPI 2021: 33.3 ML/MIN/1.73
EOSINOPHIL # BLD AUTO: 0.15 10*3/MM3 (ref 0–0.4)
EOSINOPHIL NFR BLD AUTO: 2.5 % (ref 0.3–6.2)
ERYTHROCYTE [DISTWIDTH] IN BLOOD BY AUTOMATED COUNT: 15.1 % (ref 12.3–15.4)
GLUCOSE SERPL-MCNC: 100 MG/DL (ref 65–99)
HCT VFR BLD AUTO: 32.4 % (ref 34–46.6)
HGB BLD-MCNC: 10.6 G/DL (ref 12–15.9)
IMM GRANULOCYTES # BLD AUTO: 0.11 10*3/MM3 (ref 0–0.05)
IMM GRANULOCYTES NFR BLD AUTO: 1.9 % (ref 0–0.5)
INR PPP: 2.82 (ref 0.8–1.2)
LYMPHOCYTES # BLD AUTO: 1.69 10*3/MM3 (ref 0.7–3.1)
LYMPHOCYTES NFR BLD AUTO: 28.5 % (ref 19.6–45.3)
MCH RBC QN AUTO: 26.7 PG (ref 26.6–33)
MCHC RBC AUTO-ENTMCNC: 32.7 G/DL (ref 31.5–35.7)
MCV RBC AUTO: 81.6 FL (ref 79–97)
MONOCYTES # BLD AUTO: 0.75 10*3/MM3 (ref 0.1–0.9)
MONOCYTES NFR BLD AUTO: 12.6 % (ref 5–12)
NEUTROPHILS NFR BLD AUTO: 3.17 10*3/MM3 (ref 1.7–7)
NEUTROPHILS NFR BLD AUTO: 53.3 % (ref 42.7–76)
NRBC BLD AUTO-RTO: 0 /100 WBC (ref 0–0.2)
PLATELET # BLD AUTO: 484 10*3/MM3 (ref 140–450)
PMV BLD AUTO: 9.5 FL (ref 6–12)
POTASSIUM SERPL-SCNC: 3.1 MMOL/L (ref 3.5–5.2)
POTASSIUM SERPL-SCNC: 4.2 MMOL/L (ref 3.5–5.2)
PROTHROMBIN TIME: 29 SECONDS (ref 11.1–15.3)
RBC # BLD AUTO: 3.97 10*6/MM3 (ref 3.77–5.28)
SODIUM SERPL-SCNC: 137 MMOL/L (ref 136–145)
WBC NRBC COR # BLD: 5.94 10*3/MM3 (ref 3.4–10.8)

## 2023-08-03 PROCEDURE — 85610 PROTHROMBIN TIME: CPT | Performed by: STUDENT IN AN ORGANIZED HEALTH CARE EDUCATION/TRAINING PROGRAM

## 2023-08-03 PROCEDURE — 94660 CPAP INITIATION&MGMT: CPT

## 2023-08-03 PROCEDURE — 97164 PT RE-EVAL EST PLAN CARE: CPT

## 2023-08-03 PROCEDURE — 99024 POSTOP FOLLOW-UP VISIT: CPT | Performed by: STUDENT IN AN ORGANIZED HEALTH CARE EDUCATION/TRAINING PROGRAM

## 2023-08-03 PROCEDURE — 80048 BASIC METABOLIC PNL TOTAL CA: CPT | Performed by: STUDENT IN AN ORGANIZED HEALTH CARE EDUCATION/TRAINING PROGRAM

## 2023-08-03 PROCEDURE — 86140 C-REACTIVE PROTEIN: CPT | Performed by: STUDENT IN AN ORGANIZED HEALTH CARE EDUCATION/TRAINING PROGRAM

## 2023-08-03 PROCEDURE — 25010000002 PIPERACILLIN SOD-TAZOBACTAM PER 1 G: Performed by: STUDENT IN AN ORGANIZED HEALTH CARE EDUCATION/TRAINING PROGRAM

## 2023-08-03 PROCEDURE — 25010000002 ENOXAPARIN PER 10 MG: Performed by: STUDENT IN AN ORGANIZED HEALTH CARE EDUCATION/TRAINING PROGRAM

## 2023-08-03 PROCEDURE — 25010000002 FUROSEMIDE PER 20 MG: Performed by: STUDENT IN AN ORGANIZED HEALTH CARE EDUCATION/TRAINING PROGRAM

## 2023-08-03 PROCEDURE — 25010000002 MAGNESIUM SULFATE 2 GM/50ML SOLUTION: Performed by: FAMILY MEDICINE

## 2023-08-03 PROCEDURE — 85025 COMPLETE CBC W/AUTO DIFF WBC: CPT | Performed by: STUDENT IN AN ORGANIZED HEALTH CARE EDUCATION/TRAINING PROGRAM

## 2023-08-03 PROCEDURE — 84132 ASSAY OF SERUM POTASSIUM: CPT | Performed by: INTERNAL MEDICINE

## 2023-08-03 RX ORDER — POTASSIUM CHLORIDE 750 MG/1
40 CAPSULE, EXTENDED RELEASE ORAL EVERY 4 HOURS
Status: COMPLETED | OUTPATIENT
Start: 2023-08-03 | End: 2023-08-03

## 2023-08-03 RX ORDER — MAGNESIUM SULFATE HEPTAHYDRATE 40 MG/ML
2 INJECTION, SOLUTION INTRAVENOUS ONCE
Status: COMPLETED | OUTPATIENT
Start: 2023-08-03 | End: 2023-08-03

## 2023-08-03 RX ORDER — SODIUM CHLORIDE 0.9 % (FLUSH) 0.9 %
10 SYRINGE (ML) INJECTION AS NEEDED
Status: DISCONTINUED | OUTPATIENT
Start: 2023-08-03 | End: 2023-08-17 | Stop reason: HOSPADM

## 2023-08-03 RX ORDER — DOXYCYCLINE 100 MG/1
100 CAPSULE ORAL EVERY 12 HOURS SCHEDULED
Status: DISCONTINUED | OUTPATIENT
Start: 2023-08-03 | End: 2023-08-05

## 2023-08-03 RX ORDER — SODIUM CHLORIDE 0.9 % (FLUSH) 0.9 %
10 SYRINGE (ML) INJECTION EVERY 12 HOURS SCHEDULED
Status: DISCONTINUED | OUTPATIENT
Start: 2023-08-03 | End: 2023-08-17 | Stop reason: HOSPADM

## 2023-08-03 RX ORDER — POTASSIUM CHLORIDE 750 MG/1
40 CAPSULE, EXTENDED RELEASE ORAL EVERY 4 HOURS
Status: DISCONTINUED | OUTPATIENT
Start: 2023-08-03 | End: 2023-08-03 | Stop reason: SDUPTHER

## 2023-08-03 RX ORDER — FUROSEMIDE 20 MG/1
20 TABLET ORAL
Status: DISCONTINUED | OUTPATIENT
Start: 2023-08-03 | End: 2023-08-05

## 2023-08-03 RX ORDER — SODIUM CHLORIDE 9 MG/ML
40 INJECTION, SOLUTION INTRAVENOUS AS NEEDED
Status: DISCONTINUED | OUTPATIENT
Start: 2023-08-03 | End: 2023-08-17 | Stop reason: HOSPADM

## 2023-08-03 RX ADMIN — ROSUVASTATIN CALCIUM 40 MG: 20 TABLET, FILM COATED ORAL at 08:59

## 2023-08-03 RX ADMIN — SODIUM CHLORIDE 30 ML/HR: 9 INJECTION, SOLUTION INTRAVENOUS at 17:42

## 2023-08-03 RX ADMIN — DULOXETINE HYDROCHLORIDE 60 MG: 60 CAPSULE, DELAYED RELEASE ORAL at 08:59

## 2023-08-03 RX ADMIN — NIFEDIPINE 60 MG: 60 TABLET, EXTENDED RELEASE ORAL at 08:58

## 2023-08-03 RX ADMIN — DOXYCYCLINE 100 MG: 100 CAPSULE ORAL at 20:38

## 2023-08-03 RX ADMIN — POTASSIUM CHLORIDE 40 MEQ: 750 CAPSULE, EXTENDED RELEASE ORAL at 13:07

## 2023-08-03 RX ADMIN — PIPERACILLIN SODIUM AND TAZOBACTAM SODIUM 3.38 G: 3; .375 INJECTION, POWDER, LYOPHILIZED, FOR SOLUTION INTRAVENOUS at 08:59

## 2023-08-03 RX ADMIN — MAGNESIUM SULFATE HEPTAHYDRATE 2 G: 40 INJECTION, SOLUTION INTRAVENOUS at 10:20

## 2023-08-03 RX ADMIN — METOPROLOL TARTRATE 5 MG: 5 INJECTION INTRAVENOUS at 08:54

## 2023-08-03 RX ADMIN — Medication 10 ML: at 20:44

## 2023-08-03 RX ADMIN — Medication 10 ML: at 10:25

## 2023-08-03 RX ADMIN — FAMOTIDINE 20 MG: 20 TABLET ORAL at 16:33

## 2023-08-03 RX ADMIN — TRAMADOL HYDROCHLORIDE 50 MG: 50 TABLET, COATED ORAL at 04:57

## 2023-08-03 RX ADMIN — LOSARTAN POTASSIUM 50 MG: 50 TABLET, FILM COATED ORAL at 08:58

## 2023-08-03 RX ADMIN — DOXYCYCLINE 100 MG: 100 CAPSULE ORAL at 10:23

## 2023-08-03 RX ADMIN — POTASSIUM CHLORIDE 40 MEQ: 750 CAPSULE, EXTENDED RELEASE ORAL at 08:57

## 2023-08-03 RX ADMIN — METOPROLOL SUCCINATE 50 MG: 50 TABLET, EXTENDED RELEASE ORAL at 08:58

## 2023-08-03 RX ADMIN — NYSTATIN: 100000 POWDER TOPICAL at 09:21

## 2023-08-03 RX ADMIN — FUROSEMIDE 20 MG: 10 INJECTION, SOLUTION INTRAVENOUS at 10:19

## 2023-08-03 RX ADMIN — ENOXAPARIN SODIUM 70 MG: 80 INJECTION SUBCUTANEOUS at 09:21

## 2023-08-03 RX ADMIN — TRAMADOL HYDROCHLORIDE 50 MG: 50 TABLET, COATED ORAL at 12:24

## 2023-08-03 RX ADMIN — ENOXAPARIN SODIUM 70 MG: 80 INJECTION SUBCUTANEOUS at 21:24

## 2023-08-03 RX ADMIN — FAMOTIDINE 20 MG: 20 TABLET ORAL at 06:36

## 2023-08-03 RX ADMIN — NYSTATIN: 100000 POWDER TOPICAL at 20:40

## 2023-08-03 RX ADMIN — TRAMADOL HYDROCHLORIDE 50 MG: 50 TABLET, COATED ORAL at 20:38

## 2023-08-03 RX ADMIN — Medication 10 ML: at 09:23

## 2023-08-03 RX ADMIN — ISOSORBIDE MONONITRATE 30 MG: 30 TABLET, EXTENDED RELEASE ORAL at 08:59

## 2023-08-03 RX ADMIN — PIPERACILLIN SODIUM AND TAZOBACTAM SODIUM 3.38 G: 3; .375 INJECTION, POWDER, LYOPHILIZED, FOR SOLUTION INTRAVENOUS at 01:09

## 2023-08-03 RX ADMIN — FUROSEMIDE 20 MG: 20 TABLET ORAL at 16:33

## 2023-08-03 NOTE — PROGRESS NOTES
[facility] Medicine Services  INPATIENT PROGRESS NOTE      Length of Stay: 4  Date of Admission: 7/28/2023  Primary Care Physician: Len Seo MD    Subjective   Chief Complaint: No new complaints  HPI:  Feeling better today. No new concerns.  Still in afib but rate controlled.  Down to 2L O2.     Review of Systems   All pertinent negatives and positives are as above. All other systems have been reviewed and are negative unless otherwise stated.     Objective    As of today 08/03/23  Temp:  [97.7 øF (36.5 øC)-98.4 øF (36.9 øC)] 98 øF (36.7 øC)  Heart Rate:  [] 93  Resp:  [16-18] 16  BP: (104-144)/(53-71) 131/71    Physical Exam  Constitutional:       General: She is not in acute distress.     Appearance: She is not toxic-appearing.   HENT:      Head: Normocephalic and atraumatic.      Right Ear: External ear normal.      Left Ear: External ear normal.      Nose: Nose normal.      Mouth/Throat:      Pharynx: Oropharynx is clear.   Eyes:      Conjunctiva/sclera: Conjunctivae normal.   Cardiovascular:      Rate and Rhythm: Normal rate. Rhythm irregular.      Heart sounds: Normal heart sounds.   Pulmonary:      Effort: Pulmonary effort is normal. No respiratory distress.      Breath sounds: Normal breath sounds.   Abdominal:      General: Bowel sounds are normal.      Palpations: Abdomen is soft.      Tenderness: There is no abdominal tenderness.   Musculoskeletal:         General: No deformity.   Skin:     General: Skin is warm and dry.      Capillary Refill: Capillary refill takes less than 2 seconds.   Neurological:      General: No focal deficit present.      Mental Status: She is alert and oriented to person, place, and time. Mental status is at baseline.   Psychiatric:         Behavior: Behavior normal.         Results Review:  I have reviewed the labs, radiology results, and diagnostic studies.    Laboratory Data:   Results from last 7 days   Lab  Units 08/03/23  0507 08/02/23  0415 08/01/23  1455 08/01/23  0551 07/29/23  0508 07/28/23  1600   SODIUM mmol/L 137 138  --  134*   < > 134*   POTASSIUM mmol/L 3.1* 3.8 3.5 3.2*   < > 4.2   CHLORIDE mmol/L 98 101  --  98   < > 96*   CO2 mmol/L 28.0 23.0  --  22.0   < > 25.0   BUN mg/dL 16 16  --  18   < > 24*   CREATININE mg/dL 1.56* 1.61*  --  1.70*   < > 1.49*   GLUCOSE mg/dL 100* 133*  --  125*   < > 121*   CALCIUM mg/dL 8.6 8.2*  --  8.5*   < > 8.5*   BILIRUBIN mg/dL  --   --   --   --   --  0.6   ALK PHOS U/L  --   --   --   --   --  121*   ALT (SGPT) U/L  --   --   --   --   --  14   AST (SGOT) U/L  --   --   --   --   --  29   ANION GAP mmol/L 11.0 14.0  --  14.0   < > 13.0    < > = values in this interval not displayed.       Estimated Creatinine Clearance: 28.2 mL/min (A) (by C-G formula based on SCr of 1.56 mg/dL (H)).  Results from last 7 days   Lab Units 08/01/23  0551 07/31/23 2012 07/28/23  1600   MAGNESIUM mg/dL 2.5* 1.5* 1.6           Results from last 7 days   Lab Units 08/03/23  0507 08/02/23  0415 08/01/23  0551 07/31/23  0639 07/30/23  0601   WBC 10*3/mm3 5.94 7.86 9.57 14.50* 15.35*   HEMOGLOBIN g/dL 10.6* 10.7* 10.7* 11.5* 11.2*   HEMATOCRIT % 32.4* 32.9* 31.2* 34.2 34.1   PLATELETS 10*3/mm3 484* 370 298 290 244       Results from last 7 days   Lab Units 08/03/23  0507 08/02/23  0415 08/01/23  0551 07/31/23  0639 07/30/23  0601   INR  2.82* 3.41* 3.44* 2.78* 2.51*         Culture Data:   No results found for: BLOODCX  No results found for: URINECX  No results found for: RESPCX  No results found for: WOUNDCX  No results found for: STOOLCX  No components found for: BODYFLD    Radiology Data:   Imaging Results (Last 24 Hours)       ** No results found for the last 24 hours. **            I have reviewed the patient's current medications.     Assessment/Plan     Principal Problem:    Sepsis  Active Problems:    Mastitis  Sepsis secondary to left breast mastitis  Hypertension  Paroxysmal atrial  fibrillation  BLAINE  UTI due to Enterococcus faecalis  Acute respiratory failure with hypoxia    Plan:  - Continue supplemental O2 and wean as tolerated.   - Remains in Afib but rate controlled, will give Lopressor 5 mg IV x1 dose and monitor for response.   - Revise antibiotics to Doxy PO moving forward. Discussed with general surgery.   - Continue home medications as appropriate  - Once heart rate and O2 requirements improve then we will plan to resume PT and OT  - Should her oxygen requirements and heart rate stabilizes she can also potentially transfer to the floor later today  - Continue IV Lasix 20 mg twice daily.  Suspect underlying respiratory failure is likely related to some volume overload at this time.  - Monitor I's and O's and daily weights  - Monitor renal function and electrolytes  - DVT prophylaxis with Lovenox treatment dose for now  - CODE STATUS: Full    31 minutes of critical care provided. This time excludes other billable procedures. Time does include preparation of documents, medical consultations, review of old records, and direct bedside care.       Medical Decision Making  Number and Complexity of problems: 5 high complexity    Conditions and Status:        Condition is improving.     MDM Data  Tests considered but not ordered: None     Decision rules/scores evaluated (example [id number], Wells, etc): None     Discussed with: Patient and family     Treatment Plan  As above    Care Planning  Shared decision making: Patient  Code status and discussions: Full    Disposition  Social Determinants of Health that impact treatment or disposition: none  I expect the patient to be discharged to home in 2-3 days.       I have utilized all available immediate resources to obtain, update, or review the patient's current medications (including all prescriptions, over-the-counter products, herbals, cannabis/cannabidiol products, and vitamin/mineral/dietary (nutritional) supplements).   I confirmed that  the patient's Advance Care Plan is present, code status is documented, or surrogate decision maker is listed in the patient's medical record.      Juanito Harrington MD

## 2023-08-03 NOTE — CONSULTS
"Adult Nutrition  Assessment/PES    Patient Name:  Carol Sullivan  YOB: 1940  MRN: [id number]  Admit Date:  7/28/2023    Assessment Date:  8/3/2023    Comments:  Nutrition Assessment for LOS.  Pt is POD #2 I&D  of the breast related to breast abscess.  She was transferred to CCU post surgery due to increased HR and hypoxia.  She was initially admitted for Sepsis; Mastitis; UTI and Acute Resp Failure.  She reports a decreased appetite but also notes that even PTA she did not have the best appetite.  Intake over the last several days has been sub-optimal to meet EEN.  Based on wt hx her wt appears relatively stable.  RD will add milk and magic cup in an effort to optimize protein for wound healing and to increase oral intake.  RD will continue to monitor POC and po intake.      Reason for Assessment       Row Name 08/03/23 1257          Reason for Assessment    Reason For Assessment per organizational policy     Diagnosis infection/sepsis;other (see comments)  Breast mastitis     Identified At Risk by Screening Criteria other (see comments)  LOS                    Nutrition/Diet History       Row Name 08/03/23 1257          Nutrition/Diet History    Typical Intake (Food/Fluid/EN/PN) Pt reports that her appetite is depressed but it is normal. She doesn't eat the best.  She is unsure of wt loss \"but only a few pounds.\"  She does not like Boost but will drink Lakshmi milk and magic cups                    Labs/Tests/Procedures/Meds       Row Name 08/03/23 1300          Labs/Procedures/Meds    Lab Results Reviewed reviewed, pertinent     Lab Results Comments K+ 3.1; Gluc 100; Creat 1.56;        Diagnostic Tests/Procedures    Diagnostic Test/Procedure Reviewed reviewed, pertinent     Diagnostic Test/Procedures Comments Supplemental 02; POD #2 Breast I&D        Medications    Pertinent Medications Reviewed reviewed, pertinent     Pertinent Medications Comments Lasix; Zosyn; KCl; NACl 30cc/hr                    " "Physical Findings       Row Name 08/03/23 1301          Physical Findings    Overall Physical Appearance Breast wound                    Estimated/Assessed Needs - Anthropometrics       Row Name 08/03/23 1301          Anthropometrics    Height for Calculation 1.651 m (5' 5\")     Weight for Calculation 56.7 kg (125 lb)        Estimated/Assessed Needs    Additional Documentation Additional Requirements (Group);Fluid Requirements (Group);Modified Wilber State Equation (Group);Estimated Calorie Needs (Group);KCAL/KG (Group);[place]-St. Jeor Equation (Group);Protein Requirements (Group)        Estimated Calorie Needs    Estimated Calorie Need Method [place]-St Jeor        [place]-St. Jeor Equation    RMR ([place]-St. Jeor Equation) 1032.875     [place]-St. Jeor Activity Factors 1.4 - 1.5     Activity Factors ([place]-St. Jeor) 1446.025 - 1549.3125        Protein Requirements    Weight Used For Protein Calculations 56.7 kg (125 lb)     Est Protein Requirement Amount (gms/kg) 1.3 gm protein     Estimated Protein Requirements (gms/day) 73.71        Fluid Requirements    Fluid Requirements (mL/day) 1500     Estimated Fluid Requirement Method RDA Method     RDA Method (mL) 1500                    Nutrition Prescription Ordered       Row Name 08/03/23 1302          Nutrition Prescription PO    Current PO Diet Regular     Fluid Consistency Thin     Common Modifiers Cardiac                    Evaluation of Received Nutrient/Fluid Intake       Row Name 08/03/23 1302          PO Evaluation    Number of Days PO Intake Evaluated 2 days     Number of Meals 4     % PO Intake 25% x 3; 50% x 1                       Problem/Interventions:   Problem 1       Row Name 08/03/23 1303          Nutrition Diagnoses Problem 1    Problem 1 Increased Nutrient Needs     Macronutrient Protein     Micronutrient Vitamin;Mineral     Etiology (related to) Medical Diagnosis     Infectious Disease Sepsis;UTI     Other Breast Mastitis --POD #2 I&D     Other " Comment Increased metabolic demands                    Problem 2       Row Name 08/03/23 1304          Nutrition Diagnoses Problem 2    Problem 2 Inadequate Intake/Infusion     Inadequate Intake Type Oral     Macronutrient Kcal;Protein     Micronutrient Vitamin;Mineral     Etiology (related to) Factors Affecting Nutrition     Appetite Fair;Poor     Reported GI Symptoms Diarrhea  on Abx     Signs/Symptoms (evidenced by) PO Intake     Percent (%) intake recorded --  25% x 3; 50% x 1     Over number of meals 4                        Intervention Goal       Row Name 08/03/23 1306          Intervention Goal    General Reduce/improve symptoms;Meet nutritional needs for age/condition     PO Tolerate PO;Increase intake;Meet estimated needs     Weight Maintain weight                    Nutrition Intervention       Row Name 08/03/23 1306          Nutrition Intervention    RD/Tech Action Follow Tx progress;Care plan reviewd;Encourage intake;Recommend/ordered;Advise alternate selection;Advise available snack;Interview for preference;Menu provided     Recommended/Ordered Supplement                    Nutrition Prescription       Row Name 08/03/23 1306          Nutrition Prescription PO    PO Prescription Begin/change supplement     Supplement Milk;Magic Cup     Supplement Frequency 2 times a day;3 times a day     New PO Prescription Ordered? Yes                    Education/Evaluation       Row Name 08/03/23 1307          Education    Education Education topics;Advised regarding habits/behavior     Education Topics Basic nutrition;Protein     Advised Regarding Habits/Behavior Increased nutrient density;Snacks;Use supplement;Food choices        Monitor/Evaluation    Monitor Per protocol;I&O;PO intake;Supplement intake;Pertinent labs;Weight;Skin status;Symptoms     Education Follow-up Reinforce PRN                     Electronically signed by:  Zohreh Jeter RD  08/03/23 13:10 CDT

## 2023-08-03 NOTE — SIGNIFICANT NOTE
08/03/23 1046   OTHER   Discipline occupational therapy assistant   Rehab Time/Intention   Session Not Performed other (see comments);patient unavailable for treatment  (Nsg defers at this time; pt waiting for MD to come back for dressing management)

## 2023-08-03 NOTE — PLAN OF CARE
Goal Outcome Evaluation:   Assumed care of patient at 1900. Pt alert, oriented and following commands. HR afib with controlled rate. BP stable. O2 titrated off overnight. Breast dressing changed x2. Purewick remains in place and adequate urine output noted. Son at bedside overnight. No other issues at this time.

## 2023-08-03 NOTE — PLAN OF CARE
Goal Outcome Evaluation:  Plan of Care Reviewed With: caregiver, patient        Progress: no change  Outcome Evaluation: Nutrition Assessment for LOS.  Pt currently with inadequate oral intake.  Decreased appetite but per her report her appetite is never very good.  Increased nutrient needs related to the need for would healing.  Will add Magic cups and milk and monitor oral intake.

## 2023-08-03 NOTE — PLAN OF CARE
Goal Outcome Evaluation:            Pt oriented to [address] after being transferred from CCU just before suppertime. Vital signs stable.

## 2023-08-03 NOTE — PLAN OF CARE
Goal Outcome Evaluation:  Plan of Care Reviewed With: patient           Outcome Evaluation: PT re-evaluation complete.  Patient asleep but arousable, cooperative. She requires SPV with bed mobility, CGA with transfers and gait, ambulating 5'x1 with FWW, forward/back.  Good use of walker, good posture, unsteady with retro gait.  Patient will likely need HHPT upon discharge home, already has a FWW available.  Goals re-established, continue skilled I/P PT.      Anticipated Discharge Disposition (PT): home with assist, home with home health

## 2023-08-03 NOTE — PROGRESS NOTES
GENERAL SURGERY PROGRESS NOTE     LOS: 4 days         Interval History:     Packing was removed, but replaced due to some bleeding from the wound.     Medication Review:   doxycycline, 100 mg, Oral, Q12H  DULoxetine, 60 mg, Oral, Daily  enoxaparin, 1 mg/kg, Subcutaneous, Q12H  famotidine, 20 mg, Oral, BID AC  furosemide, 20 mg, Oral, BID  isosorbide mononitrate, 30 mg, Oral, Daily  losartan, 50 mg, Oral, Daily  metoprolol succinate XL, 50 mg, Oral, Daily  NIFEdipine XL, 60 mg, Oral, Daily  nystatin, , Topical, Q12H  rosuvastatin, 40 mg, Oral, Daily  sodium chloride, 10 mL, Intravenous, Q12H  sodium chloride, 10 mL, Intravenous, Q12H  traMADol, 50 mg, Oral, Q8H        Pharmacy Consult,   Pharmacy to Dose enoxaparin (LOVENOX),   sodium chloride, 30 mL/hr, Last Rate: 30 mL/hr (08/01/23 1201)    Objective     Vital Signs:  Temp:  [97.7 øF (36.5 øC)-98.4 øF (36.9 øC)] 97.8 øF (36.6 øC)  Heart Rate:  [] 96  Resp:  [16-18] 16  BP: (104-144)/(53-71) 105/59    Intake/Output Summary (Last 24 hours) at 8/3/2023 1536  Last data filed at 8/3/2023 1300  Gross per 24 hour   Intake 200 ml   Output 1700 ml   Net -1500 ml       Physical Exam  Skin:     Comments: Erythema and pain better.        Results Review:    Results from last 7 days   Lab Units 08/03/23  0507 08/02/23  0415 08/01/23  1455 08/01/23  0551   SODIUM mmol/L 137 138  --  134*   POTASSIUM mmol/L 3.1* 3.8 3.5 3.2*   CHLORIDE mmol/L 98 101  --  98   CO2 mmol/L 28.0 23.0  --  22.0   BUN mg/dL 16 16  --  18   CREATININE mg/dL 1.56* 1.61*  --  1.70*   GLUCOSE mg/dL 100* 133*  --  125*   CALCIUM mg/dL 8.6 8.2*  --  8.5*     Results from last 7 days   Lab Units 08/03/23  0507 08/02/23  0415 08/01/23  0551   WBC 10*3/mm3 5.94 7.86 9.57   HEMOGLOBIN g/dL 10.6* 10.7* 10.7*   HEMATOCRIT % 32.4* 32.9* 31.2*   PLATELETS 10*3/mm3 484* 370 298       Assessment:    Sepsis    Mastitis      POD2 from incision and drainage of breast abscess    Plan:    - Continue antibiotics  -  Will remove packing tomorrow and replace      This document has been electronically signed by Fox Roper MD on August 3, 2023 15:36 CDT        Fox Roper MD  08/03/23  15:36 CDT

## 2023-08-03 NOTE — PROGRESS NOTES
GENERAL SURGERY PROGRESS NOTE     LOS: 4 days         Interval History:     Transferred to unit after OR yesterday for heart rate issues and hypoxia. This improved without the need for intubation. She remained confused overnight.     Medication Review:   DULoxetine, 60 mg, Oral, Daily  enoxaparin, 1 mg/kg, Subcutaneous, Q12H  famotidine, 20 mg, Oral, BID AC  furosemide, 20 mg, Intravenous, Q12H  isosorbide mononitrate, 30 mg, Oral, Daily  losartan, 50 mg, Oral, Daily  metoprolol succinate XL, 50 mg, Oral, Daily  NIFEdipine XL, 60 mg, Oral, Daily  nystatin, , Topical, Q12H  piperacillin-tazobactam, 3.375 g, Intravenous, Q8H  potassium chloride, 40 mEq, Oral, Q4H  rosuvastatin, 40 mg, Oral, Daily  sodium chloride, 10 mL, Intravenous, Q12H  traMADol, 50 mg, Oral, Q8H        Pharmacy Consult,   Pharmacy to Dose enoxaparin (LOVENOX),   Pharmacy to Dose Zosyn,   sodium chloride, 30 mL/hr, Last Rate: 30 mL/hr (08/01/23 1201)    Objective     Vital Signs:  Temp:  [97.7 øF (36.5 øC)-98.4 øF (36.9 øC)] 97.7 øF (36.5 øC)  Heart Rate:  [] 93  Resp:  [16-18] 16  BP: (104-134)/(53-69) 122/62    Intake/Output Summary (Last 24 hours) at 8/3/2023 0729  Last data filed at 8/3/2023 0400  Gross per 24 hour   Intake 700 ml   Output 2300 ml   Net -1600 ml       Physical Exam  Constitutional:       Appearance: Normal appearance.   HENT:      Head: Normocephalic and atraumatic.      Nose: Nose normal. No congestion.      Mouth/Throat:      Mouth: Mucous membranes are moist.      Pharynx: Oropharynx is clear.   Eyes:      General: No scleral icterus.     Pupils: Pupils are equal, round, and reactive to light.   Cardiovascular:      Rate and Rhythm: Normal rate and regular rhythm.   Pulmonary:      Effort: Pulmonary effort is normal. No respiratory distress.   Skin:     General: Skin is warm and dry.      Comments: Left Breast erythematous   Neurological:      General: No focal deficit present.      Mental Status: She is alert and  oriented to person, place, and time.       Results Review:    Results from last 7 days   Lab Units 08/03/23  0507 08/02/23  0415 08/01/23  1455 08/01/23  0551   SODIUM mmol/L 137 138  --  134*   POTASSIUM mmol/L 3.1* 3.8 3.5 3.2*   CHLORIDE mmol/L 98 101  --  98   CO2 mmol/L 28.0 23.0  --  22.0   BUN mg/dL 16 16  --  18   CREATININE mg/dL 1.56* 1.61*  --  1.70*   GLUCOSE mg/dL 100* 133*  --  125*   CALCIUM mg/dL 8.6 8.2*  --  8.5*     Results from last 7 days   Lab Units 08/03/23  0507 08/02/23  0415 08/01/23  0551   WBC 10*3/mm3 5.94 7.86 9.57   HEMOGLOBIN g/dL 10.6* 10.7* 10.7*   HEMATOCRIT % 32.4* 32.9* 31.2*   PLATELETS 10*3/mm3 484* 370 298       Assessment:    Sepsis    Mastitis      POD1 from breast I and D    Plan:    - Continue antibiotics  - Await cultures  - Monitor breast erythema      This document has been electronically signed by Fox Roper MD on August 3, 2023 07:29 CDT        Fox Roper MD  08/03/23  07:29 CDT

## 2023-08-03 NOTE — PROGRESS NOTES
"    [facility]  [address] [place] [address]. [address]  T - [phone]  F - [phone]     Progress Note          PATIENT  DEMOGRAPHICS   PATIENT NAME: Carol Sullivan                      PHYSICIAN: Stephanie Gómze MD  : 1940  MRN: [id number]   LOS: 4 days    Patient Care Team:  Len Seo MD as PCP - General  [place] ribera APRN as Nurse Practitioner (General Surgery)  Subjective   SUBJECTIVE   No acute events overnight. Denied chest pain, sleepy today       Objective   OBJECTIVE   Vital Signs  Temp:  [97.7 øF (36.5 øC)-98.4 øF (36.9 øC)] 98 øF (36.7 øC)  Heart Rate:  [] 93  Resp:  [16-18] 16  BP: (104-144)/(53-71) 131/71    Flowsheet Rows      Flowsheet Row First Filed Value   Admission Height 165.1 cm (65\") Documented at 2023 1900   Admission Weight 74.8 kg (165 lb) Documented at 2023 1608             I/O last 3 completed shifts:  In: 900 [P.O.:600; IV Piggyback:300]  Out: 3100 [Urine:3100]    PHYSICAL EXAM    Physical Exam  Vitals and nursing note reviewed.   Constitutional:       Appearance: Normal appearance. She is not ill-appearing.   Cardiovascular:      Rate and Rhythm: Normal rate and regular rhythm.      Heart sounds: Normal heart sounds. No murmur heard.    No friction rub. No gallop.   Pulmonary:      Effort: No respiratory distress.      Breath sounds: Normal breath sounds. No stridor. No wheezing, rhonchi or rales.   Abdominal:      General: Bowel sounds are normal. There is no distension.      Palpations: Abdomen is soft.      Tenderness: There is no abdominal tenderness.   Musculoskeletal:      Right lower leg: No edema.      Left lower leg: No edema.   Skin:     General: Skin is warm and dry.   Neurological:      Mental Status: She is alert.       RESULTS   Results Review:    Results from last 7 days   Lab Units 23  0507 23  0415 23  1455 23  0551 23  0508 23  1600   SODIUM mmol/L 137 138  --  134*   < > " 134*   POTASSIUM mmol/L 3.1* 3.8 3.5 3.2*   < > 4.2   CHLORIDE mmol/L 98 101  --  98   < > 96*   CO2 mmol/L 28.0 23.0  --  22.0   < > 25.0   BUN mg/dL 16 16  --  18   < > 24*   CREATININE mg/dL 1.56* 1.61*  --  1.70*   < > 1.49*   CALCIUM mg/dL 8.6 8.2*  --  8.5*   < > 8.5*   BILIRUBIN mg/dL  --   --   --   --   --  0.6   ALK PHOS U/L  --   --   --   --   --  121*   ALT (SGPT) U/L  --   --   --   --   --  14   AST (SGOT) U/L  --   --   --   --   --  29   GLUCOSE mg/dL 100* 133*  --  125*   < > 121*    < > = values in this interval not displayed.         Estimated Creatinine Clearance: 28.2 mL/min (A) (by C-G formula based on SCr of 1.56 mg/dL (H)).    Results from last 7 days   Lab Units 08/01/23  0551 07/31/23 2012 07/28/23  1600   MAGNESIUM mg/dL 2.5* 1.5* 1.6               Results from last 7 days   Lab Units 08/03/23  0507 08/02/23 0415 08/01/23 0551 07/31/23  0639 07/30/23  0601   WBC 10*3/mm3 5.94 7.86 9.57 14.50* 15.35*   HEMOGLOBIN g/dL 10.6* 10.7* 10.7* 11.5* 11.2*   PLATELETS 10*3/mm3 484* 370 298 290 244         Results from last 7 days   Lab Units 08/03/23  0507 08/02/23  0415 08/01/23  0551 07/31/23  0639 07/30/23  0601   INR  2.82* 3.41* 3.44* 2.78* 2.51*           Imaging Results (Last 24 Hours)       ** No results found for the last 24 hours. **             MEDICATIONS    doxycycline, 100 mg, Oral, Q12H  DULoxetine, 60 mg, Oral, Daily  enoxaparin, 1 mg/kg, Subcutaneous, Q12H  famotidine, 20 mg, Oral, BID AC  furosemide, 20 mg, Intravenous, Q12H  isosorbide mononitrate, 30 mg, Oral, Daily  losartan, 50 mg, Oral, Daily  metoprolol succinate XL, 50 mg, Oral, Daily  NIFEdipine XL, 60 mg, Oral, Daily  nystatin, , Topical, Q12H  potassium chloride, 40 mEq, Oral, Q4H  rosuvastatin, 40 mg, Oral, Daily  sodium chloride, 10 mL, Intravenous, Q12H  sodium chloride, 10 mL, Intravenous, Q12H  traMADol, 50 mg, Oral, Q8H      Pharmacy Consult,   Pharmacy to Dose enoxaparin (LOVENOX),   sodium chloride, 30 mL/hr,  Last Rate: 30 mL/hr (08/01/23 1201)        Assessment & Plan   ASSESSMENT / PLAN      Sepsis    Mastitis    1. Acute kidney injury: baseline unknown.   - Creatinine was 1.5 in 10/2022.   - UA 3+ protein, trace leukocytes, 0-2 RBC, 6-12 WBC, 2+ bacteria. Urine sodium 26. Renal ultrasound unremarkable.   - Creatinine at 1.5-1.6 range and near baseline. Will transition iv lasix to po     2. Hypertension:   - Blood pressure is acceptable now. Was on low side and we have lowered losartan to 50mg     3. Metabolic acidosis:   - Mild. Will monitor.      4. Sepsis:  - On Zosyn     5. UTI:   - Receiving zosyn     6. Cdiff: not on po vanc now      7. Hyponatremia:   - Sodium is stable    8. Anemia:  - Hemoglobin is acceptable            This document has been electronically signed by Stephnaie Gómez MD on August 3, 2023 11:01 CDT

## 2023-08-03 NOTE — THERAPY RE-EVALUATION
"Patient Name: Carol Sullivan  : 1940    MRN: [id number]                              Today's Date: 8/3/2023       Admit Date: 2023    Visit Dx:     ICD-10-CM ICD-9-CM   1. Mastitis  N61.0 611.0   2. Somnolence  R40.0 780.09   3. Sepsis, due to unspecified organism, unspecified whether acute organ dysfunction present  A41.9 038.9     995.91   4. Impaired functional mobility, balance, gait, and endurance [Z74.09 (ICD-10-CM)]  Z74.09 V49.89   5. Impaired mobility and ADLs [Z74.09, Z78.9 (ICD-10-CM)]  Z74.09 V49.89    Z78.9      Patient Active Problem List   Diagnosis    Hypertension    Hyperlipidemia    Near syncope    GERD (gastroesophageal reflux disease)    Palpitation    PVC (premature ventricular contraction)    Breast calcifications on mammogram    Paroxysmal atrial fibrillation    Peripheral vascular disease, unspecified    Type 2 diabetes mellitus with other specified complication    Long term (current) use of anticoagulants    Encounter for therapeutic drug monitoring    Sepsis    Mastitis     Past Medical History:   Diagnosis Date    Arthritis     Depression     GERD (gastroesophageal reflux disease)     Hyperlipidemia     Hypertension     Near syncope     Vascular disease      Past Surgical History:   Procedure Laterality Date    BLADDER SURGERY      ENDOSCOPY N/A 2019    Procedure: ESOPHAGOGASTRODUODENOSCOPY;  Surgeon: Alberto Sanchez DO;  Location: [facility] ENDOSCOPY;  Service: Gastroenterology    GALLBLADDER SURGERY      SHOULDER SURGERY      \"bone spurs\"    SUBTOTAL HYSTERECTOMY        General Information       Row Name 23 0817          Physical Therapy Time and Intention    Document Type re-evaluation  -CZ     Mode of Treatment physical therapy  -CZ       Row Name 23 0817          General Information    Patient Profile Reviewed yes  -CZ     Prior Level of Function independent:;all household mobility  -CZ     Existing Precautions/Restrictions fall  -CZ     Barriers to " Rehab medically complex  -CZ       Row Name 08/03/23 0817          Living Environment    People in Home spouse  -CZ       Row Name 08/03/23 0817          Home Main Entrance    Number of Stairs, Main Entrance two  -CZ     Stair Railings, Main Entrance railing on right side (ascending)  -CZ       Row Name 08/03/23 0817          Stairs Within Home, Primary    Stairs, Within Home, Primary (I) ambulation, no AD, tub shower, regular toilet.  -CZ     Number of Stairs, Within Home, Primary none  -CZ       Row Name 08/03/23 0817          Cognition    Orientation Status (Cognition) oriented x 4  -CZ       Row Name 08/03/23 0817          Safety Issues, Functional Mobility    Impairments Affecting Function (Mobility) strength;endurance/activity tolerance;balance;pain  -CZ               User Key  (r) = Recorded By, (t) = Taken By, (c) = Cosigned By      Initials Name Provider Type    Dionte Smith, PT Physical Therapist                   Mobility       Row Name 08/03/23 0817          Bed Mobility    Bed Mobility supine-sit;sit-supine  -CZ     Supine-Sit [place] (Bed Mobility) contact guard  -CZ     Sit-Supine [place] (Bed Mobility) contact guard  -CZ     Assistive Device (Bed Mobility) head of bed elevated;bed rails  -CZ       Row Name 08/03/23 0817          Sit-Stand Transfer    Sit-Stand [place] (Transfers) contact guard  -CZ     Assistive Device (Sit-Stand Transfers) walker, front-wheeled  -CZ       Row Name 08/03/23 0817          Gait/Stairs (Locomotion)    [place] Level (Gait) contact guard  -CZ     Assistive Device (Gait) walker, front-wheeled  -CZ     Distance in Feet (Gait) 5'x1, forward/back.  -CZ     Comment, (Gait/Stairs) Good posture, good use of walker, unsteady with retro gait, no falls.  -CZ               User Key  (r) = Recorded By, (t) = Taken By, (c) = Cosigned By      Initials Name Provider Type    Dionte Smith, PT Physical Therapist                   Obj/Interventions        Row Name 08/03/23 0817          Range of Motion Comprehensive    General Range of Motion bilateral lower extremity ROM WFL  -CZ       Row Name 08/03/23 0817          Strength Comprehensive (MMT)    Comment, General Manual Muscle Testing (MMT) Assessment BLEs: 4-/5 grossly.  -CZ       Row Name 08/03/23 0817          Sensory Assessment (Somatosensory)    Sensory Assessment (Somatosensory) LE sensation intact  -CZ               User Key  (r) = Recorded By, (t) = Taken By, (c) = Cosigned By      Initials Name Provider Type    CZ Dionte Kendrick, PT Physical Therapist                   Goals/Plan       Row Name 08/03/23 0817          Bed Mobility Goal 1 (PT)    Activity/Assistive Device (Bed Mobility Goal 1, PT) sit to supine/supine to sit  -CZ     [place] Level/Cues Needed (Bed Mobility Goal 1, PT) independent  -CZ     Time Frame (Bed Mobility Goal 1, PT) by discharge  -CZ     Progress/Outcomes (Bed Mobility Goal 1, PT) goal not met  -CZ       Row Name 08/03/23 0817          Transfer Goal 1 (PT)    Activity/Assistive Device (Transfer Goal 1, PT) sit-to-stand/stand-to-sit;bed-to-chair/chair-to-bed;walker, rolling  -CZ     [place] Level/Cues Needed (Transfer Goal 1, PT) modified independence  -CZ     Time Frame (Transfer Goal 1, PT) by discharge  -CZ     Progress/Outcome (Transfer Goal 1, PT) goal not met  -CZ       Row Name 08/03/23 0817          Gait Training Goal 1 (PT)    Activity/Assistive Device (Gait Training Goal 1, PT) walker, rolling  -CZ     Distance (Gait Training Goal 1, PT) 30'x3. (Limited by contact precautions.)  -CZ     Strategies/Barriers (Gait Training Goal 1, PT) May advance to (I) without an AD (PLOF).  -CZ     Progress/Outcome (Gait Training Goal 1, PT) goal not met  -CZ       Row Name 08/03/23 0817          Stairs Goal 1 (PT)    Activity/Assistive Device (Stairs Goal 1, PT) using handrail, right  -CZ     [place] Level/Cues Needed (Stairs Goal 1, PT) supervision required  -CZ      Number of Stairs (Stairs Goal 1, PT) 2 steps.  -CZ     Time Frame (Stairs Goal 1, PT) by discharge  -CZ     Progress/Outcome (Stairs Goal 1, PT) goal not met  -CZ       Row Name 08/03/23 0817          Problem Specific Goal 1 (PT)    Problem Specific Goal 1 (PT) Score 25/28 on Tinetti fall risk assessment.  -CZ     Time Frame (Problem Specific Goal 1, PT) by discharge  -CZ     Strategies/Barriers (Problem Specific Goal 1, PT) Decreased stepping response to perturbation.  -CZ     Progress/Outcome (Problem Specific Goal 1, PT) goal not met  -CZ       Row Name 08/03/23 0817          Therapy Assessment/Plan (PT)    Planned Therapy Interventions (PT) balance training;bed mobility training;gait training;home exercise program;patient/family education;transfer training;strengthening;ROM (range of motion);stair training;stretching  -CZ               User Key  (r) = Recorded By, (t) = Taken By, (c) = Cosigned By      Initials Name Provider Type    CZ Dionte Kendrick, PT Physical Therapist                   Clinical Impression       Row Name 08/03/23 0817          Pain    Pretreatment Pain Rating 0/10 - no pain  -CZ     Posttreatment Pain Rating 0/10 - no pain  -       Row Name 08/03/23 0817          Plan of Care Review    Outcome Evaluation PT re-evaluation complete.  Patient asleep but arousable, cooperative. She requires SPV with bed mobility, CGA with transfers and gait, ambulating 5'x1 with FWW, forward/back.  Good use of walker, good posture, unsteady with retro gait.  Patient will likely need HHPT upon discharge home, already has a FWW available.  Goals re-established, continue skilled I/P PT.  -CZ       Row Name 08/03/23 0817          Therapy Assessment/Plan (PT)    Rehab Potential (PT) good, to achieve stated therapy goals  -     Criteria for Skilled Interventions Met (PT) yes;skilled treatment is necessary  -CZ     Therapy Frequency (PT) other (see comments)  5-7 days/week.  -CZ       Row Name 08/03/23 0817           Vital Signs    Pre Systolic BP Rehab 126  -CZ     Pre Treatment Diastolic BP 60  -CZ     Pretreatment Heart Rate (beats/min) 91  -CZ     Pre SpO2 (%) 95  -CZ     O2 Delivery Pre Treatment hi-flow  2 LPM, no home O2.  -CZ     Pre Patient Position Supine  -CZ       Row Name 08/03/23 0817          Positioning and Restraints    Pre-Treatment Position in bed  -CZ     Post Treatment Position bed  -CZ     In Bed supine;call light within reach;encouraged to call for assist;patient within staff view  -CZ               User Key  (r) = Recorded By, (t) = Taken By, (c) = Cosigned By      Initials Name Provider Type    CZ Dionte Kendrick, PT Physical Therapist                   Outcome Measures       Row Name 08/03/23 0817          How much help from another person do you currently need...    Turning from your back to your side while in flat bed without using bedrails? 3  -CZ     Moving from lying on back to sitting on the side of a flat bed without bedrails? 3  -CZ     Moving to and from a bed to a chair (including a wheelchair)? 3  -CZ     Standing up from a chair using your arms (e.g., wheelchair, bedside chair)? 3  -CZ     Climbing 3-5 steps with a railing? 3  -CZ     To walk in hospital room? 3  -CZ     AM-PAC 6 Clicks Score (PT) 18  -CZ     Highest level of mobility 6 --> Walked 10 steps or more  -       Row Name 08/03/23 0817          Functional Assessment    Outcome Measure Options AM-PAC 6 Clicks Basic Mobility (PT)  -CZ               User Key  (r) = Recorded By, (t) = Taken By, (c) = Cosigned By      Initials Name Provider Type    Dionte Smith, PT Physical Therapist                                 Physical Therapy Education       Title: PT OT SLP Therapies (In Progress)       Topic: Physical Therapy (In Progress)       Point: Mobility training (In Progress)       Learning Progress Summary             Patient Acceptance, E, NR by ASHER at 8/3/2023 0844    Comment: PT POC, hand placement with transfers, rehab  process.                         Point: Home exercise program (Not Started)       Learner Progress:  Not documented in this visit.              Point: Body mechanics (Not Started)       Learner Progress:  Not documented in this visit.              Point: Precautions (Done)       Learning Progress Summary             Patient Acceptance, GHULAM BOWMAN by  at 7/30/2023 7495    Comment: Xavier assessed: 19/28 or moderate fall risk, recommend FWW with gait.                                         User Key       Initials Effective Dates Name Provider Type Discipline     07/11/23 -  Dionte Kendrick, PT Physical Therapist PT                  PT Recommendation and Plan  Planned Therapy Interventions (PT): balance training, bed mobility training, gait training, home exercise program, patient/family education, transfer training, strengthening, ROM (range of motion), stair training, stretching  Plan of Care Reviewed With: patient  Outcome Evaluation: PT re-evaluation complete.  Patient asleep but arousable, cooperative. She requires SPV with bed mobility, CGA with transfers and gait, ambulating 5'x1 with FWW, forward/back.  Good use of walker, good posture, unsteady with retro gait.  Patient will likely need HHPT upon discharge home, already has a FWW available.  Goals re-established, continue skilled I/P PT.     Time Calculation:   PT Evaluation Complexity  History, PT Evaluation Complexity: 1-2 personal factors and/or comorbidities  Examination of Body Systems (PT Eval Complexity): total of 3 or more elements  Clinical Presentation (PT Evaluation Complexity): evolving  Clinical Decision Making (PT Evaluation Complexity): moderate complexity  Overall Complexity (PT Evaluation Complexity): moderate complexity     PT Charges       Row Name 08/03/23 0848             Time Calculation    Start Time 0817  -      Stop Time 0847  -      Time Calculation (min) 30 min  -      PT Received On 08/03/23  -      PT Goal Re-Cert Due  Date 08/16/23  -CZ         Untimed Charges    PT Eval/Re-eval Minutes 30  -CZ         Total Minutes    Untimed Charges Total Minutes 30  -CZ       Total Minutes 30  -CZ                User Key  (r) = Recorded By, (t) = Taken By, (c) = Cosigned By      Initials Name Provider Type    CZ Dionte Kendrick, PT Physical Therapist                  Therapy Charges for Today       Code Description Service Date Service Provider Modifiers Qty    84168824509 HC PT RE-EVAL ESTABLISHED PLAN 2 8/3/2023 Dionte Kendrick, PT GP 1            PT G-Codes  Outcome Measure Options: AM-PAC 6 Clicks Basic Mobility (PT)  AM-PAC 6 Clicks Score (PT): 18  AM-PAC 6 Clicks Score (OT): 18  [place] Total Score: 19  PT Discharge Summary  Anticipated Discharge Disposition (PT): home with assist, home with home health    Dionte Kendrick, PT  8/3/2023

## 2023-08-04 LAB
ANION GAP SERPL CALCULATED.3IONS-SCNC: 10 MMOL/L (ref 5–15)
BACTERIA SPEC AEROBE CULT: NORMAL
BASOPHILS # BLD AUTO: 0.16 10*3/MM3 (ref 0–0.2)
BASOPHILS NFR BLD AUTO: 2.3 % (ref 0–1.5)
BUN SERPL-MCNC: 19 MG/DL (ref 8–23)
BUN/CREAT SERPL: 11.6 (ref 7–25)
CALCIUM SPEC-SCNC: 8.7 MG/DL (ref 8.6–10.5)
CHLORIDE SERPL-SCNC: 97 MMOL/L (ref 98–107)
CO2 SERPL-SCNC: 28 MMOL/L (ref 22–29)
CREAT SERPL-MCNC: 1.64 MG/DL (ref 0.57–1)
CRP SERPL-MCNC: 4.91 MG/DL (ref 0–0.5)
DEPRECATED RDW RBC AUTO: 45.7 FL (ref 37–54)
EGFRCR SERPLBLD CKD-EPI 2021: 31.3 ML/MIN/1.73
EOSINOPHIL # BLD AUTO: 0.23 10*3/MM3 (ref 0–0.4)
EOSINOPHIL NFR BLD AUTO: 3.4 % (ref 0.3–6.2)
ERYTHROCYTE [DISTWIDTH] IN BLOOD BY AUTOMATED COUNT: 15.3 % (ref 12.3–15.4)
GLUCOSE SERPL-MCNC: 135 MG/DL (ref 65–99)
GRAM STN SPEC: NORMAL
GRAM STN SPEC: NORMAL
HCT VFR BLD AUTO: 30.4 % (ref 34–46.6)
HGB BLD-MCNC: 10.1 G/DL (ref 12–15.9)
IMM GRANULOCYTES # BLD AUTO: 0.18 10*3/MM3 (ref 0–0.05)
IMM GRANULOCYTES NFR BLD AUTO: 2.6 % (ref 0–0.5)
INR PPP: 2.15 (ref 0.8–1.2)
LYMPHOCYTES # BLD AUTO: 2.22 10*3/MM3 (ref 0.7–3.1)
LYMPHOCYTES NFR BLD AUTO: 32.5 % (ref 19.6–45.3)
MCH RBC QN AUTO: 27.5 PG (ref 26.6–33)
MCHC RBC AUTO-ENTMCNC: 33.2 G/DL (ref 31.5–35.7)
MCV RBC AUTO: 82.8 FL (ref 79–97)
MONOCYTES # BLD AUTO: 0.88 10*3/MM3 (ref 0.1–0.9)
MONOCYTES NFR BLD AUTO: 12.9 % (ref 5–12)
NEUTROPHILS NFR BLD AUTO: 3.16 10*3/MM3 (ref 1.7–7)
NEUTROPHILS NFR BLD AUTO: 46.3 % (ref 42.7–76)
NRBC BLD AUTO-RTO: 0 /100 WBC (ref 0–0.2)
PLATELET # BLD AUTO: 519 10*3/MM3 (ref 140–450)
PMV BLD AUTO: 9.4 FL (ref 6–12)
POTASSIUM SERPL-SCNC: 4 MMOL/L (ref 3.5–5.2)
PROTHROMBIN TIME: 23.6 SECONDS (ref 11.1–15.3)
RBC # BLD AUTO: 3.67 10*6/MM3 (ref 3.77–5.28)
SODIUM SERPL-SCNC: 135 MMOL/L (ref 136–145)
WBC NRBC COR # BLD: 6.83 10*3/MM3 (ref 3.4–10.8)

## 2023-08-04 PROCEDURE — 97535 SELF CARE MNGMENT TRAINING: CPT

## 2023-08-04 PROCEDURE — 85025 COMPLETE CBC W/AUTO DIFF WBC: CPT | Performed by: STUDENT IN AN ORGANIZED HEALTH CARE EDUCATION/TRAINING PROGRAM

## 2023-08-04 PROCEDURE — 86140 C-REACTIVE PROTEIN: CPT | Performed by: STUDENT IN AN ORGANIZED HEALTH CARE EDUCATION/TRAINING PROGRAM

## 2023-08-04 PROCEDURE — 99024 POSTOP FOLLOW-UP VISIT: CPT | Performed by: STUDENT IN AN ORGANIZED HEALTH CARE EDUCATION/TRAINING PROGRAM

## 2023-08-04 PROCEDURE — 85610 PROTHROMBIN TIME: CPT | Performed by: STUDENT IN AN ORGANIZED HEALTH CARE EDUCATION/TRAINING PROGRAM

## 2023-08-04 PROCEDURE — 80048 BASIC METABOLIC PNL TOTAL CA: CPT | Performed by: STUDENT IN AN ORGANIZED HEALTH CARE EDUCATION/TRAINING PROGRAM

## 2023-08-04 PROCEDURE — 25010000002 ENOXAPARIN PER 10 MG: Performed by: STUDENT IN AN ORGANIZED HEALTH CARE EDUCATION/TRAINING PROGRAM

## 2023-08-04 RX ORDER — WARFARIN SODIUM 2.5 MG/1
2.5 TABLET ORAL
Status: DISCONTINUED | OUTPATIENT
Start: 2023-08-04 | End: 2023-08-06

## 2023-08-04 RX ORDER — LOSARTAN POTASSIUM 25 MG/1
25 TABLET ORAL DAILY
Status: DISCONTINUED | OUTPATIENT
Start: 2023-08-05 | End: 2023-08-05

## 2023-08-04 RX ADMIN — ISOSORBIDE MONONITRATE 30 MG: 30 TABLET, EXTENDED RELEASE ORAL at 08:32

## 2023-08-04 RX ADMIN — ROSUVASTATIN CALCIUM 40 MG: 20 TABLET, FILM COATED ORAL at 08:32

## 2023-08-04 RX ADMIN — DOXYCYCLINE 100 MG: 100 CAPSULE ORAL at 08:32

## 2023-08-04 RX ADMIN — FUROSEMIDE 20 MG: 20 TABLET ORAL at 17:42

## 2023-08-04 RX ADMIN — FAMOTIDINE 20 MG: 20 TABLET ORAL at 17:42

## 2023-08-04 RX ADMIN — HYDROCODONE BITARTRATE AND ACETAMINOPHEN 1 TABLET: 5; 325 TABLET ORAL at 21:36

## 2023-08-04 RX ADMIN — NYSTATIN: 100000 POWDER TOPICAL at 08:33

## 2023-08-04 RX ADMIN — Medication 10 ML: at 08:33

## 2023-08-04 RX ADMIN — TRAMADOL HYDROCHLORIDE 50 MG: 50 TABLET, COATED ORAL at 19:51

## 2023-08-04 RX ADMIN — Medication 10 ML: at 19:52

## 2023-08-04 RX ADMIN — Medication 10 ML: at 19:53

## 2023-08-04 RX ADMIN — METOPROLOL SUCCINATE 50 MG: 50 TABLET, EXTENDED RELEASE ORAL at 08:33

## 2023-08-04 RX ADMIN — TRAMADOL HYDROCHLORIDE 50 MG: 50 TABLET, COATED ORAL at 12:56

## 2023-08-04 RX ADMIN — ENOXAPARIN SODIUM 70 MG: 80 INJECTION SUBCUTANEOUS at 10:21

## 2023-08-04 RX ADMIN — WARFARIN SODIUM 2.5 MG: 2.5 TABLET ORAL at 17:42

## 2023-08-04 RX ADMIN — DOXYCYCLINE 100 MG: 100 CAPSULE ORAL at 19:51

## 2023-08-04 RX ADMIN — TRAMADOL HYDROCHLORIDE 50 MG: 50 TABLET, COATED ORAL at 04:09

## 2023-08-04 RX ADMIN — NIFEDIPINE 60 MG: 60 TABLET, EXTENDED RELEASE ORAL at 08:33

## 2023-08-04 RX ADMIN — NYSTATIN: 100000 POWDER TOPICAL at 19:53

## 2023-08-04 RX ADMIN — LOSARTAN POTASSIUM 50 MG: 50 TABLET, FILM COATED ORAL at 08:33

## 2023-08-04 RX ADMIN — DULOXETINE HYDROCHLORIDE 60 MG: 60 CAPSULE, DELAYED RELEASE ORAL at 08:32

## 2023-08-04 RX ADMIN — FUROSEMIDE 20 MG: 20 TABLET ORAL at 06:30

## 2023-08-04 RX ADMIN — FAMOTIDINE 20 MG: 20 TABLET ORAL at 06:30

## 2023-08-04 NOTE — PROGRESS NOTES
"Anticoagulation by Pharmacy - Warfarin    Carol Sullivan is a 82 y.o.female  [Ht: 165.1 cm (65\"); Wt: 61.6 kg (135 lb 14.4 oz)] on Warfarin for indication of Atrial fibrillation.    Goal INR: 2-3  Home dose is 2.5 mg daily except 3.75 mg on Mondays  Today's INR:   Lab Results   Component Value Date    INR 2.15 (H) 08/04/2023          Lab Results   Component Value Date    INR 2.15 (H) 08/04/2023    INR 2.82 (H) 08/03/2023    INR 3.41 (H) 08/02/2023    PROTIME 23.6 (H) 08/04/2023    PROTIME 29.0 (H) 08/03/2023    PROTIME 33.4 (H) 08/02/2023     Lab Results   Component Value Date    HGB 10.1 (L) 08/04/2023    HGB 10.6 (L) 08/03/2023    HGB 10.7 (L) 08/02/2023     Lab Results   Component Value Date    HCT 30.4 (L) 08/04/2023    HCT 32.4 (L) 08/03/2023    HCT 32.9 (L) 08/02/2023     Lab Results   Component Value Date     (H) 08/04/2023     (H) 08/03/2023     08/02/2023       Recent Warfarin Administrations       The 5 most recent administrations since 07/28/2023 are shown below each listed medication.    Warfarin Sodium         Order Dose Date Given     warfarin (COUMADIN) tablet 2.5 mg 2.5 mg 07/29/2023      2.5 mg 07/28/2023                    Assessment/Plan:  Reviewed above labs. INR is 2.15.  INR is at goal. Reviewed medication list. Concurrent medications include Doxycycline, Duloxetine and Tramadol. Pt was on Enoxaparin. Stopped today.   Will continue current dose of  2.5 mg as ordered by provider. No bleeding noted.  Rx will continue to follow and adjust dose accordingly.    Dolly Birch, PharmD  08/04/23 14:23 CDT     "

## 2023-08-04 NOTE — DISCHARGE PLACEMENT REQUEST
"Claudia Sullivan (82 y.o. Female)       Date of Birth   1940    Social Security Number       [facility] [place] [id number]    Home Phone   [phone]    MRN   [id number]       [employer]   Rastafari    Marital Status                               Admission Date   7/28/23    Admission Type   Emergency    Admitting Provider   Tena Valdivia MD    Attending Provider   Javy Barton MD    Department, Room/Bed   [address], 314/1       Discharge Date       Discharge Disposition       Discharge Destination                                 Attending Provider: Javy Barton MD    Allergies: Tuberculin Tests, Hydrocodone, Lortab [Hydrocodone-acetaminophen]    Isolation: Spore   Infection: C.difficile (07/29/23)   Code Status: CPR    Ht: 165.1 cm (65\")   Wt: 61.6 kg (135 lb 14.4 oz)    Admission Cmt: None   Principal Problem: Sepsis [A41.9]                   Active Insurance as of 7/28/2023       Primary Coverage       Payor Plan Insurance Group Employer/Plan Group    HUMANA MEDICARE REPLACEMENT HUMANA MEDICARE REPLACEMENT [id number]       Payor Plan Address Payor Plan Phone Number Payor Plan Fax Number Effective Dates    [address] [address] [phone]  5/1/2023 - None Entered    [facility] [address]         Subscriber Name Subscriber Birth Date Member ID       CLAUDIA SULLIVAN 1940 [id number]               Secondary Coverage       Payor Plan Insurance Group Employer/Plan Group    [facility] SU[employer] PLAN F       Payor Plan Address Payor Plan Phone Number Payor Plan Fax Number Effective Dates    [address] [phone]  5/1/2009 - None Entered    [facility] [address]         Subscriber Name Subscriber Birth Date Member ID       CLAUDIA SULLIVAN 1940 [id number]                     Emergency Contacts        (Rel.) Home Phone Work Phone Mobile Phone    NateTristan (Spouse) [phone] -- [phone]      "         Insurance Information                  HUMANA MEDICARE REPLACEMENT/HUMANA MEDICARE REPLACEMENT Phone: [phone]    Subscriber: Carol Sullivan Subscriber#: [id number]    Group#: [id number] Precert#: --        [facility] SUP Phone: [phone]    Subscriber: Carol Sullivan Subscriber#: [id number]    Group#: [id number] Precert#: --

## 2023-08-04 NOTE — PLAN OF CARE
Goal Outcome Evaluation:  Plan of Care Reviewed With: patient        Progress: improving  Outcome Evaluation: OT tx completed this date. Sup>sit-SBA. Sit>stand- CGA. Once standing pt had incontinent episode. Toilet t/f-CGA of 1 w/ RW. Pericare-Ind. Pt sat EOB and completed ADL. Comb hair-Mod A 2' tangles while sitting EOB. UB bathing/dressing-SBA. LB bathing-SBA. Pt required Max A for feet and to don socks. Pt then returned to supine-SBA w/ all needs in reach. Cont OT POC.      Anticipated Discharge Disposition (OT): home with assist, home with home health

## 2023-08-04 NOTE — PROGRESS NOTES
"    [facility]  [address] [place] [address]. [address]  T - [phone]  F - [phone]     Progress Note          PATIENT  DEMOGRAPHICS   PATIENT NAME: Carol Sullivan                      PHYSICIAN: Stephanie Gómez MD  : 1940  MRN: [id number]   LOS: 5 days    Patient Care Team:  Len Seo MD as PCP - General  [place] Paul[place]laire ribera APRN as Nurse Practitioner (General Surgery)  Subjective   SUBJECTIVE   No acute events overnight. Denied chest pain, move to the floor       Objective   OBJECTIVE   Vital Signs  Temp:  [96.1 øF (35.6 øC)-98.2 øF (36.8 øC)] 97.4 øF (36.3 øC)  Heart Rate:  [] 67  Resp:  [16-18] 16  BP: ()/(50-90) 115/57    Flowsheet Rows      Flowsheet Row First Filed Value   Admission Height 165.1 cm (65\") Documented at 2023 1900   Admission Weight 74.8 kg (165 lb) Documented at 2023 1608             I/O last 3 completed shifts:  In: 340 [P.O.:240; IV Piggyback:100]  Out: 2150 [Urine:2150]    PHYSICAL EXAM    Physical Exam  Vitals and nursing note reviewed.   Constitutional:       Appearance: Normal appearance. She is not ill-appearing.   Cardiovascular:      Rate and Rhythm: Normal rate and regular rhythm.      Heart sounds: Normal heart sounds. No murmur heard.    No friction rub. No gallop.   Pulmonary:      Effort: No respiratory distress.      Breath sounds: Normal breath sounds. No stridor. No wheezing, rhonchi or rales.   Abdominal:      General: Bowel sounds are normal. There is no distension.      Palpations: Abdomen is soft.      Tenderness: There is no abdominal tenderness.   Musculoskeletal:      Right lower leg: No edema.      Left lower leg: No edema.   Skin:     General: Skin is warm and dry.   Neurological:      Mental Status: She is alert.       RESULTS   Results Review:    Results from last 7 days   Lab Units 23  0548 23  1726 23  0507 23  0415 23  0508 23  1600   SODIUM mmol/L 135*  --  137 138 "   < > 134*   POTASSIUM mmol/L 4.0 4.2 3.1* 3.8   < > 4.2   CHLORIDE mmol/L 97*  --  98 101   < > 96*   CO2 mmol/L 28.0  --  28.0 23.0   < > 25.0   BUN mg/dL 19  --  16 16   < > 24*   CREATININE mg/dL 1.64*  --  1.56* 1.61*   < > 1.49*   CALCIUM mg/dL 8.7  --  8.6 8.2*   < > 8.5*   BILIRUBIN mg/dL  --   --   --   --   --  0.6   ALK PHOS U/L  --   --   --   --   --  121*   ALT (SGPT) U/L  --   --   --   --   --  14   AST (SGOT) U/L  --   --   --   --   --  29   GLUCOSE mg/dL 135*  --  100* 133*   < > 121*    < > = values in this interval not displayed.         Estimated Creatinine Clearance: 26.2 mL/min (A) (by C-G formula based on SCr of 1.64 mg/dL (H)).    Results from last 7 days   Lab Units 08/01/23  0551 07/31/23 2012 07/28/23  1600   MAGNESIUM mg/dL 2.5* 1.5* 1.6               Results from last 7 days   Lab Units 08/04/23  0548 08/03/23  0507 08/02/23 0415 08/01/23 0551 07/31/23  0639   WBC 10*3/mm3 6.83 5.94 7.86 9.57 14.50*   HEMOGLOBIN g/dL 10.1* 10.6* 10.7* 10.7* 11.5*   PLATELETS 10*3/mm3 519* 484* 370 298 290         Results from last 7 days   Lab Units 08/04/23  0548 08/03/23  0507 08/02/23 0415 08/01/23 0551 07/31/23  0639   INR  2.15* 2.82* 3.41* 3.44* 2.78*           Imaging Results (Last 24 Hours)       ** No results found for the last 24 hours. **             MEDICATIONS    doxycycline, 100 mg, Oral, Q12H  DULoxetine, 60 mg, Oral, Daily  enoxaparin, 1 mg/kg, Subcutaneous, Q12H  famotidine, 20 mg, Oral, BID AC  furosemide, 20 mg, Oral, BID  isosorbide mononitrate, 30 mg, Oral, Daily  losartan, 50 mg, Oral, Daily  metoprolol succinate XL, 50 mg, Oral, Daily  NIFEdipine XL, 60 mg, Oral, Daily  nystatin, , Topical, Q12H  rosuvastatin, 40 mg, Oral, Daily  sodium chloride, 10 mL, Intravenous, Q12H  sodium chloride, 10 mL, Intravenous, Q12H  traMADol, 50 mg, Oral, Q8H      Pharmacy Consult,   Pharmacy to Dose enoxaparin (LOVENOX),   sodium chloride, 30 mL/hr, Last Rate: 30 mL/hr (08/03/23  8058)        Assessment & Plan   ASSESSMENT / PLAN      Sepsis    Mastitis    1. Acute kidney injury: baseline unknown.   - Creatinine was 1.5 in 10/2022.   - UA 3+ protein, trace leukocytes, 0-2 RBC, 6-12 WBC, 2+ bacteria. Urine sodium 26. Renal ultrasound unremarkable.   - Creatinine at 1.5-1.6 range and near baseline. Cr stable and keep po lasix      2. Hypertension:   - Blood pressure is acceptable now. Was on low side and we have lowered losartan to 50mg, I will lower further to 25mg     3. Metabolic acidosis:   - Mild. Will monitor.      4. Sepsis:  - was On Zosyn     5. UTI:   - Receiving doxycycline     6. Cdiff: not on po vanc now      7. Hyponatremia:   - Sodium is stable    8. Anemia:  - Hemoglobin is acceptable            This document has been electronically signed by Stephanie Gómez MD on August 4, 2023 11:24 CDT

## 2023-08-04 NOTE — THERAPY TREATMENT NOTE
"Patient Name: Carol Sullivan  : 1940    MRN: [id number]                              Today's Date: 2023       Admit Date: 2023    Visit Dx:     ICD-10-CM ICD-9-CM   1. Mastitis  N61.0 611.0   2. Somnolence  R40.0 780.09   3. Sepsis, due to unspecified organism, unspecified whether acute organ dysfunction present  A41.9 038.9     995.91   4. Impaired functional mobility, balance, gait, and endurance [Z74.09 (ICD-10-CM)]  Z74.09 V49.89   5. Impaired mobility and ADLs [Z74.09, Z78.9 (ICD-10-CM)]  Z74.09 V49.89    Z78.9      Patient Active Problem List   Diagnosis    Hypertension    Hyperlipidemia    Near syncope    GERD (gastroesophageal reflux disease)    Palpitation    PVC (premature ventricular contraction)    Breast calcifications on mammogram    Paroxysmal atrial fibrillation    Peripheral vascular disease, unspecified    Type 2 diabetes mellitus with other specified complication    Long term (current) use of anticoagulants    Encounter for therapeutic drug monitoring    Sepsis    Mastitis     Past Medical History:   Diagnosis Date    Arthritis     Depression     GERD (gastroesophageal reflux disease)     Hyperlipidemia     Hypertension     Near syncope     Vascular disease      Past Surgical History:   Procedure Laterality Date    BLADDER SURGERY      ENDOSCOPY N/A 2019    Procedure: ESOPHAGOGASTRODUODENOSCOPY;  Surgeon: Alberto Sanchez DO;  Location: [facility] ENDOSCOPY;  Service: Gastroenterology    GALLBLADDER SURGERY      SHOULDER SURGERY      \"bone spurs\"    SUBTOTAL HYSTERECTOMY        General Information       Row Name 23 1245          OT Time and Intention    Document Type therapy note (daily note)  -KD     Mode of Treatment occupational therapy;individual therapy  -KD       Row Name 23 1245          General Information    Patient Profile Reviewed yes  -KD     Existing Precautions/Restrictions fall  -KD       Row Name 23 1245          Cognition    Orientation " Status (Cognition) oriented x 4  -KD       Row Name 08/04/23 1245          Safety Issues, Functional Mobility    Impairments Affecting Function (Mobility) strength;endurance/activity tolerance;balance;pain  -KD               User Key  (r) = Recorded By, (t) = Taken By, (c) = Cosigned By      Initials Name Provider Type    Tamara Marks COTA Occupational Therapist Assistant                     Mobility/ADL's       Row Name 08/04/23 1245          Bed Mobility    Supine-Sit [place] (Bed Mobility) contact guard  -KD     Sit-Supine [place] (Bed Mobility) contact guard  -KD     Assistive Device (Bed Mobility) head of bed elevated;bed rails  -KD       Row Name 08/04/23 1245          Sit-Stand Transfer    Sit-Stand [place] (Transfers) contact guard  -KD     Assistive Device (Sit-Stand Transfers) walker, front-wheeled  -KD       Row Name 08/04/23 1245          Stand-Sit Transfer    Stand-Sit [place] (Transfers) contact guard  -KD     Assistive Device (Stand-Sit Transfers) walker, front-wheeled  -KD       Row Name 08/04/23 1245          Toilet Transfer    Type (Toilet Transfer) sit-stand;stand-sit  -KD     [place] Level (Toilet Transfer) contact guard  -KD     Assistive Device (Toilet Transfer) commode  -KD       Row Name 08/04/23 1245          Functional Mobility    Functional Mobility- Ind. Level contact guard assist  -KD     Functional Mobility-Distance (Feet) 10  -KD       Row Name 08/04/23 1245          Activities of Daily Living    BADL Assessment/Intervention toileting;bathing;upper body dressing;lower body dressing;grooming  -KD       Row Name 08/04/23 1245          Lower Body Dressing Assessment/Training    [place] Level (Lower Body Dressing) lower body dressing skills;doff;don;socks;maximum assist (25% patient effort)  -KD     Position (Lower Body Dressing) edge of bed sitting  -KD       Row Name 08/04/23 1245          Toileting Assessment/Training    [place] Level  (Toileting) toileting skills;perform perineal hygiene;standby assist  -KD     Assistive Devices (Toileting) commode  -KD     Position (Toileting) unsupported sitting  -KD       Row Name 08/04/23 1245          Upper Body Dressing Assessment/Training    [place] Level (Upper Body Dressing) upper body dressing skills;doff;don;contact guard assist  -KD     Position (Upper Body Dressing) edge of bed sitting  -KD       Row Name 08/04/23 1245          Bathing Assessment/Intervention    [place] Level (Bathing) bathing skills;lower body;upper body;standby assist;maximum assist (25% patient effort)  Max A for feet only  -KD     Assistive Devices (Bathing) bath mitt  -KD     Position (Bathing) edge of bed sitting  -KD       Row Name 08/04/23 1245          Grooming Assessment/Training    [place] Level (Grooming) grooming skills;wash face, hands;hair care, combing/brushing;moderate assist (50% patient effort)  -KD     Position (Grooming) edge of bed sitting  -KD               User Key  (r) = Recorded By, (t) = Taken By, (c) = Cosigned By      Initials Name Provider Type     Tamara Mena COTA Occupational Therapist Assistant                   Obj/Interventions    No documentation.                  Goals/Plan       Row Name 08/04/23 1245          Transfer Goal 1 (OT)    Activity/Assistive Device (Transfer Goal 1, OT) transfers, all  -KD     [place] Level/Cues Needed (Transfer Goal 1, OT) modified independence  -KD     Time Frame (Transfer Goal 1, OT) long term goal (LTG);by discharge  -KD     Progress/Outcome (Transfer Goal 1, OT) goal not met  -KD       Row Name 08/04/23 1245          Bathing Goal 1 (OT)    Activity/Device (Bathing Goal 1, OT) lower body bathing  -KD     [place] Level/Cues Needed (Bathing Goal 1, OT) modified independence  -KD     Time Frame (Bathing Goal 1, OT) long term goal (LTG);by discharge  -KD     Progress/Outcomes (Bathing Goal 1, OT) goal not met  -KD       Row Name 08/04/23  1245          Dressing Goal 1 (OT)    Activity/Device (Dressing Goal 1, OT) lower body dressing  -KD     [place]/Cues Needed (Dressing Goal 1, OT) modified independence  -KD     Time Frame (Dressing Goal 1, OT) long term goal (LTG);by discharge  -KD     Progress/Outcome (Dressing Goal 1, OT) goal not met  -KD       Row Name 08/04/23 1245          Toileting Goal 1 (OT)    Activity/Device (Toileting Goal 1, OT) toileting skills, all  -KD     [place] Level/Cues Needed (Toileting Goal 1, OT) modified independence  -KD     Time Frame (Toileting Goal 1, OT) long term goal (LTG);by discharge  -KD     Progress/Outcome (Toileting Goal 1, OT) goal not met  -KD       Row Name 08/04/23 124          Problem Specific Goal 1 (OT)    Problem Specific Goal 1 (OT) Pt will perform OOB ax for 20 minutes with SBA to increase ax tolerance for ADLs.  -KD     Time Frame (Problem Specific Goal 1, OT) long term goal (LTG);by discharge  -KD     Progress/Outcome (Problem Specific Goal 1, OT) goal not met  -KD               User Key  (r) = Recorded By, (t) = Taken By, (c) = Cosigned By      Initials Name Provider Type    Tamara Marks COTA Occupational Therapist Assistant                   Clinical Impression       Row Name 08/04/23 1245          Pain Assessment    Pretreatment Pain Rating 0/10 - no pain  -KD     Posttreatment Pain Rating 0/10 - no pain  -KD       Row Name 08/04/23 1248          Plan of Care Review    Plan of Care Reviewed With patient  -KD     Progress improving  -KD     Outcome Evaluation OT tx completed this date. Sup>sit-SBA. Sit>stand- CGA. Once standing pt had incontinent episode. Toilet t/f-CGA of 1 w/ RW. [facility]icare-Ind. Pt sat EOB and completed ADL. Comb hair-Mod A 2' tangles while sitting EOB. UB bathing/dressing-SBA. LB bathing-SBA. Pt required Max A for feet and to don socks. Pt then returned to supine-SBA w/ all needs in reach. Cont OT POC.  -KD       Row Name 08/04/23 5052          Therapy  Assessment/Plan (OT)    Therapy Frequency (OT) other (see comments)  5-7 days a week  -KD       Row Name 08/04/23 1245          Therapy Plan Review/Discharge Plan (OT)    Anticipated Discharge Disposition (OT) home with assist;home with home health  -KD       Row Name 08/04/23 1245          Vital Signs    Pre Systolic BP Rehab 124  -KD     Pre Treatment Diastolic BP 58  -KD     Pretreatment Heart Rate (beats/min) 72  -KD     Pre SpO2 (%) 92  -KD     Pre Patient Position Supine  -KD     Intra Patient Position Standing  -KD     Post Patient Position Supine  -KD       Row Name 08/04/23 1245          Positioning and Restraints    Pre-Treatment Position in bed  -KD     Post Treatment Position bed  -KD               User Key  (r) = Recorded By, (t) = Taken By, (c) = Cosigned By      Initials Name Provider Type    Tamara Marks COTA Occupational Therapist Assistant                   Outcome Measures       Row Name 08/04/23 1245          How much help from another is currently needed...    Putting on and taking off regular lower body clothing? 2  -KD     Bathing (including washing, rinsing, and drying) 2  -KD     Toileting (which includes using toilet bed pan or urinal) 3  -KD     Putting on and taking off regular upper body clothing 3  -KD     Taking care of personal grooming (such as brushing teeth) 4  -KD     Eating meals 4  -KD     AM-PAC 6 Clicks Score (OT) 18  -KD       Row Name 08/04/23 0800          How much help from another person do you currently need...    Turning from your back to your side while in flat bed without using bedrails? 3  -JA     Moving from lying on back to sitting on the side of a flat bed without bedrails? 3  -JA     Moving to and from a bed to a chair (including a wheelchair)? 3  -JA     Standing up from a chair using your arms (e.g., wheelchair, bedside chair)? 2  -JA     Climbing 3-5 steps with a railing? 2  -JA     To walk in hospital room? 2  -JA     AM-PAC 6 Clicks Score (PT) 15  -JA      Highest level of mobility 4 --> Transferred to chair/commode  -MAXIMILIAN               User Key  (r) = Recorded By, (t) = Taken By, (c) = Cosigned By      Initials Name Provider Type    Tamara Marks COTA Occupational Therapist Assistant    Uvaldo Neal, RN Registered Nurse                    Occupational Therapy Education       Title: PT OT SLP Therapies (In Progress)       Topic: Occupational Therapy (In Progress)       Point: ADL training (In Progress)       Description:   Instruct learner(s) on proper safety adaptation and remediation techniques during self care or transfers.   Instruct in proper use of assistive devices.                  Learning Progress Summary             Patient Acceptance, E,TB, NR by  at 8/2/2023 1608    Comment: POC, Role of OT, transfer training                         Point: Home exercise program (Not Started)       Description:   Instruct learner(s) on appropriate technique for monitoring, assisting and/or progressing therapeutic exercises/activities.                  Learner Progress:  Not documented in this visit.              Point: Precautions (In Progress)       Description:   Instruct learner(s) on prescribed precautions during self-care and functional transfers.                  Learning Progress Summary             Patient Acceptance, E,TB, NR by  at 8/2/2023 1608    Comment: POC, Role of OT, transfer training                         Point: Body mechanics (In Progress)       Description:   Instruct learner(s) on proper positioning and spine alignment during self-care, functional mobility activities and/or exercises.                  Learning Progress Summary             Patient Acceptance, E,TB, NR by  at 8/2/2023 1608    Comment: POC, Role of OT, transfer training                                         User Key       Initials Effective Dates Name Provider Type Discipline     09/22/22 -  Ana Vela OT Occupational Therapist OT                  OT  Recommendation and Plan  Therapy Frequency (OT): other (see comments) (5-7 days a week)  Plan of Care Review  Plan of Care Reviewed With: patient  Progress: improving  Outcome Evaluation: OT tx completed this date. Sup>sit-SBA. Sit>stand- CGA. Once standing pt had incontinent episode. Toilet t/f-CGA of 1 w/ RW. [facility]-Ind. Pt sat EOB and completed ADL. Comb hair-Mod A 2' tangles while sitting EOB. UB bathing/dressing-SBA. LB bathing-SBA. Pt required Max A for feet and to don socks. Pt then returned to supine-SBA w/ all needs in reach. Cont OT POC.     Time Calculation:         Time Calculation- OT       Row Name 08/04/23 1245             Time Calculation- OT    OT Start Time 1245  -KD      OT Stop Time 1324  -KD      OT Time Calculation (min) 39 min  -KD      Total Timed Code Minutes- OT 39 minute(s)  -KD      OT Received On 08/04/23  -KD         Timed Charges    39074 - OT Self Care/Mgmt Minutes 39  -KD         Total Minutes    Timed Charges Total Minutes 39  -KD       Total Minutes 39  -KD                User Key  (r) = Recorded By, (t) = Taken By, (c) = Cosigned By      Initials Name Provider Type     Tamara Mena COTA Occupational Therapist Assistant                  Therapy Charges for Today       Code Description Service Date Service Provider Modifiers Qty    [id number]  OT SELF CARE/MGMT/TRAIN EA 15 MIN 8/4/2023 Tamara Mena COTA GO 3                 QUIQUE Monahan  8/4/2023

## 2023-08-04 NOTE — PROGRESS NOTES
[facility]  INPATIENT PROGRESS NOTE      Length of Stay: 5  Date of Admission: 7/28/2023  Primary Care Physician: Len Seo MD    Subjective   Chief Complaint: No new complaints  HPI:  Doing better, asking when she can go home. Still requiring O2. No other concerns.      Review of Systems   All pertinent negatives and positives are as above. All other systems have been reviewed and are negative unless otherwise stated.     Objective    As of today 08/04/23  Temp:  [96.1 øF (35.6 øC)-98.2 øF (36.8 øC)] 97.4 øF (36.3 øC)  Heart Rate:  [] 67  Resp:  [16-18] 16  BP: ()/(50-90) 115/57    Physical Exam  Constitutional:       General: She is not in acute distress.     Appearance: She is not toxic-appearing.   HENT:      Head: Normocephalic and atraumatic.      Right Ear: External ear normal.      Left Ear: External ear normal.      Nose: Nose normal.      Mouth/Throat:      Pharynx: Oropharynx is clear.   Eyes:      Conjunctiva/sclera: Conjunctivae normal.   Cardiovascular:      Rate and Rhythm: Normal rate. Rhythm irregular.      Heart sounds: Normal heart sounds.   Pulmonary:      Effort: Pulmonary effort is normal. No respiratory distress.      Breath sounds: Normal breath sounds.   Abdominal:      General: Bowel sounds are normal.      Palpations: Abdomen is soft.      Tenderness: There is no abdominal tenderness.   Musculoskeletal:         General: No deformity.   Skin:     General: Skin is warm and dry.      Capillary Refill: Capillary refill takes less than 2 seconds.   Neurological:      General: No focal deficit present.      Mental Status: She is alert and oriented to person, place, and time. Mental status is at baseline.   Psychiatric:         Behavior: Behavior normal.         Results Review:  I have reviewed the labs, radiology results, and diagnostic studies.    Laboratory Data:   Results from last 7 days   Lab Units  08/04/23  0548 08/03/23  1726 08/03/23  0507 08/02/23  0415 07/29/23  0508 07/28/23  1600   SODIUM mmol/L 135*  --  137 138   < > 134*   POTASSIUM mmol/L 4.0 4.2 3.1* 3.8   < > 4.2   CHLORIDE mmol/L 97*  --  98 101   < > 96*   CO2 mmol/L 28.0  --  28.0 23.0   < > 25.0   BUN mg/dL 19  --  16 16   < > 24*   CREATININE mg/dL 1.64*  --  1.56* 1.61*   < > 1.49*   GLUCOSE mg/dL 135*  --  100* 133*   < > 121*   CALCIUM mg/dL 8.7  --  8.6 8.2*   < > 8.5*   BILIRUBIN mg/dL  --   --   --   --   --  0.6   ALK PHOS U/L  --   --   --   --   --  121*   ALT (SGPT) U/L  --   --   --   --   --  14   AST (SGOT) U/L  --   --   --   --   --  29   ANION GAP mmol/L 10.0  --  11.0 14.0   < > 13.0    < > = values in this interval not displayed.       Estimated Creatinine Clearance: 26.2 mL/min (A) (by C-G formula based on SCr of 1.64 mg/dL (H)).  Results from last 7 days   Lab Units 08/01/23  0551 07/31/23 2012 07/28/23  1600   MAGNESIUM mg/dL 2.5* 1.5* 1.6           Results from last 7 days   Lab Units 08/04/23  0548 08/03/23  0507 08/02/23  0415 08/01/23  0551 07/31/23  0639   WBC 10*3/mm3 6.83 5.94 7.86 9.57 14.50*   HEMOGLOBIN g/dL 10.1* 10.6* 10.7* 10.7* 11.5*   HEMATOCRIT % 30.4* 32.4* 32.9* 31.2* 34.2   PLATELETS 10*3/mm3 519* 484* 370 298 290       Results from last 7 days   Lab Units 08/04/23  0548 08/03/23  0507 08/02/23  0415 08/01/23  0551 07/31/23  0639   INR  2.15* 2.82* 3.41* 3.44* 2.78*         Culture Data:   No results found for: BLOODCX  No results found for: URINECX  No results found for: RESPCX  No results found for: WOUNDCX  No results found for: STOOLCX  No components found for: BODYFLD    Radiology Data:   Imaging Results (Last 24 Hours)       ** No results found for the last 24 hours. **            I have reviewed the patient's current medications.     Assessment/Plan     Principal Problem:    Sepsis  Active Problems:    Mastitis  Sepsis secondary to left breast mastitis  Hypertension  Paroxysmal atrial  fibrillation  BLAINE  UTI due to Enterococcus faecalis  Acute respiratory failure with hypoxia    Plan:  - Continue supplemental O2 and wean as tolerated.   - Continue Doxycycline for now.    - Continue home medications as appropriate  - Continue PT/OT  - Should her oxygen requirements and heart rate stabilizes she can also potentially transfer to the floor later today  - Continue IV Lasix 20 mg twice daily.    - Monitor I's and O's and daily weights  - Monitor renal function and electrolytes  - DVT prophylaxis with Warfarin, pharmacy to dose.   - CODE STATUS: Full      Medical Decision Making  Number and Complexity of problems: 5 high complexity    Conditions and Status:        Condition is improving.     MDM Data  Tests considered but not ordered: None     Decision rules/scores evaluated (example [id number], [place], etc): None     Discussed with: Patient and family     Treatment Plan  As above    Care Planning  Shared decision making: Patient  Code status and discussions: Full    Disposition  Social Determinants of Health that impact treatment or disposition: none  I expect the patient to be discharged to home in 2-3 days.       I have utilized all available immediate resources to obtain, update, or review the patient's current medications (including all prescriptions, over-the-counter products, herbals, cannabis/cannabidiol products, and vitamin/mineral/dietary (nutritional) supplements).   I confirmed that the patient's Advance Care Plan is present, code status is documented, or surrogate decision maker is listed in the patient's medical record.      Juanito Harrington MD

## 2023-08-04 NOTE — PROGRESS NOTES
GENERAL SURGERY PROGRESS NOTE     LOS: 5 days         Interval History:     No acute events overnight. No bleeding    Medication Review:   doxycycline, 100 mg, Oral, Q12H  DULoxetine, 60 mg, Oral, Daily  famotidine, 20 mg, Oral, BID AC  furosemide, 20 mg, Oral, BID  isosorbide mononitrate, 30 mg, Oral, Daily  [START ON 8/5/2023] losartan, 25 mg, Oral, Daily  metoprolol succinate XL, 50 mg, Oral, Daily  NIFEdipine XL, 60 mg, Oral, Daily  nystatin, , Topical, Q12H  rosuvastatin, 40 mg, Oral, Daily  sodium chloride, 10 mL, Intravenous, Q12H  sodium chloride, 10 mL, Intravenous, Q12H  traMADol, 50 mg, Oral, Q8H  warfarin, 2.5 mg, Oral, Daily        Pharmacy Consult,   Pharmacy to dose warfarin,   sodium chloride, 30 mL/hr, Last Rate: 30 mL/hr (08/03/23 1742)    Objective     Vital Signs:  Temp:  [96.1 øF (35.6 øC)-98.2 øF (36.8 øC)] 97.4 øF (36.3 øC)  Heart Rate:  [] 67  Resp:  [16-18] 16  BP: ()/(50-90) 115/57    Intake/Output Summary (Last 24 hours) at 8/4/2023 1337  Last data filed at 8/4/2023 0800  Gross per 24 hour   Intake 720 ml   Output 450 ml   Net 270 ml       Physical Exam  Constitutional:       Appearance: Normal appearance.   HENT:      Head: Normocephalic and atraumatic.      Nose: Nose normal. No congestion.      Mouth/Throat:      Mouth: Mucous membranes are moist.      Pharynx: Oropharynx is clear.   Eyes:      General: No scleral icterus.     Pupils: Pupils are equal, round, and reactive to light.   Cardiovascular:      Rate and Rhythm: Normal rate and regular rhythm.   Pulmonary:      Effort: Pulmonary effort is normal. No respiratory distress.   Skin:     General: Skin is warm and dry.      Comments: Left breast cellulitis much improved   Neurological:      General: No focal deficit present.      Mental Status: She is alert and oriented to person, place, and time.   Psychiatric:         Mood and Affect: Mood normal.         Behavior: Behavior normal.       Results Review:    Results from  last 7 days   Lab Units 08/04/23  0548 08/03/23  1726 08/03/23  0507 08/02/23  0415   SODIUM mmol/L 135*  --  137 138   POTASSIUM mmol/L 4.0 4.2 3.1* 3.8   CHLORIDE mmol/L 97*  --  98 101   CO2 mmol/L 28.0  --  28.0 23.0   BUN mg/dL 19  --  16 16   CREATININE mg/dL 1.64*  --  1.56* 1.61*   GLUCOSE mg/dL 135*  --  100* 133*   CALCIUM mg/dL 8.7  --  8.6 8.2*     Results from last 7 days   Lab Units 08/04/23  0548 08/03/23  0507 08/02/23  0415   WBC 10*3/mm3 6.83 5.94 7.86   HEMOGLOBIN g/dL 10.1* 10.6* 10.7*   HEMATOCRIT % 30.4* 32.4* 32.9*   PLATELETS 10*3/mm3 519* 484* 370       Assessment:    Sepsis    Mastitis      POD3 from incision and drainage of left breast abscess    Plan:    - Will d/c packing tomorrow      This document has been electronically signed by Fox Roper MD on August 4, 2023 13:37 CDT        Fox Roper MD  08/04/23  13:37 CDT

## 2023-08-05 ENCOUNTER — APPOINTMENT (OUTPATIENT)
Dept: GENERAL RADIOLOGY | Facility: HOSPITAL | Age: 83
DRG: 853 | End: 2023-08-05
Payer: MEDICARE

## 2023-08-05 LAB
ALBUMIN SERPL-MCNC: 2.6 G/DL (ref 3.5–5.2)
ALBUMIN/GLOB SERPL: 0.8 G/DL
ALP SERPL-CCNC: 141 U/L (ref 39–117)
ALT SERPL W P-5'-P-CCNC: 593 U/L (ref 1–33)
ANION GAP SERPL CALCULATED.3IONS-SCNC: 13 MMOL/L (ref 5–15)
ANION GAP SERPL CALCULATED.3IONS-SCNC: 28 MMOL/L (ref 5–15)
ANISOCYTOSIS BLD QL: ABNORMAL
ARTERIAL PATENCY WRIST A: ABNORMAL
AST SERPL-CCNC: 1456 U/L (ref 1–32)
ATMOSPHERIC PRESS: 747 MMHG
BACTERIA UR QL AUTO: ABNORMAL /HPF
BASE EXCESS BLDA CALC-SCNC: -13.8 MMOL/L (ref 0–2)
BASOPHILS # BLD MANUAL: 0.45 10*3/MM3 (ref 0–0.2)
BASOPHILS NFR BLD MANUAL: 3 % (ref 0–1.5)
BDY SITE: ABNORMAL
BILIRUB SERPL-MCNC: 0.7 MG/DL (ref 0–1.2)
BILIRUB UR QL STRIP: NEGATIVE
BUN SERPL-MCNC: 24 MG/DL (ref 8–23)
BUN SERPL-MCNC: 30 MG/DL (ref 8–23)
BUN/CREAT SERPL: 10.7 (ref 7–25)
BUN/CREAT SERPL: 8.4 (ref 7–25)
CALCIUM SPEC-SCNC: 8.1 MG/DL (ref 8.6–10.5)
CALCIUM SPEC-SCNC: 9 MG/DL (ref 8.6–10.5)
CHLORIDE SERPL-SCNC: 95 MMOL/L (ref 98–107)
CHLORIDE SERPL-SCNC: 97 MMOL/L (ref 98–107)
CLARITY UR: CLEAR
CO2 SERPL-SCNC: 11 MMOL/L (ref 22–29)
CO2 SERPL-SCNC: 24 MMOL/L (ref 22–29)
COLOR UR: YELLOW
CREAT SERPL-MCNC: 2.24 MG/DL (ref 0.57–1)
CREAT SERPL-MCNC: 3.57 MG/DL (ref 0.57–1)
CRP SERPL-MCNC: 5.3 MG/DL (ref 0–0.5)
D-LACTATE SERPL-SCNC: 12.9 MMOL/L (ref 0.5–2)
DEPRECATED RDW RBC AUTO: 43.3 FL (ref 37–54)
DEPRECATED RDW RBC AUTO: 46.4 FL (ref 37–54)
EGFRCR SERPLBLD CKD-EPI 2021: 12.3 ML/MIN/1.73
EGFRCR SERPLBLD CKD-EPI 2021: 21.5 ML/MIN/1.73
EOSINOPHIL # BLD MANUAL: 0.15 10*3/MM3 (ref 0–0.4)
EOSINOPHIL NFR BLD MANUAL: 1 % (ref 0.3–6.2)
ERYTHROCYTE [DISTWIDTH] IN BLOOD BY AUTOMATED COUNT: 14.8 % (ref 12.3–15.4)
ERYTHROCYTE [DISTWIDTH] IN BLOOD BY AUTOMATED COUNT: 15.1 % (ref 12.3–15.4)
GAS FLOW AIRWAY: 4 LPM
GLOBULIN UR ELPH-MCNC: 3.1 GM/DL
GLUCOSE SERPL-MCNC: 115 MG/DL (ref 65–99)
GLUCOSE SERPL-MCNC: 201 MG/DL (ref 65–99)
GLUCOSE UR STRIP-MCNC: NEGATIVE MG/DL
HCO3 BLDA-SCNC: 12 MMOL/L (ref 20–26)
HCT VFR BLD AUTO: 22.1 % (ref 34–46.6)
HCT VFR BLD AUTO: 27 % (ref 34–46.6)
HGB BLD-MCNC: 6.9 G/DL (ref 12–15.9)
HGB BLD-MCNC: 9 G/DL (ref 12–15.9)
HGB UR QL STRIP.AUTO: NEGATIVE
HOLD SPECIMEN: NORMAL
HOLD SPECIMEN: NORMAL
HYALINE CASTS UR QL AUTO: ABNORMAL /LPF
INR PPP: 1.77 (ref 0.8–1.2)
INR PPP: 3.42 (ref 0.8–1.2)
KETONES UR QL STRIP: NEGATIVE
LEUKOCYTE ESTERASE UR QL STRIP.AUTO: ABNORMAL
LYMPHOCYTES # BLD MANUAL: 2.67 10*3/MM3 (ref 0.7–3.1)
LYMPHOCYTES NFR BLD MANUAL: 3 % (ref 5–12)
Lab: ABNORMAL
MAGNESIUM SERPL-MCNC: 2.3 MG/DL (ref 1.6–2.4)
MCH RBC QN AUTO: 27 PG (ref 26.6–33)
MCH RBC QN AUTO: 27.3 PG (ref 26.6–33)
MCHC RBC AUTO-ENTMCNC: 31.2 G/DL (ref 31.5–35.7)
MCHC RBC AUTO-ENTMCNC: 33.3 G/DL (ref 31.5–35.7)
MCV RBC AUTO: 81.8 FL (ref 79–97)
MCV RBC AUTO: 86.3 FL (ref 79–97)
MODALITY: ABNORMAL
MONOCYTES # BLD: 0.45 10*3/MM3 (ref 0.1–0.9)
NEUTROPHILS # BLD AUTO: 11.14 10*3/MM3 (ref 1.7–7)
NEUTROPHILS NFR BLD MANUAL: 72 % (ref 42.7–76)
NEUTS BAND NFR BLD MANUAL: 3 % (ref 0–5)
NITRITE UR QL STRIP: NEGATIVE
NRBC SPEC MANUAL: 3 /100 WBC (ref 0–0.2)
NT-PROBNP SERPL-MCNC: 3011 PG/ML (ref 0–1800)
PCO2 BLDA: 26.9 MM HG (ref 35–45)
PH BLDA: 7.26 PH UNITS (ref 7.35–7.45)
PH UR STRIP.AUTO: 5.5 [PH] (ref 5–9)
PLATELET # BLD AUTO: 588 10*3/MM3 (ref 140–450)
PLATELET # BLD AUTO: 601 10*3/MM3 (ref 140–450)
PMV BLD AUTO: 9.1 FL (ref 6–12)
PMV BLD AUTO: 9.8 FL (ref 6–12)
PO2 BLDA: 69.2 MM HG (ref 83–108)
POTASSIUM SERPL-SCNC: 4.5 MMOL/L (ref 3.5–5.2)
POTASSIUM SERPL-SCNC: 5.3 MMOL/L (ref 3.5–5.2)
PROT SERPL-MCNC: 5.7 G/DL (ref 6–8.5)
PROT UR QL STRIP: ABNORMAL
PROTHROMBIN TIME: 20.4 SECONDS (ref 11.1–15.3)
PROTHROMBIN TIME: 33.5 SECONDS (ref 11.1–15.3)
RBC # BLD AUTO: 2.56 10*6/MM3 (ref 3.77–5.28)
RBC # BLD AUTO: 3.3 10*6/MM3 (ref 3.77–5.28)
RBC # UR STRIP: ABNORMAL /HPF
REF LAB TEST METHOD: ABNORMAL
SAO2 % BLDCOA: 90.3 % (ref 94–99)
SMALL PLATELETS BLD QL SMEAR: ABNORMAL
SODIUM SERPL-SCNC: 132 MMOL/L (ref 136–145)
SODIUM SERPL-SCNC: 136 MMOL/L (ref 136–145)
SP GR UR STRIP: 1.01 (ref 1–1.03)
SQUAMOUS #/AREA URNS HPF: ABNORMAL /HPF
TROPONIN T SERPL HS-MCNC: 51 NG/L
UROBILINOGEN UR QL STRIP: ABNORMAL
VARIANT LYMPHS NFR BLD MANUAL: 16 % (ref 19.6–45.3)
VARIANT LYMPHS NFR BLD MANUAL: 2 % (ref 0–5)
VENTILATOR MODE: ABNORMAL
WBC # UR STRIP: ABNORMAL /HPF
WBC MORPH BLD: NORMAL
WBC NRBC COR # BLD: 14.85 10*3/MM3 (ref 3.4–10.8)
WBC NRBC COR # BLD: 33.24 10*3/MM3 (ref 3.4–10.8)
YEAST URNS QL MICRO: ABNORMAL /HPF

## 2023-08-05 PROCEDURE — 86900 BLOOD TYPING SEROLOGIC ABO: CPT

## 2023-08-05 PROCEDURE — 87086 URINE CULTURE/COLONY COUNT: CPT | Performed by: STUDENT IN AN ORGANIZED HEALTH CARE EDUCATION/TRAINING PROGRAM

## 2023-08-05 PROCEDURE — 82803 BLOOD GASES ANY COMBINATION: CPT

## 2023-08-05 PROCEDURE — 25010000002 METRONIDAZOLE 500 MG/100ML SOLUTION: Performed by: INTERNAL MEDICINE

## 2023-08-05 PROCEDURE — 86140 C-REACTIVE PROTEIN: CPT | Performed by: STUDENT IN AN ORGANIZED HEALTH CARE EDUCATION/TRAINING PROGRAM

## 2023-08-05 PROCEDURE — 86901 BLOOD TYPING SEROLOGIC RH(D): CPT

## 2023-08-05 PROCEDURE — 85610 PROTHROMBIN TIME: CPT | Performed by: STUDENT IN AN ORGANIZED HEALTH CARE EDUCATION/TRAINING PROGRAM

## 2023-08-05 PROCEDURE — 71045 X-RAY EXAM CHEST 1 VIEW: CPT

## 2023-08-05 PROCEDURE — 83880 ASSAY OF NATRIURETIC PEPTIDE: CPT | Performed by: INTERNAL MEDICINE

## 2023-08-05 PROCEDURE — 85025 COMPLETE CBC W/AUTO DIFF WBC: CPT | Performed by: STUDENT IN AN ORGANIZED HEALTH CARE EDUCATION/TRAINING PROGRAM

## 2023-08-05 PROCEDURE — 87040 BLOOD CULTURE FOR BACTERIA: CPT | Performed by: INTERNAL MEDICINE

## 2023-08-05 PROCEDURE — 80053 COMPREHEN METABOLIC PANEL: CPT | Performed by: STUDENT IN AN ORGANIZED HEALTH CARE EDUCATION/TRAINING PROGRAM

## 2023-08-05 PROCEDURE — 74018 RADEX ABDOMEN 1 VIEW: CPT

## 2023-08-05 PROCEDURE — 85007 BL SMEAR W/DIFF WBC COUNT: CPT | Performed by: STUDENT IN AN ORGANIZED HEALTH CARE EDUCATION/TRAINING PROGRAM

## 2023-08-05 PROCEDURE — 83605 ASSAY OF LACTIC ACID: CPT | Performed by: INTERNAL MEDICINE

## 2023-08-05 PROCEDURE — 25010000002 CEFTRIAXONE PER 250 MG: Performed by: FAMILY MEDICINE

## 2023-08-05 PROCEDURE — 84145 PROCALCITONIN (PCT): CPT | Performed by: INTERNAL MEDICINE

## 2023-08-05 PROCEDURE — 99024 POSTOP FOLLOW-UP VISIT: CPT | Performed by: STUDENT IN AN ORGANIZED HEALTH CARE EDUCATION/TRAINING PROGRAM

## 2023-08-05 PROCEDURE — 84484 ASSAY OF TROPONIN QUANT: CPT | Performed by: INTERNAL MEDICINE

## 2023-08-05 PROCEDURE — 82948 REAGENT STRIP/BLOOD GLUCOSE: CPT

## 2023-08-05 PROCEDURE — 36600 WITHDRAWAL OF ARTERIAL BLOOD: CPT

## 2023-08-05 PROCEDURE — 81001 URINALYSIS AUTO W/SCOPE: CPT | Performed by: STUDENT IN AN ORGANIZED HEALTH CARE EDUCATION/TRAINING PROGRAM

## 2023-08-05 PROCEDURE — 85610 PROTHROMBIN TIME: CPT | Performed by: INTERNAL MEDICINE

## 2023-08-05 PROCEDURE — 85027 COMPLETE CBC AUTOMATED: CPT | Performed by: INTERNAL MEDICINE

## 2023-08-05 PROCEDURE — 83735 ASSAY OF MAGNESIUM: CPT | Performed by: INTERNAL MEDICINE

## 2023-08-05 RX ORDER — NOREPINEPHRINE BITARTRATE 0.03 MG/ML
.02-.3 INJECTION, SOLUTION INTRAVENOUS
Status: DISCONTINUED | OUTPATIENT
Start: 2023-08-05 | End: 2023-08-10

## 2023-08-05 RX ORDER — SODIUM CHLORIDE 9 MG/ML
75 INJECTION, SOLUTION INTRAVENOUS CONTINUOUS
Status: DISCONTINUED | OUTPATIENT
Start: 2023-08-05 | End: 2023-08-05

## 2023-08-05 RX ORDER — AMOXICILLIN 250 MG
2 CAPSULE ORAL 2 TIMES DAILY
Status: DISCONTINUED | OUTPATIENT
Start: 2023-08-05 | End: 2023-08-07

## 2023-08-05 RX ORDER — SODIUM CHLORIDE 9 MG/ML
INJECTION, SOLUTION INTRAVENOUS
Status: COMPLETED
Start: 2023-08-05 | End: 2023-08-05

## 2023-08-05 RX ORDER — BISACODYL 5 MG/1
5 TABLET, DELAYED RELEASE ORAL DAILY PRN
Status: DISCONTINUED | OUTPATIENT
Start: 2023-08-05 | End: 2023-08-07

## 2023-08-05 RX ORDER — BISACODYL 10 MG
10 SUPPOSITORY, RECTAL RECTAL DAILY PRN
Status: DISCONTINUED | OUTPATIENT
Start: 2023-08-05 | End: 2023-08-07

## 2023-08-05 RX ORDER — POLYETHYLENE GLYCOL 3350 17 G/17G
17 POWDER, FOR SOLUTION ORAL DAILY PRN
Status: DISCONTINUED | OUTPATIENT
Start: 2023-08-05 | End: 2023-08-07

## 2023-08-05 RX ORDER — METRONIDAZOLE 500 MG/100ML
500 INJECTION, SOLUTION INTRAVENOUS EVERY 8 HOURS
Status: DISCONTINUED | OUTPATIENT
Start: 2023-08-05 | End: 2023-08-05

## 2023-08-05 RX ORDER — METRONIDAZOLE 500 MG/100ML
500 INJECTION, SOLUTION INTRAVENOUS EVERY 6 HOURS
Status: COMPLETED | OUTPATIENT
Start: 2023-08-06 | End: 2023-08-07

## 2023-08-05 RX ADMIN — ROSUVASTATIN CALCIUM 40 MG: 20 TABLET, FILM COATED ORAL at 09:13

## 2023-08-05 RX ADMIN — SODIUM CHLORIDE 1000 ML: 9 INJECTION, SOLUTION INTRAVENOUS at 21:43

## 2023-08-05 RX ADMIN — FAMOTIDINE 20 MG: 20 TABLET ORAL at 17:10

## 2023-08-05 RX ADMIN — Medication 10 ML: at 21:44

## 2023-08-05 RX ADMIN — Medication 10 ML: at 09:15

## 2023-08-05 RX ADMIN — SODIUM CHLORIDE: 9 INJECTION, SOLUTION INTRAVENOUS at 22:57

## 2023-08-05 RX ADMIN — SODIUM BICARBONATE 50 MEQ: 84 INJECTION INTRAVENOUS at 22:21

## 2023-08-05 RX ADMIN — METOPROLOL SUCCINATE 50 MG: 50 TABLET, EXTENDED RELEASE ORAL at 09:14

## 2023-08-05 RX ADMIN — ISOSORBIDE MONONITRATE 30 MG: 30 TABLET, EXTENDED RELEASE ORAL at 09:14

## 2023-08-05 RX ADMIN — NIFEDIPINE 60 MG: 60 TABLET, EXTENDED RELEASE ORAL at 09:14

## 2023-08-05 RX ADMIN — TRAMADOL HYDROCHLORIDE 50 MG: 50 TABLET, COATED ORAL at 11:05

## 2023-08-05 RX ADMIN — DULOXETINE HYDROCHLORIDE 60 MG: 60 CAPSULE, DELAYED RELEASE ORAL at 09:14

## 2023-08-05 RX ADMIN — FUROSEMIDE 20 MG: 20 TABLET ORAL at 06:04

## 2023-08-05 RX ADMIN — CEFTRIAXONE SODIUM 1000 MG: 1 INJECTION, POWDER, FOR SOLUTION INTRAMUSCULAR; INTRAVENOUS at 15:24

## 2023-08-05 RX ADMIN — FAMOTIDINE 20 MG: 20 TABLET ORAL at 09:13

## 2023-08-05 RX ADMIN — NYSTATIN: 100000 POWDER TOPICAL at 21:15

## 2023-08-05 RX ADMIN — TRAMADOL HYDROCHLORIDE 50 MG: 50 TABLET, COATED ORAL at 03:46

## 2023-08-05 RX ADMIN — SODIUM BICARBONATE 150 MEQ: 84 INJECTION, SOLUTION INTRAVENOUS at 22:21

## 2023-08-05 RX ADMIN — Medication 10 ML: at 21:15

## 2023-08-05 RX ADMIN — METRONIDAZOLE 500 MG: 500 INJECTION, SOLUTION INTRAVENOUS at 23:21

## 2023-08-05 RX ADMIN — WARFARIN SODIUM 2.5 MG: 2.5 TABLET ORAL at 17:10

## 2023-08-05 RX ADMIN — NOREPINEPHRINE BITARTRATE 0.02 MCG/KG/MIN: 1 INJECTION, SOLUTION, CONCENTRATE INTRAVENOUS at 21:50

## 2023-08-05 RX ADMIN — NYSTATIN: 100000 POWDER TOPICAL at 09:15

## 2023-08-05 RX ADMIN — HYDROCODONE BITARTRATE AND ACETAMINOPHEN 1 TABLET: 5; 325 TABLET ORAL at 23:58

## 2023-08-05 RX ADMIN — DOCUSATE SODIUM 50 MG AND SENNOSIDES 8.6 MG 2 TABLET: 8.6; 5 TABLET, FILM COATED ORAL at 11:09

## 2023-08-05 RX ADMIN — DOXYCYCLINE 100 MG: 100 CAPSULE ORAL at 09:13

## 2023-08-05 RX ADMIN — SODIUM CHLORIDE 75 ML/HR: 9 INJECTION, SOLUTION INTRAVENOUS at 11:04

## 2023-08-05 NOTE — PROGRESS NOTES
[facility] Medicine Services  INPATIENT PROGRESS NOTE      Length of Stay: 6  Date of Admission: 7/28/2023  Primary Care Physician: Len Seo MD    Subjective   Chief Complaint: No new complaints  HPI:  Had a rough night with some right sided abdominal pain.  Also reports some dysuria. UO decreased as well despite lasix but has not been eating or drinking much.       Review of Systems   All pertinent negatives and positives are as above. All other systems have been reviewed and are negative unless otherwise stated.     Objective    As of today 08/05/23  Temp:  [97.1 øF (36.2 øC)-97.5 øF (36.4 øC)] 97.2 øF (36.2 øC)  Heart Rate:  [67-87] 87  Resp:  [16-18] 18  BP: (115-134)/(54-60) 116/54    Physical Exam  Constitutional:       General: She is not in acute distress.     Appearance: She is not toxic-appearing.   HENT:      Head: Normocephalic and atraumatic.      Right Ear: External ear normal.      Left Ear: External ear normal.      Nose: Nose normal.      Mouth/Throat:      Pharynx: Oropharynx is clear.   Eyes:      Conjunctiva/sclera: Conjunctivae normal.   Cardiovascular:      Rate and Rhythm: Normal rate. Rhythm irregular.      Heart sounds: Normal heart sounds.   Pulmonary:      Effort: Pulmonary effort is normal. No respiratory distress.      Breath sounds: Normal breath sounds.   Abdominal:      General: Bowel sounds are normal.      Palpations: Abdomen is soft.      Tenderness: There is generalized abdominal tenderness.   Musculoskeletal:         General: No deformity.   Skin:     General: Skin is warm and dry.      Capillary Refill: Capillary refill takes less than 2 seconds.   Neurological:      General: No focal deficit present.      Mental Status: She is alert and oriented to person, place, and time. Mental status is at baseline.   Psychiatric:         Behavior: Behavior normal.         Results Review:  I have reviewed the labs, radiology  results, and diagnostic studies.    Laboratory Data:   Results from last 7 days   Lab Units 08/05/23  0718 08/04/23  0548 08/03/23  1726 08/03/23  0507   SODIUM mmol/L 132* 135*  --  137   POTASSIUM mmol/L 4.5 4.0 4.2 3.1*   CHLORIDE mmol/L 95* 97*  --  98   CO2 mmol/L 24.0 28.0  --  28.0   BUN mg/dL 24* 19  --  16   CREATININE mg/dL 2.24* 1.64*  --  1.56*   GLUCOSE mg/dL 201* 135*  --  100*   CALCIUM mg/dL 9.0 8.7  --  8.6   ANION GAP mmol/L 13.0 10.0  --  11.0       Estimated Creatinine Clearance: 19.7 mL/min (A) (by C-G formula based on SCr of 2.24 mg/dL (H)).  Results from last 7 days   Lab Units 08/01/23  0551 07/31/23 2012   MAGNESIUM mg/dL 2.5* 1.5*           Results from last 7 days   Lab Units 08/05/23  0827 08/04/23  0548 08/03/23  0507 08/02/23  0415 08/01/23  0551   WBC 10*3/mm3 14.85* 6.83 5.94 7.86 9.57   HEMOGLOBIN g/dL 9.0* 10.1* 10.6* 10.7* 10.7*   HEMATOCRIT % 27.0* 30.4* 32.4* 32.9* 31.2*   PLATELETS 10*3/mm3 588* 519* 484* 370 298       Results from last 7 days   Lab Units 08/05/23  0718 08/04/23  0548 08/03/23  0507 08/02/23  0415 08/01/23  0551   INR  1.77* 2.15* 2.82* 3.41* 3.44*         Culture Data:   No results found for: BLOODCX  No results found for: URINECX  No results found for: RESPCX  No results found for: WOUNDCX  No results found for: STOOLCX  No components found for: BODYFLD    Radiology Data:   Imaging Results (Last 24 Hours)       ** No results found for the last 24 hours. **            I have reviewed the patient's current medications.     Assessment/Plan     Principal Problem:    Sepsis  Active Problems:    Mastitis  Sepsis secondary to left breast mastitis  Hypertension  Paroxysmal atrial fibrillation  BLAINE  UTI due to Enterococcus faecalis  Acute respiratory failure with hypoxia    Plan:  - Continue supplemental O2 and wean as tolerated.   - Continue Doxycycline for now.    - Continue home medications as appropriate  - Continue PT/OT  - Should her oxygen requirements and  heart rate stabilizes she can also potentially transfer to the floor later today  - Given decreased UO will stop Lasix and give IVF with NS @75 cc/hr for 12 hours and monitor for response.   - Check KUB and UA   - Monitor I's and O's and daily weights  - Monitor renal function and electrolytes  - DVT prophylaxis with Warfarin, pharmacy to dose.   - CODE STATUS: Full      Medical Decision Making  Number and Complexity of problems: 5 high complexity    Conditions and Status:        Condition is improving.     MDM Data  Tests considered but not ordered: None     Decision rules/scores evaluated (example [id number], Wells, etc): None     Discussed with: Patient and family     Treatment Plan  As above    Care Planning  Shared decision making: Patient  Code status and discussions: Full    Disposition  Social Determinants of Health that impact treatment or disposition: none  I expect the patient to be discharged to home in 2-3 days.       I have utilized all available immediate resources to obtain, update, or review the patient's current medications (including all prescriptions, over-the-counter products, herbals, cannabis/cannabidiol products, and vitamin/mineral/dietary (nutritional) supplements).   I confirmed that the patient's Advance Care Plan is present, code status is documented, or surrogate decision maker is listed in the patient's medical record.      Juanito Harrington MD

## 2023-08-05 NOTE — PROGRESS NOTES
"Anticoagulation by Pharmacy - Warfarin    Carol Sullivan is a 82 y.o.female  [Ht: 165.1 cm (65\"); Wt: 63.2 kg (139 lb 4.8 oz)] on Warfarin for indication of Atrial fibrillation.    Goal INR: 2-3  Home dose is 2.5 mg daily except 3.75 mg on Mondays   Today's INR:   Lab Results   Component Value Date    INR 1.77 (H) 08/05/2023          Lab Results   Component Value Date    INR 1.77 (H) 08/05/2023    INR 2.15 (H) 08/04/2023    INR 2.82 (H) 08/03/2023    PROTIME 20.4 (H) 08/05/2023    PROTIME 23.6 (H) 08/04/2023    PROTIME 29.0 (H) 08/03/2023     Lab Results   Component Value Date    HGB 9.0 (L) 08/05/2023    HGB 10.1 (L) 08/04/2023    HGB 10.6 (L) 08/03/2023     Lab Results   Component Value Date    HCT 27.0 (L) 08/05/2023    HCT 30.4 (L) 08/04/2023    HCT 32.4 (L) 08/03/2023     Lab Results   Component Value Date     (H) 08/05/2023     (H) 08/04/2023     (H) 08/03/2023       Recent Warfarin Administrations       The 5 most recent administrations since 07/29/2023 are shown below each listed medication.    Warfarin Sodium         Order Dose Date Given     warfarin (COUMADIN) tablet 2.5 mg 2.5 mg 08/04/2023     warfarin (COUMADIN) tablet 2.5 mg 2.5 mg 07/29/2023                    Assessment/Plan:  Reviewed above labs. INR is 1.77.  INR is below goal, was just started back last night. No changes in medication list. Concurrent medications include Doxycycline, Duloxetine and Tramadol, which can increase INR . Pt did receive dose of warfarin last night.  Will continue current dose of  2.5 mg. No bleeding noted.  Rx will continue to follow and adjust dose accordingly.  .    Dolly Birch, PharmD  08/05/23 10:38 CDT     "

## 2023-08-05 NOTE — PROGRESS NOTES
GENERAL SURGERY PROGRESS NOTE     LOS: 6 days         Interval History:     No acute events overnight    Medication Review:   doxycycline, 100 mg, Oral, Q12H  DULoxetine, 60 mg, Oral, Daily  famotidine, 20 mg, Oral, BID AC  furosemide, 20 mg, Oral, BID  isosorbide mononitrate, 30 mg, Oral, Daily  losartan, 25 mg, Oral, Daily  metoprolol succinate XL, 50 mg, Oral, Daily  NIFEdipine XL, 60 mg, Oral, Daily  nystatin, , Topical, Q12H  rosuvastatin, 40 mg, Oral, Daily  sodium chloride, 10 mL, Intravenous, Q12H  sodium chloride, 10 mL, Intravenous, Q12H  traMADol, 50 mg, Oral, Q8H  warfarin, 2.5 mg, Oral, Daily        Pharmacy Consult,   Pharmacy to dose warfarin,   sodium chloride, 30 mL/hr, Last Rate: 30 mL/hr (08/03/23 1742)    Objective     Vital Signs:  Temp:  [97.1 øF (36.2 øC)-97.5 øF (36.4 øC)] 97.1 øF (36.2 øC)  Heart Rate:  [67-87] 85  Resp:  [16-18] 18  BP: (115-134)/(56-60) 120/56    Intake/Output Summary (Last 24 hours) at 8/5/2023 0735  Last data filed at 8/5/2023 0500  Gross per 24 hour   Intake 1080 ml   Output --   Net 1080 ml       Physical Exam  Skin:     Comments: Left breast wound with old clot but no active bleeding       Results Review:    Results from last 7 days   Lab Units 08/04/23  0548 08/03/23  1726 08/03/23  0507 08/02/23  0415   SODIUM mmol/L 135*  --  137 138   POTASSIUM mmol/L 4.0 4.2 3.1* 3.8   CHLORIDE mmol/L 97*  --  98 101   CO2 mmol/L 28.0  --  28.0 23.0   BUN mg/dL 19  --  16 16   CREATININE mg/dL 1.64*  --  1.56* 1.61*   GLUCOSE mg/dL 135*  --  100* 133*   CALCIUM mg/dL 8.7  --  8.6 8.2*     Results from last 7 days   Lab Units 08/04/23  0548 08/03/23  0507 08/02/23  0415   WBC 10*3/mm3 6.83 5.94 7.86   HEMOGLOBIN g/dL 10.1* 10.6* 10.7*   HEMATOCRIT % 30.4* 32.4* 32.9*   PLATELETS 10*3/mm3 519* 484* 370       Assessment:    Sepsis    Mastitis        Plan:    -No evidence of bleeding after packing removed.  Okay for discharge from my standpoint once cleared by other services.   Follow-up next week for wound check in my office.      This document has been electronically signed by Fox Roper MD on August 5, 2023 07:35 CDT        Fox Roper MD  08/05/23  07:35 CDT

## 2023-08-05 NOTE — SIGNIFICANT NOTE
08/05/23 1325   OTHER   Discipline physical therapy assistant   Rehab Time/Intention   Session Not Performed other (see comments)  (nsg states pt  not following commands today,more confused,tomorrow may be better)

## 2023-08-05 NOTE — PROGRESS NOTES
"    [facility]  [address] [place] [address]. [id number]  T - [phone]  F - [phone]     Progress Note          PATIENT  DEMOGRAPHICS   PATIENT NAME: Carol Sullivan                      PHYSICIAN: Stephanie Gómez MD  : 1940  MRN: [id number]   LOS: 6 days    Patient Care Team:  Len Seo MD as PCP - General  ValeBlaire ribera APRN as Nurse Practitioner (General Surgery)  Subjective   SUBJECTIVE   No acute events overnight. Denied chest pain, poor appetite       Objective   OBJECTIVE   Vital Signs  Temp:  [97.1 øF (36.2 øC)-97.5 øF (36.4 øC)] 97.1 øF (36.2 øC)  Heart Rate:  [73-91] 87  Resp:  [18] 18  BP: (116-134)/(54-60) 116/58    Flowsheet Rows      Flowsheet Row First Filed Value   Admission Height 165.1 cm (65\") Documented at 2023 1900   Admission Weight 74.8 kg (165 lb) Documented at 2023 1608             I/O last 3 completed shifts:  In: 1080 [P.O.:1080]  Out: 150 [Urine:150]    PHYSICAL EXAM    Physical Exam  Vitals and nursing note reviewed.   Constitutional:       Appearance: Normal appearance. She is not ill-appearing.   Cardiovascular:      Rate and Rhythm: Normal rate and regular rhythm.      Heart sounds: Normal heart sounds. No murmur heard.    No friction rub. No gallop.   Pulmonary:      Effort: No respiratory distress.      Breath sounds: Normal breath sounds. No stridor. No wheezing, rhonchi or rales.   Abdominal:      General: Bowel sounds are normal. There is no distension.      Palpations: Abdomen is soft.      Tenderness: There is no abdominal tenderness.   Musculoskeletal:      Right lower leg: No edema.      Left lower leg: No edema.   Skin:     General: Skin is warm and dry.   Neurological:      Mental Status: She is alert.       RESULTS   Results Review:    Results from last 7 days   Lab Units 23  0718 23  0548 23  1726 23  0507   SODIUM mmol/L 132* 135*  --  137   POTASSIUM mmol/L 4.5 4.0 4.2 3.1*   CHLORIDE mmol/L " 95* 97*  --  98   CO2 mmol/L 24.0 28.0  --  28.0   BUN mg/dL 24* 19  --  16   CREATININE mg/dL 2.24* 1.64*  --  1.56*   CALCIUM mg/dL 9.0 8.7  --  8.6   GLUCOSE mg/dL 201* 135*  --  100*         Estimated Creatinine Clearance: 19.7 mL/min (A) (by C-G formula based on SCr of 2.24 mg/dL (H)).    Results from last 7 days   Lab Units 08/01/23  0551 07/31/23 2012   MAGNESIUM mg/dL 2.5* 1.5*               Results from last 7 days   Lab Units 08/05/23  0827 08/04/23  0548 08/03/23  0507 08/02/23 0415 08/01/23  0551   WBC 10*3/mm3 14.85* 6.83 5.94 7.86 9.57   HEMOGLOBIN g/dL 9.0* 10.1* 10.6* 10.7* 10.7*   PLATELETS 10*3/mm3 588* 519* 484* 370 298         Results from last 7 days   Lab Units 08/05/23  0718 08/04/23  0548 08/03/23  0507 08/02/23 0415 08/01/23  0551   INR  1.77* 2.15* 2.82* 3.41* 3.44*           Imaging Results (Last 24 Hours)       Procedure Component Value Units Date/Time    XR Abdomen KUB [[id number]] Collected: 08/05/23 1116     Updated: 08/05/23 1120    Narrative:      Comparison:  None    FINDINGS:  There is mild gaseous distention of the colon.  No significant stool burden.  No  evidence of small bowel obstruction.  Calcified phleboliths noted in the pelvis.               MEDICATIONS    doxycycline, 100 mg, Oral, Q12H  DULoxetine, 60 mg, Oral, Daily  famotidine, 20 mg, Oral, BID AC  isosorbide mononitrate, 30 mg, Oral, Daily  losartan, 25 mg, Oral, Daily  metoprolol succinate XL, 50 mg, Oral, Daily  NIFEdipine XL, 60 mg, Oral, Daily  nystatin, , Topical, Q12H  rosuvastatin, 40 mg, Oral, Daily  senna-docusate sodium, 2 tablet, Oral, BID  sodium chloride, 10 mL, Intravenous, Q12H  sodium chloride, 10 mL, Intravenous, Q12H  traMADol, 50 mg, Oral, Q8H  warfarin, 2.5 mg, Oral, Daily      Pharmacy Consult,   Pharmacy to dose warfarin,   sodium chloride, 75 mL/hr, Last Rate: 75 mL/hr (08/05/23 1104)        Assessment & Plan   ASSESSMENT / PLAN      Sepsis    Mastitis    1. Acute kidney injury: baseline  unknown.   - Creatinine was 1.5 in 10/2022.   - UA 3+ protein, trace leukocytes, 0-2 RBC, 6-12 WBC, 2+ bacteria. Urine sodium 26. Renal ultrasound unremarkable.   - Creatinine at 1.5-1.6 range and near baseline. Cr worse today and now on ivf and off lasix     2. Hypertension:   - Blood pressure is acceptable now. Was on low side and we have lowered losartan to 25mg. If cr is climbing more may stop it in am     3. Metabolic acidosis:   - Mild. Will monitor.      4. Sepsis:  - was On Zosyn     5. UTI:   - Receiving doxycycline     6. Cdiff: not on po vanc now      7. Hyponatremia:   - Sodium is stable    8. Anemia:  - Hemoglobin is acceptable            This document has been electronically signed by Stephanie Gómez MD on August 5, 2023 13:17 CDT

## 2023-08-06 ENCOUNTER — APPOINTMENT (OUTPATIENT)
Dept: CT IMAGING | Facility: HOSPITAL | Age: 83
DRG: 853 | End: 2023-08-06
Payer: MEDICARE

## 2023-08-06 ENCOUNTER — APPOINTMENT (OUTPATIENT)
Dept: GENERAL RADIOLOGY | Facility: HOSPITAL | Age: 83
DRG: 853 | End: 2023-08-06
Payer: MEDICARE

## 2023-08-06 ENCOUNTER — ANESTHESIA EVENT (OUTPATIENT)
Dept: PERIOP | Facility: HOSPITAL | Age: 83
DRG: 853 | End: 2023-08-06
Payer: MEDICARE

## 2023-08-06 ENCOUNTER — APPOINTMENT (OUTPATIENT)
Dept: ULTRASOUND IMAGING | Facility: HOSPITAL | Age: 83
DRG: 853 | End: 2023-08-06
Payer: MEDICARE

## 2023-08-06 ENCOUNTER — ANESTHESIA (OUTPATIENT)
Dept: PERIOP | Facility: HOSPITAL | Age: 83
DRG: 853 | End: 2023-08-06
Payer: MEDICARE

## 2023-08-06 PROBLEM — N13.39 OTHER HYDRONEPHROSIS: Status: ACTIVE | Noted: 2023-07-28

## 2023-08-06 LAB
ABO GROUP BLD: NORMAL
ALBUMIN SERPL-MCNC: 2.3 G/DL (ref 3.5–5.2)
ALBUMIN/GLOB SERPL: 0.7 G/DL
ALP SERPL-CCNC: 136 U/L (ref 39–117)
ALT SERPL W P-5'-P-CCNC: 1591 U/L (ref 1–33)
ANION GAP SERPL CALCULATED.3IONS-SCNC: 24 MMOL/L (ref 5–15)
ARTERIAL PATENCY WRIST A: ABNORMAL
AST SERPL-CCNC: 5266 U/L (ref 1–32)
ATMOSPHERIC PRESS: 745 MMHG
ATMOSPHERIC PRESS: 745 MMHG
ATMOSPHERIC PRESS: 746 MMHG
ATMOSPHERIC PRESS: 746 MMHG
BACTERIA SPEC ANAEROBE CULT: NORMAL
BASE EXCESS BLDA CALC-SCNC: -0.2 MMOL/L (ref 0–2)
BASE EXCESS BLDA CALC-SCNC: -5.2 MMOL/L (ref 0–2)
BASE EXCESS BLDA CALC-SCNC: 4.2 MMOL/L (ref 0–2)
BASE EXCESS BLDA CALC-SCNC: 6 MMOL/L (ref 0–2)
BASOPHILS # BLD AUTO: 0.05 10*3/MM3 (ref 0–0.2)
BASOPHILS NFR BLD AUTO: 0.1 % (ref 0–1.5)
BDY SITE: ABNORMAL
BILIRUB SERPL-MCNC: 0.5 MG/DL (ref 0–1.2)
BLD GP AB SCN SERPL QL: NEGATIVE
BUN SERPL-MCNC: 35 MG/DL (ref 8–23)
BUN/CREAT SERPL: 9.9 (ref 7–25)
CALCIUM SPEC-SCNC: 7.3 MG/DL (ref 8.6–10.5)
CHLORIDE SERPL-SCNC: 98 MMOL/L (ref 98–107)
CO2 SERPL-SCNC: 17 MMOL/L (ref 22–29)
CREAT SERPL-MCNC: 3.52 MG/DL (ref 0.57–1)
CRP SERPL-MCNC: 6.61 MG/DL (ref 0–0.5)
D-LACTATE SERPL-SCNC: 11.7 MMOL/L (ref 0.5–2)
D-LACTATE SERPL-SCNC: 2.6 MMOL/L (ref 0.5–2)
D-LACTATE SERPL-SCNC: 3.2 MMOL/L (ref 0.5–2)
D-LACTATE SERPL-SCNC: 6 MMOL/L (ref 0.5–2)
D-LACTATE SERPL-SCNC: 8.7 MMOL/L (ref 0.5–2)
DEPRECATED RDW RBC AUTO: 45.7 FL (ref 37–54)
EGFRCR SERPLBLD CKD-EPI 2021: 12.5 ML/MIN/1.73
EOSINOPHIL # BLD AUTO: 0.01 10*3/MM3 (ref 0–0.4)
EOSINOPHIL NFR BLD AUTO: 0 % (ref 0.3–6.2)
ERYTHROCYTE [DISTWIDTH] IN BLOOD BY AUTOMATED COUNT: 15.5 % (ref 12.3–15.4)
GAS FLOW AIRWAY: 4 LPM
GAS FLOW AIRWAY: 60 LPM
GEN 5 2HR TROPONIN T REFLEX: 47 NG/L
GLOBULIN UR ELPH-MCNC: 3.1 GM/DL
GLUCOSE BLDC GLUCOMTR-MCNC: 135 MG/DL (ref 70–130)
GLUCOSE BLDC GLUCOMTR-MCNC: 163 MG/DL (ref 70–130)
GLUCOSE BLDC GLUCOMTR-MCNC: 186 MG/DL (ref 70–130)
GLUCOSE BLDC GLUCOMTR-MCNC: 205 MG/DL (ref 70–130)
GLUCOSE SERPL-MCNC: 197 MG/DL (ref 65–99)
HCO3 BLDA-SCNC: 19.2 MMOL/L (ref 20–26)
HCO3 BLDA-SCNC: 24.7 MMOL/L (ref 20–26)
HCO3 BLDA-SCNC: 29.3 MMOL/L (ref 20–26)
HCO3 BLDA-SCNC: 29.5 MMOL/L (ref 20–26)
HCT VFR BLD AUTO: 18.7 % (ref 34–46.6)
HCT VFR BLD AUTO: 23.4 % (ref 34–46.6)
HGB BLD-MCNC: 6.6 G/DL (ref 12–15.9)
HGB BLD-MCNC: 8.2 G/DL (ref 12–15.9)
IMM GRANULOCYTES # BLD AUTO: 1.59 10*3/MM3 (ref 0–0.05)
IMM GRANULOCYTES NFR BLD AUTO: 4.3 % (ref 0–0.5)
INHALED O2 CONCENTRATION: 100 %
INHALED O2 CONCENTRATION: 100 %
INR PPP: 3 (ref 0.8–1.2)
LYMPHOCYTES # BLD AUTO: 1.66 10*3/MM3 (ref 0.7–3.1)
LYMPHOCYTES NFR BLD AUTO: 4.5 % (ref 19.6–45.3)
Lab: ABNORMAL
Lab: NORMAL
MAGNESIUM SERPL-MCNC: 1.7 MG/DL (ref 1.6–2.4)
MCH RBC QN AUTO: 29.6 PG (ref 26.6–33)
MCHC RBC AUTO-ENTMCNC: 35.3 G/DL (ref 31.5–35.7)
MCV RBC AUTO: 83.9 FL (ref 79–97)
MODALITY: ABNORMAL
MONOCYTES # BLD AUTO: 2.01 10*3/MM3 (ref 0.1–0.9)
MONOCYTES NFR BLD AUTO: 5.5 % (ref 5–12)
NEUTROPHILS NFR BLD AUTO: 31.4 10*3/MM3 (ref 1.7–7)
NEUTROPHILS NFR BLD AUTO: 85.6 % (ref 42.7–76)
NRBC BLD AUTO-RTO: 0.5 /100 WBC (ref 0–0.2)
PCO2 BLDA: 31.6 MM HG (ref 35–45)
PCO2 BLDA: 37.1 MM HG (ref 35–45)
PCO2 BLDA: 40.5 MM HG (ref 35–45)
PCO2 BLDA: 46.1 MM HG (ref 35–45)
PEEP RESPIRATORY: 5 CM[H2O]
PH BLDA: 7.39 PH UNITS (ref 7.35–7.45)
PH BLDA: 7.39 PH UNITS (ref 7.35–7.45)
PH BLDA: 7.41 PH UNITS (ref 7.35–7.45)
PH BLDA: 7.51 PH UNITS (ref 7.35–7.45)
PLATELET # BLD AUTO: 426 10*3/MM3 (ref 140–450)
PMV BLD AUTO: 10 FL (ref 6–12)
PO2 BLDA: 54.3 MM HG (ref 83–108)
PO2 BLDA: 60.4 MM HG (ref 83–108)
PO2 BLDA: 64.9 MM HG (ref 83–108)
PO2 BLDA: 75.6 MM HG (ref 83–108)
POTASSIUM SERPL-SCNC: 4.8 MMOL/L (ref 3.5–5.2)
PROCALCITONIN SERPL-MCNC: 1.01 NG/ML (ref 0–0.25)
PROT SERPL-MCNC: 5.4 G/DL (ref 6–8.5)
PROTHROMBIN TIME: 30.4 SECONDS (ref 11.1–15.3)
RBC # BLD AUTO: 2.23 10*6/MM3 (ref 3.77–5.28)
RH BLD: POSITIVE
SAO2 % BLDCOA: 90.6 % (ref 94–99)
SAO2 % BLDCOA: 90.7 % (ref 94–99)
SAO2 % BLDCOA: 93.2 % (ref 94–99)
SAO2 % BLDCOA: 95.9 % (ref 94–99)
SET MECH RESP RATE: 18
SODIUM SERPL-SCNC: 139 MMOL/L (ref 136–145)
T&S EXPIRATION DATE: NORMAL
TROPONIN T DELTA: -4 NG/L
TROPONIN T SERPL HS-MCNC: 56 NG/L
VENTILATOR MODE: ABNORMAL
VT ON VENT VENT: 450 ML
WBC NRBC COR # BLD: 36.72 10*3/MM3 (ref 3.4–10.8)

## 2023-08-06 PROCEDURE — 94002 VENT MGMT INPAT INIT DAY: CPT

## 2023-08-06 PROCEDURE — 25010000002 FUROSEMIDE PER 20 MG: Performed by: FAMILY MEDICINE

## 2023-08-06 PROCEDURE — 86923 COMPATIBILITY TEST ELECTRIC: CPT

## 2023-08-06 PROCEDURE — 83605 ASSAY OF LACTIC ACID: CPT | Performed by: INTERNAL MEDICINE

## 2023-08-06 PROCEDURE — 85014 HEMATOCRIT: CPT | Performed by: UROLOGY

## 2023-08-06 PROCEDURE — 25010000002 FENTANYL CITRATE (PF) 50 MCG/ML SOLUTION: Performed by: FAMILY MEDICINE

## 2023-08-06 PROCEDURE — 85025 COMPLETE CBC W/AUTO DIFF WBC: CPT | Performed by: STUDENT IN AN ORGANIZED HEALTH CARE EDUCATION/TRAINING PROGRAM

## 2023-08-06 PROCEDURE — 86900 BLOOD TYPING SEROLOGIC ABO: CPT

## 2023-08-06 PROCEDURE — 25010000002 FENTANYL CITRATE (PF) 50 MCG/ML SOLUTION: Performed by: UROLOGY

## 2023-08-06 PROCEDURE — 94799 UNLISTED PULMONARY SVC/PX: CPT

## 2023-08-06 PROCEDURE — 86140 C-REACTIVE PROTEIN: CPT | Performed by: STUDENT IN AN ORGANIZED HEALTH CARE EDUCATION/TRAINING PROGRAM

## 2023-08-06 PROCEDURE — 5A1945Z RESPIRATORY VENTILATION, 24-96 CONSECUTIVE HOURS: ICD-10-PCS | Performed by: FAMILY MEDICINE

## 2023-08-06 PROCEDURE — 25010000002 PROPOFOL 10 MG/ML EMULSION

## 2023-08-06 PROCEDURE — 86901 BLOOD TYPING SEROLOGIC RH(D): CPT | Performed by: INTERNAL MEDICINE

## 2023-08-06 PROCEDURE — 85018 HEMOGLOBIN: CPT | Performed by: UROLOGY

## 2023-08-06 PROCEDURE — 25010000002 PROPOFOL 10 MG/ML EMULSION: Performed by: FAMILY MEDICINE

## 2023-08-06 PROCEDURE — C1769 GUIDE WIRE: HCPCS | Performed by: UROLOGY

## 2023-08-06 PROCEDURE — 74420 UROGRAPHY RTRGR +-KUB: CPT

## 2023-08-06 PROCEDURE — 80053 COMPREHEN METABOLIC PANEL: CPT | Performed by: INTERNAL MEDICINE

## 2023-08-06 PROCEDURE — 76705 ECHO EXAM OF ABDOMEN: CPT

## 2023-08-06 PROCEDURE — 25010000002 PIPERACILLIN SOD-TAZOBACTAM PER 1 G: Performed by: UROLOGY

## 2023-08-06 PROCEDURE — 84484 ASSAY OF TROPONIN QUANT: CPT | Performed by: INTERNAL MEDICINE

## 2023-08-06 PROCEDURE — P9017 PLASMA 1 DONOR FRZ W/IN 8 HR: HCPCS

## 2023-08-06 PROCEDURE — 25010000002 ALBUMIN HUMAN 25% PER 50 ML: Performed by: FAMILY MEDICINE

## 2023-08-06 PROCEDURE — 25010000002 PIPERACILLIN SOD-TAZOBACTAM PER 1 G: Performed by: INTERNAL MEDICINE

## 2023-08-06 PROCEDURE — 71045 X-RAY EXAM CHEST 1 VIEW: CPT

## 2023-08-06 PROCEDURE — 25010000002 PROPOFOL 200 MG/20ML EMULSION: Performed by: NURSE ANESTHETIST, CERTIFIED REGISTERED

## 2023-08-06 PROCEDURE — 0BH17EZ INSERTION OF ENDOTRACHEAL AIRWAY INTO TRACHEA, VIA NATURAL OR ARTIFICIAL OPENING: ICD-10-PCS | Performed by: FAMILY MEDICINE

## 2023-08-06 PROCEDURE — 25010000002 METRONIDAZOLE 500 MG/100ML SOLUTION: Performed by: INTERNAL MEDICINE

## 2023-08-06 PROCEDURE — 86850 RBC ANTIBODY SCREEN: CPT | Performed by: INTERNAL MEDICINE

## 2023-08-06 PROCEDURE — 25510000001 IOPAMIDOL 61 % SOLUTION: Performed by: UROLOGY

## 2023-08-06 PROCEDURE — 25010000002 METHYLPREDNISOLONE PER 125 MG: Performed by: FAMILY MEDICINE

## 2023-08-06 PROCEDURE — 86927 PLASMA FRESH FROZEN: CPT

## 2023-08-06 PROCEDURE — 31500 INSERT EMERGENCY AIRWAY: CPT | Performed by: FAMILY MEDICINE

## 2023-08-06 PROCEDURE — 83735 ASSAY OF MAGNESIUM: CPT | Performed by: INTERNAL MEDICINE

## 2023-08-06 PROCEDURE — 86900 BLOOD TYPING SEROLOGIC ABO: CPT | Performed by: INTERNAL MEDICINE

## 2023-08-06 PROCEDURE — 36430 TRANSFUSION BLD/BLD COMPNT: CPT

## 2023-08-06 PROCEDURE — 74176 CT ABD & PELVIS W/O CONTRAST: CPT

## 2023-08-06 PROCEDURE — P9016 RBC LEUKOCYTES REDUCED: HCPCS

## 2023-08-06 PROCEDURE — 25010000002 VITAMIN K1 PER 1 MG: Performed by: FAMILY MEDICINE

## 2023-08-06 PROCEDURE — 25010000002 VITAMIN K1 PER 1 MG: Performed by: NURSE PRACTITIONER

## 2023-08-06 PROCEDURE — BT1DZZZ FLUOROSCOPY OF RIGHT KIDNEY, URETER AND BLADDER: ICD-10-PCS | Performed by: UROLOGY

## 2023-08-06 PROCEDURE — 82948 REAGENT STRIP/BLOOD GLUCOSE: CPT

## 2023-08-06 PROCEDURE — P9047 ALBUMIN (HUMAN), 25%, 50ML: HCPCS | Performed by: FAMILY MEDICINE

## 2023-08-06 PROCEDURE — C2617 STENT, NON-COR, TEM W/O DEL: HCPCS | Performed by: UROLOGY

## 2023-08-06 PROCEDURE — 25010000002 SUCCINYLCHOLINE PER 20 MG: Performed by: FAMILY MEDICINE

## 2023-08-06 PROCEDURE — 0T768DZ DILATION OF RIGHT URETER WITH INTRALUMINAL DEVICE, VIA NATURAL OR ARTIFICIAL OPENING ENDOSCOPIC: ICD-10-PCS | Performed by: UROLOGY

## 2023-08-06 PROCEDURE — C1758 CATHETER, URETERAL: HCPCS | Performed by: UROLOGY

## 2023-08-06 PROCEDURE — 25010000002 METRONIDAZOLE 500 MG/100ML SOLUTION: Performed by: UROLOGY

## 2023-08-06 PROCEDURE — 36600 WITHDRAWAL OF ARTERIAL BLOOD: CPT

## 2023-08-06 PROCEDURE — 85610 PROTHROMBIN TIME: CPT | Performed by: STUDENT IN AN ORGANIZED HEALTH CARE EDUCATION/TRAINING PROGRAM

## 2023-08-06 PROCEDURE — 25010000002 FENTANYL CITRATE PF 50 MCG/ML SOLUTION PREFILLED SYRINGE: Performed by: NURSE ANESTHETIST, CERTIFIED REGISTERED

## 2023-08-06 PROCEDURE — 94761 N-INVAS EAR/PLS OXIMETRY MLT: CPT

## 2023-08-06 PROCEDURE — 82803 BLOOD GASES ANY COMBINATION: CPT

## 2023-08-06 PROCEDURE — 25010000002 FUROSEMIDE PER 20 MG

## 2023-08-06 PROCEDURE — 25010000002 VANCOMYCIN 10 G RECONSTITUTED SOLUTION: Performed by: INTERNAL MEDICINE

## 2023-08-06 DEVICE — URETERAL STENT
Type: IMPLANTABLE DEVICE | Site: KIDNEY | Status: FUNCTIONAL
Brand: POLARIS™ ULTRA

## 2023-08-06 RX ORDER — FUROSEMIDE 10 MG/ML
20 INJECTION INTRAMUSCULAR; INTRAVENOUS ONCE
Status: COMPLETED | OUTPATIENT
Start: 2023-08-06 | End: 2023-08-06

## 2023-08-06 RX ORDER — METHYLPREDNISOLONE SODIUM SUCCINATE 125 MG/2ML
60 INJECTION, POWDER, LYOPHILIZED, FOR SOLUTION INTRAMUSCULAR; INTRAVENOUS EVERY 6 HOURS
Status: DISCONTINUED | OUTPATIENT
Start: 2023-08-06 | End: 2023-08-13

## 2023-08-06 RX ORDER — PANTOPRAZOLE SODIUM 40 MG/10ML
40 INJECTION, POWDER, LYOPHILIZED, FOR SOLUTION INTRAVENOUS
Status: DISCONTINUED | OUTPATIENT
Start: 2023-08-06 | End: 2023-08-17 | Stop reason: HOSPADM

## 2023-08-06 RX ORDER — FENTANYL CITRATE 50 UG/ML
50 INJECTION, SOLUTION INTRAMUSCULAR; INTRAVENOUS
Status: DISPENSED | OUTPATIENT
Start: 2023-08-06 | End: 2023-08-13

## 2023-08-06 RX ORDER — ONDANSETRON 2 MG/ML
4 INJECTION INTRAMUSCULAR; INTRAVENOUS EVERY 6 HOURS PRN
Status: DISCONTINUED | OUTPATIENT
Start: 2023-08-06 | End: 2023-08-07

## 2023-08-06 RX ORDER — FUROSEMIDE 10 MG/ML
40 INJECTION INTRAMUSCULAR; INTRAVENOUS ONCE
Status: COMPLETED | OUTPATIENT
Start: 2023-08-06 | End: 2023-08-06

## 2023-08-06 RX ORDER — FENTANYL CITRATE 50 UG/ML
50 INJECTION, SOLUTION INTRAMUSCULAR; INTRAVENOUS ONCE
Status: COMPLETED | OUTPATIENT
Start: 2023-08-06 | End: 2023-08-06

## 2023-08-06 RX ORDER — FENTANYL CITRATE 50 UG/ML
50 INJECTION, SOLUTION INTRAMUSCULAR; INTRAVENOUS ONCE
Status: DISCONTINUED | OUTPATIENT
Start: 2023-08-06 | End: 2023-08-06

## 2023-08-06 RX ORDER — PROPOFOL 10 MG/ML
INJECTION, EMULSION INTRAVENOUS AS NEEDED
Status: DISCONTINUED | OUTPATIENT
Start: 2023-08-06 | End: 2023-08-06 | Stop reason: SURG

## 2023-08-06 RX ORDER — DEXTROSE AND SODIUM CHLORIDE 5; .45 G/100ML; G/100ML
100 INJECTION, SOLUTION INTRAVENOUS CONTINUOUS
Status: DISCONTINUED | OUTPATIENT
Start: 2023-08-06 | End: 2023-08-06

## 2023-08-06 RX ORDER — NALOXONE HCL 0.4 MG/ML
0.4 VIAL (ML) INJECTION
Status: DISCONTINUED | OUTPATIENT
Start: 2023-08-06 | End: 2023-08-17 | Stop reason: HOSPADM

## 2023-08-06 RX ORDER — PROPOFOL 10 MG/ML
VIAL (ML) INTRAVENOUS
Status: COMPLETED
Start: 2023-08-06 | End: 2023-08-06

## 2023-08-06 RX ORDER — FENTANYL CITRATE 50 UG/ML
INJECTION, SOLUTION INTRAMUSCULAR; INTRAVENOUS AS NEEDED
Status: DISCONTINUED | OUTPATIENT
Start: 2023-08-06 | End: 2023-08-06 | Stop reason: SURG

## 2023-08-06 RX ORDER — FENTANYL CITRATE 50 UG/ML
25 INJECTION, SOLUTION INTRAMUSCULAR; INTRAVENOUS
Status: DISCONTINUED | OUTPATIENT
Start: 2023-08-06 | End: 2023-08-06

## 2023-08-06 RX ORDER — DEXMEDETOMIDINE HYDROCHLORIDE 4 UG/ML
.2-1.5 INJECTION, SOLUTION INTRAVENOUS
Status: DISCONTINUED | OUTPATIENT
Start: 2023-08-06 | End: 2023-08-06

## 2023-08-06 RX ORDER — ALBUMIN (HUMAN) 12.5 G/50ML
25 SOLUTION INTRAVENOUS 2 TIMES DAILY
Status: COMPLETED | OUTPATIENT
Start: 2023-08-06 | End: 2023-08-07

## 2023-08-06 RX ORDER — FUROSEMIDE 10 MG/ML
INJECTION INTRAMUSCULAR; INTRAVENOUS
Status: COMPLETED
Start: 2023-08-06 | End: 2023-08-06

## 2023-08-06 RX ORDER — LIDOCAINE HYDROCHLORIDE 20 MG/ML
JELLY TOPICAL AS NEEDED
Status: DISCONTINUED | OUTPATIENT
Start: 2023-08-06 | End: 2023-08-06 | Stop reason: HOSPADM

## 2023-08-06 RX ORDER — ETOMIDATE 2 MG/ML
20 INJECTION INTRAVENOUS ONCE
Status: COMPLETED | OUTPATIENT
Start: 2023-08-06 | End: 2023-08-06

## 2023-08-06 RX ORDER — ONDANSETRON 4 MG/1
4 TABLET, FILM COATED ORAL EVERY 6 HOURS PRN
Status: DISCONTINUED | OUTPATIENT
Start: 2023-08-06 | End: 2023-08-07

## 2023-08-06 RX ORDER — SUCCINYLCHOLINE CHLORIDE 20 MG/ML
100 INJECTION INTRAMUSCULAR; INTRAVENOUS ONCE
Status: COMPLETED | OUTPATIENT
Start: 2023-08-06 | End: 2023-08-06

## 2023-08-06 RX ADMIN — FENTANYL CITRATE 10 MCG: 50 INJECTION, SOLUTION INTRAMUSCULAR; INTRAVENOUS at 12:53

## 2023-08-06 RX ADMIN — VANCOMYCIN HYDROCHLORIDE 1000 MG: 10 INJECTION, POWDER, LYOPHILIZED, FOR SOLUTION INTRAVENOUS at 00:56

## 2023-08-06 RX ADMIN — PROPOFOL 20 MG: 10 INJECTION, EMULSION INTRAVENOUS at 12:46

## 2023-08-06 RX ADMIN — FUROSEMIDE 40 MG: 10 INJECTION, SOLUTION INTRAMUSCULAR; INTRAVENOUS at 13:21

## 2023-08-06 RX ADMIN — DOCUSATE SODIUM 50 MG AND SENNOSIDES 8.6 MG 2 TABLET: 8.6; 5 TABLET, FILM COATED ORAL at 08:41

## 2023-08-06 RX ADMIN — SODIUM CHLORIDE 100 ML: 9 INJECTION, SOLUTION INTRAVENOUS at 12:55

## 2023-08-06 RX ADMIN — FENTANYL CITRATE 50 MCG: 50 INJECTION, SOLUTION INTRAMUSCULAR; INTRAVENOUS at 18:05

## 2023-08-06 RX ADMIN — FENTANYL CITRATE 20 MCG: 50 INJECTION, SOLUTION INTRAMUSCULAR; INTRAVENOUS at 12:15

## 2023-08-06 RX ADMIN — METHYLPREDNISOLONE SODIUM SUCCINATE 60 MG: 125 INJECTION INTRAMUSCULAR; INTRAVENOUS at 21:00

## 2023-08-06 RX ADMIN — PIPERACILLIN SODIUM AND TAZOBACTAM SODIUM 3.38 G: 3; .375 INJECTION, POWDER, LYOPHILIZED, FOR SOLUTION INTRAVENOUS at 00:23

## 2023-08-06 RX ADMIN — METRONIDAZOLE 500 MG: 500 INJECTION, SOLUTION INTRAVENOUS at 13:55

## 2023-08-06 RX ADMIN — ROSUVASTATIN CALCIUM 40 MG: 20 TABLET, FILM COATED ORAL at 08:41

## 2023-08-06 RX ADMIN — PROPOFOL 50 MG: 10 INJECTION, EMULSION INTRAVENOUS at 12:15

## 2023-08-06 RX ADMIN — PROPOFOL 20 MG: 10 INJECTION, EMULSION INTRAVENOUS at 12:30

## 2023-08-06 RX ADMIN — FENTANYL CITRATE 50 MCG: 50 INJECTION, SOLUTION INTRAMUSCULAR; INTRAVENOUS at 18:44

## 2023-08-06 RX ADMIN — PROPOFOL 20 MG: 10 INJECTION, EMULSION INTRAVENOUS at 12:35

## 2023-08-06 RX ADMIN — PHYTONADIONE 10 MG: 10 INJECTION, EMULSION INTRAMUSCULAR; INTRAVENOUS; SUBCUTANEOUS at 01:19

## 2023-08-06 RX ADMIN — PIPERACILLIN SODIUM AND TAZOBACTAM SODIUM 3.38 G: 3; .375 INJECTION, POWDER, LYOPHILIZED, FOR SOLUTION INTRAVENOUS at 05:35

## 2023-08-06 RX ADMIN — METOPROLOL SUCCINATE 50 MG: 50 TABLET, EXTENDED RELEASE ORAL at 08:42

## 2023-08-06 RX ADMIN — PANTOPRAZOLE SODIUM 40 MG: 40 INJECTION, POWDER, FOR SOLUTION INTRAVENOUS at 05:34

## 2023-08-06 RX ADMIN — FENTANYL CITRATE 50 MCG: 50 INJECTION, SOLUTION INTRAMUSCULAR; INTRAVENOUS at 23:42

## 2023-08-06 RX ADMIN — FENTANYL CITRATE 25 MCG: 50 INJECTION, SOLUTION INTRAMUSCULAR; INTRAVENOUS at 09:48

## 2023-08-06 RX ADMIN — FENTANYL CITRATE 20 MCG: 50 INJECTION, SOLUTION INTRAMUSCULAR; INTRAVENOUS at 12:34

## 2023-08-06 RX ADMIN — SODIUM BICARBONATE 150 MEQ: 84 INJECTION, SOLUTION INTRAVENOUS at 22:48

## 2023-08-06 RX ADMIN — FENTANYL CITRATE 25 MCG: 50 INJECTION, SOLUTION INTRAMUSCULAR; INTRAVENOUS at 10:51

## 2023-08-06 RX ADMIN — ETOMIDATE 20 MG: 2 INJECTION INTRAVENOUS at 18:45

## 2023-08-06 RX ADMIN — PIPERACILLIN SODIUM AND TAZOBACTAM SODIUM 3.38 G: 3; .375 INJECTION, POWDER, LYOPHILIZED, FOR SOLUTION INTRAVENOUS at 17:07

## 2023-08-06 RX ADMIN — PROPOFOL 20 MG: 10 INJECTION, EMULSION INTRAVENOUS at 12:41

## 2023-08-06 RX ADMIN — FUROSEMIDE 20 MG: 10 INJECTION, SOLUTION INTRAVENOUS at 17:26

## 2023-08-06 RX ADMIN — METRONIDAZOLE 500 MG: 500 INJECTION, SOLUTION INTRAVENOUS at 17:07

## 2023-08-06 RX ADMIN — Medication 10 ML: at 21:02

## 2023-08-06 RX ADMIN — ALBUMIN (HUMAN) 25 G: 0.25 INJECTION, SOLUTION INTRAVENOUS at 22:21

## 2023-08-06 RX ADMIN — FENTANYL CITRATE 50 MCG: 50 INJECTION, SOLUTION INTRAMUSCULAR; INTRAVENOUS at 19:05

## 2023-08-06 RX ADMIN — PHYTONADIONE 10 MG: 10 INJECTION, EMULSION INTRAMUSCULAR; INTRAVENOUS; SUBCUTANEOUS at 10:50

## 2023-08-06 RX ADMIN — HYDROCODONE BITARTRATE AND ACETAMINOPHEN 1 TABLET: 5; 325 TABLET ORAL at 05:34

## 2023-08-06 RX ADMIN — SUCCINYLCHOLINE CHLORIDE 100 MG: 20 INJECTION, SOLUTION INTRAMUSCULAR; INTRAVENOUS at 18:45

## 2023-08-06 RX ADMIN — METRONIDAZOLE 500 MG: 500 INJECTION, SOLUTION INTRAVENOUS at 23:46

## 2023-08-06 RX ADMIN — PROPOFOL INJECTABLE EMULSION 40 MCG/KG/MIN: 10 INJECTION, EMULSION INTRAVENOUS at 22:48

## 2023-08-06 RX ADMIN — NYSTATIN: 100000 POWDER TOPICAL at 23:43

## 2023-08-06 RX ADMIN — PROPOFOL INJECTABLE EMULSION 5 MCG/KG/MIN: 10 INJECTION, EMULSION INTRAVENOUS at 19:00

## 2023-08-06 RX ADMIN — METRONIDAZOLE 500 MG: 500 INJECTION, SOLUTION INTRAVENOUS at 04:38

## 2023-08-06 RX ADMIN — ALBUMIN (HUMAN) 25 G: 0.25 INJECTION, SOLUTION INTRAVENOUS at 09:22

## 2023-08-06 RX ADMIN — NOREPINEPHRINE BITARTRATE 0.3 MCG/KG/MIN: 1 INJECTION, SOLUTION, CONCENTRATE INTRAVENOUS at 04:33

## 2023-08-06 RX ADMIN — DEXMEDETOMIDINE HYDROCHLORIDE 0.2 MCG/KG/HR: 100 INJECTION, SOLUTION, CONCENTRATE INTRAVENOUS at 17:29

## 2023-08-06 RX ADMIN — NOREPINEPHRINE BITARTRATE 0.18 MCG/KG/MIN: 1 INJECTION, SOLUTION, CONCENTRATE INTRAVENOUS at 12:02

## 2023-08-06 RX ADMIN — FENTANYL CITRATE 25 MCG: 50 INJECTION, SOLUTION INTRAMUSCULAR; INTRAVENOUS at 14:21

## 2023-08-06 RX ADMIN — SODIUM BICARBONATE 150 MEQ: 84 INJECTION, SOLUTION INTRAVENOUS at 12:00

## 2023-08-06 RX ADMIN — FENTANYL CITRATE 25 MCG: 50 INJECTION, SOLUTION INTRAMUSCULAR; INTRAVENOUS at 16:05

## 2023-08-06 RX ADMIN — FUROSEMIDE 40 MG: 10 INJECTION INTRAMUSCULAR; INTRAVENOUS at 13:21

## 2023-08-06 RX ADMIN — DULOXETINE HYDROCHLORIDE 60 MG: 60 CAPSULE, DELAYED RELEASE ORAL at 08:41

## 2023-08-06 RX ADMIN — FUROSEMIDE 20 MG: 10 INJECTION, SOLUTION INTRAMUSCULAR; INTRAVENOUS at 21:00

## 2023-08-06 RX ADMIN — PROPOFOL 20 MG: 10 INJECTION, EMULSION INTRAVENOUS at 12:23

## 2023-08-06 RX ADMIN — FENTANYL CITRATE 25 MCG: 50 INJECTION, SOLUTION INTRAMUSCULAR; INTRAVENOUS at 17:08

## 2023-08-06 NOTE — PROGRESS NOTES
Nurse Practitioner - Brief Progress Note  PERMANENT  08/06/2023 01:02    [facility] - CCU/SD - 20 - [place], [place] ([facility])    CLAUDIA CALVIN    Date of Service 08/06/2023 01:02    HPI/Events of Note [facility] Provider Assessment Note  Tele ICU Focused Assessment Note - Information obtained from patient's chart.    Brief HPI: 83 yo F admitted to hospital on 7/28/23 with confusion, sepsis 2/2 left breast mastitis s/p I&D on 8/1/23, PAF, UTI and BLAINE. This evening RRT called due to respiratory distress, symptomatic hypotension and was transferred to the ICU.    Most recent vital Signs reviewed.  Most recent Labs/Diagnostics reviewed.  Na 136 K 5.3 BUN 30 Cr.5 Glu 115  WBC 33.3 Hgb 6.9 Hct 22.1 Plts 601  PT 33.5 INR 3.4   Alk Phos 141 AST 1456   LA 12.9 BNP 3011  ABG reviewed on 4L NC  Urine culture pending     CXR (impression per radiology read) No consolidative pulmonary opacity, pleural effusion, or pneumothorax. Heart appears mildly enlarged, probably at least partially due to portable technique    Assessment and Plan: 83 yo F with septic shock, acute respiratory distress, on RA, Elevated LFTs  and metabolic acidosis. Requiring vasopressor support and Bicarb infusion. Acute blood loss anemia, requiring blood transfusion in setting of long term   anticoagulation   -neuro checks per protocol   - continue IV hydration and IV abx  -CT abd/pelvis pending  -US of liver pending  - trend BMP, LA and repeat ABG  -Hold Warfarin, Vit K IV x1 now and FFPs x2   - obtain post transition H&H   - supplemental 02, ABG PRN  Code status: Full Code   __x__   Video Assessment performed  __x___   Most recent labs reviewed  __x___   Vital Signs reviewed  __x___   Best Practices addressed:                 VTE prophylaxis: SCD's only in acute anemia                  SUP (when indicated): Protonix IV                 Glycemic control: ISS, Please notify bedside physician when  present or [facility] iif glc > 180 x2   __x___ Sepsis guidelines:  __x___     Spoke with bedside RN  __x__     Orders written  Maryanne Albarado, MSN, APRN, AGACNP-BC  Nurse Practitioner, [facility]   Please Contact [facility] for any need if the bedside provider is not present.       The patient is critically ill with renal failure, hepatic failure, severe metabolic derangement(s) and overwhelming infection  and requires high complexity decision making for assessment and support including assessment and treatment of complex   metabolic derangement, frequent evaluation and titration of therapies and application of advanced monitoring technologies; Critical care; Time devoted to patient care services described in this note = 4 minutes face-to-face / 45 minutes total evaluation   time     Care during the described time interval was provided by me.  I have reviewed this patient's available data, including medical history, events of note, physical examination and test results as part of my evaluation.    Interventions Intermediate-Best-practice therapies (e.g. VTE, beta blocker, etc.), Bleeding - evaluation and treatment with blood products, Communication with other healthcare providers and/or family, Diagnostic test evaluation, Electrolyte abnormality -   evaluation and management, Infection - evaluation and management        Electronically Signed by: Maryanne Albarado (NP) on 08/06/2023 01:07    Annotated By: Duke Rascon)    Date: 08/06/23 01:23  Chart reviewed, case discussed contemporaneously.    Uncertain where she's bleeding if truly hemorrhage, but she's hypotensive and in shock regardless.

## 2023-08-06 NOTE — ANESTHESIA POSTPROCEDURE EVALUATION
Patient: Carol Sullivan    Procedure Summary       Date: 08/06/23 Room / Location: [facility] OR 02 / [facility] OR    Anesthesia Start: 1215 Anesthesia Stop: 1311    Procedure: CYSTOSCOPY RETROGRADE PYELOGRAM AND STENT INSERTION (Right) Diagnosis:       Other hydronephrosis      (Other hydronephrosis [N13.39])    Surgeons: Ousmane Garcia MD Provider: Candie Mantilla CRNA    Anesthesia Type: general, MAC ASA Status: 4 - Emergent            Anesthesia Type: general, MAC    Vitals  Vitals Value Taken Time   BP     Temp     Pulse 89 08/06/23 1319   Resp     SpO2 92 % 08/06/23 1319   Vitals shown include unvalidated device data.        Post Anesthesia Care and Evaluation    Patient location during evaluation: ICU  Patient participation: complete - patient cannot participate  Level of consciousness: obtunded/minimal responses and responsive to physical stimuli  Pain score: 0  Pain management: adequate    Airway patency: patent  PONV Status: none  Cardiovascular status: acceptable  Respiratory status: spontaneous ventilation and acceptable  Hydration status: acceptable    Comments: 139/63,93,92%  Transferred to ICU on 100% non rebreather

## 2023-08-06 NOTE — PROGRESS NOTES
[facility] Medicine Services  INPATIENT PROGRESS NOTE      Length of Stay: 7  Date of Admission: 7/28/2023  Primary Care Physician: Len Seo MD    Subjective   Chief Complaint: No new complaints  HPI:  Became hypotensive last night and was transferred to CCU. Now on Levophed.  Will wake and answer questions appropriately.        Review of Systems   All pertinent negatives and positives are as above. All other systems have been reviewed and are negative unless otherwise stated.     Objective    As of today 08/06/23  Temp:  [97.1 øF (36.2 øC)-99 øF (37.2 øC)] 99 øF (37.2 øC)  Heart Rate:  [79-94] 90  Resp:  [18-31] 25  BP: ()/(37-59) 114/53    Physical Exam  Constitutional:       General: She is not in acute distress.     Appearance: She is not toxic-appearing.   HENT:      Head: Normocephalic and atraumatic.      Right Ear: External ear normal.      Left Ear: External ear normal.      Nose: Nose normal.      Mouth/Throat:      Pharynx: Oropharynx is clear.   Eyes:      Conjunctiva/sclera: Conjunctivae normal.   Cardiovascular:      Rate and Rhythm: Normal rate. Rhythm irregular.      Heart sounds: Normal heart sounds.   Pulmonary:      Effort: Pulmonary effort is normal. No respiratory distress.      Breath sounds: Normal breath sounds.   Abdominal:      General: Bowel sounds are normal.      Palpations: Abdomen is soft.      Tenderness: There is generalized abdominal tenderness.   Musculoskeletal:         General: No deformity.   Skin:     General: Skin is warm and dry.      Capillary Refill: Capillary refill takes less than 2 seconds.   Neurological:      General: No focal deficit present.      Mental Status: She is alert and oriented to person, place, and time. Mental status is at baseline.   Psychiatric:         Behavior: Behavior normal.         Results Review:  I have reviewed the labs, radiology results, and diagnostic studies.    Laboratory  Data:   Results from last 7 days   Lab Units 08/06/23 0414 08/05/23 2156 08/05/23  0718   SODIUM mmol/L 139 136 132*   POTASSIUM mmol/L 4.8 5.3* 4.5   CHLORIDE mmol/L 98 97* 95*   CO2 mmol/L 17.0* 11.0* 24.0   BUN mg/dL 35* 30* 24*   CREATININE mg/dL 3.52* 3.57* 2.24*   GLUCOSE mg/dL 197* 115* 201*   CALCIUM mg/dL 7.3* 8.1* 9.0   BILIRUBIN mg/dL 0.5 0.7  --    ALK PHOS U/L 136* 141*  --    ALT (SGPT) U/L 1,591* 593*  --    AST (SGOT) U/L 5,266* 1,456*  --    ANION GAP mmol/L 24.0* 28.0* 13.0       Estimated Creatinine Clearance: 12.6 mL/min (A) (by C-G formula based on SCr of 3.52 mg/dL (H)).  Results from last 7 days   Lab Units 08/06/23 0414 08/05/23 2156 08/01/23  0551   MAGNESIUM mg/dL 1.7 2.3 2.5*           Results from last 7 days   Lab Units 08/05/23 2156 08/05/23  0827 08/04/23  0548 08/03/23  0507 08/02/23  0415   WBC 10*3/mm3 33.24* 14.85* 6.83 5.94 7.86   HEMOGLOBIN g/dL 6.9* 9.0* 10.1* 10.6* 10.7*   HEMATOCRIT % 22.1* 27.0* 30.4* 32.4* 32.9*   PLATELETS 10*3/mm3 601* 588* 519* 484* 370       Results from last 7 days   Lab Units 08/06/23 0414 08/05/23 2156 08/05/23  0718 08/04/23  0548 08/03/23  0507   INR  3.00* 3.42* 1.77* 2.15* 2.82*         Culture Data:   No results found for: BLOODCX  No results found for: URINECX  No results found for: RESPCX  No results found for: WOUNDCX  No results found for: STOOLCX  No components found for: BODYFLD    Radiology Data:   Imaging Results (Last 24 Hours)       Procedure Component Value Units Date/Time    CT Abdomen Pelvis Without Contrast [[id number]] Resulted: 08/06/23 0857     Updated: 08/06/23 0857    US Liver [[id number]] Collected: 08/06/23 0806     Updated: 08/06/23 0812    Narrative:      RIGHT UPPER QUADRANT ABDOMINAL ULTRASOUND    HISTORY:  Right upper quadrant abdominal pain, elevated liver enzymes    COMPARISON:  None    TECHNIQUE:  High-resolution gray scale images of the liver, gallbladder, common bile duct,  pancreas, and right kidney were  obtained.  Color and spectral Doppler ultrasound  evaluation of the main portal vein was also performed.    FINDINGS:    Limited examination secondary to patient confusion and motion.    The liver is normal in size, measuring 9.5 cm in length.  The hepatic parenchyma  is diffusely increased in echogenicity.  Main portal vein is patent with  antegrade flow.    The gallbladder is surgically absent. There is no biliary dilatation. The common  bile duct is 7 mm in caliber.    The pancreas is obscured by bowel gas. The right kidney measures 9.6 cm in  length.  There is mild hydronephrosis.      Impression:      1.  Limited examination secondary to patient confusion and motion.  2.  Hepatic steatosis.  3.  Prior cholecystectomy.  4.  Mild right hydronephrosis.      XR Chest 1 View [[id number]] Collected: 08/05/23 2204     Updated: 08/05/23 2227    Narrative:        INDICATION:  Hypotension    COMPARISON:  None.      Impression:      FINDINGS/IMPRESSION:  No consolidative pulmonary opacity, pleural effusion, or pneumothorax.    Heart appears mildly enlarged, probably at least partially due to portable  technique.    Right upper extremity PICC terminates in the right axilla.    XR Abdomen KUB [[id number]] Collected: 08/05/23 1116     Updated: 08/05/23 1120    Narrative:      Comparison:  None    FINDINGS:  There is mild gaseous distention of the colon.  No significant stool burden.  No  evidence of small bowel obstruction.  Calcified phleboliths noted in the pelvis.              I have reviewed the patient's current medications.     Assessment/Plan     Principal Problem:    Sepsis  Active Problems:    Mastitis  Sepsis secondary to left breast mastitis improved now complicated by sepsis likely urological in origin.   Hypertension  Paroxysmal atrial fibrillation  BLAINE  UTI due to Enterococcus faecalis  Acute respiratory failure with hypoxia  Shock liver  Acute blood loss anemia  Lactic acidosis due to sepsis    Plan:  - Continue  supplemental O2 and wean as tolerated.   - Continue Levophed and IV fluids  - Continue broad spectrum coverage for now with Vanc and Zosyn  - Agree with CT A/P  - Right hydronephrosis noted on RUQ US, suspect she may have underlying kidney stone. Will discuss with Urology.   - Continue home medications as appropriate  - LFT's likely elevated due to shock liver. Can consider GI consultation if needed.   - She has had some bloody oozing from her left breast mastitis site.  This in conjunction with dilution from IV fluids is likely the cause of her worsening anemia.  Monitor for other signs of bleeding.   - Monitor H/H, if Hgb <7 then transfuse.  Risk vs benefits previous discussed.    - Monitor I's and O's and daily weights  - Monitor renal function and electrolytes  - Appreciate Nephrology assistance.   - DVT prophylaxis on hold.   - CODE STATUS: Full    35 minutes of critical care provided. This time excludes other billable procedures. Time does include preparation of documents, medical consultations, review of old records, and direct bedside care.     Medical Decision Making  Number and Complexity of problems: 7 high complexity    Conditions and Status:        Condition is improving.     MDM Data  Tests considered but not ordered: None     Decision rules/scores evaluated (example [id number], Wells, etc): None     Discussed with: Patient and family     Treatment Plan  As above    Care Planning  Shared decision making: Patient  Code status and discussions: Full    Disposition  Social Determinants of Health that impact treatment or disposition: none  I expect the patient to be discharged to home in 2-3 days.       I have utilized all available immediate resources to obtain, update, or review the patient's current medications (including all prescriptions, over-the-counter products, herbals, cannabis/cannabidiol products, and vitamin/mineral/dietary (nutritional) supplements).   I confirmed that the patient's Advance  Care Plan is present, code status is documented, or surrogate decision maker is listed in the patient's medical record.      Juanito Harrington MD     Addendum:  Hemoglobin continued to drop despite 1 unit of PRBCs.  2 more units of PRBCs as well as FFP have been ordered.

## 2023-08-06 NOTE — BRIEF OP NOTE
CYSTOSCOPY RETROGRADE PYELOGRAM AND STENT INSERTION  Progress Note    Carol Sullivan  8/6/2023    Pre-op Diagnosis:   Other hydronephrosis [N13.39]       Post-Op Diagnosis Codes:     * Other hydronephrosis [N13.39]    Procedure/CPTr Codes:        Procedure(s):  CYSTOSCOPY RETROGRADE PYELOGRAM AND STENT INSERTION              Surgeon(s):  Ousmane Garcia MD    Anesthesia: Choice    Staff:   Circulator: Mechelle Marshall RN; Suri Curran RN  Scrub Person: Kae Martinez         Estimated Blood Loss: none    Urine Voided: * No values recorded between 8/6/2023 12:20 PM and 8/6/2023 12:53 PM *    Specimens:                None          Drains:   Urethral Catheter 16 Fr. (Active)   Catheter care complete Yes 08/06/23 0739   Output (mL) 10 mL 08/06/23 1000       [REMOVED] External Urinary Catheter (Removed)   Site Assessment Clean;Skin intact 08/06/23 0400   Application/Removal skin care provided 08/05/23 2145   Collection Container Wall suction 08/06/23 0400   Wall suction (mmHG) 125 mmHG 08/02/23 2000   Securement Method Tape 08/06/23 0400   Catheter care complete Yes 08/05/23 2145   Output (mL) 300 mL 08/03/23 1700       Findings: Right hydronephrosis secondary to retroperitoneal hematoma        Complications: None          Ousmane Garcia MD     Date: 8/6/2023  Time: 12:53 CDT

## 2023-08-06 NOTE — NURSING NOTE
At 2100 Staff was called into room by family stating pt was having difficulty breathing. Sabrina RN went into room pt was talking and alert. Vital signs were taken at 2105 88HR 20 R 106/52 97% on 2L nasal canula. This RN came into room, pt was sitting up in bed breathing unlabored.Night time medications were scanned and I spoke with family at bedside. Pt HOB was raised up to 90 degrees. I asked pt if she was having pain, she said yes and rubbed her lower abd. I had pt take a sip of water with no issues and then gave her the po doxycline. Pt immediately started grunting. I retrieved the doxycline capsule out of pt mouth. Staff assist was pushed and a rapid response was called.

## 2023-08-06 NOTE — PROGRESS NOTES
"    [facility]  [address] [place] [address]. [address]  T - [phone]  F - [phone]     Progress Note          PATIENT  DEMOGRAPHICS   PATIENT NAME: Carol Sullivan                      PHYSICIAN: Stephanie Gómez MD  : 1940  MRN: [id number]   LOS: 7 days    Patient Care Team:  Len Seo MD as PCP - General  Blaire Camara APRN as Nurse Practitioner (General Surgery)  Subjective   SUBJECTIVE   Events reviewed - low bp septic, on levophed. Now in icu       Objective   OBJECTIVE   Vital Signs  Temp:  [97.1 øF (36.2 øC)-99 øF (37.2 øC)] 99 øF (37.2 øC)  Heart Rate:  [79-94] 90  Resp:  [18-31] 25  BP: ()/(37-59) 114/53    Flowsheet Rows      Flowsheet Row First Filed Value   Admission Height 165.1 cm (65\") Documented at 2023 1900   Admission Weight 74.8 kg (165 lb) Documented at 2023 1608             I/O last 3 completed shifts:  In: 4843 [P.O.:420; I.V.:3946; Blood:477]  Out: 100 [Urine:100]    PHYSICAL EXAM    Physical Exam  Vitals and nursing note reviewed.   Constitutional:       Appearance: Normal appearance. She is not ill-appearing.   Cardiovascular:      Rate and Rhythm: Normal rate and regular rhythm.      Heart sounds: Normal heart sounds. No murmur heard.    No friction rub. No gallop.   Pulmonary:      Effort: No respiratory distress.      Breath sounds: Normal breath sounds. No stridor. No wheezing, rhonchi or rales.   Abdominal:      General: Bowel sounds are normal. There is no distension.      Palpations: Abdomen is soft.      Tenderness: There is no abdominal tenderness.   Musculoskeletal:      Right lower leg: No edema.      Left lower leg: No edema.   Skin:     General: Skin is warm and dry.   Neurological:      Mental Status: She is alert.       RESULTS   Results Review:    Results from last 7 days   Lab Units 23  0414 23  2156 23  0718   SODIUM mmol/L 139 136 132*   POTASSIUM mmol/L 4.8 5.3* 4.5   CHLORIDE mmol/L 98 97* " 95*   CO2 mmol/L 17.0* 11.0* 24.0   BUN mg/dL 35* 30* 24*   CREATININE mg/dL 3.52* 3.57* 2.24*   CALCIUM mg/dL 7.3* 8.1* 9.0   BILIRUBIN mg/dL 0.5 0.7  --    ALK PHOS U/L 136* 141*  --    ALT (SGPT) U/L 1,591* 593*  --    AST (SGOT) U/L 5,266* 1,456*  --    GLUCOSE mg/dL 197* 115* 201*         Estimated Creatinine Clearance: 12.6 mL/min (A) (by C-G formula based on SCr of 3.52 mg/dL (H)).    Results from last 7 days   Lab Units 08/06/23 0414 08/05/23 2156 08/01/23  0551   MAGNESIUM mg/dL 1.7 2.3 2.5*               Results from last 7 days   Lab Units 08/05/23 2156 08/05/23  0827 08/04/23  0548 08/03/23  0507 08/02/23  0415   WBC 10*3/mm3 33.24* 14.85* 6.83 5.94 7.86   HEMOGLOBIN g/dL 6.9* 9.0* 10.1* 10.6* 10.7*   PLATELETS 10*3/mm3 601* 588* 519* 484* 370         Results from last 7 days   Lab Units 08/06/23 0414 08/05/23 2156 08/05/23  0718 08/04/23  0548 08/03/23  0507   INR  3.00* 3.42* 1.77* 2.15* 2.82*           Imaging Results (Last 24 Hours)       Procedure Component Value Units Date/Time    CT Abdomen Pelvis Without Contrast [[id number]] Resulted: 08/06/23 0857     Updated: 08/06/23 0903    US Liver [[id number]] Collected: 08/06/23 0806     Updated: 08/06/23 0812    Narrative:      RIGHT UPPER QUADRANT ABDOMINAL ULTRASOUND    HISTORY:  Right upper quadrant abdominal pain, elevated liver enzymes    COMPARISON:  None    TECHNIQUE:  High-resolution gray scale images of the liver, gallbladder, common bile duct,  pancreas, and right kidney were obtained.  Color and spectral Doppler ultrasound  evaluation of the main portal vein was also performed.    FINDINGS:    Limited examination secondary to patient confusion and motion.    The liver is normal in size, measuring 9.5 cm in length.  The hepatic parenchyma  is diffusely increased in echogenicity.  Main portal vein is patent with  antegrade flow.    The gallbladder is surgically absent. There is no biliary dilatation. The common  bile duct is 7 mm in  caliber.    The pancreas is obscured by bowel gas. The right kidney measures 9.6 cm in  length.  There is mild hydronephrosis.      Impression:      1.  Limited examination secondary to patient confusion and motion.  2.  Hepatic steatosis.  3.  Prior cholecystectomy.  4.  Mild right hydronephrosis.      XR Chest 1 View [[id number]] Collected: 08/05/23 2204     Updated: 08/05/23 2227    Narrative:        INDICATION:  Hypotension    COMPARISON:  None.      Impression:      FINDINGS/IMPRESSION:  No consolidative pulmonary opacity, pleural effusion, or pneumothorax.    Heart appears mildly enlarged, probably at least partially due to portable  technique.    Right upper extremity PICC terminates in the right axilla.    XR Abdomen KUB [[id number]] Collected: 08/05/23 1116     Updated: 08/05/23 1120    Narrative:      Comparison:  None    FINDINGS:  There is mild gaseous distention of the colon.  No significant stool burden.  No  evidence of small bowel obstruction.  Calcified phleboliths noted in the pelvis.               MEDICATIONS    albumin human, 25 g, Intravenous, BID  DULoxetine, 60 mg, Oral, Daily  metoprolol succinate XL, 50 mg, Oral, Daily  metroNIDAZOLE, 500 mg, Intravenous, Q6H  nystatin, , Topical, Q12H  pantoprazole, 40 mg, Intravenous, Q AM  piperacillin-tazobactam, 3.375 g, Intravenous, Q12H  rosuvastatin, 40 mg, Oral, Daily  senna-docusate sodium, 2 tablet, Oral, BID  sodium chloride, 1,000 mL, Intravenous, Once  sodium chloride, 10 mL, Intravenous, Q12H  sodium chloride, 10 mL, Intravenous, Q12H      norepinephrine, 0.02-0.3 mcg/kg/min, Last Rate: 0.3 mcg/kg/min (08/06/23 0433)  Pharmacy Consult,   Pharmacy to dose vancomycin,   Pharmacy to dose warfarin,   sodium bicarbonate drip (greater than 75 mEq/bag), 150 mEq, Last Rate: 150 mEq (08/05/23 2221)  vasopressin, 0.03 Units/min        Assessment & Plan   ASSESSMENT / PLAN      Sepsis    Mastitis    1. Acute kidney injury: baseline unknown.   - Creatinine  was 1.5 in 10/2022.   - UA 3+ protein, trace leukocytes, 0-2 RBC, 6-12 WBC, 2+ bacteria. Urine sodium 26. Renal ultrasound unremarkable.   - Creatinine at 1.5-1.6 range and then rapid worsening. Now upto 3.5 likely now atn. UO poor    Dw patient and her  that she may need RRT - they understand it.     Keep albumin levophed bicarb drip and antibiotics    2. Hypertension:   - Blood pressure is low and now off losartan and currently on levophed     3. Metabolic acidosis:   - on bicarb drip     4. Sepsis:  - zosyn vancomycin and flagyl wbc now elevated     5. UTI:   - became septic with mild hydro Dr Alexis is consulted    6. Cdiff: not on po vanc now      7. Hyponatremia:   - Sodium is stable    8. Anemia:  - Hemoglobin is acceptable            This document has been electronically signed by Stephanie Gómez MD on August 6, 2023 09:14 CDT

## 2023-08-06 NOTE — OP NOTE
CYSTOSCOPY RETROGRADE PYELOGRAM AND STENT INSERTION  Procedure Note    Carol Sullivan  8/6/2023    Pre-op Diagnosis:   Other hydronephrosis [N13.39]    Post-op Diagnosis:     Post-Op Diagnosis Codes:     * Other hydronephrosis [N13.39]    Procedure(s):  CYSTOSCOPY RETROGRADE PYELOGRAM AND STENT INSERTION    Surgeon(s):  Ousmane Garcia MD    Anesthesia: Choice    Staff:   Circulator: Mechelle Marshall RN; Suri Curran RN  Scrub Person: Kae Martinez          Estimated Blood Loss: none    Specimens:                None      Drains:   Urethral Catheter Silicone 16 Fr. (Active)   Daily Indications Placement by  physician 08/06/23 1600   Site Assessment Clean;Skin intact 08/06/23 1600   Collection Container Standard drainage bag 08/06/23 1600   Securement Method Securing device 08/06/23 1600   Catheter care complete Yes 08/06/23 1200   Output (mL) 200 mL 08/06/23 1700       [REMOVED] Urethral Catheter 16 Fr. (Removed)   Site Assessment Clean;Skin intact 08/06/23 0800   Collection Container Standard drainage bag 08/06/23 0800   Securement Method Securing device 08/06/23 0800   Catheter care complete Yes 08/06/23 0800   Output (mL) 10 mL 08/06/23 1000       [REMOVED] External Urinary Catheter (Removed)   Site Assessment Clean;Skin intact 08/06/23 0400   Application/Removal skin care provided 08/05/23 2145   Collection Container Wall suction 08/06/23 0400   Wall suction (mmHG) 125 mmHG 08/02/23 2000   Securement Method Tape 08/06/23 0400   Catheter care complete Yes 08/05/23 2145   Output (mL) 300 mL 08/03/23 1700       Findings: Right ureter nephrosis secondary to retroperitoneal hematoma    Complications: None    Indications: Same    Description of Procedure: Patient brought to surgery placed in dorsolithotomy position sterile prep and drape genitalia in routine fashion I passed a 22 Serbian cystoscope into the bladder right ureter was noted to be pushed medially from the retroperitoneal hematoma I was able to  negotiate an 038 guidewire up the ureter on the right-hand side put a retrograde catheter over top that shot retrograde studies.  I put the 03 guidewire all in the right renal pelvis and over top guidewire through cystoscope placed a 6 x 30 double-J stent pushes distal pusher bladder was drained scope was removed and fluoroscopy showed J stent was in good position at termination procedure    Ousmane Garcia MD     Date: 8/6/2023  Time: 17:26 CDT

## 2023-08-06 NOTE — SIGNIFICANT NOTE
Pt t/f to ICU and will need R/S orders when appropriate.   08/06/23 0700   OTHER   Discipline occupational therapy assistant   Rehab Time/Intention   Session Not Performed other (see comments)  (t/f to ICU)

## 2023-08-06 NOTE — PROGRESS NOTES
"  Pharmacokinetics by Pharmacy - Vancomycin    Carol Sullivan is a 82 y.o. female   [Ht: 165.1 cm (65\"); Wt: 63.5 kg (140 lb)] was started on Vancomycin for sepsis. Consult is for 7 days.   Pt was also started on Zosyn and Flagyl- possible intra-abdominal infection    Estimated Creatinine Clearance: 12.6 mL/min (A) (by C-G formula based on SCr of 3.52 mg/dL (H)).   Creatinine   Date Value Ref Range Status   08/06/2023 3.52 (H) 0.57 - 1.00 mg/dL Final   08/05/2023 3.57 (H) 0.57 - 1.00 mg/dL Final   08/05/2023 2.24 (H) 0.57 - 1.00 mg/dL Final   10/27/2022 1.5 (H) 0.7 - 1.3 mg/dL Final   06/03/2020 0.93 0.57 - 1.11 mg/dL Final   06/02/2020 1.05 0.57 - 1.11 mg/dL Final   06/01/2020 0.96 0.57 - 1.11 mg/dL Final      Lab Results   Component Value Date    WBC 33.24 (C) 08/05/2023    WBC 14.85 (H) 08/05/2023    WBC 6.83 08/04/2023      Temp Readings from Last 1 Encounters:   08/06/23 99 øF (37.2 øC) (Oral)      C-Reactive Protein   Date Value Ref Range Status   08/06/2023 6.61 (H) 0.00 - 0.50 mg/dL Final   08/05/2023 5.30 (H) 0.00 - 0.50 mg/dL Final   08/04/2023 4.91 (H) 0.00 - 0.50 mg/dL Final     Lactate   Date Value Ref Range Status   08/06/2023 8.7 (C) 0.5 - 2.0 mmol/L Final   08/06/2023 11.7 (C) 0.5 - 2.0 mmol/L Final   08/05/2023 12.9 (C) 0.5 - 2.0 mmol/L Final        Baseline culture results:  Microbiology Results (last 10 days)       Procedure Component Value - Date/Time    Urine Culture - Urine, Straight Cath [[id number]]  (Normal) Collected: 08/05/23 1109    Lab Status: Preliminary result Specimen: Urine from Straight Cath Updated: 08/06/23 0905     Urine Culture No growth    Anaerobic Culture - Tissue, Breast, Left [[id number]]  (Normal) Collected: 08/01/23 1241    Lab Status: Final result Specimen: Tissue from Breast, Left Updated: 08/06/23 0343     Anaerobic Culture No anaerobes isolated at 5 days    Tissue / Bone Culture - Tissue, Breast, Left [[id number]] Collected: 08/01/23 1241    Lab Status: Final result " Specimen: Tissue from Breast, Left Updated: 08/04/23 0527     Tissue Culture No growth at 3 days     Gram Stain Few (2+) WBCs seen      No organisms seen    Gastrointestinal Panel, PCR - Stool, Per Rectum [[id number]]  (Normal) Collected: 07/29/23 2107    Lab Status: Final result Specimen: Stool from Per Rectum Updated: 07/29/23 2255     Campylobacter Not Detected     Plesiomonas shigelloides Not Detected     Salmonella Not Detected     Vibrio Not Detected     Vibrio cholerae Not Detected     Yersinia enterocolitica Not Detected     Enteroaggregative E. coli (EAEC) Not Detected     Enteropathogenic E. coli (EPEC) Not Detected     Enterotoxigenic E. coli (ETEC) lt/st Not Detected     Shiga-like toxin-producing E. coli (STEC) stx1/stx2 Not Detected     Shigella/Enteroinvasive E. coli (EIEC) Not Detected     Cryptosporidium Not Detected     Cyclospora cayetanensis Not Detected     Entamoeba histolytica Not Detected     Giardia lamblia Not Detected     Adenovirus F40/41 Not Detected     Astrovirus Not Detected     Norovirus GI/GII Not Detected     Rotavirus A Not Detected     Sapovirus (I, II, IV or V) Not Detected    Narrative:      Testing performed by multiplex PCR system.    Clostridioides difficile Toxin - Stool, Per Rectum [[id number]]  (Abnormal) Collected: 07/29/23 2107    Lab Status: Final result Specimen: Stool from Per Rectum Updated: 07/29/23 2221    Narrative:      The following orders were created for panel order Clostridioides difficile Toxin - Stool, Per Rectum.  Procedure                               Abnormality         Status                     ---------                               -----------         ------                     Clostridioides difficile...[[id number]]  Abnormal            Final result                 Please view results for these tests on the individual orders.    Clostridioides difficile Toxin, PCR - Stool, Per Rectum [[id number]]  (Abnormal) Collected: 07/29/23 2107    Lab Status:  Final result Specimen: Stool from Per Rectum Updated: 07/29/23 2221     Toxigenic C. difficile by PCR Positive     Comment: CONTACT PRECAUTION       Narrative:      Performed by real-time polymerase chain reaction (qPCR).  DNA from a toxigenic strain of C.difficile has been detected. Antigen testing for the presence of free C.difficile toxin is currently in progress, to help determine the clinical significance of this PCR result.     Clostridioides difficile toxin Ag, Reflex - Stool, Per Rectum [[id number]]  (Normal) Collected: 07/29/23 2107    Lab Status: Final result Specimen: Stool from Per Rectum Updated: 07/30/23 0814     C.diff Toxin Ag Negative    Narrative:      DNA from a toxigenic strain of C.difficile was detected, although the free toxin itself was not detected. These findings are consistent with C.difficile colonization and may not reflect actual C.difficile infection. Clinical correlation needed.    Blood Culture - Blood, Arm, Left [[id number]]  (Normal) Collected: 07/28/23 1626    Lab Status: Final result Specimen: Blood from Arm, Left Updated: 08/02/23 1631     Blood Culture No growth at 5 days    Blood Culture - Blood, Arm, Right [[id number]]  (Normal) Collected: 07/28/23 1626    Lab Status: Final result Specimen: Blood from Arm, Right Updated: 08/02/23 1631     Blood Culture No growth at 5 days    COVID-19 and FLU A/B PCR - Swab, Nasopharynx [[id number]]  (Normal) Collected: 07/28/23 1605    Lab Status: Final result Specimen: Swab from Nasopharynx Updated: 07/28/23 1642     COVID19 Not Detected     Influenza A PCR Not Detected     Influenza B PCR Not Detected    Narrative:      Fact sheet for providers: https://www.fda.gov/media/046167/download    Fact sheet for patients: https://www.fda.gov/media/059301/download    Test performed by PCR.    Urine Culture - Urine, Urine, Catheter [[id number]]  (Abnormal)  (Susceptibility) Collected: 07/28/23 1604    Lab Status: Final result Specimen: Urine, Catheter  Updated: 07/30/23 0428     Urine Culture >100,000 CFU/mL Enterococcus faecalis    Narrative:      Colonization of the urinary tract without infection is common. Treatment is discouraged unless the patient is symptomatic, pregnant, or undergoing an invasive urologic procedure.    Susceptibility        Enterococcus faecalis      NATALI      Ampicillin Susceptible      Levofloxacin Susceptible      Nitrofurantoin Intermediate      Tetracycline Resistant      Vancomycin Susceptible                                     Assessment/Plan  Noted per provider:  Sepsis  Active Problems:    Mastitis  Sepsis secondary to left breast mastitis improved now complicated by sepsis likely urological in origin.   Hypertension  Paroxysmal atrial fibrillation  BLAINE  UTI due to Enterococcus faecalis  Acute respiratory failure with hypoxia  Shock liver  Acute blood loss anemia  Lactic acidosis due to sepsis    Initiated Vancomycin 1000 mg x 1 dose was given this morning at 0056.   Blood cultures: pending  C Diff- toxin was +/ antigen -  Urine culture- no growth  Will order a random level for 8/7 at 0600  Will have to pulse dose due to CrCl 12.6, may need RRT per nephrology  Pharmacy will monitor renal function and adjust dose accordingly.    Dolly Birch, PharmD  08/06/23 09:24 CDT

## 2023-08-06 NOTE — CONSULTS
"    Referring Provider: Dr. Harrington  Reason for Consultation: Right hydronephrosis    Patient Care Team:  Len Seo MD as PCP - General  Blaire Camara APRN as Nurse Practitioner (General Surgery)    Chief complaint: Sepsis hydronephrosis    Subjective .     History of present illness: ICU patient well-known to us that she has been a patient of ours in the past who has had right hydroureteronephrosis and sepsis we have been asked see her apparently the hydronephrosis is from a retroperitoneal hematoma or bleed    Review of Systems:  Pertinent items are noted in HPI    History:  Past Medical History:   Diagnosis Date    Arthritis     Depression     GERD (gastroesophageal reflux disease)     Hyperlipidemia     Hypertension     Near syncope     Vascular disease      Past Surgical History:   Procedure Laterality Date    BLADDER SURGERY      ENDOSCOPY N/A 5/6/2019    Procedure: ESOPHAGOGASTRODUODENOSCOPY;  Surgeon: Alberto Sanchez DO;  Location: [facility] ENDOSCOPY;  Service: Gastroenterology    GALLBLADDER SURGERY      SHOULDER SURGERY      \"bone spurs\"    SUBTOTAL HYSTERECTOMY       Family History   Problem Relation Age of Onset    Hypertension Mother     Diabetes Paternal Aunt     Diabetes Paternal Grandmother     Hypertension Paternal Grandmother     Hypertension Paternal Grandfather      Social History     Tobacco Use    Smoking status: Never    Smokeless tobacco: Never   Vaping Use    Vaping Use: Never used   Substance Use Topics    Alcohol use: No    Drug use: No     Medications Prior to Admission   Medication Sig Dispense Refill Last Dose    acetaminophen (TYLENOL) 500 MG tablet Take 1 tablet by mouth As Needed.   Past Week    DULoxetine (CYMBALTA) 60 MG capsule    Past Week    furosemide (LASIX) 20 MG tablet Take 1 tablet by mouth 2 (Two) Times a Day. 180 tablet 3 Past Week    gabapentin (NEURONTIN) 300 MG capsule Take 1 capsule by mouth 3 (Three) Times a Day.  2 Past Week    isosorbide mononitrate " (IMDUR) 30 MG 24 hr tablet Take 1 tablet by mouth Daily. 90 tablet 3 Past Week    lansoprazole (PREVACID) 30 MG capsule Take 1 capsule by mouth Every 12 (Twelve) Hours.   Past Week    losartan (COZAAR) 100 MG tablet Take 1 tablet by mouth Daily. 30 tablet 3 Past Week    metoprolol succinate XL (TOPROL-XL) 50 MG 24 hr tablet Take 1 tablet by mouth Daily. 90 tablet 3 Past Week    Mirabegron ER (MYRBETRIQ) 50 MG tablet sustained-release 24 hour 24 hr tablet Take 50 mg by mouth Daily.   Past Week    NIFEdipine XL (PROCARDIA XL) 60 MG 24 hr tablet Take 1 tablet by mouth Daily. 90 tablet 3 Past Week    potassium chloride (K-DUR,KLOR-CON) 20 MEQ CR tablet Take 1 tablet by mouth Daily. 90 tablet 3 Past Week    rosuvastatin (CRESTOR) 40 MG tablet Take 1 tablet by mouth Daily. 90 tablet 3 Past Week    traMADol (ULTRAM) 50 MG tablet Take 1 tablet by mouth As Needed.   Past Week    VENTOLIN  (90 Base) MCG/ACT inhaler    Past Week    warfarin (COUMADIN) 2.5 MG tablet Take 1 tablet nightly except on Monday take 1.5 tablets OR AS DIRECTED 100 tablet 1 Past Week        Allergies: Tuberculin tests, Hydrocodone, and Lortab [hydrocodone-acetaminophen]    Objective     Vital Signs:   Temp:  [97.1 øF (36.2 øC)-99 øF (37.2 øC)] 98.4 øF (36.9 øC)  Heart Rate:  [79-94] 87  Resp:  [18-31] 25  BP: ()/(37-62) 118/62    Physical Exam:     General Appearance:      Sick   Head:    Normocephalic, without obvious abnormality, atraumatic.   Eyes:            Lids and lashes normal, conjunctivae and sclerae normal, no   icterus, no pallor, corneas clear, PERRLA.   Ears:    Ears appear intact with no abnormalities noted.   Throat:   No oral lesions, no thrush, oral mucosa moist.   Lungs:     Clear to auscultation, respirations regular, even and                  unlabored.    Heart:    Regular rhythm and normal rate, normal S1 and S2, no            murmur, no gallop, no rub, no click.   Abdomen:     Normal bowel sounds, no masses, no  organomegaly, soft        nontender, nondistended, no guarding, no rebound          tenderness.   Genitourinary: Urine clear.   Rectal:   Tobar draining urine clear.   Extremities:   Moves all extremities well, no edema, no cyanosis, no             redness.   Pulses:   Pulses palpable and equal bilaterally.   Skin:   No bleeding, bruising or rash.   Neurologic:   Cranial nerves 2 - 12 grossly intact, sensation intact, DTR       present and equal bilaterally.       Results Review:    Lab Results (last 24 hours)       Procedure Component Value Units Date/Time    STAT Lactic Acid, Reflex [[id number]]  (Abnormal) Collected: 08/06/23 0944    Specimen: Blood Updated: 08/06/23 1017     Lactate 6.0 mmol/L     CBC & Differential [[id number]]  (Abnormal) Collected: 08/06/23 0944    Specimen: Blood Updated: 08/06/23 1012    Narrative:      The following orders were created for panel order CBC & Differential.  Procedure                               Abnormality         Status                     ---------                               -----------         ------                     CBC Auto Differential[[id number]]        Abnormal            Final result               Scan Slide[[id number]]                                                                    Please view results for these tests on the individual orders.    CBC Auto Differential [[id number]]  (Abnormal) Collected: 08/06/23 0944    Specimen: Blood Updated: 08/06/23 1012     WBC 36.72 10*3/mm3      RBC 2.23 10*6/mm3      Hemoglobin 6.6 g/dL      Hematocrit 18.7 %      MCV 83.9 fL      MCH 29.6 pg      MCHC 35.3 g/dL      RDW 15.5 %      RDW-SD 45.7 fl      MPV 10.0 fL      Platelets 426 10*3/mm3      Neutrophil % 85.6 %      Lymphocyte % 4.5 %      Monocyte % 5.5 %      Eosinophil % 0.0 %      Basophil % 0.1 %      Immature Grans % 4.3 %      Neutrophils, Absolute 31.40 10*3/mm3      Lymphocytes, Absolute 1.66 10*3/mm3      Monocytes, Absolute 2.01 10*3/mm3       Eosinophils, Absolute 0.01 10*3/mm3      Basophils, Absolute 0.05 10*3/mm3      Immature Grans, Absolute 1.59 10*3/mm3      nRBC 0.5 /100 WBC     POC Glucose Once [[id number]]  (Abnormal) Collected: 08/06/23 0719    Specimen: Blood Updated: 08/06/23 0925     Glucose 205 mg/dL      Comment: RN NotifiedOperator: [id number] JOSE ALYSSAMeter ID: [id number]       Urine Culture - Urine, Straight Cath [[id number]]  (Normal) Collected: 08/05/23 1109    Specimen: Urine from Straight Cath Updated: 08/06/23 0905     Urine Culture No growth    High Sensitivity Troponin T [[id number]]  (Abnormal) Collected: 08/06/23 0414    Specimen: Blood Updated: 08/06/23 0814     HS Troponin T 56 ng/L     Narrative:      High Sensitive Troponin T Reference Range:  <10.0 ng/L- Negative Female for AMI  <15.0 ng/L- Negative Male for AMI  >=10 - Abnormal Female indicating possible myocardial injury.  >=15 - Abnormal Male indicating possible myocardial injury.   Clinicians would have to utilize clinical acumen, EKG, Troponin, and serial changes to determine if it is an Acute Myocardial Infarction or myocardial injury due to an underlying chronic condition.         Blood Gas, Arterial - [[id number]]  (Abnormal) Collected: 08/06/23 0541    Specimen: Arterial Blood Updated: 08/06/23 0539     Site Left Radial     Haroon's Test N/A     pH, Arterial 7.393 pH units      pCO2, Arterial 31.6 mm Hg      Comment: 84 Value below reference range        pO2, Arterial 64.9 mm Hg      Comment: 84 Value below reference range        HCO3, Arterial 19.2 mmol/L      Comment: 84 Value below reference range        Base Excess, Arterial -5.2 mmol/L      Comment: 84 Value below reference range        O2 Saturation, Arterial 93.2 %      Comment: 84 Value below reference range        Barometric Pressure for Blood Gas 746 mmHg      Modality Nasal Cannula     Flow Rate 4.0 lpm      Ventilator Mode NA     Collected by RT     Comment: Meter: [id number]     :   [id number]       POC Glucose Once [[id number]]  (Abnormal) Collected: 08/05/23 2121    Specimen: Blood Updated: 08/06/23 0500     Glucose 135 mg/dL      Comment: RN NotifiedOperator: [id number] ЕЛЕНА BAILEYMeter ID: [id number]       Comprehensive Metabolic Panel [[id number]]  (Abnormal) Collected: 08/06/23 0414    Specimen: Blood Updated: 08/06/23 0457     Glucose 197 mg/dL      BUN 35 mg/dL      Creatinine 3.52 mg/dL      Sodium 139 mmol/L      Potassium 4.8 mmol/L      Chloride 98 mmol/L      CO2 17.0 mmol/L      Calcium 7.3 mg/dL      Total Protein 5.4 g/dL      Albumin 2.3 g/dL      ALT (SGPT) 1,591 U/L      AST (SGOT) 5,266 U/L      Alkaline Phosphatase 136 U/L      Total Bilirubin 0.5 mg/dL      Globulin 3.1 gm/dL      A/G Ratio 0.7 g/dL      BUN/Creatinine Ratio 9.9     Anion Gap 24.0 mmol/L      eGFR 12.5 mL/min/1.73      Comment: <15 Indicative of kidney failure       Narrative:      GFR Normal >60  Chronic Kidney Disease <60  Kidney Failure <15    The GFR formula is only valid for adults with stable renal function between ages 18 and 70.    Protime-INR [[id number]]  (Abnormal) Collected: 08/06/23 0414    Specimen: Blood Updated: 08/06/23 0454     Protime 30.4 Seconds      INR 3.00    Narrative:      Therapeutic range for most indications is 2.0-3.0 INR,  or 2.5-3.5 for mechanical heart valves.    Magnesium [[id number]]  (Normal) Collected: 08/06/23 0414    Specimen: Blood Updated: 08/06/23 0448     Magnesium 1.7 mg/dL     C-reactive Protein [[id number]]  (Abnormal) Collected: 08/06/23 0414    Specimen: Blood Updated: 08/06/23 0444     C-Reactive Protein 6.61 mg/dL     STAT Lactic Acid, Reflex [[id number]]  (Abnormal) Collected: 08/06/23 0414    Specimen: Blood Updated: 08/06/23 0442     Lactate 8.7 mmol/L     Anaerobic Culture - Tissue, Breast, Left [[id number]]  (Normal) Collected: 08/01/23 1241    Specimen: Tissue from Breast, Left Updated: 08/06/23 0343     Anaerobic Culture No anaerobes isolated at 5 days     "Procalcitonin [[id number]]  (Abnormal) Collected: 08/05/23 2156    Specimen: Blood Updated: 08/06/23 0247     Procalcitonin 1.01 ng/mL     Narrative:      As a Marker for Sepsis (Non-Neonates):    1. <0.5 ng/mL represents a low risk of severe sepsis and/or septic shock.  2. >2 ng/mL represents a high risk of severe sepsis and/or septic shock.    As a Marker for Lower Respiratory Tract Infections that require antibiotic therapy:    PCT on Admission    Antibiotic Therapy       6-12 Hrs later    >0.5                Strongly Recommended  >0.25 - <0.5        Recommended   0.1 - 0.25          Discouraged              Remeasure/reassess PCT  <0.1                Strongly Discouraged     Remeasure/reassess PCT    As 28 day mortality risk marker: \"Change in Procalcitonin Result\" (>80% or <=80%) if Day 0 (or Day 1) and Day 4 values are available. Refer to http://www.QBEpct-calculator.com    Change in PCT <=80%  A decrease of PCT levels below or equal to 80% defines a positive change in PCT test result representing a higher risk for 28-day all-cause mortality of patients diagnosed with severe sepsis for septic shock.    Change in PCT >80%  A decrease of PCT levels of more than 80% defines a negative change in PCT result representing a lower risk for 28-day all-cause mortality of patients diagnosed with severe sepsis or septic shock.       High Sensitivity Troponin T 2Hr [[id number]]  (Abnormal) Collected: 08/06/23 0022    Specimen: Blood Updated: 08/06/23 0051     HS Troponin T 47 ng/L      Troponin T Delta -4 ng/L     Narrative:      High Sensitive Troponin T Reference Range:  <10.0 ng/L- Negative Female for AMI  <15.0 ng/L- Negative Male for AMI  >=10 - Abnormal Female indicating possible myocardial injury.  >=15 - Abnormal Male indicating possible myocardial injury.   Clinicians would have to utilize clinical acumen, EKG, Troponin, and serial changes to determine if it is an Acute Myocardial Infarction or myocardial injury " due to an underlying chronic condition.         STAT Lactic Acid, Reflex [[id number]]  (Abnormal) Collected: 08/06/23 0022    Specimen: Blood Updated: 08/06/23 0050     Lactate 11.7 mmol/L     Magnesium [[id number]]  (Normal) Collected: 08/05/23 2156    Specimen: Blood Updated: 08/05/23 2324     Magnesium 2.3 mg/dL     Comprehensive Metabolic Panel [[id number]]  (Abnormal) Collected: 08/05/23 2156    Specimen: Blood Updated: 08/05/23 2324     Glucose 115 mg/dL      BUN 30 mg/dL      Creatinine 3.57 mg/dL      Sodium 136 mmol/L      Potassium 5.3 mmol/L      Chloride 97 mmol/L      CO2 11.0 mmol/L      Calcium 8.1 mg/dL      Total Protein 5.7 g/dL      Albumin 2.6 g/dL      ALT (SGPT) 593 U/L      AST (SGOT) 1,456 U/L      Alkaline Phosphatase 141 U/L      Total Bilirubin 0.7 mg/dL      Globulin 3.1 gm/dL      A/G Ratio 0.8 g/dL      BUN/Creatinine Ratio 8.4     Anion Gap 28.0 mmol/L      eGFR 12.3 mL/min/1.73      Comment: <15 Indicative of kidney failure       Narrative:      GFR Normal >60  Chronic Kidney Disease <60  Kidney Failure <15    The GFR formula is only valid for adults with stable renal function between ages 18 and 70.    Protime-INR [[id number]]  (Abnormal) Collected: 08/05/23 2156    Specimen: Blood Updated: 08/05/23 2324     Protime 33.5 Seconds      INR 3.42    Narrative:      Therapeutic range for most indications is 2.0-3.0 INR,  or 2.5-3.5 for mechanical heart valves.    Extra Tubes [[id number]] Collected: 08/05/23 2201    Specimen: Blood, Venous Line Updated: 08/05/23 2315    Narrative:      The following orders were created for panel order Extra Tubes.  Procedure                               Abnormality         Status                     ---------                               -----------         ------                     Gold Top - [facility], [place][[id number]]                                   Final result                 Please view results for these tests on the individual orders.    Gold Top - [facility], [place]  [[id number]] Collected: 08/05/23 2201    Specimen: Blood Updated: 08/05/23 2315     Extra Tube Hold for add-ons.     Comment: Auto resulted.       Lactic Acid, Plasma [[id number]]  (Abnormal) Collected: 08/05/23 2156    Specimen: Blood Updated: 08/05/23 2306     Lactate 12.9 mmol/L     High Sensitivity Troponin T [[id number]]  (Abnormal) Collected: 08/05/23 2156    Specimen: Blood Updated: 08/05/23 2303     HS Troponin T 51 ng/L     Narrative:      High Sensitive Troponin T Reference Range:  <10.0 ng/L- Negative Female for AMI  <15.0 ng/L- Negative Male for AMI  >=10 - Abnormal Female indicating possible myocardial injury.  >=15 - Abnormal Male indicating possible myocardial injury.   Clinicians would have to utilize clinical acumen, EKG, Troponin, and serial changes to determine if it is an Acute Myocardial Infarction or myocardial injury due to an underlying chronic condition.         BNP [[id number]]  (Abnormal) Collected: 08/05/23 2156    Specimen: Blood Updated: 08/05/23 2303     proBNP 3,011.0 pg/mL     Narrative:      Among patients with dyspnea, NT-proBNP is highly sensitive for the detection of acute congestive heart failure. In addition NT-proBNP of <300 pg/ml effectively rules out acute congestive heart failure with 99% negative predictive value.      CBC (No Diff) [[id number]]  (Abnormal) Collected: 08/05/23 2156    Specimen: Blood Updated: 08/05/23 2250     WBC 33.24 10*3/mm3      RBC 2.56 10*6/mm3      Hemoglobin 6.9 g/dL      Hematocrit 22.1 %      MCV 86.3 fL      MCH 27.0 pg      MCHC 31.2 g/dL      RDW 15.1 %      RDW-SD 46.4 fl      MPV 9.8 fL      Platelets 601 10*3/mm3     Blood Culture - Blood, Arm, Right [[id number]] Collected: 08/05/23 2156    Specimen: Blood from Arm, Right Updated: 08/05/23 2201    Blood Culture - Blood, Arm, Right [[id number]] Collected: 08/05/23 2156    Specimen: Blood from Arm, Right Updated: 08/05/23 2201    Blood Gas, Arterial - [[id number]]  (Abnormal) Collected: 08/05/23  2202    Specimen: Arterial Blood Updated: 08/05/23 2200     Site Right Radial     Haroon's Test N/A     pH, Arterial 7.258 pH units      Comment: 84 Value below reference range        pCO2, Arterial 26.9 mm Hg      Comment: 84 Value below reference range        pO2, Arterial 69.2 mm Hg      Comment: 84 Value below reference range        HCO3, Arterial 12.0 mmol/L      Comment: 84 Value below reference range        Base Excess, Arterial -13.8 mmol/L      Comment: 84 Value below reference range        O2 Saturation, Arterial 90.3 %      Comment: 84 Value below reference range        Barometric Pressure for Blood Gas 747 mmHg      Modality Nasal Cannula     Flow Rate 4.0 lpm      Ventilator Mode NA     Collected by RT     Comment: Meter: [id number]     :  [id number]       Urinalysis, Microscopic Only - Straight Cath [[id number]]  (Abnormal) Collected: 08/05/23 1109    Specimen: Urine from Straight Cath Updated: 08/05/23 1257     RBC, UA 0-2 /HPF      WBC, UA 31-50 /HPF      Bacteria, UA Trace /HPF      Squamous Epithelial Cells, UA 0-2 /HPF      Yeast, UA Small/1+ Yeast /HPF      Hyaline Casts, UA None Seen /LPF      Methodology Manual Light Microscopy    Urinalysis With Culture If Indicated - Straight Cath [[id number]]  (Abnormal) Collected: 08/05/23 1109    Specimen: Urine from Straight Cath Updated: 08/05/23 1155     Color, UA Yellow     Appearance, UA Clear     pH, UA 5.5     Specific Gravity, UA 1.015     Glucose, UA Negative     Ketones, UA Negative     Bilirubin, UA Negative     Blood, UA Negative     Protein, UA 30 mg/dL (1+)     Leuk Esterase, UA Small (1+)     Nitrite, UA Negative     Urobilinogen, UA 0.2 E.U./dL    Narrative:      In absence of clinical symptoms, the presence of pyuria, bacteria, and/or nitrites on the urinalysis result does not correlate with infection.           Imaging Results (Last 24 Hours)       Procedure Component Value Units Date/Time    CT Abdomen Pelvis Without Contrast  [[id number]] Collected: 08/06/23 0911     Updated: 08/06/23 0942    Narrative:      CT ABDOMEN PELVIS WO CONTRAST    HISTORY: Severe sepsis    COMPARISON:    Radiographs dated 8/5/2023 and CT chest dated 7/28/2023 there is new moderate  right hydronephrosis. Small phlebolith is seen adjacent to the mid right ureter.  No convincing ureteral stones other evaluation is somewhat limited by the  hematoma. Tobar catheter noted in the urinary bladder.    TECHNIQUE:    Automated exposure control was used to reduce patient dose    CT ABDOMEN PELVIS WO CONTRAST    FINDINGS:    Trace left pleural effusion and moderate right pleural effusion. There is right  infrahilar consolidation with air bronchograms. Also some atelectasis in the  left lower lobe. Moderate-sized hiatal hernia containing fluid, similar to  prior.    There a right rectus sheath hematoma measuring 9.0 x 3.1 cm. This may  communicate with the extraperitoneal soft tissues as seen on axial images 69  through 73    There is a large right extraperitoneal/retroperitoneal hematoma extending from  the pelvis where it is displacing the urinary bladder and into the right  retroperitoneum at the lower right renal border. The pelvic component of the  hematoma measures 12.3 x 8.9 cm. The lower abdominal/upper pelvic component of  the hematoma measures 16.1 x 6.0 cm.    No significant iliac or psoas hematoma.    Stomach is mildly dilated. Bowel is nonobstructed. Liver and spleen and pancreas  and adrenal glands are unremarkable.    Osseous degenerative changes are present with bowel suspicious osseous lesion.      Impression:        Large right retroperitoneal hematoma. This may have started as a rectal sheath  hematoma which decompressed into the extraperitoneal soft tissues and upward  into the retroperitoneum. It is compressing the mid to distal right ureter and  causing moderate right hydronephrosis. These findings were discussed with Dr. Harrington at 9:37 AM on 8/6/2023 by  phone    Moderate right pleural effusion and trace left pleural effusion. There is  airspace disease in the bilateral lungs, this could be atelectasis or pneumonia.    US Liver [[id number]] Collected: 08/06/23 0806     Updated: 08/06/23 0812    Narrative:      RIGHT UPPER QUADRANT ABDOMINAL ULTRASOUND    HISTORY:  Right upper quadrant abdominal pain, elevated liver enzymes    COMPARISON:  None    TECHNIQUE:  High-resolution gray scale images of the liver, gallbladder, common bile duct,  pancreas, and right kidney were obtained.  Color and spectral Doppler ultrasound  evaluation of the main portal vein was also performed.    FINDINGS:    Limited examination secondary to patient confusion and motion.    The liver is normal in size, measuring 9.5 cm in length.  The hepatic parenchyma  is diffusely increased in echogenicity.  Main portal vein is patent with  antegrade flow.    The gallbladder is surgically absent. There is no biliary dilatation. The common  bile duct is 7 mm in caliber.    The pancreas is obscured by bowel gas. The right kidney measures 9.6 cm in  length.  There is mild hydronephrosis.      Impression:      1.  Limited examination secondary to patient confusion and motion.  2.  Hepatic steatosis.  3.  Prior cholecystectomy.  4.  Mild right hydronephrosis.      XR Chest 1 View [[id number]] Collected: 08/05/23 2204     Updated: 08/05/23 2227    Narrative:        INDICATION:  Hypotension    COMPARISON:  None.      Impression:      FINDINGS/IMPRESSION:  No consolidative pulmonary opacity, pleural effusion, or pneumothorax.    Heart appears mildly enlarged, probably at least partially due to portable  technique.    Right upper extremity PICC terminates in the right axilla.            I reviewed the patient's new clinical results.  I reviewed the patient's new imaging results and agree with the interpretation.  I reviewed the patient's other test results and agree with the  interpretation.      Assessment:    Right hydronephrosis secondary to retroperitoneal hematoma    Plan:    Right retroperitoneal hematoma with hydroureteronephrosis plan will be cystoscopy retrograde right side double-J stent placement risk and benefits of been discussed    I discussed the patient's findings and my recommendations with patient.     Ousmane Garcia MD  08/06/23  11:44 CDT

## 2023-08-06 NOTE — SIGNIFICANT NOTE
08/06/23 0708   OTHER   Discipline physical therapy assistant   Rehab Time/Intention   Session Not Performed unable to treat, medical status change  (pt t/twila to CCU/stepdown, note left for MD for r/s orders when medically appropriate)

## 2023-08-06 NOTE — PROGRESS NOTES
MD at night:  Rapid response call was initiated concerning change in patient's status.  Patient was seen and examined immediately.     82-year-old ill-looking female who has been admitted on 7/28/2023 with confusion and sepsis, mastitis left breast, paroxysmal atrial fibrillation acute kidney injury urinary tract infection with Candida.  During hospitalization patient was seen by surgical service and underwent I/D of left breast.  Patient has been on Rocephin and doxycycline.  Has been followed by nephrology service.  Patient has been on C. difficile precaution. Per nursing staff patient does not have any particular diarrhea.   Stool for C. difficile  toxin was positive on  7/29/2023, but  C. difficile toxin antigen was negative 7/29/2023.    Blood sugar in acceptable range during.    Pulse oximetry maintained on 2 L oxygen.    Hypotensive for systolic blood pressure in 60s.  Patient is elderly female ill looking, hypotensive, diaphoretic, pale.  Patient is on supplemental O2.  Patient is awake but does not provide any information.  Lungs with decreased breath sounds bilaterally.  Heart with irregular rate and irregular rhythm.  Abdomen soft, not rigid, decreased bowel sounds.  Abdominal x-ray on 6 8/5/2023 which showed mild gaseous distention of colon with no obvious sign of obstruction.  UA was not impressive on 8/5/2023 Patient with new leukocytosis of 14 on 8/5/2023.    Assessment and plan:  Symptomatic hypotension etiology to be determined:  1 Liter of normal saline IV fluid bolus ordered.  Stat laboratory work-up including CBC CMP procalcitonin level lactic acid level, blood cultures, ABG, INR as chest x-ray EKG ordered  Order for transfer to ICU given.  Placed order for Levophed for maintaining blood pressures pending further evaluation.  Continue IV fluid and adjust rate as needed.  The decision making is depending on response to above care and results of laboratory work-up and imaging studies.  We will  consider further imaging studies depending on response to above care and results of the studies.  Patient is on nifedipine XL 60 mg, Toprol-XL, losartan 25, Imdur, and Coumadin. Discontinue losartan and nifedipine XL for now.  We will consider holding antihypertensive medication depending on response to care as above.      Addendum:   8/5/2023 11: 30 PM:  Since last entry:  Reevaluated patient.  ABG reviewed earlier and placed patient on sodium bicarb drip concerning  metabolic acidosis  Available laboratory work-up reviewed.  Patient with severe leukocytosis of 30.  Lactic acid of 12.  Anion gap of 28 CO2 of 11 BUN of 30 creatinine of 3.57 AST of 1456 with ALT of 593  Started second vasopressor, vasopressin concerning severe septic.  Gave additional IV fluid boluses for sepsis   Maintain on sodium bicarb drip  Change IV antibiotic to Zosyn, vancomycin, Flagyl IV and discontinue Rocephin and doxycycline.  If CT of the abdomen and pelvis suggestive of colitis. consider vancomycin p.o. pending further evaluation considering severe sepsis unclear etiology at this time with recent above history of positive C. difficile.  I updated patient's son multiple times about patient's critical condition.  Ordered unit of packed red blood cell transfusion.  I informed patient's son about benefit and risks of packed red blood cell transfusion.  He agreed with packed red blood cell transfusion  CT of the abdomen and pelvis ordered and instructed nursing staff to obtain CT if patient clinically stable to undergo above diagnostic tests.  Obtain right upper quadrant abdominal  Repeat laboratory work-up with ABG in a.m.  Add further vasopressors if needed.  Prognosis guarded to poor. Hicuity to see patient.     # Severe sepsis, septic shock unclear etiology  # Hypotension secondary to above  # Severe anion gap positive metabolic acidosis   # Elevated transaminase concerning shock liver, other etiologies cannot be excluded  # Anemia   #  Acute renal failure   # Mild hyperkalemia  # Elevated proBNP level  # Severe leukocytosis  # Acute hypoxemic respiratory failure secondary to severe sepsis    Plan:  As above, IV fluid bolus, sodium bicarb drip, broad-spectrum IV antibiotic, blood transfusion, vasopressors.

## 2023-08-06 NOTE — ANESTHESIA PREPROCEDURE EVALUATION
Anesthesia Evaluation     Patient summary reviewed and Nursing notes reviewed   no history of anesthetic complications:   NPO Solid Status: Waived due to emergency  NPO Liquid Status: Waived due to emergency           Airway   Mallampati: II  TM distance: >3 FB  Neck ROM: full  possible difficult intubation  Dental    (+) lower dentures and upper dentures    Pulmonary - negative pulmonary ROS    breath sounds clear to auscultation  (-) shortness of breath, not a smoker    ROS comment: FINDINGS:  There may be a small left pleural effusion.  Mild central pulmonary vascular  congestion noted.  No pneumothorax.  Cardiomediastinal silhouette is  unremarkable.       Cardiovascular     ECG reviewed  PT is on anticoagulation therapy  Patient on routine beta blocker and Beta blocker given within 24 hours of surgery  Rhythm: irregular  Rate: normal    (+) hypertensiondysrhythmias Paroxysmal Atrial Fib, Atrial Fib, GARNICA, PVD, hyperlipidemia  (-) past MI, angina, murmur, cardiac stents    ROS comment: ú Estimated EF = 61%.  ú Left ventricular systolic function is normal.  ú Left ventricular diastolic dysfunction (grade I) consistent with impaired relaxation.  ú Left atrial cavity size is mildly dilated.  ú Normal left atrial volume noted. Left atrial cavity size is mildly dilated. No evidence of a left atrial thrombus present. No evidence of a left atrial mass present. No evidence of a patent foramen ovale. Saline test results are negative.     Atrial fibrillation  Cannot rule out Anteroseptal infarct , age undetermined  Abnormal ECG  When compared with ECG of 28-JUL-2023 15:18,  No significant change was found      Neuro/Psych  (+) psychiatric history Depression  GI/Hepatic/Renal/Endo    (+) obesity, GERD well controlled, renal disease (Creatinine 1.46) ARF    Musculoskeletal     Abdominal   (+) obese   Substance History - negative use     OB/GYN negative ob/gyn ROS         Other   arthritis,     ROS/Med Hx Other: HGB 6.6 K+  4.8                Anesthesia Plan    ASA 4 - emergent     general and MAC     intravenous induction     Anesthetic plan, risks, benefits, and alternatives have been provided, discussed and informed consent has been obtained with: patient, spouse/significant other, sibling and child.  Pre-procedure education provided  Plan discussed with CRNA.    CODE STATUS:    Level Of Support Discussed With: Patient  Code Status (Patient has no pulse and is not breathing): CPR (Attempt to Resuscitate)  Medical Interventions (Patient has pulse or is breathing): Full Support  Release to patient: Routine Release

## 2023-08-07 LAB
ALBUMIN SERPL-MCNC: 3 G/DL (ref 3.5–5.2)
ALP SERPL-CCNC: 156 U/L (ref 39–117)
ALT SERPL W P-5'-P-CCNC: 1188 U/L (ref 1–33)
ANION GAP SERPL CALCULATED.3IONS-SCNC: 17 MMOL/L (ref 5–15)
ARTERIAL PATENCY WRIST A: ABNORMAL
AST SERPL-CCNC: 3324 U/L (ref 1–32)
ATMOSPHERIC PRESS: 744 MMHG
BACTERIA SPEC AEROBE CULT: NO GROWTH
BASE EXCESS BLDA CALC-SCNC: 7.4 MMOL/L (ref 0–2)
BASOPHILS # BLD AUTO: 0.12 10*3/MM3 (ref 0–0.2)
BASOPHILS NFR BLD AUTO: 0.5 % (ref 0–1.5)
BDY SITE: ABNORMAL
BH BB BLOOD EXPIRATION DATE: NORMAL
BH BB BLOOD TYPE BARCODE: NORMAL
BH BB DISPENSE STATUS: NORMAL
BH BB PRODUCT CODE: NORMAL
BH BB UNIT NUMBER: NORMAL
BILIRUB CONJ SERPL-MCNC: 0.7 MG/DL (ref 0–0.3)
BILIRUB INDIRECT SERPL-MCNC: 0.5 MG/DL
BILIRUB SERPL-MCNC: 1.2 MG/DL (ref 0–1.2)
BUN SERPL-MCNC: 49 MG/DL (ref 8–23)
BUN/CREAT SERPL: 11.7 (ref 7–25)
CALCIUM SPEC-SCNC: 6.2 MG/DL (ref 8.6–10.5)
CHLORIDE SERPL-SCNC: 93 MMOL/L (ref 98–107)
CO2 SERPL-SCNC: 29 MMOL/L (ref 22–29)
CREAT SERPL-MCNC: 4.19 MG/DL (ref 0.57–1)
CROSSMATCH INTERPRETATION: NORMAL
CRP SERPL-MCNC: 9.39 MG/DL (ref 0–0.5)
DEPRECATED RDW RBC AUTO: 42.7 FL (ref 37–54)
EGFRCR SERPLBLD CKD-EPI 2021: 10.2 ML/MIN/1.73
EOSINOPHIL # BLD AUTO: 0.02 10*3/MM3 (ref 0–0.4)
EOSINOPHIL NFR BLD AUTO: 0.1 % (ref 0.3–6.2)
ERYTHROCYTE [DISTWIDTH] IN BLOOD BY AUTOMATED COUNT: 14.7 % (ref 12.3–15.4)
GLUCOSE BLDC GLUCOMTR-MCNC: 187 MG/DL (ref 70–130)
GLUCOSE BLDC GLUCOMTR-MCNC: 209 MG/DL (ref 70–130)
GLUCOSE SERPL-MCNC: 245 MG/DL (ref 65–99)
HCO3 BLDA-SCNC: 31.7 MMOL/L (ref 20–26)
HCT VFR BLD AUTO: 23.2 % (ref 34–46.6)
HCT VFR BLD AUTO: 23.7 % (ref 34–46.6)
HGB BLD-MCNC: 7.9 G/DL (ref 12–15.9)
HGB BLD-MCNC: 8.3 G/DL (ref 12–15.9)
HGB BLD-MCNC: 8.3 G/DL (ref 12–15.9)
HGB BLD-MCNC: 8.4 G/DL (ref 12–15.9)
IMM GRANULOCYTES # BLD AUTO: 0.78 10*3/MM3 (ref 0–0.05)
IMM GRANULOCYTES NFR BLD AUTO: 3.1 % (ref 0–0.5)
INHALED O2 CONCENTRATION: 100 %
INR PPP: 2.44 (ref 0.8–1.2)
LYMPHOCYTES # BLD AUTO: 0.58 10*3/MM3 (ref 0.7–3.1)
LYMPHOCYTES NFR BLD AUTO: 2.3 % (ref 19.6–45.3)
Lab: ABNORMAL
MCH RBC QN AUTO: 29.7 PG (ref 26.6–33)
MCHC RBC AUTO-ENTMCNC: 35.8 G/DL (ref 31.5–35.7)
MCV RBC AUTO: 83.2 FL (ref 79–97)
MODALITY: ABNORMAL
MONOCYTES # BLD AUTO: 0.6 10*3/MM3 (ref 0.1–0.9)
MONOCYTES NFR BLD AUTO: 2.4 % (ref 5–12)
NEUTROPHILS NFR BLD AUTO: 22.91 10*3/MM3 (ref 1.7–7)
NEUTROPHILS NFR BLD AUTO: 91.6 % (ref 42.7–76)
NRBC BLD AUTO-RTO: 0.6 /100 WBC (ref 0–0.2)
PCO2 BLDA: 42.6 MM HG (ref 35–45)
PEEP RESPIRATORY: 5 CM[H2O]
PH BLDA: 7.48 PH UNITS (ref 7.35–7.45)
PLATELET # BLD AUTO: 359 10*3/MM3 (ref 140–450)
PMV BLD AUTO: 9.6 FL (ref 6–12)
PO2 BLDA: 73.3 MM HG (ref 83–108)
POTASSIUM SERPL-SCNC: 3.5 MMOL/L (ref 3.5–5.2)
PROT SERPL-MCNC: 5.9 G/DL (ref 6–8.5)
PROTHROMBIN TIME: 25.9 SECONDS (ref 11.1–15.3)
RBC # BLD AUTO: 2.79 10*6/MM3 (ref 3.77–5.28)
SAO2 % BLDCOA: 96.5 % (ref 94–99)
SET MECH RESP RATE: 18
SODIUM SERPL-SCNC: 139 MMOL/L (ref 136–145)
UNIT  ABO: NORMAL
UNIT  RH: NORMAL
VANCOMYCIN SERPL-MCNC: 16.6 MCG/ML (ref 5–40)
VENTILATOR MODE: AC
VT ON VENT VENT: 450 ML
WBC NRBC COR # BLD: 25.01 10*3/MM3 (ref 3.4–10.8)

## 2023-08-07 PROCEDURE — 25010000002 ALBUMIN HUMAN 25% PER 50 ML: Performed by: FAMILY MEDICINE

## 2023-08-07 PROCEDURE — P9017 PLASMA 1 DONOR FRZ W/IN 8 HR: HCPCS

## 2023-08-07 PROCEDURE — 25010000002 METRONIDAZOLE 500 MG/100ML SOLUTION: Performed by: UROLOGY

## 2023-08-07 PROCEDURE — 25010000002 VITAMIN K1 PER 1 MG: Performed by: INTERNAL MEDICINE

## 2023-08-07 PROCEDURE — 94761 N-INVAS EAR/PLS OXIMETRY MLT: CPT

## 2023-08-07 PROCEDURE — 94799 UNLISTED PULMONARY SVC/PX: CPT

## 2023-08-07 PROCEDURE — 25010000002 METHYLPREDNISOLONE PER 125 MG: Performed by: FAMILY MEDICINE

## 2023-08-07 PROCEDURE — 85014 HEMATOCRIT: CPT | Performed by: UROLOGY

## 2023-08-07 PROCEDURE — 86140 C-REACTIVE PROTEIN: CPT | Performed by: UROLOGY

## 2023-08-07 PROCEDURE — 80202 ASSAY OF VANCOMYCIN: CPT | Performed by: UROLOGY

## 2023-08-07 PROCEDURE — 82948 REAGENT STRIP/BLOOD GLUCOSE: CPT

## 2023-08-07 PROCEDURE — 85610 PROTHROMBIN TIME: CPT | Performed by: UROLOGY

## 2023-08-07 PROCEDURE — 82803 BLOOD GASES ANY COMBINATION: CPT

## 2023-08-07 PROCEDURE — 36600 WITHDRAWAL OF ARTERIAL BLOOD: CPT

## 2023-08-07 PROCEDURE — 94003 VENT MGMT INPAT SUBQ DAY: CPT

## 2023-08-07 PROCEDURE — 85018 HEMOGLOBIN: CPT | Performed by: UROLOGY

## 2023-08-07 PROCEDURE — 99222 1ST HOSP IP/OBS MODERATE 55: CPT | Performed by: NURSE PRACTITIONER

## 2023-08-07 PROCEDURE — 80076 HEPATIC FUNCTION PANEL: CPT | Performed by: UROLOGY

## 2023-08-07 PROCEDURE — 25010000002 PROPOFOL 10 MG/ML EMULSION: Performed by: FAMILY MEDICINE

## 2023-08-07 PROCEDURE — 25010000002 PIPERACILLIN SOD-TAZOBACTAM PER 1 G: Performed by: UROLOGY

## 2023-08-07 PROCEDURE — 85025 COMPLETE CBC W/AUTO DIFF WBC: CPT | Performed by: UROLOGY

## 2023-08-07 PROCEDURE — P9047 ALBUMIN (HUMAN), 25%, 50ML: HCPCS | Performed by: FAMILY MEDICINE

## 2023-08-07 PROCEDURE — 86927 PLASMA FRESH FROZEN: CPT

## 2023-08-07 PROCEDURE — 36430 TRANSFUSION BLD/BLD COMPNT: CPT

## 2023-08-07 PROCEDURE — 25010000002 FENTANYL CITRATE (PF) 50 MCG/ML SOLUTION: Performed by: FAMILY MEDICINE

## 2023-08-07 PROCEDURE — 80048 BASIC METABOLIC PNL TOTAL CA: CPT | Performed by: UROLOGY

## 2023-08-07 PROCEDURE — 63710000001 INSULIN ASPART PER 5 UNITS: Performed by: HOSPITALIST

## 2023-08-07 RX ORDER — DEXTROSE MONOHYDRATE 25 G/50ML
25 INJECTION, SOLUTION INTRAVENOUS
Status: DISCONTINUED | OUTPATIENT
Start: 2023-08-07 | End: 2023-08-17 | Stop reason: HOSPADM

## 2023-08-07 RX ORDER — GLUCAGON 1 MG/ML
1 KIT INJECTION
Status: DISCONTINUED | OUTPATIENT
Start: 2023-08-07 | End: 2023-08-17 | Stop reason: HOSPADM

## 2023-08-07 RX ORDER — BISACODYL 5 MG/1
5 TABLET, DELAYED RELEASE ORAL DAILY PRN
Status: DISCONTINUED | OUTPATIENT
Start: 2023-08-07 | End: 2023-08-17 | Stop reason: HOSPADM

## 2023-08-07 RX ORDER — ONDANSETRON 4 MG/1
4 TABLET, FILM COATED ORAL EVERY 6 HOURS PRN
Status: DISCONTINUED | OUTPATIENT
Start: 2023-08-07 | End: 2023-08-17 | Stop reason: HOSPADM

## 2023-08-07 RX ORDER — POLYETHYLENE GLYCOL 3350 17 G/17G
17 POWDER, FOR SOLUTION ORAL DAILY PRN
Status: DISCONTINUED | OUTPATIENT
Start: 2023-08-07 | End: 2023-08-17 | Stop reason: HOSPADM

## 2023-08-07 RX ORDER — INSULIN ASPART 100 [IU]/ML
0-9 INJECTION, SOLUTION INTRAVENOUS; SUBCUTANEOUS
Status: DISCONTINUED | OUTPATIENT
Start: 2023-08-07 | End: 2023-08-17 | Stop reason: HOSPADM

## 2023-08-07 RX ORDER — LANOLIN ALCOHOL/MO/W.PET/CERES
6 CREAM (GRAM) TOPICAL NIGHTLY PRN
Status: DISCONTINUED | OUTPATIENT
Start: 2023-08-07 | End: 2023-08-17 | Stop reason: HOSPADM

## 2023-08-07 RX ORDER — ONDANSETRON 2 MG/ML
4 INJECTION INTRAMUSCULAR; INTRAVENOUS EVERY 6 HOURS PRN
Status: DISCONTINUED | OUTPATIENT
Start: 2023-08-07 | End: 2023-08-17 | Stop reason: HOSPADM

## 2023-08-07 RX ORDER — SODIUM CHLORIDE, SODIUM LACTATE, POTASSIUM CHLORIDE, CALCIUM CHLORIDE 600; 310; 30; 20 MG/100ML; MG/100ML; MG/100ML; MG/100ML
75 INJECTION, SOLUTION INTRAVENOUS CONTINUOUS
Status: DISCONTINUED | OUTPATIENT
Start: 2023-08-07 | End: 2023-08-08

## 2023-08-07 RX ORDER — NICOTINE POLACRILEX 4 MG
15 LOZENGE BUCCAL
Status: DISCONTINUED | OUTPATIENT
Start: 2023-08-07 | End: 2023-08-17 | Stop reason: HOSPADM

## 2023-08-07 RX ORDER — AMOXICILLIN 250 MG
2 CAPSULE ORAL 2 TIMES DAILY
Status: DISCONTINUED | OUTPATIENT
Start: 2023-08-07 | End: 2023-08-08

## 2023-08-07 RX ORDER — HYDROCODONE BITARTRATE AND ACETAMINOPHEN 5; 325 MG/1; MG/1
1 TABLET ORAL EVERY 6 HOURS PRN
Status: ACTIVE | OUTPATIENT
Start: 2023-08-07 | End: 2023-08-09

## 2023-08-07 RX ORDER — BISACODYL 10 MG
10 SUPPOSITORY, RECTAL RECTAL DAILY PRN
Status: DISCONTINUED | OUTPATIENT
Start: 2023-08-07 | End: 2023-08-17 | Stop reason: HOSPADM

## 2023-08-07 RX ADMIN — METRONIDAZOLE 500 MG: 500 INJECTION, SOLUTION INTRAVENOUS at 11:40

## 2023-08-07 RX ADMIN — NYSTATIN: 100000 POWDER TOPICAL at 09:31

## 2023-08-07 RX ADMIN — METHYLPREDNISOLONE SODIUM SUCCINATE 60 MG: 125 INJECTION INTRAMUSCULAR; INTRAVENOUS at 14:13

## 2023-08-07 RX ADMIN — METHYLPREDNISOLONE SODIUM SUCCINATE 60 MG: 125 INJECTION INTRAMUSCULAR; INTRAVENOUS at 03:10

## 2023-08-07 RX ADMIN — PHYTONADIONE 10 MG: 10 INJECTION, EMULSION INTRAMUSCULAR; INTRAVENOUS; SUBCUTANEOUS at 09:30

## 2023-08-07 RX ADMIN — ALBUMIN (HUMAN) 25 G: 0.25 INJECTION, SOLUTION INTRAVENOUS at 20:39

## 2023-08-07 RX ADMIN — METRONIDAZOLE 500 MG: 500 INJECTION, SOLUTION INTRAVENOUS at 16:51

## 2023-08-07 RX ADMIN — METRONIDAZOLE 500 MG: 500 INJECTION, SOLUTION INTRAVENOUS at 05:28

## 2023-08-07 RX ADMIN — DULOXETINE HYDROCHLORIDE 60 MG: 60 CAPSULE, DELAYED RELEASE ORAL at 09:31

## 2023-08-07 RX ADMIN — PROPOFOL INJECTABLE EMULSION 50 MCG/KG/MIN: 10 INJECTION, EMULSION INTRAVENOUS at 05:28

## 2023-08-07 RX ADMIN — Medication 10 ML: at 09:32

## 2023-08-07 RX ADMIN — DOCUSATE SODIUM 50 MG AND SENNOSIDES 8.6 MG 2 TABLET: 8.6; 5 TABLET, FILM COATED ORAL at 20:39

## 2023-08-07 RX ADMIN — FENTANYL CITRATE 50 MCG: 50 INJECTION, SOLUTION INTRAMUSCULAR; INTRAVENOUS at 03:10

## 2023-08-07 RX ADMIN — SODIUM CHLORIDE, POTASSIUM CHLORIDE, SODIUM LACTATE AND CALCIUM CHLORIDE 75 ML/HR: 600; 310; 30; 20 INJECTION, SOLUTION INTRAVENOUS at 11:39

## 2023-08-07 RX ADMIN — PIPERACILLIN SODIUM AND TAZOBACTAM SODIUM 3.38 G: 3; .375 INJECTION, POWDER, LYOPHILIZED, FOR SOLUTION INTRAVENOUS at 06:38

## 2023-08-07 RX ADMIN — PANTOPRAZOLE SODIUM 40 MG: 40 INJECTION, POWDER, FOR SOLUTION INTRAVENOUS at 06:43

## 2023-08-07 RX ADMIN — PROPOFOL INJECTABLE EMULSION 25 MCG/KG/MIN: 10 INJECTION, EMULSION INTRAVENOUS at 20:38

## 2023-08-07 RX ADMIN — FENTANYL CITRATE 50 MCG: 50 INJECTION, SOLUTION INTRAMUSCULAR; INTRAVENOUS at 21:34

## 2023-08-07 RX ADMIN — METHYLPREDNISOLONE SODIUM SUCCINATE 60 MG: 125 INJECTION INTRAMUSCULAR; INTRAVENOUS at 20:38

## 2023-08-07 RX ADMIN — ROSUVASTATIN CALCIUM 40 MG: 20 TABLET, FILM COATED ORAL at 09:31

## 2023-08-07 RX ADMIN — METOPROLOL TARTRATE 25 MG: 25 TABLET, FILM COATED ORAL at 20:39

## 2023-08-07 RX ADMIN — NYSTATIN: 100000 POWDER TOPICAL at 20:39

## 2023-08-07 RX ADMIN — METHYLPREDNISOLONE SODIUM SUCCINATE 60 MG: 125 INJECTION INTRAMUSCULAR; INTRAVENOUS at 09:30

## 2023-08-07 RX ADMIN — PROPOFOL INJECTABLE EMULSION 25 MCG/KG/MIN: 10 INJECTION, EMULSION INTRAVENOUS at 14:13

## 2023-08-07 RX ADMIN — ALBUMIN (HUMAN) 25 G: 0.25 INJECTION, SOLUTION INTRAVENOUS at 09:31

## 2023-08-07 RX ADMIN — INSULIN ASPART 4 UNITS: 100 INJECTION, SOLUTION INTRAVENOUS; SUBCUTANEOUS at 18:34

## 2023-08-07 RX ADMIN — PIPERACILLIN SODIUM AND TAZOBACTAM SODIUM 3.38 G: 3; .375 INJECTION, POWDER, LYOPHILIZED, FOR SOLUTION INTRAVENOUS at 17:43

## 2023-08-07 NOTE — PLAN OF CARE
Goal Outcome Evaluation:  Plan of Care Reviewed With: spouse, daughter        Progress: no change     Patient remains intubated and sedated. Did not tolerated weaning of O2 today. Remains at 90% FIO2.  Other than SPO2 decreasing, VSS. Urine output WNL. Does not appear to be in pain. Stable. Will continue to monitor.

## 2023-08-07 NOTE — CONSULTS
CVTS CONSULTATION          Patient Care Team:  Len Seo MD as PCP - General  Blaire Camara APRN as Nurse Practitioner (General Surgery)    Requesting Provider: Dr. Valdivia requesting Dr. Yin    Chief complaint: Sepsis, Resp Failure, Acute renal failure      SUBJECTIVE       History of Present Illness:  82 y.o. female with HTN(stable, increased risk stroke, rupture), Hyperlipidemia(stable, increased risk cardiovascular events), and Atrial fibrillation(stable, increased risk stroke) GERD, depression, arthritis.  Prior surgical history includes bladder surgery, cholecystectomy, shoulder surgery, subtotal hysterectomy never smoked.  Admit 7/28 with fever, chills, confusion sepsis.  Mastitis which underwent incision and debridement with Dr. Roper 8/1 tissue cultures from operation negative.  Urine culture positive for Enterococcus faecalis also found to have C. difficile.  Developed hypotension was transferred to CCU started on levophed, further evaluation by CT demonstrated retroperitoneal hematoma causing hydronephrosis, urology was consulted and patient underwent ureter stent placement.  Respiratory status evening of 8/6 compromised underwent emergent intubation elevated Fio2 demands.  Patient was then made DNR after discussion with family.  Patient over the night did improve, inotropic support weaned, and Fio2 requirement improved.  CTVS Dr. Yin consulted for evaluation of retroperitoneal hematoma.  Patient has multi-system organ dysfunction, shock liver and renal failure.  She received 1 unit packed red cells 8/5, 2 units FFP 8/6, 2 units packed red cells 8/6, additional 2 units FFP 8/6 .  no other associated symptoms or modifying factors.    8/6/2023 CT Abd Pelvis: The pelvic component of the hematoma measures 12.3 x 8.9 cm. The lower abdominal/upper pelvic component of the hematoma measures 16.1 x 6.0 cm.  Causing moderate R hydronephrosis    The following portions of the patient's  "history were reviewed and updated as appropriate: allergies, current medications, past family history, past medical history, past social history, past surgical history and problem list.  Recent images independently reviewed.  Available laboratory values reviewed.    Review of Systems   Unable to perform ROS: Intubated      Past Medical History:   Diagnosis Date    Arthritis     Depression     GERD (gastroesophageal reflux disease)     Hyperlipidemia     Hypertension     Near syncope     Vascular disease      Past Surgical History:   Procedure Laterality Date    BLADDER SURGERY      ENDOSCOPY N/A 5/6/2019    Procedure: ESOPHAGOGASTRODUODENOSCOPY;  Surgeon: Alberto Sanchez DO;  Location: [facility] ENDOSCOPY;  Service: Gastroenterology    GALLBLADDER SURGERY      SHOULDER SURGERY      \"bone spurs\"    SUBTOTAL HYSTERECTOMY       Family History   Problem Relation Age of Onset    Hypertension Mother     Diabetes Paternal Aunt     Diabetes Paternal Grandmother     Hypertension Paternal Grandmother     Hypertension Paternal Grandfather      Social History     Tobacco Use    Smoking status: Never    Smokeless tobacco: Never   Vaping Use    Vaping Use: Never used   Substance Use Topics    Alcohol use: No    Drug use: No     Medications Prior to Admission   Medication Sig Dispense Refill Last Dose    acetaminophen (TYLENOL) 500 MG tablet Take 1 tablet by mouth As Needed.   Past Week    DULoxetine (CYMBALTA) 60 MG capsule    Past Week    furosemide (LASIX) 20 MG tablet Take 1 tablet by mouth 2 (Two) Times a Day. 180 tablet 3 Past Week    gabapentin (NEURONTIN) 300 MG capsule Take 1 capsule by mouth 3 (Three) Times a Day.  2 Past Week    isosorbide mononitrate (IMDUR) 30 MG 24 hr tablet Take 1 tablet by mouth Daily. 90 tablet 3 Past Week    lansoprazole (PREVACID) 30 MG capsule Take 1 capsule by mouth Every 12 (Twelve) Hours.   Past Week    losartan (COZAAR) 100 MG tablet Take 1 tablet by mouth Daily. 30 tablet 3 Past Week    " "metoprolol succinate XL (TOPROL-XL) 50 MG 24 hr tablet Take 1 tablet by mouth Daily. 90 tablet 3 Past Week    Mirabegron ER (MYRBETRIQ) 50 MG tablet sustained-release 24 hour 24 hr tablet Take 50 mg by mouth Daily.   Past Week    NIFEdipine XL (PROCARDIA XL) 60 MG 24 hr tablet Take 1 tablet by mouth Daily. 90 tablet 3 Past Week    potassium chloride (K-DUR,KLOR-CON) 20 MEQ CR tablet Take 1 tablet by mouth Daily. 90 tablet 3 Past Week    rosuvastatin (CRESTOR) 40 MG tablet Take 1 tablet by mouth Daily. 90 tablet 3 Past Week    traMADol (ULTRAM) 50 MG tablet Take 1 tablet by mouth As Needed.   Past Week    VENTOLIN  (90 Base) MCG/ACT inhaler    Past Week    warfarin (COUMADIN) 2.5 MG tablet Take 1 tablet nightly except on Monday take 1.5 tablets OR AS DIRECTED 100 tablet 1 Past Week     albumin human, 25 g, Intravenous, BID  DULoxetine, 60 mg, Oral, Daily  methylPREDNISolone sodium succinate, 60 mg, Intravenous, Q6H  metoprolol succinate XL, 50 mg, Oral, Daily  metroNIDAZOLE, 500 mg, Intravenous, Q6H  nystatin, , Topical, Q12H  pantoprazole, 40 mg, Intravenous, Q AM  piperacillin-tazobactam, 3.375 g, Intravenous, Q12H  rosuvastatin, 40 mg, Oral, Daily  senna-docusate sodium, 2 tablet, Oral, BID  sodium chloride, 10 mL, Intravenous, Q12H  sodium chloride, 10 mL, Intravenous, Q12H      Allergies:  Tuberculin tests, Hydrocodone, and Lortab [hydrocodone-acetaminophen]    OBJECTIVE        Vital Signs  Temp:  [97.6 øF (36.4 øC)-99.4 øF (37.4 øC)] 98.1 øF (36.7 øC)  Heart Rate:  [] 102  Resp:  [18-30] 20  BP: (103-176)/(51-77) 116/57  FiO2 (%):  [80 %-100 %] 80 %    Flowsheet Rows      Flowsheet Row First Filed Value   Admission Height 165.1 cm (65\") Documented at 07/28/2023 1900   Admission Weight 74.8 kg (165 lb) Documented at 07/28/2023 1608          165.1 cm (65\")    Physical Exam  Constitutional:       Appearance: She is ill-appearing.   HENT:      Head:      Comments: [place]     Mouth/Throat:      Mouth: " Mucous membranes are moist.   Eyes:      Pupils: Pupils are equal, round, and reactive to light.   Cardiovascular:      Rate and Rhythm: Normal rate.   Pulmonary:      Effort: No respiratory distress.      Breath sounds: Normal breath sounds.      Comments: AC ventilation   Abdominal:      General: There is no distension.      Palpations: Abdomen is soft.   Musculoskeletal:      Right lower leg: Edema present.      Left lower leg: Edema present.   Skin:     General: Skin is warm and dry.      Capillary Refill: Capillary refill takes 2 to 3 seconds.       Results Review:   Lab Results (last 24 hours)       Procedure Component Value Units Date/Time    Hepatic Function Panel [[id number]]  (Abnormal) Collected: 08/07/23 0555    Specimen: Blood Updated: 08/07/23 0651     Total Protein 5.9 g/dL      Albumin 3.0 g/dL      ALT (SGPT) 1,188 U/L      AST (SGOT) 3,324 U/L      Alkaline Phosphatase 156 U/L      Total Bilirubin 1.2 mg/dL      Bilirubin, Direct 0.7 mg/dL      Bilirubin, Indirect 0.5 mg/dL     Protime-INR [[id number]]  (Abnormal) Collected: 08/07/23 0555    Specimen: Blood Updated: 08/07/23 0649     Protime 25.9 Seconds      INR 2.44    Narrative:      Therapeutic range for most indications is 2.0-3.0 INR,  or 2.5-3.5 for mechanical heart valves.    Basic Metabolic Panel [[id number]]  (Abnormal) Collected: 08/07/23 0555    Specimen: Blood Updated: 08/07/23 0637     Glucose 245 mg/dL      BUN 49 mg/dL      Creatinine 4.19 mg/dL      Sodium 139 mmol/L      Potassium 3.5 mmol/L      Chloride 93 mmol/L      CO2 29.0 mmol/L      Calcium 6.2 mg/dL      BUN/Creatinine Ratio 11.7     Anion Gap 17.0 mmol/L      eGFR 10.2 mL/min/1.73      Comment: <15 Indicative of kidney failure       Narrative:      GFR Normal >60  Chronic Kidney Disease <60  Kidney Failure <15    The GFR formula is only valid for adults with stable renal function between ages 18 and 70.    Vancomycin, Random [[id number]]  (Normal) Collected: 08/07/23 0555     Specimen: Blood Updated: 08/07/23 0637     Vancomycin Random 16.60 mcg/mL     C-reactive Protein [[id number]]  (Abnormal) Collected: 08/07/23 0555    Specimen: Blood Updated: 08/07/23 0637     C-Reactive Protein 9.39 mg/dL     CBC & Differential [[id number]]  (Abnormal) Collected: 08/07/23 0555    Specimen: Blood Updated: 08/07/23 0625    Narrative:      The following orders were created for panel order CBC & Differential.  Procedure                               Abnormality         Status                     ---------                               -----------         ------                     CBC Auto Differential[[id number]]        Abnormal            Final result               Scan Slide[[id number]]                                                                    Please view results for these tests on the individual orders.    CBC Auto Differential [[id number]]  (Abnormal) Collected: 08/07/23 0555    Specimen: Blood Updated: 08/07/23 0624     WBC 25.01 10*3/mm3      RBC 2.79 10*6/mm3      Hemoglobin 8.3 g/dL      Hematocrit 23.2 %      MCV 83.2 fL      MCH 29.7 pg      MCHC 35.8 g/dL      RDW 14.7 %      RDW-SD 42.7 fl      MPV 9.6 fL      Platelets 359 10*3/mm3      Neutrophil % 91.6 %      Lymphocyte % 2.3 %      Monocyte % 2.4 %      Eosinophil % 0.1 %      Basophil % 0.5 %      Immature Grans % 3.1 %      Neutrophils, Absolute 22.91 10*3/mm3      Lymphocytes, Absolute 0.58 10*3/mm3      Monocytes, Absolute 0.60 10*3/mm3      Eosinophils, Absolute 0.02 10*3/mm3      Basophils, Absolute 0.12 10*3/mm3      Immature Grans, Absolute 0.78 10*3/mm3      nRBC 0.6 /100 WBC     Blood Gas, Arterial - [[id number]]  (Abnormal) Collected: 08/07/23 0444    Specimen: Arterial Blood Updated: 08/07/23 0442     Site Right Radial     Haroon's Test N/A     pH, Arterial 7.480 pH units      Comment: 83 Value above reference range        pCO2, Arterial 42.6 mm Hg      pO2, Arterial 73.3 mm Hg      Comment: 84 Value below reference range         HCO3, Arterial 31.7 mmol/L      Comment: 83 Value above reference range        Base Excess, Arterial 7.4 mmol/L      Comment: 83 Value above reference range        O2 Saturation, Arterial 96.5 %      Barometric Pressure for Blood Gas 744 mmHg      Modality Ventilator     FIO2 100 %      Ventilator Mode AC     Set Tidal Volume 450     Set Mech Resp Rate 18.0     PEEP 5.0     Collected by RENE HUERTA     Comment: Meter: [id number]     :  [id number]       Hemoglobin & Hematocrit, Blood [[id number]]  (Abnormal) Collected: 08/07/23 0033    Specimen: Blood Updated: 08/07/23 0047     Hemoglobin 8.4 g/dL      Hematocrit 23.2 %     POC Glucose Once [[id number]]  (Abnormal) Collected: 08/06/23 2247    Specimen: Blood Updated: 08/06/23 2303     Glucose 186 mg/dL      Comment: : [phone] CARLOS KENDRAMeter ID: [id number]       Blood Culture - Blood, Arm, Right [[id number]]  (Normal) Collected: 08/05/23 2156    Specimen: Blood from Arm, Right Updated: 08/06/23 2215     Blood Culture No growth at 24 hours    Blood Culture - Blood, Arm, Right [[id number]]  (Normal) Collected: 08/05/23 2156    Specimen: Blood from Arm, Right Updated: 08/06/23 2215     Blood Culture No growth at 24 hours    STAT Lactic Acid, Reflex [[id number]]  (Abnormal) Collected: 08/06/23 2126    Specimen: Blood Updated: 08/06/23 2146     Lactate 2.6 mmol/L     POC Glucose Once [[id number]]  (Abnormal) Collected: 08/06/23 1733    Specimen: Blood Updated: 08/06/23 2106     Glucose 163 mg/dL      Comment: RN NotifiedOperator: [id number] JOSE ALYSSAMeter ID: [id number]       Blood Gas, Arterial - [[id number]]  (Abnormal) Collected: 08/06/23 2018    Specimen: Arterial Blood Updated: 08/06/23 2016     Site Right Radial     Haroon's Test N/A     pH, Arterial 7.412 pH units      pCO2, Arterial 46.1 mm Hg      Comment: 83 Value above reference range        pO2, Arterial 60.4 mm Hg      Comment: 84 Value below reference range        HCO3,  Arterial 29.3 mmol/L      Comment: 83 Value above reference range        Base Excess, Arterial 4.2 mmol/L      Comment: 83 Value above reference range        O2 Saturation, Arterial 90.7 %      Comment: 84 Value below reference range        Barometric Pressure for Blood Gas 745 mmHg      Modality Ventilator     FIO2 100 %      Ventilator Mode NA     Set Tidal Volume 450     Set Mech Resp Rate 18.0     PEEP 5.0     Collected by rrt     Comment: Meter: [id number]     :  [id number]       Hemoglobin & Hematocrit, Blood [[id number]]  (Abnormal) Collected: 08/06/23 1826    Specimen: Blood Updated: 08/06/23 1835     Hemoglobin 8.2 g/dL      Hematocrit 23.4 %     Blood Gas, Arterial - [[id number]]  (Abnormal) Collected: 08/06/23 1836    Specimen: Arterial Blood Updated: 08/06/23 1833     Site Right Radial     Haroon's Test N/A     pH, Arterial 7.508 pH units      Comment: 83 Value above reference range        pCO2, Arterial 37.1 mm Hg      pO2, Arterial 54.3 mm Hg      Comment: 85 Value below critical limit        HCO3, Arterial 29.5 mmol/L      Comment: 83 Value above reference range        Base Excess, Arterial 6.0 mmol/L      Comment: 83 Value above reference range        O2 Saturation, Arterial 90.6 %      Comment: 84 Value below reference range        Barometric Pressure for Blood Gas 745 mmHg      Modality Heated HFNC     FIO2 100 %      Flow Rate 60.0 lpm      Ventilator Mode NA     Collected by rrt     Comment: Meter: [id number]     :  [id number]       STAT Lactic Acid, Reflex [[id number]]  (Abnormal) Collected: 08/06/23 1549    Specimen: Blood Updated: 08/06/23 1621     Lactate 3.2 mmol/L     Blood Gas, Arterial - [[id number]]  (Abnormal) Collected: 08/06/23 1341    Specimen: Arterial Blood Updated: 08/06/23 1339     Site Right Radial     Haroon's Test N/A     pH, Arterial 7.393 pH units      pCO2, Arterial 40.5 mm Hg      pO2, Arterial 75.6 mm Hg      Comment: 84 Value below reference range         HCO3, Arterial 24.7 mmol/L      Base Excess, Arterial -0.2 mmol/L      Comment: 84 Value below reference range        O2 Saturation, Arterial 95.9 %      Barometric Pressure for Blood Gas 746 mmHg      Modality NRB     Ventilator Mode NA     Collected by      Comment: Meter: [id number]     :  [id number]             Imaging Results (Last 24 Hours)       Procedure Component Value Units Date/Time    FL Retrograde Pyelogram In OR [[id number]] Resulted: 08/07/23 0707     Updated: 08/07/23 0707    XR Chest 1 View [[id number]] Collected: 08/06/23 1907     Updated: 08/06/23 1911    Narrative:      XR CHEST 1 VIEW    HISTORY: ET PLACEMENT    COMPARISON: 8/6/2023 at 1342 hours.    FINDINGS:  The endotracheal tube is in appropriate position. A defibrillator pad overlies  the right chest. The right ureteral stent is unchanged.    Mild atelectatic changes are noted in both lungs. There is no pleural effusion  or pneumothorax.    XR Chest 1 View [[id number]] Collected: 08/06/23 1404     Updated: 08/06/23 1415    Narrative:      Comparison:  AP chest, 08/05/2023.    FINDINGS:  Heart size is borderline enlarged and stable.  Mild consolidation in the right  lung base likely representing atelectasis.  There is a small right-sided pleural  effusion.  No pneumothorax.  Osseous structures are age-appropriate.    Right upper extremity PICC terminating in the right axilla, similar in position  to previous exam.                ASSESSMENT/PLAN         Sepsis    Mastitis    Other hydronephrosis      Assessment & Plan    Retroperitoneal Hematoma  Hemodynamically stable this AM weaned off pressor therapy last night.  Serial H/H at this juncture appears stabilized.  Received FFP and vitamin K to correct coags.  Critically ill, sepsis, shock liver, multi-system organ involvement.  There is limited information to deduce a bleeding source of retroperitoneal bleed (non-contrast CT imaging due to acute renal failure).  Recommend  conservative management so long as periodic follow up of H/H remains stable.     Thank you for the opportunity to participate in this patient's care.       I discussed the patient's findings and my recommendations with Dr. Yin            This document has been electronically signed by NIKOLAI Vicente-BC @  On August 7, 2023 11:31 CDT

## 2023-08-07 NOTE — PROGRESS NOTES
[facility]  INPATIENT PROGRESS NOTE    Length of Stay: 8  Date of Admission: 7/28/2023  Primary Care Physician: Len Seo MD    Subjective   Chief Complaint: Altered mental status  HPI: Intubated and sedated    As of today, 08/07/23  Review of Systems   Unable to perform ROS: Intubated      All pertinent negatives and positives are as above. All other systems have been reviewed and are negative unless otherwise stated.     Objective    Temp:  [97.6 øF (36.4 øC)-99.4 øF (37.4 øC)] 98.1 øF (36.7 øC)  Heart Rate:  [] 102  Resp:  [18-30] 20  BP: (103-176)/(51-77) 116/57  FiO2 (%):  [80 %-100 %] 80 %    AM-PAC 6 Clicks Score (PT): 15 (08/05/23 0926)    Vent Settings:  FiO2 (%):  [80 %-100 %] 80 %  S RR:  [18] 18  PEEP/CPAP (cm H2O):  [5 cm H20] 5 cm H20  MAP (cm H2O):  [8.5-11] 8.7    As of today, 08/07/23  Physical Exam  Vitals reviewed.   Constitutional:       Appearance: She is well-developed.      Interventions: She is sedated and intubated.   HENT:      Head: Normocephalic and atraumatic.   Eyes:      Pupils: Pupils are equal, round, and reactive to light.   Cardiovascular:      Rate and Rhythm: Normal rate and regular rhythm.      Heart sounds: Normal heart sounds. No murmur heard.    No friction rub. No gallop.   Pulmonary:      Effort: Pulmonary effort is normal. No respiratory distress. She is intubated.      Breath sounds: No wheezing or rales.      Comments: Coarse breath sounds  Chest:      Chest wall: No tenderness.   Abdominal:      General: Bowel sounds are normal. There is no distension.      Palpations: Abdomen is soft.      Tenderness: There is no abdominal tenderness. There is no guarding.   Musculoskeletal:      Cervical back: Normal range of motion and neck supple.   Skin:     General: Skin is warm and dry.   Psychiatric:         Behavior: Behavior normal.         Thought Content: Thought content normal.            Results Review:  I have reviewed the labs, radiology results, and diagnostic studies.    Laboratory Data:   Results from last 7 days   Lab Units 08/07/23  0555 08/06/23 0414 08/05/23 2156   SODIUM mmol/L 139 139 136   POTASSIUM mmol/L 3.5 4.8 5.3*   CHLORIDE mmol/L 93* 98 97*   CO2 mmol/L 29.0 17.0* 11.0*   BUN mg/dL 49* 35* 30*   CREATININE mg/dL 4.19* 3.52* 3.57*   GLUCOSE mg/dL 245* 197* 115*   CALCIUM mg/dL 6.2* 7.3* 8.1*   BILIRUBIN mg/dL 1.2 0.5 0.7   ALK PHOS U/L 156* 136* 141*   ALT (SGPT) U/L 1,188* 1,591* 593*   AST (SGOT) U/L 3,324* 5,266* 1,456*   ANION GAP mmol/L 17.0* 24.0* 28.0*     Estimated Creatinine Clearance: 10.6 mL/min (A) (by C-G formula based on SCr of 4.19 mg/dL (H)).  Results from last 7 days   Lab Units 08/06/23 0414 08/05/23 2156 08/01/23  0551   MAGNESIUM mg/dL 1.7 2.3 2.5*         Results from last 7 days   Lab Units 08/07/23  0555 08/07/23  0033 08/06/23  1826 08/06/23  0944 08/05/23 2156 08/05/23  0827 08/04/23  0548   WBC 10*3/mm3 25.01*  --   --  36.72* 33.24* 14.85* 6.83   HEMOGLOBIN g/dL 8.3* 8.4* 8.2* 6.6* 6.9* 9.0* 10.1*   HEMATOCRIT % 23.2* 23.2* 23.4* 18.7* 22.1* 27.0* 30.4*   PLATELETS 10*3/mm3 359  --   --  426 601* 588* 519*     Results from last 7 days   Lab Units 08/07/23  0555 08/06/23  0414 08/05/23 2156 08/05/23  0718 08/04/23  0548   INR  2.44* 3.00* 3.42* 1.77* 2.15*       Culture Data:   Blood Culture   Date Value Ref Range Status   08/05/2023 No growth at 24 hours  Preliminary   08/05/2023 No growth at 24 hours  Preliminary     Urine Culture   Date Value Ref Range Status   08/05/2023 No growth  Preliminary     No results found for: RESPCX  No results found for: WOUNDCX  No results found for: STOOLCX  No components found for: BODYFLD    Radiology Data:   Imaging Results (Last 24 Hours)       Procedure Component Value Units Date/Time    FL Retrograde Pyelogram In OR [[id number]] Resulted: 08/07/23 0707     Updated: 08/07/23 0707    XR Chest 1 View  [[id number]] Collected: 08/06/23 1907     Updated: 08/06/23 1911    Narrative:      XR CHEST 1 VIEW    HISTORY: ET PLACEMENT    COMPARISON: 8/6/2023 at 1342 hours.    FINDINGS:  The endotracheal tube is in appropriate position. A defibrillator pad overlies  the right chest. The right ureteral stent is unchanged.    Mild atelectatic changes are noted in both lungs. There is no pleural effusion  or pneumothorax.    XR Chest 1 View [[id number]] Collected: 08/06/23 1404     Updated: 08/06/23 1415    Narrative:      Comparison:  AP chest, 08/05/2023.    FINDINGS:  Heart size is borderline enlarged and stable.  Mild consolidation in the right  lung base likely representing atelectasis.  There is a small right-sided pleural  effusion.  No pneumothorax.  Osseous structures are age-appropriate.    Right upper extremity PICC terminating in the right axilla, similar in position  to previous exam.            ABG:  Results from last 7 days   Lab Units 08/07/23  0444   PH, ARTERIAL pH units 7.480*   PO2 ART mm Hg 73.3*   PCO2, ARTERIAL mm Hg 42.6   HCO3 ART mmol/L 31.7*       I have reviewed the patient's current medications.     Assessment/Plan     Active Hospital Problems    Diagnosis     **Sepsis     Mastitis     Other hydronephrosis        Plan:  1.  Acute hypoxic respiratory failure: Patient decompensated yesterday and required intubation.  Currently on mechanical ventilation with a PEEP of 5 with FiO2 of 80%.  Wean as tolerated.  2.  Right-sided retroperitoneal hematoma: CT surgery following.  Monitor for now.  Off anticoagulation.  Repeat FFP today.  3.  Shock: Requiring multiple vasopressors.  Likely secondary to acute blood loss and sepsis.  4.  Acute blood loss anemia: Required packed red blood cells yesterday.  Hemoglobin currently stable around 8.  5.  Acute kidney injury: Renal function some worse today.  Dr. Gómez following.  May require hemodialysis.  6.  Shock liver: LFTs, while still markedly elevated, are down  some today.  7.  Paroxysmal atrial fibrillation: Continue to hold anticoagulation.  Rate controlled.  8.  Mastitis: Status post I&D General surgery has signed off and will see in clinic.  9.  DVT prophylaxis: On hold for now.    Prognosis is guarded.    Medical Decision Making  Number and Complexity of problems: Multiple highly complex medical problems    Conditions and Status:        Condition is unchanged.     Discussed with: Patient's family     Treatment Plan  As above    Care Planning  Shared decision making: Family in agreement with plan of care  Code status and discussions: DNR    Disposition  Social Determinants of Health that impact treatment or disposition: None    I expect the patient to be discharged to skilled facility versus LTAC in 5-6 days.       I spent 34 minutes providing critical care management to this patient.  This excludes time spent in performing separately billed procedures.        This document has been electronically signed by Oscar Vela MD on August 7, 2023 11:32 CDT

## 2023-08-07 NOTE — PLAN OF CARE
Problem: Adult Inpatient Plan of Care  Goal: Plan of Care Review  Outcome: Ongoing, Progressing  Flowsheets (Taken 8/7/2023 9103)  Progress: improving  Plan of Care Reviewed With: spouse  Outcome Evaluation: Levophed off since 2210.  Urine output 600 ml during night.  Pt in afib, rate controlled.  Spouse and son spoke with MD and decision made to change code status to DNR.   Goal Outcome Evaluation:

## 2023-08-07 NOTE — PROGRESS NOTES
"    [facility]  [address] [place] [address]. [phone]  T - [phone]  F - [phone]     Progress Note          PATIENT  DEMOGRAPHICS   PATIENT NAME: Carol Sullivan                      PHYSICIAN: Stephanie Gómez MD  : 1940  MRN: [id number]   LOS: 8 days    Patient Care Team:  Len Seo MD as PCP - General  [place] APRN as Nurse Practitioner (General Surgery)  Subjective   SUBJECTIVE   On 2 pressors, bp stable at present       Objective   OBJECTIVE   Vital Signs  Temp:  [97.6 øF (36.4 øC)-99.4 øF (37.4 øC)] 99.2 øF (37.3 øC)  Heart Rate:  [] 98  Resp:  [18-30] 20  BP: (103-176)/(51-77) 112/57  FiO2 (%):  [80 %-100 %] 80 %    Flowsheet Rows      Flowsheet Row First Filed Value   Admission Height 165.1 cm (65\") Documented at 2023 1900   Admission Weight 74.8 kg (165 lb) Documented at 2023 1608             I/O last 3 completed shifts:  In: 17729.5 [P.O.:120; I.V.:8093.2; Blood:1848.3; Other:20; IV Piggyback:900]  Out: 910 [Urine:810; Emesis/NG output:100]    PHYSICAL EXAM    Physical Exam  Vitals and nursing note reviewed.   Constitutional:       Appearance: Normal appearance. She is not ill-appearing.   Cardiovascular:      Rate and Rhythm: Normal rate and regular rhythm.      Heart sounds: Normal heart sounds. No murmur heard.    No friction rub. No gallop.   Pulmonary:      Effort: No respiratory distress.      Breath sounds: Normal breath sounds. No stridor. No wheezing, rhonchi or rales.   Abdominal:      General: Bowel sounds are normal. There is no distension.      Palpations: Abdomen is soft.      Tenderness: There is no abdominal tenderness.   Musculoskeletal:      Right lower leg: No edema.      Left lower leg: No edema.   Skin:     General: Skin is warm and dry.   Neurological:      Mental Status: She is alert.       RESULTS   Results Review:    Results from last 7 days   Lab Units 23  0555 23  0414 23  2156   SODIUM " mmol/L 139 139 136   POTASSIUM mmol/L 3.5 4.8 5.3*   CHLORIDE mmol/L 93* 98 97*   CO2 mmol/L 29.0 17.0* 11.0*   BUN mg/dL 49* 35* 30*   CREATININE mg/dL 4.19* 3.52* 3.57*   CALCIUM mg/dL 6.2* 7.3* 8.1*   BILIRUBIN mg/dL 1.2 0.5 0.7   ALK PHOS U/L 156* 136* 141*   ALT (SGPT) U/L 1,188* 1,591* 593*   AST (SGOT) U/L 3,324* 5,266* 1,456*   GLUCOSE mg/dL 245* 197* 115*         Estimated Creatinine Clearance: 10.6 mL/min (A) (by C-G formula based on SCr of 4.19 mg/dL (H)).    Results from last 7 days   Lab Units 08/06/23 0414 08/05/23 2156 08/01/23  0551   MAGNESIUM mg/dL 1.7 2.3 2.5*               Results from last 7 days   Lab Units 08/07/23  0555 08/07/23  0033 08/06/23  1826 08/06/23  0944 08/05/23  2156 08/05/23  0827 08/04/23  0548   WBC 10*3/mm3 25.01*  --   --  36.72* 33.24* 14.85* 6.83   HEMOGLOBIN g/dL 8.3* 8.4* 8.2* 6.6* 6.9* 9.0* 10.1*   PLATELETS 10*3/mm3 359  --   --  426 601* 588* 519*         Results from last 7 days   Lab Units 08/07/23  0555 08/06/23  0414 08/05/23 2156 08/05/23  0718 08/04/23  0548   INR  2.44* 3.00* 3.42* 1.77* 2.15*           Imaging Results (Last 24 Hours)       Procedure Component Value Units Date/Time    FL Retrograde Pyelogram In OR [[id number]] Resulted: 08/07/23 0707     Updated: 08/07/23 0707    XR Chest 1 View [[id number]] Collected: 08/06/23 1907     Updated: 08/06/23 1911    Narrative:      XR CHEST 1 VIEW    HISTORY: ET PLACEMENT    COMPARISON: 8/6/2023 at 1342 hours.    FINDINGS:  The endotracheal tube is in appropriate position. A defibrillator pad overlies  the right chest. The right ureteral stent is unchanged.    Mild atelectatic changes are noted in both lungs. There is no pleural effusion  or pneumothorax.    XR Chest 1 View [[id number]] Collected: 08/06/23 1404     Updated: 08/06/23 1415    Narrative:      Comparison:  AP chest, 08/05/2023.    FINDINGS:  Heart size is borderline enlarged and stable.  Mild consolidation in the right  lung base likely representing  atelectasis.  There is a small right-sided pleural  effusion.  No pneumothorax.  Osseous structures are age-appropriate.    Right upper extremity PICC terminating in the right axilla, similar in position  to previous exam.             MEDICATIONS    albumin human, 25 g, Intravenous, BID  DULoxetine, 60 mg, Oral, Daily  methylPREDNISolone sodium succinate, 60 mg, Intravenous, Q6H  metoprolol succinate XL, 50 mg, Oral, Daily  metroNIDAZOLE, 500 mg, Intravenous, Q6H  nystatin, , Topical, Q12H  pantoprazole, 40 mg, Intravenous, Q AM  piperacillin-tazobactam, 3.375 g, Intravenous, Q12H  rosuvastatin, 40 mg, Oral, Daily  senna-docusate sodium, 2 tablet, Oral, BID  sodium chloride, 10 mL, Intravenous, Q12H  sodium chloride, 10 mL, Intravenous, Q12H      norepinephrine, 0.02-0.3 mcg/kg/min, Last Rate: Stopped (08/06/23 1500)  Pharmacy Consult,   Pharmacy to dose vancomycin,   propofol, 5-50 mcg/kg/min, Last Rate: 25 mcg/kg/min (08/07/23 0815)  sodium bicarbonate drip (greater than 75 mEq/bag), 150 mEq, Last Rate: 150 mEq (08/06/23 7308)  vasopressin, 0.03 Units/min        Assessment & Plan   ASSESSMENT / PLAN      Sepsis    Mastitis    Other hydronephrosis    1. Acute kidney injury: baseline unknown.   - Creatinine was 1.5 in 10/2022.   - UA 3+ protein, trace leukocytes, 0-2 RBC, 6-12 WBC, 2+ bacteria. Urine sodium 26. Renal ultrasound unremarkable.   - Creatinine at 1.5-1.6 range and then rapid worsening. Now upto 4.2 likely now atn. UO upto 800cc    Dw patient and her  that she may need RRT - they understand it.     Keep albumin . Change bicarb drip to LR. No plan for RRT. I will give another 24 hrs to see if any recovery on her own.     2. Hypertension:   - Blood pressure is low and now off losartan and currently on levophed and vasopressin     3. Metabolic acidosis:   - on bicarb drip. Now change LR     4. Sepsis:  - zosyn vancomycin and flagyl wbc now better     5. UTI:   - became septic with mild hydro Dr  [place] is consulted. Now stent in place . E.fecalis    6. Cdiff: not on po vanc now      7. Hyponatremia:   - Sodium is stable    8. Anemia:  - Hemoglobin is acceptable            This document has been electronically signed by Stephanie Gómez MD on August 7, 2023 10:44 CDT

## 2023-08-07 NOTE — PLAN OF CARE
Problem: Inability to Wean (Mechanical Ventilation, Invasive)  Goal: Mechanical Ventilation Liberation  Outcome: Ongoing, Progressing   Goal Outcome Evaluation:   Patient did not have an SBT due to high oxygen requirement; oxygen was weaned as tolerated and the patient tolerated the vent well; will continue to monitor

## 2023-08-07 NOTE — PROGRESS NOTES
MD at night:  I was informed by nursing staff in ICU that patient's  Tristan and son Murphy at bedside wish to change CODE STATUS to DNR.  Patient is intubated ventilated, critically ill.  I discussed at bedside with patient's  Tristan and son Murphy.  I informed them about patient's severe medical problems.  They informed me that they talked earlier  with Dr. Harrington and they were well aware of patient's severe medical problems and critical condition.  Patient's  states that DNR CODE STATUS is in concordance to patient's wish.  Her son Murphy agrees.  CODE STATUS will be changed to DNR on request of patient's  and son.

## 2023-08-07 NOTE — ADDENDUM NOTE
Addendum  created 08/07/23 1316 by Alessandro Benitez MD    Review and Sign - Ready for Procedure, Review and Sign - Signed

## 2023-08-07 NOTE — NURSING NOTE
Pt's oxygen's needs increased throughout the day. Multiple doses of fentanyl given for restlessness and pain. MD at bedside majority of the day. Large retroperitoneal hematoma noted on CT of abd. Pt received total of 3 Units of PRBCS including unit from night shift, 2 additional units of FFP this shift and additional unit of Vit K. Pt went to OR with  for stent placement for secondary hydronephrosis from compression.  consulted. Near the end of shift Pt's 02 demands increased even more requiring Airvo and then Mechanical Ventilation.

## 2023-08-07 NOTE — SIGNIFICANT NOTE
08/07/23 0815   OTHER   Discipline occupational therapist   Rehab Time/Intention   Session Not Performed unable to treat, medical status change  (Pt t/f to CCU, will need restart orders when medically appropriate. Thank you.)

## 2023-08-07 NOTE — PLAN OF CARE
Goal Outcome Evaluation:  Plan of Care Reviewed With: caregiver, patient        Progress: no change  Outcome Evaluation: Nutrition F/U:  change in medical condition.  Pt currently NPO on the vent for resp failure with sepsis and BLAINE.  currenlty on Pressors for hypotension.  Will montior for the need to start EN.

## 2023-08-07 NOTE — PROCEDURES
"Intubation    Date/Time: 8/6/2023 7:34 PM  Performed by: Juanito Harrington MD  Authorized by: Juanito Harrington MD   Consent: Verbal consent obtained.  Consent given by: next of kin.  Required items: required blood products, implants, devices, and special equipment available  Patient identity confirmed: hospital-assigned identification number and arm band  Time out: Immediately prior to procedure a \"time out\" was called to verify the correct patient, procedure, equipment, support staff and site/side marked as required.  Indications: respiratory distress, respiratory failure, airway protection and hypoxemia  Intubation method: video-assisted  Patient status: paralyzed (RSI)  Preoxygenation: BVM  Sedatives: etomidate  Paralytic: succinylcholine  Laryngoscope size: Mac 3  Tube size: 7.0 mm  Tube type: cuffed  Number of attempts: 2  Ventilation between attempts: BVM  Cricoid pressure: yes  Cords visualized: yes  Post-procedure assessment: chest rise and ETCO2 monitor  Breath sounds: equal  Cuff inflated: yes  ETT to lip: 22 cm  Tube secured with: bite block and ETT lyon  Chest x-ray interpreted by me.  Chest x-ray findings: endotracheal tube in appropriate position  Patient tolerance: patient tolerated the procedure well with no immediate complications  Comments: Due to worsening respiratory status and stress patient required emergent intubation.  Procedure was discussed with patient's son and daughter prior to performing they agreed with proceeding.  On the first attempt patient's vocal cords were visualized and ET tube was approached to the appropriate area but met resistance.  First ET tube was a 7.5 and this was changed out for a 7.0 ET tube which was able to be placed on the first attempt with this size.  During the procedure the patient had an episode of vomitus which was suctioned and likely aspirated during the procedure but is already on appropriate antibiotic coverage for this.  Patient's son and daughter were " updated after procedure.      Juanito Harrington MD

## 2023-08-07 NOTE — PROGRESS NOTES
"Adult Nutrition  Assessment/PES    Patient Name:  Carol Sullivan  YOB: 1940  MRN: [id number]  Admit Date:  7/28/2023    Assessment Date:  8/7/2023    Comments:  Nutrition F/U:  Pt with a change in overall medical condition.  She is currently NPO on the vent for Acute resp failure.  She is s/p Cysto with stent insertion.  She is being managed for Sepsis due to Breast Mastitis; HTN; AFib; BLAINE; UTI; Acute Resp Failure; Shock Liver; & Acute blood loss Anemia.  She has received PRBC and FFP along with VitK.  Currently on Vasopressin for hypotension.  RD will monitor for the need to start nutrition support.        Reason for Assessment       Row Name 08/07/23 1517          Reason for Assessment    Reason For Assessment follow-up protocol                    Nutrition/Diet History       Row Name 08/07/23 1517          Nutrition/Diet History    Typical Intake (Food/Fluid/EN/PN) Pt sedated on the vent                    Labs/Tests/Procedures/Meds       Row Name 08/07/23 1519          Labs/Procedures/Meds    Lab Results Reviewed reviewed, pertinent     Lab Results Comments Gluc 205/186/245; Bun 49; Creat 4.19; ALT 1188; ALb 3.0; Crp 9.39; H/H 8.3/23.7        Diagnostic Tests/Procedures    Diagnostic Test/Procedure Reviewed reviewed, pertinent     Diagnostic Test/Procedures Comments Intubated; S/p Cysto with stent insertion        Medications    Pertinent Medications Reviewed reviewed, pertinent     Pertinent Medications Comments IV Alb; Solumedrol; Flagyl; Protonix; Zosyn; Protpofol; Sodium bicarb; Vasopressin                    Physical Findings       Row Name 08/07/23 1521          Physical Findings    Enteral Access Devices orogastric tube                    Estimated/Assessed Needs - Anthropometrics       Row Name 08/07/23 1522          Anthropometrics    Height for Calculation 1.651 m (5' 5\")     Weight for Calculation 56.7 kg (125 lb)        Estimated/Assessed Needs    Additional Documentation " Additional Requirements (Group);Fluid Requirements (Group);Modified Wilber State Equation (Group);Estimated Calorie Needs (Group);KCAL/KG (Group);Jeffers-St. Jeor Equation (Group);Protein Requirements (Group)        Estimated Calorie Needs    Estimated Calorie Need Method kcal/kg        KCAL/KG    KCAL/KG 25 Kcal/Kg (kcal)     25 Kcal/Kg (kcal) 1417.5        Jeffers-St. Jeor Equation    RMR (Jeffers-St. Jeor Equation) 1032.875        Protein Requirements    Weight Used For Protein Calculations 56.7 kg (125 lb)     Est Protein Requirement Amount (gms/kg) --  68--74 gms (1.2-1.3 gms/kg IBW)        Fluid Requirements    Fluid Requirements (mL/day) 1500     Estimated Fluid Requirement Method other (see comments)  1500 cc Minimal fluid intake     RDA Method (mL) 1500                    Nutrition Prescription Ordered       Row Name 08/07/23 1524          Nutrition Prescription PO    Current PO Diet NPO        Propofol Considerations    Propofol (mL/hr) 9.4 mL/hr     Propofol (Kcal/day) 248 Kcal/day                         Problem/Interventions:   Problem 1       Row Name 08/07/23 1525          Nutrition Diagnoses Problem 1    Problem 1 Increased Nutrient Needs     Macronutrient Protein     Micronutrient Vitamin;Mineral     Etiology (related to) Medical Diagnosis     Infectious Disease Sepsis;UTI     Other Breast Mastitis --POD #2 I&D     Other Comment Increased metabolic demands                    Problem 2       Row Name 08/07/23 1526          Nutrition Diagnoses Problem 2    Problem 2 Inadequate Intake/Infusion     Inadequate Intake Type Oral     Macronutrient Kcal;Protein     Micronutrient Vitamin;Mineral     Etiology (related to) Factors Affecting Nutrition     Infectious Disease Sepsis;UTI     Pulmonary/Critical Care Ventilator;Acute respiratory failure     Reported GI Symptoms --  on Abx     Signs/Symptoms (evidenced by) NPO     Over number of meals 4     Other Comment Variable po intake prior to being intubated                     Problem 3       Row Name 08/07/23 1529          Nutrition Diagnoses Problem 3    Problem 3 Altered Nutrition Related to Labs     Etiology (related to) Medical Diagnosis     Hematological Acute blood loss;Anemia     Hepatic Shock liver     Metabolic/ICU Elevated LFTs     Renal BLAINE     Signs/Symptoms (evidenced by) Biochemical     Specific Labs Noted BUN;Creatinine;ALT                      Intervention Goal       Row Name 08/07/23 1532          Intervention Goal    General Reduce/improve symptoms     Weight No significant weight loss                    Nutrition Intervention       Row Name 08/07/23 1532          Nutrition Intervention    RD/Tech Action Follow Tx progress;Care plan reviewd                      Education/Evaluation       Row Name 08/07/23 1532          Education    Education Education not appropriate at this time     Please explain Patient intubated        Monitor/Evaluation    Monitor Per protocol;I&O;Pertinent labs;Weight;Skin status;Symptoms                     Electronically signed by:  Zohreh Jeter RD  08/07/23 15:36 CDT

## 2023-08-08 ENCOUNTER — APPOINTMENT (OUTPATIENT)
Dept: INTERVENTIONAL RADIOLOGY/VASCULAR | Facility: HOSPITAL | Age: 83
DRG: 853 | End: 2023-08-08
Payer: MEDICARE

## 2023-08-08 ENCOUNTER — APPOINTMENT (OUTPATIENT)
Dept: ULTRASOUND IMAGING | Facility: HOSPITAL | Age: 83
DRG: 853 | End: 2023-08-08
Payer: MEDICARE

## 2023-08-08 LAB
ALBUMIN SERPL-MCNC: 3.5 G/DL (ref 3.5–5.2)
ALP SERPL-CCNC: 222 U/L (ref 39–117)
ALT SERPL W P-5'-P-CCNC: 774 U/L (ref 1–33)
ANION GAP SERPL CALCULATED.3IONS-SCNC: 21 MMOL/L (ref 5–15)
ARTERIAL PATENCY WRIST A: ABNORMAL
ARTERIAL PATENCY WRIST A: ABNORMAL
AST SERPL-CCNC: 1293 U/L (ref 1–32)
ATMOSPHERIC PRESS: 748 MMHG
ATMOSPHERIC PRESS: 748 MMHG
BASE EXCESS BLDA CALC-SCNC: 7.7 MMOL/L (ref 0–2)
BASE EXCESS BLDA CALC-SCNC: 8.3 MMOL/L (ref 0–2)
BASOPHILS # BLD AUTO: 0.01 10*3/MM3 (ref 0–0.2)
BASOPHILS NFR BLD AUTO: 0 % (ref 0–1.5)
BDY SITE: ABNORMAL
BDY SITE: ABNORMAL
BH BB BLOOD EXPIRATION DATE: NORMAL
BH BB BLOOD TYPE BARCODE: NORMAL
BH BB DISPENSE STATUS: NORMAL
BH BB PRODUCT CODE: NORMAL
BH BB UNIT NUMBER: NORMAL
BILIRUB CONJ SERPL-MCNC: 0.6 MG/DL (ref 0–0.3)
BILIRUB INDIRECT SERPL-MCNC: 0.4 MG/DL
BILIRUB SERPL-MCNC: 1 MG/DL (ref 0–1.2)
BUN SERPL-MCNC: 52 MG/DL (ref 8–23)
BUN/CREAT SERPL: 10.8 (ref 7–25)
CALCIUM SPEC-SCNC: 6.2 MG/DL (ref 8.6–10.5)
CHLORIDE SERPL-SCNC: 92 MMOL/L (ref 98–107)
CO2 SERPL-SCNC: 26 MMOL/L (ref 22–29)
CREAT SERPL-MCNC: 4.8 MG/DL (ref 0.57–1)
CROSSMATCH INTERPRETATION: NORMAL
CROSSMATCH INTERPRETATION: NORMAL
CRP SERPL-MCNC: 7.14 MG/DL (ref 0–0.5)
DEPRECATED RDW RBC AUTO: 43.2 FL (ref 37–54)
EGFRCR SERPLBLD CKD-EPI 2021: 8.6 ML/MIN/1.73
EOSINOPHIL # BLD AUTO: 0 10*3/MM3 (ref 0–0.4)
EOSINOPHIL NFR BLD AUTO: 0 % (ref 0.3–6.2)
ERYTHROCYTE [DISTWIDTH] IN BLOOD BY AUTOMATED COUNT: 15.1 % (ref 12.3–15.4)
GLUCOSE BLDC GLUCOMTR-MCNC: 117 MG/DL (ref 70–130)
GLUCOSE BLDC GLUCOMTR-MCNC: 129 MG/DL (ref 70–130)
GLUCOSE BLDC GLUCOMTR-MCNC: 148 MG/DL (ref 70–130)
GLUCOSE BLDC GLUCOMTR-MCNC: 175 MG/DL (ref 70–130)
GLUCOSE BLDC GLUCOMTR-MCNC: 214 MG/DL (ref 70–130)
GLUCOSE SERPL-MCNC: 172 MG/DL (ref 65–99)
HBV SURFACE AG SERPL QL IA: NORMAL
HCO3 BLDA-SCNC: 31.5 MMOL/L (ref 20–26)
HCO3 BLDA-SCNC: 32.6 MMOL/L (ref 20–26)
HCT VFR BLD AUTO: 21.4 % (ref 34–46.6)
HCT VFR BLD AUTO: 24.6 % (ref 34–46.6)
HCT VFR BLD AUTO: 25.6 % (ref 34–46.6)
HCT VFR BLD AUTO: 29.6 % (ref 34–46.6)
HEMOCCULT STL QL: POSITIVE
HGB BLD-MCNC: 10 G/DL (ref 12–15.9)
HGB BLD-MCNC: 7.4 G/DL (ref 12–15.9)
HGB BLD-MCNC: 8.8 G/DL (ref 12–15.9)
HGB BLD-MCNC: 9 G/DL (ref 12–15.9)
IMM GRANULOCYTES # BLD AUTO: 0.32 10*3/MM3 (ref 0–0.05)
IMM GRANULOCYTES NFR BLD AUTO: 1.5 % (ref 0–0.5)
INHALED O2 CONCENTRATION: 50 %
INHALED O2 CONCENTRATION: 55 %
INR PPP: 2.03 (ref 0.8–1.2)
LYMPHOCYTES # BLD AUTO: 0.51 10*3/MM3 (ref 0.7–3.1)
LYMPHOCYTES NFR BLD AUTO: 2.4 % (ref 19.6–45.3)
Lab: ABNORMAL
Lab: ABNORMAL
MCH RBC QN AUTO: 29.5 PG (ref 26.6–33)
MCHC RBC AUTO-ENTMCNC: 35.2 G/DL (ref 31.5–35.7)
MCV RBC AUTO: 83.9 FL (ref 79–97)
MODALITY: ABNORMAL
MODALITY: ABNORMAL
MONOCYTES # BLD AUTO: 0.87 10*3/MM3 (ref 0.1–0.9)
MONOCYTES NFR BLD AUTO: 4.1 % (ref 5–12)
NEUTROPHILS NFR BLD AUTO: 19.39 10*3/MM3 (ref 1.7–7)
NEUTROPHILS NFR BLD AUTO: 92 % (ref 42.7–76)
NRBC BLD AUTO-RTO: 0.4 /100 WBC (ref 0–0.2)
PCO2 BLDA: 40.2 MM HG (ref 35–45)
PCO2 BLDA: 43.2 MM HG (ref 35–45)
PEEP RESPIRATORY: 5 CM[H2O]
PEEP RESPIRATORY: 5 CM[H2O]
PH BLDA: 7.49 PH UNITS (ref 7.35–7.45)
PH BLDA: 7.5 PH UNITS (ref 7.35–7.45)
PLATELET # BLD AUTO: 272 10*3/MM3 (ref 140–450)
PMV BLD AUTO: 9.7 FL (ref 6–12)
PO2 BLDA: 41.2 MM HG (ref 83–108)
PO2 BLDA: 53.8 MM HG (ref 83–108)
POTASSIUM SERPL-SCNC: 3.4 MMOL/L (ref 3.5–5.2)
PROT SERPL-MCNC: 6.4 G/DL (ref 6–8.5)
PROTHROMBIN TIME: 22.6 SECONDS (ref 11.1–15.3)
RBC # BLD AUTO: 3.05 10*6/MM3 (ref 3.77–5.28)
SAO2 % BLDCOA: 76.9 % (ref 94–99)
SAO2 % BLDCOA: 90.2 % (ref 94–99)
SET MECH RESP RATE: 18
SET MECH RESP RATE: 18
SODIUM SERPL-SCNC: 139 MMOL/L (ref 136–145)
UNIT  ABO: NORMAL
UNIT  RH: NORMAL
VENTILATOR MODE: AC
VENTILATOR MODE: AC
VT ON VENT VENT: 400 ML
VT ON VENT VENT: 400 ML
WBC NRBC COR # BLD: 21.1 10*3/MM3 (ref 3.4–10.8)

## 2023-08-08 PROCEDURE — 63710000001 INSULIN ASPART PER 5 UNITS: Performed by: HOSPITALIST

## 2023-08-08 PROCEDURE — 85014 HEMATOCRIT: CPT | Performed by: UROLOGY

## 2023-08-08 PROCEDURE — 25010000002 PIPERACILLIN SOD-TAZOBACTAM PER 1 G: Performed by: UROLOGY

## 2023-08-08 PROCEDURE — 94799 UNLISTED PULMONARY SVC/PX: CPT

## 2023-08-08 PROCEDURE — P9016 RBC LEUKOCYTES REDUCED: HCPCS

## 2023-08-08 PROCEDURE — 85018 HEMOGLOBIN: CPT | Performed by: UROLOGY

## 2023-08-08 PROCEDURE — 25010000002 FENTANYL CITRATE (PF) 50 MCG/ML SOLUTION: Performed by: FAMILY MEDICINE

## 2023-08-08 PROCEDURE — 82948 REAGENT STRIP/BLOOD GLUCOSE: CPT

## 2023-08-08 PROCEDURE — 82272 OCCULT BLD FECES 1-3 TESTS: CPT | Performed by: HOSPITALIST

## 2023-08-08 PROCEDURE — 76937 US GUIDE VASCULAR ACCESS: CPT | Performed by: THORACIC SURGERY (CARDIOTHORACIC VASCULAR SURGERY)

## 2023-08-08 PROCEDURE — 76937 US GUIDE VASCULAR ACCESS: CPT

## 2023-08-08 PROCEDURE — 86900 BLOOD TYPING SEROLOGIC ABO: CPT

## 2023-08-08 PROCEDURE — 25010000002 METHYLPREDNISOLONE PER 125 MG: Performed by: FAMILY MEDICINE

## 2023-08-08 PROCEDURE — 87340 HEPATITIS B SURFACE AG IA: CPT | Performed by: STUDENT IN AN ORGANIZED HEALTH CARE EDUCATION/TRAINING PROGRAM

## 2023-08-08 PROCEDURE — 36600 WITHDRAWAL OF ARTERIAL BLOOD: CPT

## 2023-08-08 PROCEDURE — 02HV33Z INSERTION OF INFUSION DEVICE INTO SUPERIOR VENA CAVA, PERCUTANEOUS APPROACH: ICD-10-PCS | Performed by: THORACIC SURGERY (CARDIOTHORACIC VASCULAR SURGERY)

## 2023-08-08 PROCEDURE — 36556 INSERT NON-TUNNEL CV CATH: CPT | Performed by: THORACIC SURGERY (CARDIOTHORACIC VASCULAR SURGERY)

## 2023-08-08 PROCEDURE — 25010000002 CALCIUM GLUCONATE-NACL 1-0.8 GM/100ML-% SOLUTION: Performed by: INTERNAL MEDICINE

## 2023-08-08 PROCEDURE — 5A1D70Z PERFORMANCE OF URINARY FILTRATION, INTERMITTENT, LESS THAN 6 HOURS PER DAY: ICD-10-PCS

## 2023-08-08 PROCEDURE — C1752 CATH,HEMODIALYSIS,SHORT-TERM: HCPCS

## 2023-08-08 PROCEDURE — 86140 C-REACTIVE PROTEIN: CPT | Performed by: UROLOGY

## 2023-08-08 PROCEDURE — 85025 COMPLETE CBC W/AUTO DIFF WBC: CPT | Performed by: UROLOGY

## 2023-08-08 PROCEDURE — 82803 BLOOD GASES ANY COMBINATION: CPT

## 2023-08-08 PROCEDURE — 25010000002 PROPOFOL 10 MG/ML EMULSION: Performed by: FAMILY MEDICINE

## 2023-08-08 PROCEDURE — 85610 PROTHROMBIN TIME: CPT | Performed by: UROLOGY

## 2023-08-08 PROCEDURE — 94761 N-INVAS EAR/PLS OXIMETRY MLT: CPT

## 2023-08-08 PROCEDURE — 80076 HEPATIC FUNCTION PANEL: CPT | Performed by: UROLOGY

## 2023-08-08 PROCEDURE — 94003 VENT MGMT INPAT SUBQ DAY: CPT

## 2023-08-08 PROCEDURE — 80048 BASIC METABOLIC PNL TOTAL CA: CPT | Performed by: UROLOGY

## 2023-08-08 RX ORDER — SODIUM CHLORIDE, SODIUM LACTATE, POTASSIUM CHLORIDE, CALCIUM CHLORIDE 600; 310; 30; 20 MG/100ML; MG/100ML; MG/100ML; MG/100ML
50 INJECTION, SOLUTION INTRAVENOUS CONTINUOUS
Status: DISCONTINUED | OUTPATIENT
Start: 2023-08-08 | End: 2023-08-09

## 2023-08-08 RX ORDER — ALBUMIN (HUMAN) 12.5 G/50ML
12.5 SOLUTION INTRAVENOUS AS NEEDED
Status: ACTIVE | OUTPATIENT
Start: 2023-08-08 | End: 2023-08-09

## 2023-08-08 RX ORDER — POTASSIUM CHLORIDE 1.5 G/1.58G
20 POWDER, FOR SOLUTION ORAL ONCE
Status: COMPLETED | OUTPATIENT
Start: 2023-08-08 | End: 2023-08-08

## 2023-08-08 RX ORDER — CALCIUM GLUCONATE 10 MG/ML
1000 INJECTION, SOLUTION INTRAVENOUS ONCE
Status: COMPLETED | OUTPATIENT
Start: 2023-08-08 | End: 2023-08-08

## 2023-08-08 RX ADMIN — PROPOFOL INJECTABLE EMULSION 25 MCG/KG/MIN: 10 INJECTION, EMULSION INTRAVENOUS at 05:34

## 2023-08-08 RX ADMIN — PIPERACILLIN SODIUM AND TAZOBACTAM SODIUM 3.38 G: 3; .375 INJECTION, POWDER, LYOPHILIZED, FOR SOLUTION INTRAVENOUS at 17:44

## 2023-08-08 RX ADMIN — METHYLPREDNISOLONE SODIUM SUCCINATE 60 MG: 125 INJECTION INTRAMUSCULAR; INTRAVENOUS at 14:44

## 2023-08-08 RX ADMIN — PANTOPRAZOLE SODIUM 40 MG: 40 INJECTION, POWDER, FOR SOLUTION INTRAVENOUS at 05:34

## 2023-08-08 RX ADMIN — ROSUVASTATIN CALCIUM 40 MG: 20 TABLET, FILM COATED ORAL at 09:28

## 2023-08-08 RX ADMIN — SODIUM CHLORIDE, POTASSIUM CHLORIDE, SODIUM LACTATE AND CALCIUM CHLORIDE 50 ML/HR: 600; 310; 30; 20 INJECTION, SOLUTION INTRAVENOUS at 17:46

## 2023-08-08 RX ADMIN — SODIUM CHLORIDE, POTASSIUM CHLORIDE, SODIUM LACTATE AND CALCIUM CHLORIDE 75 ML/HR: 600; 310; 30; 20 INJECTION, SOLUTION INTRAVENOUS at 01:55

## 2023-08-08 RX ADMIN — DULOXETINE HYDROCHLORIDE 60 MG: 60 CAPSULE, DELAYED RELEASE ORAL at 09:28

## 2023-08-08 RX ADMIN — METOPROLOL TARTRATE 25 MG: 25 TABLET, FILM COATED ORAL at 09:29

## 2023-08-08 RX ADMIN — METHYLPREDNISOLONE SODIUM SUCCINATE 60 MG: 125 INJECTION INTRAMUSCULAR; INTRAVENOUS at 07:57

## 2023-08-08 RX ADMIN — INSULIN ASPART 2 UNITS: 100 INJECTION, SOLUTION INTRAVENOUS; SUBCUTANEOUS at 07:57

## 2023-08-08 RX ADMIN — FENTANYL CITRATE 50 MCG: 50 INJECTION, SOLUTION INTRAMUSCULAR; INTRAVENOUS at 05:52

## 2023-08-08 RX ADMIN — METOPROLOL TARTRATE 5 MG: 5 INJECTION INTRAVENOUS at 23:07

## 2023-08-08 RX ADMIN — CALCIUM GLUCONATE 1000 MG: 10 INJECTION, SOLUTION INTRAVENOUS at 17:44

## 2023-08-08 RX ADMIN — PROPOFOL INJECTABLE EMULSION 25 MCG/KG/MIN: 10 INJECTION, EMULSION INTRAVENOUS at 11:51

## 2023-08-08 RX ADMIN — NYSTATIN: 100000 POWDER TOPICAL at 20:41

## 2023-08-08 RX ADMIN — METHYLPREDNISOLONE SODIUM SUCCINATE 60 MG: 125 INJECTION INTRAMUSCULAR; INTRAVENOUS at 20:40

## 2023-08-08 RX ADMIN — METOPROLOL TARTRATE 25 MG: 25 TABLET, FILM COATED ORAL at 20:41

## 2023-08-08 RX ADMIN — POTASSIUM CHLORIDE 20 MEQ: 1.5 POWDER, FOR SOLUTION ORAL at 17:44

## 2023-08-08 RX ADMIN — NYSTATIN: 100000 POWDER TOPICAL at 11:15

## 2023-08-08 RX ADMIN — Medication 10 ML: at 19:57

## 2023-08-08 RX ADMIN — Medication 10 ML: at 09:29

## 2023-08-08 RX ADMIN — FENTANYL CITRATE 50 MCG: 50 INJECTION, SOLUTION INTRAMUSCULAR; INTRAVENOUS at 01:11

## 2023-08-08 RX ADMIN — PIPERACILLIN SODIUM AND TAZOBACTAM SODIUM 3.38 G: 3; .375 INJECTION, POWDER, LYOPHILIZED, FOR SOLUTION INTRAVENOUS at 05:34

## 2023-08-08 RX ADMIN — FENTANYL CITRATE 50 MCG: 50 INJECTION, SOLUTION INTRAMUSCULAR; INTRAVENOUS at 19:57

## 2023-08-08 RX ADMIN — PROPOFOL INJECTABLE EMULSION 30 MCG/KG/MIN: 10 INJECTION, EMULSION INTRAVENOUS at 22:47

## 2023-08-08 RX ADMIN — METHYLPREDNISOLONE SODIUM SUCCINATE 60 MG: 125 INJECTION INTRAMUSCULAR; INTRAVENOUS at 02:01

## 2023-08-08 NOTE — PROGRESS NOTES
LOS: 9 days     Patient Care Team:  Len Seo MD as PCP - General  Blaire Camara APRN as Nurse Practitioner (General Surgery)      Subjective     Retroperitoneal bleed    Objective       Vital Signs  Temp:  [98.1 øF (36.7 øC)-98.7 øF (37.1 øC)] 98.1 øF (36.7 øC)  Heart Rate:  [] 101  Resp:  [18-23] 20  BP: (109-153)/(53-96) 153/96  FiO2 (%):  [50 %-90 %] 55 %    Physical Exam:        General Appearance:  On ventilator     Respiratory:    UNLABORED RESPIRATIONS.     Abdomen:     SOFT.       Genitourinary: Urine output excellent     Rectal:     DEFERRED       Results Review:       Imaging Results (Last 24 Hours)       ** No results found for the last 24 hours. **          Lab Results (last 24 hours)       Procedure Component Value Units Date/Time    Hepatic Function Panel [[id number]]  (Abnormal) Collected: 08/08/23 0612    Specimen: Blood Updated: 08/08/23 0731     Total Protein 6.4 g/dL      Albumin 3.5 g/dL      ALT (SGPT) 774 U/L      AST (SGOT) 1,293 U/L      Alkaline Phosphatase 222 U/L      Total Bilirubin 1.0 mg/dL      Bilirubin, Direct 0.6 mg/dL      Bilirubin, Indirect 0.4 mg/dL     Protime-INR [[id number]]  (Abnormal) Collected: 08/08/23 0612    Specimen: Blood Updated: 08/08/23 0717     Protime 22.6 Seconds      INR 2.03    Narrative:      Therapeutic range for most indications is 2.0-3.0 INR,  or 2.5-3.5 for mechanical heart valves.    Basic Metabolic Panel [[id number]]  (Abnormal) Collected: 08/08/23 0612    Specimen: Blood Updated: 08/08/23 0713     Glucose 172 mg/dL      BUN 52 mg/dL      Creatinine 4.80 mg/dL      Sodium 139 mmol/L      Potassium 3.4 mmol/L      Chloride 92 mmol/L      CO2 26.0 mmol/L      Calcium 6.2 mg/dL      BUN/Creatinine Ratio 10.8     Anion Gap 21.0 mmol/L      eGFR 8.6 mL/min/1.73      Comment: <15 Indicative of kidney failure       Narrative:      GFR Normal >60  Chronic Kidney Disease <60  Kidney Failure <15    The GFR formula is only valid for adults with  stable renal function between ages 18 and 70.    C-reactive Protein [[id number]]  (Abnormal) Collected: 08/08/23 0612    Specimen: Blood Updated: 08/08/23 0713     C-Reactive Protein 7.14 mg/dL     CBC & Differential [[id number]]  (Abnormal) Collected: 08/08/23 0612    Specimen: Blood Updated: 08/08/23 0657    Narrative:      The following orders were created for panel order CBC & Differential.  Procedure                               Abnormality         Status                     ---------                               -----------         ------                     CBC Auto Differential[[id number]]        Abnormal            Final result               Scan Slide[[id number]]                                                                    Please view results for these tests on the individual orders.    CBC Auto Differential [[id number]]  (Abnormal) Collected: 08/08/23 0612    Specimen: Blood Updated: 08/08/23 0657     WBC 21.10 10*3/mm3      RBC 3.05 10*6/mm3      Hemoglobin 9.0 g/dL      Hematocrit 25.6 %      MCV 83.9 fL      MCH 29.5 pg      MCHC 35.2 g/dL      RDW 15.1 %      RDW-SD 43.2 fl      MPV 9.7 fL      Platelets 272 10*3/mm3      Neutrophil % 92.0 %      Lymphocyte % 2.4 %      Monocyte % 4.1 %      Eosinophil % 0.0 %      Basophil % 0.0 %      Immature Grans % 1.5 %      Neutrophils, Absolute 19.39 10*3/mm3      Lymphocytes, Absolute 0.51 10*3/mm3      Monocytes, Absolute 0.87 10*3/mm3      Eosinophils, Absolute 0.00 10*3/mm3      Basophils, Absolute 0.01 10*3/mm3      Immature Grans, Absolute 0.32 10*3/mm3      nRBC 0.4 /100 WBC     POC Glucose Once [[id number]]  (Abnormal) Collected: 08/07/23 1253    Specimen: Blood Updated: 08/08/23 0535     Glucose 214 mg/dL      Comment: : [id number] CRISTINO VICKIMeter ID: [id number]       Blood Gas, Arterial - [[id number]]  (Abnormal) Collected: 08/08/23 0447    Specimen: Arterial Blood Updated: 08/08/23 0444     Site Right Radial     Haroon's Test N/A     pH,  Arterial 7.503 pH units      Comment: 83 Value above reference range        pCO2, Arterial 40.2 mm Hg      pO2, Arterial 53.8 mm Hg      Comment: 85 Value below critical limit        HCO3, Arterial 31.5 mmol/L      Comment: 83 Value above reference range        Base Excess, Arterial 7.7 mmol/L      Comment: 83 Value above reference range        O2 Saturation, Arterial 90.2 %      Comment: 84 Value below reference range        Barometric Pressure for Blood Gas 748 mmHg      Modality Ventilator     FIO2 55 %      Ventilator Mode AC     Set Tidal Volume 400     Set Mech Resp Rate 18.0     PEEP 5.0     Collected by [email or web address]. rrt     Comment: Meter: [id number]     :  [id number]       Blood Gas, Arterial - [[id number]]  (Abnormal) Collected: 08/08/23 0402    Specimen: Arterial Blood Updated: 08/08/23 0359     Site Right Radial     Haroon's Test N/A     pH, Arterial 7.485 pH units      Comment: 83 Value above reference range        pCO2, Arterial 43.2 mm Hg      pO2, Arterial 41.2 mm Hg      Comment: 85 Value below critical limit        HCO3, Arterial 32.6 mmol/L      Comment: 83 Value above reference range        Base Excess, Arterial 8.3 mmol/L      Comment: 83 Value above reference range        O2 Saturation, Arterial 76.9 %      Comment: 84 Value below reference range        Barometric Pressure for Blood Gas 748 mmHg      Modality Ventilator     FIO2 50 %      Ventilator Mode AC     Set Tidal Volume 400     Set Mech Resp Rate 18.0     PEEP 5.0     Collected by jason. rrt     Comment: Meter: [id number]     :  [id number]       Hemoglobin & Hematocrit, Blood [[id number]]  (Abnormal) Collected: 08/08/23 0001    Specimen: Blood Updated: 08/08/23 0022     Hemoglobin 7.4 g/dL      Hematocrit 21.4 %     Blood Culture - Blood, Arm, Right [[id number]]  (Normal) Collected: 08/05/23 2156    Specimen: Blood from Arm, Right Updated: 08/07/23 2215     Blood Culture No growth at 2 days    Blood Culture - Blood,  Arm, Right [[id number]]  (Normal) Collected: 08/05/23 2156    Specimen: Blood from Arm, Right Updated: 08/07/23 2215     Blood Culture No growth at 2 days    POC Glucose Once [[id number]]  (Abnormal) Collected: 08/07/23 2038    Specimen: Blood Updated: 08/07/23 2055     Glucose 187 mg/dL      Comment: : [id number] GARCIA WHITNEYMeter ID: [id number]       Hemoglobin & Hematocrit, Blood [[id number]]  (Abnormal) Collected: 08/07/23 1742    Specimen: Blood Updated: 08/07/23 1807     Hemoglobin 7.9 g/dL      Hematocrit 23.2 %     POC Glucose Once [[id number]]  (Abnormal) Collected: 08/07/23 1741    Specimen: Blood Updated: 08/07/23 1800     Glucose 209 mg/dL      Comment: : [phone] DAVID STEPHANIELEYMeter ID: [id number]       Hemoglobin & Hematocrit, Blood [[id number]]  (Abnormal) Collected: 08/07/23 1146    Specimen: Blood Updated: 08/07/23 1153     Hemoglobin 8.3 g/dL      Hematocrit 23.7 %     Urine Culture - Urine, Straight Cath [[id number]]  (Normal) Collected: 08/05/23 1109    Specimen: Urine from Straight Cath Updated: 08/07/23 1140     Urine Culture No growth              I reviewed the patient's new clinical results.  I reviewed the patient's new imaging results and agree with the interpretation.  I reviewed the patient's other test results and agree with the interpretation        Assessment:    Post retroperitoneal bleed with good urine output with right J stent in place    Plan:     Continue present management      Ousmane Garcia MD  08/08/23  08:35 CDT

## 2023-08-08 NOTE — PLAN OF CARE
Problem: Device-Related Complication Risk (Hemodialysis)  Goal: Safe, Effective Therapy Delivery  Outcome: Ongoing, Progressing     Problem: Hemodynamic Instability (Hemodialysis)  Goal: Effective Tissue Perfusion  Outcome: Ongoing, Progressing     Problem: Infection (Hemodialysis)  Goal: Absence of Infection Signs and Symptoms  Outcome: Ongoing, Progressing   Goal Outcome Evaluation:      Hemodynamically stable during HD. Obtained ordered UF 3L without difficulty. No issues with vascular access. Next planned HD tomorrow.

## 2023-08-08 NOTE — SIGNIFICANT NOTE
Nephrology has discussed details and indications for dialysis with Carol Sullivan and/or family and have requested catheter placement.  Had a detailed discussion with Carol Sullivan and/or family regarding situation, options and plans. BLAINE and CKD requiring intermediate term hemodialysis. Placement of temporary central venous catheter for hemodialysis is advisable. Involves catheter placed in vein in neck traveling down into heart or in groin traveling up toward heart.      Risks: Include, but not limited to, bleeding, infection, pneumothorax, pulmonary dysfunction, blood vessel injury, nerve injury. Benefits: Long term central venous access for dialysis. Options: Medical therapy. Understands and wishes to proceed.     Place temporary hemodialysis catheter, vascular ultrasound guidance. Local, IV sedation. 08/08/23        This document has been electronically signed by Hardy Yin MD on August 8, 2023 12:38 CDT

## 2023-08-08 NOTE — PROGRESS NOTES
"    [facility]  [address] [place] [address]. [id number]  T - [phone]  F - [phone]     Progress Note          PATIENT  DEMOGRAPHICS   PATIENT NAME: Carol Sullivan                      PHYSICIAN: Larry Rendon MD  : 1940  MRN: [id number]   LOS: 9 days    Patient Care Team:  Len Seo MD as PCP - General  [place]cean ShoresBlaire APRN as Nurse Practitioner (General Surgery)  Subjective   SUBJECTIVE   S/p ETT. Not on 2 pressors, bp stable at present. UOP 850cc.eGFR 8.6.       Objective   OBJECTIVE   Vital Signs  Temp:  [98.1 øF (36.7 øC)-98.7 øF (37.1 øC)] 98.1 øF (36.7 øC)  Heart Rate:  [] 96  Resp:  [18-23] 18  BP: (116-153)/(56-96) 129/72  FiO2 (%):  [50 %-90 %] 55 %    Flowsheet Rows      Flowsheet Row First Filed Value   Admission Height 165.1 cm (65\") Documented at 2023 1900   Admission Weight 74.8 kg (165 lb) Documented at 2023 1608             I/O last 3 completed shifts:  In: 7553.7 [I.V.:6333.7; Other:20; IV Piggyback:1200]  Out: 1550 [Urine:1450; Emesis/NG output:100]    PHYSICAL EXAM    Physical Exam  Vitals and nursing note reviewed.   Constitutional:       Appearance: Normal appearance. She is not ill-appearing.   Cardiovascular:      Rate and Rhythm: Normal rate and regular rhythm.      Heart sounds: Normal heart sounds. No murmur heard.    No friction rub. No gallop.   Pulmonary:      Effort: No respiratory distress.      Breath sounds: Normal breath sounds. No stridor. No wheezing, rhonchi or rales.   Abdominal:      General: Bowel sounds are normal. There is no distension.      Palpations: Abdomen is soft.      Tenderness: There is no abdominal tenderness.   Musculoskeletal:      Right lower leg: No edema.      Left lower leg: No edema.   Skin:     General: Skin is warm and dry.   Neurological:      Mental Status: She is alert.       RESULTS   Results Review:    Results from last 7 days   Lab Units 23  0612 23  0555 23  0414 "   SODIUM mmol/L 139 139 139   POTASSIUM mmol/L 3.4* 3.5 4.8   CHLORIDE mmol/L 92* 93* 98   CO2 mmol/L 26.0 29.0 17.0*   BUN mg/dL 52* 49* 35*   CREATININE mg/dL 4.80* 4.19* 3.52*   CALCIUM mg/dL 6.2* 6.2* 7.3*   BILIRUBIN mg/dL 1.0 1.2 0.5   ALK PHOS U/L 222* 156* 136*   ALT (SGPT) U/L 774* 1,188* 1,591*   AST (SGOT) U/L 1,293* 3,324* 5,266*   GLUCOSE mg/dL 172* 245* 197*         Estimated Creatinine Clearance: 9.5 mL/min (A) (by C-G formula based on SCr of 4.8 mg/dL (H)).    Results from last 7 days   Lab Units 08/06/23 0414 08/05/23 2156   MAGNESIUM mg/dL 1.7 2.3               Results from last 7 days   Lab Units 08/08/23  0612 08/08/23  0001 08/07/23  1742 08/07/23  1146 08/07/23  0555 08/06/23  1826 08/06/23  0944 08/05/23 2156 08/05/23  0827   WBC 10*3/mm3 21.10*  --   --   --  25.01*  --  36.72* 33.24* 14.85*   HEMOGLOBIN g/dL 9.0* 7.4* 7.9* 8.3* 8.3*   < > 6.6* 6.9* 9.0*   PLATELETS 10*3/mm3 272  --   --   --  359  --  426 601* 588*    < > = values in this interval not displayed.         Results from last 7 days   Lab Units 08/08/23  0612 08/07/23  0555 08/06/23  0414 08/05/23 2156 08/05/23  0718   INR  2.03* 2.44* 3.00* 3.42* 1.77*           Imaging Results (Last 24 Hours)       ** No results found for the last 24 hours. **             MEDICATIONS    DULoxetine, 60 mg, Oral, Daily  Insulin Aspart, 0-9 Units, Subcutaneous, TID AC  methylPREDNISolone sodium succinate, 60 mg, Intravenous, Q6H  metoprolol tartrate, 25 mg, Nasogastric, Q12H  nystatin, , Topical, Q12H  pantoprazole, 40 mg, Intravenous, Q AM  piperacillin-tazobactam, 3.375 g, Intravenous, Q12H  rosuvastatin, 40 mg, Oral, Daily  sodium chloride, 10 mL, Intravenous, Q12H  sodium chloride, 10 mL, Intravenous, Q12H      lactated ringers, 75 mL/hr, Last Rate: 75 mL/hr (08/08/23 0155)  norepinephrine, 0.02-0.3 mcg/kg/min, Last Rate: Stopped (08/06/23 1500)  propofol, 5-50 mcg/kg/min, Last Rate: 25 mcg/kg/min (08/08/23 0534)  vasopressin, 0.03  Units/min        Assessment & Plan   ASSESSMENT / PLAN      Sepsis    Mastitis    Other hydronephrosis    1. Acute kidney injury: baseline unknown.   - Creatinine was 1.5 in 10/2022.   - UA 3+ protein, trace leukocytes, 0-2 RBC, 6-12 WBC, 2+ bacteria. Urine sodium 26. Renal ultrasound unremarkable.   - Creatinine at 1.5-1.6 range and then rapid worsening. Cr worsening to 4.8 today. Likely ATN. Changed bicarb drip to LR. UO upto 850cc.  on admit. Today wt 173lbs. eGFR 8.6. Consider starting RRT/hemodialysis. Discussed plan with .    Family will let us know about dialysis. Risks and benefits are discussed with the family in detail and they are agreeable of the risks of dialysis (including low bp, cardiac arrest)    2. Hypertension:   - Blood pressure is low and now off losartan and now off levophed and vasopressin     3. Metabolic acidosis:   - was on bicarb drip. Now change LR     4. Sepsis:  - zosyn vancomycin and flagyl wbc now better     5. UTI:   - became septic with mild hydro Dr [place]field is consulted. Now stent in place . E.fecalis    6. Cdiff: not on po vanc now      7. Hyponatremia:   - Sodium is stable    8. Anemia:  - Hemoglobin is acceptable            This document has been electronically signed by Larry Rendon MD on August 8, 2023 10:23 CDT      I have reviewed the case with the resident. I have also examined and seen  the patient.  I agree with the assessment and plan as documented in the resident's note.         This document has been electronically signed by Stephanie Gómez MD on August 8, 2023 10:37 CDT

## 2023-08-08 NOTE — PLAN OF CARE
Problem: Inability to Wean (Mechanical Ventilation, Invasive)  Goal: Mechanical Ventilation Liberation  Outcome: Ongoing, Progressing   Goal Outcome Evaluation:       Pt remains on vent. RT was able to wean FiO2 to 55% at this time. Will wean as tolerated.

## 2023-08-08 NOTE — PLAN OF CARE
Goal Outcome Evaluation:  Plan of Care Reviewed With: spouse, family        Progress: improving     Remains sedated and on mechanical ventilation. FIO2 weaned to 45% today and patient has tolerated the vent change well. VSS. Completed dialysis today without difficulty. Urine output decreased. Electrolytes replaced per Dr. Gómez's guidance. Stable. Will continue to monitor.

## 2023-08-09 ENCOUNTER — APPOINTMENT (OUTPATIENT)
Dept: GENERAL RADIOLOGY | Facility: HOSPITAL | Age: 83
DRG: 853 | End: 2023-08-09
Payer: MEDICARE

## 2023-08-09 LAB
ALBUMIN SERPL-MCNC: 3.7 G/DL (ref 3.5–5.2)
ALP SERPL-CCNC: 361 U/L (ref 39–117)
ALT SERPL W P-5'-P-CCNC: 598 U/L (ref 1–33)
ANION GAP SERPL CALCULATED.3IONS-SCNC: 20 MMOL/L (ref 5–15)
ARTERIAL PATENCY WRIST A: ABNORMAL
AST SERPL-CCNC: 506 U/L (ref 1–32)
ATMOSPHERIC PRESS: 748 MMHG
BASE EXCESS BLDA CALC-SCNC: 2.7 MMOL/L (ref 0–2)
BASOPHILS # BLD AUTO: 0.07 10*3/MM3 (ref 0–0.2)
BASOPHILS NFR BLD AUTO: 0.4 % (ref 0–1.5)
BDY SITE: ABNORMAL
BH BB BLOOD EXPIRATION DATE: NORMAL
BH BB BLOOD TYPE BARCODE: NORMAL
BH BB DISPENSE STATUS: NORMAL
BH BB PRODUCT CODE: NORMAL
BH BB UNIT NUMBER: NORMAL
BILIRUB CONJ SERPL-MCNC: 0.6 MG/DL (ref 0–0.3)
BILIRUB INDIRECT SERPL-MCNC: 0.4 MG/DL
BILIRUB SERPL-MCNC: 1 MG/DL (ref 0–1.2)
BUN SERPL-MCNC: 45 MG/DL (ref 8–23)
BUN/CREAT SERPL: 11.1 (ref 7–25)
CALCIUM SPEC-SCNC: 7.3 MG/DL (ref 8.6–10.5)
CHLORIDE SERPL-SCNC: 91 MMOL/L (ref 98–107)
CO2 SERPL-SCNC: 23 MMOL/L (ref 22–29)
CREAT SERPL-MCNC: 4.04 MG/DL (ref 0.57–1)
CROSSMATCH INTERPRETATION: NORMAL
CRP SERPL-MCNC: 5.51 MG/DL (ref 0–0.5)
DEPRECATED RDW RBC AUTO: 47 FL (ref 37–54)
EGFRCR SERPLBLD CKD-EPI 2021: 10.6 ML/MIN/1.73
EOSINOPHIL # BLD AUTO: 0 10*3/MM3 (ref 0–0.4)
EOSINOPHIL NFR BLD AUTO: 0 % (ref 0.3–6.2)
ERYTHROCYTE [DISTWIDTH] IN BLOOD BY AUTOMATED COUNT: 16.1 % (ref 12.3–15.4)
GLUCOSE BLDC GLUCOMTR-MCNC: 144 MG/DL (ref 70–130)
GLUCOSE BLDC GLUCOMTR-MCNC: 149 MG/DL (ref 70–130)
GLUCOSE BLDC GLUCOMTR-MCNC: 97 MG/DL (ref 70–130)
GLUCOSE SERPL-MCNC: 160 MG/DL (ref 65–99)
HCO3 BLDA-SCNC: 26.6 MMOL/L (ref 20–26)
HCT VFR BLD AUTO: 28.5 % (ref 34–46.6)
HCT VFR BLD AUTO: 29.7 % (ref 34–46.6)
HCT VFR BLD AUTO: 33.6 % (ref 34–46.6)
HCT VFR BLD AUTO: 33.6 % (ref 34–46.6)
HCT VFR BLD AUTO: 37.5 % (ref 34–46.6)
HGB BLD-MCNC: 10.2 G/DL (ref 12–15.9)
HGB BLD-MCNC: 11.5 G/DL (ref 12–15.9)
HGB BLD-MCNC: 11.5 G/DL (ref 12–15.9)
HGB BLD-MCNC: 12.4 G/DL (ref 12–15.9)
HGB BLD-MCNC: 9.9 G/DL (ref 12–15.9)
IMM GRANULOCYTES # BLD AUTO: 0.16 10*3/MM3 (ref 0–0.05)
IMM GRANULOCYTES NFR BLD AUTO: 0.9 % (ref 0–0.5)
INHALED O2 CONCENTRATION: 40 %
INR PPP: 1.59 (ref 0.8–1.2)
LYMPHOCYTES # BLD AUTO: 0.74 10*3/MM3 (ref 0.7–3.1)
LYMPHOCYTES NFR BLD AUTO: 4 % (ref 19.6–45.3)
Lab: ABNORMAL
MAGNESIUM SERPL-MCNC: 1.8 MG/DL (ref 1.6–2.4)
MCH RBC QN AUTO: 29 PG (ref 26.6–33)
MCHC RBC AUTO-ENTMCNC: 34.2 G/DL (ref 31.5–35.7)
MCV RBC AUTO: 84.8 FL (ref 79–97)
MODALITY: ABNORMAL
MONOCYTES # BLD AUTO: 1.35 10*3/MM3 (ref 0.1–0.9)
MONOCYTES NFR BLD AUTO: 7.3 % (ref 5–12)
NEUTROPHILS NFR BLD AUTO: 16.05 10*3/MM3 (ref 1.7–7)
NEUTROPHILS NFR BLD AUTO: 87.4 % (ref 42.7–76)
NRBC BLD AUTO-RTO: 0.6 /100 WBC (ref 0–0.2)
PCO2 BLDA: 37 MM HG (ref 35–45)
PEEP RESPIRATORY: 5 CM[H2O]
PH BLDA: 7.46 PH UNITS (ref 7.35–7.45)
PLATELET # BLD AUTO: 310 10*3/MM3 (ref 140–450)
PMV BLD AUTO: 9.3 FL (ref 6–12)
PO2 BLDA: 68.6 MM HG (ref 83–108)
POTASSIUM SERPL-SCNC: 4.4 MMOL/L (ref 3.5–5.2)
PROT SERPL-MCNC: 7.2 G/DL (ref 6–8.5)
PROTHROMBIN TIME: 18.7 SECONDS (ref 11.1–15.3)
RBC # BLD AUTO: 3.96 10*6/MM3 (ref 3.77–5.28)
SAO2 % BLDCOA: 95 % (ref 94–99)
SET MECH RESP RATE: 16
SODIUM SERPL-SCNC: 134 MMOL/L (ref 136–145)
UNIT  ABO: NORMAL
UNIT  RH: NORMAL
VENTILATOR MODE: AC
VT ON VENT VENT: 400 ML
WBC NRBC COR # BLD: 18.37 10*3/MM3 (ref 3.4–10.8)

## 2023-08-09 PROCEDURE — 86140 C-REACTIVE PROTEIN: CPT | Performed by: UROLOGY

## 2023-08-09 PROCEDURE — 80048 BASIC METABOLIC PNL TOTAL CA: CPT | Performed by: UROLOGY

## 2023-08-09 PROCEDURE — 94799 UNLISTED PULMONARY SVC/PX: CPT

## 2023-08-09 PROCEDURE — 25010000002 HYDRALAZINE PER 20 MG: Performed by: HOSPITALIST

## 2023-08-09 PROCEDURE — 63710000001 INSULIN ASPART PER 5 UNITS: Performed by: HOSPITALIST

## 2023-08-09 PROCEDURE — 85014 HEMATOCRIT: CPT | Performed by: UROLOGY

## 2023-08-09 PROCEDURE — 25010000002 PIPERACILLIN SOD-TAZOBACTAM PER 1 G: Performed by: UROLOGY

## 2023-08-09 PROCEDURE — 94761 N-INVAS EAR/PLS OXIMETRY MLT: CPT

## 2023-08-09 PROCEDURE — 85025 COMPLETE CBC W/AUTO DIFF WBC: CPT | Performed by: UROLOGY

## 2023-08-09 PROCEDURE — 82948 REAGENT STRIP/BLOOD GLUCOSE: CPT

## 2023-08-09 PROCEDURE — 85610 PROTHROMBIN TIME: CPT | Performed by: UROLOGY

## 2023-08-09 PROCEDURE — 85018 HEMOGLOBIN: CPT | Performed by: UROLOGY

## 2023-08-09 PROCEDURE — 25010000002 FENTANYL CITRATE (PF) 50 MCG/ML SOLUTION: Performed by: FAMILY MEDICINE

## 2023-08-09 PROCEDURE — 25010000002 METHYLPREDNISOLONE PER 125 MG: Performed by: FAMILY MEDICINE

## 2023-08-09 PROCEDURE — 74018 RADEX ABDOMEN 1 VIEW: CPT

## 2023-08-09 PROCEDURE — 94003 VENT MGMT INPAT SUBQ DAY: CPT

## 2023-08-09 PROCEDURE — 25010000002 PROPOFOL 10 MG/ML EMULSION: Performed by: FAMILY MEDICINE

## 2023-08-09 PROCEDURE — 36600 WITHDRAWAL OF ARTERIAL BLOOD: CPT

## 2023-08-09 PROCEDURE — 80076 HEPATIC FUNCTION PANEL: CPT | Performed by: UROLOGY

## 2023-08-09 PROCEDURE — 82803 BLOOD GASES ANY COMBINATION: CPT

## 2023-08-09 PROCEDURE — 83735 ASSAY OF MAGNESIUM: CPT | Performed by: INTERNAL MEDICINE

## 2023-08-09 RX ORDER — HYDRALAZINE HYDROCHLORIDE 20 MG/ML
10 INJECTION INTRAMUSCULAR; INTRAVENOUS EVERY 6 HOURS PRN
Status: DISCONTINUED | OUTPATIENT
Start: 2023-08-09 | End: 2023-08-17 | Stop reason: HOSPADM

## 2023-08-09 RX ORDER — ALBUMIN (HUMAN) 12.5 G/50ML
12.5 SOLUTION INTRAVENOUS AS NEEDED
Status: DISCONTINUED | OUTPATIENT
Start: 2023-08-09 | End: 2023-08-09 | Stop reason: SDUPTHER

## 2023-08-09 RX ORDER — HYDRALAZINE HYDROCHLORIDE 20 MG/ML
10 INJECTION INTRAMUSCULAR; INTRAVENOUS ONCE
Status: COMPLETED | OUTPATIENT
Start: 2023-08-09 | End: 2023-08-09

## 2023-08-09 RX ADMIN — FENTANYL CITRATE 50 MCG: 50 INJECTION, SOLUTION INTRAMUSCULAR; INTRAVENOUS at 20:24

## 2023-08-09 RX ADMIN — FENTANYL CITRATE 50 MCG: 50 INJECTION, SOLUTION INTRAMUSCULAR; INTRAVENOUS at 07:33

## 2023-08-09 RX ADMIN — HYDRALAZINE HYDROCHLORIDE 10 MG: 20 INJECTION INTRAMUSCULAR; INTRAVENOUS at 13:39

## 2023-08-09 RX ADMIN — FENTANYL CITRATE 50 MCG: 50 INJECTION, SOLUTION INTRAMUSCULAR; INTRAVENOUS at 01:24

## 2023-08-09 RX ADMIN — Medication 10 ML: at 20:25

## 2023-08-09 RX ADMIN — NYSTATIN: 100000 POWDER TOPICAL at 12:27

## 2023-08-09 RX ADMIN — NYSTATIN: 100000 POWDER TOPICAL at 20:25

## 2023-08-09 RX ADMIN — METHYLPREDNISOLONE SODIUM SUCCINATE 60 MG: 125 INJECTION INTRAMUSCULAR; INTRAVENOUS at 08:27

## 2023-08-09 RX ADMIN — PROPOFOL INJECTABLE EMULSION 40 MCG/KG/MIN: 10 INJECTION, EMULSION INTRAVENOUS at 05:29

## 2023-08-09 RX ADMIN — FENTANYL CITRATE 50 MCG: 50 INJECTION, SOLUTION INTRAMUSCULAR; INTRAVENOUS at 05:16

## 2023-08-09 RX ADMIN — PANTOPRAZOLE SODIUM 40 MG: 40 INJECTION, POWDER, FOR SOLUTION INTRAVENOUS at 05:15

## 2023-08-09 RX ADMIN — METOPROLOL TARTRATE 5 MG: 5 INJECTION INTRAVENOUS at 14:19

## 2023-08-09 RX ADMIN — METHYLPREDNISOLONE SODIUM SUCCINATE 60 MG: 125 INJECTION INTRAMUSCULAR; INTRAVENOUS at 20:24

## 2023-08-09 RX ADMIN — PIPERACILLIN SODIUM AND TAZOBACTAM SODIUM 3.38 G: 3; .375 INJECTION, POWDER, LYOPHILIZED, FOR SOLUTION INTRAVENOUS at 05:29

## 2023-08-09 RX ADMIN — PROPOFOL INJECTABLE EMULSION 50 MCG/KG/MIN: 10 INJECTION, EMULSION INTRAVENOUS at 23:10

## 2023-08-09 RX ADMIN — PIPERACILLIN SODIUM AND TAZOBACTAM SODIUM 3.38 G: 3; .375 INJECTION, POWDER, LYOPHILIZED, FOR SOLUTION INTRAVENOUS at 18:11

## 2023-08-09 RX ADMIN — PROPOFOL INJECTABLE EMULSION 50 MCG/KG/MIN: 10 INJECTION, EMULSION INTRAVENOUS at 20:24

## 2023-08-09 RX ADMIN — ROSUVASTATIN CALCIUM 40 MG: 20 TABLET, FILM COATED ORAL at 12:27

## 2023-08-09 RX ADMIN — DULOXETINE HYDROCHLORIDE 60 MG: 60 CAPSULE, DELAYED RELEASE ORAL at 12:27

## 2023-08-09 RX ADMIN — PROPOFOL INJECTABLE EMULSION 40 MCG/KG/MIN: 10 INJECTION, EMULSION INTRAVENOUS at 15:49

## 2023-08-09 RX ADMIN — METHYLPREDNISOLONE SODIUM SUCCINATE 60 MG: 125 INJECTION INTRAMUSCULAR; INTRAVENOUS at 01:30

## 2023-08-09 RX ADMIN — METOPROLOL TARTRATE 5 MG: 5 INJECTION INTRAVENOUS at 07:21

## 2023-08-09 RX ADMIN — METHYLPREDNISOLONE SODIUM SUCCINATE 60 MG: 125 INJECTION INTRAMUSCULAR; INTRAVENOUS at 13:39

## 2023-08-09 RX ADMIN — FENTANYL CITRATE 50 MCG: 50 INJECTION, SOLUTION INTRAMUSCULAR; INTRAVENOUS at 14:19

## 2023-08-09 RX ADMIN — INSULIN ASPART 2 UNITS: 100 INJECTION, SOLUTION INTRAVENOUS; SUBCUTANEOUS at 08:26

## 2023-08-09 RX ADMIN — METOPROLOL TARTRATE 25 MG: 25 TABLET, FILM COATED ORAL at 12:27

## 2023-08-09 RX ADMIN — FENTANYL CITRATE 50 MCG: 50 INJECTION, SOLUTION INTRAMUSCULAR; INTRAVENOUS at 23:09

## 2023-08-09 NOTE — PROGRESS NOTES
Nutrition Services    Patient Name:  Carol Sullivan  YOB: 1940  MRN: [id number]  Admit Date:  7/28/2023    Nutrition F/U:    Nutrition Hx:  Per staff pt is currently off pressors.  Dialysis has been started.      Labs:  Na 134; Gluc 160; bun 45; Creat 4.04  Meds:  SSI; solumedrol; Zosyn; LR 50cc/hr; Propofol 7.2cc/hr      Prescription Order:  NPO    Nutrition Assessment:  Pt remains NPO on the vent.  Currently receiving dialysis.  Wt remains up.  No longer requiring Pressors.  Per Charge nurse RD to start EN.      Pt continues to be managed for Acute Resp failrue; Right sided Retroperitoneal Hematoma requiring transfusions; Shock liver, off pressors; acute Blood loss anemia; BLAINE--requiring dialysis; Shock Liver--LFT trending down; AFib; Mastitis-s/p I&D.      Nutrition Intervention:  Start EN:  Novasource with goal rate of 30cc/hr/10cc/hr flush per staff with pt having minimal UOP.   Monitor POC and tube feeding regimen/tolerance.      Last BM:  8/9    Wounds:  MASD bilateral midline; Wound to the left breast.     Edema: 1+-2+    Electronically signed by:  Zohreh Jeter RD  08/09/23 15:05 CDT

## 2023-08-09 NOTE — PLAN OF CARE
Problem: Device-Related Complication Risk (Hemodialysis)  Goal: Safe, Effective Therapy Delivery  Outcome: Ongoing, Progressing  Goal: Safe, Effective Therapy Delivery  Outcome: Ongoing, Progressing     Problem: Hemodynamic Instability (Hemodialysis)  Goal: Effective Tissue Perfusion  Outcome: Ongoing, Progressing   Goal Outcome Evaluation:    HD treatment today, 4L removed, pt tolerated well

## 2023-08-09 NOTE — PROGRESS NOTES
[facility] Medicine Services  INPATIENT PROGRESS NOTE    Length of Stay: 10  Date of Admission: 7/28/2023  Primary Care Physician: Len Seo MD    Subjective   Chief Complaint: Altered mental status  HPI: Intubated and sedated    As of today, 08/09/23  Review of Systems   Unable to perform ROS: Intubated      All pertinent negatives and positives are as above. All other systems have been reviewed and are negative unless otherwise stated.     Objective    Temp:  [96.9 øF (36.1 øC)-99 øF (37.2 øC)] 97.7 øF (36.5 øC)  Heart Rate:  [] 102  Resp:  [14-23] 16  BP: (114-180)/() 139/65  FiO2 (%):  [30 %-40 %] 40 %    AM-PAC 6 Clicks Score (PT): 15 (08/05/23 0926)    Vent Settings:  FiO2 (%):  [30 %-40 %] 40 %  S RR:  [16-18] 16  PEEP/CPAP (cm H2O):  [5 cm H20] 5 cm H20  MI SUP:  [8 cm H20] 8 cm H20  MAP (cm H2O):  [7.4-11] 11    As of today, 08/09/23  Physical Exam  Vitals reviewed.   Constitutional:       Appearance: She is well-developed.      Interventions: She is sedated and intubated.   HENT:      Head: Normocephalic and atraumatic.   Eyes:      Pupils: Pupils are equal, round, and reactive to light.   Cardiovascular:      Rate and Rhythm: Normal rate and regular rhythm.      Heart sounds: Normal heart sounds. No murmur heard.    No friction rub. No gallop.   Pulmonary:      Effort: Pulmonary effort is normal. No respiratory distress. She is intubated.      Breath sounds: No wheezing or rales.      Comments: Coarse breath sounds  Chest:      Chest wall: No tenderness.   Abdominal:      General: Bowel sounds are normal. There is no distension.      Palpations: Abdomen is soft.      Tenderness: There is no abdominal tenderness. There is no guarding.   Musculoskeletal:      Cervical back: Normal range of motion and neck supple.   Skin:     General: Skin is warm and dry.   Psychiatric:         Behavior: Behavior normal.         Thought Content:  Thought content normal.           Results Review:  I have reviewed the labs, radiology results, and diagnostic studies.    Laboratory Data:   Results from last 7 days   Lab Units 08/09/23  0729 08/08/23  0612 08/07/23  0555   SODIUM mmol/L 134* 139 139   POTASSIUM mmol/L 4.4 3.4* 3.5   CHLORIDE mmol/L 91* 92* 93*   CO2 mmol/L 23.0 26.0 29.0   BUN mg/dL 45* 52* 49*   CREATININE mg/dL 4.04* 4.80* 4.19*   GLUCOSE mg/dL 160* 172* 245*   CALCIUM mg/dL 7.3* 6.2* 6.2*   BILIRUBIN mg/dL 1.0 1.0 1.2   ALK PHOS U/L 361* 222* 156*   ALT (SGPT) U/L 598* 774* 1,188*   AST (SGOT) U/L 506* 1,293* 3,324*   ANION GAP mmol/L 20.0* 21.0* 17.0*       Estimated Creatinine Clearance: 11.2 mL/min (A) (by C-G formula based on SCr of 4.04 mg/dL (H)).  Results from last 7 days   Lab Units 08/09/23  0729 08/06/23  0414 08/05/23  2156   MAGNESIUM mg/dL 1.8 1.7 2.3           Results from last 7 days   Lab Units 08/09/23  1130 08/09/23  0729 08/09/23  0014 08/08/23  1737 08/08/23  1148 08/08/23  0612 08/07/23  1146 08/07/23  0555 08/06/23  1826 08/06/23  0944 08/05/23  2156   WBC 10*3/mm3  --  18.37*  --   --   --  21.10*  --  25.01*  --  36.72* 33.24*   HEMOGLOBIN g/dL 12.4 11.5*  11.5* 9.9* 10.0* 8.8* 9.0*   < > 8.3*   < > 6.6* 6.9*   HEMATOCRIT % 37.5 33.6*  33.6* 28.5* 29.6* 24.6* 25.6*   < > 23.2*   < > 18.7* 22.1*   PLATELETS 10*3/mm3  --  310  --   --   --  272  --  359  --  426 601*    < > = values in this interval not displayed.       Results from last 7 days   Lab Units 08/09/23  0612 08/08/23  0612 08/07/23  0555 08/06/23  0414 08/05/23  2156   INR  1.59* 2.03* 2.44* 3.00* 3.42*         Culture Data:   No results found for: BLOODCX    No results found for: URINECX    No results found for: RESPCX  No results found for: WOUNDCX  No results found for: STOOLCX  No components found for: BODYFLD    Radiology Data:   Imaging Results (Last 24 Hours)       Procedure Component Value Units Date/Time    XR Abdomen KUB [[id number]] Resulted:  08/09/23 1628     Updated: 08/09/23 1628    XR Abdomen KUB [[id number]] Collected: 08/09/23 1502     Updated: 08/09/23 1525    Narrative:      FINDINGS/    Impression:      Single view of the abdomen demonstrates a feeding tube present, projected over  the second portion of the duodenum.            ABG:  Results from last 7 days   Lab Units 08/09/23  0437   PH, ARTERIAL pH units 7.464*   PO2 ART mm Hg 68.6*   PCO2, ARTERIAL mm Hg 37.0   HCO3 ART mmol/L 26.6*         I have reviewed the patient's current medications.     Assessment/Plan     Active Hospital Problems    Diagnosis     **Sepsis     Mastitis     Other hydronephrosis        Plan:  1.  Acute hypoxic respiratory failure: Patient decompensated on 8/6 and required intubation.  Currently on mechanical ventilation with a PEEP of 5 with FiO2 of 40%.  Continue to wean as tolerated.  2.  Right-sided retroperitoneal hematoma: CT surgery following.  Monitor for now.  Off anticoagulation.   3.  Shock: Off pressors.  Doing better.  Becoming hypertensive.  4.  Acute blood loss anemia: Required packed red blood cells 8/7.  Hemoglobin much better today.  Hemoccult positive.  May need GI intervention if hemoglobin drops.  5.  Acute kidney injury: Renal function continues to worsen.  Dr. Gómez following.  Hemodialysis today.  6.  Shock liver: LFTs continuing to trend down.  7.  Paroxysmal atrial fibrillation: Continue to hold anticoagulation.  Rate controlled.  8.  Mastitis: Status post I&D General surgery has signed off and will see in clinic.  9.  DVT prophylaxis: On hold for now.    Prognosis remains guarded.    Medical Decision Making  Number and Complexity of problems: Multiple highly complex medical problems    Conditions and Status:        Condition is unchanged.     Discussed with: Patient's family at length.  All questions answered.     Treatment Plan  As above    Care Planning  Shared decision making: Family in agreement with plan of care  Code status and  discussions: DNR    Disposition  Social Determinants of Health that impact treatment or disposition: None    I expect the patient to be discharged to skilled facility versus LTAC in 5-6 days.       I spent 32 minutes providing critical care management to this patient.  This excludes time spent in performing separately billed procedures.        This document has been electronically signed by Oscar Vela MD on August 9, 2023 16:30 CDT

## 2023-08-09 NOTE — PLAN OF CARE
Goal Outcome Evaluation:  Plan of Care Reviewed With: spouse, son        Progress: no change     Failed SBT this am. Cortrak feeding tube placed left nare - initially secured at 90 cm, would not flush, repositioned to 100 cm and placement rechecked with second KUB. Awaiting on tube feeding to be brought from food and nutrition to initiate tube feeding. VSS. Only 50 cc of urine in mitchell. Dr. Gómez aware of decreased urine output. Pain controlled with ordered pain medication. Stable. Will continue to monitor.

## 2023-08-09 NOTE — SIGNIFICANT NOTE
Patient failed this morning SBT with an increased blood pressure; increased heart rate and low oxygen saturation; patient placed back on previous settings (assist control); patient oxygen sats increased; will monitor patient

## 2023-08-09 NOTE — PROGRESS NOTES
"    [facility]  [address] [place] [address]. [address]  T - [phone]  F - [phone]     Progress Note          PATIENT  DEMOGRAPHICS   PATIENT NAME: Carol Sullivan                      PHYSICIAN: Stephanie Gómez MD  : 1940  MRN: [id number]   LOS: 10 days    Patient Care Team:  Len Seo MD as PCP - General  JerseyBlaire ribera APRN as Nurse Practitioner (General Surgery)  Subjective   SUBJECTIVE   SEEN during hd - bp is on high side in 170s. Plan for 4 LUF        Objective   OBJECTIVE   Vital Signs  Temp:  [96.9 øF (36.1 øC)-99 øF (37.2 øC)] 97.2 øF (36.2 øC)  Heart Rate:  [] 106  Resp:  [14-23] 21  BP: (128-180)/() 175/88  FiO2 (%):  [30 %-55 %] 40 %    Flowsheet Rows      Flowsheet Row First Filed Value   Admission Height 165.1 cm (65\") Documented at 2023 1900   Admission Weight 74.8 kg (165 lb) Documented at 2023 1608             I/O last 3 completed shifts:  In: 3488.5 [I.V.:2653.5; Other:435; IV Piggyback:400]  Out: 3750 [Urine:600; Emesis/NG output:150]    PHYSICAL EXAM    Physical Exam  Vitals and nursing note reviewed.   Constitutional:       Appearance: Normal appearance. She is not ill-appearing.   Cardiovascular:      Rate and Rhythm: Normal rate and regular rhythm.      Heart sounds: Normal heart sounds. No murmur heard.    No friction rub. No gallop.   Pulmonary:      Effort: No respiratory distress.      Breath sounds: Normal breath sounds. No stridor. No wheezing, rhonchi or rales.   Abdominal:      General: Bowel sounds are normal. There is no distension.      Palpations: Abdomen is soft.      Tenderness: There is no abdominal tenderness.   Musculoskeletal:      Right lower leg: No edema.      Left lower leg: No edema.   Skin:     General: Skin is warm and dry.   Neurological:      Mental Status: She is alert.       RESULTS   Results Review:    Results from last 7 days   Lab Units 23  0729 23  0612 23  0555   SODIUM " mmol/L 134* 139 139   POTASSIUM mmol/L 4.4 3.4* 3.5   CHLORIDE mmol/L 91* 92* 93*   CO2 mmol/L 23.0 26.0 29.0   BUN mg/dL 45* 52* 49*   CREATININE mg/dL 4.04* 4.80* 4.19*   CALCIUM mg/dL 7.3* 6.2* 6.2*   BILIRUBIN mg/dL 1.0 1.0 1.2   ALK PHOS U/L 361* 222* 156*   ALT (SGPT) U/L 598* 774* 1,188*   AST (SGOT) U/L 506* 1,293* 3,324*   GLUCOSE mg/dL 160* 172* 245*         Estimated Creatinine Clearance: 11.2 mL/min (A) (by C-G formula based on SCr of 4.04 mg/dL (H)).    Results from last 7 days   Lab Units 08/09/23  0729 08/06/23 0414 08/05/23  2156   MAGNESIUM mg/dL 1.8 1.7 2.3               Results from last 7 days   Lab Units 08/09/23  0729 08/09/23  0014 08/08/23  1737 08/08/23  1148 08/08/23  0612 08/07/23  1146 08/07/23  0555 08/06/23  1826 08/06/23  0944 08/05/23  2156   WBC 10*3/mm3 18.37*  --   --   --  21.10*  --  25.01*  --  36.72* 33.24*   HEMOGLOBIN g/dL 11.5*  11.5* 9.9* 10.0* 8.8* 9.0*   < > 8.3*   < > 6.6* 6.9*   PLATELETS 10*3/mm3 310  --   --   --  272  --  359  --  426 601*    < > = values in this interval not displayed.         Results from last 7 days   Lab Units 08/09/23  0612 08/08/23  0612 08/07/23  0555 08/06/23  0414 08/05/23  2156   INR  1.59* 2.03* 2.44* 3.00* 3.42*           Imaging Results (Last 24 Hours)       Procedure Component Value Units Date/Time    US Guided Vascular Access [[id number]] Resulted: 08/08/23 1526     Updated: 08/08/23 1526    IR insert non-tunneled central line 5+ [[id number]] Resulted: 08/08/23 1512     Updated: 08/08/23 1512    Narrative:      Images from the original result were not included.  OPERATIVE NOTE  Carol Jamison Raglandrickey  1940      PREOP DIAGNOSES:  Acute kidney injury requiring short term hemodialysis.    POSTOP DIAGNOSES:   Acute kidney injury requiring short term hemodialysis.    PROCEDURE:   Placement non-tunneled central venous catheter (12Fr Trialysis)  Vascular ultrasound guidance    SURGEON: CHRISTINA Yin MD FACS RPVI    ASSISTANT: Yanelis  Mariana RT     ANESTHESIA: Local 1% lidocaine    ESTIMATED BLOOD LOSS: Minimal    SPECIMEN:  None    COMPLICATIONS: None    DESCRIPTION OF OPERATION: In CCU, patient placed in supine position,   prepped and draped in sterile fashion.  Vascular ultrasound was used to   identify the RIGHT common femoral vein which was 10mm in diameter and   patent.  Cannulated via modified Seldinger technique with MiniStick under   ultrasound guidance, imaging saved.  Exchanged for a 0.035 guidewire and   serial dilators.  A 12Fr Trialysis catheter was placed, aspirated and   flushed without difficulty with Hep-Lock solution.  1000 units/mL heparin   instilled into lumens. Catheter secured to the skin with 2-0 Nylon suture.    Sterile dressing applied. Patient tolerated procedure well. Dialysis   notified.             This document has been electronically signed by Hardy Yin MD   on August 8, 2023 15:12 CDT                  MEDICATIONS    DULoxetine, 60 mg, Oral, Daily  Insulin Aspart, 0-9 Units, Subcutaneous, TID AC  methylPREDNISolone sodium succinate, 60 mg, Intravenous, Q6H  metoprolol tartrate, 25 mg, Nasogastric, Q12H  nystatin, , Topical, Q12H  pantoprazole, 40 mg, Intravenous, Q AM  piperacillin-tazobactam, 3.375 g, Intravenous, Q12H  rosuvastatin, 40 mg, Oral, Daily  sodium chloride, 10 mL, Intravenous, Q12H  sodium chloride, 10 mL, Intravenous, Q12H      lactated ringers, 50 mL/hr, Last Rate: 50 mL/hr (08/08/23 1746)  norepinephrine, 0.02-0.3 mcg/kg/min, Last Rate: Stopped (08/06/23 1500)  propofol, 5-50 mcg/kg/min, Last Rate: 20 mcg/kg/min (08/09/23 0724)  vasopressin, 0.03 Units/min        Assessment & Plan   ASSESSMENT / PLAN      Sepsis    Mastitis    Other hydronephrosis    1. Acute kidney injury: baseline unknown.   - Creatinine was 1.5 in 10/2022.   - UA 3+ protein, trace leukocytes, 0-2 RBC, 6-12 WBC, 2+ bacteria. Urine sodium 26. Renal ultrasound unremarkable.   - Creatinine at 1.5-1.6 range and then  rapidly worsening. Cr worsening to 4.8 on 8/8/23 and therefore started dialysis on her.     Now Likely ATN.       on admit    Hd next on Friday  Will give a trial of diuretic tomorrow     2. Hypertension:   - Blood pressure was low and now high - HR was high and now on metoprolol      losartan on hold     3. Metabolic acidosis:   - was on bicarb drip. Now modulate with hd      4. Sepsis:  - zosyn at present. C.diff carrier     5. UTI:   - became septic with mild hydro Dr [place] is consulted. Now stent in place . E.fecalis    6. Cdiff: not on po vanc now      7. Hyponatremia:   - Sodium is stable    8. Anemia / retroperitoneal bleed:  - Hemoglobin is acceptable            This document has been electronically signed by Stephanie Gómez MD on August 9, 2023 09:43 CDT      I have reviewed the case with the resident. I have also examined and seen  the patient.  I agree with the assessment and plan as documented in the resident's note.         This document has been electronically signed by Stephanie Gómez MD on August 9, 2023 09:43 CDT

## 2023-08-09 NOTE — PROGRESS NOTES
"   LOS: 10 days   Patient Care Team:  Len Seo MD as PCP - General  Blaire Camara APRN as Nurse Practitioner (General Surgery)    Subjective     Subjective:  Diet:  NPO.  No vomiting.      History taken from: chart RN    Objective     Vital Signs  Temp:  [96.9 øF (36.1 øC)-99 øF (37.2 øC)] 97.2 øF (36.2 øC)  Heart Rate:  [] 96  Resp:  [14-23] 16  BP: (118-180)/() 118/62  FiO2 (%):  [30 %-55 %] 40 %    Objective:  Vital signs: (most recent): Blood pressure 118/62, pulse 96, temperature 97.2 øF (36.2 øC), temperature source Tympanic, resp. rate 16, height 165.1 cm (65\"), weight 76.3 kg (168 lb 4.8 oz), SpO2 99 %.  No fever.    Output: Producing urine (Tobar).    Heart: Normal rate.    Skin:  Warm, dry and pale.              Results Review:    Lab Results (last 24 hours)       Procedure Component Value Units Date/Time    POC Glucose Once [[id number]]  (Normal) Collected: 08/09/23 1111    Specimen: Blood Updated: 08/09/23 1123     Glucose 97 mg/dL      Comment: : [phone] NAMAN Brand ID: [id number]       Basic Metabolic Panel [[id number]]  (Abnormal) Collected: 08/09/23 0729    Specimen: Blood Updated: 08/09/23 0809     Glucose 160 mg/dL      BUN 45 mg/dL      Creatinine 4.04 mg/dL      Sodium 134 mmol/L      Potassium 4.4 mmol/L      Chloride 91 mmol/L      CO2 23.0 mmol/L      Calcium 7.3 mg/dL      BUN/Creatinine Ratio 11.1     Anion Gap 20.0 mmol/L      eGFR 10.6 mL/min/1.73      Comment: <15 Indicative of kidney failure       Narrative:      GFR Normal >60  Chronic Kidney Disease <60  Kidney Failure <15    The GFR formula is only valid for adults with stable renal function between ages 18 and 70.    Magnesium [[id number]]  (Normal) Collected: 08/09/23 0729    Specimen: Blood Updated: 08/09/23 0809     Magnesium 1.8 mg/dL     Hepatic Function Panel [[id number]]  (Abnormal) Collected: 08/09/23 0729    Specimen: Blood Updated: 08/09/23 0809     Total Protein 7.2 g/dL      " Albumin 3.7 g/dL      ALT (SGPT) 598 U/L      AST (SGOT) 506 U/L      Alkaline Phosphatase 361 U/L      Total Bilirubin 1.0 mg/dL      Bilirubin, Direct 0.6 mg/dL      Bilirubin, Indirect 0.4 mg/dL     C-reactive Protein [[id number]]  (Abnormal) Collected: 08/09/23 0729    Specimen: Blood Updated: 08/09/23 0807     C-Reactive Protein 5.51 mg/dL     Hemoglobin & Hematocrit, Blood [[id number]]  (Abnormal) Collected: 08/09/23 0729    Specimen: Blood Updated: 08/09/23 0739     Hemoglobin 11.5 g/dL      Hematocrit 33.6 %     CBC & Differential [[id number]]  (Abnormal) Collected: 08/09/23 0729    Specimen: Blood Updated: 08/09/23 0739    Narrative:      The following orders were created for panel order CBC & Differential.  Procedure                               Abnormality         Status                     ---------                               -----------         ------                     CBC Auto Differential[[id number]]        Abnormal            Final result                 Please view results for these tests on the individual orders.    CBC Auto Differential [[id number]]  (Abnormal) Collected: 08/09/23 0729    Specimen: Blood Updated: 08/09/23 0739     WBC 18.37 10*3/mm3      RBC 3.96 10*6/mm3      Hemoglobin 11.5 g/dL      Hematocrit 33.6 %      MCV 84.8 fL      MCH 29.0 pg      MCHC 34.2 g/dL      RDW 16.1 %      RDW-SD 47.0 fl      MPV 9.3 fL      Platelets 310 10*3/mm3      Neutrophil % 87.4 %      Lymphocyte % 4.0 %      Monocyte % 7.3 %      Eosinophil % 0.0 %      Basophil % 0.4 %      Immature Grans % 0.9 %      Neutrophils, Absolute 16.05 10*3/mm3      Lymphocytes, Absolute 0.74 10*3/mm3      Monocytes, Absolute 1.35 10*3/mm3      Eosinophils, Absolute 0.00 10*3/mm3      Basophils, Absolute 0.07 10*3/mm3      Immature Grans, Absolute 0.16 10*3/mm3      nRBC 0.6 /100 WBC     Protime-INR [[id number]]  (Abnormal) Collected: 08/09/23 0612    Specimen: Blood Updated: 08/09/23 0659     Protime 18.7 Seconds      INR  1.59    Narrative:      Therapeutic range for most indications is 2.0-3.0 INR,  or 2.5-3.5 for mechanical heart valves.    Blood Gas, Arterial - [[id number]]  (Abnormal) Collected: 08/09/23 0437    Specimen: Arterial Blood Updated: 08/09/23 0435     Site Left Radial     Haroon's Test N/A     pH, Arterial 7.464 pH units      Comment: 83 Value above reference range        pCO2, Arterial 37.0 mm Hg      pO2, Arterial 68.6 mm Hg      Comment: 84 Value below reference range        HCO3, Arterial 26.6 mmol/L      Comment: 83 Value above reference range        Base Excess, Arterial 2.7 mmol/L      Comment: 83 Value above reference range        O2 Saturation, Arterial 95.0 %      Barometric Pressure for Blood Gas 748 mmHg      Modality Ventilator     FIO2 40 %      Ventilator Mode AC     Set Tidal Volume 400     Set Mech Resp Rate 16.0     PEEP 5.0     Collected by [email or web address] rrt     Comment: Meter: [id number]     :  [id number]       Hemoglobin & Hematocrit, Blood [[id number]]  (Abnormal) Collected: 08/09/23 0014    Specimen: Blood Updated: 08/09/23 0021     Hemoglobin 9.9 g/dL      Hematocrit 28.5 %     Blood Culture - Blood, Arm, Right [[id number]]  (Normal) Collected: 08/05/23 2156    Specimen: Blood from Arm, Right Updated: 08/08/23 2201     Blood Culture No growth at 3 days    Blood Culture - Blood, Arm, Right [[id number]]  (Normal) Collected: 08/05/23 2156    Specimen: Blood from Arm, Right Updated: 08/08/23 2201     Blood Culture No growth at 3 days    POC Glucose Once [[id number]]  (Normal) Collected: 08/08/23 1939    Specimen: Blood Updated: 08/08/23 1951     Glucose 129 mg/dL      Comment: : [phone] CARLOS HOLTRAMeter ID: [id number]       Occult Blood X 1, Stool - Stool, Per Rectum [[id number]]  (Abnormal) Collected: 08/08/23 1806    Specimen: Stool from Per Rectum Updated: 08/08/23 1851     Fecal Occult Blood Positive    Hemoglobin & Hematocrit, Blood [[id number]]  (Abnormal) Collected: 08/08/23  1737    Specimen: Blood from Cannula Updated: 08/08/23 1817     Hemoglobin 10.0 g/dL      Hematocrit 29.6 %     POC Glucose Once [[id number]]  (Normal) Collected: 08/08/23 1739    Specimen: Blood Updated: 08/08/23 1755     Glucose 117 mg/dL      Comment: : [phone] COTTON VICKIMeter ID: [id number]       Hepatitis B Surface Antigen [[id number]]  (Normal) Collected: 08/08/23 1148    Specimen: Blood Updated: 08/08/23 1257     Hepatitis B Surface Ag Non-Reactive    Hemoglobin & Hematocrit, Blood [[id number]]  (Abnormal) Collected: 08/08/23 1148    Specimen: Blood Updated: 08/08/23 1211     Hemoglobin 8.8 g/dL      Hematocrit 24.6 %     POC Glucose Once [[id number]]  (Abnormal) Collected: 08/08/23 1148    Specimen: Blood Updated: 08/08/23 1159     Glucose 148 mg/dL      Comment: Result Not ConfirmedOperator: [id number] COTTON VICKIMeter ID: [id number]              Imaging Results (Last 24 Hours)       Procedure Component Value Units Date/Time    US Guided Vascular Access [[id number]] Resulted: 08/08/23 1526     Updated: 08/08/23 1526    IR insert non-tunneled central line 5+ [[id number]] Resulted: 08/08/23 1512     Updated: 08/08/23 1512    Narrative:      Images from the original result were not included.  OPERATIVE NOTE  Carol Swift Necarissarickey  1940      PREOP DIAGNOSES:  Acute kidney injury requiring short term hemodialysis.    POSTOP DIAGNOSES:   Acute kidney injury requiring short term hemodialysis.    PROCEDURE:   Placement non-tunneled central venous catheter (12Fr Trialysis)  Vascular ultrasound guidance    SURGEON: CHRISTINA Yin MD FACS RPVI    ASSISTANT: Yanelis Peña RT     ANESTHESIA: Local 1% lidocaine    ESTIMATED BLOOD LOSS: Minimal    SPECIMEN:  None    COMPLICATIONS: None    DESCRIPTION OF OPERATION: In CCU, patient placed in supine position,   prepped and draped in sterile fashion.  Vascular ultrasound was used to   identify the RIGHT common femoral vein which was 10mm in diameter and    patent.  Cannulated via modified Seldinger technique with MiniStick under   ultrasound guidance, imaging saved.  Exchanged for a 0.035 guidewire and   serial dilators.  A 12Fr Trialysis catheter was placed, aspirated and   flushed without difficulty with Hep-Lock solution.  1000 units/mL heparin   instilled into lumens. Catheter secured to the skin with 2-0 Nylon suture.    Sterile dressing applied. Patient tolerated procedure well. Dialysis   notified.             This document has been electronically signed by Hardy Yin MD   on August 8, 2023 15:12 CDT                  I reviewed the patient's new clinical results.  I reviewed the patient's new imaging results and agree with the interpretation.  I reviewed the patient's other test results and agree with the interpretation      Assessment & Plan       Sepsis    Mastitis    Other hydronephrosis      Assessment & Plan    Post op 8/6/23 right J-stent placement for right hydronephrosis due to retroperitoneal hematoma. Tobar in place, blood tinged urine.   Estimated Creatinine Clearance: 11.2 mL/min (A) (by C-G formula based on SCr of 4.04 mg/dL (H)).    Plan:  Keep Tobar, no Urology changes today    COY Reynolds  08/09/23  11:33 CDT

## 2023-08-09 NOTE — PLAN OF CARE
Goal Outcome Evaluation:  Plan of Care Reviewed With: caregiver        Progress: no change  Outcome Evaluation: Nutrition F/U:  Pt remains NPO on the vent. Receiving dialysis.  RD to start EN now that pt is off pressors. Will start Jovasource Renal with goal rate of 30cc/hr.  Will Monitor POC

## 2023-08-09 NOTE — PLAN OF CARE
Goal Outcome Evaluation:              Problem: Communication Impairment (Mechanical Ventilation, Invasive)  Goal: Effective Communication  Outcome: Ongoing, Progressing     Problem: Inability to Wean (Mechanical Ventilation, Invasive)  Goal: Mechanical Ventilation Liberation  Outcome: Ongoing, Progressing      Patient attempt SBT today. Patient failed due to increased heart rate and blood pressure. Patient also desatted during SBT as well. Will continue to monitor and wean as tolerated.

## 2023-08-10 ENCOUNTER — APPOINTMENT (OUTPATIENT)
Dept: GENERAL RADIOLOGY | Facility: HOSPITAL | Age: 83
DRG: 853 | End: 2023-08-10
Payer: MEDICARE

## 2023-08-10 LAB
ALBUMIN SERPL-MCNC: 3.2 G/DL (ref 3.5–5.2)
ALP SERPL-CCNC: 386 U/L (ref 39–117)
ALT SERPL W P-5'-P-CCNC: 410 U/L (ref 1–33)
ANION GAP SERPL CALCULATED.3IONS-SCNC: 20 MMOL/L (ref 5–15)
ARTERIAL PATENCY WRIST A: ABNORMAL
AST SERPL-CCNC: 192 U/L (ref 1–32)
ATMOSPHERIC PRESS: 746 MMHG
BACTERIA SPEC AEROBE CULT: NORMAL
BACTERIA SPEC AEROBE CULT: NORMAL
BASE EXCESS BLDA CALC-SCNC: -0.4 MMOL/L (ref 0–2)
BASOPHILS # BLD AUTO: 0.06 10*3/MM3 (ref 0–0.2)
BASOPHILS NFR BLD AUTO: 0.6 % (ref 0–1.5)
BDY SITE: ABNORMAL
BILIRUB CONJ SERPL-MCNC: 0.7 MG/DL (ref 0–0.3)
BILIRUB INDIRECT SERPL-MCNC: 0.4 MG/DL
BILIRUB SERPL-MCNC: 1.1 MG/DL (ref 0–1.2)
BUN SERPL-MCNC: 53 MG/DL (ref 8–23)
BUN/CREAT SERPL: 14.2 (ref 7–25)
CALCIUM SPEC-SCNC: 7.2 MG/DL (ref 8.6–10.5)
CHLORIDE SERPL-SCNC: 92 MMOL/L (ref 98–107)
CO2 SERPL-SCNC: 21 MMOL/L (ref 22–29)
CREAT SERPL-MCNC: 3.74 MG/DL (ref 0.57–1)
CRP SERPL-MCNC: 3.52 MG/DL (ref 0–0.5)
DEPRECATED RDW RBC AUTO: 46.6 FL (ref 37–54)
EGFRCR SERPLBLD CKD-EPI 2021: 11.6 ML/MIN/1.73
EOSINOPHIL # BLD AUTO: 0 10*3/MM3 (ref 0–0.4)
EOSINOPHIL NFR BLD AUTO: 0 % (ref 0.3–6.2)
ERYTHROCYTE [DISTWIDTH] IN BLOOD BY AUTOMATED COUNT: 15.7 % (ref 12.3–15.4)
GLUCOSE BLDC GLUCOMTR-MCNC: 156 MG/DL (ref 70–130)
GLUCOSE SERPL-MCNC: 166 MG/DL (ref 65–99)
HCO3 BLDA-SCNC: 23.9 MMOL/L (ref 20–26)
HCT VFR BLD AUTO: 29.5 % (ref 34–46.6)
HCT VFR BLD AUTO: 29.5 % (ref 34–46.6)
HCT VFR BLD AUTO: 33.1 % (ref 34–46.6)
HGB BLD-MCNC: 10.4 G/DL (ref 12–15.9)
HGB BLD-MCNC: 10.4 G/DL (ref 12–15.9)
HGB BLD-MCNC: 11.2 G/DL (ref 12–15.9)
IMM GRANULOCYTES # BLD AUTO: 0.07 10*3/MM3 (ref 0–0.05)
IMM GRANULOCYTES NFR BLD AUTO: 0.7 % (ref 0–0.5)
INHALED O2 CONCENTRATION: 35 %
INR PPP: 1.55 (ref 0.8–1.2)
LYMPHOCYTES # BLD AUTO: 0.35 10*3/MM3 (ref 0.7–3.1)
LYMPHOCYTES NFR BLD AUTO: 3.5 % (ref 19.6–45.3)
Lab: ABNORMAL
MCH RBC QN AUTO: 30.1 PG (ref 26.6–33)
MCHC RBC AUTO-ENTMCNC: 35.3 G/DL (ref 31.5–35.7)
MCV RBC AUTO: 85.3 FL (ref 79–97)
MODALITY: ABNORMAL
MONOCYTES # BLD AUTO: 0.96 10*3/MM3 (ref 0.1–0.9)
MONOCYTES NFR BLD AUTO: 9.7 % (ref 5–12)
NEUTROPHILS NFR BLD AUTO: 8.5 10*3/MM3 (ref 1.7–7)
NEUTROPHILS NFR BLD AUTO: 85.5 % (ref 42.7–76)
NRBC BLD AUTO-RTO: 0.5 /100 WBC (ref 0–0.2)
PCO2 BLDA: 36.9 MM HG (ref 35–45)
PEEP RESPIRATORY: 5 CM[H2O]
PH BLDA: 7.42 PH UNITS (ref 7.35–7.45)
PLATELET # BLD AUTO: 252 10*3/MM3 (ref 140–450)
PMV BLD AUTO: 9.8 FL (ref 6–12)
PO2 BLDA: 54.8 MM HG (ref 83–108)
POTASSIUM SERPL-SCNC: 4.2 MMOL/L (ref 3.5–5.2)
PROT SERPL-MCNC: 6.4 G/DL (ref 6–8.5)
PROTHROMBIN TIME: 18.4 SECONDS (ref 11.1–15.3)
PSV: 8 CMH2O
QT INTERVAL: 324 MS
QTC INTERVAL: 474 MS
RBC # BLD AUTO: 3.46 10*6/MM3 (ref 3.77–5.28)
SAO2 % BLDCOA: 89.2 % (ref 94–99)
SODIUM SERPL-SCNC: 133 MMOL/L (ref 136–145)
VENTILATOR MODE: ABNORMAL
WBC NRBC COR # BLD: 9.94 10*3/MM3 (ref 3.4–10.8)

## 2023-08-10 PROCEDURE — 63710000001 INSULIN ASPART PER 5 UNITS: Performed by: HOSPITALIST

## 2023-08-10 PROCEDURE — 25010000002 FENTANYL CITRATE (PF) 50 MCG/ML SOLUTION: Performed by: FAMILY MEDICINE

## 2023-08-10 PROCEDURE — 80076 HEPATIC FUNCTION PANEL: CPT | Performed by: UROLOGY

## 2023-08-10 PROCEDURE — 25010000002 LABETALOL 5 MG/ML SOLUTION: Performed by: INTERNAL MEDICINE

## 2023-08-10 PROCEDURE — 94799 UNLISTED PULMONARY SVC/PX: CPT

## 2023-08-10 PROCEDURE — 93010 ELECTROCARDIOGRAM REPORT: CPT | Performed by: INTERNAL MEDICINE

## 2023-08-10 PROCEDURE — 94761 N-INVAS EAR/PLS OXIMETRY MLT: CPT

## 2023-08-10 PROCEDURE — 25010000002 METHYLPREDNISOLONE PER 125 MG: Performed by: FAMILY MEDICINE

## 2023-08-10 PROCEDURE — 82948 REAGENT STRIP/BLOOD GLUCOSE: CPT

## 2023-08-10 PROCEDURE — 25010000002 HYDRALAZINE PER 20 MG: Performed by: HOSPITALIST

## 2023-08-10 PROCEDURE — 80048 BASIC METABOLIC PNL TOTAL CA: CPT | Performed by: UROLOGY

## 2023-08-10 PROCEDURE — 82803 BLOOD GASES ANY COMBINATION: CPT

## 2023-08-10 PROCEDURE — 74018 RADEX ABDOMEN 1 VIEW: CPT

## 2023-08-10 PROCEDURE — 86140 C-REACTIVE PROTEIN: CPT | Performed by: UROLOGY

## 2023-08-10 PROCEDURE — 85018 HEMOGLOBIN: CPT | Performed by: UROLOGY

## 2023-08-10 PROCEDURE — 94003 VENT MGMT INPAT SUBQ DAY: CPT

## 2023-08-10 PROCEDURE — 25010000002 HALOPERIDOL LACTATE PER 5 MG: Performed by: INTERNAL MEDICINE

## 2023-08-10 PROCEDURE — 25010000002 PROPOFOL 10 MG/ML EMULSION: Performed by: FAMILY MEDICINE

## 2023-08-10 PROCEDURE — 25010000002 PIPERACILLIN SOD-TAZOBACTAM PER 1 G: Performed by: UROLOGY

## 2023-08-10 PROCEDURE — 85014 HEMATOCRIT: CPT | Performed by: UROLOGY

## 2023-08-10 PROCEDURE — 25010000002 LORAZEPAM PER 2 MG: Performed by: FAMILY MEDICINE

## 2023-08-10 PROCEDURE — 93005 ELECTROCARDIOGRAM TRACING: CPT | Performed by: FAMILY MEDICINE

## 2023-08-10 PROCEDURE — 85025 COMPLETE CBC W/AUTO DIFF WBC: CPT | Performed by: UROLOGY

## 2023-08-10 PROCEDURE — 85610 PROTHROMBIN TIME: CPT | Performed by: UROLOGY

## 2023-08-10 RX ORDER — LABETALOL HYDROCHLORIDE 5 MG/ML
10 INJECTION, SOLUTION INTRAVENOUS EVERY 4 HOURS PRN
Status: DISCONTINUED | OUTPATIENT
Start: 2023-08-10 | End: 2023-08-17 | Stop reason: HOSPADM

## 2023-08-10 RX ORDER — HALOPERIDOL 5 MG/ML
2 INJECTION INTRAMUSCULAR ONCE AS NEEDED
Status: COMPLETED | OUTPATIENT
Start: 2023-08-10 | End: 2023-08-10

## 2023-08-10 RX ORDER — METOPROLOL TARTRATE 50 MG/1
50 TABLET, FILM COATED ORAL EVERY 12 HOURS SCHEDULED
Status: DISCONTINUED | OUTPATIENT
Start: 2023-08-10 | End: 2023-08-13

## 2023-08-10 RX ORDER — LORAZEPAM 2 MG/ML
1 INJECTION INTRAMUSCULAR EVERY 4 HOURS PRN
Status: DISCONTINUED | OUTPATIENT
Start: 2023-08-10 | End: 2023-08-17 | Stop reason: HOSPADM

## 2023-08-10 RX ORDER — LOSARTAN POTASSIUM 50 MG/1
100 TABLET ORAL DAILY
Status: DISCONTINUED | OUTPATIENT
Start: 2023-08-10 | End: 2023-08-10

## 2023-08-10 RX ORDER — LOSARTAN POTASSIUM 50 MG/1
100 TABLET ORAL DAILY
Status: DISCONTINUED | OUTPATIENT
Start: 2023-08-10 | End: 2023-08-11

## 2023-08-10 RX ORDER — BUMETANIDE 0.25 MG/ML
2 INJECTION INTRAMUSCULAR; INTRAVENOUS ONCE
Status: COMPLETED | OUTPATIENT
Start: 2023-08-10 | End: 2023-08-10

## 2023-08-10 RX ORDER — DILTIAZEM HYDROCHLORIDE 5 MG/ML
10 INJECTION INTRAVENOUS ONCE
Status: COMPLETED | OUTPATIENT
Start: 2023-08-10 | End: 2023-08-10

## 2023-08-10 RX ORDER — ISOSORBIDE MONONITRATE 30 MG/1
30 TABLET, EXTENDED RELEASE ORAL DAILY
Status: DISCONTINUED | OUTPATIENT
Start: 2023-08-10 | End: 2023-08-17 | Stop reason: HOSPADM

## 2023-08-10 RX ADMIN — NYSTATIN: 100000 POWDER TOPICAL at 08:08

## 2023-08-10 RX ADMIN — Medication 10 ML: at 08:08

## 2023-08-10 RX ADMIN — LORAZEPAM 1 MG: 2 INJECTION INTRAMUSCULAR; INTRAVENOUS at 22:51

## 2023-08-10 RX ADMIN — PANTOPRAZOLE SODIUM 40 MG: 40 INJECTION, POWDER, FOR SOLUTION INTRAVENOUS at 05:02

## 2023-08-10 RX ADMIN — FENTANYL CITRATE 50 MCG: 50 INJECTION, SOLUTION INTRAMUSCULAR; INTRAVENOUS at 16:41

## 2023-08-10 RX ADMIN — HYDRALAZINE HYDROCHLORIDE 10 MG: 20 INJECTION INTRAMUSCULAR; INTRAVENOUS at 14:22

## 2023-08-10 RX ADMIN — FENTANYL CITRATE 50 MCG: 50 INJECTION, SOLUTION INTRAMUSCULAR; INTRAVENOUS at 12:05

## 2023-08-10 RX ADMIN — METHYLPREDNISOLONE SODIUM SUCCINATE 60 MG: 125 INJECTION INTRAMUSCULAR; INTRAVENOUS at 20:17

## 2023-08-10 RX ADMIN — METHYLPREDNISOLONE SODIUM SUCCINATE 60 MG: 125 INJECTION INTRAMUSCULAR; INTRAVENOUS at 03:52

## 2023-08-10 RX ADMIN — FENTANYL CITRATE 50 MCG: 50 INJECTION, SOLUTION INTRAMUSCULAR; INTRAVENOUS at 10:41

## 2023-08-10 RX ADMIN — INSULIN ASPART 2 UNITS: 100 INJECTION, SOLUTION INTRAVENOUS; SUBCUTANEOUS at 16:42

## 2023-08-10 RX ADMIN — BUMETANIDE 2 MG: 0.25 INJECTION INTRAMUSCULAR; INTRAVENOUS at 10:40

## 2023-08-10 RX ADMIN — Medication 10 ML: at 20:16

## 2023-08-10 RX ADMIN — METOPROLOL TARTRATE 50 MG: 50 TABLET, FILM COATED ORAL at 20:17

## 2023-08-10 RX ADMIN — ROSUVASTATIN CALCIUM 40 MG: 20 TABLET, FILM COATED ORAL at 10:40

## 2023-08-10 RX ADMIN — PROPOFOL INJECTABLE EMULSION 50 MCG/KG/MIN: 10 INJECTION, EMULSION INTRAVENOUS at 05:04

## 2023-08-10 RX ADMIN — METHYLPREDNISOLONE SODIUM SUCCINATE 60 MG: 125 INJECTION INTRAMUSCULAR; INTRAVENOUS at 08:09

## 2023-08-10 RX ADMIN — METHYLPREDNISOLONE SODIUM SUCCINATE 60 MG: 125 INJECTION INTRAMUSCULAR; INTRAVENOUS at 14:22

## 2023-08-10 RX ADMIN — NYSTATIN: 100000 POWDER TOPICAL at 20:40

## 2023-08-10 RX ADMIN — METOPROLOL TARTRATE 5 MG: 5 INJECTION INTRAVENOUS at 22:52

## 2023-08-10 RX ADMIN — LOSARTAN POTASSIUM 100 MG: 50 TABLET, FILM COATED ORAL at 16:41

## 2023-08-10 RX ADMIN — FENTANYL CITRATE 50 MCG: 50 INJECTION, SOLUTION INTRAMUSCULAR; INTRAVENOUS at 08:09

## 2023-08-10 RX ADMIN — HALOPERIDOL LACTATE 2 MG: 5 INJECTION, SOLUTION INTRAMUSCULAR at 20:17

## 2023-08-10 RX ADMIN — FENTANYL CITRATE 50 MCG: 50 INJECTION, SOLUTION INTRAMUSCULAR; INTRAVENOUS at 14:43

## 2023-08-10 RX ADMIN — DILTIAZEM HYDROCHLORIDE 10 MG: 5 INJECTION INTRAVENOUS at 18:11

## 2023-08-10 RX ADMIN — PIPERACILLIN SODIUM AND TAZOBACTAM SODIUM 3.38 G: 3; .375 INJECTION, POWDER, LYOPHILIZED, FOR SOLUTION INTRAVENOUS at 17:15

## 2023-08-10 RX ADMIN — INSULIN ASPART 2 UNITS: 100 INJECTION, SOLUTION INTRAVENOUS; SUBCUTANEOUS at 12:22

## 2023-08-10 RX ADMIN — LABETALOL HYDROCHLORIDE 10 MG: 5 INJECTION, SOLUTION INTRAVENOUS at 21:27

## 2023-08-10 RX ADMIN — INSULIN ASPART 2 UNITS: 100 INJECTION, SOLUTION INTRAVENOUS; SUBCUTANEOUS at 09:47

## 2023-08-10 RX ADMIN — METOPROLOL TARTRATE 25 MG: 25 TABLET, FILM COATED ORAL at 10:40

## 2023-08-10 RX ADMIN — METOPROLOL TARTRATE 5 MG: 5 INJECTION INTRAVENOUS at 12:22

## 2023-08-10 RX ADMIN — PIPERACILLIN SODIUM AND TAZOBACTAM SODIUM 3.38 G: 3; .375 INJECTION, POWDER, LYOPHILIZED, FOR SOLUTION INTRAVENOUS at 05:02

## 2023-08-10 RX ADMIN — LORAZEPAM 1 MG: 2 INJECTION INTRAMUSCULAR; INTRAVENOUS at 17:15

## 2023-08-10 RX ADMIN — DILTIAZEM HYDROCHLORIDE 10 MG: 5 INJECTION INTRAVENOUS at 15:36

## 2023-08-10 NOTE — PROGRESS NOTES
[facility] Medicine Services  INPATIENT PROGRESS NOTE    Length of Stay: 11  Date of Admission: 7/28/2023  Primary Care Physician: Len Seo MD    Subjective   Chief Complaint: Respiratory failure  HPI:    Patient is intubated.  Doing stable right now.    Review of Systems   Unable to perform ROS: Intubated        All pertinent negatives and positives are as above. All other systems have been reviewed and are negative unless otherwise stated.     Objective    Temp:  [97.6 øF (36.4 øC)-98.1 øF (36.7 øC)] 97.8 øF (36.6 øC)  Heart Rate:  [] 106  Resp:  [16-20] 18  BP: (134-191)/() 172/97  FiO2 (%):  [35 %-40 %] 35 %  Physical Exam  Vitals and nursing note reviewed.   Constitutional:       General: She is not in acute distress.     Appearance: She is well-developed. She is not diaphoretic.      Interventions: She is intubated.   HENT:      Head: Normocephalic and atraumatic.   Cardiovascular:      Rate and Rhythm: Normal rate.   Pulmonary:      Effort: Pulmonary effort is normal. No respiratory distress. She is intubated.      Breath sounds: No wheezing.   Abdominal:      General: There is no distension.      Palpations: Abdomen is soft.   Musculoskeletal:         General: Normal range of motion.   Skin:     General: Skin is warm and dry.   Neurological:      Mental Status: She is alert.      Cranial Nerves: No cranial nerve deficit.             Results Review:  I have reviewed the labs, radiology results, and diagnostic studies.    Laboratory Data:   Lab Results (last 24 hours)       Procedure Component Value Units Date/Time    Hemoglobin & Hematocrit, Blood [[id number]] Collected: 08/10/23 1207    Specimen: Blood Updated: 08/10/23 1213    Basic Metabolic Panel [[id number]]  (Abnormal) Collected: 08/10/23 0524    Specimen: Blood Updated: 08/10/23 0642     Glucose 166 mg/dL      BUN 53 mg/dL      Creatinine 3.74 mg/dL      Sodium 133 mmol/L      Potassium 4.2 mmol/L       Chloride 92 mmol/L      CO2 21.0 mmol/L      Calcium 7.2 mg/dL      BUN/Creatinine Ratio 14.2     Anion Gap 20.0 mmol/L      eGFR 11.6 mL/min/1.73      Comment: <15 Indicative of kidney failure       Narrative:      GFR Normal >60  Chronic Kidney Disease <60  Kidney Failure <15    The GFR formula is only valid for adults with stable renal function between ages 18 and 70.    Hepatic Function Panel [[id number]]  (Abnormal) Collected: 08/10/23 0524    Specimen: Blood Updated: 08/10/23 0642     Total Protein 6.4 g/dL      Albumin 3.2 g/dL      ALT (SGPT) 410 U/L      AST (SGOT) 192 U/L      Alkaline Phosphatase 386 U/L      Total Bilirubin 1.1 mg/dL      Bilirubin, Direct 0.7 mg/dL      Bilirubin, Indirect 0.4 mg/dL     C-reactive Protein [[id number]]  (Abnormal) Collected: 08/10/23 0524    Specimen: Blood Updated: 08/10/23 0642     C-Reactive Protein 3.52 mg/dL     Protime-INR [[id number]]  (Abnormal) Collected: 08/10/23 0524    Specimen: Blood Updated: 08/10/23 0636     Protime 18.4 Seconds      INR 1.55    Narrative:      Therapeutic range for most indications is 2.0-3.0 INR,  or 2.5-3.5 for mechanical heart valves.    Hemoglobin & Hematocrit, Blood [[id number]]  (Abnormal) Collected: 08/10/23 0524    Specimen: Blood Updated: 08/10/23 0618     Hemoglobin 10.4 g/dL      Hematocrit 29.5 %     CBC & Differential [[id number]]  (Abnormal) Collected: 08/10/23 0524    Specimen: Blood Updated: 08/10/23 0617    Narrative:      The following orders were created for panel order CBC & Differential.  Procedure                               Abnormality         Status                     ---------                               -----------         ------                     CBC Auto Differential[[id number]]        Abnormal            Final result                 Please view results for these tests on the individual orders.    CBC Auto Differential [[id number]]  (Abnormal) Collected: 08/10/23 0524    Specimen: Blood Updated: 08/10/23 0617      WBC 9.94 10*3/mm3      RBC 3.46 10*6/mm3      Hemoglobin 10.4 g/dL      Hematocrit 29.5 %      MCV 85.3 fL      MCH 30.1 pg      MCHC 35.3 g/dL      RDW 15.7 %      RDW-SD 46.6 fl      MPV 9.8 fL      Platelets 252 10*3/mm3      Neutrophil % 85.5 %      Lymphocyte % 3.5 %      Monocyte % 9.7 %      Eosinophil % 0.0 %      Basophil % 0.6 %      Immature Grans % 0.7 %      Neutrophils, Absolute 8.50 10*3/mm3      Lymphocytes, Absolute 0.35 10*3/mm3      Monocytes, Absolute 0.96 10*3/mm3      Eosinophils, Absolute 0.00 10*3/mm3      Basophils, Absolute 0.06 10*3/mm3      Immature Grans, Absolute 0.07 10*3/mm3      nRBC 0.5 /100 WBC     Hemoglobin & Hematocrit, Blood [[id number]]  (Abnormal) Collected: 08/09/23 2346    Specimen: Blood Updated: 08/09/23 2355     Hemoglobin 10.2 g/dL      Hematocrit 29.7 %     POC Glucose Once [[id number]]  (Abnormal) Collected: 08/09/23 2223    Specimen: Blood Updated: 08/09/23 2235     Glucose 149 mg/dL      Comment: : [phone] CARLA LEOMeter ID: [id number]       Blood Culture - Blood, Arm, Right [[id number]]  (Normal) Collected: 08/05/23 2156    Specimen: Blood from Arm, Right Updated: 08/09/23 2216     Blood Culture No growth at 4 days    Blood Culture - Blood, Arm, Right [[id number]]  (Normal) Collected: 08/05/23 2156    Specimen: Blood from Arm, Right Updated: 08/09/23 2216     Blood Culture No growth at 4 days    POC Glucose Once [[id number]]  (Abnormal) Collected: 08/09/23 1809    Specimen: Blood Updated: 08/09/23 1843     Glucose 144 mg/dL      Comment: : [phone] LATASHA PIERRENEYMeter ID: [id number]               Culture Data:   No results found for: BLOODCX  No results found for: URINECX  No results found for: RESPCX  No results found for: WOUNDCX  No results found for: STOOLCX  No components found for: BODYFLD    Radiology Data:   Imaging Results (Last 24 Hours)       Procedure Component Value Units Date/Time    XR Abdomen KUB [[id number]] Collected:  08/10/23 1024     Updated: 08/10/23 1137    Narrative:      COMPARISON:  Same examination 8/9/2023.    FINDINGS:  Enteric tube terminates in the fourth portion of the duodenum.  Right-sided  ureteral stent in place.  No definite calculus along the course of the right  ureteral stent.  There are numerous phleboliths within the pelvis.    No evidence of bowel obstruction or gross free intraperitoneal air.    XR Abdomen KUB [[id number]] Collected: 08/09/23 1653     Updated: 08/09/23 1703    Narrative:      Single view abdomen    COMPARISON:  Abdomen 8/9/2023    HISTORY:  Cortrak placement after adjustment of the tube.      Impression:      FINDINGS/IMPRESSION:  Radiopaque tip of the Dobbhoff tube projects over the second portion of the  duodenum, similar to even slightly retracted when compared to the prior exam.  Right-sided ureteral stent is again noted and incompletely visualized.  There  are probably also right femoral catheters which are incompletely visualized.    Normal bowel gas pattern in the visualized abdomen.    XR Abdomen KUB [[id number]] Collected: 08/09/23 1502     Updated: 08/09/23 1525    Narrative:      FINDINGS/    Impression:      Single view of the abdomen demonstrates a feeding tube present, projected over  the second portion of the duodenum.            I have reviewed the patient's current medications.     Assessment/Plan     Active Hospital Problems    Diagnosis     **Sepsis     Mastitis     Other hydronephrosis        1.  Acute hypoxic respiratory failure: Patient decompensated on 8/6 and required intubation.  Currently on mechanical ventilation with a PEEP of 5 with FiO2 of 40%.  Continue to wean as tolerated.  We will try for breathing trial today.  Will wean off of sedation slowly.  2.  Right-sided retroperitoneal hematoma: CT surgery following.  Monitor for now.  Off anticoagulation.   3.  Shock: Off pressors.  Doing better.  Becoming hypertensive.  4.  Acute blood loss anemia: Required  packed red blood cells 8/7.  Hemoglobin much better today.  Hemoccult positive.  May need GI intervention if hemoglobin drops.  5.  Acute kidney injury: Renal function continues to worsen.  Dr. Gómez following.  Hemodialysis .  Nephrology is managing, had dialysis yesterday.  6.  Shock liver: LFTs continuing to trend down.  7.  Paroxysmal atrial fibrillation: Continue to hold anticoagulation.  Rate controlled.  8.  Mastitis: Status post I&D General surgery has signed off and will see in clinic.  9.  DVT prophylaxis: On hold for now.     Prognosis remains guarded.     Medical Decision Making  Number and Complexity of problems: Multiple highly complex medical problems     Conditions and Status:        Condition is unchanged.     Discussed with: Patient's family at length.  All questions answered.     Treatment Plan  As above     Care Planning  Shared decision making: Family in agreement with plan of care  Code status and discussions: DNR     Disposition  Social Determinants of Health that impact treatment or disposition: None     I expect the patient to be discharged to skilled facility versus LTAC in 5-6 days.     40 minutes critical care time    Chandana Schultz MD   08/10/23   12:14 CDT

## 2023-08-10 NOTE — PLAN OF CARE
Goal Outcome Evaluation:  Plan of Care Reviewed With: patient        Progress: improving  Outcome Evaluation: extubated, restless, afib but became afib with RVR- card pushes.

## 2023-08-10 NOTE — PROGRESS NOTES
"    [facility]  [address] [place] [address]. [id number]  T - [phone]  F - [phone]     Progress Note          PATIENT  DEMOGRAPHICS   PATIENT NAME: Carol Sullivan                      PHYSICIAN: Stephanie Gómez MD  : 1940  MRN: [id number]   LOS: 11 days    Patient Care Team:  Len Seo MD as PCP - General  CulbersonBlaire ribera APRN as Nurse Practitioner (General Surgery)  Subjective   SUBJECTIVE   bp is on high side in 170s. UO remains dismal.        Objective   OBJECTIVE   Vital Signs  Temp:  [97.6 øF (36.4 øC)-98.1 øF (36.7 øC)] 97.8 øF (36.6 øC)  Heart Rate:  [] 122  Resp:  [15-20] 18  BP: (114-191)/() 177/80  FiO2 (%):  [35 %-40 %] 35 %    Flowsheet Rows      Flowsheet Row First Filed Value   Admission Height 165.1 cm (65\") Documented at 2023 1900   Admission Weight 74.8 kg (165 lb) Documented at 2023 1608             I/O last 3 completed shifts:  In: 1604 [I.V.:1074; Other:130; IV Piggyback:400]  Out: 4325 [Urine:175; Emesis/NG output:150]    PHYSICAL EXAM    Physical Exam  Vitals and nursing note reviewed.   Constitutional:       Appearance: Normal appearance. She is not ill-appearing.   Cardiovascular:      Rate and Rhythm: Normal rate and regular rhythm.      Heart sounds: Normal heart sounds. No murmur heard.    No friction rub. No gallop.   Pulmonary:      Effort: No respiratory distress.      Breath sounds: Normal breath sounds. No stridor. No wheezing, rhonchi or rales.   Abdominal:      General: Bowel sounds are normal. There is no distension.      Palpations: Abdomen is soft.      Tenderness: There is no abdominal tenderness.   Musculoskeletal:      Right lower leg: No edema.      Left lower leg: No edema.   Skin:     General: Skin is warm and dry.   Neurological:      Mental Status: She is alert.       RESULTS   Results Review:    Results from last 7 days   Lab Units 08/10/23  0524 23  0729 23  0612   SODIUM mmol/L 133* 134* " 139   POTASSIUM mmol/L 4.2 4.4 3.4*   CHLORIDE mmol/L 92* 91* 92*   CO2 mmol/L 21.0* 23.0 26.0   BUN mg/dL 53* 45* 52*   CREATININE mg/dL 3.74* 4.04* 4.80*   CALCIUM mg/dL 7.2* 7.3* 6.2*   BILIRUBIN mg/dL 1.1 1.0 1.0   ALK PHOS U/L 386* 361* 222*   ALT (SGPT) U/L 410* 598* 774*   AST (SGOT) U/L 192* 506* 1,293*   GLUCOSE mg/dL 166* 160* 172*         Estimated Creatinine Clearance: 12.3 mL/min (A) (by C-G formula based on SCr of 3.74 mg/dL (H)).    Results from last 7 days   Lab Units 08/09/23  0729 08/06/23  0414 08/05/23  2156   MAGNESIUM mg/dL 1.8 1.7 2.3               Results from last 7 days   Lab Units 08/10/23  0524 08/09/23  2346 08/09/23  1130 08/09/23  0729 08/09/23  0014 08/08/23  1148 08/08/23  0612 08/07/23  1146 08/07/23  0555 08/06/23  1826 08/06/23  0944   WBC 10*3/mm3 9.94  --   --  18.37*  --   --  21.10*  --  25.01*  --  36.72*   HEMOGLOBIN g/dL 10.4*  10.4* 10.2* 12.4 11.5*  11.5* 9.9*   < > 9.0*   < > 8.3*   < > 6.6*   PLATELETS 10*3/mm3 252  --   --  310  --   --  272  --  359  --  426    < > = values in this interval not displayed.         Results from last 7 days   Lab Units 08/10/23  0524 08/09/23  0612 08/08/23  0612 08/07/23  0555 08/06/23  0414   INR  1.55* 1.59* 2.03* 2.44* 3.00*           Imaging Results (Last 24 Hours)       Procedure Component Value Units Date/Time    XR Abdomen KUB [[id number]] Resulted: 08/10/23 0954     Updated: 08/10/23 1006    XR Abdomen KUB [[id number]] Collected: 08/09/23 1653     Updated: 08/09/23 1703    Narrative:      Single view abdomen    COMPARISON:  Abdomen 8/9/2023    HISTORY:  Cortrak placement after adjustment of the tube.      Impression:      FINDINGS/IMPRESSION:  Radiopaque tip of the Dobbhoff tube projects over the second portion of the  duodenum, similar to even slightly retracted when compared to the prior exam.  Right-sided ureteral stent is again noted and incompletely visualized.  There  are probably also right femoral catheters which are  incompletely visualized.    Normal bowel gas pattern in the visualized abdomen.    XR Abdomen KUB [[id number]] Collected: 08/09/23 1502     Updated: 08/09/23 1525    Narrative:      FINDINGS/    Impression:      Single view of the abdomen demonstrates a feeding tube present, projected over  the second portion of the duodenum.             MEDICATIONS    Insulin Aspart, 0-9 Units, Subcutaneous, TID AC  methylPREDNISolone sodium succinate, 60 mg, Intravenous, Q6H  metoprolol tartrate, 25 mg, Nasogastric, Q12H  nystatin, , Topical, Q12H  pantoprazole, 40 mg, Intravenous, Q AM  piperacillin-tazobactam, 3.375 g, Intravenous, Q12H  rosuvastatin, 40 mg, Oral, Daily  sodium chloride, 10 mL, Intravenous, Q12H  sodium chloride, 10 mL, Intravenous, Q12H      norepinephrine, 0.02-0.3 mcg/kg/min, Last Rate: Stopped (08/06/23 1500)  propofol, 5-50 mcg/kg/min, Last Rate: 20 mcg/kg/min (08/10/23 0950)  vasopressin, 0.03 Units/min        Assessment & Plan   ASSESSMENT / PLAN      Sepsis    Mastitis    Other hydronephrosis    1. Acute kidney injury: baseline unknown.   - Creatinine was 1.5 in 10/2022.   - UA 3+ protein, trace leukocytes, 0-2 RBC, 6-12 WBC, 2+ bacteria. Urine sodium 26. Renal ultrasound unremarkable.   - Creatinine at 1.5-1.6 range and then rapidly worsening. Cr worsening to 4.8 on 8/8/23 and therefore started dialysis on her.     Now Likely ATN.       on admit    Hd next on Friday  Will give a trial of diuretic today    2. Hypertension:   - Blood pressure was low and now high - HR was high and now on metoprolol      losartan on hold. Plan to give metoprolol via OG tube     3. Metabolic acidosis:   - was on bicarb drip. Now modulate with hd      4. Sepsis:  - zosyn at present. C.diff carrier     5. UTI:   - became septic with mild hydro Dr [place] is consulted. Now stent in place . E.fecalis    6. Cdiff: not on po vanc now      7. Hyponatremia:   - Sodium is stable    8. Anemia / retroperitoneal bleed:  -  Hemoglobin is acceptable            This document has been electronically signed by Stephanie Gómez MD on August 10, 2023 10:24 CDT

## 2023-08-10 NOTE — NURSING NOTE
HR and BP still  poorly controlled even with patient now resting more and less restless.  notified

## 2023-08-10 NOTE — NURSING NOTE
Patient still very restless and anxious despite efforts to make comfortable and reorient patient. Verbal order per Dr. Schultz 1mg IV ativan Q4 PRN

## 2023-08-10 NOTE — NURSING NOTE
Cortrak in L nare not flushing upon assessment, opened patients mouth and Cortrak was coiled in mouth. Removed Cortrak.

## 2023-08-10 NOTE — PLAN OF CARE
Goal Outcome Evaluation:           Progress: no change  Outcome Evaluation: Pt remains intubated and sedated. Cortraregina removed this shift d/t improper placement, coiled in mouth at shift change upon assessment. VSS. All basic needs met at this time.

## 2023-08-11 LAB
ALBUMIN SERPL-MCNC: 3.3 G/DL (ref 3.5–5.2)
ALP SERPL-CCNC: 372 U/L (ref 39–117)
ALT SERPL W P-5'-P-CCNC: 270 U/L (ref 1–33)
ANION GAP SERPL CALCULATED.3IONS-SCNC: 25 MMOL/L (ref 5–15)
ARTERIAL PATENCY WRIST A: ABNORMAL
AST SERPL-CCNC: 85 U/L (ref 1–32)
ATMOSPHERIC PRESS: 746 MMHG
BASE EXCESS BLDA CALC-SCNC: -3.3 MMOL/L (ref 0–2)
BASOPHILS # BLD AUTO: 0.06 10*3/MM3 (ref 0–0.2)
BASOPHILS NFR BLD AUTO: 0.4 % (ref 0–1.5)
BDY SITE: ABNORMAL
BILIRUB CONJ SERPL-MCNC: 0.6 MG/DL (ref 0–0.3)
BILIRUB INDIRECT SERPL-MCNC: 0.2 MG/DL
BILIRUB SERPL-MCNC: 0.8 MG/DL (ref 0–1.2)
BUN SERPL-MCNC: 75 MG/DL (ref 8–23)
BUN/CREAT SERPL: 14.6 (ref 7–25)
CALCIUM SPEC-SCNC: 7.3 MG/DL (ref 8.6–10.5)
CHLORIDE SERPL-SCNC: 92 MMOL/L (ref 98–107)
CO2 SERPL-SCNC: 19 MMOL/L (ref 22–29)
CREAT SERPL-MCNC: 5.12 MG/DL (ref 0.57–1)
CRP SERPL-MCNC: 2.46 MG/DL (ref 0–0.5)
DEPRECATED RDW RBC AUTO: 51.3 FL (ref 37–54)
EGFRCR SERPLBLD CKD-EPI 2021: 8 ML/MIN/1.73
EOSINOPHIL # BLD AUTO: 0 10*3/MM3 (ref 0–0.4)
EOSINOPHIL NFR BLD AUTO: 0 % (ref 0.3–6.2)
ERYTHROCYTE [DISTWIDTH] IN BLOOD BY AUTOMATED COUNT: 16.2 % (ref 12.3–15.4)
GAS FLOW AIRWAY: 15 LPM
GLUCOSE BLDC GLUCOMTR-MCNC: 211 MG/DL (ref 70–130)
GLUCOSE BLDC GLUCOMTR-MCNC: 262 MG/DL (ref 70–130)
GLUCOSE SERPL-MCNC: 255 MG/DL (ref 65–99)
HCO3 BLDA-SCNC: 20.9 MMOL/L (ref 20–26)
HCT VFR BLD AUTO: 30 % (ref 34–46.6)
HCT VFR BLD AUTO: 30.2 % (ref 34–46.6)
HCT VFR BLD AUTO: 31.5 % (ref 34–46.6)
HCT VFR BLD AUTO: 39.8 % (ref 34–46.6)
HGB BLD-MCNC: 10 G/DL (ref 12–15.9)
HGB BLD-MCNC: 10.2 G/DL (ref 12–15.9)
HGB BLD-MCNC: 11 G/DL (ref 12–15.9)
HGB BLD-MCNC: 13.5 G/DL (ref 12–15.9)
IMM GRANULOCYTES # BLD AUTO: 0.19 10*3/MM3 (ref 0–0.05)
IMM GRANULOCYTES NFR BLD AUTO: 1.3 % (ref 0–0.5)
INR PPP: 1.48 (ref 0.8–1.2)
LYMPHOCYTES # BLD AUTO: 0.49 10*3/MM3 (ref 0.7–3.1)
LYMPHOCYTES NFR BLD AUTO: 3.4 % (ref 19.6–45.3)
Lab: ABNORMAL
MCH RBC QN AUTO: 29.8 PG (ref 26.6–33)
MCHC RBC AUTO-ENTMCNC: 33.3 G/DL (ref 31.5–35.7)
MCV RBC AUTO: 89.3 FL (ref 79–97)
MODALITY: ABNORMAL
MONOCYTES # BLD AUTO: 1.81 10*3/MM3 (ref 0.1–0.9)
MONOCYTES NFR BLD AUTO: 12.4 % (ref 5–12)
NEUTROPHILS NFR BLD AUTO: 11.99 10*3/MM3 (ref 1.7–7)
NEUTROPHILS NFR BLD AUTO: 82.5 % (ref 42.7–76)
NRBC BLD AUTO-RTO: 0.3 /100 WBC (ref 0–0.2)
PCO2 BLDA: 33.6 MM HG (ref 35–45)
PH BLDA: 7.4 PH UNITS (ref 7.35–7.45)
PLATELET # BLD AUTO: 188 10*3/MM3 (ref 140–450)
PMV BLD AUTO: 10.6 FL (ref 6–12)
PO2 BLDA: 76.4 MM HG (ref 83–108)
POTASSIUM SERPL-SCNC: 3.9 MMOL/L (ref 3.5–5.2)
PROT SERPL-MCNC: 5.8 G/DL (ref 6–8.5)
PROTHROMBIN TIME: 17.7 SECONDS (ref 11.1–15.3)
RBC # BLD AUTO: 3.36 10*6/MM3 (ref 3.77–5.28)
SAO2 % BLDCOA: 95.7 % (ref 94–99)
SODIUM SERPL-SCNC: 136 MMOL/L (ref 136–145)
VENTILATOR MODE: ABNORMAL
WBC NRBC COR # BLD: 14.54 10*3/MM3 (ref 3.4–10.8)

## 2023-08-11 PROCEDURE — 94799 UNLISTED PULMONARY SVC/PX: CPT

## 2023-08-11 PROCEDURE — 85014 HEMATOCRIT: CPT | Performed by: UROLOGY

## 2023-08-11 PROCEDURE — 36600 WITHDRAWAL OF ARTERIAL BLOOD: CPT

## 2023-08-11 PROCEDURE — 85018 HEMOGLOBIN: CPT | Performed by: UROLOGY

## 2023-08-11 PROCEDURE — 85610 PROTHROMBIN TIME: CPT | Performed by: UROLOGY

## 2023-08-11 PROCEDURE — 80048 BASIC METABOLIC PNL TOTAL CA: CPT | Performed by: UROLOGY

## 2023-08-11 PROCEDURE — 85025 COMPLETE CBC W/AUTO DIFF WBC: CPT | Performed by: UROLOGY

## 2023-08-11 PROCEDURE — 25010000002 LORAZEPAM PER 2 MG: Performed by: FAMILY MEDICINE

## 2023-08-11 PROCEDURE — 25010000002 METHYLPREDNISOLONE PER 125 MG: Performed by: FAMILY MEDICINE

## 2023-08-11 PROCEDURE — 25010000002 HALOPERIDOL LACTATE PER 5 MG: Performed by: INTERNAL MEDICINE

## 2023-08-11 PROCEDURE — 80076 HEPATIC FUNCTION PANEL: CPT | Performed by: UROLOGY

## 2023-08-11 PROCEDURE — 25010000002 PIPERACILLIN SOD-TAZOBACTAM PER 1 G: Performed by: UROLOGY

## 2023-08-11 PROCEDURE — 63710000001 INSULIN ASPART PER 5 UNITS: Performed by: HOSPITALIST

## 2023-08-11 PROCEDURE — 94761 N-INVAS EAR/PLS OXIMETRY MLT: CPT

## 2023-08-11 PROCEDURE — 82948 REAGENT STRIP/BLOOD GLUCOSE: CPT

## 2023-08-11 PROCEDURE — 82803 BLOOD GASES ANY COMBINATION: CPT

## 2023-08-11 PROCEDURE — 86140 C-REACTIVE PROTEIN: CPT | Performed by: UROLOGY

## 2023-08-11 RX ORDER — HALOPERIDOL 5 MG/ML
2 INJECTION INTRAMUSCULAR ONCE AS NEEDED
Status: COMPLETED | OUTPATIENT
Start: 2023-08-11 | End: 2023-08-11

## 2023-08-11 RX ORDER — ALBUMIN (HUMAN) 12.5 G/50ML
12.5 SOLUTION INTRAVENOUS AS NEEDED
Status: ACTIVE | OUTPATIENT
Start: 2023-08-11 | End: 2023-08-11

## 2023-08-11 RX ORDER — DILTIAZEM HYDROCHLORIDE 5 MG/ML
10 INJECTION INTRAVENOUS ONCE
Status: COMPLETED | OUTPATIENT
Start: 2023-08-11 | End: 2023-08-11

## 2023-08-11 RX ADMIN — ROSUVASTATIN CALCIUM 40 MG: 20 TABLET, FILM COATED ORAL at 12:48

## 2023-08-11 RX ADMIN — Medication 10 ML: at 20:13

## 2023-08-11 RX ADMIN — NYSTATIN: 100000 POWDER TOPICAL at 12:50

## 2023-08-11 RX ADMIN — LORAZEPAM 1 MG: 2 INJECTION INTRAMUSCULAR; INTRAVENOUS at 04:26

## 2023-08-11 RX ADMIN — HALOPERIDOL LACTATE 2 MG: 5 INJECTION, SOLUTION INTRAMUSCULAR at 01:05

## 2023-08-11 RX ADMIN — INSULIN ASPART 6 UNITS: 100 INJECTION, SOLUTION INTRAVENOUS; SUBCUTANEOUS at 18:25

## 2023-08-11 RX ADMIN — MELATONIN 6 MG: at 01:05

## 2023-08-11 RX ADMIN — METHYLPREDNISOLONE SODIUM SUCCINATE 60 MG: 125 INJECTION INTRAMUSCULAR; INTRAVENOUS at 02:42

## 2023-08-11 RX ADMIN — NYSTATIN: 100000 POWDER TOPICAL at 20:17

## 2023-08-11 RX ADMIN — METOPROLOL TARTRATE 50 MG: 50 TABLET, FILM COATED ORAL at 20:16

## 2023-08-11 RX ADMIN — PANTOPRAZOLE SODIUM 40 MG: 40 INJECTION, POWDER, FOR SOLUTION INTRAVENOUS at 06:11

## 2023-08-11 RX ADMIN — PIPERACILLIN SODIUM AND TAZOBACTAM SODIUM 3.38 G: 3; .375 INJECTION, POWDER, LYOPHILIZED, FOR SOLUTION INTRAVENOUS at 18:24

## 2023-08-11 RX ADMIN — SODIUM CHLORIDE 5 MG/HR: 900 INJECTION, SOLUTION INTRAVENOUS at 16:37

## 2023-08-11 RX ADMIN — SODIUM CHLORIDE 5 MG/HR: 900 INJECTION, SOLUTION INTRAVENOUS at 01:04

## 2023-08-11 RX ADMIN — DILTIAZEM HYDROCHLORIDE 10 MG: 5 INJECTION INTRAVENOUS at 01:04

## 2023-08-11 RX ADMIN — Medication 10 ML: at 20:15

## 2023-08-11 RX ADMIN — PIPERACILLIN SODIUM AND TAZOBACTAM SODIUM 3.38 G: 3; .375 INJECTION, POWDER, LYOPHILIZED, FOR SOLUTION INTRAVENOUS at 06:11

## 2023-08-11 RX ADMIN — Medication 10 ML: at 12:50

## 2023-08-11 RX ADMIN — METOPROLOL TARTRATE 50 MG: 50 TABLET, FILM COATED ORAL at 12:47

## 2023-08-11 RX ADMIN — METHYLPREDNISOLONE SODIUM SUCCINATE 60 MG: 125 INJECTION INTRAMUSCULAR; INTRAVENOUS at 20:15

## 2023-08-11 RX ADMIN — Medication 10 ML: at 12:49

## 2023-08-11 RX ADMIN — INSULIN ASPART 6 UNITS: 100 INJECTION, SOLUTION INTRAVENOUS; SUBCUTANEOUS at 06:35

## 2023-08-11 RX ADMIN — INSULIN ASPART 4 UNITS: 100 INJECTION, SOLUTION INTRAVENOUS; SUBCUTANEOUS at 13:33

## 2023-08-11 RX ADMIN — ISOSORBIDE MONONITRATE 30 MG: 30 TABLET, EXTENDED RELEASE ORAL at 12:48

## 2023-08-11 RX ADMIN — METHYLPREDNISOLONE SODIUM SUCCINATE 60 MG: 125 INJECTION INTRAMUSCULAR; INTRAVENOUS at 16:37

## 2023-08-11 RX ADMIN — METHYLPREDNISOLONE SODIUM SUCCINATE 60 MG: 125 INJECTION INTRAMUSCULAR; INTRAVENOUS at 09:47

## 2023-08-11 RX ADMIN — SODIUM CHLORIDE 5 MG/HR: 900 INJECTION, SOLUTION INTRAVENOUS at 14:37

## 2023-08-11 NOTE — SIGNIFICANT NOTE
08/11/23 1408   OTHER   Discipline physical therapist   Rehab Time/Intention   Session Not Performed patient unavailable for evaluation  (not ready for eval;)   Recommendation   PT - Next Appointment 08/12/23

## 2023-08-11 NOTE — PROGRESS NOTES
LOS: 12 days     Patient Care Team:  Len Seo MD as PCP - General  Blaire Camara APRN as Nurse Practitioner (General Surgery)      Subjective     Renal failure retroperitoneal bleed status post right J stent for hydronephrosis    Objective       Vital Signs  Temp:  [96.1 øF (35.6 øC)-98.8 øF (37.1 øC)] 97.1 øF (36.2 øC)  Heart Rate:  [] 78  Resp:  [16-30] 17  BP: (100-191)/() 122/69  FiO2 (%):  [35 %] 35 %    Physical Exam:        General Appearance:  On ventilator starting dialysis     Respiratory:    UNLABORED RESPIRATIONS.     Abdomen:     SOFT.       Genitourinary: Some urine in the Tobar bag but bloody     Rectal:     DEFERRED       Results Review:       Imaging Results (Last 24 Hours)       Procedure Component Value Units Date/Time    XR Abdomen KUB [[id number]] Collected: 08/10/23 1024     Updated: 08/10/23 1137    Narrative:      COMPARISON:  Same examination 8/9/2023.    FINDINGS:  Enteric tube terminates in the fourth portion of the duodenum.  Right-sided  ureteral stent in place.  No definite calculus along the course of the right  ureteral stent.  There are numerous phleboliths within the pelvis.    No evidence of bowel obstruction or gross free intraperitoneal air.          Lab Results (last 24 hours)       Procedure Component Value Units Date/Time    Basic Metabolic Panel [[id number]]  (Abnormal) Collected: 08/11/23 0629    Specimen: Blood Updated: 08/11/23 0655     Glucose 255 mg/dL      BUN 75 mg/dL      Creatinine 5.12 mg/dL      Sodium 136 mmol/L      Potassium 3.9 mmol/L      Chloride 92 mmol/L      CO2 19.0 mmol/L      Calcium 7.3 mg/dL      BUN/Creatinine Ratio 14.6     Anion Gap 25.0 mmol/L      eGFR 8.0 mL/min/1.73      Comment: <15 Indicative of kidney failure       Narrative:      GFR Normal >60  Chronic Kidney Disease <60  Kidney Failure <15    The GFR formula is only valid for adults with stable renal function between ages 18 and 70.    Hepatic Function Panel  [[id number]]  (Abnormal) Collected: 08/11/23 0629    Specimen: Blood Updated: 08/11/23 0655     Total Protein 5.8 g/dL      Albumin 3.3 g/dL      ALT (SGPT) 270 U/L      AST (SGOT) 85 U/L      Alkaline Phosphatase 372 U/L      Total Bilirubin 0.8 mg/dL      Bilirubin, Direct 0.6 mg/dL      Bilirubin, Indirect 0.2 mg/dL     C-reactive Protein [[id number]]  (Abnormal) Collected: 08/11/23 0629    Specimen: Blood Updated: 08/11/23 0654     C-Reactive Protein 2.46 mg/dL     CBC & Differential [[id number]]  (Abnormal) Collected: 08/11/23 0629    Specimen: Blood Updated: 08/11/23 0650    Narrative:      The following orders were created for panel order CBC & Differential.  Procedure                               Abnormality         Status                     ---------                               -----------         ------                     CBC Auto Differential[[id number]]        Abnormal            Final result                 Please view results for these tests on the individual orders.    CBC Auto Differential [[id number]]  (Abnormal) Collected: 08/11/23 0629    Specimen: Blood Updated: 08/11/23 0650     WBC 14.54 10*3/mm3      RBC 3.36 10*6/mm3      Hemoglobin 10.0 g/dL      Hematocrit 30.0 %      MCV 89.3 fL      MCH 29.8 pg      MCHC 33.3 g/dL      RDW 16.2 %      RDW-SD 51.3 fl      MPV 10.6 fL      Platelets 188 10*3/mm3      Neutrophil % 82.5 %      Lymphocyte % 3.4 %      Monocyte % 12.4 %      Eosinophil % 0.0 %      Basophil % 0.4 %      Immature Grans % 1.3 %      Neutrophils, Absolute 11.99 10*3/mm3      Lymphocytes, Absolute 0.49 10*3/mm3      Monocytes, Absolute 1.81 10*3/mm3      Eosinophils, Absolute 0.00 10*3/mm3      Basophils, Absolute 0.06 10*3/mm3      Immature Grans, Absolute 0.19 10*3/mm3      nRBC 0.3 /100 WBC     Blood Gas, Arterial - [[id number]]  (Abnormal) Collected: 08/11/23 0137    Specimen: Arterial Blood Updated: 08/11/23 0136     Site Right Radial     Haroon's Test N/A     pH, Arterial 7.403  pH units      pCO2, Arterial 33.6 mm Hg      Comment: 84 Value below reference range        pO2, Arterial 76.4 mm Hg      Comment: 84 Value below reference range        HCO3, Arterial 20.9 mmol/L      Base Excess, Arterial -3.3 mmol/L      Comment: 84 Value below reference range        O2 Saturation, Arterial 95.7 %      Barometric Pressure for Blood Gas 746 mmHg      Modality HFNC     Flow Rate 15.0 lpm      Ventilator Mode NA     Collected by Lola     Comment: Meter: [id number]     :  [id number]       Hemoglobin & Hematocrit, Blood [[id number]]  (Abnormal) Collected: 08/11/23 0002    Specimen: Blood Updated: 08/11/23 0012     Hemoglobin 10.2 g/dL      Hematocrit 30.2 %     Blood Culture - Blood, Arm, Right [[id number]]  (Normal) Collected: 08/05/23 2156    Specimen: Blood from Arm, Right Updated: 08/10/23 2216     Blood Culture No growth at 5 days    Blood Culture - Blood, Arm, Right [[id number]]  (Normal) Collected: 08/05/23 2156    Specimen: Blood from Arm, Right Updated: 08/10/23 2201     Blood Culture No growth at 5 days    POC Glucose Once [[id number]]  (Abnormal) Collected: 08/10/23 1202    Specimen: Blood Updated: 08/10/23 1912     Glucose 156 mg/dL      Comment: : [id number] NAMAN Cathie ID: [id number]       Blood Gas, Arterial - [[id number]]  (Abnormal) Collected: 08/10/23 1229    Specimen: Arterial Blood Updated: 08/10/23 1227     Site Arterial Line     Haroon's Test N/A     pH, Arterial 7.419 pH units      pCO2, Arterial 36.9 mm Hg      pO2, Arterial 54.8 mm Hg      Comment: 85 Value below critical limit        HCO3, Arterial 23.9 mmol/L      Base Excess, Arterial -0.4 mmol/L      Comment: 84 Value below reference range        O2 Saturation, Arterial 89.2 %      Comment: 84 Value below reference range        Barometric Pressure for Blood Gas 746 mmHg      Modality Room Air     FIO2 35 %      Ventilator Mode NA     PEEP 5.0     PSV 8.0 cmH2O      Collected by      Comment:  Meter: [id number]     :  [id number]       Hemoglobin & Hematocrit, Blood [[id number]]  (Abnormal) Collected: 08/10/23 1207    Specimen: Blood Updated: 08/10/23 1219     Hemoglobin 11.2 g/dL      Hematocrit 33.1 %               I reviewed the patient's new clinical results.  I reviewed the patient's new imaging results and agree with the interpretation.  I reviewed the patient's other test results and agree with the interpretation        Assessment:    Post right J stent placement for retroperitoneal hematoma    Plan:     Continued to monitor renal function monitor J stent      Ousmane Garcia MD  08/11/23  07:51 CDT

## 2023-08-11 NOTE — SIGNIFICANT NOTE
08/11/23 0800   OTHER   Discipline occupational therapist;occupational therapy assistant   Rehab Time/Intention   Session Not Performed other (see comments)   Recommendation   PT - Next Appointment 08/12/23   Recommendation   OT - Next Appointment 08/12/23     Attempt therapy evaluations, patient at dialysis, will f/u as able.     Attempt 2: 13:50 family in room, requesting patient to sleep/rest post dialysis. Will f/u tomorrow.

## 2023-08-11 NOTE — SIGNIFICANT NOTE
08/11/23 1048   OTHER   Discipline speech language pathologist   Rehab Time/Intention   Session Not Performed unable to evaluate, medical status change     Nsg advised hold eval this date d/t AMS.  SLP will f/u tomorrow

## 2023-08-11 NOTE — PLAN OF CARE
Goal Outcome Evaluation:   Assumed care of patient at 1900. Pt opening eyes spontaneously, but very restless at start of shift. Restlessness has been controlled with PRN ativan, haldol and night time dose of melatonin. MD was notified multiple times throughout the shift in regards to elevated HR, elevated BP and restlessness. Pt was given labetalol and metoprolol with minimum effect of HR and BP. Cardizem bolus and gtt was started due to HR being Afib with rate in 140s-160s. Pt responded well to Cardizem. HR has been less than 100 since starting and BP reduced. ABG was performed due to patient's confusion and restless behavior and no significant abnormalities noted. Very minimal output from [facility] overnight. Plans of dialysis today. Son at bedside. No other issues at this time.

## 2023-08-11 NOTE — PROGRESS NOTES
"    [facility]  [address] [place] [address]. [address]  T - [phone]  F - [phone]     Progress Note          PATIENT  DEMOGRAPHICS   PATIENT NAME: Carol Sullivan                      PHYSICIAN: Larry Rendon MD  : 1940  MRN: [id number]   LOS: 12 days    Patient Care Team:  Len Seo MD as PCP - Blaire Love APRN as Nurse Practitioner (General Surgery)  Subjective   SUBJECTIVE   Pt sleeping comfortably. Undergoing HD. BP is on lower side in 105s. UOP remains minimal.        Objective   OBJECTIVE   Vital Signs  Temp:  [96.1 øF (35.6 øC)-98.8 øF (37.1 øC)] 97.1 øF (36.2 øC)  Heart Rate:  [] 84  Resp:  [13-30] 14  BP: ()/() 101/58  FiO2 (%):  [35 %] 35 %    Flowsheet Rows      Flowsheet Row First Filed Value   Admission Height 165.1 cm (65\") Documented at 2023 1900   Admission Weight 74.8 kg (165 lb) Documented at 2023 1608             I/O last 3 completed shifts:  In: 1138 [I.V.:242; Other:323; NG/GT:273; IV Piggyback:300]  Out: 225 [Urine:225]    PHYSICAL EXAM    Physical Exam  Vitals and nursing note reviewed.   Constitutional:       General: She is sleeping.      Appearance: Normal appearance. She is not ill-appearing.   HENT:      Head: Normocephalic and atraumatic.   Cardiovascular:      Rate and Rhythm: Normal rate. Rhythm irregular.      Heart sounds: Normal heart sounds. No murmur heard.    No friction rub. No gallop.   Pulmonary:      Effort: No respiratory distress.      Breath sounds: Normal breath sounds. No stridor. No wheezing, rhonchi or rales.   Abdominal:      General: Bowel sounds are normal. There is no distension.      Palpations: Abdomen is soft.      Tenderness: There is no abdominal tenderness.   Musculoskeletal:      Right lower leg: No edema.      Left lower leg: No edema.   Skin:     General: Skin is warm and dry.       RESULTS   Results Review:    Results from last 7 days   Lab Units 23  0629 " 08/10/23  0524 08/09/23  0729   SODIUM mmol/L 136 133* 134*   POTASSIUM mmol/L 3.9 4.2 4.4   CHLORIDE mmol/L 92* 92* 91*   CO2 mmol/L 19.0* 21.0* 23.0   BUN mg/dL 75* 53* 45*   CREATININE mg/dL 5.12* 3.74* 4.04*   CALCIUM mg/dL 7.3* 7.2* 7.3*   BILIRUBIN mg/dL 0.8 1.1 1.0   ALK PHOS U/L 372* 386* 361*   ALT (SGPT) U/L 270* 410* 598*   AST (SGOT) U/L 85* 192* 506*   GLUCOSE mg/dL 255* 166* 160*         Estimated Creatinine Clearance: 8.9 mL/min (A) (by C-G formula based on SCr of 5.12 mg/dL (H)).    Results from last 7 days   Lab Units 08/09/23  0729 08/06/23  0414 08/05/23  2156   MAGNESIUM mg/dL 1.8 1.7 2.3               Results from last 7 days   Lab Units 08/11/23  0629 08/11/23  0002 08/10/23  1207 08/10/23  0524 08/09/23  2346 08/09/23  1130 08/09/23  0729 08/08/23  1148 08/08/23  0612 08/07/23  1146 08/07/23  0555   WBC 10*3/mm3 14.54*  --   --  9.94  --   --  18.37*  --  21.10*  --  25.01*   HEMOGLOBIN g/dL 10.0* 10.2* 11.2* 10.4*  10.4* 10.2*   < > 11.5*  11.5*   < > 9.0*   < > 8.3*   PLATELETS 10*3/mm3 188  --   --  252  --   --  310  --  272  --  359    < > = values in this interval not displayed.         Results from last 7 days   Lab Units 08/11/23  0754 08/10/23  0524 08/09/23  0612 08/08/23  0612 08/07/23  0555   INR  1.48* 1.55* 1.59* 2.03* 2.44*           Imaging Results (Last 24 Hours)       Procedure Component Value Units Date/Time    XR Abdomen KUB [[id number]] Collected: 08/10/23 1024     Updated: 08/10/23 1137    Narrative:      COMPARISON:  Same examination 8/9/2023.    FINDINGS:  Enteric tube terminates in the fourth portion of the duodenum.  Right-sided  ureteral stent in place.  No definite calculus along the course of the right  ureteral stent.  There are numerous phleboliths within the pelvis.    No evidence of bowel obstruction or gross free intraperitoneal air.             MEDICATIONS    Insulin Aspart, 0-9 Units, Subcutaneous, TID AC  isosorbide mononitrate, 30 mg, Oral,  Daily  methylPREDNISolone sodium succinate, 60 mg, Intravenous, Q6H  metoprolol tartrate, 50 mg, Nasogastric, Q12H  nystatin, , Topical, Q12H  pantoprazole, 40 mg, Intravenous, Q AM  piperacillin-tazobactam, 3.375 g, Intravenous, Q12H  rosuvastatin, 40 mg, Oral, Daily  sodium chloride, 10 mL, Intravenous, Q12H  sodium chloride, 10 mL, Intravenous, Q12H      dilTIAZem, 5-15 mg/hr, Last Rate: 5 mg/hr (08/11/23 0600)        Assessment & Plan   ASSESSMENT / PLAN      Sepsis    Mastitis    Other hydronephrosis    1. Acute kidney injury/ Acute tubular necrosis:  - Baseline unknown.   - Creatinine was 1.5 in 10/2022.   - UA 3+ protein, trace leukocytes, 0-2 RBC, 6-12 WBC, 2+ bacteria.   - Urine sodium 26. CT abd large rt retroperitoneal hematoma causing moderate rt hydronephrosis.  - Creatinine since admission was 1.5-1.6 range and then rapidly worsening.   - Cr 4.8>5.12 today. eGFR 8. Poor UOP from [facility] of 200cc.  -Underwent temporary CVC for emergent HD on 8/8. Today third session of HD.  Target fluid removal 4L as /58. Hold HD if drop in BP.  - on admit. Today 172lbs    Next hd on monday    2.Renal failure retroperitoneal bleed s/p right J stent for hydronephrosis   -s/p J stent placement by urology 8/11  -Continue to monitor renal function     3. Hypertension/ Afib with RVR:   - Blood pressure was low and now high. Off pressors. HR was high and now on Cardizem gtt and metoprolol po via NG tube. Losartan on hold.   -HR now <100 and Bp reduced.    4. Metabolic acidosis/ confusion:   - Was on bicarb drip. Now modulate with hd  -Hco3 19    -ABG was performed due to patient's confusion and restless behavior and no significant abnormalities noted.    5. Hypocalcemia  -Corrected Ca 7.9     5. Sepsis:  - zosyn at present. C.diff carrier     6. UTI:   - became septic with mild hydro Dr [place] is consulted. Now stent in place . E.fecalis    7. Cdiff: not on po vanc now      8. Hyponatremia:   - Sodium is  stable    8. Anemia / retroperitoneal bleed:  - Hemoglobin is acceptable            This document has been electronically signed by Larry Rendon MD on August 11, 2023 09:50 CDT      I have reviewed the case with the resident. I have also examined and seen  the patient.  I agree with the assessment and plan as documented in the resident's note.         This document has been electronically signed by Stephanie Gómez MD on August 11, 2023 11:55 CDT

## 2023-08-11 NOTE — PLAN OF CARE
tion:   Unable to obtain prescribed uf goal of 4.5 L d/t hypotension,4L removed.No complications with femoral cvc,flushes well.

## 2023-08-11 NOTE — PROGRESS NOTES
Adult Nutrition  Assessment/PES    Patient Name:  Carol Sullivan  YOB: 1940  MRN: [id number]  Admit Date:  7/28/2023    Assessment Date:  8/11/2023    Comments:  Nutrition F/U:      Pt extubated.  She remains NPO with EN via ND for total nutrition.  Pt was very confused and restless last pm requiring medication.  Currently receiving dialysis.  Tube feeding at goal rate but she is no longer receiving Propotol so I will increase to 35cc/hr to optimize her protein intake.    She continues to be managed for Acute hypoxic Resp Failure; Right sided Retroperitoneal hematoma; Shock but currently off Pressors; Acute blood loss anemia; BLAINE requiring dialysis; Shock Liver--LFT trending down; AFib; & Mastitis--s/p I&D.    RD will continue to monitor POC and tube feeding regimen/tolerance.       Reason for Assessment       Row Name 08/11/23 1344          Reason for Assessment    Reason For Assessment follow-up protocol                    Nutrition/Diet History       Row Name 08/11/23 1346          Nutrition/Diet History    Typical Intake (Food/Fluid/EN/PN) Pt extubated.  Per staff she has been very confused and restless.  She is tolerating tube feeding.                    Labs/Tests/Procedures/Meds       Row Name 08/11/23 1347          Labs/Procedures/Meds    Lab Results Reviewed reviewed, pertinent     Lab Results Comments Gluc 166/156/255; bun 75; Creat 5.12; ; ALt 3.3        Diagnostic Tests/Procedures    Diagnostic Test/Procedure Reviewed reviewed, pertinent     Diagnostic Test/Procedures Comments Extubated; Dialysis        Medications    Pertinent Medications Reviewed reviewed, pertinent     Pertinent Medications Comments SSI; Solumedrol; Protonix; Zosyn; Cardizem                    Physical Findings       Row Name 08/11/23 1351          Physical Findings    Enteral Access Devices nasoduodenal tube                    Estimated/Assessed Needs - Anthropometrics       Row Name 08/11/23 0600           Anthropometrics    Weight 78.4 kg (172 lb 14.4 oz)                    Nutrition Prescription Ordered       Row Name 08/11/23 1354          Nutrition Prescription PO    Current PO Diet NPO        Nutrition Prescription EN    Enteral Route ND     Product Novasource Renal (Nepro)     TF Delivery Method Continuous     Continuous TF Goal Rate (mL/hr) 30 mL/hr     Continuous TF Current Rate (mL/hr) 30 mL/hr     Water flush (mL)  10 mL     Water Flush Frequency Per hour                    Evaluation of Received Nutrient/Fluid Intake       Row Name 08/11/23 1354          Calories Evaluation    Total Calorie Target (kcal) 1400     Oral Calories (kcal) 0     Enteral Calories (kcal) 1440     Parenteral Calories (kcal) 0     Other Calories (kcal) 0     Total Calories (kcal) 1440     % of Kcal Needs 102.86        Protein Evaluation    Total Protein Target (g) 74     Oral Protein (g) 0     Enteral Protein (g) 66     Parenteral Protein (g) 0     Other Protein (g) 0     Total Protein (g) 66     % of Protein Needs 89.19                       Problem/Interventions:   Problem 1       Row Name 08/11/23 1332          Nutrition Diagnoses Problem 1    Problem 1 Increased Nutrient Needs     Macronutrient Protein     Micronutrient Vitamin;Mineral     Etiology (related to) Medical Diagnosis     Infectious Disease Sepsis;UTI     Other Breast Mastitis --POD #2 I&D     Other Comment Increased metabolic demands                    Problem 2       Row Name 08/11/23 1332          Nutrition Diagnoses Problem 2    Problem 2 Inadequate Intake/Infusion     Inadequate Intake Type Oral     Macronutrient Kcal;Protein     Micronutrient Vitamin;Mineral     Etiology (related to) Factors Affecting Nutrition     Infectious Disease Sepsis;UTI     Pulmonary/Critical Care Acute respiratory failure     Reported GI Symptoms --  on Abx     Signs/Symptoms (evidenced by) NPO;EN Intake Delivery     Percent (%) of EN goal 100 %     Other Comment Variable po intake prior  to being intubated                    Problem 3       Row Name 08/11/23 1333          Nutrition Diagnoses Problem 3    Problem 3 Altered Nutrition Related to Labs     Etiology (related to) Medical Diagnosis     Hematological Acute blood loss;Anemia     Hepatic Shock liver     Metabolic/ICU Elevated LFTs     Renal BLAINE     Signs/Symptoms (evidenced by) Biochemical     Specific Labs Noted BUN;Creatinine;ALT                      Intervention Goal       Row Name 08/11/23 1339          Intervention Goal    General Reduce/improve symptoms;Nutrition support treatment;Meet nutritional needs for age/condition     TF/PN Adjust TF/PN;Tolerate TF at goal     Weight No significant weight loss                    Nutrition Intervention       Row Name 08/11/23 1339          Nutrition Intervention    RD/Tech Action Follow Tx progress;Care plan reviewd;Recommend/ordered     Recommended/Ordered EN                    Nutrition Prescription       Row Name 08/11/23 1339          Nutrition Prescription EN    Enteral Prescription Enteral begin/change     Enteral Route ND     Product Novasource Renal     TF Delivery Method Continuous     Continuous TF Goal Rate (mL/hr) 35 mL/hr     Continuous TF Starting Rate (mL/hr) 35 mL/hr     Water flush (mL)  10 mL     Water Flush Frequency Per hour     New EN Prescription Ordered? Yes                    Education/Evaluation       Row Name 08/11/23 4265          Education    Education Education not appropriate at this time     Please explain Patient intubated        Monitor/Evaluation    Monitor Per protocol;I&O;Pertinent labs;TF delivery/tolerance;Weight;Skin status;Symptoms                     Electronically signed by:  Zohreh Jeter RD  08/11/23 13:57 CDT

## 2023-08-11 NOTE — PROGRESS NOTES
[facility]  INPATIENT PROGRESS NOTE    Length of Stay: 12  Date of Admission: 7/28/2023  Primary Care Physician: Len Seo MD    Subjective   Chief Complaint: Respiratory failure  HPI:    Patient is having mild difficulty breathing.  She was extubated yesterday.  Has been restless.    Review of Systems   Respiratory:  Positive for shortness of breath.    Cardiovascular:  Negative for chest pain.        All pertinent negatives and positives are as above. All other systems have been reviewed and are negative unless otherwise stated.     Objective    Temp:  [96.1 øF (35.6 øC)-98.8 øF (37.1 øC)] 97.7 øF (36.5 øC)  Heart Rate:  [] 82  Resp:  [13-30] 15  BP: ()/() 101/53  Physical Exam  Vitals and nursing note reviewed.   Constitutional:       General: She is not in acute distress.     Appearance: She is well-developed. She is not diaphoretic.   HENT:      Head: Normocephalic and atraumatic.   Cardiovascular:      Rate and Rhythm: Normal rate.   Pulmonary:      Effort: Pulmonary effort is normal. No respiratory distress.      Breath sounds: No wheezing.   Abdominal:      General: There is no distension.      Palpations: Abdomen is soft.   Musculoskeletal:         General: Normal range of motion.   Skin:     General: Skin is warm and dry.   Neurological:      Mental Status: She is alert.      Cranial Nerves: No cranial nerve deficit.   Psychiatric:      Comments: Confusion present             Results Review:  I have reviewed the labs, radiology results, and diagnostic studies.    Laboratory Data:   Lab Results (last 24 hours)       Procedure Component Value Units Date/Time    Protime-INR [[id number]]  (Abnormal) Collected: 08/11/23 0754    Specimen: Blood Updated: 08/11/23 0826     Protime 17.7 Seconds      INR 1.48    Narrative:      Therapeutic range for most indications is 2.0-3.0 INR,  or 2.5-3.5 for mechanical heart valves.    Basic  Metabolic Panel [[id number]]  (Abnormal) Collected: 08/11/23 0629    Specimen: Blood Updated: 08/11/23 0655     Glucose 255 mg/dL      BUN 75 mg/dL      Creatinine 5.12 mg/dL      Sodium 136 mmol/L      Potassium 3.9 mmol/L      Chloride 92 mmol/L      CO2 19.0 mmol/L      Calcium 7.3 mg/dL      BUN/Creatinine Ratio 14.6     Anion Gap 25.0 mmol/L      eGFR 8.0 mL/min/1.73      Comment: <15 Indicative of kidney failure       Narrative:      GFR Normal >60  Chronic Kidney Disease <60  Kidney Failure <15    The GFR formula is only valid for adults with stable renal function between ages 18 and 70.    Hepatic Function Panel [[id number]]  (Abnormal) Collected: 08/11/23 0629    Specimen: Blood Updated: 08/11/23 0655     Total Protein 5.8 g/dL      Albumin 3.3 g/dL      ALT (SGPT) 270 U/L      AST (SGOT) 85 U/L      Alkaline Phosphatase 372 U/L      Total Bilirubin 0.8 mg/dL      Bilirubin, Direct 0.6 mg/dL      Bilirubin, Indirect 0.2 mg/dL     C-reactive Protein [[id number]]  (Abnormal) Collected: 08/11/23 0629    Specimen: Blood Updated: 08/11/23 0654     C-Reactive Protein 2.46 mg/dL     CBC & Differential [[id number]]  (Abnormal) Collected: 08/11/23 0629    Specimen: Blood Updated: 08/11/23 0650    Narrative:      The following orders were created for panel order CBC & Differential.  Procedure                               Abnormality         Status                     ---------                               -----------         ------                     CBC Auto Differential[[id number]]        Abnormal            Final result                 Please view results for these tests on the individual orders.    CBC Auto Differential [[id number]]  (Abnormal) Collected: 08/11/23 0629    Specimen: Blood Updated: 08/11/23 0650     WBC 14.54 10*3/mm3      RBC 3.36 10*6/mm3      Hemoglobin 10.0 g/dL      Hematocrit 30.0 %      MCV 89.3 fL      MCH 29.8 pg      MCHC 33.3 g/dL      RDW 16.2 %      RDW-SD 51.3 fl      MPV 10.6 fL       Platelets 188 10*3/mm3      Neutrophil % 82.5 %      Lymphocyte % 3.4 %      Monocyte % 12.4 %      Eosinophil % 0.0 %      Basophil % 0.4 %      Immature Grans % 1.3 %      Neutrophils, Absolute 11.99 10*3/mm3      Lymphocytes, Absolute 0.49 10*3/mm3      Monocytes, Absolute 1.81 10*3/mm3      Eosinophils, Absolute 0.00 10*3/mm3      Basophils, Absolute 0.06 10*3/mm3      Immature Grans, Absolute 0.19 10*3/mm3      nRBC 0.3 /100 WBC     Blood Gas, Arterial - [[id number]]  (Abnormal) Collected: 08/11/23 0137    Specimen: Arterial Blood Updated: 08/11/23 0136     Site Right Radial     Haroon's Test N/A     pH, Arterial 7.403 pH units      pCO2, Arterial 33.6 mm Hg      Comment: 84 Value below reference range        pO2, Arterial 76.4 mm Hg      Comment: 84 Value below reference range        HCO3, Arterial 20.9 mmol/L      Base Excess, Arterial -3.3 mmol/L      Comment: 84 Value below reference range        O2 Saturation, Arterial 95.7 %      Barometric Pressure for Blood Gas 746 mmHg      Modality HFNC     Flow Rate 15.0 lpm      Ventilator Mode NA     Collected by Lola     Comment: Meter: [id number]     :  [id number]       Hemoglobin & Hematocrit, Blood [[id number]]  (Abnormal) Collected: 08/11/23 0002    Specimen: Blood Updated: 08/11/23 0012     Hemoglobin 10.2 g/dL      Hematocrit 30.2 %     Blood Culture - Blood, Arm, Right [[id number]]  (Normal) Collected: 08/05/23 2156    Specimen: Blood from Arm, Right Updated: 08/10/23 2216     Blood Culture No growth at 5 days    Blood Culture - Blood, Arm, Right [[id number]]  (Normal) Collected: 08/05/23 2156    Specimen: Blood from Arm, Right Updated: 08/10/23 2201     Blood Culture No growth at 5 days    POC Glucose Once [[id number]]  (Abnormal) Collected: 08/10/23 1202    Specimen: Blood Updated: 08/10/23 1912     Glucose 156 mg/dL      Comment: : [id number] NAMAN Brand ID: [id number]       Blood Gas, Arterial - [[id number]]  (Abnormal)  Collected: 08/10/23 1229    Specimen: Arterial Blood Updated: 08/10/23 1227     Site Arterial Line     Haroon's Test N/A     pH, Arterial 7.419 pH units      pCO2, Arterial 36.9 mm Hg      pO2, Arterial 54.8 mm Hg      Comment: 85 Value below critical limit        HCO3, Arterial 23.9 mmol/L      Base Excess, Arterial -0.4 mmol/L      Comment: 84 Value below reference range        O2 Saturation, Arterial 89.2 %      Comment: 84 Value below reference range        Barometric Pressure for Blood Gas 746 mmHg      Modality Room Air     FIO2 35 %      Ventilator Mode NA     PEEP 5.0     PSV 8.0 cmH2O      Collected by      Comment: Meter: [id number]     :  [id number]       Hemoglobin & Hematocrit, Blood [[id number]]  (Abnormal) Collected: 08/10/23 1207    Specimen: Blood Updated: 08/10/23 1219     Hemoglobin 11.2 g/dL      Hematocrit 33.1 %             Culture Data:   No results found for: BLOODCX  No results found for: URINECX  No results found for: RESPCX  No results found for: WOUNDCX  No results found for: STOOLCX  No components found for: BODYFLD    Radiology Data:   Imaging Results (Last 24 Hours)       ** No results found for the last 24 hours. **            I have reviewed the patient's current medications.     Assessment/Plan     Active Hospital Problems    Diagnosis     **Sepsis     Mastitis     Other hydronephrosis          Acute hypoxic respiratory failure: Patient decompensated on 8/6 and required intubation.    Extubation done yesterday, we will continue with supplemental oxygen, wean as tolerated.  2.  Right-sided retroperitoneal hematoma: CT surgery following.  Monitor for now.  Off anticoagulation.   3.  Shock: Off pressors.  Doing better.  Becoming hypertensive.  4.  Acute blood loss anemia: Required packed red blood cells 8/7.  Hemoglobin much better today.  Hemoccult positive.  May need GI intervention if hemoglobin drops.  5.  Acute kidney injury: Renal function continues to worsen.    Dalia following.  Hemodialysis .  Nephrology is managing, had dialysis yesterday.  6.  Shock liver: LFTs continuing to trend down.  7.  Paroxysmal atrial fibrillation: Continue to hold anticoagulation.  Rate controlled.  8.  Mastitis: Status post I&D General surgery has signed off and will see in clinic.  9.  DVT prophylaxis: On hold for now.     Prognosis remains guarded.     Medical Decision Making  Number and Complexity of problems: Multiple highly complex medical problems     Conditions and Status:        Condition is unchanged.     Discussed with: Patient's family at length.  All questions answered.     Treatment Plan  As above     Care Planning  Shared decision making: Family     Disposition  Social Determinants of Health that impact treatment or disposition: None     I expect the patient to be discharged to skilled facility versus LTAC in 5-6 days.        Chandana Schultz MD   08/11/23   12:13 CDT

## 2023-08-11 NOTE — PLAN OF CARE
Goal Outcome Evaluation:  Plan of Care Reviewed With: caregiver        Progress: improving  Outcome Evaluation: Nutrition F/U:  pt extubated but remains on tube feeding.  Currently confused.  Will adjust tube feeding as pt is not on Propofol.  will increase goal rate to 35cc/hr.

## 2023-08-12 LAB
ALBUMIN SERPL-MCNC: 3.7 G/DL (ref 3.5–5.2)
ALP SERPL-CCNC: 454 U/L (ref 39–117)
ALT SERPL W P-5'-P-CCNC: 238 U/L (ref 1–33)
ANION GAP SERPL CALCULATED.3IONS-SCNC: 23 MMOL/L (ref 5–15)
AST SERPL-CCNC: 59 U/L (ref 1–32)
BASOPHILS # BLD AUTO: 0.09 10*3/MM3 (ref 0–0.2)
BASOPHILS NFR BLD AUTO: 0.5 % (ref 0–1.5)
BILIRUB CONJ SERPL-MCNC: 0.7 MG/DL (ref 0–0.3)
BILIRUB INDIRECT SERPL-MCNC: 0.4 MG/DL
BILIRUB SERPL-MCNC: 1.1 MG/DL (ref 0–1.2)
BUN SERPL-MCNC: 74 MG/DL (ref 8–23)
BUN/CREAT SERPL: 16.7 (ref 7–25)
CALCIUM SPEC-SCNC: 8.1 MG/DL (ref 8.6–10.5)
CHLORIDE SERPL-SCNC: 92 MMOL/L (ref 98–107)
CO2 SERPL-SCNC: 19 MMOL/L (ref 22–29)
CREAT SERPL-MCNC: 4.44 MG/DL (ref 0.57–1)
CRP SERPL-MCNC: 2.12 MG/DL (ref 0–0.5)
DEPRECATED RDW RBC AUTO: 46.1 FL (ref 37–54)
EGFRCR SERPLBLD CKD-EPI 2021: 9.5 ML/MIN/1.73
EOSINOPHIL # BLD AUTO: 0.01 10*3/MM3 (ref 0–0.4)
EOSINOPHIL NFR BLD AUTO: 0.1 % (ref 0.3–6.2)
ERYTHROCYTE [DISTWIDTH] IN BLOOD BY AUTOMATED COUNT: 15.3 % (ref 12.3–15.4)
GLUCOSE BLDC GLUCOMTR-MCNC: 163 MG/DL (ref 70–130)
GLUCOSE BLDC GLUCOMTR-MCNC: 198 MG/DL (ref 70–130)
GLUCOSE BLDC GLUCOMTR-MCNC: 252 MG/DL (ref 70–130)
GLUCOSE BLDC GLUCOMTR-MCNC: 296 MG/DL (ref 70–130)
GLUCOSE BLDC GLUCOMTR-MCNC: 364 MG/DL (ref 70–130)
GLUCOSE SERPL-MCNC: 441 MG/DL (ref 65–99)
HCT VFR BLD AUTO: 30.3 % (ref 34–46.6)
HCT VFR BLD AUTO: 31.4 % (ref 34–46.6)
HCT VFR BLD AUTO: 31.4 % (ref 34–46.6)
HCT VFR BLD AUTO: 33.5 % (ref 34–46.6)
HGB BLD-MCNC: 10.4 G/DL (ref 12–15.9)
HGB BLD-MCNC: 10.8 G/DL (ref 12–15.9)
HGB BLD-MCNC: 11.3 G/DL (ref 12–15.9)
HGB BLD-MCNC: 11.3 G/DL (ref 12–15.9)
IMM GRANULOCYTES # BLD AUTO: 0.21 10*3/MM3 (ref 0–0.05)
IMM GRANULOCYTES NFR BLD AUTO: 1.2 % (ref 0–0.5)
INR PPP: 1.41 (ref 0.8–1.2)
LYMPHOCYTES # BLD AUTO: 0.24 10*3/MM3 (ref 0.7–3.1)
LYMPHOCYTES NFR BLD AUTO: 1.3 % (ref 19.6–45.3)
MCH RBC QN AUTO: 29.1 PG (ref 26.6–33)
MCHC RBC AUTO-ENTMCNC: 34.4 G/DL (ref 31.5–35.7)
MCV RBC AUTO: 84.6 FL (ref 79–97)
MONOCYTES # BLD AUTO: 1.36 10*3/MM3 (ref 0.1–0.9)
MONOCYTES NFR BLD AUTO: 7.6 % (ref 5–12)
NEUTROPHILS NFR BLD AUTO: 15.98 10*3/MM3 (ref 1.7–7)
NEUTROPHILS NFR BLD AUTO: 89.3 % (ref 42.7–76)
NRBC BLD AUTO-RTO: 0.1 /100 WBC (ref 0–0.2)
PLATELET # BLD AUTO: 260 10*3/MM3 (ref 140–450)
PMV BLD AUTO: 10.9 FL (ref 6–12)
POTASSIUM SERPL-SCNC: 3.7 MMOL/L (ref 3.5–5.2)
PROT SERPL-MCNC: 6.9 G/DL (ref 6–8.5)
PROTHROMBIN TIME: 17.2 SECONDS (ref 11.1–15.3)
QT INTERVAL: 330 MS
QTC INTERVAL: 416 MS
RBC # BLD AUTO: 3.71 10*6/MM3 (ref 3.77–5.28)
SODIUM SERPL-SCNC: 134 MMOL/L (ref 136–145)
WBC NRBC COR # BLD: 17.89 10*3/MM3 (ref 3.4–10.8)

## 2023-08-12 PROCEDURE — 85014 HEMATOCRIT: CPT | Performed by: UROLOGY

## 2023-08-12 PROCEDURE — 82948 REAGENT STRIP/BLOOD GLUCOSE: CPT

## 2023-08-12 PROCEDURE — 85018 HEMOGLOBIN: CPT | Performed by: UROLOGY

## 2023-08-12 PROCEDURE — 85025 COMPLETE CBC W/AUTO DIFF WBC: CPT | Performed by: UROLOGY

## 2023-08-12 PROCEDURE — 97168 OT RE-EVAL EST PLAN CARE: CPT

## 2023-08-12 PROCEDURE — 80048 BASIC METABOLIC PNL TOTAL CA: CPT | Performed by: UROLOGY

## 2023-08-12 PROCEDURE — 97164 PT RE-EVAL EST PLAN CARE: CPT

## 2023-08-12 PROCEDURE — 25010000002 METHYLPREDNISOLONE PER 125 MG: Performed by: FAMILY MEDICINE

## 2023-08-12 PROCEDURE — 25010000002 NITROGLYCERIN 200 MCG/ML SOLUTION: Performed by: INTERNAL MEDICINE

## 2023-08-12 PROCEDURE — 63710000001 INSULIN ASPART PER 5 UNITS: Performed by: HOSPITALIST

## 2023-08-12 PROCEDURE — 80076 HEPATIC FUNCTION PANEL: CPT | Performed by: UROLOGY

## 2023-08-12 PROCEDURE — 85610 PROTHROMBIN TIME: CPT | Performed by: UROLOGY

## 2023-08-12 PROCEDURE — 86140 C-REACTIVE PROTEIN: CPT | Performed by: UROLOGY

## 2023-08-12 PROCEDURE — 25010000002 FENTANYL CITRATE (PF) 50 MCG/ML SOLUTION: Performed by: FAMILY MEDICINE

## 2023-08-12 PROCEDURE — 25010000002 PIPERACILLIN SOD-TAZOBACTAM PER 1 G: Performed by: UROLOGY

## 2023-08-12 PROCEDURE — 25010000002 LABETALOL 5 MG/ML SOLUTION: Performed by: INTERNAL MEDICINE

## 2023-08-12 PROCEDURE — 25010000002 HYDRALAZINE PER 20 MG: Performed by: HOSPITALIST

## 2023-08-12 RX ORDER — ACETAMINOPHEN 160 MG/5ML
650 SOLUTION ORAL EVERY 6 HOURS PRN
Status: DISCONTINUED | OUTPATIENT
Start: 2023-08-12 | End: 2023-08-17 | Stop reason: HOSPADM

## 2023-08-12 RX ORDER — NITROGLYCERIN 20 MG/100ML
5-200 INJECTION INTRAVENOUS
Status: DISCONTINUED | OUTPATIENT
Start: 2023-08-12 | End: 2023-08-15

## 2023-08-12 RX ORDER — LISINOPRIL 10 MG/1
10 TABLET ORAL
Status: DISCONTINUED | OUTPATIENT
Start: 2023-08-12 | End: 2023-08-13

## 2023-08-12 RX ADMIN — Medication 10 ML: at 21:30

## 2023-08-12 RX ADMIN — METOPROLOL TARTRATE 50 MG: 50 TABLET, FILM COATED ORAL at 08:26

## 2023-08-12 RX ADMIN — METHYLPREDNISOLONE SODIUM SUCCINATE 60 MG: 125 INJECTION INTRAMUSCULAR; INTRAVENOUS at 01:55

## 2023-08-12 RX ADMIN — HYDRALAZINE HYDROCHLORIDE 10 MG: 20 INJECTION INTRAMUSCULAR; INTRAVENOUS at 12:52

## 2023-08-12 RX ADMIN — LABETALOL HYDROCHLORIDE 10 MG: 5 INJECTION, SOLUTION INTRAVENOUS at 01:54

## 2023-08-12 RX ADMIN — PANTOPRAZOLE SODIUM 40 MG: 40 INJECTION, POWDER, FOR SOLUTION INTRAVENOUS at 06:29

## 2023-08-12 RX ADMIN — PIPERACILLIN SODIUM AND TAZOBACTAM SODIUM 3.38 G: 3; .375 INJECTION, POWDER, LYOPHILIZED, FOR SOLUTION INTRAVENOUS at 06:29

## 2023-08-12 RX ADMIN — NITROGLYCERIN 5 MCG/MIN: 20 INJECTION INTRAVENOUS at 06:08

## 2023-08-12 RX ADMIN — Medication 10 ML: at 21:36

## 2023-08-12 RX ADMIN — SODIUM CHLORIDE 5 MG/HR: 900 INJECTION, SOLUTION INTRAVENOUS at 11:45

## 2023-08-12 RX ADMIN — INSULIN ASPART 2 UNITS: 100 INJECTION, SOLUTION INTRAVENOUS; SUBCUTANEOUS at 17:42

## 2023-08-12 RX ADMIN — PIPERACILLIN SODIUM AND TAZOBACTAM SODIUM 3.38 G: 3; .375 INJECTION, POWDER, LYOPHILIZED, FOR SOLUTION INTRAVENOUS at 17:47

## 2023-08-12 RX ADMIN — LABETALOL HYDROCHLORIDE 10 MG: 5 INJECTION, SOLUTION INTRAVENOUS at 07:46

## 2023-08-12 RX ADMIN — METHYLPREDNISOLONE SODIUM SUCCINATE 60 MG: 125 INJECTION INTRAMUSCULAR; INTRAVENOUS at 14:50

## 2023-08-12 RX ADMIN — FENTANYL CITRATE 50 MCG: 50 INJECTION, SOLUTION INTRAMUSCULAR; INTRAVENOUS at 01:53

## 2023-08-12 RX ADMIN — INSULIN ASPART 6 UNITS: 100 INJECTION, SOLUTION INTRAVENOUS; SUBCUTANEOUS at 11:44

## 2023-08-12 RX ADMIN — ACETAMINOPHEN 650 MG: 650 SOLUTION ORAL at 11:45

## 2023-08-12 RX ADMIN — METHYLPREDNISOLONE SODIUM SUCCINATE 60 MG: 125 INJECTION INTRAMUSCULAR; INTRAVENOUS at 21:30

## 2023-08-12 RX ADMIN — NYSTATIN: 100000 POWDER TOPICAL at 21:30

## 2023-08-12 RX ADMIN — METOPROLOL TARTRATE 50 MG: 50 TABLET, FILM COATED ORAL at 21:30

## 2023-08-12 RX ADMIN — METHYLPREDNISOLONE SODIUM SUCCINATE 60 MG: 125 INJECTION INTRAMUSCULAR; INTRAVENOUS at 07:47

## 2023-08-12 RX ADMIN — INSULIN ASPART 9 UNITS: 100 INJECTION, SOLUTION INTRAVENOUS; SUBCUTANEOUS at 07:31

## 2023-08-12 RX ADMIN — Medication 10 ML: at 01:55

## 2023-08-12 RX ADMIN — Medication 10 ML: at 21:37

## 2023-08-12 RX ADMIN — LISINOPRIL 10 MG: 10 TABLET ORAL at 14:51

## 2023-08-12 RX ADMIN — ROSUVASTATIN CALCIUM 40 MG: 20 TABLET, FILM COATED ORAL at 08:26

## 2023-08-12 RX ADMIN — HYDRALAZINE HYDROCHLORIDE 10 MG: 20 INJECTION INTRAMUSCULAR; INTRAVENOUS at 00:11

## 2023-08-12 RX ADMIN — NYSTATIN: 100000 POWDER TOPICAL at 08:27

## 2023-08-12 RX ADMIN — LABETALOL HYDROCHLORIDE 10 MG: 5 INJECTION, SOLUTION INTRAVENOUS at 18:25

## 2023-08-12 RX ADMIN — ISOSORBIDE MONONITRATE 30 MG: 30 TABLET, EXTENDED RELEASE ORAL at 08:26

## 2023-08-12 NOTE — PROGRESS NOTES
Patient suffering from hypertensive urgency overnight unrelieved by IV labetalol/hydralazine.  Patient already on Cardizem gtt.  We will therefore start nitroglycerin gtt. and titrate to systolic blood pressure less than 180.    Patient Vitals for the past 24 hrs:   BP Temp Temp src Pulse Resp SpO2 Weight   08/12/23 0300 (!) 199/87 -- -- 104 -- 94 % --   08/12/23 0200 (!) 194/91 97.9 øF (36.6 øC) Temporal 103 23 94 % --   08/12/23 0154 (!) 192/85 -- -- 105 -- -- --   08/12/23 0011 (!) 187/97 -- -- 98 -- -- --   08/12/23 0000 (!) 188/92 98.2 øF (36.8 øC) Temporal 98 21 97 % --   08/11/23 2300 (!) 188/96 -- -- 95 -- 94 % --   08/11/23 2200 168/82 -- -- 92 -- 96 % --   08/11/23 2100 168/74 -- -- 92 -- 96 % --   08/11/23 2016 157/82 -- -- 102 -- -- --   08/11/23 2000 157/82 98.1 øF (36.7 øC) Temporal 104 24 95 % --   08/11/23 1900 173/85 -- -- 98 -- 95 % --   08/11/23 1800 108/56 -- -- 83 -- 97 % --   08/11/23 1700 154/74 -- -- 93 -- 95 % --   08/11/23 1600 157/67 -- -- 93 -- 95 % --   08/11/23 1500 160/72 -- -- 100 -- 96 % --   08/11/23 1400 156/73 -- -- 98 -- 99 % --   08/11/23 1300 142/67 -- -- 96 -- 98 % --   08/11/23 1219 150/77 -- -- 90 20 98 % --   08/11/23 1216 100/61 -- -- -- 20 -- --   08/11/23 1202 101/53 -- -- -- 15 -- --   08/11/23 1200 101/53 97.8 øF (36.6 øC) Oral 85 20 100 % --   08/11/23 1130 101/56 -- -- -- 22 -- --   08/11/23 1101 101/62 -- -- -- 17 -- --   08/11/23 1100 101/62 -- -- 90 -- 100 % --   08/11/23 1030 -- -- -- -- 17 -- --   08/11/23 1000 105/60 -- -- 82 18 100 % --   08/11/23 0930 101/58 -- -- 84 14 100 % --   08/11/23 0900 107/52 -- -- 83 16 100 % --   08/11/23 0845 102/55 -- -- 80 -- 100 % --   08/11/23 0833 96/57 -- -- 79 13 100 % --   08/11/23 0830 110/55 -- -- 79 16 100 % --   08/11/23 0812 -- -- -- -- -- 100 % --   08/11/23 0800 136/66 97.7 øF (36.5 øC) -- 85 -- 99 % --   08/11/23 0700 164/74 -- -- 83 -- 98 % --   08/11/23 0630 122/69 -- -- 78 -- 100 % --   08/11/23 0615 114/61 --  -- 77 -- 100 % --   08/11/23 0600 100/54 -- -- 80 -- 99 % 78.4 kg (172 lb 14.4 oz)   08/11/23 0545 -- -- -- 79 -- 99 % --

## 2023-08-12 NOTE — PROGRESS NOTES
LOS: 13 days     Patient Care Team:  Len Seo MD as PCP - General  Blaire Camara APRN as Nurse Practitioner (General Surgery)      Subjective     Retroperitoneal bleed with right double-J stent in place    Objective       Vital Signs  Temp:  [97.7 øF (36.5 øC)-98.2 øF (36.8 øC)] 97.9 øF (36.6 øC)  Heart Rate:  [] 103  Resp:  [13-24] 23  BP: ()/() 184/105    Physical Exam:        General Appearance:  Sedated on ventilator     Respiratory:    UNLABORED RESPIRATIONS.     Abdomen:     SOFT.       Genitourinary: Mild amount of urine output with bloody appearance     Rectal:     DEFERRED       Results Review:       Imaging Results (Last 24 Hours)       ** No results found for the last 24 hours. **          Lab Results (last 24 hours)       Procedure Component Value Units Date/Time    Basic Metabolic Panel [[id number]]  (Abnormal) Collected: 08/12/23 0555    Specimen: Blood Updated: 08/12/23 0718     Glucose 441 mg/dL      BUN 74 mg/dL      Creatinine 4.44 mg/dL      Sodium 134 mmol/L      Potassium 3.7 mmol/L      Chloride 92 mmol/L      CO2 19.0 mmol/L      Calcium 8.1 mg/dL      BUN/Creatinine Ratio 16.7     Anion Gap 23.0 mmol/L      eGFR 9.5 mL/min/1.73      Comment: <15 Indicative of kidney failure       Narrative:      GFR Normal >60  Chronic Kidney Disease <60  Kidney Failure <15    The GFR formula is only valid for adults with stable renal function between ages 18 and 70.    Protime-INR [[id number]]  (Abnormal) Collected: 08/12/23 0555    Specimen: Blood Updated: 08/12/23 0707     Protime 17.2 Seconds      INR 1.41    Narrative:      Therapeutic range for most indications is 2.0-3.0 INR,  or 2.5-3.5 for mechanical heart valves.    Hepatic Function Panel [[id number]]  (Abnormal) Collected: 08/12/23 0555    Specimen: Blood Updated: 08/12/23 0657     Total Protein 6.9 g/dL      Albumin 3.7 g/dL      ALT (SGPT) 238 U/L      AST (SGOT) 59 U/L      Alkaline Phosphatase 454 U/L      Total  Bilirubin 1.1 mg/dL      Bilirubin, Direct 0.7 mg/dL      Bilirubin, Indirect 0.4 mg/dL     C-reactive Protein [[id number]]  (Abnormal) Collected: 08/12/23 0555    Specimen: Blood Updated: 08/12/23 0657     C-Reactive Protein 2.12 mg/dL     CBC & Differential [[id number]]  (Abnormal) Collected: 08/12/23 0555    Specimen: Blood Updated: 08/12/23 0640    Narrative:      The following orders were created for panel order CBC & Differential.  Procedure                               Abnormality         Status                     ---------                               -----------         ------                     CBC Auto Differential[[id number]]        Abnormal            Final result                 Please view results for these tests on the individual orders.    CBC Auto Differential [[id number]]  (Abnormal) Collected: 08/12/23 0555    Specimen: Blood Updated: 08/12/23 0640     WBC 17.89 10*3/mm3      RBC 3.71 10*6/mm3      Hemoglobin 10.8 g/dL      Hematocrit 31.4 %      MCV 84.6 fL      MCH 29.1 pg      MCHC 34.4 g/dL      RDW 15.3 %      RDW-SD 46.1 fl      MPV 10.9 fL      Platelets 260 10*3/mm3      Neutrophil % 89.3 %      Lymphocyte % 1.3 %      Monocyte % 7.6 %      Eosinophil % 0.1 %      Basophil % 0.5 %      Immature Grans % 1.2 %      Neutrophils, Absolute 15.98 10*3/mm3      Lymphocytes, Absolute 0.24 10*3/mm3      Monocytes, Absolute 1.36 10*3/mm3      Eosinophils, Absolute 0.01 10*3/mm3      Basophils, Absolute 0.09 10*3/mm3      Immature Grans, Absolute 0.21 10*3/mm3      nRBC 0.1 /100 WBC     POC Glucose Once [[id number]]  (Abnormal) Collected: 08/10/23 2152    Specimen: Blood Updated: 08/12/23 0210     Glucose 198 mg/dL      Comment: : [id number] ENRIKE STEPWILLIAMIEMeter ID: [id number]       POC Glucose Once [[id number]]  (Abnormal) Collected: 08/10/23 1610    Specimen: Blood Updated: 08/12/23 0210     Glucose 163 mg/dL      Comment: RN NotifiedOperator: [id number] NAMAN Brand ID:  [id number]       Hemoglobin & Hematocrit, Blood [[id number]]  (Abnormal) Collected: 08/12/23 0056    Specimen: Blood Updated: 08/12/23 0113     Hemoglobin 11.3 g/dL      Hematocrit 33.5 %     Hemoglobin & Hematocrit, Blood [[id number]]  (Abnormal) Collected: 08/11/23 1854    Specimen: Blood Updated: 08/11/23 1908     Hemoglobin 11.0 g/dL      Hematocrit 31.5 %     POC Glucose Once [[id number]]  (Abnormal) Collected: 08/11/23 1823    Specimen: Blood Updated: 08/11/23 1845     Glucose 262 mg/dL      Comment: Sliding Scale AdminOperator: [id number] JORDON DIEHLAMeter ID: [id number]       POC Glucose Once [[id number]]  (Abnormal) Collected: 08/11/23 1256    Specimen: Blood Updated: 08/11/23 1801     Glucose 211 mg/dL      Comment: Sliding Scale AdminOperator: [id number] JORDON HOWARDOLDAMeter ID: [id number]       Hemoglobin & Hematocrit, Blood [[id number]]  (Normal) Collected: 08/11/23 1227    Specimen: Blood Updated: 08/11/23 1320     Hemoglobin 13.5 g/dL      Hematocrit 39.8 %     Protime-INR [[id number]]  (Abnormal) Collected: 08/11/23 0754    Specimen: Blood Updated: 08/11/23 0826     Protime 17.7 Seconds      INR 1.48    Narrative:      Therapeutic range for most indications is 2.0-3.0 INR,  or 2.5-3.5 for mechanical heart valves.              I reviewed the patient's new clinical results.  I reviewed the patient's new imaging results and agree with the interpretation.  I reviewed the patient's other test results and agree with the interpretation        Assessment:    Retroperitoneal bleed with J stent in place    Plan:     No change in recommendations      Ousmane Garcia MD  08/12/23  07:46 CDT

## 2023-08-12 NOTE — SIGNIFICANT NOTE
08/12/23 0800   OTHER   Discipline occupational therapist;physical therapist   Rehab Time/Intention   Session Not Performed patient unavailable for evaluation   Recommendation   PT - Next Appointment 08/13/23   Recommendation   OT - Next Appointment 08/13/23     Attempted therapy re-evaluation, per RN, patient not ready for therapy today. Will cont to follow and attempt evaluation when cleared.

## 2023-08-12 NOTE — PLAN OF CARE
Goal Outcome Evaluation:  Plan of Care Reviewed With: patient, spouse, daughter           Outcome Evaluation: OT re-eval complete, with PT, RN requests in bed only. L/R rolling with max A. Dependent x 2 to scoot towards HOB. Patient incontient of bowel, dependent for toileting, RN assisted in task. Patient with improved command following this date, slightly drowsy still. Patient with decrased UE strength, decreased activity tolerance, decreased safety in transfers, and decreased independence in ADLs. Cont OT serices. Recommend rehab based on current presentation.      Anticipated Discharge Disposition (OT): inpatient rehabilitation facility, LTCH (long-term care hospital), skilled nursing facility

## 2023-08-12 NOTE — PROGRESS NOTES
"    [facility]  [address] [place] [address]. [address]  T - [phone]  F - [phone]     Progress Note          PATIENT  DEMOGRAPHICS   PATIENT NAME: Carol Sullivan                      PHYSICIAN: COY Chong  : 1940  MRN: [id number]   LOS: 13 days    Patient Care Team:  Len Seo MD as PCP - General  FairviewBlaire ribera APRN as Nurse Practitioner (General Surgery)  Subjective   SUBJECTIVE   Pt restless       Objective   OBJECTIVE   Vital Signs  Temp:  [97.8 øF (36.6 øC)-98.2 øF (36.8 øC)] 97.9 øF (36.6 øC)  Heart Rate:  [] 92  Resp:  [15-24] 22  BP: (100-199)/() 151/88    Flowsheet Rows      Flowsheet Row First Filed Value   Admission Height 165.1 cm (65\") Documented at 2023 1900   Admission Weight 74.8 kg (165 lb) Documented at 2023 1608             I/O last 3 completed shifts:  In: 1979.3 [I.V.:260.4; Other:570; NG/GT:1041; IV Piggyback:108]  Out: 4110 [Urine:110]    PHYSICAL EXAM    Physical Exam  Vitals and nursing note reviewed.   Constitutional:       General: She is awake.      Appearance: Normal appearance. She is not ill-appearing.   HENT:      Head: Normocephalic and atraumatic.   Cardiovascular:      Rate and Rhythm: Normal rate. Rhythm irregular.      Heart sounds: Normal heart sounds. No murmur heard.    No friction rub. No gallop.   Pulmonary:      Effort: No respiratory distress.      Breath sounds: Normal breath sounds. No stridor. No wheezing, rhonchi or rales.   Abdominal:      General: Bowel sounds are normal. There is no distension.      Palpations: Abdomen is soft.      Tenderness: There is no abdominal tenderness.   Musculoskeletal:      Right lower leg: No edema.      Left lower leg: No edema.   Skin:     General: Skin is warm and dry.   Neurological:      GCS: GCS eye subscore is 2. GCS verbal subscore is 2. GCS motor subscore is 4.       RESULTS   Results Review:    Results from last 7 days   Lab Units 23  0555 " 08/11/23  0629 08/10/23  0524   SODIUM mmol/L 134* 136 133*   POTASSIUM mmol/L 3.7 3.9 4.2   CHLORIDE mmol/L 92* 92* 92*   CO2 mmol/L 19.0* 19.0* 21.0*   BUN mg/dL 74* 75* 53*   CREATININE mg/dL 4.44* 5.12* 3.74*   CALCIUM mg/dL 8.1* 7.3* 7.2*   BILIRUBIN mg/dL 1.1 0.8 1.1   ALK PHOS U/L 454* 372* 386*   ALT (SGPT) U/L 238* 270* 410*   AST (SGOT) U/L 59* 85* 192*   GLUCOSE mg/dL 441* 255* 166*         Estimated Creatinine Clearance: 9.9 mL/min (A) (by C-G formula based on SCr of 4.44 mg/dL (H)).    Results from last 7 days   Lab Units 08/09/23  0729 08/06/23  0414 08/05/23  2156   MAGNESIUM mg/dL 1.8 1.7 2.3               Results from last 7 days   Lab Units 08/12/23  0555 08/12/23  0056 08/11/23  1854 08/11/23  1227 08/11/23  0629 08/10/23  1207 08/10/23  0524 08/09/23  1130 08/09/23  0729 08/08/23  1148 08/08/23  0612   WBC 10*3/mm3 17.89*  --   --   --  14.54*  --  9.94  --  18.37*  --  21.10*   HEMOGLOBIN g/dL 10.8* 11.3* 11.0* 13.5 10.0*   < > 10.4*  10.4*   < > 11.5*  11.5*   < > 9.0*   PLATELETS 10*3/mm3 260  --   --   --  188  --  252  --  310  --  272    < > = values in this interval not displayed.         Results from last 7 days   Lab Units 08/12/23  0555 08/11/23  0754 08/10/23  0524 08/09/23  0612 08/08/23  0612   INR  1.41* 1.48* 1.55* 1.59* 2.03*           Imaging Results (Last 24 Hours)       ** No results found for the last 24 hours. **             MEDICATIONS    Insulin Aspart, 0-9 Units, Subcutaneous, TID AC  isosorbide mononitrate, 30 mg, Oral, Daily  methylPREDNISolone sodium succinate, 60 mg, Intravenous, Q6H  metoprolol tartrate, 50 mg, Nasogastric, Q12H  nystatin, , Topical, Q12H  pantoprazole, 40 mg, Intravenous, Q AM  piperacillin-tazobactam, 3.375 g, Intravenous, Q12H  rosuvastatin, 40 mg, Oral, Daily  sodium chloride, 10 mL, Intravenous, Q12H  sodium chloride, 10 mL, Intravenous, Q12H      dilTIAZem, 5-15 mg/hr, Last Rate: 5 mg/hr (08/11/23 1347)  nitroglycerin, 5-200 mcg/min, Last  Rate: 10 mcg/min (08/12/23 0751)        Assessment & Plan   ASSESSMENT / PLAN      Sepsis    Mastitis    Other hydronephrosis    1. Acute kidney injury/ Acute tubular necrosis:  - Baseline unknown.   - Creatinine was 1.5 in 10/2022.   - UA 3+ protein, trace leukocytes, 0-2 RBC, 6-12 WBC, 2+ bacteria.   - Urine sodium 26. CT abd large rt retroperitoneal hematoma causing moderate rt hydronephrosis.  - Creatinine since admission was 1.5-1.6 range and then rapidly worsening.   - Started HD due to worsening renal function  - on admit. Today 160lbs    Next hd on monday    2. Renal failure retroperitoneal bleed s/p right J stent for hydronephrosis   -s/p J stent placement by urology 8/11  -Continue to monitor renal function     3. Hypertension/ Afib with RVR:   - Blood pressure was low and now high. Off pressors. HR was high and now on Cardizem gtt and metoprolol po via NG tube. Losartan on hold.   -BP is reasonable, may be able to transition to PO diltiazem.    4. Metabolic acidosis/ confusion:   - Was on bicarb drip. Now modulate with hd    -ABG was performed due to patient's confusion and restless behavior and no significant abnormalities noted.    5. Hypocalcemia     5. Sepsis:  - zosyn at present. C.diff carrier     6. UTI:   - became septic with mild hydro Dr [place] is consulted. Now stent in place . E.fecalis    7. Cdiff:   -not on po vanc now      8. Hyponatremia:   - Sodium is stable    8. Anemia / retroperitoneal bleed:  - Hemoglobin is acceptable            This document has been electronically signed by COY Chong on August 12, 2023 10:06 CDT

## 2023-08-12 NOTE — PLAN OF CARE
Goal Outcome Evaluation:  Plan of Care Reviewed With: patient        Progress: declining  Outcome Evaluation: BP elevated, UOP decreased, PRN hydralazine, labatolol and fentanyl given, nitro gtt infusing, BP remained elevated, all needs met at this time.

## 2023-08-12 NOTE — PROGRESS NOTES
[facility] Medicine Services  INPATIENT PROGRESS NOTE    Length of Stay: 13  Date of Admission: 7/28/2023  Primary Care Physician: Len Seo MD    Subjective   Chief Complaint: Confusion  HPI:    She is s/p extubation.  Confusion still present although more alert today.    Review of Systems   Unable to perform ROS: Mental status change        All pertinent negatives and positives are as above. All other systems have been reviewed and are negative unless otherwise stated.     Objective    Temp:  [97.9 øF (36.6 øC)-98.2 øF (36.8 øC)] 97.9 øF (36.6 øC)  Heart Rate:  [] 93  Resp:  [21-24] 22  BP: (108-199)/() 172/75  Physical Exam  Vitals and nursing note reviewed.   Constitutional:       General: She is not in acute distress.     Appearance: She is well-developed. She is ill-appearing. She is not diaphoretic.   HENT:      Head: Normocephalic and atraumatic.   Cardiovascular:      Rate and Rhythm: Normal rate.   Pulmonary:      Effort: Pulmonary effort is normal. No respiratory distress.      Breath sounds: No wheezing.   Abdominal:      General: There is no distension.      Palpations: Abdomen is soft.   Musculoskeletal:         General: Normal range of motion.   Skin:     General: Skin is warm and dry.   Neurological:      Mental Status: She is alert.      Cranial Nerves: No cranial nerve deficit.   Psychiatric:      Comments: Confusion present           Results Review:  I have reviewed the labs, radiology results, and diagnostic studies.    Laboratory Data:   Lab Results (last 24 hours)       Procedure Component Value Units Date/Time    Hemoglobin & Hematocrit, Blood [[id number]]  (Abnormal) Collected: 08/12/23 1143    Specimen: Blood Updated: 08/12/23 1223     Hemoglobin 10.4 g/dL      Hematocrit 30.3 %     POC Glucose Once [[id number]]  (Abnormal) Collected: 08/11/23 2247    Specimen: Blood Updated: 08/12/23 1129     Glucose 364 mg/dL      Comment: RN  NotifiedOperator: [id number] CRISTINO GARZONELLEMeter ID: [id number]       POC Glucose Once [[id number]]  (Abnormal) Collected: 08/11/23 0616    Specimen: Blood Updated: 08/12/23 1129     Glucose 252 mg/dL      Comment: : [id number] MIRIAN AIMEEMeter ID: [id number]       Basic Metabolic Panel [[id number]]  (Abnormal) Collected: 08/12/23 0555    Specimen: Blood Updated: 08/12/23 0718     Glucose 441 mg/dL      BUN 74 mg/dL      Creatinine 4.44 mg/dL      Sodium 134 mmol/L      Potassium 3.7 mmol/L      Chloride 92 mmol/L      CO2 19.0 mmol/L      Calcium 8.1 mg/dL      BUN/Creatinine Ratio 16.7     Anion Gap 23.0 mmol/L      eGFR 9.5 mL/min/1.73      Comment: <15 Indicative of kidney failure       Narrative:      GFR Normal >60  Chronic Kidney Disease <60  Kidney Failure <15    The GFR formula is only valid for adults with stable renal function between ages 18 and 70.    Protime-INR [[id number]]  (Abnormal) Collected: 08/12/23 0555    Specimen: Blood Updated: 08/12/23 0707     Protime 17.2 Seconds      INR 1.41    Narrative:      Therapeutic range for most indications is 2.0-3.0 INR,  or 2.5-3.5 for mechanical heart valves.    Hepatic Function Panel [[id number]]  (Abnormal) Collected: 08/12/23 0555    Specimen: Blood Updated: 08/12/23 0657     Total Protein 6.9 g/dL      Albumin 3.7 g/dL      ALT (SGPT) 238 U/L      AST (SGOT) 59 U/L      Alkaline Phosphatase 454 U/L      Total Bilirubin 1.1 mg/dL      Bilirubin, Direct 0.7 mg/dL      Bilirubin, Indirect 0.4 mg/dL     C-reactive Protein [[id number]]  (Abnormal) Collected: 08/12/23 0555    Specimen: Blood Updated: 08/12/23 0657     C-Reactive Protein 2.12 mg/dL     CBC & Differential [[id number]]  (Abnormal) Collected: 08/12/23 0555    Specimen: Blood Updated: 08/12/23 0640    Narrative:      The following orders were created for panel order CBC & Differential.  Procedure                               Abnormality         Status                     ---------                                -----------         ------                     CBC Auto Differential[[id number]]        Abnormal            Final result                 Please view results for these tests on the individual orders.    CBC Auto Differential [[id number]]  (Abnormal) Collected: 08/12/23 0555    Specimen: Blood Updated: 08/12/23 0640     WBC 17.89 10*3/mm3      RBC 3.71 10*6/mm3      Hemoglobin 10.8 g/dL      Hematocrit 31.4 %      MCV 84.6 fL      MCH 29.1 pg      MCHC 34.4 g/dL      RDW 15.3 %      RDW-SD 46.1 fl      MPV 10.9 fL      Platelets 260 10*3/mm3      Neutrophil % 89.3 %      Lymphocyte % 1.3 %      Monocyte % 7.6 %      Eosinophil % 0.1 %      Basophil % 0.5 %      Immature Grans % 1.2 %      Neutrophils, Absolute 15.98 10*3/mm3      Lymphocytes, Absolute 0.24 10*3/mm3      Monocytes, Absolute 1.36 10*3/mm3      Eosinophils, Absolute 0.01 10*3/mm3      Basophils, Absolute 0.09 10*3/mm3      Immature Grans, Absolute 0.21 10*3/mm3      nRBC 0.1 /100 WBC     POC Glucose Once [[id number]]  (Abnormal) Collected: 08/10/23 2152    Specimen: Blood Updated: 08/12/23 0210     Glucose 198 mg/dL      Comment: : [id number] CALVERT STEPHANIEMeter ID: [id number]       POC Glucose Once [[id number]]  (Abnormal) Collected: 08/10/23 1610    Specimen: Blood Updated: 08/12/23 0210     Glucose 163 mg/dL      Comment: RN NotifiedOperator: [id number] NAMAN Brand ID: [id number]       Hemoglobin & Hematocrit, Blood [[id number]]  (Abnormal) Collected: 08/12/23 0056    Specimen: Blood Updated: 08/12/23 0113     Hemoglobin 11.3 g/dL      Hematocrit 33.5 %     Hemoglobin & Hematocrit, Blood [[id number]]  (Abnormal) Collected: 08/11/23 1854    Specimen: Blood Updated: 08/11/23 1908     Hemoglobin 11.0 g/dL      Hematocrit 31.5 %     POC Glucose Once [[id number]]  (Abnormal) Collected: 08/11/23 1823    Specimen: Blood Updated: 08/11/23 1845     Glucose 262 mg/dL      Comment: Sliding Scale AdminOperator: [id number]  JORDON DIEHLMay[facility] Clinic [facility])ter ID: [id number]       POC Glucose Once [[id number]]  (Abnormal) Collected: 08/11/23 1256    Specimen: Blood Updated: 08/11/23 1801     Glucose 211 mg/dL      Comment: Sliding Scale AdminOperator: [id number] JORDON Bauerter ID: [id number]       Hemoglobin & Hematocrit, Blood [[id number]]  (Normal) Collected: 08/11/23 1227    Specimen: Blood Updated: 08/11/23 1320     Hemoglobin 13.5 g/dL      Hematocrit 39.8 %             Culture Data:   No results found for: BLOODCX  No results found for: URINECX  No results found for: RESPCX  No results found for: WOUNDCX  No results found for: STOOLCX  No components found for: BODYFLD    Radiology Data:   Imaging Results (Last 24 Hours)       ** No results found for the last 24 hours. **            I have reviewed the patient's current medications.     Assessment/Plan     Active Hospital Problems    Diagnosis     **Sepsis     Mastitis     Other hydronephrosis      Acute hypoxic respiratory failure: Patient decompensated on 8/6 and required intubation.    Status post extubation, continue with supplemental oxygen, wean as tolerated.  2.  Right-sided retroperitoneal hematoma: CT surgery following.  Monitor for now.  Off anticoagulation.   3.  Shock: Off pressors.  Doing better.  Becoming hypertensive.  4.  Acute blood loss anemia: Required packed red blood cells 8/7.  Hemoglobin much better today.  Hemoccult positive.  May need GI intervention if hemoglobin drops.  5.  Acute kidney injury: Renal function continues to worsen.  Dr. Gómez following.  Hemodialysis .  Nephrology is managing, had dialysis yesterday.  6.  Shock liver: LFTs continuing to trend down.  7.  Paroxysmal atrial fibrillation: Continue to hold anticoagulation.  Rate controlled.  8.  Mastitis: Status post I&D General surgery has signed off and will see in clinic.  9.  Metabolic encephalopathy-continue with supportive care, continue to monitor.     Prognosis remains guarded.     Medical  Decision Making  Number and Complexity of problems: Multiple highly complex medical problems     Conditions and Status:        Condition is unchanged.     Discussed with: Patient's family at length.  All questions answered.     Treatment Plan  As above     Care Planning  Shared decision making: Family     Disposition  Social Determinants of Health that impact treatment or disposition: None     I expect the patient to be discharged to skilled facility versus LTAC in 5-6 days.     Chandana Schultz MD   08/12/23   13:14 CDT

## 2023-08-12 NOTE — THERAPY RE-EVALUATION
"Patient Name: Carol Sullivan  : 1940    MRN: [id number]                              Today's Date: 2023       Admit Date: 2023    Visit Dx:     ICD-10-CM ICD-9-CM   1. Mastitis  N61.0 611.0   2. Somnolence  R40.0 780.09   3. Sepsis, due to unspecified organism, unspecified whether acute organ dysfunction present  A41.9 038.9     995.91   4. Impaired functional mobility, balance, gait, and endurance [Z74.09 (ICD-10-CM)]  Z74.09 V49.89   5. Impaired mobility and ADLs [Z74.09, Z78.9 (ICD-10-CM)]  Z74.09 V49.89    Z78.9    6. Other hydronephrosis  N13.39 591   7. Acute respiratory failure with hypoxia  J96.01 518.81     Patient Active Problem List   Diagnosis    Hypertension    Hyperlipidemia    Near syncope    GERD (gastroesophageal reflux disease)    Palpitation    PVC (premature ventricular contraction)    Breast calcifications on mammogram    Paroxysmal atrial fibrillation    Peripheral vascular disease, unspecified    Type 2 diabetes mellitus with other specified complication    Long term (current) use of anticoagulants    Encounter for therapeutic drug monitoring    Sepsis    Mastitis    Other hydronephrosis     Past Medical History:   Diagnosis Date    Arthritis     Depression     GERD (gastroesophageal reflux disease)     Hyperlipidemia     Hypertension     Near syncope     Vascular disease      Past Surgical History:   Procedure Laterality Date    BLADDER SURGERY      ENDOSCOPY N/A 2019    Procedure: ESOPHAGOGASTRODUODENOSCOPY;  Surgeon: Alberto Sanchez DO;  Location: [facility] ENDOSCOPY;  Service: Gastroenterology    GALLBLADDER SURGERY      SHOULDER SURGERY      \"bone spurs\"    SUBTOTAL HYSTERECTOMY        General Information       Row Name 23 1526          OT Time and Intention    Document Type re-evaluation  -SJ     Mode of Treatment individual therapy;occupational therapy  -SJ       Row Name 23 1526          General Information    Patient Profile Reviewed yes  -SJ     " Prior Level of Function independent:;gait;transfer;bed mobility;feeding;grooming;dressing;bathing;home management;cooking;cleaning;driving;shopping  -     Existing Precautions/Restrictions fall  -       Row Name 08/12/23 1526          Cognition    Orientation Status (Cognition) oriented x 4  -       Row Name 08/12/23 1526          Safety Issues, Functional Mobility    Safety Issues Affecting Function (Mobility) ability to follow commands;awareness of need for assistance;insight into deficits/self-awareness;problem-solving;safety precaution awareness  -     Impairments Affecting Function (Mobility) strength;endurance/activity tolerance;balance;pain;cognition;range of motion (ROM)  -     Cognitive Impairments, Mobility Safety/Performance insight into deficits/self-awareness  -               User Key  (r) = Recorded By, (t) = Taken By, (c) = Cosigned By      Initials Name Provider Type    Ivelisse Osborne OT Occupational Therapist                     Mobility/ADL's       Row Name 08/12/23 1526          Bed Mobility    Bed Mobility rolling right;rolling left;scooting/bridging  -     Rolling Left [place] (Bed Mobility) maximum assist (25% patient effort)  -     Rolling Right [place] (Bed Mobility) maximum assist (25% patient effort)  -     Scooting/Bridging [place] (Bed Mobility) 2 person assist  -       Row Name 08/12/23 1526          Activities of Daily Living    BADL Assessment/Intervention toileting  -       Row Name 08/12/23 1526          Toileting Assessment/Training    [place] Level (Toileting) dependent (less than 25% patient effort)  -               User Key  (r) = Recorded By, (t) = Taken By, (c) = Cosigned By      Initials Name Provider Type    Ivelisse Osborne OT Occupational Therapist                   Obj/Interventions       Row Name 08/12/23 1526          Sensory Assessment (Somatosensory)    Sensory Assessment (Somatosensory) unable/difficult to assess   -[facility] Name 08/12/23 1526          Range of Motion Comprehensive    General Range of Motion other (see comments)  -     Comment, General Range of Motion LUE WFL; R shoulder flexion 90§, R shoulder abduction 90§, R elbow/wrist/hand WFL  -[facility] Name 08/12/23 1526          Strength Comprehensive (MMT)    General Manual Muscle Testing (MMT) Assessment other (see comments)  -     Comment, General Manual Muscle Testing (MMT) Assessment Manuel shoulder flexion/extension 3/5, manuel shoulder abduction/adduction 3/5, manuel elbow flexion 3+/5, manuel elbow extension 3/5, Manuel traci grasp 4/5  -               User Key  (r) = Recorded By, (t) = Taken By, (c) = Cosigned By      Initials Name Provider Type     Ivelisse Singh, OT Occupational Therapist                   Goals/Plan       Row Name 08/12/23 1526          Transfer Goal 1 (OT)    Activity/Assistive Device (Transfer Goal 1, OT) toilet  -     [place] Level/Cues Needed (Transfer Goal 1, OT) minimum assist (75% or more patient effort)  -SJ     Time Frame (Transfer Goal 1, OT) long term goal (LTG);by discharge  -     Progress/Outcome (Transfer Goal 1, OT) goal not met  -[facility] Name 08/12/23 1526          Bathing Goal 1 (OT)    Activity/Device (Bathing Goal 1, OT) lower body bathing  -     [place] Level/Cues Needed (Bathing Goal 1, OT) minimum assist (75% or more patient effort)  -SJ     Time Frame (Bathing Goal 1, OT) long term goal (LTG);by discharge  -     Progress/Outcomes (Bathing Goal 1, OT) goal not met  -SJ       Row Name 08/12/23 1526          Toileting Goal 1 (OT)    Activity/Device (Toileting Goal 1, OT) toileting skills, all  -SJ     [place] Level/Cues Needed (Toileting Goal 1, OT) minimum assist (75% or more patient effort)  -SJ     Time Frame (Toileting Goal 1, OT) long term goal (LTG);by discharge  -     Progress/Outcome (Toileting Goal 1, OT) goal not met  -SJ       Row Name 08/12/23 1526          Grooming Goal 1 (OT)     Activity/Device (Grooming Goal 1, OT) grooming skills, all  -     [place] (Grooming Goal 1, OT) set-up required  -     Time Frame (Grooming Goal 1, OT) long term goal (LTG);by discharge  -     Progress/Outcome (Grooming Goal 1, OT) goal not met  -       Row Name 08/12/23 1526          Therapy Assessment/Plan (OT)    Planned Therapy Interventions (OT) activity tolerance training;adaptive equipment training;BADL retraining;edema control/reduction;manual therapy/joint mobilization;IADL retraining;neuromuscular control/coordination retraining;functional balance retraining;cognitive/visual perception retraining;occupation/activity based interventions;patient/caregiver education/training;passive ROM/stretching;ROM/therapeutic exercise;transfer/mobility retraining;strengthening exercise  -               User Key  (r) = Recorded By, (t) = Taken By, (c) = Cosigned By      Initials Name Provider Type     Ivelisse Singh, OT Occupational Therapist                   Clinical Impression       Row Name 08/12/23 1526          Pain Assessment    Pretreatment Pain Rating 0/10 - no pain  -     Posttreatment Pain Rating 0/10 - no pain  -       Row Name 08/12/23 1526          Plan of Care Review    Plan of Care Reviewed With patient;spouse;daughter  -     Outcome Evaluation OT re-eval complete, with PT, RN requests in bed only. L/R rolling with max A. Dependent x 2 to scoot towards HOB. Patient incontient of bowel, dependent for toileting, RN assisted in task. Patient with improved command following this date, slightly drowsy still. Patient with decrased UE strength, decreased activity tolerance, decreased safety in transfers, and decreased independence in ADLs. Cont OT serices. Recommend rehab based on current presentation.  -       Row Name 08/12/23 1526          Therapy Assessment/Plan (OT)    Patient/Family Therapy Goal Statement (OT) return home  -     Rehab Potential (OT) fair, will monitor progress  closely  -     Criteria for Skilled Therapeutic Interventions Met (OT) yes;skilled treatment is necessary  -     Therapy Frequency (OT) other (see comments)  5-7 d/wk  -     Predicted Duration of Therapy Intervention (OT) until d/c or all goals met  -       Row Name 08/12/23 1526          Therapy Plan Review/Discharge Plan (OT)    Anticipated Discharge Disposition (OT) inpatient rehabilitation facility;[facility] ([facility]skilled nursing facility  -       Row Name 08/12/23 1526          Vital Signs    Pre Patient Position Supine  -SJ     Post Patient Position Supine  -       Row Name 08/12/23 1526          Positioning and Restraints    Pre-Treatment Position in bed  -SJ     Post Treatment Position bed  -SJ     In Bed notified nsg;supine;call light within reach;encouraged to call for assist;with family/caregiver  -               User Key  (r) = Recorded By, (t) = Taken By, (c) = Cosigned By      Initials Name Provider Type    Ivelisse Osborne OT Occupational Therapist                   Outcome Measures       Row Name 08/12/23 1526          How much help from another is currently needed...    Putting on and taking off regular lower body clothing? 1  -SJ     Bathing (including washing, rinsing, and drying) 1  -SJ     Toileting (which includes using toilet bed pan or urinal) 1  -SJ     Putting on and taking off regular upper body clothing 1  -SJ     Taking care of personal grooming (such as brushing teeth) 1  -SJ     Eating meals 1  -SJ     AM-PAC 6 Clicks Score (OT) 6  -       Row Name 08/12/23 1526          Functional Assessment    Outcome Measure Options AM-PAC 6 Clicks Daily Activity (OT)  -               User Key  (r) = Recorded By, (t) = Taken By, (c) = Cosigned By      Initials Name Provider Type    Ivelisse Osborne OT Occupational Therapist                    Occupational Therapy Education       Title: PT OT SLP Therapies (In Progress)       Topic: Occupational Therapy (In  Progress)       Point: ADL training (Done)       Description:   Instruct learner(s) on proper safety adaptation and remediation techniques during self care or transfers.   Instruct in proper use of assistive devices.                  Learning Progress Summary             Patient Acceptance, E,TB, VU,NR by  at 8/12/2023 1605    Comment: POC, role of OT    Acceptance, E,TB, NR by RW at 8/2/2023 1608    Comment: POC, Role of OT, transfer training                         Point: Home exercise program (Not Started)       Description:   Instruct learner(s) on appropriate technique for monitoring, assisting and/or progressing therapeutic exercises/activities.                  Learner Progress:  Not documented in this visit.              Point: Precautions (In Progress)       Description:   Instruct learner(s) on prescribed precautions during self-care and functional transfers.                  Learning Progress Summary             Patient Acceptance, E,TB, NR by RW at 8/2/2023 1608    Comment: POC, Role of OT, transfer training                         Point: Body mechanics (In Progress)       Description:   Instruct learner(s) on proper positioning and spine alignment during self-care, functional mobility activities and/or exercises.                  Learning Progress Summary             Patient Acceptance, E,TB, NR by  at 8/2/2023 1608    Comment: POC, Role of OT, transfer training                                         User Key       Initials Effective Dates Name Provider Type Discipline     06/14/21 -  Ivelisse Singh OT Occupational Therapist OT     09/22/22 -  Ana Vela OT Occupational Therapist OT                  OT Recommendation and Plan  Planned Therapy Interventions (OT): activity tolerance training, adaptive equipment training, BADL retraining, edema control/reduction, manual therapy/joint mobilization, IADL retraining, neuromuscular control/coordination retraining, functional balance  retraining, cognitive/visual perception retraining, occupation/activity based interventions, patient/caregiver education/training, passive ROM/stretching, ROM/therapeutic exercise, transfer/mobility retraining, strengthening exercise  Therapy Frequency (OT): other (see comments) (5-7 d/wk)  Plan of Care Review  Plan of Care Reviewed With: patient, spouse, daughter  Outcome Evaluation: OT re-eval complete, with PT, RN requests in bed only. L/R rolling with max A. Dependent x 2 to scoot towards HOB. Patient incontient of bowel, dependent for toileting, RN assisted in task. Patient with improved command following this date, slightly drowsy still. Patient with decrased UE strength, decreased activity tolerance, decreased safety in transfers, and decreased independence in ADLs. Cont OT serices. Recommend rehab based on current presentation.     Time Calculation:         Time Calculation- OT       Row Name 08/12/23 1606 08/12/23 0800          Time Calculation- OT    OT Start Time 1526  - --     OT Stop Time 1600  - --     OT Time Calculation (min) 34 min  - --     OT Received On 08/12/23  - --     OT - Next Appointment -- 08/13/23  -     OT Goal Re-Cert Due Date 08/25/23  - --        Untimed Charges    OT Eval/Re-eval Minutes 34  -SJ --        Total Minutes    Untimed Charges Total Minutes 34  - --      Total Minutes 34  -SJ --               User Key  (r) = Recorded By, (t) = Taken By, (c) = Cosigned By      Initials Name Provider Type     Ivelisse Singh OT Occupational Therapist                  Therapy Charges for Today       Code Description Service Date Service Provider Modifiers Qty    [id number]  OT RE-EVAL 2 8/12/2023 Ivelisse Singh OT GO 1                 Ivelisse Singh OT  8/12/2023

## 2023-08-13 LAB
ALBUMIN SERPL-MCNC: 3.4 G/DL (ref 3.5–5.2)
ALP SERPL-CCNC: 425 U/L (ref 39–117)
ALT SERPL W P-5'-P-CCNC: 179 U/L (ref 1–33)
ANION GAP SERPL CALCULATED.3IONS-SCNC: 24 MMOL/L (ref 5–15)
AST SERPL-CCNC: 52 U/L (ref 1–32)
BASOPHILS # BLD AUTO: 0.05 10*3/MM3 (ref 0–0.2)
BASOPHILS NFR BLD AUTO: 0.3 % (ref 0–1.5)
BILIRUB CONJ SERPL-MCNC: 0.7 MG/DL (ref 0–0.3)
BILIRUB INDIRECT SERPL-MCNC: 0.2 MG/DL
BILIRUB SERPL-MCNC: 0.9 MG/DL (ref 0–1.2)
BUN SERPL-MCNC: 121 MG/DL (ref 8–23)
BUN/CREAT SERPL: 20.2 (ref 7–25)
CALCIUM SPEC-SCNC: 8.2 MG/DL (ref 8.6–10.5)
CHLORIDE SERPL-SCNC: 93 MMOL/L (ref 98–107)
CO2 SERPL-SCNC: 20 MMOL/L (ref 22–29)
CREAT SERPL-MCNC: 5.99 MG/DL (ref 0.57–1)
CRP SERPL-MCNC: 1.37 MG/DL (ref 0–0.5)
DEPRECATED RDW RBC AUTO: 48.6 FL (ref 37–54)
EGFRCR SERPLBLD CKD-EPI 2021: 6.6 ML/MIN/1.73
EOSINOPHIL # BLD AUTO: 0 10*3/MM3 (ref 0–0.4)
EOSINOPHIL NFR BLD AUTO: 0 % (ref 0.3–6.2)
ERYTHROCYTE [DISTWIDTH] IN BLOOD BY AUTOMATED COUNT: 15.9 % (ref 12.3–15.4)
GLUCOSE BLDC GLUCOMTR-MCNC: 130 MG/DL (ref 70–130)
GLUCOSE BLDC GLUCOMTR-MCNC: 206 MG/DL (ref 70–130)
GLUCOSE BLDC GLUCOMTR-MCNC: 318 MG/DL (ref 70–130)
GLUCOSE SERPL-MCNC: 371 MG/DL (ref 65–99)
HCT VFR BLD AUTO: 31.4 % (ref 34–46.6)
HCT VFR BLD AUTO: 32.4 % (ref 34–46.6)
HCT VFR BLD AUTO: 32.7 % (ref 34–46.6)
HCT VFR BLD AUTO: 33.8 % (ref 34–46.6)
HGB BLD-MCNC: 10.7 G/DL (ref 12–15.9)
HGB BLD-MCNC: 11 G/DL (ref 12–15.9)
HGB BLD-MCNC: 11.1 G/DL (ref 12–15.9)
HGB BLD-MCNC: 11.1 G/DL (ref 12–15.9)
IMM GRANULOCYTES # BLD AUTO: 0.23 10*3/MM3 (ref 0–0.05)
IMM GRANULOCYTES NFR BLD AUTO: 1.3 % (ref 0–0.5)
INR PPP: 1.41 (ref 0.8–1.2)
LYMPHOCYTES # BLD AUTO: 0.29 10*3/MM3 (ref 0.7–3.1)
LYMPHOCYTES NFR BLD AUTO: 1.6 % (ref 19.6–45.3)
MCH RBC QN AUTO: 28.2 PG (ref 26.6–33)
MCHC RBC AUTO-ENTMCNC: 32.8 G/DL (ref 31.5–35.7)
MCV RBC AUTO: 86 FL (ref 79–97)
MONOCYTES # BLD AUTO: 1.29 10*3/MM3 (ref 0.1–0.9)
MONOCYTES NFR BLD AUTO: 7.1 % (ref 5–12)
NEUTROPHILS NFR BLD AUTO: 16.31 10*3/MM3 (ref 1.7–7)
NEUTROPHILS NFR BLD AUTO: 89.7 % (ref 42.7–76)
NRBC BLD AUTO-RTO: 0 /100 WBC (ref 0–0.2)
PLATELET # BLD AUTO: 301 10*3/MM3 (ref 140–450)
PMV BLD AUTO: 10.8 FL (ref 6–12)
POTASSIUM SERPL-SCNC: 3.3 MMOL/L (ref 3.5–5.2)
PROT SERPL-MCNC: 6.6 G/DL (ref 6–8.5)
PROTHROMBIN TIME: 17.2 SECONDS (ref 11.1–15.3)
QT INTERVAL: 336 MS
QTC INTERVAL: 464 MS
RBC # BLD AUTO: 3.93 10*6/MM3 (ref 3.77–5.28)
SODIUM SERPL-SCNC: 137 MMOL/L (ref 136–145)
WBC NRBC COR # BLD: 18.17 10*3/MM3 (ref 3.4–10.8)

## 2023-08-13 PROCEDURE — 86140 C-REACTIVE PROTEIN: CPT | Performed by: UROLOGY

## 2023-08-13 PROCEDURE — 82948 REAGENT STRIP/BLOOD GLUCOSE: CPT

## 2023-08-13 PROCEDURE — 85018 HEMOGLOBIN: CPT | Performed by: UROLOGY

## 2023-08-13 PROCEDURE — 85014 HEMATOCRIT: CPT | Performed by: UROLOGY

## 2023-08-13 PROCEDURE — 63710000001 INSULIN ASPART PER 5 UNITS: Performed by: HOSPITALIST

## 2023-08-13 PROCEDURE — 80076 HEPATIC FUNCTION PANEL: CPT | Performed by: UROLOGY

## 2023-08-13 PROCEDURE — 85025 COMPLETE CBC W/AUTO DIFF WBC: CPT | Performed by: UROLOGY

## 2023-08-13 PROCEDURE — 80048 BASIC METABOLIC PNL TOTAL CA: CPT | Performed by: UROLOGY

## 2023-08-13 PROCEDURE — 85610 PROTHROMBIN TIME: CPT | Performed by: UROLOGY

## 2023-08-13 PROCEDURE — 25010000002 METHYLPREDNISOLONE PER 125 MG: Performed by: FAMILY MEDICINE

## 2023-08-13 PROCEDURE — 25010000002 FENTANYL CITRATE (PF) 50 MCG/ML SOLUTION: Performed by: INTERNAL MEDICINE

## 2023-08-13 RX ORDER — METOPROLOL TARTRATE 100 MG/1
100 TABLET ORAL EVERY 12 HOURS SCHEDULED
Status: DISCONTINUED | OUTPATIENT
Start: 2023-08-13 | End: 2023-08-15

## 2023-08-13 RX ORDER — FENTANYL CITRATE 50 UG/ML
25 INJECTION, SOLUTION INTRAMUSCULAR; INTRAVENOUS
Status: DISCONTINUED | OUTPATIENT
Start: 2023-08-13 | End: 2023-08-14

## 2023-08-13 RX ADMIN — Medication 10 ML: at 22:06

## 2023-08-13 RX ADMIN — METOPROLOL TARTRATE 50 MG: 50 TABLET, FILM COATED ORAL at 08:37

## 2023-08-13 RX ADMIN — FENTANYL CITRATE 25 MCG: 50 INJECTION, SOLUTION INTRAMUSCULAR; INTRAVENOUS at 22:05

## 2023-08-13 RX ADMIN — Medication 10 ML: at 08:38

## 2023-08-13 RX ADMIN — METHYLPREDNISOLONE SODIUM SUCCINATE 60 MG: 125 INJECTION INTRAMUSCULAR; INTRAVENOUS at 08:37

## 2023-08-13 RX ADMIN — LISINOPRIL 10 MG: 10 TABLET ORAL at 08:36

## 2023-08-13 RX ADMIN — NYSTATIN: 100000 POWDER TOPICAL at 09:40

## 2023-08-13 RX ADMIN — SODIUM CHLORIDE 5 MG/HR: 900 INJECTION, SOLUTION INTRAVENOUS at 06:11

## 2023-08-13 RX ADMIN — METOPROLOL TARTRATE 100 MG: 100 TABLET, FILM COATED ORAL at 22:06

## 2023-08-13 RX ADMIN — NYSTATIN: 100000 POWDER TOPICAL at 22:09

## 2023-08-13 RX ADMIN — Medication 10 ML: at 02:09

## 2023-08-13 RX ADMIN — ROSUVASTATIN CALCIUM 40 MG: 20 TABLET, FILM COATED ORAL at 08:36

## 2023-08-13 RX ADMIN — PANTOPRAZOLE SODIUM 40 MG: 40 INJECTION, POWDER, FOR SOLUTION INTRAVENOUS at 06:12

## 2023-08-13 RX ADMIN — INSULIN ASPART 7 UNITS: 100 INJECTION, SOLUTION INTRAVENOUS; SUBCUTANEOUS at 11:23

## 2023-08-13 RX ADMIN — METHYLPREDNISOLONE SODIUM SUCCINATE 60 MG: 125 INJECTION INTRAMUSCULAR; INTRAVENOUS at 02:07

## 2023-08-13 RX ADMIN — INSULIN ASPART 9 UNITS: 100 INJECTION, SOLUTION INTRAVENOUS; SUBCUTANEOUS at 06:39

## 2023-08-13 RX ADMIN — Medication 10 ML: at 22:10

## 2023-08-13 NOTE — PROGRESS NOTES
"   LOS: 14 days   Patient Care Team:  Len Seo MD as PCP - General  Blaire Camara APRN as Nurse Practitioner (General Surgery)    Subjective     Subjective:  Symptoms:  (Minimal urine output).      History taken from: chart    Objective     Vital Signs  Temp:  [97.4 øF (36.3 øC)-97.9 øF (36.6 øC)] 97.8 øF (36.6 øC)  Heart Rate:  [] 102  Resp:  [21-23] 23  BP: (135-179)/() 165/76    Objective:  General Appearance:  Ill-appearing.    Vital signs: (most recent): Blood pressure 165/76, pulse 102, temperature 97.8 øF (36.6 øC), temperature source Axillary, resp. rate 23, height 165.1 cm (65\"), weight 73.1 kg (161 lb 3.2 oz), SpO2 97 %.  No fever.    Output: Minimal urine output.    Heart: Tachycardia.    Abdomen: Abdomen is soft and non-distended.    Skin:  Warm, dry and pale.              Results Review:    Lab Results (last 24 hours)       Procedure Component Value Units Date/Time    Basic Metabolic Panel [[id number]]  (Abnormal) Collected: 08/13/23 0518    Specimen: Blood Updated: 08/13/23 0601     Glucose 371 mg/dL       mg/dL      Creatinine 5.99 mg/dL      Sodium 137 mmol/L      Potassium 3.3 mmol/L      Chloride 93 mmol/L      CO2 20.0 mmol/L      Calcium 8.2 mg/dL      BUN/Creatinine Ratio 20.2     Anion Gap 24.0 mmol/L      eGFR 6.6 mL/min/1.73      Comment: <15 Indicative of kidney failure       Narrative:      GFR Normal >60  Chronic Kidney Disease <60  Kidney Failure <15    The GFR formula is only valid for adults with stable renal function between ages 18 and 70.    Hepatic Function Panel [[id number]]  (Abnormal) Collected: 08/13/23 0518    Specimen: Blood Updated: 08/13/23 0553     Total Protein 6.6 g/dL      Albumin 3.4 g/dL      ALT (SGPT) 179 U/L      AST (SGOT) 52 U/L      Alkaline Phosphatase 425 U/L      Total Bilirubin 0.9 mg/dL      Bilirubin, Direct 0.7 mg/dL      Bilirubin, Indirect 0.2 mg/dL     C-reactive Protein [[id number]]  (Abnormal) Collected: 08/13/23 0518    " Specimen: Blood Updated: 08/13/23 0553     C-Reactive Protein 1.37 mg/dL     Protime-INR [[id number]]  (Abnormal) Collected: 08/13/23 0518    Specimen: Blood Updated: 08/13/23 0551     Protime 17.2 Seconds      INR 1.41    Narrative:      Therapeutic range for most indications is 2.0-3.0 INR,  or 2.5-3.5 for mechanical heart valves.    CBC & Differential [[id number]]  (Abnormal) Collected: 08/13/23 0518    Specimen: Blood Updated: 08/13/23 0536    Narrative:      The following orders were created for panel order CBC & Differential.  Procedure                               Abnormality         Status                     ---------                               -----------         ------                     CBC Auto Differential[[id number]]        Abnormal            Final result                 Please view results for these tests on the individual orders.    CBC Auto Differential [[id number]]  (Abnormal) Collected: 08/13/23 0518    Specimen: Blood Updated: 08/13/23 0536     WBC 18.17 10*3/mm3      RBC 3.93 10*6/mm3      Hemoglobin 11.1 g/dL      Hematocrit 33.8 %      MCV 86.0 fL      MCH 28.2 pg      MCHC 32.8 g/dL      RDW 15.9 %      RDW-SD 48.6 fl      MPV 10.8 fL      Platelets 301 10*3/mm3      Neutrophil % 89.7 %      Lymphocyte % 1.6 %      Monocyte % 7.1 %      Eosinophil % 0.0 %      Basophil % 0.3 %      Immature Grans % 1.3 %      Neutrophils, Absolute 16.31 10*3/mm3      Lymphocytes, Absolute 0.29 10*3/mm3      Monocytes, Absolute 1.29 10*3/mm3      Eosinophils, Absolute 0.00 10*3/mm3      Basophils, Absolute 0.05 10*3/mm3      Immature Grans, Absolute 0.23 10*3/mm3      nRBC 0.0 /100 WBC     Hemoglobin & Hematocrit, Blood [[id number]]  (Abnormal) Collected: 08/13/23 0003    Specimen: Blood Updated: 08/13/23 0020     Hemoglobin 10.7 g/dL      Hematocrit 31.4 %     Hemoglobin & Hematocrit, Blood [[id number]]  (Abnormal) Collected: 08/12/23 1745    Specimen: Blood Updated: 08/12/23 1818     Hemoglobin 11.3 g/dL       Hematocrit 31.4 %     POC Glucose Once [[id number]]  (Abnormal) Collected: 08/12/23 1133    Specimen: Blood Updated: 08/12/23 1719     Glucose 296 mg/dL      Comment: Sliding Scale AdminOperator: [id number] JORDON DIEHLAMeter ID: [id number]       Hemoglobin & Hematocrit, Blood [[id number]]  (Abnormal) Collected: 08/12/23 1143    Specimen: Blood Updated: 08/12/23 1223     Hemoglobin 10.4 g/dL      Hematocrit 30.3 %     POC Glucose Once [[id number]]  (Abnormal) Collected: 08/11/23 2247    Specimen: Blood Updated: 08/12/23 1129     Glucose 364 mg/dL      Comment: RN NotifiedOperator: [id number] CRISTINO GARZONELLEMeter ID: [id number]       POC Glucose Once [[id number]]  (Abnormal) Collected: 08/11/23 0616    Specimen: Blood Updated: 08/12/23 1129     Glucose 252 mg/dL      Comment: : [id number] VELA AIMEEMeter ID: [id number]              Imaging Results (Last 24 Hours)       ** No results found for the last 24 hours. **             I reviewed the patient's new clinical results.  I reviewed the patient's new imaging results and agree with the interpretation.  I reviewed the patient's other test results and agree with the interpretation      Assessment & Plan       Sepsis    Mastitis    Other hydronephrosis      Assessment & Plan    Post op 8/6/23 right J-stent placement for right hydronephrosis due to retroperitoneal hematoma. Tobar in place, minimal urine output  --Estimated Creatinine Clearance: 7.4 mL/min (A) (by C-G formula based on SCr of 5.99 mg/dL (H)).     Plan:  Keep Tobar, strict I&O    COY Reynolds  08/13/23  08:02 CDT

## 2023-08-13 NOTE — PROGRESS NOTES
"    [facility]  [address] [place] [address]. [address]  T - [phone]  F - [phone]     Progress Note          PATIENT  DEMOGRAPHICS   PATIENT NAME: Carol Sullivan                      PHYSICIAN: COY Chong  : 1940  MRN: [id number]   LOS: 14 days    Patient Care Team:  Len Seo MD as PCP - General  Blaire Camara APRN as Nurse Practitioner (General Surgery)  Subjective   SUBJECTIVE   Pt resting better today       Objective   OBJECTIVE   Vital Signs  Temp:  [97.1 øF (36.2 øC)-97.9 øF (36.6 øC)] 97.1 øF (36.2 øC)  Heart Rate:  [] 95  Resp:  [21-23] 22  BP: (134-179)/() 157/74    Flowsheet Rows      Flowsheet Row First Filed Value   Admission Height 165.1 cm (65\") Documented at 2023 1900   Admission Weight 74.8 kg (165 lb) Documented at 2023 1608             I/O last 3 completed shifts:  In: 1985.3 [I.V.:393.3; Other:341; NG/GT:1143; IV Piggyback:108]  Out: 95 [Urine:95]    PHYSICAL EXAM    Physical Exam  Vitals and nursing note reviewed.   Constitutional:       General: She is awake.      Appearance: Normal appearance. She is not ill-appearing.   HENT:      Head: Normocephalic and atraumatic.   Cardiovascular:      Rate and Rhythm: Normal rate. Rhythm irregular.      Heart sounds: Normal heart sounds. No murmur heard.    No friction rub. No gallop.   Pulmonary:      Effort: No respiratory distress.      Breath sounds: Normal breath sounds. No stridor. No wheezing, rhonchi or rales.   Abdominal:      General: Bowel sounds are normal. There is no distension.      Palpations: Abdomen is soft.      Tenderness: There is no abdominal tenderness.   Musculoskeletal:      Right lower leg: No edema.      Left lower leg: No edema.   Skin:     General: Skin is warm and dry.   Neurological:      GCS: GCS eye subscore is 2. GCS verbal subscore is 2. GCS motor subscore is 4.       RESULTS   Results Review:    Results from last 7 days   Lab Units " 08/13/23  0518 08/12/23  0555 08/11/23  0629   SODIUM mmol/L 137 134* 136   POTASSIUM mmol/L 3.3* 3.7 3.9   CHLORIDE mmol/L 93* 92* 92*   CO2 mmol/L 20.0* 19.0* 19.0*   BUN mg/dL 121* 74* 75*   CREATININE mg/dL 5.99* 4.44* 5.12*   CALCIUM mg/dL 8.2* 8.1* 7.3*   BILIRUBIN mg/dL 0.9 1.1 0.8   ALK PHOS U/L 425* 454* 372*   ALT (SGPT) U/L 179* 238* 270*   AST (SGOT) U/L 52* 59* 85*   GLUCOSE mg/dL 371* 441* 255*         Estimated Creatinine Clearance: 7.4 mL/min (A) (by C-G formula based on SCr of 5.99 mg/dL (H)).    Results from last 7 days   Lab Units 08/09/23  0729   MAGNESIUM mg/dL 1.8               Results from last 7 days   Lab Units 08/13/23  0518 08/13/23  0003 08/12/23  1745 08/12/23  1143 08/12/23  0555 08/11/23  1227 08/11/23  0629 08/10/23  1207 08/10/23  0524 08/09/23  1130 08/09/23  0729   WBC 10*3/mm3 18.17*  --   --   --  17.89*  --  14.54*  --  9.94  --  18.37*   HEMOGLOBIN g/dL 11.1* 10.7* 11.3* 10.4* 10.8*   < > 10.0*   < > 10.4*  10.4*   < > 11.5*  11.5*   PLATELETS 10*3/mm3 301  --   --   --  260  --  188  --  252  --  310    < > = values in this interval not displayed.         Results from last 7 days   Lab Units 08/13/23  0518 08/12/23  0555 08/11/23  0754 08/10/23  0524 08/09/23  0612   INR  1.41* 1.41* 1.48* 1.55* 1.59*           Imaging Results (Last 24 Hours)       ** No results found for the last 24 hours. **             MEDICATIONS    Insulin Aspart, 0-9 Units, Subcutaneous, TID AC  isosorbide mononitrate, 30 mg, Oral, Daily  metoprolol tartrate, 100 mg, Nasogastric, Q12H  nystatin, , Topical, Q12H  pantoprazole, 40 mg, Intravenous, Q AM  rosuvastatin, 40 mg, Oral, Daily  sodium chloride, 10 mL, Intravenous, Q12H  sodium chloride, 10 mL, Intravenous, Q12H      dilTIAZem, 5-15 mg/hr, Last Rate: 5 mg/hr (08/13/23 0611)  nitroglycerin, 5-200 mcg/min, Last Rate: 10 mcg/min (08/12/23 0751)        Assessment & Plan   ASSESSMENT / PLAN      Sepsis    Mastitis    Other hydronephrosis    1. Acute  kidney injury/ Acute tubular necrosis:  - Baseline unknown.   - Creatinine was 1.5 in 10/2022.   - UA 3+ protein, trace leukocytes, 0-2 RBC, 6-12 WBC, 2+ bacteria.   - Urine sodium 26. CT abd large rt retroperitoneal hematoma causing moderate rt hydronephrosis.  - Creatinine since admission was 1.5-1.6 range and then rapidly worsening.   - Started HD due to worsening renal function  - on admit. Today 161lbs    Next hd on monday    2. Renal failure retroperitoneal bleed s/p right J stent for hydronephrosis   -s/p J stent placement by urology 8/11  -Continue to monitor renal function     3. Hypertension/ Afib with RVR:   - Blood pressure was low and now high. Off pressors. HR was high and now on Cardizem gtt and metoprolol po via NG tube. Losartan on hold.   -BP is reasonable, may be able to transition to PO diltiazem.    4. Metabolic acidosis:   - Was on bicarb drip. Now modulate with hd    5. Hypocalcemia     5. Sepsis:  - zosyn at present. C.diff carrier     6. UTI:   - became septic with mild hydro Dr [place] is consulted. Now stent in place . E.fecalis    7. Cdiff:   -not on po vanc now      8. Hyponatremia:   - Sodium is stable    8. Anemia / retroperitoneal bleed:  - Hemoglobin is acceptable     9.  AMS           This document has been electronically signed by COY Chong on August 13, 2023 10:09 CDT

## 2023-08-13 NOTE — SIGNIFICANT NOTE
08/13/23 1023   OTHER   Discipline speech language pathologist   Rehab Time/Intention   Session Not Performed other (see comments)  (Pt not alert for swallow evaluation. Will check in am.)

## 2023-08-13 NOTE — THERAPY EVALUATION
"Patient Name: Carol Sullivan  : 1940    MRN: [id number]                              Today's Date: 2023     PT Evaluation  Admit Date: 2023    Visit Dx:     ICD-10-CM ICD-9-CM   1. Mastitis  N61.0 611.0   2. Somnolence  R40.0 780.09   3. Sepsis, due to unspecified organism, unspecified whether acute organ dysfunction present  A41.9 038.9     995.91   4. Impaired functional mobility, balance, gait, and endurance [Z74.09 (ICD-10-CM)]  Z74.09 V49.89   5. Impaired mobility and ADLs [Z74.09, Z78.9 (ICD-10-CM)]  Z74.09 V49.89    Z78.9    6. Other hydronephrosis  N13.39 591   7. Acute respiratory failure with hypoxia  J96.01 518.81     Patient Active Problem List   Diagnosis    Hypertension    Hyperlipidemia    Near syncope    GERD (gastroesophageal reflux disease)    Palpitation    PVC (premature ventricular contraction)    Breast calcifications on mammogram    Paroxysmal atrial fibrillation    Peripheral vascular disease, unspecified    Type 2 diabetes mellitus with other specified complication    Long term (current) use of anticoagulants    Encounter for therapeutic drug monitoring    Sepsis    Mastitis    Other hydronephrosis     Past Medical History:   Diagnosis Date    Arthritis     Depression     GERD (gastroesophageal reflux disease)     Hyperlipidemia     Hypertension     Near syncope     Vascular disease      Past Surgical History:   Procedure Laterality Date    BLADDER SURGERY      ENDOSCOPY N/A 2019    Procedure: ESOPHAGOGASTRODUODENOSCOPY;  Surgeon: Alberto Sanchez DO;  Location: [facility] ENDOSCOPY;  Service: Gastroenterology    GALLBLADDER SURGERY      SHOULDER SURGERY      \"bone spurs\"    SUBTOTAL HYSTERECTOMY        General Information       Row Name 23 1525          Physical Therapy Time and Intention    Document Type re-evaluation  -GB     Mode of Treatment physical therapy  -GB       Row Name 23 1525          General Information    Patient Profile Reviewed yes  -GB  "    Existing Precautions/Restrictions fall  -GB       Row Name 08/12/23 1525          Cognition    Orientation Status (Cognition) oriented x 4  -GB       Row Name 08/12/23 1525          Safety Issues, Functional Mobility    Impairments Affecting Function (Mobility) strength;endurance/activity tolerance;balance;pain  -GB               User Key  (r) = Recorded By, (t) = Taken By, (c) = Cosigned By      Initials Name Provider Type    Chelsea Esquivel PT Physical Therapist                   Mobility       Row Name 08/12/23 1602          Bed Mobility    Bed Mobility rolling right;rolling left;scooting/bridging  -GB     All Activities, Rowe (Bed Mobility) 2 person assist;dependent (less than 25% patient effort)  -GB     Rolling Left Rowe (Bed Mobility) dependent (less than 25% patient effort);1 person assist;1 person to manage equipment  -GB     Rolling Right Rowe (Bed Mobility) dependent (less than 25% patient effort);1 person assist;1 person to manage equipment  -GB     Scooting/Bridging Rowe (Bed Mobility) 2 person assist;dependent (less than 25% patient effort)  -GB     Supine-Sit Rowe (Bed Mobility) not tested  -GB     Sit-Supine Rowe (Bed Mobility) not tested  -GB     Assistive Device (Bed Mobility) draw sheet  -GB       Row Name 08/12/23 1602          Bed-Chair Transfer    Bed-Chair Rowe (Transfers) not tested  -GB       Row Name 08/12/23 1602          Sit-Stand Transfer    Sit-Stand Rowe (Transfers) not tested  -GB       Row Name 08/12/23 1602          Gait/Stairs (Locomotion)    Rowe Level (Gait) not tested  -GB               User Key  (r) = Recorded By, (t) = Taken By, (c) = Cosigned By      Initials Name Provider Type    Chelsea Esquivel PT Physical Therapist                   Obj/Interventions       Row Name 08/12/23 1603          Range of Motion Comprehensive    General Range of Motion bilateral lower extremity  ROM WNL  -     Comment, General Range of Motion except gabriel heeld cords limited to ~10 deg from neutral L>R; tight to passive stretch and she willl recoil w/ much stretch so advised dtr  in law to give stretch to comfort;  -       Row Name 08/12/23 1603          Strength Comprehensive (MMT)    Comment, General Manual Muscle Testing (MMT) Assessment at least 3+-4-'5 hip knee flx/ext in her usual arom in bed w/ restless legs; Dtr in law reports she has RLS usually  -       Row Name 08/12/23 1603          Motor Skills    Therapeutic Exercise ankle  -       Row Name 08/12/23 1603          Ankle (Therapeutic Exercise)    Ankle (Therapeutic Exercise) other (see comments);PROM (passive range of motion)  -     Ankle PROM (Therapeutic Exercise) dorsiflexion;bilateral;5 second hold;supine;5 repetitions  -       Row Name 08/12/23 1603          Sensory Assessment (Somatosensory)    Sensory Assessment (Somatosensory) other (see comments)  -               User Key  (r) = Recorded By, (t) = Taken By, (c) = Cosigned By      Initials Name Provider Type     Chelsea Dunn, PT Physical Therapist                   Goals/Plan       Row Name 08/12/23 2012          Bed Mobility Goal 1 (PT)    Activity/Assistive Device (Bed Mobility Goal 1, PT) sit to supine/supine to sit  -     [place] Level/Cues Needed (Bed Mobility Goal 1, PT) minimum assist (75% or more patient effort)  -     Time Frame (Bed Mobility Goal 1, PT) 2 weeks  -     Progress/Outcomes (Bed Mobility Goal 1, PT) goal not met;goal revised this date  -       Row Name 08/12/23 2012          Transfer Goal 1 (PT)    Activity/Assistive Device (Transfer Goal 1, PT) sit-to-stand/stand-to-sit;bed-to-chair/chair-to-bed;walker, rolling  -     [place] Level/Cues Needed (Transfer Goal 1, PT) minimum assist (75% or more patient effort);moderate assist (50-74% patient effort)  -     Time Frame (Transfer Goal 1, PT) 30 days  -      Progress/Outcome (Transfer Goal 1, PT) goal not met;goal revised this date  -GB       Row Name 08/12/23 2012          Gait Training Goal 1 (PT)    Activity/Assistive Device (Gait Training Goal 1, PT) walker, rolling  -GB     [place] Level (Gait Training Goal 1, PT) minimum assist (75% or more patient effort)  -GB     Distance (Gait Training Goal 1, PT) 3'x3. (Limited by contact precautions.) assist of 2 w/ 1 to follow  -GB     Progress/Outcome (Gait Training Goal 1, PT) goal not met;goal revised this date  -GB       Row Name 08/12/23 2012          Stairs Goal 1 (PT)    Activity/Assistive Device (Stairs Goal 1, PT) using handrail, right  -GB     [place] Level/Cues Needed (Stairs Goal 1, PT) supervision required  -GB     Number of Stairs (Stairs Goal 1, PT) 2 steps.  -GB     Time Frame (Stairs Goal 1, PT) by discharge  -GB     Progress/Outcome (Stairs Goal 1, PT) goal not met;goal no longer appropriate  -GB       Row Name 08/12/23 2012          Problem Specific Goal 1 (PT)    Problem Specific Goal 1 (PT) Score 25/28 on Tinetti fall risk assessment.  -GB     Time Frame (Problem Specific Goal 1, PT) 30 days  -GB     Strategies/Barriers (Problem Specific Goal 1, PT) Decreased stepping response to perturbation.  -GB     Progress/Outcome (Problem Specific Goal 1, PT) goal no longer appropriate  -GB       Row Name 08/12/23 2012          Therapy Assessment/Plan (PT)    Planned Therapy Interventions (PT) balance training;bed mobility training;gait training;neuromuscular re-education;strengthening;ROM (range of motion);transfer training;home exercise program;patient/family education  -GB               User Key  (r) = Recorded By, (t) = Taken By, (c) = Cosigned By      Initials Name Provider Type    GB Chelsea Dunn, PT Physical Therapist                   Clinical Impression       Row Name 08/12/23 1531          Pain    Pretreatment Pain Rating 0/10 - no pain  -GB     Pre/Posttreatment Pain Comment hard  to determine;  -GB     Pain Intervention(s) Repositioned  -GB       Row Name 08/12/23 1531          Plan of Care Review    Plan of Care Reviewed With patient  -GB     Outcome Evaluation PT re evaluation completed w/ OT. RN requested bed mobility only today. Pt needed max A for rolling and max-dep 2 for scooting. She has RLS and moving LEs thru full AROM w/out difficulty but MMT deferred this date.  She shows some heel cord tightness gabriel, L>R so dtr in law instructed on how to give mild distraction to heels for heel cord stretch. Chair pose used at end of session w/ goal to see if she could robin it, which she did but did want to lay back down after about 15 -20 min.  VSS w/ upright and session. Pt will be followed to work toward progression of ex and upright activity. Will likely need extensive rehab to home , possible SNF rehab to home pending progress.  -GB       Row Name 08/12/23 1531          Therapy Assessment/Plan (PT)    Rehab Potential (PT) fair, will monitor progress closely  -GB     Criteria for Skilled Interventions Met (PT) yes;skilled treatment is necessary  -GB     Therapy Frequency (PT) other (see comments)  -GB       Row Name 08/12/23 1531          Vital Signs    Pre Systolic BP Rehab 139  -GB     Pre Treatment Diastolic BP 89  -GB     Intra Systolic BP Rehab 150  -GB     Intra Treatment Diastolic BP 70  -GB     Post Systolic BP Rehab 162  -GB     Post Treatment Diastolic BP 72  -GB     Pretreatment Heart Rate (beats/min) 92  -GB     Intratreatment Heart Rate (beats/min) 104  -GB     Posttreatment Heart Rate (beats/min) 98  -GB     O2 Delivery Pre Treatment nasal cannula  -GB     Intra SpO2 (%) 96  -GB     O2 Delivery Intra Treatment nasal cannula  -GB     Post SpO2 (%) 95  -GB     O2 Delivery Post Treatment nasal cannula  -GB     Pre Patient Position Supine  -GB     Intra Patient Position Side Lying  -GB     Post Patient Position Supine  -GB       Row Name 08/12/23 1531          Positioning and  Restraints    Pre-Treatment Position in bed  -GB     Post Treatment Position bed  -GB     In Bed supine;notified nsg;fowlers;call light within reach;side rails up x3;with family/caregiver;patient within staff view;heels elevated  -GB               User Key  (r) = Recorded By, (t) = Taken By, (c) = Cosigned By      Initials Name Provider Type    Chelsea Esquivel, PT Physical Therapist                   Outcome Measures       Row Name 08/12/23 2015          How much help from another person do you currently need...    Turning from your back to your side while in flat bed without using bedrails? 1  -GB     Moving from lying on back to sitting on the side of a flat bed without bedrails? 1  -GB     Moving to and from a bed to a chair (including a wheelchair)? 1  -GB     Standing up from a chair using your arms (e.g., wheelchair, bedside chair)? 1  -GB     Climbing 3-5 steps with a railing? 1  -GB     To walk in hospital room? 1  -GB     AM-PAC 6 Clicks Score (PT) 6  -GB     Highest level of mobility 2 --> Bed activities/dependent transfer  -GB       Row Name 08/12/23 2015          Tinetti Assessment    Tinetti Assessment no  -GB       Row Name 08/12/23 1526          Functional Assessment    Outcome Measure Options AM-PAC 6 Clicks Daily Activity (OT)  -SJ               User Key  (r) = Recorded By, (t) = Taken By, (c) = Cosigned By      Initials Name Provider Type    Chelsea Esquivel, PT Physical Therapist    SJ Ivelisse Singh, OT Occupational Therapist                                 Physical Therapy Education       Title: PT OT SLP Therapies (In Progress)       Topic: Physical Therapy (In Progress)       Point: Mobility training (In Progress)       Learning Progress Summary             Patient Acceptance, E, NR by ASHER at 8/3/2023 0844    Comment: PT POC, hand placement with transfers, rehab process.   Family Acceptance, E,D, VU,NR by APOORVA at 8/12/2023 2016    Comment: heel cord stretch at heel,  avoid ball of foot pressure;                         Point: Home exercise program (Done)       Learning Progress Summary             Family Acceptance, E,D, VU,NR by GB at 8/12/2023 2016    Comment: heel cord stretch at heel, avoid ball of foot pressure;                         Point: Body mechanics (Done)       Learning Progress Summary             Family Acceptance, E,D, VU,NR by GB at 8/12/2023 2016    Comment: heel cord stretch at heel, avoid ball of foot pressure;                         Point: Precautions (Done)       Learning Progress Summary             Patient Acceptance, E, VU by CZ at 7/30/2023 1423    Comment: Xaveir assessed: 19/28 or moderate fall risk, recommend FWW with gait.   Family Acceptance, E,D, VU,NR by APOORVA at 8/12/2023 2016    Comment: heel cord stretch at heel, avoid ball of foot pressure;                                         User Key       Initials Effective Dates Name Provider Type Discipline    APOORVA 06/16/21 -  Chelsea Dunn, PT Physical Therapist PT    ASHER 07/11/23 -  Dionte Kendrick, PT Physical Therapist PT                  PT Recommendation and Plan  Planned Therapy Interventions (PT): balance training, bed mobility training, gait training, neuromuscular re-education, strengthening, ROM (range of motion), transfer training, home exercise program, patient/family education  Plan of Care Reviewed With: patient  Outcome Evaluation: PT re evaluation completed w/ OT. RN requested bed mobility only today. Pt needed max A for rolling and max-dep 2 for scooting. She has RLS and moving LEs thru full AROM w/out difficulty but MMT deferred this date.  She shows some heel cord tightness gabriel, L>R so dtr in law instructed on how to give mild distraction to heels for heel cord stretch. Chair pose used at end of session w/ goal to see if she could robin it, which she did but did want to lay back down after about 15 -20 min.  VSS w/ upright and session. Pt will be followed to work toward  progression of ex and upright activity. Will likely need extensive rehab to home , possible SNF rehab to home pending progress.     Time Calculation:   PT Evaluation Complexity  History, PT Evaluation Complexity: 3 or more personal factors and/or comorbidities  Examination of Body Systems (PT Eval Complexity): total of 3 or more elements  Clinical Presentation (PT Evaluation Complexity): evolving  Clinical Decision Making (PT Evaluation Complexity): moderate complexity  Overall Complexity (PT Evaluation Complexity): moderate complexity     PT Charges       Row Name 08/12/23 1526             Time Calculation    Start Time 1525  -GB      Stop Time 1600  -GB      Time Calculation (min) 35 min  -GB      PT Received On 08/12/23  -GB      PT Goal Re-Cert Due Date 08/25/23  -GB         Untimed Charges    PT Eval/Re-eval Minutes 35  -GB         Total Minutes    Untimed Charges Total Minutes 35  -GB       Total Minutes 35  -GB                User Key  (r) = Recorded By, (t) = Taken By, (c) = Cosigned By      Initials Name Provider Type    Chelsea Esquivel, PT Physical Therapist                  Therapy Charges for Today       Code Description Service Date Service Provider Modifiers Qty    51118059534 HC PT RE-EVAL ESTABLISHED PLAN 2 8/12/2023 Chelsea Dunn, PT GP 1            PT G-Codes  Outcome Measure Options: AM-PAC 6 Clicks Daily Activity (OT)  AM-PAC 6 Clicks Score (PT): 6  AM-PAC 6 Clicks Score (OT): 6  Tinetti Total Score: 19  PT Discharge Summary  Anticipated Discharge Disposition (PT): other (see comments) (rehab to home, LTACH or SNF or both possible pending progress)    Chelsea Dunn, CHRIS  8/12/2023

## 2023-08-13 NOTE — PROGRESS NOTES
[facility] Medicine Services  INPATIENT PROGRESS NOTE    Length of Stay: 14  Date of Admission: 7/28/2023  Primary Care Physician: Len Seo MD    Subjective   (S) Admitted for sepsis due to left breast mastitis with no abscess and UTI; intubated on 8/6/23 and extubated on 8/10/23  Today we keep weaning oxygen to 3 L so far and O2 sats is 100 %;    Review of Systems   All other systems reviewed and are negative.     All pertinent negatives and positives are as above. All other systems have been reviewed and are negative unless otherwise stated.     Prior to Admission medications    Medication Sig Start Date End Date Taking? Authorizing Provider   acetaminophen (TYLENOL) 500 MG tablet Take 1 tablet by mouth As Needed.   Yes Rubi Seymour MD   DULoxetine (CYMBALTA) 60 MG capsule  1/20/20  Yes Rubi Seymour MD   furosemide (LASIX) 20 MG tablet Take 1 tablet by mouth 2 (Two) Times a Day. 6/2/23  Yes Odette Alves MD   gabapentin (NEURONTIN) 300 MG capsule Take 1 capsule by mouth 3 (Three) Times a Day. 9/26/18  Yes Rubi Seymour MD   isosorbide mononitrate (IMDUR) 30 MG 24 hr tablet Take 1 tablet by mouth Daily. 6/2/23  Yes Odette Alves MD   lansoprazole (PREVACID) 30 MG capsule Take 1 capsule by mouth Every 12 (Twelve) Hours. 6/21/21  Yes Rubi Seymour MD   losartan (COZAAR) 100 MG tablet Take 1 tablet by mouth Daily. 8/19/20  Yes Odette Alves MD   metoprolol succinate XL (TOPROL-XL) 50 MG 24 hr tablet Take 1 tablet by mouth Daily. 6/2/23 6/2/24 Yes Odette Alves MD   Mirabegron ER (MYRBETRIQ) 50 MG tablet sustained-release 24 hour 24 hr tablet Take 50 mg by mouth Daily.   Yes Rubi Seymour MD   NIFEdipine XL (PROCARDIA XL) 60 MG 24 hr tablet Take 1 tablet by mouth Daily. 6/2/23  Yes Odette Alves MD   potassium chloride (K-DUR,KLOR-CON) 20 MEQ CR tablet Take 1 tablet by mouth Daily. 6/2/23  Yes Odette Alves  MD   rosuvastatin (CRESTOR) 40 MG tablet Take 1 tablet by mouth Daily. 6/2/23  Yes Odette Alves MD   traMADol (ULTRAM) 50 MG tablet Take 1 tablet by mouth As Needed. 12/23/20  Yes Provider, MD Rubi   VENTOLIN  (90 Base) MCG/ACT inhaler  10/23/19  Yes Provider, Rubi, MD   warfarin (COUMADIN) 2.5 MG tablet Take 1 tablet nightly except on Monday take 1.5 tablets OR AS DIRECTED 5/15/23  Yes Shauna Casillas APRN       Insulin Aspart, 0-9 Units, Subcutaneous, TID AC  isosorbide mononitrate, 30 mg, Oral, Daily  lisinopril, 10 mg, Oral, Q24H  methylPREDNISolone sodium succinate, 60 mg, Intravenous, Q6H  metoprolol tartrate, 50 mg, Nasogastric, Q12H  nystatin, , Topical, Q12H  pantoprazole, 40 mg, Intravenous, Q AM  rosuvastatin, 40 mg, Oral, Daily  sodium chloride, 10 mL, Intravenous, Q12H  sodium chloride, 10 mL, Intravenous, Q12H      dilTIAZem, 5-15 mg/hr, Last Rate: 5 mg/hr (08/13/23 0611)  nitroglycerin, 5-200 mcg/min, Last Rate: 10 mcg/min (08/12/23 0751)        Objective    Temp:  [97.1 øF (36.2 øC)-97.9 øF (36.6 øC)] 97.1 øF (36.2 øC)  Heart Rate:  [] 95  Resp:  [21-23] 22  BP: (134-179)/() 157/74    Physical Exam  Vitals reviewed.   Constitutional:       Appearance: She is ill-appearing.      Comments: With a Dobbhoff on; sleepy and  at bedside   HENT:      Head: Normocephalic.      Nose: Nose normal.      Mouth/Throat:      Mouth: Mucous membranes are moist.   Eyes:      Extraocular Movements: Extraocular movements intact.   Cardiovascular:      Rate and Rhythm: Normal rate. Rhythm irregular.      Heart sounds: Normal heart sounds.   Pulmonary:      Comments: Decreased breath sounds bibasilar  Abdominal:      Palpations: Abdomen is soft.   Musculoskeletal:         General: Normal range of motion.      Cervical back: Normal range of motion.      Right lower leg: No edema.      Left lower leg: No edema.   Neurological:      General: No focal deficit present.       Mental Status: She is alert.   Psychiatric:         Behavior: Behavior normal.       Results Review:  I have reviewed the labs, radiology results, and diagnostic studies.    Laboratory Data:   Results from last 7 days   Lab Units 08/13/23  0518 08/12/23  0555 08/11/23  0629   SODIUM mmol/L 137 134* 136   POTASSIUM mmol/L 3.3* 3.7 3.9   CHLORIDE mmol/L 93* 92* 92*   CO2 mmol/L 20.0* 19.0* 19.0*   BUN mg/dL 121* 74* 75*   CREATININE mg/dL 5.99* 4.44* 5.12*   GLUCOSE mg/dL 371* 441* 255*   CALCIUM mg/dL 8.2* 8.1* 7.3*   BILIRUBIN mg/dL 0.9 1.1 0.8   ALK PHOS U/L 425* 454* 372*   ALT (SGPT) U/L 179* 238* 270*   AST (SGOT) U/L 52* 59* 85*   ANION GAP mmol/L 24.0* 23.0* 25.0*     Estimated Creatinine Clearance: 7.4 mL/min (A) (by C-G formula based on SCr of 5.99 mg/dL (H)).  Results from last 7 days   Lab Units 08/09/23  0729   MAGNESIUM mg/dL 1.8         Results from last 7 days   Lab Units 08/13/23  0518 08/13/23  0003 08/12/23  1745 08/12/23  1143 08/12/23  0555 08/11/23  1227 08/11/23  0629 08/10/23  1207 08/10/23  0524 08/09/23  1130 08/09/23  0729   WBC 10*3/mm3 18.17*  --   --   --  17.89*  --  14.54*  --  9.94  --  18.37*   HEMOGLOBIN g/dL 11.1* 10.7* 11.3* 10.4* 10.8*   < > 10.0*   < > 10.4*  10.4*   < > 11.5*  11.5*   HEMATOCRIT % 33.8* 31.4* 31.4* 30.3* 31.4*   < > 30.0*   < > 29.5*  29.5*   < > 33.6*  33.6*   PLATELETS 10*3/mm3 301  --   --   --  260  --  188  --  252  --  310    < > = values in this interval not displayed.     Results from last 7 days   Lab Units 08/13/23  0518 08/12/23  0555 08/11/23  0754 08/10/23  0524 08/09/23  0612   INR  1.41* 1.41* 1.48* 1.55* 1.59*       Culture Data:   No results found for: BLOODCX  No results found for: URINECX  No results found for: RESPCX  No results found for: WOUNDCX  No results found for: STOOLCX  No components found for: BODYFLD    Radiology Data:   Imaging Results (Last 24 Hours)       ** No results found for the last 24 hours. **            I have  reviewed the patient's current medications.     Assessment/Plan     Acute mastitis      S/p I&D by surgery; resolved    UTI by Enterococcus faecalis     Completed therapy    Sepsis/septic shock     Likely due to above     Resolved; not on vasopressors     Follow up blood cultures negative    Shock liver     Due to above; resolving    Acute respiratory failure with hypoxemia     On MV from 8/6 to 8/10/2023; weaning oxygen now; she is on 3 L; keep weaning as able     Right pleural effusion; monitor    Right sided retroperitoneal hematoma     CT surgery team following; stable; off anticoagulation    BLAINE on CKD     Aggravated by ATN likely caused by sepsis     On HD     Appreciated nephrology assessment     Right hydronephrosis     S/p right double J placement due to retroperitoneal hematoma     Appreciated urology assistance       Paroxysmal atrial fibrillation     On rate control; will wean diltiazem drip; off anticoagulation    Acute blood loss anemia     S/p one unit of PRBC on 8/7/23; monitor; stable    Altered mental status     Likely multifactorial, septic, metabolic, delirium    Hypertension crisis     Resolved; will wean nitroglycerin drip      Medical Decision Making  Number and Complexity of problems: multiple major complexes    Conditions and Status:        Condition is improving.     MDM Data  External documents reviewed: previous medical records  My EKG interpretation: n/a  My plain film interpretation: right pleural effusion     Discussed with: patient and family     Treatment Plan  See above    Care Planning  Shared decision making: her   Code status and discussions: DNR/DNI    Disposition  Social Determinants of Health that impact treatment or disposition: none  I expect the patient to be discharged: in progress; prognosis is guarded       I confirmed that the patient's Advance Care Plan is present, code status is documented, or surrogate decision maker is listed in the patient's medical record.      Paul Martinez MD

## 2023-08-13 NOTE — PLAN OF CARE
Goal Outcome Evaluation:  Plan of Care Reviewed With: patient           Outcome Evaluation: PT re evaluation completed w/ OT. RN requested bed mobility only today. Pt needed max A for rolling and max-dep 2 for scooting. She has RLS and moving LEs thru full AROM w/out difficulty but MMT deferred this date.  She shows some heel cord tightness gabriel, L>R so dtr in law instructed on how to give mild distraction to heels for heel cord stretch. Chair pose used at end of session w/ goal to see if she could robin it, which she did but did want to lay back down after about 15 -20 min.  VSS w/ upright and session. Pt will be followed to work toward progression of ex and upright activity. Will likely need extensive rehab to home , possible SNF rehab to home pending progress.      Anticipated Discharge Disposition (PT): other (see comments) (rehab to home, LTACH or SNF or both possible pending progress)

## 2023-08-13 NOTE — PLAN OF CARE
Goal Outcome Evaluation:  Plan of Care Reviewed With: patient        Progress: no change  Outcome Evaluation: V/S maintaining, UOP 75ml/shift, pt not tolerating tube feed due to continued 3+ loose mucoidal BM/shift, cadizem gtt infusing, nitro gtt infusing, pt is resting between care, all needs met at this time.

## 2023-08-14 LAB
ALBUMIN SERPL-MCNC: 3.4 G/DL (ref 3.5–5.2)
ALP SERPL-CCNC: 457 U/L (ref 39–117)
ALT SERPL W P-5'-P-CCNC: 159 U/L (ref 1–33)
ANION GAP SERPL CALCULATED.3IONS-SCNC: 27 MMOL/L (ref 5–15)
ANISOCYTOSIS BLD QL: NORMAL
AST SERPL-CCNC: 90 U/L (ref 1–32)
BASOPHILS # BLD AUTO: 0.07 10*3/MM3 (ref 0–0.2)
BASOPHILS NFR BLD AUTO: 0.3 % (ref 0–1.5)
BILIRUB CONJ SERPL-MCNC: 0.4 MG/DL (ref 0–0.3)
BILIRUB INDIRECT SERPL-MCNC: 0.3 MG/DL
BILIRUB SERPL-MCNC: 0.7 MG/DL (ref 0–1.2)
BUN SERPL-MCNC: 160 MG/DL (ref 8–23)
BUN/CREAT SERPL: 21.9 (ref 7–25)
CALCIUM SPEC-SCNC: 8.1 MG/DL (ref 8.6–10.5)
CHLORIDE SERPL-SCNC: 91 MMOL/L (ref 98–107)
CO2 SERPL-SCNC: 19 MMOL/L (ref 22–29)
CREAT SERPL-MCNC: 7.29 MG/DL (ref 0.57–1)
CRP SERPL-MCNC: 0.99 MG/DL (ref 0–0.5)
DACRYOCYTES BLD QL SMEAR: NORMAL
DEPRECATED RDW RBC AUTO: 51.5 FL (ref 37–54)
EGFRCR SERPLBLD CKD-EPI 2021: 5.2 ML/MIN/1.73
EOSINOPHIL # BLD AUTO: 0 10*3/MM3 (ref 0–0.4)
EOSINOPHIL NFR BLD AUTO: 0 % (ref 0.3–6.2)
ERYTHROCYTE [DISTWIDTH] IN BLOOD BY AUTOMATED COUNT: 16.6 % (ref 12.3–15.4)
GLUCOSE BLDC GLUCOMTR-MCNC: 184 MG/DL (ref 70–130)
GLUCOSE BLDC GLUCOMTR-MCNC: 311 MG/DL (ref 70–130)
GLUCOSE BLDC GLUCOMTR-MCNC: 349 MG/DL (ref 70–130)
GLUCOSE BLDC GLUCOMTR-MCNC: 405 MG/DL (ref 70–130)
GLUCOSE SERPL-MCNC: 363 MG/DL (ref 65–99)
HCT VFR BLD AUTO: 33 % (ref 34–46.6)
HCT VFR BLD AUTO: 33.3 % (ref 34–46.6)
HCT VFR BLD AUTO: 36.2 % (ref 34–46.6)
HCT VFR BLD AUTO: 36.8 % (ref 34–46.6)
HGB BLD-MCNC: 10.9 G/DL (ref 12–15.9)
HGB BLD-MCNC: 11.4 G/DL (ref 12–15.9)
HGB BLD-MCNC: 12 G/DL (ref 12–15.9)
HGB BLD-MCNC: 12.3 G/DL (ref 12–15.9)
HYPOCHROMIA BLD QL: NORMAL
IMM GRANULOCYTES # BLD AUTO: 0.28 10*3/MM3 (ref 0–0.05)
IMM GRANULOCYTES NFR BLD AUTO: 1.4 % (ref 0–0.5)
INR PPP: 1.3 (ref 0.8–1.2)
LYMPHOCYTES # BLD AUTO: 0.4 10*3/MM3 (ref 0.7–3.1)
LYMPHOCYTES NFR BLD AUTO: 2 % (ref 19.6–45.3)
MCH RBC QN AUTO: 29.7 PG (ref 26.6–33)
MCHC RBC AUTO-ENTMCNC: 34.2 G/DL (ref 31.5–35.7)
MCV RBC AUTO: 86.7 FL (ref 79–97)
MONOCYTES # BLD AUTO: 2.96 10*3/MM3 (ref 0.1–0.9)
MONOCYTES NFR BLD AUTO: 14.5 % (ref 5–12)
NEUTROPHILS NFR BLD AUTO: 16.69 10*3/MM3 (ref 1.7–7)
NEUTROPHILS NFR BLD AUTO: 81.8 % (ref 42.7–76)
NRBC BLD AUTO-RTO: 0 /100 WBC (ref 0–0.2)
OVALOCYTES BLD QL SMEAR: NORMAL
PLATELET # BLD AUTO: 346 10*3/MM3 (ref 140–450)
PMV BLD AUTO: 11.4 FL (ref 6–12)
POTASSIUM SERPL-SCNC: 3.9 MMOL/L (ref 3.5–5.2)
PROT SERPL-MCNC: 6.5 G/DL (ref 6–8.5)
PROTHROMBIN TIME: 16.1 SECONDS (ref 11.1–15.3)
RBC # BLD AUTO: 3.84 10*6/MM3 (ref 3.77–5.28)
SMALL PLATELETS BLD QL SMEAR: ADEQUATE
SODIUM SERPL-SCNC: 137 MMOL/L (ref 136–145)
WBC MORPH BLD: NORMAL
WBC NRBC COR # BLD: 20.4 10*3/MM3 (ref 3.4–10.8)

## 2023-08-14 PROCEDURE — 94799 UNLISTED PULMONARY SVC/PX: CPT

## 2023-08-14 PROCEDURE — 25010000002 HEPARIN (PORCINE) PER 1000 UNITS: Performed by: INTERNAL MEDICINE

## 2023-08-14 PROCEDURE — 92610 EVALUATE SWALLOWING FUNCTION: CPT | Performed by: SPEECH-LANGUAGE PATHOLOGIST

## 2023-08-14 PROCEDURE — 85014 HEMATOCRIT: CPT | Performed by: UROLOGY

## 2023-08-14 PROCEDURE — 82948 REAGENT STRIP/BLOOD GLUCOSE: CPT

## 2023-08-14 PROCEDURE — 25010000002 HYDRALAZINE PER 20 MG: Performed by: HOSPITALIST

## 2023-08-14 PROCEDURE — 97110 THERAPEUTIC EXERCISES: CPT

## 2023-08-14 PROCEDURE — 63710000001 INSULIN ASPART PER 5 UNITS: Performed by: HOSPITALIST

## 2023-08-14 PROCEDURE — 85018 HEMOGLOBIN: CPT | Performed by: UROLOGY

## 2023-08-14 PROCEDURE — 80048 BASIC METABOLIC PNL TOTAL CA: CPT | Performed by: UROLOGY

## 2023-08-14 PROCEDURE — 94761 N-INVAS EAR/PLS OXIMETRY MLT: CPT

## 2023-08-14 PROCEDURE — 25010000002 FENTANYL CITRATE (PF) 50 MCG/ML SOLUTION: Performed by: INTERNAL MEDICINE

## 2023-08-14 PROCEDURE — 85007 BL SMEAR W/DIFF WBC COUNT: CPT | Performed by: UROLOGY

## 2023-08-14 PROCEDURE — 86140 C-REACTIVE PROTEIN: CPT | Performed by: UROLOGY

## 2023-08-14 PROCEDURE — 97530 THERAPEUTIC ACTIVITIES: CPT

## 2023-08-14 PROCEDURE — 85610 PROTHROMBIN TIME: CPT | Performed by: UROLOGY

## 2023-08-14 PROCEDURE — 97535 SELF CARE MNGMENT TRAINING: CPT

## 2023-08-14 PROCEDURE — 80076 HEPATIC FUNCTION PANEL: CPT | Performed by: UROLOGY

## 2023-08-14 PROCEDURE — 85025 COMPLETE CBC W/AUTO DIFF WBC: CPT | Performed by: UROLOGY

## 2023-08-14 RX ORDER — HEPARIN SODIUM 1000 [USP'U]/ML
4000 INJECTION, SOLUTION INTRAVENOUS; SUBCUTANEOUS AS NEEDED
Status: DISCONTINUED | OUTPATIENT
Start: 2023-08-14 | End: 2023-08-17 | Stop reason: HOSPADM

## 2023-08-14 RX ORDER — ALBUMIN (HUMAN) 12.5 G/50ML
25 SOLUTION INTRAVENOUS AS NEEDED
Status: ACTIVE | OUTPATIENT
Start: 2023-08-14 | End: 2023-08-15

## 2023-08-14 RX ORDER — MANNITOL 250 MG/ML
25 INJECTION, SOLUTION INTRAVENOUS ONCE
Status: DISCONTINUED | OUTPATIENT
Start: 2023-08-14 | End: 2023-08-17 | Stop reason: HOSPADM

## 2023-08-14 RX ORDER — HYDROCODONE BITARTRATE AND ACETAMINOPHEN 5; 325 MG/1; MG/1
1 TABLET ORAL EVERY 6 HOURS PRN
Status: DISCONTINUED | OUTPATIENT
Start: 2023-08-14 | End: 2023-08-17 | Stop reason: HOSPADM

## 2023-08-14 RX ADMIN — NYSTATIN: 100000 POWDER TOPICAL at 20:36

## 2023-08-14 RX ADMIN — INSULIN ASPART 6 UNITS: 100 INJECTION, SOLUTION INTRAVENOUS; SUBCUTANEOUS at 11:31

## 2023-08-14 RX ADMIN — HYDRALAZINE HYDROCHLORIDE 10 MG: 20 INJECTION INTRAMUSCULAR; INTRAVENOUS at 02:28

## 2023-08-14 RX ADMIN — METOPROLOL TARTRATE 100 MG: 100 TABLET, FILM COATED ORAL at 20:36

## 2023-08-14 RX ADMIN — HEPARIN SODIUM 4000 UNITS: 1000 INJECTION INTRAVENOUS; SUBCUTANEOUS at 19:38

## 2023-08-14 RX ADMIN — Medication 10 ML: at 20:36

## 2023-08-14 RX ADMIN — Medication 10 ML: at 06:03

## 2023-08-14 RX ADMIN — NYSTATIN: 100000 POWDER TOPICAL at 08:34

## 2023-08-14 RX ADMIN — ACETAMINOPHEN 650 MG: 650 SOLUTION ORAL at 11:35

## 2023-08-14 RX ADMIN — Medication 10 ML: at 02:28

## 2023-08-14 RX ADMIN — FENTANYL CITRATE 25 MCG: 50 INJECTION, SOLUTION INTRAMUSCULAR; INTRAVENOUS at 01:21

## 2023-08-14 RX ADMIN — HYDROCODONE BITARTRATE AND ACETAMINOPHEN 1 TABLET: 5; 325 TABLET ORAL at 15:51

## 2023-08-14 RX ADMIN — FENTANYL CITRATE 25 MCG: 50 INJECTION, SOLUTION INTRAMUSCULAR; INTRAVENOUS at 04:39

## 2023-08-14 RX ADMIN — PANTOPRAZOLE SODIUM 40 MG: 40 INJECTION, POWDER, FOR SOLUTION INTRAVENOUS at 06:02

## 2023-08-14 RX ADMIN — FENTANYL CITRATE 25 MCG: 50 INJECTION, SOLUTION INTRAMUSCULAR; INTRAVENOUS at 08:33

## 2023-08-14 RX ADMIN — Medication 10 ML: at 08:34

## 2023-08-14 RX ADMIN — Medication 10 ML: at 04:39

## 2023-08-14 RX ADMIN — INSULIN ASPART 8 UNITS: 100 INJECTION, SOLUTION INTRAVENOUS; SUBCUTANEOUS at 07:52

## 2023-08-14 RX ADMIN — METOPROLOL TARTRATE 100 MG: 100 TABLET, FILM COATED ORAL at 08:33

## 2023-08-14 RX ADMIN — Medication 10 ML: at 08:35

## 2023-08-14 NOTE — PLAN OF CARE
Goal Outcome Evaluation:  Plan of Care Reviewed With: caregiver, patient        Progress: improving  Outcome Evaluation: Nutrition F/U:  Pt extubated but EN continues at goal rate with good tolerance.  Will continue to monitor POC and tube feeding tolerance/regimen as well as pt being started on a po diet.

## 2023-08-14 NOTE — THERAPY TREATMENT NOTE
"Acute Care - Physical Therapy Treatment Note  [facility]     Patient Name: Carol Sullivan  : 1940  MRN: [id number]  Today's Date: 2023      Visit Dx:     ICD-10-CM ICD-9-CM   1. Mastitis  N61.0 611.0   2. Somnolence  R40.0 780.09   3. Sepsis, due to unspecified organism, unspecified whether acute organ dysfunction present  A41.9 038.9     995.91   4. Impaired functional mobility, balance, gait, and endurance [Z74.09 (ICD-10-CM)]  Z74.09 V49.89   5. Impaired mobility and ADLs [Z74.09, Z78.9 (ICD-10-CM)]  Z74.09 V49.89    Z78.9    6. Other hydronephrosis  N13.39 591   7. Acute respiratory failure with hypoxia  J96.01 518.81   8. Oral phase dysphagia [R13.11 (ICD-10-CM)]  R13.11 787.21     Patient Active Problem List   Diagnosis    Hypertension    Hyperlipidemia    Near syncope    GERD (gastroesophageal reflux disease)    Palpitation    PVC (premature ventricular contraction)    Breast calcifications on mammogram    Paroxysmal atrial fibrillation    Peripheral vascular disease, unspecified    Type 2 diabetes mellitus with other specified complication    Long term (current) use of anticoagulants    Encounter for therapeutic drug monitoring    Sepsis    Mastitis    Other hydronephrosis     Past Medical History:   Diagnosis Date    Arthritis     Depression     GERD (gastroesophageal reflux disease)     Hyperlipidemia     Hypertension     Near syncope     Vascular disease      Past Surgical History:   Procedure Laterality Date    BLADDER SURGERY      ENDOSCOPY N/A 2019    Procedure: ESOPHAGOGASTRODUODENOSCOPY;  Surgeon: Alberto Sanchez DO;  Location: [facility] ENDOSCOPY;  Service: Gastroenterology    GALLBLADDER SURGERY      SHOULDER SURGERY      \"bone spurs\"    SUBTOTAL HYSTERECTOMY       PT Assessment (last 12 hours)       PT Evaluation and Treatment       Row Name 23 1024          Physical Therapy Time and Intention    Subjective Information complains of;fatigue;weakness;pain  -LN     " Document Type therapy note (daily note)  -LN     Mode of Treatment physical therapy  -LN       Row Name 08/14/23 1024          General Information    Patient Profile Reviewed yes  -LN     Existing Precautions/Restrictions fall  -LN       Row Name 08/14/23 1024          Pain    Posttreatment Pain Rating 5/10  -LN     Pain Location - abdomen  -LN     Pain Intervention(s) Repositioned  meds not due  -LN       Row Name 08/14/23 1024          Cognition    Orientation Status (Cognition) oriented x 4  -LN       Row Name 08/14/23 1024          Safety Issues, Functional Mobility    Impairments Affecting Function (Mobility) strength;endurance/activity tolerance;balance;pain  -LN       Row Name 08/14/23 1024          Motor Skills    Therapeutic Exercise hip;knee;ankle  -LN       Row Name 08/14/23 1024          Hip (Therapeutic Exercise)    Hip (Therapeutic Exercise) AAROM (active assistive range of motion);isometric exercises  -LN     Hip AAROM (Therapeutic Exercise) flexion  -LN     Hip Isometrics (Therapeutic Exercise) bilateral;aDduction  -LN       Row Name 08/14/23 1024          Knee (Therapeutic Exercise)    Knee (Therapeutic Exercise) AAROM (active assistive range of motion)  -LN     Knee AAROM (Therapeutic Exercise) bilateral;flexion;extension  -LN       Row Name 08/14/23 1024          Ankle (Therapeutic Exercise)    Ankle (Therapeutic Exercise) AAROM (active assistive range of motion)  -LN     Ankle AAROM (Therapeutic Exercise) bilateral;dorsiflexion;plantarflexion  -LN       Row Name             Wound 07/28/23 1945 Bilateral midline sternal MASD (Moisture associated skin damage)    Wound - Properties Group Placement Date: 07/28/23  -CC (r)  AW (c) Placement Time: 1945 -CC (r)  AW (c) Present on Hospital Admission: Y  -CC (r)  AW (c) Side: Bilateral  -CC (r)  AW (c) Orientation: midline  -CC (r)  AW (c) Location: sternal  -CC (r)  AW (c) Primary Wound Type: MASD  -CC (r)  AW (c)    Retired Wound - Properties Group  Placement Date: 07/28/23  -CC (r)  AW (c) Placement Time: 1945  -CC (r)  AW (c) Present on Hospital Admission: Y  -CC (r)  AW (c) Side: Bilateral  -CC (r)  AW (c) Orientation: midline  -CC (r)  AW (c) Location: sternal  -CC (r)  AW (c) Primary Wound Type: MASD  -CC (r)  AW (c)    Retired Wound - Properties Group Date first assessed: 07/28/23  -CC (r)  AW (c) Time first assessed: 1945  -CC (r)  AW (c) Present on Hospital Admission: Y  -CC (r)  AW (c) Side: Bilateral  -CC (r)  AW (c) Location: sternal  -CC (r)  AW (c) Primary Wound Type: MASD  -CC (r)  AW (c)      Row Name             Wound 08/01/23 1241 Left breast Incision    Wound - Properties Group Placement Date: 08/01/23  -CP Placement Time: 1241  -CP Present on Hospital Admission: Y  -CP Side: Left  -CP Location: breast  -CP Primary Wound Type: Incision  -CP Additional Comments: packed with 1/4 inch iodoform, 4x4's, silk tape  -CP    Retired Wound - Properties Group Placement Date: 08/01/23  -CP Placement Time: 1241  -CP Present on Hospital Admission: Y  -CP Side: Left  -CP Location: breast  -CP Primary Wound Type: Incision  -CP Additional Comments: packed with 1/4 inch iodoform, 4x4's, silk tape  -CP    Retired Wound - Properties Group Date first assessed: 08/01/23  -CP Time first assessed: 1241  -CP Present on Hospital Admission: Y  -CP Side: Left  -CP Location: breast  -CP Primary Wound Type: Incision  -CP Additional Comments: packed with 1/4 inch iodoform, 4x4's, silk tape  -CP      Row Name             Wound 08/13/23 2000 Bilateral posterior anus MASD (Moisture associated skin damage)    Wound - Properties Group Placement Date: 08/13/23  -ML Placement Time: 2000  -ML Side: Bilateral  -ML Orientation: posterior  -ML Location: anus  -ML Primary Wound Type: MASD  -ML Additional Comments: severe anal excoriation. multiple liquid BMs daily  -ML    Retired Wound - Properties Group Placement Date: 08/13/23  -ML Placement Time: 2000  -ML Side: Bilateral  -ML  Orientation: posterior  -ML Location: anus  -ML Primary Wound Type: MASD  -ML Additional Comments: severe anal excoriation. multiple liquid BMs daily  -ML    Retired Wound - Properties Group Date first assessed: 08/13/23  -ML Time first assessed: 2000  -ML Side: Bilateral  -ML Location: anus  -ML Primary Wound Type: MASD  -ML Additional Comments: severe anal excoriation. multiple liquid BMs daily  -ML      Row Name 08/14/23 1024          Plan of Care Review    Plan of Care Reviewed With patient  -LN     Outcome Evaluation pt up in chair;pt performed aarom to gabriel le's with min encouragement-difficult to understand speech at times  -LN       Row Name 08/14/23 1024          Vital Signs    Pre Systolic BP Rehab 173  -LN     Pre Treatment Diastolic BP 93  -LN     Post Systolic BP Rehab 171  -LN     Post Treatment Diastolic BP 77  -LN     Pretreatment Heart Rate (beats/min) 102  -LN     Posttreatment Heart Rate (beats/min) 85  -LN     Pre SpO2 (%) 92  -LN     O2 Delivery Pre Treatment supplemental O2  -LN     Post SpO2 (%) 95  -LN     Pre Patient Position Sitting  -LN     Intra Patient Position Sitting  -LN     Post Patient Position Sitting  -LN       Row Name 08/14/23 1024          Positioning and Restraints    In Chair notified nsg;reclined;call light within reach;encouraged to call for assist;with family/caregiver;legs elevated  -LN               User Key  (r) = Recorded By, (t) = Taken By, (c) = Cosigned By      Initials Name Provider Type    LN Fanta Herbert, PTA Physical Therapist Assistant    Shadi Alvarado, RN Registered Nurse    Jennie Tsang, PCT Technician    Krys Pope RN Registered Nurse    Trish Summers, RN Registered Nurse                    Physical Therapy Education       Title: PT OT SLP Therapies (In Progress)       Topic: Physical Therapy (In Progress)       Point: Mobility training (In Progress)       Learning Progress Summary             Patient Acceptance, E,  NR by  at 8/3/2023 0844    Comment: PT POC, hand placement with transfers, rehab process.   Family Acceptance, E,D, VU,NR by  at 8/12/2023 2016    Comment: heel cord stretch at heel, avoid ball of foot pressure;                         Point: Home exercise program (Done)       Learning Progress Summary             Family Acceptance, E,D, VU,NR by  at 8/12/2023 2016    Comment: heel cord stretch at heel, avoid ball of foot pressure;                         Point: Body mechanics (Done)       Learning Progress Summary             Family Acceptance, E,D, VU,NR by  at 8/12/2023 2016    Comment: heel cord stretch at heel, avoid ball of foot pressure;                         Point: Precautions (Done)       Learning Progress Summary             Patient Acceptance, E, VU by  at 7/30/2023 1423    Comment: Nicolásetti assessed: 19/28 or moderate fall risk, recommend FWW with gait.   Family Acceptance, E,D, VU,NR by  at 8/12/2023 2016    Comment: heel cord stretch at heel, avoid ball of foot pressure;                                         User Key       Initials Effective Dates Name Provider Type Discipline     06/16/21 -  Chelsea Dunn, PT Physical Therapist PT     07/11/23 -  Dionte Kendrick, PT Physical Therapist PT                  PT Recommendation and Plan     Plan of Care Reviewed With: patient  Outcome Evaluation: pt up in chair;pt performed aarom to gabriel le's with min encouragement-difficult to understand speech at times       Time Calculation:    PT Charges       Row Name 08/14/23 1256             Time Calculation    Start Time 1024  -LN      Stop Time 1052  -LN      Time Calculation (min) 28 min  -LN      PT Received On 08/14/23  -LN         Time Calculation- PT    Total Timed Code Minutes- PT 28 minute(s)  -LN                User Key  (r) = Recorded By, (t) = Taken By, (c) = Cosigned By      Initials Name Provider Type    Fanta Crystal, PTA Physical Therapist Assistant                   Therapy Charges for Today       Code Description Service Date Service Provider Modifiers Qty    47595585039  PT THERAPEUTIC ACT EA 15 MIN 8/14/2023 Fanta Herbert, PTA GP 1    [id number]  PT THER PROC EA 15 MIN 8/14/2023 Fanta Herbert, AUDREY GP 1            PT G-Codes  Outcome Measure Options: AM-PAC 6 Clicks Daily Activity (OT)  AM-PAC 6 Clicks Score (PT): 10  AM-PAC 6 Clicks Score (OT): 6  Tinetti Total Score: 19    Fanta Herbert PTA  8/14/2023

## 2023-08-14 NOTE — PLAN OF CARE
Goal Outcome Evaluation:  Plan of Care Reviewed With: patient        Progress: improving  Outcome Evaluation: Pt tolerated tx well this date. Pt was max A with rolling R/L. Pt was max A with sup-sit. Pt sat EOB x 15 mins with LOB. Pt was max A x 2 with sit-stand-sit. Pt was max A x 2 with bed-toilet and toilet-chair t/f. Pt was dep with toileting. Pt was dep with LB dressing. No goals met this tx. Continue OT POC.      Anticipated Discharge Disposition (OT): inpatient rehabilitation facility, LTCH (long-term care hospital), skilled nursing facility

## 2023-08-14 NOTE — PLAN OF CARE
Goal Outcome Evaluation:  Plan of Care Reviewed With: patient           Outcome Evaluation: pt up in chair;pt performed aarom to gabriel zambrano with min encouragement-difficult to understand speech at times

## 2023-08-14 NOTE — PROGRESS NOTES
"    [facility]  [address] [place] [address]. [address]  T - [phone]  F - [phone]     Progress Note          PATIENT  DEMOGRAPHICS   PATIENT NAME: Carol Sullivan                      PHYSICIAN: Stephanie Gómez MD  : 1940  MRN: [id number]   LOS: 15 days    Patient Care Team:  Len Seo MD as PCP - General  Blaire Camara APRN as Nurse Practitioner (General Surgery)  Subjective   SUBJECTIVE   Speech is evaluating her. Out of bed to chair       Objective   OBJECTIVE   Vital Signs  Temp:  [97.2 øF (36.2 øC)-97.8 øF (36.6 øC)] 97.4 øF (36.3 øC)  Heart Rate:  [] 89  Resp:  [20] 20  BP: (111-198)/(55-88) 150/71    Flowsheet Rows      Flowsheet Row First Filed Value   Admission Height 165.1 cm (65\") Documented at 2023 1900   Admission Weight 74.8 kg (165 lb) Documented at 2023 1608             I/O last 3 completed shifts:  In: 1586.9 [I.V.:94.9; Other:326; NG/GT:1166]  Out: 150 [Urine:150]    PHYSICAL EXAM    Physical Exam  Vitals and nursing note reviewed.   Constitutional:       General: She is awake.      Appearance: Normal appearance. She is not ill-appearing.   HENT:      Head: Normocephalic and atraumatic.   Cardiovascular:      Rate and Rhythm: Normal rate. Rhythm irregular.      Heart sounds: Normal heart sounds. No murmur heard.    No friction rub. No gallop.   Pulmonary:      Effort: No respiratory distress.      Breath sounds: Normal breath sounds. No stridor. No wheezing, rhonchi or rales.   Abdominal:      General: Bowel sounds are normal. There is no distension.      Palpations: Abdomen is soft.      Tenderness: There is no abdominal tenderness.   Musculoskeletal:      Right lower leg: No edema.      Left lower leg: No edema.   Skin:     General: Skin is warm and dry.   Neurological:      GCS: GCS eye subscore is 2. GCS verbal subscore is 2. GCS motor subscore is 4.       RESULTS   Results Review:    Results from last 7 days   Lab Units " 08/14/23  0533 08/13/23  0518 08/12/23  0555   SODIUM mmol/L 137 137 134*   POTASSIUM mmol/L 3.9 3.3* 3.7   CHLORIDE mmol/L 91* 93* 92*   CO2 mmol/L 19.0* 20.0* 19.0*   BUN mg/dL 160* 121* 74*   CREATININE mg/dL 7.29* 5.99* 4.44*   CALCIUM mg/dL 8.1* 8.2* 8.1*   BILIRUBIN mg/dL 0.7 0.9 1.1   ALK PHOS U/L 457* 425* 454*   ALT (SGPT) U/L 159* 179* 238*   AST (SGOT) U/L 90* 52* 59*   GLUCOSE mg/dL 363* 371* 441*         Estimated Creatinine Clearance: 6.1 mL/min (A) (by C-G formula based on SCr of 7.29 mg/dL (H)).    Results from last 7 days   Lab Units 08/09/23  0729   MAGNESIUM mg/dL 1.8               Results from last 7 days   Lab Units 08/14/23  0533 08/14/23  0131 08/13/23  1810 08/13/23  1212 08/13/23  0518 08/12/23  1143 08/12/23  0555 08/11/23  1227 08/11/23  0629 08/10/23  1207 08/10/23  0524   WBC 10*3/mm3 20.40*  --   --   --  18.17*  --  17.89*  --  14.54*  --  9.94   HEMOGLOBIN g/dL 11.4* 10.9* 11.1* 11.0* 11.1*   < > 10.8*   < > 10.0*   < > 10.4*  10.4*   PLATELETS 10*3/mm3 346  --   --   --  301  --  260  --  188  --  252    < > = values in this interval not displayed.         Results from last 7 days   Lab Units 08/14/23  0533 08/13/23  0518 08/12/23  0555 08/11/23  0754 08/10/23  0524   INR  1.30* 1.41* 1.41* 1.48* 1.55*           Imaging Results (Last 24 Hours)       ** No results found for the last 24 hours. **             MEDICATIONS    Insulin Aspart, 0-9 Units, Subcutaneous, TID AC  isosorbide mononitrate, 30 mg, Oral, Daily  metoprolol tartrate, 100 mg, Nasogastric, Q12H  nystatin, , Topical, Q12H  pantoprazole, 40 mg, Intravenous, Q AM  rosuvastatin, 40 mg, Oral, Daily  sodium chloride, 10 mL, Intravenous, Q12H  sodium chloride, 10 mL, Intravenous, Q12H      dilTIAZem, 5-15 mg/hr, Last Rate: Stopped (08/13/23 1005)  nitroglycerin, 5-200 mcg/min, Last Rate: Stopped (08/13/23 1005)        Assessment & Plan   ASSESSMENT / PLAN      Sepsis    Mastitis    Other hydronephrosis    1. Acute kidney  injury/ Acute tubular necrosis:  - Baseline unknown.   - Creatinine was 1.5 in 10/2022.   - UA 3+ protein, trace leukocytes, 0-2 RBC, 6-12 WBC, 2+ bacteria.   - Urine sodium 26. CT abd large rt retroperitoneal hematoma causing moderate rt hydronephrosis.  - Creatinine since admission was 1.5-1.6 range and then rapidly worsening.   - Started HD due to worsening renal function    Hd today. Marked uremia and elevated cr. Hd today with mannitol. May need hd tomorrow as well. Likely need permcath     2. Renal failure retroperitoneal bleed s/p right J stent for hydronephrosis   -s/p J stent placement by urology 8/11  -Continue to monitor renal function     3. Hypertension/ Afib with RVR:   - Blood pressure was low and now high. Off pressors. HR was high and now on Cardizem gtt and metoprolol po via NG tube. Losartan on hold.   -BP is reasonable, may be able to transition to PO diltiazem.    4. Metabolic acidosis:   - Was on bicarb drip. Now modulate with hd    5. Hypocalcemia     5. Sepsis:  - zosyn at present. C.diff carrier     6. UTI:   - became septic with mild hydro Dr Brodhead is consulted. Now stent in place . E.fecalis    7. Cdiff:   -not on po vanc now      8. Hyponatremia:   - Sodium is stable    8. Anemia / retroperitoneal bleed:  - Hemoglobin is acceptable     9.  AMS           This document has been electronically signed by Stephanie Gómez MD on August 14, 2023 10:58 CDT

## 2023-08-14 NOTE — PROGRESS NOTES
[facility] Medicine Services  INPATIENT PROGRESS NOTE    Length of Stay: 15  Date of Admission: 7/28/2023  Primary Care Physician: Len Seo MD    Subjective   (S) Admitted for sepsis due to left breast mastitis with no abscess and UTI; intubated on 8/6/23 and extubated on 8/10/23  Today she is up, participating with PT/OT; took some steps     Review of Systems   All other systems reviewed and are negative.     All pertinent negatives and positives are as above. All other systems have been reviewed and are negative unless otherwise stated.     Prior to Admission medications    Medication Sig Start Date End Date Taking? Authorizing Provider   acetaminophen (TYLENOL) 500 MG tablet Take 1 tablet by mouth As Needed.   Yes Rubi Seymour MD   DULoxetine (CYMBALTA) 60 MG capsule  1/20/20  Yes Rubi Seymour MD   furosemide (LASIX) 20 MG tablet Take 1 tablet by mouth 2 (Two) Times a Day. 6/2/23  Yes Odette Alves MD   gabapentin (NEURONTIN) 300 MG capsule Take 1 capsule by mouth 3 (Three) Times a Day. 9/26/18  Yes Rubi Seymour MD   isosorbide mononitrate (IMDUR) 30 MG 24 hr tablet Take 1 tablet by mouth Daily. 6/2/23  Yes Odette Alves MD   lansoprazole (PREVACID) 30 MG capsule Take 1 capsule by mouth Every 12 (Twelve) Hours. 6/21/21  Yes Rubi Seymour MD   losartan (COZAAR) 100 MG tablet Take 1 tablet by mouth Daily. 8/19/20  Yes Odette Alves MD   metoprolol succinate XL (TOPROL-XL) 50 MG 24 hr tablet Take 1 tablet by mouth Daily. 6/2/23 6/2/24 Yes Odette Alves MD   Mirabegron ER (MYRBETRIQ) 50 MG tablet sustained-release 24 hour 24 hr tablet Take 50 mg by mouth Daily.   Yes Rubi Seymour MD   NIFEdipine XL (PROCARDIA XL) 60 MG 24 hr tablet Take 1 tablet by mouth Daily. 6/2/23  Yes Odette Alves MD   potassium chloride (K-DUR,KLOR-CON) 20 MEQ CR tablet Take 1 tablet by mouth Daily. 6/2/23  Yes Odette Alves MD    rosuvastatin (CRESTOR) 40 MG tablet Take 1 tablet by mouth Daily. 6/2/23  Yes Odette Alves MD   traMADol (ULTRAM) 50 MG tablet Take 1 tablet by mouth As Needed. 12/23/20  Yes Provider, MD Rubi   VENTOLIN  (90 Base) MCG/ACT inhaler  10/23/19  Yes ProviderRubi MD   warfarin (COUMADIN) 2.5 MG tablet Take 1 tablet nightly except on Monday take 1.5 tablets OR AS DIRECTED 5/15/23  Yes Shauna Casillas APRN       Insulin Aspart, 0-9 Units, Subcutaneous, TID AC  isosorbide mononitrate, 30 mg, Oral, Daily  metoprolol tartrate, 100 mg, Nasogastric, Q12H  nystatin, , Topical, Q12H  pantoprazole, 40 mg, Intravenous, Q AM  rosuvastatin, 40 mg, Oral, Daily  sodium chloride, 10 mL, Intravenous, Q12H  sodium chloride, 10 mL, Intravenous, Q12H      dilTIAZem, 5-15 mg/hr, Last Rate: Stopped (08/13/23 1005)  nitroglycerin, 5-200 mcg/min, Last Rate: Stopped (08/13/23 1005)        Objective    Temp:  [97.2 øF (36.2 øC)-97.8 øF (36.6 øC)] 97.4 øF (36.3 øC)  Heart Rate:  [] 91  Resp:  [20] 20  BP: (106-187)/(53-88) 148/71    Physical Exam  Vitals reviewed.   Constitutional:       Comments: With a Dobbhoff on; better appearance   HENT:      Head: Normocephalic.      Nose: Nose normal.      Mouth/Throat:      Mouth: Mucous membranes are moist.   Eyes:      Extraocular Movements: Extraocular movements intact.   Cardiovascular:      Rate and Rhythm: Normal rate. Rhythm irregular.      Heart sounds: Normal heart sounds.   Pulmonary:      Comments: Decreased breath sounds bibasilar  Abdominal:      Palpations: Abdomen is soft.   Musculoskeletal:         General: Normal range of motion.      Cervical back: Normal range of motion.      Right lower leg: No edema.      Left lower leg: No edema.   Neurological:      General: No focal deficit present.      Mental Status: She is alert.   Psychiatric:         Behavior: Behavior normal.       Results Review:  I have reviewed the labs, radiology results, and  diagnostic studies.    Laboratory Data:   Results from last 7 days   Lab Units 08/14/23  0533 08/13/23  0518 08/12/23  0555   SODIUM mmol/L 137 137 134*   POTASSIUM mmol/L 3.9 3.3* 3.7   CHLORIDE mmol/L 91* 93* 92*   CO2 mmol/L 19.0* 20.0* 19.0*   BUN mg/dL 160* 121* 74*   CREATININE mg/dL 7.29* 5.99* 4.44*   GLUCOSE mg/dL 363* 371* 441*   CALCIUM mg/dL 8.1* 8.2* 8.1*   BILIRUBIN mg/dL 0.7 0.9 1.1   ALK PHOS U/L 457* 425* 454*   ALT (SGPT) U/L 159* 179* 238*   AST (SGOT) U/L 90* 52* 59*   ANION GAP mmol/L 27.0* 24.0* 23.0*       Estimated Creatinine Clearance: 6.1 mL/min (A) (by C-G formula based on SCr of 7.29 mg/dL (H)).  Results from last 7 days   Lab Units 08/09/23  0729   MAGNESIUM mg/dL 1.8           Results from last 7 days   Lab Units 08/14/23  0533 08/14/23  0131 08/13/23  1810 08/13/23  1212 08/13/23  0518 08/12/23  1143 08/12/23  0555 08/11/23  1227 08/11/23  0629 08/10/23  1207 08/10/23  0524   WBC 10*3/mm3 20.40*  --   --   --  18.17*  --  17.89*  --  14.54*  --  9.94   HEMOGLOBIN g/dL 11.4* 10.9* 11.1* 11.0* 11.1*   < > 10.8*   < > 10.0*   < > 10.4*  10.4*   HEMATOCRIT % 33.3* 33.0* 32.4* 32.7* 33.8*   < > 31.4*   < > 30.0*   < > 29.5*  29.5*   PLATELETS 10*3/mm3 346  --   --   --  301  --  260  --  188  --  252    < > = values in this interval not displayed.       Results from last 7 days   Lab Units 08/14/23  0533 08/13/23  0518 08/12/23  0555 08/11/23  0754 08/10/23  0524   INR  1.30* 1.41* 1.41* 1.48* 1.55*         Culture Data:   No results found for: BLOODCX  No results found for: URINECX  No results found for: RESPCX  No results found for: WOUNDCX  No results found for: STOOLCX  No components found for: BODYFLD    Radiology Data:   Imaging Results (Last 24 Hours)       ** No results found for the last 24 hours. **            I have reviewed the patient's current medications.     Assessment/Plan     Acute mastitis      S/p I&D by surgery; resolved; scar looks clean; no tender to  palpation    UTI by Enterococcus faecalis     Completed therapy    Sepsis/septic shock     Likely due to above     Resolved; not on vasopressors     Follow up blood cultures negative    Shock liver     Due to above; resolving    Acute respiratory failure with hypoxemia     On MV from 8/6 to 8/10/2023; weaning oxygen now; she is on 2 L now; keep weaning as able     Right pleural effusion; monitor    Right sided retroperitoneal hematoma     CT surgery team following; stable; off anticoagulation    BLAINE on CKD     Aggravated by ATN likely caused by sepsis     Will follow up with HD today      Appreciated nephrology assessment     Right hydronephrosis     s/p right double J placement due to retroperitoneal hematoma     Appreciated urology assistance       Paroxysmal atrial fibrillation     On rate control; on rate control; off anticoagulation    Acute blood loss anemia     S/p one unit of PRBC on 8/7/23; monitor; stable    Altered mental status     Likely multifactorial, septic, metabolic, delirium    Hypertension crisis     Resolved; will wean nitroglycerin drip      Medical Decision Making  Number and Complexity of problems: multiple major complexes    Conditions and Status:        Condition is improving.     MDM Data  External documents reviewed: previous medical records  My EKG interpretation: n/a  My plain film interpretation: right pleural effusion     Discussed with: patient and family     Treatment Plan  See above    Care Planning  Shared decision making: her   Code status and discussions: DNR/DNI    Disposition  Social Determinants of Health that impact treatment or disposition: none  I expect the patient to be discharged: in progress; prognosis is guarded       I confirmed that the patient's Advance Care Plan is present, code status is documented, or surrogate decision maker is listed in the patient's medical record.     Paul Martinez MD

## 2023-08-14 NOTE — PLAN OF CARE
Problem: Fall Injury Risk  Goal: Absence of Fall and Fall-Related Injury  Outcome: Ongoing, Progressing  Intervention: Identify and Manage Contributors  Recent Flowsheet Documentation  Taken 8/14/2023 1500 by Nai Hays RN  Medication Review/Management: medications reviewed  Taken 8/14/2023 1400 by Nai Hays RN  Medication Review/Management: medications reviewed  Taken 8/14/2023 1315 by Nai Hays RN  Medication Review/Management: medications reviewed  Taken 8/14/2023 1200 by Nai Hays RN  Medication Review/Management: medications reviewed  Taken 8/14/2023 1100 by Nai Hays RN  Medication Review/Management: medications reviewed  Taken 8/14/2023 1000 by Nai Hays RN  Medication Review/Management: medications reviewed  Taken 8/14/2023 0900 by Nai Hays RN  Medication Review/Management: medications reviewed  Taken 8/14/2023 0731 by Nai Hays RN  Medication Review/Management: medications reviewed  Intervention: Promote Injury-Free Environment  Recent Flowsheet Documentation  Taken 8/14/2023 1700 by Nai Hays RN  Safety Promotion/Fall Prevention: (pt off unit) other (see comments)  Taken 8/14/2023 1600 by Nai Hays RN  Safety Promotion/Fall Prevention: (pt off unit) other (see comments)  Taken 8/14/2023 1500 by Nai Hays RN  Safety Promotion/Fall Prevention: safety round/check completed  Taken 8/14/2023 1400 by Nai Hays RN  Safety Promotion/Fall Prevention: safety round/check completed  Taken 8/14/2023 1315 by Nai Hays RN  Safety Promotion/Fall Prevention: safety round/check completed  Taken 8/14/2023 1200 by Nai Hays RN  Safety Promotion/Fall Prevention: safety round/check completed  Taken 8/14/2023 1100 by Nai Hays RN  Safety Promotion/Fall Prevention: safety round/check completed  Taken 8/14/2023 1000 by Nai Hays RN  Safety Promotion/Fall Prevention: safety round/check completed  Taken 8/14/2023 0900 by Nai Hays RN  Safety Promotion/Fall Prevention: safety round/check completed  Taken 8/14/2023  0800 by Nai Hays RN  Safety Promotion/Fall Prevention: safety round/check completed  Taken 8/14/2023 0731 by Nai Hays RN  Safety Promotion/Fall Prevention: safety round/check completed  Goal: Absence of Fall and Fall-Related Injury  Outcome: Ongoing, Progressing  Intervention: Identify and Manage Contributors  Recent Flowsheet Documentation  Taken 8/14/2023 1500 by Nai Hays RN  Medication Review/Management: medications reviewed  Taken 8/14/2023 1400 by Nai Hays RN  Medication Review/Management: medications reviewed  Taken 8/14/2023 1315 by Nai Hays RN  Medication Review/Management: medications reviewed  Taken 8/14/2023 1200 by Nai Hays RN  Medication Review/Management: medications reviewed  Taken 8/14/2023 1100 by Nai Hays RN  Medication Review/Management: medications reviewed  Taken 8/14/2023 1000 by Nai Hays RN  Medication Review/Management: medications reviewed  Taken 8/14/2023 0900 by Nai Hays RN  Medication Review/Management: medications reviewed  Taken 8/14/2023 0731 by Nai Hays RN  Medication Review/Management: medications reviewed  Intervention: Promote Injury-Free Environment  Recent Flowsheet Documentation  Taken 8/14/2023 1700 by Nai Hays RN  Safety Promotion/Fall Prevention: (pt off unit) other (see comments)  Taken 8/14/2023 1600 by Nai Hays RN  Safety Promotion/Fall Prevention: (pt off unit) other (see comments)  Taken 8/14/2023 1500 by Nai Hays RN  Safety Promotion/Fall Prevention: safety round/check completed  Taken 8/14/2023 1400 by Nai Hays RN  Safety Promotion/Fall Prevention: safety round/check completed  Taken 8/14/2023 1315 by Nai Hays RN  Safety Promotion/Fall Prevention: safety round/check completed  Taken 8/14/2023 1200 by Nai Hays RN  Safety Promotion/Fall Prevention: safety round/check completed  Taken 8/14/2023 1100 by Nai Hays RN  Safety Promotion/Fall Prevention: safety round/check completed  Taken 8/14/2023 1000 by Nai Hays RN  Safety Promotion/Fall  Prevention: safety round/check completed  Taken 8/14/2023 0900 by Nai Hays RN  Safety Promotion/Fall Prevention: safety round/check completed  Taken 8/14/2023 0800 by Nai Hays RN  Safety Promotion/Fall Prevention: safety round/check completed  Taken 8/14/2023 0731 by Nai Hays RN  Safety Promotion/Fall Prevention: safety round/check completed     Problem: Adjustment to Illness (Sepsis/Septic Shock)  Goal: Optimal Coping  Outcome: Ongoing, Progressing  Intervention: Optimize Psychosocial Adjustment to Illness  Recent Flowsheet Documentation  Taken 8/14/2023 1400 by Nai Hays RN  Supportive Measures: relaxation techniques promoted  Family/Support System Care: support provided  Taken 8/14/2023 0731 by Nai Hays RN  Supportive Measures:   active listening utilized   relaxation techniques promoted  Family/Support System Care: support provided     Problem: Bleeding (Sepsis/Septic Shock)  Goal: Absence of Bleeding  Outcome: Ongoing, Progressing  Intervention: Monitor and Manage Bleeding  Recent Flowsheet Documentation  Taken 8/14/2023 0731 by Nai Hays RN  Bleeding Precautions: blood pressure closely monitored     Problem: Glycemic Control Impaired (Sepsis/Septic Shock)  Goal: Blood Glucose Level Within Desired Range  Outcome: Ongoing, Progressing  Intervention: Optimize Glycemic Control  Recent Flowsheet Documentation  Taken 8/14/2023 1400 by Nai Hays RN  Glycemic Management: blood glucose monitored  Taken 8/14/2023 0731 by Nai Hays RN  Glycemic Management: blood glucose monitored     Problem: Infection Progression (Sepsis/Septic Shock)  Goal: Absence of Infection Signs and Symptoms  Outcome: Ongoing, Progressing  Intervention: Initiate Sepsis Management  Recent Flowsheet Documentation  Taken 8/14/2023 0731 by aNi Hays RN  Infection Prevention:   visitors restricted/screened   single patient room provided   rest/sleep promoted   personal protective equipment utilized   hand hygiene promoted  Isolation  Precautions:   contact   spore   precautions maintained  Intervention: Promote Recovery  Recent Flowsheet Documentation  Taken 8/14/2023 1700 by Nai Hays RN  Activity Management: (pt off unit) other (see comments)  Taken 8/14/2023 1600 by Nai Hays RN  Activity Management: (pt off unit) other (see comments)  Taken 8/14/2023 1500 by Nai Hays RN  Activity Management: bedrest  Taken 8/14/2023 1400 by Nai Hays RN  Activity Management: bedrest  Taken 8/14/2023 1315 by Nai Hays RN  Activity Management:   ambulated in room   up in chair  Taken 8/14/2023 1200 by Nai Hays RN  Activity Management: up in chair  Taken 8/14/2023 1100 by Nai Hays RN  Activity Management: up in chair  Taken 8/14/2023 1000 by Nai Hays RN  Activity Management: up in chair  Taken 8/14/2023 0900 by Nai Hays RN  Activity Management: up in chair  Taken 8/14/2023 0830 by Nai Hays RN  Activity Management:   ambulated in room   up to bedside commode   up in chair  Taken 8/14/2023 0800 by Nai Hays RN  Activity Management: bedrest  Taken 8/14/2023 0731 by Nai Hays RN  Activity Management: bedrest  Sleep/Rest Enhancement:   awakenings minimized   consistent schedule promoted     Problem: Nutrition Impaired (Sepsis/Septic Shock)  Goal: Optimal Nutrition Intake  Outcome: Ongoing, Progressing     Problem: Skin Injury Risk Increased  Goal: Skin Health and Integrity  Outcome: Ongoing, Progressing  Intervention: Optimize Skin Protection  Recent Flowsheet Documentation  Taken 8/14/2023 1500 by Nai Hays RN  Head of Bed (HOB) Positioning: HOB at 30 degrees  Taken 8/14/2023 1400 by Nai Hays RN  Pressure Reduction Techniques:   weight shift assistance provided   pressure points protected   positioned off wounds   heels elevated off bed  Head of Bed (HOB) Positioning: HOB at 30 degrees  Pressure Reduction Devices:   specialty bed utilized   pressure-redistributing mattress utilized   positioning supports utilized   heel offloading device  utilized  Skin Protection:   tubing/devices free from skin contact   transparent dressing maintained   skin-to-skin areas padded   skin-to-device areas padded   skin sealant/moisture barrier applied  Taken 8/14/2023 1315 by Nai Hays RN  Head of Bed (HOB) Positioning: HOB at 30 degrees  Taken 8/14/2023 1200 by Nai Hays RN  Head of Bed (HOB) Positioning: HOB at 30 degrees  Taken 8/14/2023 1100 by Nai Hays RN  Head of Bed (HOB) Positioning: HOB at 90 degrees  Taken 8/14/2023 1000 by Nai Hays RN  Head of Bed (HOB) Positioning: HOB at 90 degrees  Taken 8/14/2023 0900 by Nai Hays RN  Head of Bed (HOB) Positioning: HOB at 90 degrees  Taken 8/14/2023 0830 by Nai Hays RN  Head of Bed (HOB) Positioning: HOB at 90 degrees  Taken 8/14/2023 0800 by Nai Hays RN  Head of Bed (HOB) Positioning: HOB at 30 degrees  Taken 8/14/2023 0731 by Nai Hays RN  Pressure Reduction Techniques:   weight shift assistance provided   pressure points protected   positioned off wounds   heels elevated off bed  Head of Bed (HOB) Positioning: HOB at 30 degrees  Pressure Reduction Devices:   specialty bed utilized   pressure-redistributing mattress utilized   positioning supports utilized   heel offloading device utilized   foam padding utilized  Skin Protection:   tubing/devices free from skin contact   transparent dressing maintained   skin-to-skin areas padded   skin-to-device areas padded   skin sealant/moisture barrier applied     Problem: Adult Inpatient Plan of Care  Goal: Plan of Care Review  Outcome: Ongoing, Progressing  Flowsheets (Taken 8/14/2023 1734)  Progress: improving  Plan of Care Reviewed With:   patient   family  Outcome Evaluation: VSS, more alert and answering questions more appropriatley this shift. Passed swallow eval but due to poor appetitie cortrak remained in this shift to ensure nutrition. Several loose bowel movement this shift. Pt currently in dialysis.  Goal: Patient-Specific Goal (Individualized)  Outcome:  Ongoing, Progressing  Goal: Absence of Hospital-Acquired Illness or Injury  Outcome: Ongoing, Progressing  Intervention: Identify and Manage Fall Risk  Recent Flowsheet Documentation  Taken 8/14/2023 1700 by Nai Hays RN  Safety Promotion/Fall Prevention: (pt off unit) other (see comments)  Taken 8/14/2023 1600 by Nai Hays RN  Safety Promotion/Fall Prevention: (pt off unit) other (see comments)  Taken 8/14/2023 1500 by Nai Hays RN  Safety Promotion/Fall Prevention: safety round/check completed  Taken 8/14/2023 1400 by Nai Hays RN  Safety Promotion/Fall Prevention: safety round/check completed  Taken 8/14/2023 1315 by Nai Hays RN  Safety Promotion/Fall Prevention: safety round/check completed  Taken 8/14/2023 1200 by Nai Hays RN  Safety Promotion/Fall Prevention: safety round/check completed  Taken 8/14/2023 1100 by Nai Hays RN  Safety Promotion/Fall Prevention: safety round/check completed  Taken 8/14/2023 1000 by Nai Hays RN  Safety Promotion/Fall Prevention: safety round/check completed  Taken 8/14/2023 0900 by Nai Hays RN  Safety Promotion/Fall Prevention: safety round/check completed  Taken 8/14/2023 0800 by Nai Hays RN  Safety Promotion/Fall Prevention: safety round/check completed  Taken 8/14/2023 0731 by Nai Hays RN  Safety Promotion/Fall Prevention: safety round/check completed  Intervention: Prevent Skin Injury  Recent Flowsheet Documentation  Taken 8/14/2023 1700 by Nai Hays RN  Body Position: (pt off unit) other (see comments)  Taken 8/14/2023 1600 by Nai Hays RN  Body Position: (pt off unuit) other (see comments)  Taken 8/14/2023 1500 by Nai Hays RN  Body Position:   turned   side-lying   right  Taken 8/14/2023 1400 by Nai Hays RN  Body Position: supine  Skin Protection:   tubing/devices free from skin contact   transparent dressing maintained   skin-to-skin areas padded   skin-to-device areas padded   skin sealant/moisture barrier applied  Taken 8/14/2023 1315 by Nai Hays  RN  Body Position:   turned   supine  Taken 8/14/2023 1200 by Nai Hays RN  Body Position: weight shifting  Taken 8/14/2023 1100 by Nai Hays RN  Body Position: weight shifting  Taken 8/14/2023 1000 by Nai Hays RN  Body Position: weight shifting  Taken 8/14/2023 0900 by Nai Hays RN  Body Position: weight shifting  Taken 8/14/2023 0830 by Nai Hays RN  Body Position: weight shifting  Taken 8/14/2023 0800 by Nai Hays RN  Body Position:   side-lying   left  Taken 8/14/2023 0731 by Nai Hays RN  Body Position:   turned   side-lying   left  Skin Protection:   tubing/devices free from skin contact   transparent dressing maintained   skin-to-skin areas padded   skin-to-device areas padded   skin sealant/moisture barrier applied  Intervention: Prevent and Manage VTE (Venous Thromboembolism) Risk  Recent Flowsheet Documentation  Taken 8/14/2023 1700 by Nai Hays RN  Activity Management: (pt off unit) other (see comments)  Taken 8/14/2023 1600 by Nai Hays RN  Activity Management: (pt off unit) other (see comments)  Taken 8/14/2023 1500 by Nai Hays RN  Activity Management: bedrest  Taken 8/14/2023 1400 by Nai Hays RN  Activity Management: bedrest  VTE Prevention/Management:   bilateral   sequential compression devices on  Range of Motion: active ROM (range of motion) encouraged  Taken 8/14/2023 1315 by Nai Hays RN  Activity Management:   ambulated in room   up in chair  Taken 8/14/2023 1200 by Nai Hays RN  Activity Management: up in chair  Taken 8/14/2023 1100 by Nai Hays RN  Activity Management: up in chair  Taken 8/14/2023 1000 by Nai Hays RN  Activity Management: up in chair  Taken 8/14/2023 0900 by Nai Hays RN  Activity Management: up in chair  Taken 8/14/2023 0830 by Nia Hays RN  Activity Management:   ambulated in room   up to bedside commode   up in chair  Taken 8/14/2023 0800 by Nai Hays RN  Activity Management: bedrest  Taken 8/14/2023 0731 by Nai Hays RN  Activity Management:  bedrest  VTE Prevention/Management:   bilateral   sequential compression devices on  Range of Motion: active ROM (range of motion) encouraged  Intervention: Prevent Infection  Recent Flowsheet Documentation  Taken 8/14/2023 0731 by Nai Hays RN  Infection Prevention:   visitors restricted/screened   single patient room provided   rest/sleep promoted   personal protective equipment utilized   hand hygiene promoted  Goal: Optimal Comfort and Wellbeing  Outcome: Ongoing, Progressing  Intervention: Monitor Pain and Promote Comfort  Recent Flowsheet Documentation  Taken 8/14/2023 1551 by Nai Hays RN  Pain Management Interventions: see MAR  Taken 8/14/2023 1400 by Nai Hays RN  Pain Management Interventions: position adjusted  Taken 8/14/2023 0830 by Nai Hays RN  Pain Management Interventions: see MAR  Intervention: Provide Person-Centered Care  Recent Flowsheet Documentation  Taken 8/14/2023 1400 by Nai Hays RN  Trust Relationship/Rapport:   choices provided   care explained  Taken 8/14/2023 0731 by Nai Hays RN  Trust Relationship/Rapport:   care explained   reassurance provided  Goal: Readiness for Transition of Care  Outcome: Ongoing, Progressing     Problem: Skin or Soft Tissue Infection  Goal: Absence of Infection Signs and Symptoms  Outcome: Ongoing, Progressing  Intervention: Minimize and Manage Infection Progression  Recent Flowsheet Documentation  Taken 8/14/2023 0731 by Nai Hays RN  Infection Prevention:   visitors restricted/screened   single patient room provided   rest/sleep promoted   personal protective equipment utilized   hand hygiene promoted  Isolation Precautions:   contact   spore   precautions maintained     Problem: Pain Acute  Goal: Acceptable Pain Control and Functional Ability  Outcome: Ongoing, Progressing  Intervention: Prevent or Manage Pain  Recent Flowsheet Documentation  Taken 8/14/2023 1500 by Nai Hays RN  Medication Review/Management: medications reviewed  Taken 8/14/2023  1400 by Lis, Nai, RN  Sensory Stimulation Regulation:   auditory stimulation provided   television on  Medication Review/Management: medications reviewed  Taken 8/14/2023 1315 by Nai Hays RN  Medication Review/Management: medications reviewed  Taken 8/14/2023 1200 by Nai Hays RN  Medication Review/Management: medications reviewed  Taken 8/14/2023 1100 by Nai Hays RN  Medication Review/Management: medications reviewed  Taken 8/14/2023 1000 by Nai Hays RN  Medication Review/Management: medications reviewed  Taken 8/14/2023 0900 by Nai Hays RN  Medication Review/Management: medications reviewed  Taken 8/14/2023 0731 by Nai Hays RN  Sensory Stimulation Regulation:   auditory stimulation minimized   television on  Sleep/Rest Enhancement:   awakenings minimized   consistent schedule promoted  Medication Review/Management: medications reviewed  Intervention: Develop Pain Management Plan  Recent Flowsheet Documentation  Taken 8/14/2023 1551 by Nai Hays RN  Pain Management Interventions: see MAR  Taken 8/14/2023 1400 by Nai Hays RN  Pain Management Interventions: position adjusted  Taken 8/14/2023 0830 by Nai Hays RN  Pain Management Interventions: see MAR  Intervention: Optimize Psychosocial Wellbeing  Recent Flowsheet Documentation  Taken 8/14/2023 1400 by Nai Hays RN  Supportive Measures: relaxation techniques promoted  Diversional Activities: television  Taken 8/14/2023 0731 by Nai Hays RN  Supportive Measures:   active listening utilized   relaxation techniques promoted  Diversional Activities: television     Problem: Device-Related Complication Risk (Hemodialysis)  Goal: Safe, Effective Therapy Delivery  Outcome: Ongoing, Progressing  Intervention: Optimize Device Care and Function  Recent Flowsheet Documentation  Taken 8/14/2023 1500 by Nai Hays RN  Medication Review/Management: medications reviewed  Taken 8/14/2023 1400 by Nai Hays RN  Medication Review/Management: medications  reviewed  Taken 8/14/2023 1315 by Nai Hays RN  Medication Review/Management: medications reviewed  Taken 8/14/2023 1200 by Nai Hays RN  Medication Review/Management: medications reviewed  Taken 8/14/2023 1100 by Nai Hays RN  Medication Review/Management: medications reviewed  Taken 8/14/2023 1000 by Nai Hays RN  Medication Review/Management: medications reviewed  Taken 8/14/2023 0900 by Nai Hays RN  Medication Review/Management: medications reviewed  Taken 8/14/2023 0731 by Nai Hays RN  Medication Review/Management: medications reviewed  Goal: Safe, Effective Therapy Delivery  Outcome: Ongoing, Progressing  Intervention: Optimize Device Care and Function  Recent Flowsheet Documentation  Taken 8/14/2023 1500 by Nai Hays RN  Medication Review/Management: medications reviewed  Taken 8/14/2023 1400 by Nai Hays RN  Medication Review/Management: medications reviewed  Taken 8/14/2023 1315 by Nai Hays RN  Medication Review/Management: medications reviewed  Taken 8/14/2023 1200 by Nai Hays RN  Medication Review/Management: medications reviewed  Taken 8/14/2023 1100 by Nai Hays RN  Medication Review/Management: medications reviewed  Taken 8/14/2023 1000 by Nai Hays RN  Medication Review/Management: medications reviewed  Taken 8/14/2023 0900 by Nai Hays RN  Medication Review/Management: medications reviewed  Taken 8/14/2023 0731 by Nai Hays RN  Medication Review/Management: medications reviewed     Problem: Hemodynamic Instability (Hemodialysis)  Goal: Effective Tissue Perfusion  Outcome: Ongoing, Progressing  Goal: Effective Tissue Perfusion  Outcome: Ongoing, Progressing     Problem: Infection (Hemodialysis)  Goal: Absence of Infection Signs and Symptoms  Outcome: Ongoing, Progressing  Goal: Absence of Infection Signs and Symptoms  Outcome: Ongoing, Progressing     Problem: Dysrhythmia  Goal: Normalized Cardiac Rhythm  Outcome: Ongoing, Progressing  Intervention: Monitor and Manage Cardiac Rhythm  Effect  Recent Flowsheet Documentation  Taken 8/14/2023 1400 by Nai Hays, RN  VTE Prevention/Management:   bilateral   sequential compression devices on  Taken 8/14/2023 0731 by Nai Hays, RN  VTE Prevention/Management:   bilateral   sequential compression devices on   Goal Outcome Evaluation:  Plan of Care Reviewed With: patient, family        Progress: improving  Outcome Evaluation: VSS, more alert and answering questions more appropriatley this shift. Passed swallow eval but due to poor appetitie cortrak remained in this shift to ensure nutrition. Several loose bowel movement this shift. Pt currently in dialysis.

## 2023-08-14 NOTE — PROGRESS NOTES
Adult Nutrition  Assessment/PES    Patient Name:  Carol Sullivan  YOB: 1940  MRN: [id number]  Admit Date:  7/28/2023    Assessment Date:  8/14/2023    Comments: Nutrition F/U:      Pt sitting up in chair.  She said that she is feeling better.  Per staff SLP to evaluate pt today.  She is tolerating tube feeding well    Labs:  Gluc 371/318/311/363; Bun 160; Creat 7.29; ; Alb 3.4;   Meds:  SSI; Protonix; Cardizem;    Prescription:  Novasource Renal at 35cc/hr     Nutrition Evaluation: Currently tube meeting meeting and exceeding calorie and protein needs.    Nutrition Assessment:  Pt extubated and tolerating tube feeding at goal rate of 35cc/hr.  SLP to evaluate pt.  Dialysis continues with low UOP and renal labs significantly elevated.  Glucose also significantly elevated.  She continues to be managed for BLAINE/Acute tubular necrosis; Renal Failure; Retroperitoneal bleed--s/p Right J Stent for hydronephrosis; HTN; Sepsis due to acute Mastitis--s/p I&D; UTI; Shock liver; and AFib RVR.    Nutrition Intervention:  Will monitor POC and tube feeding regimen/tolerance.    Will monitor po diet to be started    Last BM:  8/14    Edema:  none noted                           Problem/Interventions:   Problem 1       Row Name 08/14/23 1549          Nutrition Diagnoses Problem 1    Problem 1 Increased Nutrient Needs     Macronutrient Protein     Micronutrient Vitamin;Mineral     Etiology (related to) Medical Diagnosis     Infectious Disease Sepsis;UTI     Other Breast Mastitis --POD #2 I&D     Other Comment Increased metabolic demands                    Problem 2       Row Name 08/14/23 1549          Nutrition Diagnoses Problem 2    Problem 2 Inadequate Intake/Infusion     Inadequate Intake Type Oral;Enteral     Macronutrient Kcal;Protein     Micronutrient Vitamin;Mineral     Etiology (related to) Factors Affecting Nutrition     Infectious Disease Sepsis;UTI     Pulmonary/Critical Care Acute respiratory  failure;Extubated     Reported GI Symptoms --  on Abx     Signs/Symptoms (evidenced by) NPO;EN Intake Delivery     Percent (%) of EN goal 100 %                    Problem 3       Row Name 08/14/23 1549          Nutrition Diagnoses Problem 3    Problem 3 Altered Nutrition Related to Labs     Etiology (related to) Medical Diagnosis     Hematological Acute blood loss;Anemia     Hepatic Shock liver     Metabolic/ICU Elevated LFTs     Renal BLAINE;ARF;Hemodialysis     Signs/Symptoms (evidenced by) Biochemical     Specific Labs Noted BUN;Creatinine;ALT                               Electronically signed by:  Zohreh Jeter RD  08/14/23 15:51 CDT

## 2023-08-14 NOTE — THERAPY EVALUATION
"Acute Care - Speech Language Pathology   Swallow Initial Evaluation [facility]     Patient Name: Carol Sullivan  : 1940  MRN: [id number]  Today's Date: 2023               Admit Date: 2023    Visit Dx:     ICD-10-CM ICD-9-CM   1. Mastitis  N61.0 611.0   2. Somnolence  R40.0 780.09   3. Sepsis, due to unspecified organism, unspecified whether acute organ dysfunction present  A41.9 038.9     995.91   4. Impaired functional mobility, balance, gait, and endurance [Z74.09 (ICD-10-CM)]  Z74.09 V49.89   5. Impaired mobility and ADLs [Z74.09, Z78.9 (ICD-10-CM)]  Z74.09 V49.89    Z78.9    6. Other hydronephrosis  N13.39 591   7. Acute respiratory failure with hypoxia  J96.01 518.81   8. Oral phase dysphagia [R13.11 (ICD-10-CM)]  R13.11 787.21     Patient Active Problem List   Diagnosis    Hypertension    Hyperlipidemia    Near syncope    GERD (gastroesophageal reflux disease)    Palpitation    PVC (premature ventricular contraction)    Breast calcifications on mammogram    Paroxysmal atrial fibrillation    Peripheral vascular disease, unspecified    Type 2 diabetes mellitus with other specified complication    Long term (current) use of anticoagulants    Encounter for therapeutic drug monitoring    Sepsis    Mastitis    Other hydronephrosis     Past Medical History:   Diagnosis Date    Arthritis     Depression     GERD (gastroesophageal reflux disease)     Hyperlipidemia     Hypertension     Near syncope     Vascular disease      Past Surgical History:   Procedure Laterality Date    BLADDER SURGERY      ENDOSCOPY N/A 2019    Procedure: ESOPHAGOGASTRODUODENOSCOPY;  Surgeon: Alberto Sanchez DO;  Location: [facility] ENDOSCOPY;  Service: Gastroenterology    GALLBLADDER SURGERY      SHOULDER SURGERY      \"bone spurs\"    SUBTOTAL HYSTERECTOMY         SLP Recommendation and Plan  SLP Swallowing Diagnosis: mild, oral dysphagia (23 1054)  SLP Diet Recommendation: mechanical ground textures, thin " liquids (08/14/23 1054)                 Swallow Criteria for Skilled Therapeutic Interventions Met: demonstrates skilled criteria (08/14/23 1054)     Rehab Potential/Prognosis, Swallowing: good, to achieve stated therapy goals (08/14/23 1054)  Therapy Frequency (Swallow): 2 days per week, 3 days per week (08/14/23 1054)  Predicted Duration Therapy Intervention (Days): 1 week (08/14/23 1054)                                              Plan of Care Reviewed With: patient  Progress: improving  Outcome Evaluation: ST: bedside swallow evaluation completed this date. recommend denture adhesive for top plate [facility] when alert only.  remove NG and begin crushed meds orally as alert.  SLP will f/u for diet texture analysis and aspiration precautions.      SWALLOW EVALUATION (last 72 hours)       SLP Adult Swallow Evaluation       Row Name 08/14/23 1054                   Rehab Evaluation    Document Type evaluation  -EC        Subjective Information complains of  dry mouth  -EC        Patient Observations alert;cooperative;agree to therapy  -EC        Patient/Family/Caregiver Comments/Observations   -EC        Patient Effort adequate  -EC           General Information    Patient Profile Reviewed yes  -EC        Pertinent History Of Current Problem extubated and alert for PO trials at this time.  -EC        Current Method of Nutrition nasogastric feedings  -EC        Prior Level of Function-Swallowing no diet consistency restrictions  -EC        Plans/Goals Discussed with patient;spouse/S.O.  -EC        Barriers to Rehab none identified  -EC        Patient's Goals for Discharge patient did not state  -EC           Pain Scale: Numbers Pre/Post-Treatment    Pretreatment Pain Rating 5/10  -EC        Posttreatment Pain Rating 5/10  -EC        Pain Location - Side/Orientation Right  -EC        Pain Location - abdomen  -EC        Pain Intervention(s)  Repositioned;Nursing Notified  -EC           Oral Motor Structure and Function    Oral Lesions or Structural Abnormalities and/or variants very dry and crusty under tongue  -EC        Dentition Assessment other (see comments);dentures are ill fitting  unable towear top dentures for eval.  hassome natural bottom teeth  -EC        Secretion Management dried secretions in oral cavity  -EC        Mucosal Quality dry  -EC        Gag Response WFL  -EC        Volitional Swallow WFL  -EC           General Eating/Swallowing Observations    Respiratory Support Currently in Use room air  -EC        Eating/Swallowing Skills fed by SLP  -EC        Positioning During Eating upright 90 degree;upright in chair  -EC        Utensils Used spoon;cup;straw  -EC        Consistencies Trialed ice chips;pureed;soft to chew textures  water, ice, apples sauce, ryan cracker  -EC           Clinical Swallow Eval    Oral Prep Phase impaired  -EC        Oral Transit WFL  -EC        Oral Residue impaired  -EC        Pharyngeal Phase no overt signs/symptoms of pharyngeal impairment  -EC        Esophageal Phase unremarkable  -EC        Clinical Swallow Evaluation Summary pt demonstrates some increased oral prep gideon with chewable solids d/t not able to wear top dentures.  adhesive will be brought to room and they will try top teeth with nursing.  pt has very dry mouth and there is some coughing  associated with first few swallows.  pt encouraged to use double swallow with improvement noted.  good tolerance of applesauce.  -EC           Oral Prep Concerns    Oral Prep Concerns increased prep time  -EC        Increased Prep Time mechanical soft  -EC           Oral Residue Concerns    Oral Residue Concerns diffuse residue throughout oral cavity  -EC        Diffuse Residue Throughout Oral Cavity mechanical soft  -EC        Oral Residue Concerns, Comment cleared with liquid wash  -EC           SLP Evaluation Clinical Impression    SLP Swallowing  Diagnosis mild;oral dysphagia  -EC        Functional Impact risk of malnutrition  -EC        Rehab Potential/Prognosis, Swallowing good, to achieve stated therapy goals  -EC        Swallow Criteria for Skilled Therapeutic Interventions Met demonstrates skilled criteria  -EC           Recommendations    Therapy Frequency (Swallow) 2 days per week;3 days per week  -EC        Predicted Duration Therapy Intervention (Days) 1 week  -EC        SLP Diet Recommendation mechanical ground textures;thin liquids  -EC           Swallow Goals (SLP)    Swallow LTGs Patient will demonstrate functional swallow for  -EC        Swallow STGs diet tolerance goal selection (SLP)  -EC        Diet Tolerance Goal Selection (SLP) Patient will tolerate trials of  -EC           (LTG) Patient will demonstrate functional swallow for    Diet Texture (Demonstrate functional swallow) regular textures  -EC        Liquid viscosity (Demonstrate functional swallow) thin liquids  -EC        [place] (Demonstrate functional swallow) independently (over 90% accuracy)  -EC        Time Frame (Demonstrate functional swallow) 1 week  -EC        Barriers (Demonstrate functional swallow) deconditioning  -EC        Progress/Outcomes (Demonstrate functional swallow) new goal  -EC           (STG) Patient will tolerate trials of    Consistencies Trialed (Tolerate trials) regular textures;mechanical ground textures;thin liquids  -EC        Desired Outcome (Tolerate trials) without signs/symptoms of aspiration;with adequate oral prep/transit/clearance  -EC        [place] (Tolerate trials) independently (over 90% accuracy)  -EC        Time Frame (Tolerate trials) 1 week  -EC        Progress/Outcomes (Tolerate trials) new goal  -EC                  User Key  (r) = Recorded By, (t) = Taken By, (c) = Cosigned By      Initials Name Effective Dates    Tena Escobar CCC-SLP 07/11/23 -                     EDUCATION  The patient has been educated in the  following areas:   Dysphagia (Swallowing Impairment) Oral Care/Hydration Modified Diet Instruction.        SLP GOALS       Row Name 08/14/23 1054             (LTG) Patient will demonstrate functional swallow for    Diet Texture (Demonstrate functional swallow) regular textures  -EC      Liquid viscosity (Demonstrate functional swallow) thin liquids  -EC      [place] (Demonstrate functional swallow) independently (over 90% accuracy)  -EC      Time Frame (Demonstrate functional swallow) 1 week  -EC      Barriers (Demonstrate functional swallow) deconditioning  -EC      Progress/Outcomes (Demonstrate functional swallow) new goal  -EC         (STG) Patient will tolerate trials of    Consistencies Trialed (Tolerate trials) regular textures;mechanical ground textures;thin liquids  -EC      Desired Outcome (Tolerate trials) without signs/symptoms of aspiration;with adequate oral prep/transit/clearance  -EC      [place] (Tolerate trials) independently (over 90% accuracy)  -EC      Time Frame (Tolerate trials) 1 week  -EC      Progress/Outcomes (Tolerate trials) new goal  -EC                User Key  (r) = Recorded By, (t) = Taken By, (c) = Cosigned By      Initials Name Provider Type    Tena Escobar CCC-SLP Speech and Language Pathologist                       Time Calculation:    Time Calculation- SLP       Row Name 08/14/23 1139             Time Calculation- SLP    SLP Start Time 1054  -EC      SLP Stop Time 1124  -EC      SLP Time Calculation (min) 30 min  -EC      Total Timed Code Minutes- SLP 30 minute(s)  -EC      SLP Received On 08/14/23  -EC      SLP Goal Re-Cert Due Date 08/28/23  -EC         Untimed Charges    SLP Eval/Re-eval  ST Eval Oral Pharyng Swallow - 53440  -EC      05972-CC Eval Oral Pharyng Swallow Minutes 30  -EC         Total Minutes    Untimed Charges Total Minutes 30  -EC       Total Minutes 30  -EC                User Key  (r) = Recorded By, (t) = Taken By, (c) = Cosigned By       Initials Name Provider Type     Tena Mendoza CCC-SLP Speech and Language Pathologist                    Therapy Charges for Today       Code Description Service Date Service Provider Modifiers Qty    [id number]  ST EVAL ORAL PHARYNG SWALLOW 2 8/14/2023 Tena Mendoza CCC-SLP GN 1                 GRANT Roberson  8/14/2023

## 2023-08-14 NOTE — PLAN OF CARE
Goal Outcome Evaluation:  Plan of Care Reviewed With: patient        Progress: improving  Outcome Evaluation: VSS, UOP 75ml/shift, pt continued to have 3+ liquid BM/shift, PRN fentanyl given, pt is awake/alert, following commands, pt resting between care, all needs met at this time.

## 2023-08-14 NOTE — PLAN OF CARE
Goal Outcome Evaluation:  Plan of Care Reviewed With: patient        Progress: improving  Outcome Evaluation: ST: bedside swallow evaluation completed this date. recommend denture adhesive for top plate prior to chewable food (nsg aware) initate [facility] soft/ground diet with thinliqids when alert only.  remove NG and begin crushed meds orally as alert.  SLP will f/u for diet texture analysis and aspiration precautions.

## 2023-08-14 NOTE — THERAPY TREATMENT NOTE
"Patient Name: Carol Sullivan  : 1940    MRN: [id number]                              Today's Date: 2023       Admit Date: 2023    Visit Dx:     ICD-10-CM ICD-9-CM   1. Mastitis  N61.0 611.0   2. Somnolence  R40.0 780.09   3. Sepsis, due to unspecified organism, unspecified whether acute organ dysfunction present  A41.9 038.9     995.91   4. Impaired functional mobility, balance, gait, and endurance [Z74.09 (ICD-10-CM)]  Z74.09 V49.89   5. Impaired mobility and ADLs [Z74.09, Z78.9 (ICD-10-CM)]  Z74.09 V49.89    Z78.9    6. Other hydronephrosis  N13.39 591   7. Acute respiratory failure with hypoxia  J96.01 518.81   8. Oral phase dysphagia [R13.11 (ICD-10-CM)]  R13.11 787.21     Patient Active Problem List   Diagnosis    Hypertension    Hyperlipidemia    Near syncope    GERD (gastroesophageal reflux disease)    Palpitation    PVC (premature ventricular contraction)    Breast calcifications on mammogram    Paroxysmal atrial fibrillation    Peripheral vascular disease, unspecified    Type 2 diabetes mellitus with other specified complication    Long term (current) use of anticoagulants    Encounter for therapeutic drug monitoring    Sepsis    Mastitis    Other hydronephrosis     Past Medical History:   Diagnosis Date    Arthritis     Depression     GERD (gastroesophageal reflux disease)     Hyperlipidemia     Hypertension     Near syncope     Vascular disease      Past Surgical History:   Procedure Laterality Date    BLADDER SURGERY      ENDOSCOPY N/A 2019    Procedure: ESOPHAGOGASTRODUODENOSCOPY;  Surgeon: Alberto Sanchez DO;  Location: [facility] ENDOSCOPY;  Service: Gastroenterology    GALLBLADDER SURGERY      SHOULDER SURGERY      \"bone spurs\"    SUBTOTAL HYSTERECTOMY        General Information       Row Name 23          OT Time and Intention    Document Type therapy note (daily note)  -CS     Mode of Treatment occupational therapy  -CS       Row Name 23          General " Information    Patient Profile Reviewed yes  -CS     Existing Precautions/Restrictions fall  -CS       Row Name 08/14/23 0830          Cognition    Orientation Status (Cognition) oriented x 4  -CS       Row Name 08/14/23 0830          Safety Issues, Functional Mobility    Impairments Affecting Function (Mobility) strength;endurance/activity tolerance;balance;pain;cognition;range of motion (ROM)  -CS               User Key  (r) = Recorded By, (t) = Taken By, (c) = Cosigned By      Initials Name Provider Type    CS Allie Lim COTA Occupational Therapist Assistant                     Mobility/ADL's       Row Name 08/14/23 0830          Bed Mobility    Rolling Left [place] (Bed Mobility) maximum assist (25% patient effort);2 person assist  -CS     Rolling Right [place] (Bed Mobility) maximum assist (25% patient effort);2 person assist  -CS     Supine-Sit [place] (Bed Mobility) maximum assist (25% patient effort)  -     Assistive Device (Bed Mobility) head of bed elevated;draw sheet;bed rails  -CS       Row Name 08/14/23 0830          Transfers    Transfers bed-chair transfer;sit-stand transfer;stand-sit transfer;toilet transfer  -       Row Name 08/14/23 0830          Bed-Chair Transfer    Bed-Chair [place] (Transfers) maximum assist (25% patient effort);2 person assist;verbal cues  -CS     Assistive Device (Bed-Chair Transfers) walker, front-wheeled  -CS       Row Name 08/14/23 0830          Sit-Stand Transfer    Sit-Stand [place] (Transfers) maximum assist (25% patient effort);2 person assist  -CS     Assistive Device (Sit-Stand Transfers) walker, 4-wheeled  -CS       Row Name 08/14/23 0830          Stand-Sit Transfer    Stand-Sit [place] (Transfers) maximum assist (25% patient effort);2 person assist  -CS     Assistive Device (Stand-Sit Transfers) walker, front-wheeled  -CS       Row Name 08/14/23 0830          Toilet Transfer    Type (Toilet Transfer) sit-stand;stand-sit   -CS     [place] Level (Toilet Transfer) maximum assist (25% patient effort);2 person assist  -CS     Assistive Device (Toilet Transfer) commode, bedside with drop arms;walker, front-wheeled  -CS       Row Name 08/14/23 0830          Activities of Daily Living    BADL Assessment/Intervention toileting;lower body dressing  -CS       Row Name 08/14/23 0830          Lower Body Dressing Assessment/Training    [place] Level (Lower Body Dressing) lower body dressing skills;doff;don;socks;maximum assist (25% patient effort)  -CS     Position (Lower Body Dressing) supine  -CS       Row Name 08/14/23 0830          Toileting Assessment/Training    [place] Level (Toileting) dependent (less than 25% patient effort)  -CS     Assistive Devices (Toileting) commode  -CS     Position (Toileting) supported standing  -CS               User Key  (r) = Recorded By, (t) = Taken By, (c) = Cosigned By      Initials Name Provider Type    CS Allie Lim COTA Occupational Therapist Assistant                   Obj/Interventions    No documentation.                  Goals/Plan       Row Name 08/14/23 0830          Transfer Goal 1 (OT)    Activity/Assistive Device (Transfer Goal 1, OT) toilet  -CS     [place] Level/Cues Needed (Transfer Goal 1, OT) minimum assist (75% or more patient effort)  -CS     Time Frame (Transfer Goal 1, OT) long term goal (LTG);by discharge  -CS     Progress/Outcome (Transfer Goal 1, OT) goal not met  -CS       Row Name 08/14/23 0830          Bathing Goal 1 (OT)    Activity/Device (Bathing Goal 1, OT) lower body bathing  -CS     [place] Level/Cues Needed (Bathing Goal 1, OT) minimum assist (75% or more patient effort)  -CS     Time Frame (Bathing Goal 1, OT) long term goal (LTG);by discharge  -CS     Progress/Outcomes (Bathing Goal 1, OT) goal not met  -CS       Row Name 08/14/23 0830          Dressing Goal 1 (OT)    Activity/Device (Dressing Goal 1, OT) lower body dressing  -CS      [place]/Cues Needed (Dressing Goal 1, OT) modified independence  -CS     Time Frame (Dressing Goal 1, OT) long term goal (LTG);by discharge  -CS     Progress/Outcome (Dressing Goal 1, OT) goal not met  -CS       Row Name 08/14/23 0830          Toileting Goal 1 (OT)    Activity/Device (Toileting Goal 1, OT) toileting skills, all  -CS     [place] Level/Cues Needed (Toileting Goal 1, OT) minimum assist (75% or more patient effort)  -CS     Time Frame (Toileting Goal 1, OT) long term goal (LTG);by discharge  -CS     Progress/Outcome (Toileting Goal 1, OT) goal not met  -CS       Row Name 08/14/23 0830          Grooming Goal 1 (OT)    Activity/Device (Grooming Goal 1, OT) grooming skills, all  -CS     [place] (Grooming Goal 1, OT) set-up required  -CS     Time Frame (Grooming Goal 1, OT) long term goal (LTG);by discharge  -CS     Progress/Outcome (Grooming Goal 1, OT) goal not met  -CS       Row Name 08/14/23 0830          Problem Specific Goal 1 (OT)    Problem Specific Goal 1 (OT) Pt will perform OOB ax for 20 minutes with SBA to increase ax tolerance for ADLs.  -CS     Time Frame (Problem Specific Goal 1, OT) long term goal (LTG);by discharge  -CS     Progress/Outcome (Problem Specific Goal 1, OT) goal not met  -CS               User Key  (r) = Recorded By, (t) = Taken By, (c) = Cosigned By      Initials Name Provider Type    CS Allie Lim COTA Occupational Therapist Assistant                   Clinical Impression       Row Name 08/14/23 0830          Pain Assessment    Pretreatment Pain Rating 0/10 - no pain  -CS     Posttreatment Pain Rating 0/10 - no pain  -CS       Row Name 08/14/23 0830          Plan of Care Review    Plan of Care Reviewed With patient  -CS     Progress improving  -CS     Outcome Evaluation Pt tolerated tx well this date. Pt was max A with rolling R/L. Pt was max A with sup-sit. Pt sat EOB x 15 mins with LOB. Pt was max A x 2 with sit-stand-sit. Pt was max A x 2 with  bed-toilet and toilet-chair t/f. Pt was dep with toileting. Pt was dep with LB dressing. No goals met this tx. Continue OT POC.  -CS       Row Name 08/14/23 0830          Therapy Assessment/Plan (OT)    Rehab Potential (OT) fair, will monitor progress closely  -CS     Criteria for Skilled Therapeutic Interventions Met (OT) yes;skilled treatment is necessary  -CS       Row Name 08/14/23 0830          Therapy Plan Review/Discharge Plan (OT)    Anticipated Discharge Disposition (OT) inpatient rehabilitation facility;[facility] ([facility]skilled nursing facility  -CS       Row Name 08/14/23 0830          Vital Signs    Pre Systolic BP Rehab 178  -CS     Pre Treatment Diastolic BP 81  -CS     Intra Systolic BP Rehab 169  -CS     Intra Treatment Diastolic BP 80  -CS     Post Systolic BP Rehab 150  -CS     Post Treatment Diastolic BP 71  -CS     Pretreatment Heart Rate (beats/min) 86  -CS     Posttreatment Heart Rate (beats/min) 88  -CS     Pre SpO2 (%) 98  -CS     O2 Delivery Pre Treatment nasal cannula  -CS     Post SpO2 (%) 100  -CS     O2 Delivery Post Treatment nasal cannula  -CS     Pre Patient Position Supine  -CS     Post Patient Position Sitting  -CS       Row Name 08/14/23 0830          Positioning and Restraints    Pre-Treatment Position in bed  -CS     Post Treatment Position chair  -CS     In Chair reclined;call light within reach;encouraged to call for assist;exit alarm on  -CS               User Key  (r) = Recorded By, (t) = Taken By, (c) = Cosigned By      Initials Name Provider Type    CS Allie Lim COTA Occupational Therapist Assistant                   Outcome Measures       Row Name 08/14/23 0830          How much help from another is currently needed...    Putting on and taking off regular lower body clothing? 1  -CS     Bathing (including washing, rinsing, and drying) 1  -CS     Toileting (which includes using toilet bed pan or urinal) 1  -CS     Putting on and taking off regular  upper body clothing 1  -CS     Taking care of personal grooming (such as brushing teeth) 1  -CS     Eating meals 1  -CS     AM-PAC 6 Clicks Score (OT) 6  -CS       Row Name 08/14/23 0731          How much help from another person do you currently need...    Turning from your back to your side while in flat bed without using bedrails? 3  -AR     Moving from lying on back to sitting on the side of a flat bed without bedrails? 2  -AR     Moving to and from a bed to a chair (including a wheelchair)? 2  -AR     Standing up from a chair using your arms (e.g., wheelchair, bedside chair)? 1  -AR     Climbing 3-5 steps with a railing? 1  -AR     To walk in hospital room? 1  -AR     AM-PAC 6 Clicks Score (PT) 10  -AR     Highest level of mobility 4 --> Transferred to chair/commode  -AR               User Key  (r) = Recorded By, (t) = Taken By, (c) = Cosigned By      Initials Name Provider Type    CS Allie Lim COTA Occupational Therapist Assistant    AR Nai Hays, RN Registered Nurse                    Occupational Therapy Education       Title: PT OT SLP Therapies (In Progress)       Topic: Occupational Therapy (In Progress)       Point: ADL training (Done)       Description:   Instruct learner(s) on proper safety adaptation and remediation techniques during self care or transfers.   Instruct in proper use of assistive devices.                  Learning Progress Summary             Patient Acceptance, E,TB, VU,NR by SJ at 8/12/2023 1605    Comment: POC, role of OT    Acceptance, E,TB, NR by RW at 8/2/2023 1608    Comment: POC, Role of OT, transfer training                         Point: Home exercise program (Not Started)       Description:   Instruct learner(s) on appropriate technique for monitoring, assisting and/or progressing therapeutic exercises/activities.                  Learner Progress:  Not documented in this visit.              Point: Precautions (In Progress)       Description:   Instruct learner(s) on  prescribed precautions during self-care and functional transfers.                  Learning Progress Summary             Patient Acceptance, E,TB, NR by RW at 8/2/2023 1608    Comment: POC, Role of OT, transfer training                         Point: Body mechanics (In Progress)       Description:   Instruct learner(s) on proper positioning and spine alignment during self-care, functional mobility activities and/or exercises.                  Learning Progress Summary             Patient Acceptance, E,TB, NR by RW at 8/2/2023 1608    Comment: POC, Role of OT, transfer training                                         User Key       Initials Effective Dates Name Provider Type Discipline     06/14/21 -  Ivelisse Singh OT Occupational Therapist OT     09/22/22 -  Ana Vela OT Occupational Therapist OT                  OT Recommendation and Plan     Plan of Care Review  Plan of Care Reviewed With: patient  Progress: improving  Outcome Evaluation: Pt tolerated tx well this date. Pt was max A with rolling R/L. Pt was max A with sup-sit. Pt sat EOB x 15 mins with LOB. Pt was max A x 2 with sit-stand-sit. Pt was max A x 2 with bed-toilet and toilet-chair t/f. Pt was dep with toileting. Pt was dep with LB dressing. No goals met this tx. Continue OT POC.     Time Calculation:         Time Calculation- OT       Row Name 08/14/23 1256             Time Calculation- OT    OT Start Time 0830  -CS      OT Stop Time 0924  -CS      OT Time Calculation (min) 54 min  -CS      Total Timed Code Minutes- OT 54 minute(s)  -CS      OT Received On 08/14/23  -CS         Timed Charges    38097 - OT Therapeutic Activity Minutes 30  -CS      76140 - OT Self Care/Mgmt Minutes 24  -CS         Total Minutes    Timed Charges Total Minutes 54  -CS       Total Minutes 54  -CS                User Key  (r) = Recorded By, (t) = Taken By, (c) = Cosigned By      Initials Name Provider Type    CS Allie Lim COTA Occupational Therapist  Assistant                  Therapy Charges for Today       Code Description Service Date Service Provider Modifiers Qty    89392035685 HC OT THERAPEUTIC ACT EA 15 MIN 8/14/2023 Allie Lim COTA GO 2    24623663005 HC OT SELF CARE/MGMT/TRAIN EA 15 MIN 8/14/2023 Allie Lim COTA GO 2                 QUIQUE Trujillo  8/14/2023

## 2023-08-15 LAB
ALBUMIN SERPL-MCNC: 3.2 G/DL (ref 3.5–5.2)
ALP SERPL-CCNC: 438 U/L (ref 39–117)
ALT SERPL W P-5'-P-CCNC: 134 U/L (ref 1–33)
ANION GAP SERPL CALCULATED.3IONS-SCNC: 17 MMOL/L (ref 5–15)
AST SERPL-CCNC: 66 U/L (ref 1–32)
BASOPHILS # BLD AUTO: 0.09 10*3/MM3 (ref 0–0.2)
BASOPHILS NFR BLD AUTO: 0.5 % (ref 0–1.5)
BILIRUB CONJ SERPL-MCNC: 0.3 MG/DL (ref 0–0.3)
BILIRUB INDIRECT SERPL-MCNC: 0.5 MG/DL
BILIRUB SERPL-MCNC: 0.8 MG/DL (ref 0–1.2)
BUN SERPL-MCNC: 102 MG/DL (ref 8–23)
BUN/CREAT SERPL: 19.5 (ref 7–25)
CALCIUM SPEC-SCNC: 8.2 MG/DL (ref 8.6–10.5)
CHLORIDE SERPL-SCNC: 91 MMOL/L (ref 98–107)
CO2 SERPL-SCNC: 24 MMOL/L (ref 22–29)
CREAT SERPL-MCNC: 5.23 MG/DL (ref 0.57–1)
CRP SERPL-MCNC: 0.93 MG/DL (ref 0–0.5)
DEPRECATED RDW RBC AUTO: 49.8 FL (ref 37–54)
EGFRCR SERPLBLD CKD-EPI 2021: 7.8 ML/MIN/1.73
EOSINOPHIL # BLD AUTO: 0.05 10*3/MM3 (ref 0–0.4)
EOSINOPHIL NFR BLD AUTO: 0.3 % (ref 0.3–6.2)
ERYTHROCYTE [DISTWIDTH] IN BLOOD BY AUTOMATED COUNT: 16.2 % (ref 12.3–15.4)
GLUCOSE BLDC GLUCOMTR-MCNC: 191 MG/DL (ref 70–130)
GLUCOSE BLDC GLUCOMTR-MCNC: 236 MG/DL (ref 70–130)
GLUCOSE BLDC GLUCOMTR-MCNC: 270 MG/DL (ref 70–130)
GLUCOSE BLDC GLUCOMTR-MCNC: 329 MG/DL (ref 70–130)
GLUCOSE BLDC GLUCOMTR-MCNC: 99 MG/DL (ref 70–130)
GLUCOSE SERPL-MCNC: 215 MG/DL (ref 65–99)
HCT VFR BLD AUTO: 35.1 % (ref 34–46.6)
HCT VFR BLD AUTO: 37 % (ref 34–46.6)
HGB BLD-MCNC: 12.4 G/DL (ref 12–15.9)
HGB BLD-MCNC: 12.6 G/DL (ref 12–15.9)
IMM GRANULOCYTES # BLD AUTO: 0.27 10*3/MM3 (ref 0–0.05)
IMM GRANULOCYTES NFR BLD AUTO: 1.4 % (ref 0–0.5)
INR PPP: 1.32 (ref 0.8–1.2)
LYMPHOCYTES # BLD AUTO: 0.8 10*3/MM3 (ref 0.7–3.1)
LYMPHOCYTES NFR BLD AUTO: 4 % (ref 19.6–45.3)
MAGNESIUM SERPL-MCNC: 1.9 MG/DL (ref 1.6–2.4)
MCH RBC QN AUTO: 30.2 PG (ref 26.6–33)
MCHC RBC AUTO-ENTMCNC: 35.3 G/DL (ref 31.5–35.7)
MCV RBC AUTO: 85.4 FL (ref 79–97)
MONOCYTES # BLD AUTO: 3.4 10*3/MM3 (ref 0.1–0.9)
MONOCYTES NFR BLD AUTO: 17.2 % (ref 5–12)
NEUTROPHILS NFR BLD AUTO: 15.19 10*3/MM3 (ref 1.7–7)
NEUTROPHILS NFR BLD AUTO: 76.6 % (ref 42.7–76)
NRBC BLD AUTO-RTO: 0.2 /100 WBC (ref 0–0.2)
PHOSPHATE SERPL-MCNC: 5.7 MG/DL (ref 2.5–4.5)
PLATELET # BLD AUTO: 314 10*3/MM3 (ref 140–450)
PMV BLD AUTO: 11.3 FL (ref 6–12)
POTASSIUM SERPL-SCNC: 3.5 MMOL/L (ref 3.5–5.2)
PROT SERPL-MCNC: 6.5 G/DL (ref 6–8.5)
PROTHROMBIN TIME: 16.3 SECONDS (ref 11.1–15.3)
RBC # BLD AUTO: 4.11 10*6/MM3 (ref 3.77–5.28)
SODIUM SERPL-SCNC: 132 MMOL/L (ref 136–145)
WBC NRBC COR # BLD: 19.8 10*3/MM3 (ref 3.4–10.8)

## 2023-08-15 PROCEDURE — 85025 COMPLETE CBC W/AUTO DIFF WBC: CPT | Performed by: UROLOGY

## 2023-08-15 PROCEDURE — 83735 ASSAY OF MAGNESIUM: CPT | Performed by: INTERNAL MEDICINE

## 2023-08-15 PROCEDURE — 80076 HEPATIC FUNCTION PANEL: CPT | Performed by: UROLOGY

## 2023-08-15 PROCEDURE — 82948 REAGENT STRIP/BLOOD GLUCOSE: CPT

## 2023-08-15 PROCEDURE — 85014 HEMATOCRIT: CPT | Performed by: UROLOGY

## 2023-08-15 PROCEDURE — 85610 PROTHROMBIN TIME: CPT | Performed by: UROLOGY

## 2023-08-15 PROCEDURE — 25010000002 HYDRALAZINE PER 20 MG: Performed by: HOSPITALIST

## 2023-08-15 PROCEDURE — 86140 C-REACTIVE PROTEIN: CPT | Performed by: UROLOGY

## 2023-08-15 PROCEDURE — 63710000001 INSULIN ASPART PER 5 UNITS: Performed by: HOSPITALIST

## 2023-08-15 PROCEDURE — 80048 BASIC METABOLIC PNL TOTAL CA: CPT | Performed by: UROLOGY

## 2023-08-15 PROCEDURE — 97110 THERAPEUTIC EXERCISES: CPT

## 2023-08-15 PROCEDURE — 85018 HEMOGLOBIN: CPT | Performed by: UROLOGY

## 2023-08-15 PROCEDURE — 84100 ASSAY OF PHOSPHORUS: CPT | Performed by: INTERNAL MEDICINE

## 2023-08-15 RX ORDER — METOPROLOL TARTRATE 50 MG/1
100 TABLET, FILM COATED ORAL EVERY 12 HOURS SCHEDULED
Status: DISCONTINUED | OUTPATIENT
Start: 2023-08-15 | End: 2023-08-17 | Stop reason: HOSPADM

## 2023-08-15 RX ADMIN — Medication 10 ML: at 08:20

## 2023-08-15 RX ADMIN — METOPROLOL TARTRATE 100 MG: 100 TABLET, FILM COATED ORAL at 20:52

## 2023-08-15 RX ADMIN — PANTOPRAZOLE SODIUM 40 MG: 40 INJECTION, POWDER, FOR SOLUTION INTRAVENOUS at 05:10

## 2023-08-15 RX ADMIN — ROSUVASTATIN CALCIUM 40 MG: 20 TABLET, FILM COATED ORAL at 08:17

## 2023-08-15 RX ADMIN — METOPROLOL TARTRATE 100 MG: 100 TABLET, FILM COATED ORAL at 08:17

## 2023-08-15 RX ADMIN — HYDRALAZINE HYDROCHLORIDE 10 MG: 20 INJECTION INTRAMUSCULAR; INTRAVENOUS at 03:52

## 2023-08-15 RX ADMIN — ZINC OXIDE 1 APPLICATION: 200 OINTMENT TOPICAL at 08:20

## 2023-08-15 RX ADMIN — INSULIN ASPART 7 UNITS: 100 INJECTION, SOLUTION INTRAVENOUS; SUBCUTANEOUS at 11:00

## 2023-08-15 RX ADMIN — NYSTATIN: 100000 POWDER TOPICAL at 08:17

## 2023-08-15 RX ADMIN — INSULIN ASPART 4 UNITS: 100 INJECTION, SOLUTION INTRAVENOUS; SUBCUTANEOUS at 18:13

## 2023-08-15 RX ADMIN — INSULIN ASPART 2 UNITS: 100 INJECTION, SOLUTION INTRAVENOUS; SUBCUTANEOUS at 08:20

## 2023-08-15 RX ADMIN — ISOSORBIDE MONONITRATE 30 MG: 30 TABLET, EXTENDED RELEASE ORAL at 08:17

## 2023-08-15 NOTE — PROGRESS NOTES
[facility]  INPATIENT PROGRESS NOTE    Length of Stay: 16  Date of Admission: 7/28/2023  Primary Care Physician: Len Seo MD    Subjective   (S) Admitted for sepsis due to left breast mastitis with no abscess and UTI; intubated on 8/6/23 and extubated on 8/10/23  Today she is up, no complains, she is surrounded by family members    Review of Systems   All other systems reviewed and are negative.     All pertinent negatives and positives are as above. All other systems have been reviewed and are negative unless otherwise stated.     Prior to Admission medications    Medication Sig Start Date End Date Taking? Authorizing Provider   acetaminophen (TYLENOL) 500 MG tablet Take 1 tablet by mouth As Needed.   Yes Rubi Seymour MD   DULoxetine (CYMBALTA) 60 MG capsule  1/20/20  Yes Rubi Seymour MD   furosemide (LASIX) 20 MG tablet Take 1 tablet by mouth 2 (Two) Times a Day. 6/2/23  Yes Odette Alves MD   gabapentin (NEURONTIN) 300 MG capsule Take 1 capsule by mouth 3 (Three) Times a Day. 9/26/18  Yes Rubi Seymour MD   isosorbide mononitrate (IMDUR) 30 MG 24 hr tablet Take 1 tablet by mouth Daily. 6/2/23  Yes Odette Alves MD   lansoprazole (PREVACID) 30 MG capsule Take 1 capsule by mouth Every 12 (Twelve) Hours. 6/21/21  Yes Rubi Seymour MD   losartan (COZAAR) 100 MG tablet Take 1 tablet by mouth Daily. 8/19/20  Yes Odette Alves MD   metoprolol succinate XL (TOPROL-XL) 50 MG 24 hr tablet Take 1 tablet by mouth Daily. 6/2/23 6/2/24 Yes Odette Alves MD   Mirabegron ER (MYRBETRIQ) 50 MG tablet sustained-release 24 hour 24 hr tablet Take 50 mg by mouth Daily.   Yes Rubi Seymour MD   NIFEdipine XL (PROCARDIA XL) 60 MG 24 hr tablet Take 1 tablet by mouth Daily. 6/2/23  Yes Odette Alves MD   potassium chloride (K-DUR,KLOR-CON) 20 MEQ CR tablet Take 1 tablet by mouth Daily. 6/2/23  Yes Odette Alves  MD   rosuvastatin (CRESTOR) 40 MG tablet Take 1 tablet by mouth Daily. 6/2/23  Yes Odette Alves MD   traMADol (ULTRAM) 50 MG tablet Take 1 tablet by mouth As Needed. 12/23/20  Yes Provider, Historical, MD HAM  (90 Base) MCG/ACT inhaler  10/23/19  Yes Provider, Historical, MD   warfarin (COUMADIN) 2.5 MG tablet Take 1 tablet nightly except on Monday take 1.5 tablets OR AS DIRECTED 5/15/23  Yes Shauna Casillas APRN       Insulin Aspart, 0-9 Units, Subcutaneous, TID AC  isosorbide mononitrate, 30 mg, Oral, Daily  mannitol, 25 g, Intravenous, Once  metoprolol tartrate, 100 mg, Oral, Q12H  nystatin, , Topical, Q12H  pantoprazole, 40 mg, Intravenous, Q AM  rosuvastatin, 40 mg, Oral, Daily  sodium chloride, 10 mL, Intravenous, Q12H  sodium chloride, 10 mL, Intravenous, Q12H             Objective    Temp:  [96.7 øF (35.9 øC)-97.6 øF (36.4 øC)] 97.1 øF (36.2 øC)  Heart Rate:  [] 92  Resp:  [15-19] 17  BP: ()/() 117/56    Physical Exam  Vitals reviewed.   Constitutional:       Comments: better appearance   HENT:      Head: Normocephalic.      Nose: Nose normal.      Mouth/Throat:      Mouth: Mucous membranes are moist.   Eyes:      Extraocular Movements: Extraocular movements intact.   Cardiovascular:      Rate and Rhythm: Normal rate. Rhythm irregular.      Heart sounds: Normal heart sounds.   Pulmonary:      Comments: Decreased breath sounds bibasilar  Abdominal:      Palpations: Abdomen is soft.   Musculoskeletal:         General: Normal range of motion.      Cervical back: Normal range of motion.      Right lower leg: No edema.      Left lower leg: No edema.   Neurological:      General: No focal deficit present.      Mental Status: She is alert.   Psychiatric:         Behavior: Behavior normal.       Results Review:  I have reviewed the labs, radiology results, and diagnostic studies.    Laboratory Data:   Results from last 7 days   Lab Units 08/15/23  0414 08/14/23  0533  08/13/23  0518   SODIUM mmol/L 132* 137 137   POTASSIUM mmol/L 3.5 3.9 3.3*   CHLORIDE mmol/L 91* 91* 93*   CO2 mmol/L 24.0 19.0* 20.0*   BUN mg/dL 102* 160* 121*   CREATININE mg/dL 5.23* 7.29* 5.99*   GLUCOSE mg/dL 215* 363* 371*   CALCIUM mg/dL 8.2* 8.1* 8.2*   BILIRUBIN mg/dL 0.8 0.7 0.9   ALK PHOS U/L 438* 457* 425*   ALT (SGPT) U/L 134* 159* 179*   AST (SGOT) U/L 66* 90* 52*   ANION GAP mmol/L 17.0* 27.0* 24.0*       Estimated Creatinine Clearance: 8.5 mL/min (A) (by C-G formula based on SCr of 5.23 mg/dL (H)).  Results from last 7 days   Lab Units 08/09/23  0729   MAGNESIUM mg/dL 1.8           Results from last 7 days   Lab Units 08/15/23  1159 08/15/23  0414 08/14/23  2342 08/14/23  1151 08/14/23  0533 08/13/23  1212 08/13/23  0518 08/12/23  1143 08/12/23  0555 08/11/23  1227 08/11/23  0629   WBC 10*3/mm3  --  19.80*  --   --  20.40*  --  18.17*  --  17.89*  --  14.54*   HEMOGLOBIN g/dL 12.6 12.4 12.3 12.0 11.4*   < > 11.1*   < > 10.8*   < > 10.0*   HEMATOCRIT % 37.0 35.1 36.8 36.2 33.3*   < > 33.8*   < > 31.4*   < > 30.0*   PLATELETS 10*3/mm3  --  314  --   --  346  --  301  --  260  --  188    < > = values in this interval not displayed.       Results from last 7 days   Lab Units 08/15/23  0414 08/14/23  0533 08/13/23  0518 08/12/23  0555 08/11/23  0754   INR  1.32* 1.30* 1.41* 1.41* 1.48*         Culture Data:   No results found for: BLOODCX  No results found for: URINECX  No results found for: RESPCX  No results found for: WOUNDCX  No results found for: STOOLCX  No components found for: BODYFLD    Radiology Data:   Imaging Results (Last 24 Hours)       ** No results found for the last 24 hours. **            I have reviewed the patient's current medications.     Assessment/Plan     Acute mastitis      S/p I&D by surgery; resolved; scar looks clean; no tender to palpation    UTI by Enterococcus faecalis     Completed therapy    Sepsis/septic shock     Likely due to above     Resolved; not on vasopressors      Follow up blood cultures negative    Shock liver     Due to above; resolving    Acute respiratory failure with hypoxemia     On MV from 8/6 to 8/10/2023; weaning oxygen now; she is on 2 L now; keep weaning as able     Right pleural effusion; monitor    Right sided retroperitoneal hematoma     CT surgery team following; stable; off anticoagulation    BLAINE on CKD     Aggravated by ATN likely caused by sepsis     Will follow up with HD today      Appreciated nephrology assessment     Uremia     It improved with mannitol      Appreciated nephrologist        Right hydronephrosis     s/p right double J placement due to retroperitoneal hematoma     Appreciated urology assistance       Paroxysmal atrial fibrillation     On rate control; on rate control; off anticoagulation    Acute blood loss anemia     S/p one unit of PRBC on 8/7/23; monitor; stable    Altered mental status     Likely multifactorial, septic, metabolic, delirium    Hypertension crisis     Resolved; will wean nitroglycerin drip      Medical Decision Making  Number and Complexity of problems: multiple major complexes    Conditions and Status:        Condition is improving.     MDM Data  External documents reviewed: previous medical records  My EKG interpretation: n/a  My plain film interpretation: right pleural effusion     Discussed with: patient and family     Treatment Plan  See above    Care Planning  Shared decision making: her   Code status and discussions: DNR/DNI    Disposition  Social Determinants of Health that impact treatment or disposition: none  I expect the patient to be discharged: in progress; prognosis is guarded       I confirmed that the patient's Advance Care Plan is present, code status is documented, or surrogate decision maker is listed in the patient's medical record.     Paul Martinez MD

## 2023-08-15 NOTE — SIGNIFICANT NOTE
08/15/23 0855   OTHER   Discipline physical therapy assistant   Rehab Time/Intention   Session Not Performed other (see comments)  (RN requests PM tx 2' pt may be getting HD this AM)

## 2023-08-15 NOTE — PLAN OF CARE
Goal Outcome Evaluation:  Plan of Care Reviewed With: patient           Outcome Evaluation: pt just back to bed per nsg, pt performs B LE supine ther ex, pt requires AAROM more than 50% of time, R LE > L LE, R LE limited 2' dialysis catheter in R groin

## 2023-08-15 NOTE — PLAN OF CARE
Goal Outcome Evaluation:      Pt resting in bed on RA. Pt more alert and responsive throughout shift.  One dose of PRN Hydralazine given. VSS. Afebrile. Multiple BM this shift.

## 2023-08-15 NOTE — DISCHARGE PLACEMENT REQUEST
"Claudia Sullivan (82 y.o. Female)       Date of Birth   1940    Social Security Number       [address] [facility] [address]    Home Phone   [phone]    MRN   [id number]       Judaism   Pentecostal    Marital Status                               Admission Date   7/28/23    Admission Type   Emergency    Admitting Provider   Paul Martinez MD    Attending Provider   Paul Martinez MD    Department, Room/Bed   [facility] CRITICAL CARE STEPDOWN, 15/A       Discharge Date       Discharge Disposition       Discharge Destination                                 Attending Provider: Paul Martinez MD    Allergies: Tuberculin Tests, Hydrocodone, Lortab [Hydrocodone-acetaminophen]    Isolation: Spore   Infection: C.difficile (07/29/23)   Code Status: No CPR    Ht: 165.1 cm (65\")   Wt: 74.4 kg (164 lb)    Admission Cmt: None   Principal Problem: Sepsis [A41.9]                   Active Insurance as of 7/28/2023       Primary Coverage       Payor Plan Insurance Group Employer/Plan Group    HUMANA MEDICARE REPLACEMENT HUMANA MEDICARE REPLACEMENT [id number]       Payor Plan Address Payor Plan Phone Number Payor Plan Fax Number Effective Dates    [address] [address] [phone]  5/1/2023 - None Entered    [facility] [address]         Subscriber Name Subscriber Birth Date Member ID       CLAUDIA SULLIVAN 1940 [id number]               Secondary Coverage       Payor Plan Insurance Group Employer/Plan Group    [facility] SUP PLAN F       Payor Plan Address Payor Plan Phone Number Payor Plan Fax Number Effective Dates    [address] [phone]  5/1/2009 - None Entered    [facility] [id number]         Subscriber Name Subscriber Birth Date Member ID       CLAUDIA SULLIVAN 1940 [id number]                     Emergency Contacts        (Rel.) Home Phone Work Phone Mobile Phone    NateTristan (Spouse) [phone] -- " [phone]    zackery branham (Son) [phone] -- [phone]

## 2023-08-15 NOTE — PROGRESS NOTES
"    [facility]  [address] [place] [address]. [address]  T - [phone]  F - [phone]     Progress Note          PATIENT  DEMOGRAPHICS   PATIENT NAME: Carol Sullivan                      PHYSICIAN: Stephanie Gómez MD  : 1940  MRN: [id number]   LOS: 16 days    Patient Care Team:  Len Seo MD as PCP - General  BlountBlaire ribera APRN as Nurse Practitioner (General Surgery)  Subjective   SUBJECTIVE   Out of bed to chair, no soa, no leg edema       Objective   OBJECTIVE   Vital Signs  Temp:  [96.7 øF (35.9 øC)-98 øF (36.7 øC)] 97.1 øF (36.2 øC)  Heart Rate:  [] 99  Resp:  [15-19] 17  BP: ()/() 134/67    Flowsheet Rows      Flowsheet Row First Filed Value   Admission Height 165.1 cm (65\") Documented at 2023 1900   Admission Weight 74.8 kg (165 lb) Documented at 2023 1608             I/O last 3 completed shifts:  In: 1236 [P.O.:120; Other:239; NG/GT:877]  Out:  [Urine:175]    PHYSICAL EXAM    Physical Exam  Vitals and nursing note reviewed.   Constitutional:       General: She is awake.      Appearance: Normal appearance. She is not ill-appearing.   HENT:      Head: Normocephalic and atraumatic.   Cardiovascular:      Rate and Rhythm: Normal rate. Rhythm irregular.      Heart sounds: Normal heart sounds. No murmur heard.    No friction rub. No gallop.   Pulmonary:      Effort: No respiratory distress.      Breath sounds: Normal breath sounds. No stridor. No wheezing, rhonchi or rales.   Abdominal:      General: Bowel sounds are normal. There is no distension.      Palpations: Abdomen is soft.      Tenderness: There is no abdominal tenderness.   Musculoskeletal:      Right lower leg: No edema.      Left lower leg: No edema.   Skin:     General: Skin is warm and dry.   Neurological:      GCS: GCS eye subscore is 2. GCS verbal subscore is 2. GCS motor subscore is 4.       RESULTS   Results Review:    Results from last 7 days   Lab Units 08/15/23  0414 " 08/14/23  0533 08/13/23  0518   SODIUM mmol/L 132* 137 137   POTASSIUM mmol/L 3.5 3.9 3.3*   CHLORIDE mmol/L 91* 91* 93*   CO2 mmol/L 24.0 19.0* 20.0*   BUN mg/dL 102* 160* 121*   CREATININE mg/dL 5.23* 7.29* 5.99*   CALCIUM mg/dL 8.2* 8.1* 8.2*   BILIRUBIN mg/dL 0.8 0.7 0.9   ALK PHOS U/L 438* 457* 425*   ALT (SGPT) U/L 134* 159* 179*   AST (SGOT) U/L 66* 90* 52*   GLUCOSE mg/dL 215* 363* 371*         Estimated Creatinine Clearance: 8.5 mL/min (A) (by C-G formula based on SCr of 5.23 mg/dL (H)).    Results from last 7 days   Lab Units 08/09/23  0729   MAGNESIUM mg/dL 1.8               Results from last 7 days   Lab Units 08/15/23  0414 08/14/23  2342 08/14/23  1151 08/14/23  0533 08/14/23  0131 08/13/23  1212 08/13/23  0518 08/12/23  1143 08/12/23  0555 08/11/23  1227 08/11/23  0629   WBC 10*3/mm3 19.80*  --   --  20.40*  --   --  18.17*  --  17.89*  --  14.54*   HEMOGLOBIN g/dL 12.4 12.3 12.0 11.4* 10.9*   < > 11.1*   < > 10.8*   < > 10.0*   PLATELETS 10*3/mm3 314  --   --  346  --   --  301  --  260  --  188    < > = values in this interval not displayed.         Results from last 7 days   Lab Units 08/15/23  0414 08/14/23  0533 08/13/23  0518 08/12/23  0555 08/11/23  0754   INR  1.32* 1.30* 1.41* 1.41* 1.48*           Imaging Results (Last 24 Hours)       ** No results found for the last 24 hours. **             MEDICATIONS    Insulin Aspart, 0-9 Units, Subcutaneous, TID AC  isosorbide mononitrate, 30 mg, Oral, Daily  mannitol, 25 g, Intravenous, Once  metoprolol tartrate, 100 mg, Oral, Q12H  nystatin, , Topical, Q12H  pantoprazole, 40 mg, Intravenous, Q AM  rosuvastatin, 40 mg, Oral, Daily  sodium chloride, 10 mL, Intravenous, Q12H  sodium chloride, 10 mL, Intravenous, Q12H             Assessment & Plan   ASSESSMENT / PLAN      Sepsis    Mastitis    Other hydronephrosis    1. Acute kidney injury/ Acute tubular necrosis:  - Baseline unknown.   - Creatinine was 1.5 in 10/2022.   - UA 3+ protein, trace leukocytes,  0-2 RBC, 6-12 WBC, 2+ bacteria.   - Urine sodium 26. CT abd large rt retroperitoneal hematoma causing moderate rt hydronephrosis.  - Creatinine since admission was 1.5-1.6 range and then rapidly worsening.   - Started HD due to worsening renal function 8/6/23    Hd today. Marked uremia and elevated cr. required mannitol. Dr Yin will place permcath at some point. Hd again tomorrow     2. Renal failure retroperitoneal bleed s/p right J stent for hydronephrosis   -s/p J stent placement by urology 8/11  -Continue to monitor renal function     3. Hypertension/ Afib with RVR:   - Blood pressure was low and now high. Off pressors. HR was high and now on Cardizem gtt and metoprolol po via NG tube. Losartan on hold.   -BP is reasonable, may be able to transition to PO diltiazem.    4. Metabolic acidosis:   - Was on bicarb drip. Now modulate with hd    5. Hypocalcemia     5. Sepsis:  - zosyn at present. C.diff carrier     6. UTI:   - became septic with mild hydro Dr Radha is consulted. Now stent in place . E.fecalis    7. Cdiff:   -not on po vanc now      8. Hyponatremia:   - Sodium is stable    8. Anemia / retroperitoneal bleed:  - Hemoglobin is acceptable     9.  AMS           This document has been electronically signed by Stephanie Gómez MD on August 15, 2023 10:10 CDT

## 2023-08-15 NOTE — PLAN OF CARE
Goal Outcome Evaluation:              Outcome Evaluation: Pt arrived to unti from ICU. VSS. Contact precautions maintained. Will continue to monitor.

## 2023-08-15 NOTE — PLAN OF CARE
Problem: Device-Related Complication Risk (Hemodialysis)  Goal: Safe, Effective Therapy Delivery  Outcome: Ongoing, Progressing     Problem: Hemodynamic Instability (Hemodialysis)  Goal: Effective Tissue Perfusion  Outcome: Ongoing, Progressing  Goal: Effective Tissue Perfusion  Outcome: Ongoing, Progressing     Problem: Hemodynamic Instability (Hemodialysis)  Goal: Effective Tissue Perfusion  Outcome: Ongoing, Progressing   Goal Outcome Evaluation:  B/P soft at times during HD--asymptomatic. Unable to obtain ordered UF due to periods of hypotension. UF 1.8L. Vascular access positional at times.

## 2023-08-15 NOTE — THERAPY TREATMENT NOTE
"Acute Care - Physical Therapy Treatment Note  [facility]     Patient Name: Carol Sullivan  : 1940  MRN: [id number]  Today's Date: 8/15/2023      Visit Dx:     ICD-10-CM ICD-9-CM   1. Mastitis  N61.0 611.0   2. Somnolence  R40.0 780.09   3. Sepsis, due to unspecified organism, unspecified whether acute organ dysfunction present  A41.9 038.9     995.91   4. Impaired functional mobility, balance, gait, and endurance [Z74.09 (ICD-10-CM)]  Z74.09 V49.89   5. Impaired mobility and ADLs [Z74.09, Z78.9 (ICD-10-CM)]  Z74.09 V49.89    Z78.9    6. Other hydronephrosis  N13.39 591   7. Acute respiratory failure with hypoxia  J96.01 518.81   8. Oral phase dysphagia [R13.11 (ICD-10-CM)]  R13.11 787.21     Patient Active Problem List   Diagnosis    Hypertension    Hyperlipidemia    Near syncope    GERD (gastroesophageal reflux disease)    Palpitation    PVC (premature ventricular contraction)    Breast calcifications on mammogram    Paroxysmal atrial fibrillation    Peripheral vascular disease, unspecified    Type 2 diabetes mellitus with other specified complication    Long term (current) use of anticoagulants    Encounter for therapeutic drug monitoring    Sepsis    Mastitis    Other hydronephrosis     Past Medical History:   Diagnosis Date    Arthritis     Depression     GERD (gastroesophageal reflux disease)     Hyperlipidemia     Hypertension     Near syncope     Vascular disease      Past Surgical History:   Procedure Laterality Date    BLADDER SURGERY      ENDOSCOPY N/A 2019    Procedure: ESOPHAGOGASTRODUODENOSCOPY;  Surgeon: Alberto Sanchez DO;  Location: [facility] ENDOSCOPY;  Service: Gastroenterology    GALLBLADDER SURGERY      SHOULDER SURGERY      \"bone spurs\"    SUBTOTAL HYSTERECTOMY       PT Assessment (last 12 hours)       PT Evaluation and Treatment       Row Name 08/15/23 1300          Physical Therapy Time and Intention    Subjective Information no complaints  -JW     Document Type therapy note " (daily note)  -     Mode of Treatment physical therapy  -     Comment nsg states pt just back to bed from being up in chair most of day,  pt agreeable to B LE supine ther ex, doesn't want to get up again  -       Row Name 08/15/23 1300          General Information    Patient Profile Reviewed yes  -     Existing Precautions/Restrictions fall  -       Row Name 08/15/23 1300          Pain    Pretreatment Pain Rating 0/10 - no pain  -     Posttreatment Pain Rating 0/10 - no pain  -       Row Name 08/15/23 1300          Cognition    Orientation Status (Cognition) oriented x 4  -       Row Name 08/15/23 1300          Safety Issues, Functional Mobility    Impairments Affecting Function (Mobility) strength;endurance/activity tolerance;balance;pain  -       Row Name 08/15/23 1300          Motor Skills    Comments, Therapeutic Exercise B LE supine ther ex:  AP, QS, SAQ, hip abd/add, HS - ROM limited by dialysis catheter R groin, pt requires AAROM R LE > L LE  -       Row Name             Wound 07/28/23 1945 Bilateral midline sternal MASD (Moisture associated skin damage)    Wound - Properties Group Placement Date: 07/28/23  -CC (r)  AW (c) Placement Time: 1945  -CC (r)  AW (c) Present on Hospital Admission: Y  -CC (r)  AW (c) Side: Bilateral  -CC (r)  AW (c) Orientation: midline  -CC (r)  AW (c) Location: sternal  -CC (r)  AW (c) Primary Wound Type: MASD  -CC (r)  AW (c)    Retired Wound - Properties Group Placement Date: 07/28/23  -CC (r)  AW (c) Placement Time: 1945  -CC (r)  AW (c) Present on Hospital Admission: Y  -CC (r)  AW (c) Side: Bilateral  -CC (r)  AW (c) Orientation: midline  -CC (r)  AW (c) Location: sternal  -CC (r)  AW (c) Primary Wound Type: MASD  -CC (r)  AW (c)    Retired Wound - Properties Group Date first assessed: 07/28/23  -CC (r)  AW (c) Time first assessed: 1945  -CC (r)  AW (c) Present on Hospital Admission: Y  -CC (r)  AW (c) Side: Bilateral  -CC (r)  AW (c) Location: sternal  -CC  (r)  AW (c) Primary Wound Type: MASD  -CC (r)  AW (c)      Row Name             Wound 08/01/23 1241 Left breast Incision    Wound - Properties Group Placement Date: 08/01/23  -CP Placement Time: 1241  -CP Present on Hospital Admission: Y  -CP Side: Left  -CP Location: breast  -CP Primary Wound Type: Incision  -CP Additional Comments: packed with 1/4 inch iodoform, 4x4's, silk tape  -CP    Retired Wound - Properties Group Placement Date: 08/01/23  -CP Placement Time: 1241  -CP Present on Hospital Admission: Y  -CP Side: Left  -CP Location: breast  -CP Primary Wound Type: Incision  -CP Additional Comments: packed with 1/4 inch iodoform, 4x4's, silk tape  -CP    Retired Wound - Properties Group Date first assessed: 08/01/23  -CP Time first assessed: 1241  -CP Present on Hospital Admission: Y  -CP Side: Left  -CP Location: breast  -CP Primary Wound Type: Incision  -CP Additional Comments: packed with 1/4 inch iodoform, 4x4's, silk tape  -CP      Row Name             Wound 08/13/23 2000 Bilateral posterior anus MASD (Moisture associated skin damage)    Wound - Properties Group Placement Date: 08/13/23  -ML Placement Time: 2000  -ML Side: Bilateral  -ML Orientation: posterior  -ML Location: anus  -ML Primary Wound Type: MASD  -ML Additional Comments: severe anal excoriation. multiple liquid BMs daily  -ML    Retired Wound - Properties Group Placement Date: 08/13/23  -ML Placement Time: 2000  -ML Side: Bilateral  -ML Orientation: posterior  -ML Location: anus  -ML Primary Wound Type: MASD  -ML Additional Comments: severe anal excoriation. multiple liquid BMs daily  -ML    Retired Wound - Properties Group Date first assessed: 08/13/23  -ML Time first assessed: 2000  -ML Side: Bilateral  -ML Location: anus  -ML Primary Wound Type: MASD  -ML Additional Comments: severe anal excoriation. multiple liquid BMs daily  -ML      Row Name 08/15/23 1300          Plan of Care Review    Plan of Care Reviewed With patient  -EMILY      Outcome Evaluation pt just back to bed per nsg, pt performs B LE supine ther ex, pt requires AAROM more than 50% of time, R LE > L LE, R LE limited 2' dialysis catheter in R groin  -       Row Name 08/15/23 1300          Vital Signs    Pre Systolic BP Rehab 117  -JW     Pre Treatment Diastolic BP 56  -JW     Post Systolic BP Rehab 124  -JW     Post Treatment Diastolic BP 65  -JW     Pretreatment Heart Rate (beats/min) 93  -JW     Posttreatment Heart Rate (beats/min) 101  -JW     Pre SpO2 (%) 96  -JW     O2 Delivery Pre Treatment room air  -JW     Post SpO2 (%) 96  -JW     O2 Delivery Post Treatment room air  -JW     Pre Patient Position Supine  -JW     Post Patient Position Supine  -JW       Row Name 08/15/23 1300          Positioning and Restraints    Pre-Treatment Position in bed  -JW     Post Treatment Position bed  -JW     In Bed fowlers;call light within reach;encouraged to call for assist  -       Row Name 08/15/23 1300          Bed Mobility Goal 1 (PT)    Activity/Assistive Device (Bed Mobility Goal 1, PT) sit to supine/supine to sit  -JW     Bland Level/Cues Needed (Bed Mobility Goal 1, PT) minimum assist (75% or more patient effort)  -     Time Frame (Bed Mobility Goal 1, PT) 2 weeks  -JW     Progress/Outcomes (Bed Mobility Goal 1, PT) goal not met  -       Row Name 08/15/23 1300          Transfer Goal 1 (PT)    Activity/Assistive Device (Transfer Goal 1, PT) sit-to-stand/stand-to-sit;bed-to-chair/chair-to-bed;walker, rolling  -JW     Bland Level/Cues Needed (Transfer Goal 1, PT) minimum assist (75% or more patient effort);moderate assist (50-74% patient effort)  -     Time Frame (Transfer Goal 1, PT) 30 days  -JW     Progress/Outcome (Transfer Goal 1, PT) goal not met  -       Row Name 08/15/23 1300          Gait Training Goal 1 (PT)    Activity/Assistive Device (Gait Training Goal 1, PT) walker, rolling  -JW     Bland Level (Gait Training Goal 1, PT) minimum assist (75% or  more patient effort)  -JW     Distance (Gait Training Goal 1, PT) 3'x3. (Limited by contact precautions.) assist of 2 w/ 1 to follow  -JW     Progress/Outcome (Gait Training Goal 1, PT) goal not met  -JW       Row Name 08/15/23 1300          Stairs Goal 1 (PT)    Activity/Assistive Device (Stairs Goal 1, PT) using handrail, right  -JW     [place] Level/Cues Needed (Stairs Goal 1, PT) supervision required  -JW     Number of Stairs (Stairs Goal 1, PT) 2 steps.  -JW     Time Frame (Stairs Goal 1, PT) by discharge  -JW     Progress/Outcome (Stairs Goal 1, PT) goal not met;goal no longer appropriate  -JW       Row Name 08/15/23 1300          Problem Specific Goal 1 (PT)    Problem Specific Goal 1 (PT) Score 25/28 on Tinetti fall risk assessment.  -JW     Time Frame (Problem Specific Goal 1, PT) 30 days  -JW     Strategies/Barriers (Problem Specific Goal 1, PT) Decreased stepping response to perturbation.  -JW     Progress/Outcome (Problem Specific Goal 1, PT) goal no longer appropriate  -JW               User Key  (r) = Recorded By, (t) = Taken By, (c) = Cosigned By      Initials Name Provider Type    JW Yanelis North, PTA Physical Therapist Assistant    GLENDY Larsen, Shadi Marshall, RN Registered Nurse    Jennie Tsang, PCT Technician    Krys Pope RN Registered Nurse    Trish Summers, RN Registered Nurse                    Physical Therapy Education       Title: PT OT SLP Therapies (Done)       Topic: Physical Therapy (Done)       Point: Mobility training (Done)       Learning Progress Summary             Patient Acceptance, E,TB, VU,NR by AR at 8/14/2023 1734    Acceptance, E, NR by ASHER at 8/3/2023 0844    Comment: PT POC, hand placement with transfers, rehab process.   Family Acceptance, E,TB, VU,NR by AR at 8/14/2023 1734    Acceptance, E,D, VU,NR by APOORVA at 8/12/2023 2016    Comment: heel cord stretch at heel, avoid ball of foot pressure;                         Point: Home  exercise program (Done)       Learning Progress Summary             Patient Acceptance, E,TB, VU,NR by AR at 8/14/2023 1734   Family Acceptance, E,TB, VU,NR by AR at 8/14/2023 1734    Acceptance, E,D, VU,NR by GB at 8/12/2023 2016    Comment: heel cord stretch at heel, avoid ball of foot pressure;                         Point: Body mechanics (Done)       Learning Progress Summary             Patient Acceptance, E,TB, VU,NR by AR at 8/14/2023 1734   Family Acceptance, E,TB, VU,NR by AR at 8/14/2023 1734    Acceptance, E,D, VU,NR by GB at 8/12/2023 2016    Comment: heel cord stretch at heel, avoid ball of foot pressure;                         Point: Precautions (Done)       Learning Progress Summary             Patient Acceptance, E,TB, VU,NR by AR at 8/14/2023 1734    Acceptance, E, VU by  at 7/30/2023 1423    Comment: Xavier assessed: 19/28 or moderate fall risk, recommend FWW with gait.   Family Acceptance, E,TB, VU,NR by AR at 8/14/2023 1734    Acceptance, E,D, VU,NR by GB at 8/12/2023 2016    Comment: heel cord stretch at heel, avoid ball of foot pressure;                                         User Key       Initials Effective Dates Name Provider Type Discipline     06/16/21 -  Chelsea Dunn, PT Physical Therapist PT    CZ 07/11/23 -  Dionte Kendrick, PT Physical Therapist PT    AR 08/18/21 -  Nai Hays, RN Registered Nurse Nurse                  PT Recommendation and Plan     Plan of Care Reviewed With: patient  Outcome Evaluation: pt just back to bed per nsg, pt performs B LE supine ther ex, pt requires AAROM more than 50% of time, R LE > L LE, R LE limited 2' dialysis catheter in R groin       Time Calculation:    PT Charges       Row Name 08/15/23 1300             Time Calculation    Start Time 1300  -JW      Stop Time 1328  -JW      Time Calculation (min) 28 min  -JW      PT Received On 08/15/23  -         Time Calculation- PT    Total Timed Code Minutes- PT 28 minute(s)  -                 User Key  (r) = Recorded By, (t) = Taken By, (c) = Cosigned By      Initials Name Provider Type    Yanelis Donohue PTA Physical Therapist Assistant                      PT G-Codes  Outcome Measure Options: AM-PAC 6 Clicks Daily Activity (OT)  AM-PAC 6 Clicks Score (PT): 10  AM-PAC 6 Clicks Score (OT): 6  Tinetti Total Score: 19    Yanelis North PTA  8/15/2023

## 2023-08-16 ENCOUNTER — APPOINTMENT (OUTPATIENT)
Dept: ULTRASOUND IMAGING | Facility: HOSPITAL | Age: 83
DRG: 853 | End: 2023-08-16
Payer: MEDICARE

## 2023-08-16 PROBLEM — Z99.2 ESRD (END STAGE RENAL DISEASE) ON DIALYSIS: Status: ACTIVE | Noted: 2023-07-28

## 2023-08-16 PROBLEM — N18.6 ESRD (END STAGE RENAL DISEASE) ON DIALYSIS: Status: ACTIVE | Noted: 2023-07-28

## 2023-08-16 LAB
ALBUMIN SERPL-MCNC: 2.9 G/DL (ref 3.5–5.2)
ALP SERPL-CCNC: 359 U/L (ref 39–117)
ALT SERPL W P-5'-P-CCNC: 94 U/L (ref 1–33)
ANION GAP SERPL CALCULATED.3IONS-SCNC: 24 MMOL/L (ref 5–15)
AST SERPL-CCNC: 36 U/L (ref 1–32)
BASOPHILS # BLD AUTO: 0.09 10*3/MM3 (ref 0–0.2)
BASOPHILS NFR BLD AUTO: 0.5 % (ref 0–1.5)
BILIRUB CONJ SERPL-MCNC: 0.6 MG/DL (ref 0–0.3)
BILIRUB INDIRECT SERPL-MCNC: 0.4 MG/DL
BILIRUB SERPL-MCNC: 1 MG/DL (ref 0–1.2)
BUN SERPL-MCNC: 129 MG/DL (ref 8–23)
BUN/CREAT SERPL: 19.1 (ref 7–25)
CALCIUM SPEC-SCNC: 8.6 MG/DL (ref 8.6–10.5)
CHLORIDE SERPL-SCNC: 89 MMOL/L (ref 98–107)
CO2 SERPL-SCNC: 18 MMOL/L (ref 22–29)
CREAT SERPL-MCNC: 6.75 MG/DL (ref 0.57–1)
CRP SERPL-MCNC: 3.87 MG/DL (ref 0–0.5)
DEPRECATED RDW RBC AUTO: 47.9 FL (ref 37–54)
EGFRCR SERPLBLD CKD-EPI 2021: 5.7 ML/MIN/1.73
EOSINOPHIL # BLD AUTO: 0.24 10*3/MM3 (ref 0–0.4)
EOSINOPHIL NFR BLD AUTO: 1.3 % (ref 0.3–6.2)
ERYTHROCYTE [DISTWIDTH] IN BLOOD BY AUTOMATED COUNT: 15.9 % (ref 12.3–15.4)
GLUCOSE BLDC GLUCOMTR-MCNC: 123 MG/DL (ref 70–130)
GLUCOSE BLDC GLUCOMTR-MCNC: 130 MG/DL (ref 70–130)
GLUCOSE BLDC GLUCOMTR-MCNC: 169 MG/DL (ref 70–130)
GLUCOSE BLDC GLUCOMTR-MCNC: 363 MG/DL (ref 70–130)
GLUCOSE BLDC GLUCOMTR-MCNC: 86 MG/DL (ref 70–130)
GLUCOSE SERPL-MCNC: 94 MG/DL (ref 65–99)
HBV CORE IGM SERPL QL IA: NORMAL
HBV SURFACE AB SER RIA-ACNC: NORMAL
HCT VFR BLD AUTO: 32.3 % (ref 34–46.6)
HCT VFR BLD AUTO: 33.2 % (ref 34–46.6)
HCT VFR BLD AUTO: 35.5 % (ref 34–46.6)
HGB BLD-MCNC: 11.3 G/DL (ref 12–15.9)
HGB BLD-MCNC: 11.7 G/DL (ref 12–15.9)
HGB BLD-MCNC: 12.3 G/DL (ref 12–15.9)
IMM GRANULOCYTES # BLD AUTO: 0.25 10*3/MM3 (ref 0–0.05)
IMM GRANULOCYTES NFR BLD AUTO: 1.4 % (ref 0–0.5)
INR PPP: 1.3 (ref 0.8–1.2)
LYMPHOCYTES # BLD AUTO: 1.13 10*3/MM3 (ref 0.7–3.1)
LYMPHOCYTES NFR BLD AUTO: 6.1 % (ref 19.6–45.3)
MCH RBC QN AUTO: 29.7 PG (ref 26.6–33)
MCHC RBC AUTO-ENTMCNC: 35.2 G/DL (ref 31.5–35.7)
MCV RBC AUTO: 84.3 FL (ref 79–97)
MONOCYTES # BLD AUTO: 2.91 10*3/MM3 (ref 0.1–0.9)
MONOCYTES NFR BLD AUTO: 15.7 % (ref 5–12)
NEUTROPHILS NFR BLD AUTO: 13.88 10*3/MM3 (ref 1.7–7)
NEUTROPHILS NFR BLD AUTO: 75 % (ref 42.7–76)
NRBC BLD AUTO-RTO: 0 /100 WBC (ref 0–0.2)
PLATELET # BLD AUTO: 272 10*3/MM3 (ref 140–450)
PMV BLD AUTO: 11.7 FL (ref 6–12)
POTASSIUM SERPL-SCNC: 3.3 MMOL/L (ref 3.5–5.2)
PROT SERPL-MCNC: 6.2 G/DL (ref 6–8.5)
PROTHROMBIN TIME: 16.1 SECONDS (ref 11.1–15.3)
RBC # BLD AUTO: 3.94 10*6/MM3 (ref 3.77–5.28)
SODIUM SERPL-SCNC: 131 MMOL/L (ref 136–145)
WBC NRBC COR # BLD: 18.5 10*3/MM3 (ref 3.4–10.8)

## 2023-08-16 PROCEDURE — 86140 C-REACTIVE PROTEIN: CPT | Performed by: UROLOGY

## 2023-08-16 PROCEDURE — 82948 REAGENT STRIP/BLOOD GLUCOSE: CPT

## 2023-08-16 PROCEDURE — 85014 HEMATOCRIT: CPT | Performed by: UROLOGY

## 2023-08-16 PROCEDURE — 97110 THERAPEUTIC EXERCISES: CPT

## 2023-08-16 PROCEDURE — 02HV33Z INSERTION OF INFUSION DEVICE INTO SUPERIOR VENA CAVA, PERCUTANEOUS APPROACH: ICD-10-PCS | Performed by: THORACIC SURGERY (CARDIOTHORACIC VASCULAR SURGERY)

## 2023-08-16 PROCEDURE — 85018 HEMOGLOBIN: CPT | Performed by: UROLOGY

## 2023-08-16 PROCEDURE — 92526 ORAL FUNCTION THERAPY: CPT | Performed by: SPEECH-LANGUAGE PATHOLOGIST

## 2023-08-16 PROCEDURE — 86706 HEP B SURFACE ANTIBODY: CPT | Performed by: INTERNAL MEDICINE

## 2023-08-16 PROCEDURE — 80076 HEPATIC FUNCTION PANEL: CPT | Performed by: UROLOGY

## 2023-08-16 PROCEDURE — 85610 PROTHROMBIN TIME: CPT | Performed by: UROLOGY

## 2023-08-16 PROCEDURE — 0JH63XZ INSERTION OF TUNNELED VASCULAR ACCESS DEVICE INTO CHEST SUBCUTANEOUS TISSUE AND FASCIA, PERCUTANEOUS APPROACH: ICD-10-PCS | Performed by: THORACIC SURGERY (CARDIOTHORACIC VASCULAR SURGERY)

## 2023-08-16 PROCEDURE — 80048 BASIC METABOLIC PNL TOTAL CA: CPT | Performed by: UROLOGY

## 2023-08-16 PROCEDURE — 86705 HEP B CORE ANTIBODY IGM: CPT | Performed by: INTERNAL MEDICINE

## 2023-08-16 PROCEDURE — 85025 COMPLETE CBC W/AUTO DIFF WBC: CPT | Performed by: UROLOGY

## 2023-08-16 RX ORDER — ALBUMIN (HUMAN) 12.5 G/50ML
12.5 SOLUTION INTRAVENOUS AS NEEDED
Status: DISCONTINUED | OUTPATIENT
Start: 2023-08-16 | End: 2023-08-17 | Stop reason: HOSPADM

## 2023-08-16 RX ORDER — LIDOCAINE HYDROCHLORIDE AND EPINEPHRINE 10; 10 MG/ML; UG/ML
INJECTION, SOLUTION INFILTRATION; PERINEURAL
Status: DISPENSED
Start: 2023-08-16 | End: 2023-08-17

## 2023-08-16 RX ORDER — HEPARIN SODIUM 1000 [USP'U]/ML
INJECTION, SOLUTION INTRAVENOUS; SUBCUTANEOUS
Status: DISPENSED
Start: 2023-08-16 | End: 2023-08-17

## 2023-08-16 RX ORDER — HEPARIN SODIUM 1000 [USP'U]/ML
4000 INJECTION, SOLUTION INTRAVENOUS; SUBCUTANEOUS AS NEEDED
Status: DISCONTINUED | OUTPATIENT
Start: 2023-08-16 | End: 2023-08-16

## 2023-08-16 RX ORDER — POTASSIUM CHLORIDE 750 MG/1
40 CAPSULE, EXTENDED RELEASE ORAL ONCE
Status: COMPLETED | OUTPATIENT
Start: 2023-08-16 | End: 2023-08-16

## 2023-08-16 RX ORDER — FENTANYL CITRATE 50 UG/ML
INJECTION, SOLUTION INTRAMUSCULAR; INTRAVENOUS
Status: DISCONTINUED
Start: 2023-08-16 | End: 2023-08-16 | Stop reason: WASHOUT

## 2023-08-16 RX ORDER — MIDAZOLAM HYDROCHLORIDE 1 MG/ML
INJECTION INTRAMUSCULAR; INTRAVENOUS
Status: DISCONTINUED
Start: 2023-08-16 | End: 2023-08-16 | Stop reason: WASHOUT

## 2023-08-16 RX ADMIN — NYSTATIN: 100000 POWDER TOPICAL at 20:50

## 2023-08-16 RX ADMIN — Medication 10 ML: at 20:45

## 2023-08-16 RX ADMIN — METOPROLOL TARTRATE 100 MG: 100 TABLET, FILM COATED ORAL at 20:44

## 2023-08-16 RX ADMIN — Medication 10 ML: at 06:19

## 2023-08-16 RX ADMIN — POTASSIUM CHLORIDE 40 MEQ: 750 CAPSULE, EXTENDED RELEASE ORAL at 14:18

## 2023-08-16 RX ADMIN — HYDROCODONE BITARTRATE AND ACETAMINOPHEN 1 TABLET: 5; 325 TABLET ORAL at 14:18

## 2023-08-16 RX ADMIN — PANTOPRAZOLE SODIUM 40 MG: 40 INJECTION, POWDER, FOR SOLUTION INTRAVENOUS at 06:20

## 2023-08-16 NOTE — THERAPY TREATMENT NOTE
"Patient Name: Carol Sullivan  : 1940    MRN: [id number]                              Today's Date: 2023       Admit Date: 2023    Visit Dx:     ICD-10-CM ICD-9-CM   1. Mastitis  N61.0 611.0   2. Somnolence  R40.0 780.09   3. Sepsis, due to unspecified organism, unspecified whether acute organ dysfunction present  A41.9 038.9     995.91   4. Impaired functional mobility, balance, gait, and endurance [Z74.09 (ICD-10-CM)]  Z74.09 V49.89   5. Impaired mobility and ADLs [Z74.09, Z78.9 (ICD-10-CM)]  Z74.09 V49.89    Z78.9    6. Other hydronephrosis  N13.39 591   7. Acute respiratory failure with hypoxia  J96.01 518.81   8. Oral phase dysphagia [R13.11 (ICD-10-CM)]  R13.11 787.21   9. ESRD (end stage renal disease) on dialysis  N18.6 585.6    Z99.2 V45.11     Patient Active Problem List   Diagnosis    Hypertension    Hyperlipidemia    Near syncope    GERD (gastroesophageal reflux disease)    Palpitation    PVC (premature ventricular contraction)    Breast calcifications on mammogram    Paroxysmal atrial fibrillation    Peripheral vascular disease, unspecified    Type 2 diabetes mellitus with other specified complication    Long term (current) use of anticoagulants    Encounter for therapeutic drug monitoring    Sepsis    Mastitis    Other hydronephrosis    ESRD (end stage renal disease) on dialysis     Past Medical History:   Diagnosis Date    Arthritis     Depression     GERD (gastroesophageal reflux disease)     Hyperlipidemia     Hypertension     Near syncope     Vascular disease      Past Surgical History:   Procedure Laterality Date    BLADDER SURGERY      ENDOSCOPY N/A 2019    Procedure: ESOPHAGOGASTRODUODENOSCOPY;  Surgeon: Alberto Sanchez DO;  Location: [facility] ENDOSCOPY;  Service: Gastroenterology    GALLBLADDER SURGERY      SHOULDER SURGERY      \"bone spurs\"    SUBTOTAL HYSTERECTOMY        General Information       Row Name 23 1320          OT Time and Intention    Mode of " Treatment individual therapy;occupational therapy  -KD       Row Name 08/16/23 1320          General Information    Patient Profile Reviewed yes  -KD     Existing Precautions/Restrictions fall  -KD       Row Name 08/16/23 1320          Cognition    Orientation Status (Cognition) oriented x 4  -KD       Row Name 08/16/23 1320          Safety Issues, Functional Mobility    Impairments Affecting Function (Mobility) strength;endurance/activity tolerance;balance;pain  -KD               User Key  (r) = Recorded By, (t) = Taken By, (c) = Cosigned By      Initials Name Provider Type    Tamara Marks COTA Occupational Therapist Assistant                     Mobility/ADL's       Row Name 08/16/23 1320          Bed Mobility    Comment, (Bed Mobility) Nsg deferred any OOB  -KD               User Key  (r) = Recorded By, (t) = Taken By, (c) = Cosigned By      Initials Name Provider Type    Tamara Marks COTA Occupational Therapist Assistant                   Obj/Interventions       Row Name 08/16/23 1320          Elbow/Forearm (Therapeutic Exercise)    Elbow/Forearm (Therapeutic Exercise) AROM (active range of motion)  -KD     Elbow/Forearm AROM (Therapeutic Exercise) bilateral;flexion;extension;supination;pronation  -KD       Row Name 08/16/23 1320          Wrist (Therapeutic Exercise)    Wrist (Therapeutic Exercise) AROM (active range of motion)  -KD     Wrist AROM (Therapeutic Exercise) bilateral;extension;flexion  -KD       Row Name 08/16/23 1320          Hand (Therapeutic Exercise)    Hand (Therapeutic Exercise) AROM (active range of motion)  -KD     Hand AROM/AAROM (Therapeutic Exercise) bilateral;finger flexion;finger extension  -KD       Row Name 08/16/23 1320          Motor Skills    Therapeutic Exercise elbow/forearm;wrist;hand  -KD               User Key  (r) = Recorded By, (t) = Taken By, (c) = Cosigned By      Initials Name Provider Type    Tamara Marks COTA Occupational Therapist Assistant                    Goals/Plan       Row Name 08/16/23 1320          Transfer Goal 1 (OT)    Activity/Assistive Device (Transfer Goal 1, OT) toilet  -KD     [place] Level/Cues Needed (Transfer Goal 1, OT) minimum assist (75% or more patient effort)  -KD     Time Frame (Transfer Goal 1, OT) long term goal (LTG);by discharge  -KD     Progress/Outcome (Transfer Goal 1, OT) goal not met  -KD       Row Name 08/16/23 1320          Bathing Goal 1 (OT)    Activity/Device (Bathing Goal 1, OT) lower body bathing  -KD     [place] Level/Cues Needed (Bathing Goal 1, OT) minimum assist (75% or more patient effort)  -KD     Time Frame (Bathing Goal 1, OT) long term goal (LTG);by discharge  -KD     Progress/Outcomes (Bathing Goal 1, OT) goal not met  -KD       Row Name 08/16/23 1320          Dressing Goal 1 (OT)    Activity/Device (Dressing Goal 1, OT) lower body dressing  -KD     [place]/Cues Needed (Dressing Goal 1, OT) modified independence  -KD     Time Frame (Dressing Goal 1, OT) long term goal (LTG);by discharge  -KD     Progress/Outcome (Dressing Goal 1, OT) goal not met  -KD       Row Name 08/16/23 1320          Toileting Goal 1 (OT)    Activity/Device (Toileting Goal 1, OT) toileting skills, all  -KD     [place] Level/Cues Needed (Toileting Goal 1, OT) minimum assist (75% or more patient effort)  -KD     Time Frame (Toileting Goal 1, OT) long term goal (LTG);by discharge  -KD     Progress/Outcome (Toileting Goal 1, OT) goal not met  -KD       Row Name 08/16/23 1320          Grooming Goal 1 (OT)    Activity/Device (Grooming Goal 1, OT) grooming skills, all  -KD     [place] (Grooming Goal 1, OT) set-up required  -KD     Time Frame (Grooming Goal 1, OT) long term goal (LTG);by discharge  -KD     Progress/Outcome (Grooming Goal 1, OT) goal not met  -KD       Row Name 08/16/23 1320          Problem Specific Goal 1 (OT)    Problem Specific Goal 1 (OT) Pt will perform OOB ax for 20 minutes with SBA to increase ax  tolerance for ADLs.  -KD     Time Frame (Problem Specific Goal 1, OT) long term goal (LTG);by discharge  -KD     Progress/Outcome (Problem Specific Goal 1, OT) goal not met  -KD               User Key  (r) = Recorded By, (t) = Taken By, (c) = Cosigned By      Initials Name Provider Type    Tamara Marks COTA Occupational Therapist Assistant                   Clinical Impression       Row Name 08/16/23 1320          Pain Assessment    Pretreatment Pain Rating 0/10 - no pain  -KD     Posttreatment Pain Rating 0/10 - no pain  -KD       Row Name 08/16/23 1320          Plan of Care Review    Plan of Care Reviewed With patient  -KD     Progress improving  -KD     Outcome Evaluation Nsg deferred any OOB/EOB @ this time. Pt completed finger, wrist and elbow flex/ext w/ vc's. Pt then distracted by visitors arrival and tx ended. No goals met @ this time. Cont OT POC.  -KD       Row Name 08/16/23 1320          Therapy Assessment/Plan (OT)    Therapy Frequency (OT) other (see comments)  5-7 d/wk  -KD       Row Name 08/16/23 1320          Therapy Plan Review/Discharge Plan (OT)    Anticipated Discharge Disposition (OT) inpatient rehabilitation facility;[facility] ([facility]);skilled nursing facility  -KD       Row Name 08/16/23 1320          Vital Signs    Pre Systolic BP Rehab 145  -KD     Pre Treatment Diastolic BP 60  -KD     Pretreatment Heart Rate (beats/min) 97  -KD     Pre SpO2 (%) 95  -KD     O2 Delivery Pre Treatment room air  -KD     Pre Patient Position Supine  -KD     Intra Patient Position Supine  -KD     Post Patient Position Supine  -KD       Row Name 08/16/23 1320          Positioning and Restraints    Pre-Treatment Position in bed  -KD     Post Treatment Position bed  -KD     In Bed supine;call light within reach;encouraged to call for assist;exit alarm on  -KD               User Key  (r) = Recorded By, (t) = Taken By, (c) = Cosigned By      Initials Name Provider Type    Tamara Marks COTA  Occupational Therapist Assistant                   Outcome Measures       Row Name 08/16/23 1320          How much help from another is currently needed...    Putting on and taking off regular lower body clothing? 2  -KD     Bathing (including washing, rinsing, and drying) 2  -KD     Toileting (which includes using toilet bed pan or urinal) 2  -KD     Putting on and taking off regular upper body clothing 2  -KD     Taking care of personal grooming (such as brushing teeth) 2  -KD     Eating meals 2  -KD     AM-PAC 6 Clicks Score (OT) 12  -KD       Row Name 08/16/23 0730          How much help from another person do you currently need...    Turning from your back to your side while in flat bed without using bedrails? 3  -MB     Moving from lying on back to sitting on the side of a flat bed without bedrails? 2  -MB     Moving to and from a bed to a chair (including a wheelchair)? 2  -MB     Standing up from a chair using your arms (e.g., wheelchair, bedside chair)? 1  -MB     Climbing 3-5 steps with a railing? 1  -MB     To walk in hospital room? 1  -MB     AM-PAC 6 Clicks Score (PT) 10  -MB     Highest level of mobility 4 --> Transferred to chair/commode  -MB               User Key  (r) = Recorded By, (t) = Taken By, (c) = Cosigned By      Initials Name Provider Type    Rosie Wilcox, RN Registered Nurse    Tamara Marks COTA Occupational Therapist Assistant                    Occupational Therapy Education       Title: PT OT SLP Therapies (Done)       Topic: Occupational Therapy (Done)       Point: ADL training (Done)       Description:   Instruct learner(s) on proper safety adaptation and remediation techniques during self care or transfers.   Instruct in proper use of assistive devices.                  Learning Progress Summary             Patient Acceptance, E,TB, VU,NR by AR at 8/14/2023 1734    Acceptance, E,TB, VU,NR by NIC at 8/12/2023 1605    Comment: POC, role of OT    Acceptance, E,TB, NR by RW  at 8/2/2023 1608    Comment: POC, Role of OT, transfer training   Family Acceptance, E,TB, VU,NR by AR at 8/14/2023 1734                         Point: Home exercise program (Done)       Description:   Instruct learner(s) on appropriate technique for monitoring, assisting and/or progressing therapeutic exercises/activities.                  Learning Progress Summary             Patient Acceptance, E,TB, VU,NR by AR at 8/14/2023 1734   Family Acceptance, E,TB, VU,NR by AR at 8/14/2023 1734                         Point: Precautions (Done)       Description:   Instruct learner(s) on prescribed precautions during self-care and functional transfers.                  Learning Progress Summary             Patient Acceptance, E,TB, VU,NR by AR at 8/14/2023 1734    Acceptance, E,TB, NR by RW at 8/2/2023 1608    Comment: POC, Role of OT, transfer training   Family Acceptance, E,TB, VU,NR by AR at 8/14/2023 1734                         Point: Body mechanics (Done)       Description:   Instruct learner(s) on proper positioning and spine alignment during self-care, functional mobility activities and/or exercises.                  Learning Progress Summary             Patient Acceptance, E,TB, VU,NR by AR at 8/14/2023 1734    Acceptance, E,TB, NR by RW at 8/2/2023 1608    Comment: POC, Role of OT, transfer training   Family Acceptance, E,TB, VU,NR by AR at 8/14/2023 1734                                         User Key       Initials Effective Dates Name Provider Type Discipline    AR 08/18/21 -  Nai Hays, RN Registered Nurse Nurse     06/14/21 -  Ivelisse Singh OT Occupational Therapist OT    RW 09/22/22 -  Ana Vela OT Occupational Therapist OT                  OT Recommendation and Plan  Therapy Frequency (OT): other (see comments) (5-7 d/wk)  Plan of Care Review  Plan of Care Reviewed With: patient  Progress: improving  Outcome Evaluation: Nsg deferred any OOB/EOB @ this time. Pt completed finger, wrist and  elbow flex/ext w/ vc's. Pt then distracted by visitors arrival and tx ended. No goals met @ this time. Cont OT POC.     Time Calculation:         Time Calculation- OT       Row Name 08/16/23 1320             Time Calculation- OT    OT Start Time 1320  -KD      OT Stop Time 1335  -KD      OT Time Calculation (min) 15 min  -KD      Total Timed Code Minutes- OT 15 minute(s)  -KD      OT Received On 08/16/23  -KD         Timed Charges    61410 - OT Therapeutic Exercise Minutes 15  -KD         Total Minutes    Timed Charges Total Minutes 15  -KD       Total Minutes 15  -KD                User Key  (r) = Recorded By, (t) = Taken By, (c) = Cosigned By      Initials Name Provider Type    KD Tamara Mena COTA Occupational Therapist Assistant                  Therapy Charges for Today       Code Description Service Date Service Provider Modifiers Qty    33658736401 HC OT THER PROC EA 15 MIN 8/16/2023 Tamara Mena COTA GO 1                 QUIQUE Monahan  8/16/2023

## 2023-08-16 NOTE — THERAPY TREATMENT NOTE
"Acute Care - Speech Language Pathology   Swallow Treatment Note [facility]     Patient Name: Carol Sullivan  : 1940  MRN: [id number]  Today's Date: 2023               Admit Date: 2023    Visit Dx:     ICD-10-CM ICD-9-CM   1. Mastitis  N61.0 611.0   2. Somnolence  R40.0 780.09   3. Sepsis, due to unspecified organism, unspecified whether acute organ dysfunction present  A41.9 038.9     995.91   4. Impaired functional mobility, balance, gait, and endurance [Z74.09 (ICD-10-CM)]  Z74.09 V49.89   5. Impaired mobility and ADLs [Z74.09, Z78.9 (ICD-10-CM)]  Z74.09 V49.89    Z78.9    6. Other hydronephrosis  N13.39 591   7. Acute respiratory failure with hypoxia  J96.01 518.81   8. Oral phase dysphagia [R13.11 (ICD-10-CM)]  R13.11 787.21   9. ESRD (end stage renal disease) on dialysis  N18.6 585.6    Z99.2 V45.11     Patient Active Problem List   Diagnosis    Hypertension    Hyperlipidemia    Near syncope    GERD (gastroesophageal reflux disease)    Palpitation    PVC (premature ventricular contraction)    Breast calcifications on mammogram    Paroxysmal atrial fibrillation    Peripheral vascular disease, unspecified    Type 2 diabetes mellitus with other specified complication    Long term (current) use of anticoagulants    Encounter for therapeutic drug monitoring    Sepsis    Mastitis    Other hydronephrosis     Past Medical History:   Diagnosis Date    Arthritis     Depression     GERD (gastroesophageal reflux disease)     Hyperlipidemia     Hypertension     Near syncope     Vascular disease      Past Surgical History:   Procedure Laterality Date    BLADDER SURGERY      ENDOSCOPY N/A 2019    Procedure: ESOPHAGOGASTRODUODENOSCOPY;  Surgeon: Alberto Sanchez DO;  Location: [facility] ENDOSCOPY;  Service: Gastroenterology    GALLBLADDER SURGERY      SHOULDER SURGERY      \"bone spurs\"    SUBTOTAL HYSTERECTOMY         SLP Recommendation and Plan     SLP Diet Recommendation: thin liquids, puree " (08/16/23 0715)                          Therapy Frequency (Swallow): 2 days per week, 3 days per week (08/16/23 0715)  Predicted Duration Therapy Intervention (Days): 1 week (08/16/23 0715)           Daily Summary of Progress (SLP): progress toward functional goals is gradual (08/16/23 0715)               Treatment Assessment (SLP): continued, oral dysphagia (08/16/23 0715)  Treatment Assessment Comments (SLP): ST: pt is confused and this is a barrier to self feeding and oral awareness for clearing chewable foods from mouth.  pt requires feeding assistance and cues to clear mouth.  recommend downgrade to puree at this time and f/u for diet advancement as appropriate and caregiver education for safe swallow strategies. (08/16/23 0715)  Plan for Continued Treatment (SLP): goals adjusted to reflect functional decline demonstrated (08/16/23 0715)            Plan of Care Reviewed With: patient  Progress: no change  Outcome Evaluation: ST: pt seen at am meal prior to HD.  pt confused and unable to self feed safely.  dentures were put in mouth and sign on dry board updated for nsg to make sure denture adhesive is used and dentures in mouth before all PO.  pt with considerable trouble chewing and clearing oral cavity.  required multiple liquid washes and multiple verbal cues to clear diffuse oral residue between bites.  recommend downgrade to puree solids.  pt will benefit from assistance with PO d/t confusion largest barrier to safety with PO.  SLP will f/u for diet texture analysis and upgrade as appropriate as well as pt/caregiver education for safe swallow strategies to decrease risk of aspiration.      SWALLOW EVALUATION (last 72 hours)       SLP Adult Swallow Evaluation       Row Name 08/16/23 0715 08/14/23 9916                Rehab Evaluation    Document Type therapy note (daily note)  -EC evaluation  -EC       Subjective Information no complaints  -EC complains of  dry mouth  -EC       Patient Observations  alert;cooperative;agree to therapy  -EC alert;cooperative;agree to therapy  -EC       Patient/Family/Caregiver Comments/Observations none  -EC   -EC       Patient Effort adequate  -EC adequate  -EC          General Information    Patient Profile Reviewed yes  -EC yes  -EC       Pertinent History Of Current Problem -- extubated and alert for PO trials at this time.  -EC       Current Method of Nutrition mechanical ground textures  -EC nasogastric feedings  -EC       Prior Level of Function-Swallowing no diet consistency restrictions  -EC no diet consistency restrictions  -EC       Plans/Goals Discussed with patient;spouse/S.O.  -EC patient;spouse/S.O.  -EC       Barriers to Rehab none identified  -EC none identified  -EC       Patient's Goals for Discharge -- patient did not state  -EC          Pain Scale: Numbers Pre/Post-Treatment    Pretreatment Pain Rating 0/10 - no pain  -EC 5/10  -EC       Posttreatment Pain Rating 0/10 - no pain  -EC 5/10  -EC       Pain Location - Side/Orientation -- Right  -EC       Pain Location - -- abdomen  -EC       Pain Intervention(s) -- Repositioned;Nursing Notified  -EC          Oral Motor Structure and Function    Oral Lesions or Structural Abnormalities and/or variants -- very dry and crusty under tongue  -EC       Dentition Assessment upper dentures/partial in place  -EC other (see comments);dentures are ill fitting  unable towear top dentures for eval.  hassome natural bottom teeth  -EC       Secretion Management WNL/WFL  -EC dried secretions in oral cavity  -EC       Mucosal Quality -- dry  -EC       Gag Response WFL  -EC WFL  -EC       Volitional Swallow WFL  -EC WFL  -EC          General Eating/Swallowing Observations    Respiratory Support Currently in Use room air  -EC room air  -EC       Eating/Swallowing Skills fed by SLP  -EC fed by SLP  -EC       Positioning During Eating upright 90 degree;upright in bed  -EC upright 90 degree;upright in chair  -EC       Utensils  Used spoon;cup;straw  -EC spoon;cup;straw  -EC       Consistencies Trialed soft to chew textures;regular textures;thin liquids  PB crackers, fruit cup/cheerios, milk, water/straw  -EC ice chips;pureed;soft to chew textures  water, ice, apples sauce, ryan cracker  -EC          Clinical Swallow Eval    Oral Prep Phase impaired  -EC impaired  -EC       Oral Transit impaired  -EC WFL  -EC       Oral Residue impaired  -EC impaired  -EC       Pharyngeal Phase no overt signs/symptoms of pharyngeal impairment  -EC no overt signs/symptoms of pharyngeal impairment  -EC       Esophageal Phase unremarkable  -EC unremarkable  -EC       Clinical Swallow Evaluation Summary trials of chewable given with increased oral prep time noted, difficulty with oral propulsion and diffuse oral residue reqring cues for extra swallows and liquid washes to clear oral cavity.  pt is confused and picking at tray without meaning and speech is not always coherent.  -EC pt demonstrates some increased oral prep gideon with chewable solids d/t not able to wear top dentures.  adhesive will be brought to room and they will try top teeth with nursing.  pt has very dry mouth and there is some coughing  associated with first few swallows.  pt encouraged to use double swallow with improvement noted.  good tolerance of applesauce.  -EC          Oral Prep Concerns    Oral Prep Concerns increased prep time  -EC increased prep time  -EC       Increased Prep Time mechanical soft;regular consistencies  -EC mechanical soft  -EC          Oral Transit Concerns    Oral Transit Concerns increased oral transit time  -EC --       Increased Oral Transit Time regular consistencies;mechanical soft  -EC --          Oral Residue Concerns    Oral Residue Concerns diffuse residue throughout oral cavity  -EC diffuse residue throughout oral cavity  -EC       Diffuse Residue Throughout Oral Cavity mechanical soft;regular consistencies  -EC mechanical soft  -EC       Oral Residue  Concerns, Comment -- cleared with liquid wash  -EC          SLP Evaluation Clinical Impression    SLP Swallowing Diagnosis -- mild;oral dysphagia  -EC       Functional Impact -- risk of malnutrition  -EC       Rehab Potential/Prognosis, Swallowing -- good, to achieve stated therapy goals  -EC       Swallow Criteria for Skilled Therapeutic Interventions Met -- demonstrates skilled criteria  -EC          SLP Treatment Clinical Impressions    Treatment Assessment (SLP) continued;oral dysphagia  -EC --       Treatment Assessment Comments (SLP) ST: pt is confused and this is a barrier to self feeding and oral awareness for clearing chewable foods from mouth.  pt requires feeding assistance and cues to clear mouth.  recommend downgrade to puree at this time and f/u for diet advancement as appropriate and caregiver education for safe swallow strategies.  -EC --       Daily Summary of Progress (SLP) progress toward functional goals is gradual  -EC --       Barriers to Overall Progress (SLP) Cognitive status  -EC --       Plan for Continued Treatment (SLP) goals adjusted to reflect functional decline demonstrated  -EC --       Care Plan Review evaluation/treatment results reviewed;care plan/treatment goals reviewed;risks/benefits reviewed;current/potential barriers reviewed;patient/other agree to care plan  -EC --          Recommendations    Therapy Frequency (Swallow) 2 days per week;3 days per week  -EC 2 days per week;3 days per week  -EC       Predicted Duration Therapy Intervention (Days) 1 week  -EC 1 week  -EC       SLP Diet Recommendation thin liquids;puree  -EC mechanical ground textures;thin liquids  -EC          Swallow Goals (SLP)    Swallow LTGs Patient will demonstrate functional swallow for  -EC Patient will demonstrate functional swallow for  -EC       Swallow STGs diet tolerance goal selection (SLP)  -EC diet tolerance goal selection (SLP)  -EC       Diet Tolerance Goal Selection (SLP) Patient will tolerate  trials of  -EC Patient will tolerate trials of  -EC          (LTG) Patient will demonstrate functional swallow for    Diet Texture (Demonstrate functional swallow) soft to chew (chopped) textures  -EC regular textures  -EC       Liquid viscosity (Demonstrate functional swallow) thin liquids  -EC thin liquids  -EC       Deuel (Demonstrate functional swallow) independently (over 90% accuracy)  -EC independently (over 90% accuracy)  -EC       Time Frame (Demonstrate functional swallow) 1 week  -EC 1 week  -EC       Barriers (Demonstrate functional swallow) deconditioning  -EC deconditioning  -EC       Progress/Outcomes (Demonstrate functional swallow) goal ongoing;goal revised this date  -EC new goal  -EC          (STG) Patient will tolerate trials of    Consistencies Trialed (Tolerate trials) mechanical ground textures;thin liquids;pureed textures  -EC regular textures;mechanical ground textures;thin liquids  -EC       Desired Outcome (Tolerate trials) without signs/symptoms of aspiration;with adequate oral prep/transit/clearance  -EC without signs/symptoms of aspiration;with adequate oral prep/transit/clearance  -EC       Deuel (Tolerate trials) independently (over 90% accuracy)  -EC independently (over 90% accuracy)  -EC       Time Frame (Tolerate trials) 1 week  -EC 1 week  -EC       Progress/Outcomes (Tolerate trials) goal revised this date;goal ongoing  -EC new goal  -EC                 User Key  (r) = Recorded By, (t) = Taken By, (c) = Cosigned By      Initials Name Effective Dates    Tena Escobar CCC-SLP 07/11/23 -                     EDUCATION  The patient has been educated in the following areas:   Dysphagia (Swallowing Impairment) Oral Care/Hydration Modified Diet Instruction.        SLP GOALS       Row Name 08/16/23 0715 08/14/23 1054          (LTG) Patient will demonstrate functional swallow for    Diet Texture (Demonstrate functional swallow) soft to chew (chopped) textures  -EC  regular textures  -EC     Liquid viscosity (Demonstrate functional swallow) thin liquids  -EC thin liquids  -EC     [place] (Demonstrate functional swallow) independently (over 90% accuracy)  -EC independently (over 90% accuracy)  -EC     Time Frame (Demonstrate functional swallow) 1 week  -EC 1 week  -EC     Barriers (Demonstrate functional swallow) deconditioning  -EC deconditioning  -EC     Progress/Outcomes (Demonstrate functional swallow) goal ongoing;goal revised this date  -EC new goal  -EC        (STG) Patient will tolerate trials of    Consistencies Trialed (Tolerate trials) mechanical ground textures;thin liquids;pureed textures  -EC regular textures;mechanical ground textures;thin liquids  -EC     Desired Outcome (Tolerate trials) without signs/symptoms of aspiration;with adequate oral prep/transit/clearance  -EC without signs/symptoms of aspiration;with adequate oral prep/transit/clearance  -EC     [place] (Tolerate trials) independently (over 90% accuracy)  -EC independently (over 90% accuracy)  -EC     Time Frame (Tolerate trials) 1 week  -EC 1 week  -EC     Progress/Outcomes (Tolerate trials) goal revised this date;goal ongoing  -EC new goal  -EC               User Key  (r) = Recorded By, (t) = Taken By, (c) = Cosigned By      Initials Name Provider Type    Tena Escobar CCC-SLP Speech and Language Pathologist                       Time Calculation:    Time Calculation- SLP       Row Name 08/16/23 0756             Time Calculation- SLP    SLP Start Time 0715  -EC      SLP Stop Time 0800  -EC      SLP Time Calculation (min) 45 min  -EC      Total Timed Code Minutes- SLP 45 minute(s)  -EC      SLP Received On 08/16/23  -EC      SLP Goal Re-Cert Due Date 08/28/23  -EC         Untimed Charges    [id number]-HM Treatment Swallow Minutes 45  -EC         Total Minutes    Untimed Charges Total Minutes 45  -EC       Total Minutes 45  -EC                User Key  (r) = Recorded By, (t) = Taken By,  (c) = Cosigned By      Initials Name Provider Type    EC Tena Mendoza CCC-SLP Speech and Language Pathologist                    Therapy Charges for Today       Code Description Service Date Service Provider Modifiers Qty    [id number]  ST TREATMENT SWALLOW 3 8/16/2023 Tena Mendoza CCC-SLP GN 1                 GRANT Roberson  8/16/2023

## 2023-08-16 NOTE — DISCHARGE PLACEMENT REQUEST
"Nedivya Claudia Swift (82 y.o. Female)       Date of Birth   1940    Social Security Number       [address] [facility] [address]    Home Phone   [phone]    MRN   [id number]       [employer]   [employer]ism    Marital Status                               Admission Date   7/28/23    Admission Type   Emergency    Admitting Provider   Paul Martinez MD    Attending Provider   Paul Martinez MD    Department, Room/Bed   [address], 333/1       Discharge Date       Discharge Disposition       Discharge Destination                                 Attending Provider: Paul Martinez MD    Allergies: Tuberculin Tests, Hydrocodone, Lortab [Hydrocodone-acetaminophen]    Isolation: Spore   Infection: C.difficile (07/29/23)   Code Status: No CPR    Ht: 165.1 cm (65\")   Wt: 68.5 kg (151 lb)    Admission Cmt: None   Principal Problem: Sepsis [A41.9]                   Active Insurance as of 7/28/2023       Primary Coverage       Payor Plan Insurance Group Employer/Plan Group    HUMANA MEDICARE REPLACEMENT HUMANA MEDICARE REPLACEMENT [id number]       Payor Plan Address Payor Plan Phone Number Payor Plan Fax Number Effective Dates    [address] [address] [phone]  5/1/2023 - None Entered    [facility] [address]         Subscriber Name Subscriber Birth Date Member ID       CLAUDIA SULLIVAN 1940 [id number]               Secondary Coverage       Payor Plan Insurance Group Employer/Plan Group    [facility] SUP PLAN F       Payor Plan Address Payor Plan Phone Number Payor Plan Fax Number Effective Dates    [address] [phone]  5/1/2009 - None Entered    [facility] [id number]         Subscriber Name Subscriber Birth Date Member ID       CLAUDIA SULLIVAN 1940 [id number]                     Emergency Contacts        (Rel.) Home Phone Work Phone Mobile Phone    Tristan Sullivan (Spouse) [phone] -- [phone]    zackery sullivan " (Son) [phone] -- [phone]          Study Result    Narrative & Impression   XR CHEST 1 VIEW     HISTORY: ET PLACEMENT     COMPARISON: 8/6/2023 at 1342 hours.     FINDINGS:  The endotracheal tube is in appropriate position. A defibrillator pad overlies  the right chest. The right ureteral stent is unchanged.     Mild atelectatic changes are noted in both lungs. There is no pleural effusion  or pneumothorax.     Imaging    XR Chest 1 View (Order: [id number]) - 8/6/2023    Result History    XR Chest 1 View (Order #[id number]) on 8/6/2023 - Order Result History Report    Signed    Electronically signed by Miguel Ángel Pereyra MD on 8/6/23 at 1908 CDT       Current Facility-Administered Medications   Medication Dose Route Frequency Provider Last Rate Last Admin    acetaminophen (TYLENOL) 160 MG/5ML solution 650 mg  650 mg Oral Q6H PRN Chandana Schultz MD   650 mg at 08/14/23 1135    albumin human 25 % IV SOLN 12.5 g  12.5 g Intravenous PRN Stephanie Gómez MD        polyethylene glycol (MIRALAX) packet 17 g  17 g Oral Daily PRN Fox Roper MD        And    bisacodyl (DULCOLAX) EC tablet 5 mg  5 mg Oral Daily PRN Fox Roper MD        And    bisacodyl (DULCOLAX) suppository 10 mg  10 mg Rectal Daily PRN Fox Roper MD        Calcium Replacement - Follow Nurse / BPA Driven Protocol   Does not apply PRN Ousmane Garcia MD        dextrose (D50W) (25 g/50 mL) IV injection 25 g  25 g Intravenous Q15 Min PRN Oscar Vela MD        dextrose (GLUTOSE) oral gel 15 g  15 g Oral Q15 Min PRN Oscar Vela MD        glucagon HCl (Diagnostic) injection 1 mg  1 mg Intramuscular Q15 Min PRN Oscar Vela MD        heparin (porcine) injection 4,000 Units  4,000 Units Intracatheter PRN Cris Moreno MD   4,000 Units at 08/14/23 1938    hydrALAZINE (APRESOLINE) injection 10 mg  10 mg Intravenous Q6H PRN Oscar Vela MD   10 mg at 08/15/23 0352    HYDROcodone-acetaminophen (NORCO) 5-325 MG  per tablet 1 tablet  1 tablet Oral Q6H PRN Paul Martinez MD   1 tablet at 08/14/23 1551    hydrocortisone-bacitracin-zinc oxide-nystatin (MAGIC BARRIER) ointment 1 application   1 application  Topical BID PRN Chandana Schultz MD   1 application  at 08/15/23 0820    Insulin Aspart (novoLOG) injection 0-9 Units  0-9 Units Subcutaneous TID AC Oscar Vela MD   4 Units at 08/15/23 1813    isosorbide mononitrate (IMDUR) 24 hr tablet 30 mg  30 mg Oral Daily Chandana Schultz MD   30 mg at 08/15/23 0817    ketotifen (ZADITOR) 0.025 % ophthalmic solution 1 drop  1 drop Both Eyes Daily PRN Ousmane Garcia MD   1 drop at 07/30/23 1600    labetalol (NORMODYNE,TRANDATE) injection 10 mg  10 mg Intravenous Q4H PRN Behroozi, Saeid, MD   10 mg at 08/12/23 1825    LORazepam (ATIVAN) injection 1 mg  1 mg Intravenous Q4H PRN Chandana Schultz MD   1 mg at 08/11/23 0426    Magnesium Standard Dose Replacement - Follow Nurse / BPA Driven Protocol   Does not apply PRN Ousmane Garcia MD        mannitol 25% (RENAL) injection  25 g Intravenous Once Stephanie Gómez MD   Held at 08/14/23 1219    melatonin tablet 6 mg  6 mg Nasogastric Nightly PRN Fox Roper MD   6 mg at 08/11/23 0105    metoprolol tartrate (LOPRESSOR) injection 5 mg  5 mg Intravenous Q6H PRN Ousmane Garcia MD   5 mg at 08/10/23 2252    metoprolol tartrate (LOPRESSOR) tablet 100 mg  100 mg Oral Q12H Paul Martinez MD   100 mg at 08/15/23 2052    naloxone (NARCAN) injection 0.4 mg  0.4 mg Intravenous Q5 Min PRN Ousmane Garcia MD        nitroglycerin (NITROSTAT) SL tablet 0.4 mg  0.4 mg Sublingual Q5 Min PRN Ousmane Garcia MD        nystatin (MYCOSTATIN) powder   Topical Q12H Ousmane Garcia MD   Given at 08/15/23 0817    ondansetron (ZOFRAN) tablet 4 mg  4 mg Nasogastric Q6H PRN Fox Roper MD        Or    ondansetron (ZOFRAN) injection 4 mg  4 mg Intravenous Q6H PRN Fox Roper MD        pantoprazole (PROTONIX) injection 40  mg  40 mg Intravenous Q AM Ousmane Garcia MD   40 mg at 08/16/23 0620    Phosphorus Replacement - Follow Nurse / BPA Driven Protocol   Does not apply PRN Ousmane Garcia MD        potassium chloride (MICRO-K) CR capsule 40 mEq  40 mEq Oral Once Stephanie Gómez MD        rosuvastatin (CRESTOR) tablet 40 mg  40 mg Oral Daily Ousmane Garcia MD   40 mg at 08/15/23 0817    sodium chloride 0.9 % bolus 100 mL  100 mL Intravenous PRN Stephanie Gómez MD        sodium chloride 0.9 % bolus 100 mL  100 mL Intravenous PRN Stephanie Gómez MD        sodium chloride 0.9 % bolus 100 mL  100 mL Intravenous PRN Stephanie Gómez MD        sodium chloride 0.9 % flush 10 mL  10 mL Intravenous Q12H Ousmane Garcia MD   10 mL at 08/16/23 0619    sodium chloride 0.9 % flush 10 mL  10 mL Intravenous Q12H Ousmane Garcia MD   10 mL at 08/16/23 0619    sodium chloride 0.9 % flush 10 mL  10 mL Intravenous PRN Ousmane Garcia MD   10 mL at 08/14/23 0603    sodium chloride 0.9 % infusion 40 mL  40 mL Intravenous PRN Ousmane Garcia MD

## 2023-08-16 NOTE — PROGRESS NOTES
"    [facility]  [address] [place] [address]. [address]  T - [phone]  F - [phone]     Progress Note          PATIENT  DEMOGRAPHICS   PATIENT NAME: Carol Sullivan                      PHYSICIAN: Stephanie Gómez MD  : 1940  MRN: [id number]   LOS: 17 days    Patient Care Team:  Len Seo MD as PCP - General  ElbertBlaire ribera APRN as Nurse Practitioner (General Surgery)  Subjective   SUBJECTIVE   no leg edema. Seen during hd. No soa.        Objective   OBJECTIVE   Vital Signs  Temp:  [96.2 øF (35.7 øC)-99.3 øF (37.4 øC)] 97.7 øF (36.5 øC)  Heart Rate:  [] 66  Resp:  [16-18] 18  BP: ()/(53-76) 116/55    Flowsheet Rows      Flowsheet Row First Filed Value   Admission Height 165.1 cm (65\") Documented at 2023 1900   Admission Weight 74.8 kg (165 lb) Documented at 2023 1608             I/O last 3 completed shifts:  In: 599 [Other:127; NG/GT:472]  Out:  [Urine:250]    PHYSICAL EXAM    Physical Exam  Vitals and nursing note reviewed.   Constitutional:       General: She is awake.      Appearance: Normal appearance. She is not ill-appearing.   HENT:      Head: Normocephalic and atraumatic.   Cardiovascular:      Rate and Rhythm: Normal rate. Rhythm irregular.      Heart sounds: Normal heart sounds. No murmur heard.    No friction rub. No gallop.   Pulmonary:      Effort: No respiratory distress.      Breath sounds: Normal breath sounds. No stridor. No wheezing, rhonchi or rales.   Abdominal:      General: Bowel sounds are normal. There is no distension.      Palpations: Abdomen is soft.      Tenderness: There is no abdominal tenderness.   Musculoskeletal:      Right lower leg: No edema.      Left lower leg: No edema.   Skin:     General: Skin is warm and dry.   Neurological:      GCS: GCS eye subscore is 2. GCS verbal subscore is 2. GCS motor subscore is 4.       RESULTS   Results Review:    Results from last 7 days   Lab Units 23  0604 " 08/15/23  0414 08/14/23  0533   SODIUM mmol/L 131* 132* 137   POTASSIUM mmol/L 3.3* 3.5 3.9   CHLORIDE mmol/L 89* 91* 91*   CO2 mmol/L 18.0* 24.0 19.0*   BUN mg/dL 129* 102* 160*   CREATININE mg/dL 6.75* 5.23* 7.29*   CALCIUM mg/dL 8.6 8.2* 8.1*   BILIRUBIN mg/dL 1.0 0.8 0.7   ALK PHOS U/L 359* 438* 457*   ALT (SGPT) U/L 94* 134* 159*   AST (SGOT) U/L 36* 66* 90*   GLUCOSE mg/dL 94 215* 363*         Estimated Creatinine Clearance: 6.4 mL/min (A) (by C-G formula based on SCr of 6.75 mg/dL (H)).    Results from last 7 days   Lab Units 08/15/23  0414   MAGNESIUM mg/dL 1.9   PHOSPHORUS mg/dL 5.7*               Results from last 7 days   Lab Units 08/16/23  0604 08/16/23  0025 08/15/23  1159 08/15/23  0414 08/14/23  2342 08/14/23  1151 08/14/23  0533 08/13/23  1212 08/13/23  0518 08/12/23  1143 08/12/23  0555   WBC 10*3/mm3 18.50*  --   --  19.80*  --   --  20.40*  --  18.17*  --  17.89*   HEMOGLOBIN g/dL 11.7* 11.3* 12.6 12.4 12.3   < > 11.4*   < > 11.1*   < > 10.8*   PLATELETS 10*3/mm3 272  --   --  314  --   --  346  --  301  --  260    < > = values in this interval not displayed.         Results from last 7 days   Lab Units 08/16/23  0604 08/15/23  0414 08/14/23  0533 08/13/23  0518 08/12/23  0555   INR  1.30* 1.32* 1.30* 1.41* 1.41*           Imaging Results (Last 24 Hours)       ** No results found for the last 24 hours. **             MEDICATIONS    Insulin Aspart, 0-9 Units, Subcutaneous, TID AC  isosorbide mononitrate, 30 mg, Oral, Daily  mannitol, 25 g, Intravenous, Once  metoprolol tartrate, 100 mg, Oral, Q12H  nystatin, , Topical, Q12H  pantoprazole, 40 mg, Intravenous, Q AM  rosuvastatin, 40 mg, Oral, Daily  sodium chloride, 10 mL, Intravenous, Q12H  sodium chloride, 10 mL, Intravenous, Q12H             Assessment & Plan   ASSESSMENT / PLAN      Sepsis    Mastitis    Other hydronephrosis    ESRD (end stage renal disease) on dialysis    1. Acute kidney injury/ Acute tubular necrosis:  - Baseline unknown.   -  Creatinine was 1.5 in 10/2022.   - UA 3+ protein, trace leukocytes, 0-2 RBC, 6-12 WBC, 2+ bacteria.   - Urine sodium 26. CT abd large rt retroperitoneal hematoma causing moderate rt hydronephrosis.  - Creatinine since admission was 1.5-1.6 range and then rapidly worsening.   - Started HD due to worsening renal function 8/6/23    Hd today. Marked uremia and elevated cr. required mannitol on Monday. Dr Yin will place permcath later today    2. Renal failure retroperitoneal bleed s/p right J stent for hydronephrosis   -s/p J stent placement by urology 8/11  -Continue to monitor renal function     3. Hypertension/ Afib with RVR:   - Blood pressure was low and now high. Off pressors. HR was high and now metoprolol. Losartan on hold    4. Metabolic acidosis:   - Was on bicarb drip. Now modulate with hd    5. Hypocalcemia     5. Sepsis:  - zosyn at present. C.diff carrier     6. UTI E.fecalis:   - became septic with mild hydro Dr Garcia is consulted. Now stent in place . E.fecalis    7. Cdiff:   -not on po vanc now      8. Hyponatremia:   - Sodium is stable    8. Anemia / retroperitoneal bleed:  - Hemoglobin is acceptable     9.  AMS           This document has been electronically signed by Stephanie Gómez MD on August 16, 2023 10:40 CDT

## 2023-08-16 NOTE — PLAN OF CARE
Goal Outcome Evaluation:  Plan of Care Reviewed With: patient        Progress: improving  Outcome Evaluation: Nsg deferred any OOB/EOB @ this time. Pt completed finger, wrist and elbow flex/ext w/ vc's. Pt then distracted by visitors arrival and tx ended. No goals met @ this time. Cont OT POC.      Anticipated Discharge Disposition (OT): inpatient rehabilitation facility, LTCH (long-term care hospital), skilled nursing facility

## 2023-08-16 NOTE — PROGRESS NOTES
[facility] Medicine Services  INPATIENT PROGRESS NOTE      Length of Stay: 17  Date of Admission: 7/28/2023  Primary Care Physician: Len Seo MD    Subjective   Chief Complaint: No new complaints  HPI:  Patient somewhat confused during my initial evaluation during HD but improved this afternoon.  Oriented to person and that she is in the hospital. Able to name family members in the room.     Review of Systems     All pertinent negatives and positives are as above. All other systems have been reviewed and are negative unless otherwise stated.     Objective    As of today 08/16/23  Temp:  [96.2 øF (35.7 øC)-99.3 øF (37.4 øC)] 98.5 øF (36.9 øC)  Heart Rate:  [] 62  Resp:  [16-20] 16  BP: ()/(48-76) 138/54    Physical Exam  Constitutional:       General: She is not in acute distress.     Appearance: She is not toxic-appearing.   HENT:      Head: Normocephalic and atraumatic.      Right Ear: External ear normal.      Left Ear: External ear normal.      Nose: Nose normal.      Mouth/Throat:      Pharynx: Oropharynx is clear.   Eyes:      Conjunctiva/sclera: Conjunctivae normal.   Cardiovascular:      Rate and Rhythm: Normal rate and regular rhythm.      Heart sounds: Normal heart sounds.   Pulmonary:      Effort: Pulmonary effort is normal. No respiratory distress.      Breath sounds: Normal breath sounds.   Abdominal:      General: Bowel sounds are normal.      Palpations: Abdomen is soft.      Tenderness: There is no abdominal tenderness.   Musculoskeletal:         General: No deformity.   Skin:     General: Skin is warm and dry.      Capillary Refill: Capillary refill takes less than 2 seconds.   Neurological:      Comments: Follows commands, moves extremities, speech is fluent and understandable   Psychiatric:         Behavior: Behavior normal.           Results Review:  I have reviewed the labs, radiology results, and diagnostic  studies.    Laboratory Data:   Results from last 7 days   Lab Units 08/16/23  0604 08/15/23  0414 08/14/23  0533   SODIUM mmol/L 131* 132* 137   POTASSIUM mmol/L 3.3* 3.5 3.9   CHLORIDE mmol/L 89* 91* 91*   CO2 mmol/L 18.0* 24.0 19.0*   BUN mg/dL 129* 102* 160*   CREATININE mg/dL 6.75* 5.23* 7.29*   GLUCOSE mg/dL 94 215* 363*   CALCIUM mg/dL 8.6 8.2* 8.1*   BILIRUBIN mg/dL 1.0 0.8 0.7   ALK PHOS U/L 359* 438* 457*   ALT (SGPT) U/L 94* 134* 159*   AST (SGOT) U/L 36* 66* 90*   ANION GAP mmol/L 24.0* 17.0* 27.0*     Estimated Creatinine Clearance: 6.4 mL/min (A) (by C-G formula based on SCr of 6.75 mg/dL (H)).  Results from last 7 days   Lab Units 08/15/23  0414   MAGNESIUM mg/dL 1.9   PHOSPHORUS mg/dL 5.7*         Results from last 7 days   Lab Units 08/16/23  1218 08/16/23  0604 08/16/23  0025 08/15/23  1159 08/15/23  0414 08/14/23  1151 08/14/23  0533 08/13/23  1212 08/13/23  0518 08/12/23  1143 08/12/23  0555   WBC 10*3/mm3  --  18.50*  --   --  19.80*  --  20.40*  --  18.17*  --  17.89*   HEMOGLOBIN g/dL 12.3 11.7* 11.3* 12.6 12.4   < > 11.4*   < > 11.1*   < > 10.8*   HEMATOCRIT % 35.5 33.2* 32.3* 37.0 35.1   < > 33.3*   < > 33.8*   < > 31.4*   PLATELETS 10*3/mm3  --  272  --   --  314  --  346  --  301  --  260    < > = values in this interval not displayed.     Results from last 7 days   Lab Units 08/16/23  0604 08/15/23  0414 08/14/23  0533 08/13/23  0518 08/12/23  0555   INR  1.30* 1.32* 1.30* 1.41* 1.41*       Culture Data:   No results found for: BLOODCX  No results found for: URINECX  No results found for: RESPCX  No results found for: WOUNDCX  No results found for: STOOLCX  No components found for: BODYFLD    Radiology Data:   Imaging Results (Last 24 Hours)       ** No results found for the last 24 hours. **            I have reviewed the patient's current medications.     Assessment/Plan     Principal Problem:    Sepsis  Active Problems:    Mastitis    Other hydronephrosis    ESRD (end stage renal  disease) on dialysis  Sepsis secondary to left breast mastitis improved but then further complicated by sepsis of urological origin from UTI improved  Shock now resolved  Hypertension  Paroxysmal atrial fibrillation  BLAINE now progressed to end-stage renal disease on hemodialysis  UTI due to Enterococcus faecalis completed antibiotic therapy  Acute respiratory failure with hypoxia  Shock liver improving  Acute blood loss anemia  Lactic acidosis due to sepsis now resolved  Retroperitoneal hematoma  Right hydronephrosis  Metabolic encephalopathy versus delirium    Plan:  - Prescient general surgery's assistance with mastitis, status post I&D  - She has completed her antibiotic therapy  - We will pressures are stable at this time  - H&H appears to be stable at this time  - Continue to monitor for any signs of bleeding or drop in hemoglobin.  Transfuse if hemoglobin is less than 7 or if there are active signs of bleeding.  Risk versus benefits have been previously discussed.  - Continue to hold any anticoagulation  - GI urology assistance with J stent placement  - Plan is for patient to have a tunneled catheter placed for dialysis needs today  - Patient has also been accepted to LTAC  - Continue working with PT and OT  - [facility]cient nephrology assistance  - Continue current insulin regimen glucose checks  - DVT prophylaxis with SCDs  - CODE STATUS: DNR      Medical Decision Making  Number and Complexity of problems: Multiple high complexity problems    Conditions and Status:        Condition is improving.     MDM Data  Tests considered but not ordered: None     Decision rules/scores evaluated (example [id number], Wells, etc): None     Discussed with: Patient and family at bedside     Treatment Plan  As above    Care Planning  Code status and discussions: DNR    Disposition  Social Determinants of Health that impact treatment or disposition: none  I expect the patient to be discharged to LTAC either this afternoon or early  tomorrow AM.        I have utilized all available immediate resources to obtain, update, or review the patient's current medications (including all prescriptions, over-the-counter products, herbals, cannabis/cannabidiol products, and vitamin/mineral/dietary (nutritional) supplements).   I confirmed that the patient's Advance Care Plan is present, code status is documented, or surrogate decision maker is listed in the patient's medical record.      Juanito Harrington MD

## 2023-08-16 NOTE — DISCHARGE PLACEMENT REQUEST
"Nedivya Claudia Swift (82 y.o. Female)       Date of Birth   1940    Social Security Number       [address] [facility] [address]    Home Phone   [phone]    MRN   [id number]       Christianity   Jew    Marital Status                               Admission Date   7/28/23    Admission Type   Emergency    Admitting Provider   Paul Martinez MD    Attending Provider   Paul Martinez MD    Department, Room/Bed   [address], 333/[address]       Discharge Date       Discharge Disposition       Discharge Destination                                 Attending Provider: Paul Martinez MD    Allergies: Tuberculin Tests, Hydrocodone, Lortab [Hydrocodone-acetaminophen]    Isolation: Spore   Infection: C.difficile (07/29/23)   Code Status: No CPR    Ht: 165.1 cm (65\")   Wt: 68.5 kg (151 lb)    Admission Cmt: None   Principal Problem: Sepsis [A41.9]                   Active Insurance as of 7/28/2023       Primary Coverage       Payor Plan Insurance Group Employer/Plan Group    HUMANA MEDICARE REPLACEMENT HUMANA MEDICARE REPLACEMENT [id number]       Payor Plan Address Payor Plan Phone Number Payor Plan Fax Number Effective Dates    [address] [address] [phone]  5/1/2023 - None Entered    [facility] [address]         Subscriber Name Subscriber Birth Date Member ID       CLAUDIA SULLIVAN 1940 [id number]               Secondary Coverage       Payor Plan Insurance Group Employer/Plan Group    [facility] SUP PLAN F       Payor Plan Address Payor Plan Phone Number Payor Plan Fax Number Effective Dates    [address] [phone]  5/1/2009 - None Entered    [facility] [id number]         Subscriber Name Subscriber Birth Date Member ID       CLAUDIA SULLIVAN 1940 [id number]                     Emergency Contacts        (Rel.) Home Phone Work Phone Mobile Phone    Tristan Sullivan (Spouse) [phone] -- [phone]    zackery sullivan " (Son) [phone] -- [phone]        [address] [address]  Phone:  [phone]  Fax:  [phone]        Patient:     Omidnicole Swift Nedivya MRN:  [id number]   [address] [facility] [address] :  1940  SSN:    Phone: [phone] Sex:  F      INSURANCE PAYOR PLAN GROUP # SUBSCRIBER ID   Primary:  Secondary:    HUMANA MEDICARE REPLACEMENT  [employer] [place] [id number]  [id number]  PLAN [id number]  [id number]   Admitting Diagnosis: Somnolence [R40.0]  Order Date:  Aug 16, 2023        Hemodialysis inpatient       (Order ID: [id number])     Diagnosis:         Priority:  Routine Expected Date:   Expiration Date:        Interval:  Once Count:    Duration of Treatment: 4.0 Hours  Access Site: Temporary Dialysis Catheter  Dialyzer: Revaclear  DFR: 300 mL/min  Dialysate Temperature (C): 36  BFR-As tolerated to a maximum of: 400 mL/min  Dialysate Solution Bath: K+ = 3 mEq, Ca = 2.5mEq  Bicarb: 35 meq  Na+: 138 meq  Fluid Removal: 3-3.5L     Specimen Type:   Specimen Source:   Specimen Taken Date:   Specimen Taken Time:                  Authorizing Provider:Stephanie Gómez MD  Authorizing Provider's NPI: [id number]  Order Entered By: Stephanie Gómez MD 2023  6:56 AM     Electronically signed by: Stephanie Gómez MD 2023  6:56 AM  Allergies      Tuberculin Tests  Swelling Medium  10/4/2016 Past Updates      Hydrocodone  Nausea Only Not Specified  2021 Past Updates      Lortab [Hydrocodone-acetaminophen]  GI Intolerance Low  10/4/2016 Past Updates       Insurance Information                  HUMANA MEDICARE REPLACEMENT/HUMANA MEDICARE REPLACEMENT Phone: [phone]    Subscriber: Carol Sullivan Subscriber#: [id number]    Group#: [id number] Precert#: --        STATE [employer] Phone: [phone]    Subscriber: Carol Sullivan Subscriber#: [id number]    Group#: [id number] Precert#: --

## 2023-08-16 NOTE — PLAN OF CARE
Goal Outcome Evaluation:  Plan of Care Reviewed With: patient        Progress: no change  Outcome Evaluation: ST: pt seen at am meal prior to HD.  pt confused and unable to self feed safely.  dentures were put in mouth and sign on dry board updated for nsg to make sure denture adhesive is used and dentures in mouth before all PO.  pt with considerable trouble chewing and clearing oral cavity.  required multiple liquid washes and multiple verbal cues to clear diffuse oral residue between bites.  recommend downgrade to puree solids.  pt will benefit from assistance with PO d/t confusion largest barrier to safety with PO.  SLP will f/u for diet texture analysis and upgrade as appropriate as well as pt/caregiver education for safe swallow strategies to decrease risk of aspiration.

## 2023-08-16 NOTE — CONSULTS
Pt will need an outpatient chair arranged for HD.   Had gathered paperwork to submit.  Plan for pt to go to [facility].  Will send paperwork with pt to [facility].  They will finish out the referral after pt's labs have returned

## 2023-08-16 NOTE — SIGNIFICANT NOTE
08/16/23 1251   OTHER   Discipline physical therapy assistant   Rehab Time/Intention   Session Not Performed patient unavailable for treatment  (pt just arrived back to unit from dialysis and is now being taken off the floor for test)   Therapy Assessment/Plan (PT)   Criteria for Skilled Interventions Met (PT) yes;skilled treatment is necessary

## 2023-08-16 NOTE — SIGNIFICANT NOTE
08/16/23 1155   OTHER   Discipline physical therapy assistant   Rehab Time/Intention   Session Not Performed patient unavailable for treatment  (pt is off unit for dialysis)   Therapy Assessment/Plan (PT)   Criteria for Skilled Interventions Met (PT) yes;skilled treatment is necessary

## 2023-08-16 NOTE — PROGRESS NOTES
Nutrition Services    Patient Name:  Carol Sullivan  YOB: 1940  MRN: [id number]  Admit Date:  7/28/2023    Nutrition F/U:      Pt currently in dialysis.  Based on documentation pt is not eating very well, </=25%.  She continues to be managed for UTI; Sepsis/Septic Shock; Acute Resp Failure--extubated; Rigth Sided Retroperitoneal hematoma; BLAINE/CKD--on Hemodialysis; R Hydronephrosis; Afib; Acute Blood Loss Anemia; & AMS--likely multifocal --Septic Metabolic Delirium.    She may benefit from an appetite stimulant    Labs:  Na 131; Gluc 191/329/99/86; Bun 125; Creat 6.75; ALT 94; Alb 2.9; Crp 3.87;   Meds:  SSI; Mannitol; Protonix    Nutrition Prescription Order:  Cardiac/Diabetic/Pureed  Nutrition Intervention:  Will add Novasource renal to all trays.      CBW:  151#    Edema:  Generalized (Trace 1+)    Electronically signed by:  Zohreh Jeter RD  08/16/23 12:26 CDT

## 2023-08-16 NOTE — PLAN OF CARE
Goal Outcome Evaluation:  Plan of Care Reviewed With: caregiver, patient        Progress: improving  Outcome Evaluation: Nutrition F/U:  Po intake sub-optimal to meet EEN.  Will add supplements and continue to monitor POC

## 2023-08-16 NOTE — SIGNIFICANT NOTE
Nephrology had a detailed discussion with Carol Sullivan regarding situation, options and plans of BLAINE requiring intermediate term hemodialysis.  Placement of tunneled hemodialysis catheter with cuff is advisable.  Placed catheter into vein in neck, tunneled under skin exiting onto anterior chest wall, using ultrasound and fluoroscopic guidance.    Risks include, but are not limited to mortality, major morbidity <1%, bleeding, transfusion, infection, pneumothorax, and blood vessel injury.  Benefits: Access for hemodialysis.  Options:  Direct cannulation, tunneled catheter discussed.  Understands and wishes to proceed.      Placement tunneled catheter.  Vascular ultrasound guidance.  Fluoroscopic guidance.  Local, IV sedation.  8/16/2023        This document has been electronically signed by Hardy Yin MD on August 16, 2023 07:32 CDT

## 2023-08-16 NOTE — PLAN OF CARE
Problem: Hemodynamic Instability (Hemodialysis)  Goal: Effective Tissue Perfusion  Outcome: Ongoing, Progressing     Problem: Infection (Hemodialysis)  Goal: Absence of Infection Signs and Symptoms  Outcome: Ongoing, Progressing   Goal Outcome Evaluation: B/P soft during dialysis. Unable to obtain ordered UF. UF 2L. Femoral access positional--planned for tunnel cath this afternoon.

## 2023-08-17 ENCOUNTER — APPOINTMENT (OUTPATIENT)
Dept: GENERAL RADIOLOGY | Facility: HOSPITAL | Age: 83
DRG: 853 | End: 2023-08-17
Payer: MEDICARE

## 2023-08-17 ENCOUNTER — HOSPITAL ENCOUNTER (OUTPATIENT)
Facility: HOSPITAL | Age: 83
Discharge: NURSING, REHAB OR LONG-TERM CARE | End: 2023-09-15
Attending: INTERNAL MEDICINE | Admitting: INTERNAL MEDICINE
Payer: MEDICARE

## 2023-08-17 ENCOUNTER — OUTSIDE FACILITY SERVICE (OUTPATIENT)
Dept: PULMONOLOGY | Facility: CLINIC | Age: 83
End: 2023-08-17
Payer: MEDICARE

## 2023-08-17 ENCOUNTER — APPOINTMENT (OUTPATIENT)
Dept: ULTRASOUND IMAGING | Facility: HOSPITAL | Age: 83
DRG: 853 | End: 2023-08-17
Payer: MEDICARE

## 2023-08-17 VITALS
RESPIRATION RATE: 18 BRPM | BODY MASS INDEX: 25.16 KG/M2 | HEIGHT: 65 IN | SYSTOLIC BLOOD PRESSURE: 150 MMHG | WEIGHT: 151 LBS | OXYGEN SATURATION: 92 % | DIASTOLIC BLOOD PRESSURE: 67 MMHG | HEART RATE: 72 BPM | TEMPERATURE: 97.4 F

## 2023-08-17 DIAGNOSIS — R13.11 ORAL PHASE DYSPHAGIA: Primary | ICD-10-CM

## 2023-08-17 DIAGNOSIS — Z78.9 IMPAIRED MOBILITY AND ADLS: ICD-10-CM

## 2023-08-17 DIAGNOSIS — Z74.09 IMPAIRED MOBILITY AND ADLS: ICD-10-CM

## 2023-08-17 DIAGNOSIS — Z74.09 IMPAIRED FUNCTIONAL MOBILITY AND ENDURANCE: ICD-10-CM

## 2023-08-17 PROBLEM — R58 RETROPERITONEAL HEMORRHAGE: Status: ACTIVE | Noted: 2023-08-17

## 2023-08-17 LAB
ALBUMIN SERPL-MCNC: 3.1 G/DL (ref 3.5–5.2)
ALP SERPL-CCNC: 372 U/L (ref 39–117)
ALT SERPL W P-5'-P-CCNC: 76 U/L (ref 1–33)
ANION GAP SERPL CALCULATED.3IONS-SCNC: 17 MMOL/L (ref 5–15)
AST SERPL-CCNC: 33 U/L (ref 1–32)
BASOPHILS # BLD AUTO: 0.07 10*3/MM3 (ref 0–0.2)
BASOPHILS NFR BLD AUTO: 0.4 % (ref 0–1.5)
BILIRUB CONJ SERPL-MCNC: 0.5 MG/DL (ref 0–0.3)
BILIRUB INDIRECT SERPL-MCNC: 0.5 MG/DL
BILIRUB SERPL-MCNC: 1 MG/DL (ref 0–1.2)
BUN SERPL-MCNC: 72 MG/DL (ref 8–23)
BUN/CREAT SERPL: 14.8 (ref 7–25)
CALCIUM SPEC-SCNC: 8.6 MG/DL (ref 8.6–10.5)
CHLORIDE SERPL-SCNC: 90 MMOL/L (ref 98–107)
CO2 SERPL-SCNC: 23 MMOL/L (ref 22–29)
CREAT SERPL-MCNC: 4.86 MG/DL (ref 0.57–1)
CRP SERPL-MCNC: 8.81 MG/DL (ref 0–0.5)
DEPRECATED RDW RBC AUTO: 49.1 FL (ref 37–54)
EGFRCR SERPLBLD CKD-EPI 2021: 8.5 ML/MIN/1.73
EOSINOPHIL # BLD AUTO: 0.26 10*3/MM3 (ref 0–0.4)
EOSINOPHIL NFR BLD AUTO: 1.5 % (ref 0.3–6.2)
ERYTHROCYTE [DISTWIDTH] IN BLOOD BY AUTOMATED COUNT: 15.9 % (ref 12.3–15.4)
GLUCOSE BLDC GLUCOMTR-MCNC: 150 MG/DL (ref 70–130)
GLUCOSE BLDC GLUCOMTR-MCNC: 161 MG/DL (ref 70–130)
GLUCOSE BLDC GLUCOMTR-MCNC: 168 MG/DL (ref 70–130)
GLUCOSE BLDC GLUCOMTR-MCNC: 263 MG/DL (ref 70–130)
GLUCOSE SERPL-MCNC: 139 MG/DL (ref 65–99)
HCT VFR BLD AUTO: 29.8 % (ref 34–46.6)
HGB BLD-MCNC: 10.2 G/DL (ref 12–15.9)
IMM GRANULOCYTES # BLD AUTO: 0.28 10*3/MM3 (ref 0–0.05)
IMM GRANULOCYTES NFR BLD AUTO: 1.6 % (ref 0–0.5)
INR PPP: 1.2 (ref 0.8–1.2)
LYMPHOCYTES # BLD AUTO: 0.96 10*3/MM3 (ref 0.7–3.1)
LYMPHOCYTES NFR BLD AUTO: 5.6 % (ref 19.6–45.3)
MCH RBC QN AUTO: 29.4 PG (ref 26.6–33)
MCHC RBC AUTO-ENTMCNC: 34.2 G/DL (ref 31.5–35.7)
MCV RBC AUTO: 85.9 FL (ref 79–97)
MONOCYTES # BLD AUTO: 2.51 10*3/MM3 (ref 0.1–0.9)
MONOCYTES NFR BLD AUTO: 14.7 % (ref 5–12)
NEUTROPHILS NFR BLD AUTO: 12.99 10*3/MM3 (ref 1.7–7)
NEUTROPHILS NFR BLD AUTO: 76.2 % (ref 42.7–76)
NRBC BLD AUTO-RTO: 0 /100 WBC (ref 0–0.2)
PLATELET # BLD AUTO: 248 10*3/MM3 (ref 140–450)
PMV BLD AUTO: 11.6 FL (ref 6–12)
POTASSIUM SERPL-SCNC: 3.9 MMOL/L (ref 3.5–5.2)
PROT SERPL-MCNC: 6.3 G/DL (ref 6–8.5)
PROTHROMBIN TIME: 15.1 SECONDS (ref 11.1–15.3)
RBC # BLD AUTO: 3.47 10*6/MM3 (ref 3.77–5.28)
SODIUM SERPL-SCNC: 130 MMOL/L (ref 136–145)
WBC NRBC COR # BLD: 17.07 10*3/MM3 (ref 3.4–10.8)

## 2023-08-17 PROCEDURE — 76937 US GUIDE VASCULAR ACCESS: CPT | Performed by: THORACIC SURGERY (CARDIOTHORACIC VASCULAR SURGERY)

## 2023-08-17 PROCEDURE — 76937 US GUIDE VASCULAR ACCESS: CPT

## 2023-08-17 PROCEDURE — 25010000002 FENTANYL CITRATE (PF) 50 MCG/ML SOLUTION: Performed by: THORACIC SURGERY (CARDIOTHORACIC VASCULAR SURGERY)

## 2023-08-17 PROCEDURE — 63710000001 INSULIN ASPART PER 5 UNITS: Performed by: HOSPITALIST

## 2023-08-17 PROCEDURE — 85610 PROTHROMBIN TIME: CPT | Performed by: UROLOGY

## 2023-08-17 PROCEDURE — 25010000002 MIDAZOLAM PER 1 MG: Performed by: THORACIC SURGERY (CARDIOTHORACIC VASCULAR SURGERY)

## 2023-08-17 PROCEDURE — 92526 ORAL FUNCTION THERAPY: CPT | Performed by: SPEECH-LANGUAGE PATHOLOGIST

## 2023-08-17 PROCEDURE — 77001 FLUOROGUIDE FOR VEIN DEVICE: CPT | Performed by: THORACIC SURGERY (CARDIOTHORACIC VASCULAR SURGERY)

## 2023-08-17 PROCEDURE — 25010000002 LABETALOL 5 MG/ML SOLUTION: Performed by: INTERNAL MEDICINE

## 2023-08-17 PROCEDURE — 86140 C-REACTIVE PROTEIN: CPT | Performed by: UROLOGY

## 2023-08-17 PROCEDURE — 82948 REAGENT STRIP/BLOOD GLUCOSE: CPT

## 2023-08-17 PROCEDURE — 25010000002 HEPARIN (PORCINE) PER 1000 UNITS: Performed by: THORACIC SURGERY (CARDIOTHORACIC VASCULAR SURGERY)

## 2023-08-17 PROCEDURE — 80048 BASIC METABOLIC PNL TOTAL CA: CPT | Performed by: UROLOGY

## 2023-08-17 PROCEDURE — 80076 HEPATIC FUNCTION PANEL: CPT | Performed by: UROLOGY

## 2023-08-17 PROCEDURE — 36558 INSERT TUNNELED CV CATH: CPT | Performed by: THORACIC SURGERY (CARDIOTHORACIC VASCULAR SURGERY)

## 2023-08-17 PROCEDURE — 25010000002 ONDANSETRON PER 1 MG: Performed by: STUDENT IN AN ORGANIZED HEALTH CARE EDUCATION/TRAINING PROGRAM

## 2023-08-17 PROCEDURE — 85025 COMPLETE CBC W/AUTO DIFF WBC: CPT | Performed by: UROLOGY

## 2023-08-17 PROCEDURE — 93005 ELECTROCARDIOGRAM TRACING: CPT | Performed by: INTERNAL MEDICINE

## 2023-08-17 PROCEDURE — C1894 INTRO/SHEATH, NON-LASER: HCPCS | Performed by: THORACIC SURGERY (CARDIOTHORACIC VASCULAR SURGERY)

## 2023-08-17 PROCEDURE — C1750 CATH, HEMODIALYSIS,LONG-TERM: HCPCS | Performed by: THORACIC SURGERY (CARDIOTHORACIC VASCULAR SURGERY)

## 2023-08-17 PROCEDURE — 93010 ELECTROCARDIOGRAM REPORT: CPT | Performed by: INTERNAL MEDICINE

## 2023-08-17 RX ORDER — LIDOCAINE HYDROCHLORIDE AND EPINEPHRINE 10; 10 MG/ML; UG/ML
INJECTION, SOLUTION INFILTRATION; PERINEURAL
Status: DISCONTINUED
Start: 2023-08-17 | End: 2023-08-17 | Stop reason: HOSPADM

## 2023-08-17 RX ORDER — HYDROCODONE BITARTRATE AND ACETAMINOPHEN 5; 325 MG/1; MG/1
1 TABLET ORAL EVERY 6 HOURS PRN
Refills: 0 | Status: ON HOLD
Start: 2023-08-17 | End: 2023-08-21

## 2023-08-17 RX ORDER — FENTANYL CITRATE 50 UG/ML
INJECTION, SOLUTION INTRAMUSCULAR; INTRAVENOUS
Status: DISCONTINUED
Start: 2023-08-17 | End: 2023-08-17 | Stop reason: HOSPADM

## 2023-08-17 RX ORDER — ISOSORBIDE MONONITRATE 30 MG/1
30 TABLET, EXTENDED RELEASE ORAL DAILY
Status: DISCONTINUED | OUTPATIENT
Start: 2023-08-18 | End: 2023-09-01

## 2023-08-17 RX ORDER — HEPARIN SODIUM 1000 [USP'U]/ML
2000 INJECTION, SOLUTION INTRAVENOUS; SUBCUTANEOUS ONCE
Status: DISCONTINUED | OUTPATIENT
Start: 2023-08-21 | End: 2023-08-23

## 2023-08-17 RX ORDER — METOPROLOL TARTRATE 50 MG/1
100 TABLET, FILM COATED ORAL EVERY 12 HOURS SCHEDULED
Status: DISCONTINUED | OUTPATIENT
Start: 2023-08-17 | End: 2023-08-28

## 2023-08-17 RX ORDER — SODIUM CHLORIDE 0.9 % (FLUSH) 0.9 %
10 SYRINGE (ML) INJECTION AS NEEDED
Status: DISCONTINUED | OUTPATIENT
Start: 2023-08-17 | End: 2023-09-15 | Stop reason: HOSPADM

## 2023-08-17 RX ORDER — HEPARIN SODIUM 1000 [USP'U]/ML
INJECTION, SOLUTION INTRAVENOUS; SUBCUTANEOUS
Status: DISCONTINUED
Start: 2023-08-17 | End: 2023-08-17 | Stop reason: HOSPADM

## 2023-08-17 RX ORDER — LIDOCAINE HYDROCHLORIDE AND EPINEPHRINE 10; 10 MG/ML; UG/ML
INJECTION, SOLUTION INFILTRATION; PERINEURAL AS NEEDED
Status: DISCONTINUED | OUTPATIENT
Start: 2023-08-17 | End: 2023-08-17 | Stop reason: HOSPADM

## 2023-08-17 RX ORDER — MIDAZOLAM HYDROCHLORIDE 1 MG/ML
INJECTION INTRAMUSCULAR; INTRAVENOUS AS NEEDED
Status: DISCONTINUED | OUTPATIENT
Start: 2023-08-17 | End: 2023-08-17 | Stop reason: HOSPADM

## 2023-08-17 RX ORDER — MIDAZOLAM HYDROCHLORIDE 1 MG/ML
INJECTION INTRAMUSCULAR; INTRAVENOUS
Status: DISCONTINUED
Start: 2023-08-17 | End: 2023-08-17 | Stop reason: HOSPADM

## 2023-08-17 RX ORDER — SODIUM CHLORIDE 0.9 % (FLUSH) 0.9 %
10 SYRINGE (ML) INJECTION AS NEEDED
Status: DISCONTINUED | OUTPATIENT
Start: 2023-08-17 | End: 2023-08-17

## 2023-08-17 RX ORDER — FENTANYL CITRATE 50 UG/ML
INJECTION, SOLUTION INTRAMUSCULAR; INTRAVENOUS AS NEEDED
Status: DISCONTINUED | OUTPATIENT
Start: 2023-08-17 | End: 2023-08-17 | Stop reason: HOSPADM

## 2023-08-17 RX ORDER — METOPROLOL TARTRATE 100 MG/1
100 TABLET ORAL EVERY 12 HOURS SCHEDULED
Status: ON HOLD
Start: 2023-08-17

## 2023-08-17 RX ORDER — HEPARIN SODIUM 1000 [USP'U]/ML
2000 INJECTION, SOLUTION INTRAVENOUS; SUBCUTANEOUS ONCE
Status: DISCONTINUED | OUTPATIENT
Start: 2023-08-18 | End: 2023-08-19

## 2023-08-17 RX ORDER — ROSUVASTATIN CALCIUM 20 MG/1
40 TABLET, COATED ORAL DAILY
Status: DISCONTINUED | OUTPATIENT
Start: 2023-08-18 | End: 2023-09-15 | Stop reason: HOSPADM

## 2023-08-17 RX ORDER — LANSOPRAZOLE
30 KIT EVERY 12 HOURS SCHEDULED
Status: DISCONTINUED | OUTPATIENT
Start: 2023-08-17 | End: 2023-09-15 | Stop reason: HOSPADM

## 2023-08-17 RX ORDER — ACETAMINOPHEN 500 MG
500 TABLET ORAL EVERY 6 HOURS PRN
Status: DISCONTINUED | OUTPATIENT
Start: 2023-08-17 | End: 2023-09-15 | Stop reason: HOSPADM

## 2023-08-17 RX ORDER — HEPARIN SODIUM 1000 [USP'U]/ML
INJECTION, SOLUTION INTRAVENOUS; SUBCUTANEOUS AS NEEDED
Status: DISCONTINUED | OUTPATIENT
Start: 2023-08-17 | End: 2023-08-17 | Stop reason: HOSPADM

## 2023-08-17 RX ORDER — INSULIN ASPART 100 [IU]/ML
2-12 INJECTION, SOLUTION INTRAVENOUS; SUBCUTANEOUS
Status: DISCONTINUED | OUTPATIENT
Start: 2023-08-17 | End: 2023-09-15 | Stop reason: HOSPADM

## 2023-08-17 RX ORDER — DEXTROSE MONOHYDRATE 25 G/50ML
50 INJECTION, SOLUTION INTRAVENOUS
Status: DISCONTINUED | OUTPATIENT
Start: 2023-08-17 | End: 2023-09-15 | Stop reason: HOSPADM

## 2023-08-17 RX ORDER — SODIUM CHLORIDE 0.9 % (FLUSH) 0.9 %
10 SYRINGE (ML) INJECTION EVERY 8 HOURS SCHEDULED
Status: DISCONTINUED | OUTPATIENT
Start: 2023-08-17 | End: 2023-09-15 | Stop reason: HOSPADM

## 2023-08-17 RX ORDER — HEPARIN SODIUM 1000 [USP'U]/ML
3700 INJECTION, SOLUTION INTRAVENOUS; SUBCUTANEOUS AS NEEDED
Status: DISPENSED | OUTPATIENT
Start: 2023-08-18 | End: 2023-08-21

## 2023-08-17 RX ORDER — NYSTATIN 100000 [USP'U]/G
POWDER TOPICAL EVERY 12 HOURS SCHEDULED
Status: ON HOLD
Start: 2023-08-17

## 2023-08-17 RX ORDER — ALBUMIN (HUMAN) 12.5 G/50ML
50 SOLUTION INTRAVENOUS AS NEEDED
Status: ACTIVE | OUTPATIENT
Start: 2023-08-18 | End: 2023-08-21

## 2023-08-17 RX ORDER — HYDROCODONE BITARTRATE AND ACETAMINOPHEN 5; 325 MG/1; MG/1
1 TABLET ORAL EVERY 6 HOURS PRN
Status: DISPENSED | OUTPATIENT
Start: 2023-08-17 | End: 2023-08-31

## 2023-08-17 RX ADMIN — LABETALOL HYDROCHLORIDE 10 MG: 5 INJECTION, SOLUTION INTRAVENOUS at 04:23

## 2023-08-17 RX ADMIN — ZINC OXIDE 1 APPLICATION: 200 OINTMENT TOPICAL at 11:00

## 2023-08-17 RX ADMIN — ROSUVASTATIN CALCIUM 40 MG: 20 TABLET, FILM COATED ORAL at 09:00

## 2023-08-17 RX ADMIN — INSULIN ASPART 6 UNITS: 100 INJECTION, SOLUTION INTRAVENOUS; SUBCUTANEOUS at 10:59

## 2023-08-17 RX ADMIN — Medication 10 ML: at 09:01

## 2023-08-17 RX ADMIN — NYSTATIN: 100000 POWDER TOPICAL at 09:01

## 2023-08-17 RX ADMIN — ISOSORBIDE MONONITRATE 30 MG: 30 TABLET, EXTENDED RELEASE ORAL at 09:00

## 2023-08-17 RX ADMIN — METOPROLOL TARTRATE 100 MG: 100 TABLET, FILM COATED ORAL at 09:01

## 2023-08-17 RX ADMIN — ONDANSETRON 4 MG: 2 INJECTION INTRAMUSCULAR; INTRAVENOUS at 10:53

## 2023-08-17 NOTE — SIGNIFICANT NOTE
08/17/23 1017   OTHER   Discipline physical therapy assistant   Rehab Time/Intention   Session Not Performed patient unavailable for treatment     Pt on bed rest for 2 hrs once line is pulled . Will check back as appropriate

## 2023-08-17 NOTE — PLAN OF CARE
Goal Outcome Evaluation:  Plan of Care Reviewed With: patient, spouse        Progress: improving  Outcome Evaluation: ST: pt cognition is improved this date which has improved her understanding of eating tasks. she is able to self feed. pt independently using liquid wash with mech soft solid residue after the swallow. pt conversing, although repeating herself. recommend upgrade to mech soft/chopped and f/u for diet consistancy analysis.

## 2023-08-17 NOTE — ACP (ADVANCE CARE PLANNING)
[facility] @ [facility]  INPATIENT HISTORY AND PHYSICAL NOTE    PATIENT NAME: Carol Sullivan      PHYSICIAN: Josefina Perez MD  : 1940        MRN: [id number]  Patient Care Team:  Len Seo MD as PCP - Blaire Love APRN as Nurse Practitioner (General Surgery)    Assessment      Sepsis Secondary to Below.  Somnolence [R40.0]  Mastitis [N61.0]  Sepsis [A41.9]  Sepsis, due to unspecified organism, unspecified whether acute organ dysfunction present [A41.9]   ESRD on Hemodialysis.      DVT Prophylaxis: Heparin.  GI Prophylaxis: Lansoprazole.  Code Status: DNR/DNI.    Plan     -Continue with hemodialysis per nephrology recommendation.  -We will continue/OT as tolerated.  -We will try to get patient out of bed to chair as tolerated.  -We will resume medications from previous hospitalization.  -Continue monitoring blood work closely.  -We will try to get patient out of bed to chair as tolerated.  -We will get nephrology evaluation.  -DVT and GI prophylaxis in place.  -We'll continue monitoring patient in hospital setting and treat patient as course dictates.  -Please review orders for detailed plan of care.  -For Aute and Chronic medical condition we will continue medications described below in medication section which have been reviewed and we will continue unless changed in plan of care.  -Laboratory and diagnostic studies have been independently reviewed and the reports are reviewed as documented below.    Chief Complaint: Patient was brought to emergency room at [facility] for confusion.    History of Present Illness: 82-year-old female with significant medical history of hypertension, dyslipidemia, gastroesophageal reflux disease who was brought into the emergency room for confusion.  Patient was also found to have erythema and cellulitis of left breast.  Patient was thought of having mastitis.  Patient's condition was  monitored now.  Patient had gradual decrease in her urine output and worsening in her kidney function was noted.  Patient off having renal failure which was worsening over time.  Patient subsequently required hemodialysis patient was placed on hemodialysis per nephrology recommendation.  Patient was admitted to the hospital setting for antibiotics and hemodialysis.  Patient's condition seem to be stabilized however patient remained extremely weak and deconditioned hemodialysis patient was recommended to be admitted to our facility for further therapy and rehabilitation along with monitoring for hemodialysis.    Discharge Summary and H&P: Reviewed from previous hospitalization and information documented above in HPI Section.    Radiology Studies from Previous Hospitalization: Reviewed and are as below:  XR Abdomen KUB    Result Date: 8/9/2023  FINDINGS/IMPRESSION: Radiopaque tip of the Dobbhoff tube projects over the second portion of the duodenum, similar to even slightly retracted when compared to the prior exam. Right-sided ureteral stent is again noted and incompletely visualized.  There are probably also right femoral catheters which are incompletely visualized. Normal bowel gas pattern in the visualized abdomen.    XR Chest Post CVA Port    Result Date: 8/17/2023  Impression: 1. Mild interstitial opacities are likely due to low lung volumes. An element of interstitial edema is not entirely excluded.     EKG From previous hospitalization reviewed and documented below:  ECG 12 Lead Other; hx afib   Preliminary Result   Test Reason : Other~   Blood Pressure :   */*   mmHG   Vent. Rate :  67 BPM     Atrial Rate :  67 BPM      P-R Int : 188 ms          QRS Dur :  78 ms       QT Int : 392 ms       P-R-T Axes :  28   6  16 degrees      QTc Int : 414 ms      Normal sinus rhythm with sinus arrhythmia   Minimal voltage criteria for LVH, may be normal variant   Anteroseptal infarct (cited on or before 30-JUL-2023)  "  Abnormal ECG   When compared with ECG of 10-AUG-2023 15:02,   Sinus rhythm has replaced Atrial fibrillation   Vent. rate has decreased BY  62 BPM   Nonspecific T wave abnormality, worse in Anterior leads      Referred By:            Confirmed By:           Laboratory results from previous hospitalization is reviewed and below:  Lab Results   Component Value Date    GLUCOSE 139 (H) 08/17/2023    CALCIUM 8.6 08/17/2023     (L) 08/17/2023    K 3.9 08/17/2023    CO2 23.0 08/17/2023    CL 90 (L) 08/17/2023    BUN 72 (H) 08/17/2023    CREATININE 4.86 (H) 08/17/2023    EGFR 8.5 (L) 08/17/2023    BCR 14.8 08/17/2023    ANIONGAP 17.0 (H) 08/17/2023     Lab Results   Component Value Date    WBC 17.07 (H) 08/17/2023    HGB 10.2 (L) 08/17/2023    HCT 29.8 (L) 08/17/2023    MCV 85.9 08/17/2023     08/17/2023     Past Medical History:   Diagnosis Date    Arthritis     Depression     GERD (gastroesophageal reflux disease)     Hyperlipidemia     Hypertension     Near syncope     Vascular disease       Past Surgical History:   Procedure Laterality Date    BLADDER SURGERY      BREAST ABSCESS INCISION AND DRAINAGE Left 8/1/2023    Procedure: BREAST ABSCESS INCISION AND DRAINAGE;  Surgeon: Fox Roper MD;  Location: [facility] OR;  Service: General;  Laterality: Left;    ENDOSCOPY N/A 5/6/2019    Procedure: ESOPHAGOGASTRODUODENOSCOPY;  Surgeon: Alberto Sanchez DO;  Location: [facility] ENDOSCOPY;  Service: Gastroenterology    GALLBLADDER SURGERY      SHOULDER SURGERY      \"bone spurs\"    SUBTOTAL HYSTERECTOMY        Family History   Problem Relation Age of Onset    Hypertension Mother     Diabetes Paternal Aunt     Diabetes Paternal Grandmother     Hypertension Paternal Grandmother     Hypertension Paternal Grandfather       Social History     Socioeconomic History    Marital status:    Tobacco Use    Smoking status: Never    Smokeless tobacco: Never   Vaping Use    Vaping Use: Never used   Substance and Sexual " Activity    Alcohol use: No    Drug use: No    Sexual activity: Defer      Allergies   Allergen Reactions    Tuberculin Tests Swelling    Hydrocodone Nausea Only    Lortab [Hydrocodone-Acetaminophen] GI Intolerance      Prior to Admission medications    Medication Sig Start Date End Date Taking? Authorizing Provider   acetaminophen (TYLENOL) 500 MG tablet Take 1 tablet by mouth As Needed.    Rubi Seymour MD   HYDROcodone-acetaminophen (NORCO) 5-325 MG per tablet Take 1 tablet by mouth Every 6 (Six) Hours As Needed for Moderate Pain for up to 4 days. 8/17/23 8/21/23  Juanito Harrington MD   hydrocortisone-bacitracin-zinc oxide-nystatin (MAGIC BARRIER) Apply 1 application  topically to the appropriate area as directed 2 (Two) Times a Day As Needed (anal excoriation) for up to 1 day. 8/17/23 8/18/23  Juanito Harrington MD   isosorbide mononitrate (IMDUR) 30 MG 24 hr tablet Take 1 tablet by mouth Daily. 6/2/23   Odette Alves MD   lansoprazole (PREVACID) 30 MG capsule Take 1 capsule by mouth Every 12 (Twelve) Hours. 6/21/21   Rubi Seymour MD   metoprolol tartrate (LOPRESSOR) 100 MG tablet Take 1 tablet by mouth Every 12 (Twelve) Hours. 8/17/23   Juanito Harrington MD   nystatin (MYCOSTATIN) 834090 UNIT/GM powder Apply  topically to the appropriate area as directed Every 12 (Twelve) Hours. 8/17/23   Juanito Harrington MD   rosuvastatin (CRESTOR) 40 MG tablet Take 1 tablet by mouth Daily. 6/2/23   Odette Alves MD   VENTOLIN  (90 Base) MCG/ACT inhaler  10/23/19   Rubi Seymour MD   DULoxetine (CYMBALTA) 60 MG capsule  1/20/20 8/17/23  Rubi Seymour MD   furosemide (LASIX) 20 MG tablet Take 1 tablet by mouth 2 (Two) Times a Day. 6/2/23 8/17/23  Odette Alves MD   gabapentin (NEURONTIN) 300 MG capsule Take 1 capsule by mouth 3 (Three) Times a Day. 9/26/18 8/17/23  Rubi Seymour MD   losartan (COZAAR) 100 MG tablet Take 1 tablet by mouth Daily. 8/19/20 8/17/23  Odette Alves,  MD   metoprolol succinate XL (TOPROL-XL) 50 MG 24 hr tablet Take 1 tablet by mouth Daily. 6/2/23 8/17/23  Odette Alves MD   Mirabegron ER (MYRBETRIQ) 50 MG tablet sustained-release 24 hour 24 hr tablet Take 50 mg by mouth Daily.  8/17/23  Rubi Seymour MD   NIFEdipine XL (PROCARDIA XL) 60 MG 24 hr tablet Take 1 tablet by mouth Daily. 6/2/23 8/17/23  Odette Alves MD   potassium chloride (K-DUR,KLOR-CON) 20 MEQ CR tablet Take 1 tablet by mouth Daily. 6/2/23 8/17/23  Odette Alves MD   traMADol (ULTRAM) 50 MG tablet Take 1 tablet by mouth As Needed. 12/23/20 8/17/23  ProviderRubi MD   warfarin (COUMADIN) 2.5 MG tablet Take 1 tablet nightly except on Monday take 1.5 tablets OR AS DIRECTED 5/15/23 8/17/23  Shauna Casillas APRN     Current Medications:   [START ON 8/18/2023] heparin (porcine), 2,000 Units, Intravenous, Once  [START ON 8/21/2023] heparin (porcine), 2,000 Units, Intravenous, Once  Insulin Aspart, 2-12 Units, Subcutaneous, 4 times per day  [START ON 8/18/2023] isosorbide mononitrate, 30 mg, Oral, Daily  lansoprazole, 30 mg, Oral, Q12H  metoprolol tartrate, 100 mg, Oral, Q12H  [START ON 8/18/2023] rosuvastatin, 40 mg, Oral, Daily  sodium chloride 0.9 % flush, 10 mL, Intravenous, Q8H         acetaminophen    [START ON 8/18/2023] albumin human    dextrose    [START ON 8/18/2023] heparin (porcine)    HYDROcodone-acetaminophen    [START ON 8/18/2023] sodium chloride    sodium chloride 0.9 % flush       Review of Systems:    Review of Systems   All other systems reviewed and are negative.    Objective   Vital Signs  Temp: 98.3 F         Heart Rate: 78         Resp: 14          Blood Pressure: 112/72         Pulse Ox: 98 %    Physical Exam:   Physical Exam  Vitals and nursing note reviewed.   Constitutional:       Appearance: She is well-developed.   HENT:      Head: Normocephalic and atraumatic.      Nose: Nose normal.   Eyes:      Conjunctiva/sclera: Conjunctivae normal.       Pupils: Pupils are equal, round, and reactive to light.   Neck:      Thyroid: No thyromegaly.      Vascular: No JVD.      Trachea: No tracheal deviation.   Cardiovascular:      Rate and Rhythm: Normal rate and regular rhythm.      Heart sounds: Normal heart sounds.   Pulmonary:      Effort: Pulmonary effort is normal. No respiratory distress.      Breath sounds: Normal breath sounds. No wheezing or rales.   Chest:      Chest wall: No tenderness.   Abdominal:      General: Bowel sounds are normal. There is no distension.      Palpations: Abdomen is soft.      Tenderness: There is no abdominal tenderness. There is no guarding or rebound.   Musculoskeletal:         General: Normal range of motion.      Cervical back: Normal range of motion and neck supple.   Lymphadenopathy:      Cervical: No cervical adenopathy.   Skin:     General: Skin is warm and dry.      Comments: Intact   Neurological:      Mental Status: She is alert and oriented to person, place, and time.      Cranial Nerves: No cranial nerve deficit.      Deep Tendon Reflexes: Reflexes are normal and symmetric.     Results Review:    I have personally reviewed current lab, radiology, and diagnostic data and agree with results.  Results from last 7 days   Lab Units 08/17/23  0551 08/16/23  0604 08/15/23  0414 08/14/23  0533 08/13/23  0518 08/12/23  0555 08/11/23  0629   SODIUM mmol/L 130* 131* 132* 137 137 134* 136   POTASSIUM mmol/L 3.9 3.3* 3.5 3.9 3.3* 3.7 3.9   CHLORIDE mmol/L 90* 89* 91* 91* 93* 92* 92*   CO2 mmol/L 23.0 18.0* 24.0 19.0* 20.0* 19.0* 19.0*   BUN mg/dL 72* 129* 102* 160* 121* 74* 75*   CREATININE mg/dL 4.86* 6.75* 5.23* 7.29* 5.99* 4.44* 5.12*   GLUCOSE mg/dL 139* 94 215* 363* 371* 441* 255*   CALCIUM mg/dL 8.6 8.6 8.2* 8.1* 8.2* 8.1* 7.3*   BILIRUBIN mg/dL 1.0 1.0 0.8 0.7 0.9 1.1 0.8   ALK PHOS U/L 372* 359* 438* 457* 425* 454* 372*   ALT (SGPT) U/L 76* 94* 134* 159* 179* 238* 270*   AST (SGOT) U/L 33* 36* 66* 90* 52* 59* 85*     Results  from last 7 days   Lab Units 08/15/23  0414   MAGNESIUM mg/dL 1.9   PHOSPHORUS mg/dL 5.7*     Results from last 7 days   Lab Units 08/17/23  0551 08/16/23  1218 08/16/23  0604 08/16/23  0025 08/15/23  1159 08/15/23  0414 08/14/23  2342 08/14/23  1151 08/14/23  0533 08/13/23  1212 08/13/23  0518 08/12/23  1143 08/12/23  0555 08/11/23  1227 08/11/23  0629   WBC 10*3/mm3 17.07*  --  18.50*  --   --  19.80*  --   --  20.40*  --  18.17*  --  17.89*  --  14.54*   HEMOGLOBIN g/dL 10.2* 12.3 11.7* 11.3* 12.6 12.4 12.3   < > 11.4*   < > 11.1*   < > 10.8*   < > 10.0*   HEMATOCRIT % 29.8* 35.5 33.2* 32.3* 37.0 35.1 36.8   < > 33.3*   < > 33.8*   < > 31.4*   < > 30.0*   PLATELETS 10*3/mm3 248  --  272  --   --  314  --   --  346  --  301  --  260  --  188    < > = values in this interval not displayed.     Lab Results   Component Value Date    TROPONINI 0.69 (H) 05/31/2020    TROPONINT 56 (C) 08/06/2023     pH, Arterial   Date Value Ref Range Status   08/11/2023 7.403 7.350 - 7.450 pH units Final     CO2   Date Value Ref Range Status   08/17/2023 23.0 22.0 - 29.0 mmol/L Final     Total CO2   Date Value Ref Range Status   10/27/2022 30 21 - 31 mmol/L Final     Results from last 7 days   Lab Units 08/17/23  0551 08/16/23  0604 08/15/23  0414 08/14/23  0533 08/13/23  0518 08/12/23  0555 08/11/23  0754   INR  1.20 1.30* 1.32* 1.30* 1.41* 1.41* 1.48*     Imaging Results (Most Recent)       None          No results found for: ACANTHNAEG, AFBCX, BPERTUSSISCX, BLOODCX  No results found for: BCIDPCR, CXREFLEX, CSFCX, CULTURETIS  No results found for: CULTURES, HSVCX, URCX  No results found for: EYECULTURE, GCCX, HSVCULTURE, LABHSV  No results found for: LEGIONELLA, MRSACX, MUMPSCX, MYCOPLASCX  No results found for: NOCARDIACX, STOOLCX  No results found for: THROATCX, UNSTIMCULT, URINECX, CULTURE, VZVCULTUR  No results found for: VIRALCULTU, WOUNDCX  Dietary Orders (From admission, onward)       Start     Ordered    08/17/23 3848  Diet:  Regular/House Diet, Renal Diets, Diabetic Diets; Consistent Carbohydrate; Low Sodium (2-3g); Texture: Soft to Chew (NDD 3); Soft to Chew: Chopped Meat; Fluid Consistency: Thin (IDDSI 0)  Diet Effective Now        References:    Diet Order [place]   Question Answer Comment   Diets: Regular/House Diet    Diets: Renal Diets    Diets: Diabetic Diets    Diabetic Diet: Consistent Carbohydrate    Renal Diet: Low Sodium (2-3g)    Texture: Soft to Chew (NDD 3)    Soft to Chew: Chopped Meat    Fluid Consistency: Thin (IDDSI 0)        08/17/23 1156                    Total Time Spent: 73 minutes.  History, physical exam, assessment and plan may have been partly or fully copied from before, but  changes made to the copied record to reflect care on the date of service. Part of the lab and imaging  reports auto populated and corrected. Some of this note may be an electronic transcription of spoken  language to printed text. This may permit erroneous, or at times, nonsensical words or phrases to be  inadvertently transcribed. Although I have reviewed the note for such errors, some may still exist.      This document has been electronically signed by Josefina Perez MD on August 17, 2023 14:01 CDT

## 2023-08-17 NOTE — DISCHARGE SUMMARY
[facility] Medicine Services  DISCHARGE SUMMARY       Date of Admission: 7/28/2023  Date of Discharge:  8/17/2023  Primary Care Physician: Len Seo MD    Presenting Problem/History of Present Illness:  Somnolence [R40.0]  Mastitis [N61.0]  Sepsis [A41.9]  Sepsis, due to unspecified organism, unspecified whether acute organ dysfunction present [A41.9]       Final Discharge Diagnoses:  Active Hospital Problems    Diagnosis     **Sepsis     Mastitis     Other hydronephrosis     ESRD (end stage renal disease) on dialysis        Consults:   Consults       Date and Time Order Name Status Description    8/6/2023 12:40 PM Inpatient Cardiothoracic Surgery Consult Completed     8/6/2023  9:26 AM Inpatient Urology Consult      7/28/2023  7:33 PM Inpatient Nephrology Consult Completed     7/28/2023  7:33 PM Inpatient General Surgery Consult              Procedures Performed: Procedure(s):  tunneled central venous catheter placement                Pertinent Test Results:   Lab Results (most recent)       Procedure Component Value Units Date/Time    POC Glucose Once [[id number]]  (Abnormal) Collected: 08/17/23 0625    Specimen: Blood Updated: 08/17/23 0822     Glucose 161 mg/dL      Comment: : [id number] LATASHA Gasca ID: [id number]       Basic Metabolic Panel [[id number]]  (Abnormal) Collected: 08/17/23 0551    Specimen: Blood Updated: 08/17/23 0731     Glucose 139 mg/dL      BUN 72 mg/dL      Creatinine 4.86 mg/dL      Sodium 130 mmol/L      Potassium 3.9 mmol/L      Chloride 90 mmol/L      CO2 23.0 mmol/L      Calcium 8.6 mg/dL      BUN/Creatinine Ratio 14.8     Anion Gap 17.0 mmol/L      eGFR 8.5 mL/min/1.73      Comment: <15 Indicative of kidney failure       Narrative:      GFR Normal >60  Chronic Kidney Disease <60  Kidney Failure <15    The GFR formula is only valid for adults with stable renal function between ages 18 and 70.    Hepatic Function  Panel [[id number]]  (Abnormal) Collected: 08/17/23 0551    Specimen: Blood Updated: 08/17/23 0731     Total Protein 6.3 g/dL      Albumin 3.1 g/dL      ALT (SGPT) 76 U/L      AST (SGOT) 33 U/L      Alkaline Phosphatase 372 U/L      Total Bilirubin 1.0 mg/dL      Bilirubin, Direct 0.5 mg/dL      Bilirubin, Indirect 0.5 mg/dL     C-reactive Protein [[id number]]  (Abnormal) Collected: 08/17/23 0551    Specimen: Blood Updated: 08/17/23 0731     C-Reactive Protein 8.81 mg/dL     Protime-INR [[id number]]  (Normal) Collected: 08/17/23 0551    Specimen: Blood Updated: 08/17/23 0720     Protime 15.1 Seconds      INR 1.20    Narrative:      Therapeutic range for most indications is 2.0-3.0 INR,  or 2.5-3.5 for mechanical heart valves.    CBC & Differential [[id number]]  (Abnormal) Collected: 08/17/23 0551    Specimen: Blood Updated: 08/17/23 0711    Narrative:      The following orders were created for panel order CBC & Differential.  Procedure                               Abnormality         Status                     ---------                               -----------         ------                     CBC Auto Differential[[id number]]        Abnormal            Final result                 Please view results for these tests on the individual orders.    CBC Auto Differential [[id number]]  (Abnormal) Collected: 08/17/23 0551    Specimen: Blood Updated: 08/17/23 0711     WBC 17.07 10*3/mm3      RBC 3.47 10*6/mm3      Hemoglobin 10.2 g/dL      Hematocrit 29.8 %      MCV 85.9 fL      MCH 29.4 pg      MCHC 34.2 g/dL      RDW 15.9 %      RDW-SD 49.1 fl      MPV 11.6 fL      Platelets 248 10*3/mm3      Neutrophil % 76.2 %      Lymphocyte % 5.6 %      Monocyte % 14.7 %      Eosinophil % 1.5 %      Basophil % 0.4 %      Immature Grans % 1.6 %      Neutrophils, Absolute 12.99 10*3/mm3      Lymphocytes, Absolute 0.96 10*3/mm3      Monocytes, Absolute 2.51 10*3/mm3      Eosinophils, Absolute 0.26 10*3/mm3      Basophils, Absolute 0.07  10*3/mm3      Immature Grans, Absolute 0.28 10*3/mm3      nRBC 0.0 /100 WBC     POC Glucose Once [[id number]]  (Abnormal) Collected: 08/16/23 2012    Specimen: Blood Updated: 08/16/23 2040     Glucose 169 mg/dL      Comment: RN NotifiedOperator: [id number] FILI SHAHCAMeter ID: [id number]       Hepatitis B Surface Antibody [[id number]]  (Normal) Collected: 08/16/23 1127    Specimen: Blood Updated: 08/16/23 1948     Hep B S Ab Non-Reactive    Narrative:      Non-Reactive - Individual is considered to be not immune to infection with HBV.    Equivocal - Unable to determine if anti-HBs is present at levels consistent with immunity. The individual should be further assessed by associated risk factors and the use of additional diagnositc information, or another sample may be collected and tested.    Reactive - Anti-HBs concentration detected at >10 mIU/mL. Individual is considered to be immune to infection with HBV.      Results may be falsely decreased if patient taking Biotin.      Hemoglobin & Hematocrit, Blood [[id number]]  (Normal) Collected: 08/16/23 1218    Specimen: Blood Updated: 08/16/23 1222     Hemoglobin 12.3 g/dL      Hematocrit 35.5 %     Hepatitis B Core Antibody, IgM [[id number]]  (Normal) Collected: 08/16/23 1127    Specimen: Blood Updated: 08/16/23 1202     Hep B C IgM Non-Reactive    Narrative:      Results may be falsely decreased if patient taking Biotin.      Protime-INR [[id number]]  (Abnormal) Collected: 08/16/23 0604    Specimen: Blood Updated: 08/16/23 0705     Protime 16.1 Seconds      INR 1.30    Narrative:      Therapeutic range for most indications is 2.0-3.0 INR,  or 2.5-3.5 for mechanical heart valves.    Basic Metabolic Panel [[id number]]  (Abnormal) Collected: 08/16/23 0604    Specimen: Blood Updated: 08/16/23 0700     Glucose 94 mg/dL       mg/dL      Creatinine 6.75 mg/dL      Sodium 131 mmol/L      Potassium 3.3 mmol/L      Chloride 89 mmol/L      CO2 18.0 mmol/L      Calcium 8.6  mg/dL      BUN/Creatinine Ratio 19.1     Anion Gap 24.0 mmol/L      eGFR 5.7 mL/min/1.73      Comment: <15 Indicative of kidney failure       Narrative:      GFR Normal >60  Chronic Kidney Disease <60  Kidney Failure <15    The GFR formula is only valid for adults with stable renal function between ages 18 and 70.    Hepatic Function Panel [[id number]]  (Abnormal) Collected: 08/16/23 0604    Specimen: Blood Updated: 08/16/23 0649     Total Protein 6.2 g/dL      Albumin 2.9 g/dL      ALT (SGPT) 94 U/L      AST (SGOT) 36 U/L      Alkaline Phosphatase 359 U/L      Total Bilirubin 1.0 mg/dL      Bilirubin, Direct 0.6 mg/dL      Bilirubin, Indirect 0.4 mg/dL     C-reactive Protein [[id number]]  (Abnormal) Collected: 08/16/23 0604    Specimen: Blood Updated: 08/16/23 0649     C-Reactive Protein 3.87 mg/dL     CBC & Differential [[id number]]  (Abnormal) Collected: 08/16/23 0604    Specimen: Blood Updated: 08/16/23 0625    Narrative:      The following orders were created for panel order CBC & Differential.  Procedure                               Abnormality         Status                     ---------                               -----------         ------                     CBC Auto Differential[[id number]]        Abnormal            Final result                 Please view results for these tests on the individual orders.    CBC Auto Differential [[id number]]  (Abnormal) Collected: 08/16/23 0604    Specimen: Blood Updated: 08/16/23 0625     WBC 18.50 10*3/mm3      RBC 3.94 10*6/mm3      Hemoglobin 11.7 g/dL      Hematocrit 33.2 %      MCV 84.3 fL      MCH 29.7 pg      MCHC 35.2 g/dL      RDW 15.9 %      RDW-SD 47.9 fl      MPV 11.7 fL      Platelets 272 10*3/mm3      Neutrophil % 75.0 %      Lymphocyte % 6.1 %      Monocyte % 15.7 %      Eosinophil % 1.3 %      Basophil % 0.5 %      Immature Grans % 1.4 %      Neutrophils, Absolute 13.88 10*3/mm3      Lymphocytes, Absolute 1.13 10*3/mm3      Monocytes, Absolute 2.91  10*3/mm3      Eosinophils, Absolute 0.24 10*3/mm3      Basophils, Absolute 0.09 10*3/mm3      Immature Grans, Absolute 0.25 10*3/mm3      nRBC 0.0 /100 WBC     Hemoglobin & Hematocrit, Blood [[id number]]  (Abnormal) Collected: 08/16/23 0025    Specimen: Blood Updated: 08/16/23 0029     Hemoglobin 11.3 g/dL      Hematocrit 32.3 %     Phosphorus [[id number]]  (Abnormal) Collected: 08/15/23 0414    Specimen: Blood Updated: 08/15/23 1628     Phosphorus 5.7 mg/dL     Magnesium [[id number]]  (Normal) Collected: 08/15/23 0414    Specimen: Blood Updated: 08/15/23 1628     Magnesium 1.9 mg/dL     Scan Slide [[id number]] Collected: 08/14/23 0533    Specimen: Blood Updated: 08/14/23 0724     Anisocytosis Slight/1+     Dacrocytes Slight/1+     Hypochromia Slight/1+     Ovalocytes Slight/1+     WBC Morphology Normal     Platelet Estimate Adequate    Blood Gas, Arterial - [[id number]]  (Abnormal) Collected: 08/11/23 0137    Specimen: Arterial Blood Updated: 08/11/23 0136     Site Right Radial     Haroon's Test N/A     pH, Arterial 7.403 pH units      pCO2, Arterial 33.6 mm Hg      Comment: 84 Value below reference range        pO2, Arterial 76.4 mm Hg      Comment: 84 Value below reference range        HCO3, Arterial 20.9 mmol/L      Base Excess, Arterial -3.3 mmol/L      Comment: 84 Value below reference range        O2 Saturation, Arterial 95.7 %      Barometric Pressure for Blood Gas 746 mmHg      Modality HFNC     Flow Rate 15.0 lpm      Ventilator Mode NA     Collected by Lola     Comment: Meter: [id number]     :  [id number]       Blood Culture - Blood, Arm, Right [[id number]]  (Normal) Collected: 08/05/23 2156    Specimen: Blood from Arm, Right Updated: 08/10/23 2216     Blood Culture No growth at 5 days    Blood Culture - Blood, Arm, Right [[id number]]  (Normal) Collected: 08/05/23 2156    Specimen: Blood from Arm, Right Updated: 08/10/23 2201     Blood Culture No growth at 5 days    Blood Gas, Arterial -  [[id number]]  (Abnormal) Collected: 08/10/23 1229    Specimen: Arterial Blood Updated: 08/10/23 1227     Site Arterial Line     Haroon's Test N/A     pH, Arterial 7.419 pH units      pCO2, Arterial 36.9 mm Hg      pO2, Arterial 54.8 mm Hg      Comment: 85 Value below critical limit        HCO3, Arterial 23.9 mmol/L      Base Excess, Arterial -0.4 mmol/L      Comment: 84 Value below reference range        O2 Saturation, Arterial 89.2 %      Comment: 84 Value below reference range        Barometric Pressure for Blood Gas 746 mmHg      Modality Room Air     FIO2 35 %      Ventilator Mode NA     PEEP 5.0     PSV 8.0 cmH2O      Collected by      Comment: Meter: [id number]     :  [id number]       Magnesium [[id number]]  (Normal) Collected: 08/09/23 0729    Specimen: Blood Updated: 08/09/23 0809     Magnesium 1.8 mg/dL     Occult Blood X 1, Stool - Stool, Per Rectum [[id number]]  (Abnormal) Collected: 08/08/23 1806    Specimen: Stool from Per Rectum Updated: 08/08/23 1851     Fecal Occult Blood Positive    Hepatitis B Surface Antigen [[id number]]  (Normal) Collected: 08/08/23 1148    Specimen: Blood Updated: 08/08/23 1257     Hepatitis B Surface Ag Non-Reactive    Urine Culture - Urine, Straight Cath [[id number]]  (Normal) Collected: 08/05/23 1109    Specimen: Urine from Straight Cath Updated: 08/07/23 1140     Urine Culture No growth    Vancomycin, Random [[id number]]  (Normal) Collected: 08/07/23 0555    Specimen: Blood Updated: 08/07/23 0637     Vancomycin Random 16.60 mcg/mL     STAT Lactic Acid, Reflex [[id number]]  (Abnormal) Collected: 08/06/23 2126    Specimen: Blood Updated: 08/06/23 2146     Lactate 2.6 mmol/L     STAT Lactic Acid, Reflex [[id number]]  (Abnormal) Collected: 08/06/23 1549    Specimen: Blood Updated: 08/06/23 1621     Lactate 3.2 mmol/L     High Sensitivity Troponin T [[id number]]  (Abnormal) Collected: 08/06/23 0414    Specimen: Blood Updated: 08/06/23 0814     HS Troponin T 56 ng/L      Narrative:      High Sensitive Troponin T Reference Range:  <10.0 ng/L- Negative Female for AMI  <15.0 ng/L- Negative Male for AMI  >=10 - Abnormal Female indicating possible myocardial injury.  >=15 - Abnormal Male indicating possible myocardial injury.   Clinicians would have to utilize clinical acumen, EKG, Troponin, and serial changes to determine if it is an Acute Myocardial Infarction or myocardial injury due to an underlying chronic condition.         Comprehensive Metabolic Panel [[id number]]  (Abnormal) Collected: 08/06/23 0414    Specimen: Blood Updated: 08/06/23 0457     Glucose 197 mg/dL      BUN 35 mg/dL      Creatinine 3.52 mg/dL      Sodium 139 mmol/L      Potassium 4.8 mmol/L      Chloride 98 mmol/L      CO2 17.0 mmol/L      Calcium 7.3 mg/dL      Total Protein 5.4 g/dL      Albumin 2.3 g/dL      ALT (SGPT) 1,591 U/L      AST (SGOT) 5,266 U/L      Alkaline Phosphatase 136 U/L      Total Bilirubin 0.5 mg/dL      Globulin 3.1 gm/dL      A/G Ratio 0.7 g/dL      BUN/Creatinine Ratio 9.9     Anion Gap 24.0 mmol/L      eGFR 12.5 mL/min/1.73      Comment: <15 Indicative of kidney failure       Narrative:      GFR Normal >60  Chronic Kidney Disease <60  Kidney Failure <15    The GFR formula is only valid for adults with stable renal function between ages 18 and 70.    Anaerobic Culture - Tissue, Breast, Left [[id number]]  (Normal) Collected: 08/01/23 1241    Specimen: Tissue from Breast, Left Updated: 08/06/23 0343     Anaerobic Culture No anaerobes isolated at 5 days    Procalcitonin [[id number]]  (Abnormal) Collected: 08/05/23 2156    Specimen: Blood Updated: 08/06/23 0247     Procalcitonin 1.01 ng/mL     Narrative:      As a Marker for Sepsis (Non-Neonates):    1. <0.5 ng/mL represents a low risk of severe sepsis and/or septic shock.  2. >2 ng/mL represents a high risk of severe sepsis and/or septic shock.    As a Marker for Lower Respiratory Tract Infections that require antibiotic therapy:    PCT on  "Admission    Antibiotic Therapy       6-12 Hrs later    >0.5                Strongly Recommended  >0.25 - <0.5        Recommended   0.1 - 0.25          Discouraged              Remeasure/reassess PCT  <0.1                Strongly Discouraged     Remeasure/reassess PCT    As 28 day mortality risk marker: \"Change in Procalcitonin Result\" (>80% or <=80%) if Day 0 (or Day 1) and Day 4 values are available. Refer to http://[email or web address] [email or web address]-[email or web address]    Change in PCT <=80%  A decrease of PCT levels below or equal to 80% defines a positive change in PCT test result representing a higher risk for 28-day all-cause mortality of patients diagnosed with severe sepsis for septic shock.    Change in PCT >80%  A decrease of PCT levels of more than 80% defines a negative change in PCT result representing a lower risk for 28-day all-cause mortality of patients diagnosed with severe sepsis or septic shock.       High Sensitivity Troponin T 2Hr [[id number]]  (Abnormal) Collected: 08/06/23 0022    Specimen: Blood Updated: 08/06/23 0051     HS Troponin T 47 ng/L      Troponin T Delta -4 ng/L     Narrative:      High Sensitive Troponin T Reference Range:  <10.0 ng/L- Negative Female for AMI  <15.0 ng/L- Negative Male for AMI  >=10 - Abnormal Female indicating possible myocardial injury.  >=15 - Abnormal Male indicating possible myocardial injury.   Clinicians would have to utilize clinical acumen, EKG, Troponin, and serial changes to determine if it is an Acute Myocardial Infarction or myocardial injury due to an underlying chronic condition.         Comprehensive Metabolic Panel [[id number]]  (Abnormal) Collected: 08/05/23 2156    Specimen: Blood Updated: 08/05/23 2320     Glucose 115 mg/dL      BUN 30 mg/dL      Creatinine 3.57 mg/dL      Sodium 136 mmol/L      Potassium 5.3 mmol/L      Chloride 97 mmol/L      CO2 11.0 mmol/L      Calcium 8.1 mg/dL      Total Protein 5.7 g/dL      Albumin 2.6 g/dL      ALT (SGPT) 593 U/L      AST " (SGOT) 1,456 U/L      Alkaline Phosphatase 141 U/L      Total Bilirubin 0.7 mg/dL      Globulin 3.1 gm/dL      A/G Ratio 0.8 g/dL      BUN/Creatinine Ratio 8.4     Anion Gap 28.0 mmol/L      eGFR 12.3 mL/min/1.73      Comment: <15 Indicative of kidney failure       Narrative:      GFR Normal >60  Chronic Kidney Disease <60  Kidney Failure <15    The GFR formula is only valid for adults with stable renal function between ages 18 and 70.    Extra Tubes [[id number]] Collected: 08/05/23 2201    Specimen: Blood, Venous Line Updated: 08/05/23 2315    Narrative:      The following orders were created for panel order Extra Tubes.  Procedure                               Abnormality         Status                     ---------                               -----------         ------                     Gold Top - SST[[id number]]                                   Final result                 Please view results for these tests on the individual orders.    Gold Top - SST [[id number]] Collected: 08/05/23 2201    Specimen: Blood Updated: 08/05/23 2315     Extra Tube Hold for add-ons.     Comment: Auto resulted.       Lactic Acid, Plasma [[id number]]  (Abnormal) Collected: 08/05/23 2156    Specimen: Blood Updated: 08/05/23 2306     Lactate 12.9 mmol/L     High Sensitivity Troponin T [[id number]]  (Abnormal) Collected: 08/05/23 2156    Specimen: Blood Updated: 08/05/23 2303     HS Troponin T 51 ng/L     Narrative:      High Sensitive Troponin T Reference Range:  <10.0 ng/L- Negative Female for AMI  <15.0 ng/L- Negative Male for AMI  >=10 - Abnormal Female indicating possible myocardial injury.  >=15 - Abnormal Male indicating possible myocardial injury.   Clinicians would have to utilize clinical acumen, EKG, Troponin, and serial changes to determine if it is an Acute Myocardial Infarction or myocardial injury due to an underlying chronic condition.         BNP [[id number]]  (Abnormal) Collected: 08/05/23 2156    Specimen: Blood  Updated: 08/05/23 2303     proBNP 3,011.0 pg/mL     Narrative:      Among patients with dyspnea, NT-proBNP is highly sensitive for the detection of acute congestive heart failure. In addition NT-proBNP of <300 pg/ml effectively rules out acute congestive heart failure with 99% negative predictive value.      CBC (No Diff) [[id number]]  (Abnormal) Collected: 08/05/23 2156    Specimen: Blood Updated: 08/05/23 2250     WBC 33.24 10*3/mm3      RBC 2.56 10*6/mm3      Hemoglobin 6.9 g/dL      Hematocrit 22.1 %      MCV 86.3 fL      MCH 27.0 pg      MCHC 31.2 g/dL      RDW 15.1 %      RDW-SD 46.4 fl      MPV 9.8 fL      Platelets 601 10*3/mm3     Urinalysis, Microscopic Only - Straight Cath [[id number]]  (Abnormal) Collected: 08/05/23 1109    Specimen: Urine from Straight Cath Updated: 08/05/23 1257     RBC, UA 0-2 /HPF      WBC, UA 31-50 /HPF      Bacteria, UA Trace /HPF      Squamous Epithelial Cells, UA 0-2 /HPF      Yeast, UA Small/1+ Yeast /HPF      Hyaline Casts, UA None Seen /LPF      Methodology Manual Light Microscopy    Urinalysis With Culture If Indicated - Straight Cath [[id number]]  (Abnormal) Collected: 08/05/23 1109    Specimen: Urine from Straight Cath Updated: 08/05/23 1155     Color, UA Yellow     Appearance, UA Clear     pH, UA 5.5     Specific Gravity, UA 1.015     Glucose, UA Negative     Ketones, UA Negative     Bilirubin, UA Negative     Blood, UA Negative     Protein, UA 30 mg/dL (1+)     Leuk Esterase, UA Small (1+)     Nitrite, UA Negative     Urobilinogen, UA 0.2 E.U./dL    Narrative:      In absence of clinical symptoms, the presence of pyuria, bacteria, and/or nitrites on the urinalysis result does not correlate with infection.    Manual Differential [[id number]]  (Abnormal) Collected: 08/05/23 0827    Specimen: Blood Updated: 08/05/23 1017     Neutrophil % 72.0 %      Lymphocyte % 16.0 %      Monocyte % 3.0 %      Eosinophil % 1.0 %      Basophil % 3.0 %      Bands %  3.0 %      Atypical  Lymphocyte % 2.0 %      Neutrophils Absolute 11.14 10*3/mm3      Lymphocytes Absolute 2.67 10*3/mm3      Monocytes Absolute 0.45 10*3/mm3      Eosinophils Absolute 0.15 10*3/mm3      Basophils Absolute 0.45 10*3/mm3      nRBC 3.0 /100 WBC      Anisocytosis Slight/1+     WBC Morphology Normal     Platelet Estimate Increased    Extra Tubes [[id number]] Collected: 08/05/23 0827    Specimen: Blood, Venous Line Updated: 08/05/23 0930    Narrative:      The following orders were created for panel order Extra Tubes.  Procedure                               Abnormality         Status                     ---------                               -----------         ------                     Green Top (Gel)[[id number]]                                  Final result                 Please view results for these tests on the individual orders.    Green Top (Gel) [[id number]] Collected: 08/05/23 0827    Specimen: Blood Updated: 08/05/23 0930     Extra Tube Hold for add-ons.     Comment: Auto resulted.       Tissue / Bone Culture - Tissue, Breast, Left [[id number]] Collected: 08/01/23 1241    Specimen: Tissue from Breast, Left Updated: 08/04/23 0527     Tissue Culture No growth at 3 days     Gram Stain Few (2+) WBCs seen      No organisms seen    Potassium [[id number]]  (Normal) Collected: 08/03/23 1726    Specimen: Blood Updated: 08/03/23 1819     Potassium 4.2 mmol/L     Potassium [[id number]]  (Normal) Collected: 08/01/23 1455    Specimen: Blood Updated: 08/01/23 1517     Potassium 3.5 mmol/L     Sodium, Urine, Random - Urine, Clean Catch [[id number]] Collected: 07/30/23 1516    Specimen: Urine, Clean Catch Updated: 07/30/23 1522     Sodium, Urine 26 mmol/L     Narrative:      Reference intervals for random urine have not been established.  Clinical usage is dependent upon physician's interpretation in combination with other laboratory tests.       Clostridioides difficile toxin Ag, Reflex - Stool, Per Rectum [[id number]]  (Normal)  Collected: 07/29/23 2107    Specimen: Stool from Per Rectum Updated: 07/30/23 0814     C.diff Toxin Ag Negative    Narrative:      DNA from a toxigenic strain of C.difficile was detected, although the free toxin itself was not detected. These findings are consistent with C.difficile colonization and may not reflect actual C.difficile infection. Clinical correlation needed.    Urine Culture - Urine, Urine, Catheter [[id number]]  (Abnormal)  (Susceptibility) Collected: 07/28/23 1604    Specimen: Urine, Catheter Updated: 07/30/23 0428     Urine Culture >100,000 CFU/mL Enterococcus faecalis    Narrative:      Colonization of the urinary tract without infection is common. Treatment is discouraged unless the patient is symptomatic, pregnant, or undergoing an invasive urologic procedure.    Susceptibility        Enterococcus faecalis      NATALI      Ampicillin Susceptible      Levofloxacin Susceptible      Nitrofurantoin Intermediate      Tetracycline Resistant      Vancomycin Susceptible                           Gastrointestinal Panel, PCR - Stool, Per Rectum [[id number]]  (Normal) Collected: 07/29/23 2107    Specimen: Stool from Per Rectum Updated: 07/29/23 2255     Campylobacter Not Detected     Plesiomonas shigelloides Not Detected     Salmonella Not Detected     Vibrio Not Detected     Vibrio cholerae Not Detected     Yersinia enterocolitica Not Detected     Enteroaggregative E. coli (EAEC) Not Detected     Enteropathogenic E. coli (EPEC) Not Detected     Enterotoxigenic E. coli (ETEC) lt/st Not Detected     Shiga-like toxin-producing E. coli (STEC) stx1/stx2 Not Detected     Shigella/Enteroinvasive E. coli (EIEC) Not Detected     Cryptosporidium Not Detected     Cyclospora cayetanensis Not Detected     Entamoeba histolytica Not Detected     Giardia lamblia Not Detected     Adenovirus F40/41 Not Detected     Astrovirus Not Detected     Norovirus GI/GII Not Detected     Rotavirus A Not Detected     Sapovirus (I, II, IV or  V) Not Detected    Narrative:      Testing performed by multiplex PCR system.    Clostridioides difficile Toxin - Stool, Per Rectum [[id number]]  (Abnormal) Collected: 07/29/23 2107    Specimen: Stool from Per Rectum Updated: 07/29/23 2221    Narrative:      The following orders were created for panel order Clostridioides difficile Toxin - Stool, Per Rectum.  Procedure                               Abnormality         Status                     ---------                               -----------         ------                     Clostridioides difficile...[[id number]]  Abnormal            Final result                 Please view results for these tests on the individual orders.    Clostridioides difficile Toxin, PCR - Stool, Per Rectum [[id number]]  (Abnormal) Collected: 07/29/23 2107    Specimen: Stool from Per Rectum Updated: 07/29/23 2221     Toxigenic C. difficile by PCR Positive     Comment: CONTACT PRECAUTION       Narrative:      Performed by real-time polymerase chain reaction (qPCR).  DNA from a toxigenic strain of C.difficile has been detected. Antigen testing for the presence of free C.difficile toxin is currently in progress, to help determine the clinical significance of this PCR result.     Procalcitonin [[id number]]  (Abnormal) Collected: 07/28/23 1600    Specimen: Blood Updated: 07/28/23 2002     Procalcitonin 7.84 ng/mL     Narrative:      As a Marker for Sepsis (Non-Neonates):    1. <0.5 ng/mL represents a low risk of severe sepsis and/or septic shock.  2. >2 ng/mL represents a high risk of severe sepsis and/or septic shock.    As a Marker for Lower Respiratory Tract Infections that require antibiotic therapy:    PCT on Admission    Antibiotic Therapy       6-12 Hrs later    >0.5                Strongly Recommended  >0.25 - <0.5        Recommended   0.1 - 0.25          Discouraged              Remeasure/reassess PCT  <0.1                Strongly Discouraged     Remeasure/reassess PCT    As 28 day  "mortality risk marker: \"Change in Procalcitonin Result\" (>80% or <=80%) if Day 0 (or Day 1) and Day 4 values are available. Refer to http://[email or web address]    Change in PCT <=80%  A decrease of PCT levels below or equal to 80% defines a positive change in PCT test result representing a higher risk for 28-day all-cause mortality of patients diagnosed with severe sepsis for septic shock.    Change in PCT >80%  A decrease of PCT levels of more than 80% defines a negative change in PCT result representing a lower risk for 28-day all-cause mortality of patients diagnosed with severe sepsis or septic shock.       Single High Sensitivity Troponin T [[id number]]  (Abnormal) Collected: 07/28/23 1820    Specimen: Blood Updated: 07/28/23 1842     HS Troponin T 32 ng/L     Narrative:      High Sensitive Troponin T Reference Range:  <10.0 ng/L- Negative Female for AMI  <15.0 ng/L- Negative Male for AMI  >=10 - Abnormal Female indicating possible myocardial injury.  >=15 - Abnormal Male indicating possible myocardial injury.   Clinicians would have to utilize clinical acumen, EKG, Troponin, and serial changes to determine if it is an Acute Myocardial Infarction or myocardial injury due to an underlying chronic condition.         Fentanyl, Urine - Urine, Clean Catch [[id number]]  (Normal) Collected: 07/28/23 1603    Specimen: Urine, Clean Catch Updated: 07/28/23 1711     Fentanyl, Urine Negative    Narrative:      Negative Threshold:      Fentanyl 5 ng/mL     The normal value for the drug tested is negative. This report includes final unconfirmed screening results to be used for medical treatment purposes only. Unconfirmed results must not be used for non-medical purposes such as employment or legal testing. Clinical consideration should be applied to any drug of abuse test, particularly when unconfirmed results are used.           Gold Top - SST [[id number]] Collected: 07/28/23 1600    Specimen: Blood Updated: 07/28/23 " 1700     Extra Tube Hold for add-ons.     Comment: Auto resulted.       BNP [[id number]]  (Abnormal) Collected: 07/28/23 1600    Specimen: Blood Updated: 07/28/23 1653     proBNP 9,052.0 pg/mL     Narrative:      Among patients with dyspnea, NT-proBNP is highly sensitive for the detection of acute congestive heart failure. In addition NT-proBNP of <300 pg/ml effectively rules out acute congestive heart failure with 99% negative predictive value.    Results may be falsely decreased if patient taking Biotin.      T4, Free [[id number]]  (Normal) Collected: 07/28/23 1600    Specimen: Blood Updated: 07/28/23 1653     Free T4 0.99 ng/dL     Narrative:      Results may be falsely increased if patient taking Biotin.      TSH [[id number]]  (Normal) Collected: 07/28/23 1600    Specimen: Blood Updated: 07/28/23 1653     TSH 1.180 uIU/mL     Urinalysis, Microscopic Only - Urine, Catheter [[id number]]  (Abnormal) Collected: 07/28/23 1604    Specimen: Urine, Catheter Updated: 07/28/23 1652     RBC, UA 0-2 /HPF      WBC, UA 6-12 /HPF      Bacteria, UA 2+ /HPF      Squamous Epithelial Cells, UA 3-5 /HPF      Yeast, UA Small/1+ Budding Yeast /HPF      Hyaline Casts, UA None Seen /LPF      Methodology Manual Light Microscopy    Ethanol [[id number]] Collected: 07/28/23 1600    Specimen: Blood Updated: 07/28/23 1648     Ethanol <10 mg/dL      Ethanol % <0.010 %     Acetaminophen Level [[id number]]  (Normal) Collected: 07/28/23 1600    Specimen: Blood Updated: 07/28/23 1648     Acetaminophen <5.0 mcg/mL     Salicylate Level [[id number]]  (Normal) Collected: 07/28/23 1600    Specimen: Blood Updated: 07/28/23 1648     Salicylate <0.3 mg/dL     Lactic Acid, Plasma [[id number]]  (Normal) Collected: 07/28/23 1626    Specimen: Blood Updated: 07/28/23 1646     Lactate 1.6 mmol/L     Single High Sensitivity Troponin T [[id number]]  (Abnormal) Collected: 07/28/23 1600    Specimen: Blood Updated: 07/28/23 1644     HS Troponin T 37 ng/L     Narrative:       High Sensitive Troponin T Reference Range:  <10.0 ng/L- Negative Female for AMI  <15.0 ng/L- Negative Male for AMI  >=10 - Abnormal Female indicating possible myocardial injury.  >=15 - Abnormal Male indicating possible myocardial injury.   Clinicians would have to utilize clinical acumen, EKG, Troponin, and serial changes to determine if it is an Acute Myocardial Infarction or myocardial injury due to an underlying chronic condition.         COVID-19 and FLU A/B PCR - Swab, Nasopharynx [[id number]]  (Normal) Collected: 07/28/23 1605    Specimen: Swab from Nasopharynx Updated: 07/28/23 1642     COVID19 Not Detected     Influenza A PCR Not Detected     Influenza B PCR Not Detected    Narrative:      Fact sheet for providers: https://www.fda.gov/media/035926/download    Fact sheet for patients: https://www.fda.gov/media/726003/download    Test performed by PCR.    Urine Drug Screen - Urine, Clean Catch [[id number]]  (Normal) Collected: 07/28/23 1603    Specimen: Urine, Clean Catch Updated: 07/28/23 1636     THC, Screen, Urine Negative     Phencyclidine (PCP), Urine Negative     Cocaine Screen, Urine Negative     Methamphetamine, Ur Negative     Opiate Screen Negative     Amphetamine Screen, Urine Negative     Benzodiazepine Screen, Urine Negative     Tricyclic Antidepressants Screen Negative     Methadone Screen, Urine Negative     Barbiturates Screen, Urine Negative     Oxycodone Screen, Urine Negative     Propoxyphene Screen Negative     Buprenorphine, Screen, Urine Negative    Narrative:      Cutoff For Drugs Screened:    Amphetamines               500 ng/ml  Barbiturates               200 ng/ml  Benzodiazepines            150 ng/ml  Cocaine                    150 ng/ml  Methadone                  200 ng/ml  Opiates                    100 ng/ml  Phencyclidine               25 ng/ml  THC                            50 ng/ml  Methamphetamine            500 ng/ml  Tricyclic Antidepressants  300 ng/ml  Oxycodone                   100 ng/ml  Propoxyphene               300 ng/ml  Buprenorphine               10 ng/ml    The normal value for all drugs tested is negative. This report includes unconfirmed screening results, with the cutoff values listed, to be used for medical treatment purposes only.  Unconfirmed results must not be used for non-medical purposes such as employment or legal testing.  Clinical consideration should be applied to any drug of abuse test, particularly when unconfirmed results are used.      Urinalysis With Culture If Indicated - Urine, Catheter [[id number]]  (Abnormal) Collected: 07/28/23 1604    Specimen: Urine, Catheter Updated: 07/28/23 1628     Color, UA Dark Yellow     Appearance, UA Clear     pH, UA 7.5     Specific Gravity, UA 1.018     Glucose, UA Negative     Ketones, UA Negative     Bilirubin, UA Negative     Blood, UA Negative     Protein, UA >=300 mg/dL (3+)     Leuk Esterase, UA Trace     Nitrite, UA Negative     Urobilinogen, UA 1.0 E.U./dL    Narrative:      In absence of clinical symptoms, the presence of pyuria, bacteria, and/or nitrites on the urinalysis result does not correlate with infection.          Imaging Results (Most Recent)       Procedure Component Value Units Date/Time    XR Chest Post CVA Port [[id number]] Collected: 08/17/23 0808     Updated: 08/17/23 0812    Narrative:      XR CHEST PORTABLE    HISTORY: post CVA    COMPARISON: 8/6/2023.    FINDINGS:  Mild interstitial opacities are noted in both lungs. The lungs are slightly  hypoinflated. There is no focal consolidation or pneumothorax. A right upper  extremity midline catheter terminates near the axillary vein. A central venous  catheter terminates in the region of the atriocaval junction. The heart is  stable in size. Aortic atherosclerosis is again noted. The pulmonary vasculature  is within normal limits.    The osseous structures and surrounding soft tissues demonstrate no acute  abnormality.      Impression:       Impression:    1. Mild interstitial opacities are likely due to low lung volumes. An element of  interstitial edema is not entirely excluded.    US Guided Vascular Access [[id number]] Resulted: 08/17/23 0714     Updated: 08/17/23 0757    XR Abdomen KUB [[id number]] Collected: 08/10/23 1024     Updated: 08/10/23 1137    Narrative:      COMPARISON:  Same examination 8/9/2023.    FINDINGS:  Enteric tube terminates in the fourth portion of the duodenum.  Right-sided  ureteral stent in place.  No definite calculus along the course of the right  ureteral stent.  There are numerous phleboliths within the pelvis.    No evidence of bowel obstruction or gross free intraperitoneal air.    XR Abdomen KUB [[id number]] Collected: 08/09/23 1653     Updated: 08/09/23 1703    Narrative:      Single view abdomen    COMPARISON:  Abdomen 8/9/2023    HISTORY:  Cortrak placement after adjustment of the tube.      Impression:      FINDINGS/IMPRESSION:  Radiopaque tip of the Dobbhoff tube projects over the second portion of the  duodenum, similar to even slightly retracted when compared to the prior exam.  Right-sided ureteral stent is again noted and incompletely visualized.  There  are probably also right femoral catheters which are incompletely visualized.    Normal bowel gas pattern in the visualized abdomen.    XR Abdomen KUB [[id number]] Collected: 08/09/23 1502     Updated: 08/09/23 1525    Narrative:      FINDINGS/    Impression:      Single view of the abdomen demonstrates a feeding tube present, projected over  the second portion of the duodenum.    US Guided Vascular Access [[id number]] Resulted: 08/08/23 1526     Updated: 08/08/23 1526    IR insert non-tunneled central line 5+ [[id number]] Resulted: 08/08/23 1512     Updated: 08/08/23 1512    Narrative:      Images from the original result were not included.  OPERATIVE NOTE  Carol Sullivan  1940      PREOP DIAGNOSES:  Acute kidney injury requiring short term  hemodialysis.    POSTOP DIAGNOSES:   Acute kidney injury requiring short term hemodialysis.    PROCEDURE:   Placement non-tunneled central venous catheter (12Fr Trialysis)  Vascular ultrasound guidance    SURGEON: CHRISTINA Yin MD FACS RPVI    ASSISTANT: Yanelis Peña RT     ANESTHESIA: Local 1% lidocaine    ESTIMATED BLOOD LOSS: Minimal    SPECIMEN:  None    COMPLICATIONS: None    DESCRIPTION OF OPERATION: In CCU, patient placed in supine position,   prepped and draped in sterile fashion.  Vascular ultrasound was used to   identify the RIGHT common femoral vein which was 10mm in diameter and   patent.  Cannulated via modified Seldinger technique with MiniStick under   ultrasound guidance, imaging saved.  Exchanged for a 0.035 guidewire and   serial dilators.  A 12Fr Trialysis catheter was placed, aspirated and   flushed without difficulty with Hep-Lock solution.  1000 units/mL heparin   instilled into lumens. Catheter secured to the skin with 2-0 Nylon suture.    Sterile dressing applied. Patient tolerated procedure well. Dialysis   notified.             This document has been electronically signed by Hardy Yin MD   on August 8, 2023 15:12 CDT         FL Retrograde Pyelogram In OR [[id number]] Resulted: 08/07/23 0707     Updated: 08/07/23 0707    XR Chest 1 View [[id number]] Collected: 08/06/23 1907     Updated: 08/06/23 1911    Narrative:      XR CHEST 1 VIEW    HISTORY: ET PLACEMENT    COMPARISON: 8/6/2023 at 1342 hours.    FINDINGS:  The endotracheal tube is in appropriate position. A defibrillator pad overlies  the right chest. The right ureteral stent is unchanged.    Mild atelectatic changes are noted in both lungs. There is no pleural effusion  or pneumothorax.    XR Chest 1 View [[id number]] Collected: 08/06/23 1404     Updated: 08/06/23 1415    Narrative:      Comparison:  AP chest, 08/05/2023.    FINDINGS:  Heart size is borderline enlarged and stable.  Mild consolidation in the  right  lung base likely representing atelectasis.  There is a small right-sided pleural  effusion.  No pneumothorax.  Osseous structures are age-appropriate.    Right upper extremity PICC terminating in the right axilla, similar in position  to previous exam.    CT Abdomen Pelvis Without Contrast [[id number]] Collected: 08/06/23 0911     Updated: 08/06/23 0942    Narrative:      CT ABDOMEN PELVIS WO CONTRAST    HISTORY: Severe sepsis    COMPARISON:    Radiographs dated 8/5/2023 and CT chest dated 7/28/2023 there is new moderate  right hydronephrosis. Small phlebolith is seen adjacent to the mid right ureter.  No convincing ureteral stones other evaluation is somewhat limited by the  hematoma. Tobar catheter noted in the urinary bladder.    TECHNIQUE:    Automated exposure control was used to reduce patient dose    CT ABDOMEN PELVIS WO CONTRAST    FINDINGS:    Trace left pleural effusion and moderate right pleural effusion. There is right  infrahilar consolidation with air bronchograms. Also some atelectasis in the  left lower lobe. Moderate-sized hiatal hernia containing fluid, similar to  prior.    There a right rectus sheath hematoma measuring 9.0 x 3.1 cm. This may  communicate with the extraperitoneal soft tissues as seen on axial images 69  through 73    There is a large right extraperitoneal/retroperitoneal hematoma extending from  the pelvis where it is displacing the urinary bladder and into the right  retroperitoneum at the lower right renal border. The pelvic component of the  hematoma measures 12.3 x 8.9 cm. The lower abdominal/upper pelvic component of  the hematoma measures 16.1 x 6.0 cm.    No significant iliac or psoas hematoma.    Stomach is mildly dilated. Bowel is nonobstructed. Liver and spleen and pancreas  and adrenal glands are unremarkable.    Osseous degenerative changes are present with bowel suspicious osseous lesion.      Impression:        Large right retroperitoneal hematoma. This may  have started as a rectal sheath  hematoma which decompressed into the extraperitoneal soft tissues and upward  into the retroperitoneum. It is compressing the mid to distal right ureter and  causing moderate right hydronephrosis. These findings were discussed with Dr. Harrington at 9:37 AM on 8/6/2023 by phone    Moderate right pleural effusion and trace left pleural effusion. There is  airspace disease in the bilateral lungs, this could be atelectasis or pneumonia.    US Liver [[id number]] Collected: 08/06/23 0806     Updated: 08/06/23 0812    Narrative:      RIGHT UPPER QUADRANT ABDOMINAL ULTRASOUND    HISTORY:  Right upper quadrant abdominal pain, elevated liver enzymes    COMPARISON:  None    TECHNIQUE:  High-resolution gray scale images of the liver, gallbladder, common bile duct,  pancreas, and right kidney were obtained.  Color and spectral Doppler ultrasound  evaluation of the main portal vein was also performed.    FINDINGS:    Limited examination secondary to patient confusion and motion.    The liver is normal in size, measuring 9.5 cm in length.  The hepatic parenchyma  is diffusely increased in echogenicity.  Main portal vein is patent with  antegrade flow.    The gallbladder is surgically absent. There is no biliary dilatation. The common  bile duct is 7 mm in caliber.    The pancreas is obscured by bowel gas. The right kidney measures 9.6 cm in  length.  There is mild hydronephrosis.      Impression:      1.  Limited examination secondary to patient confusion and motion.  2.  Hepatic steatosis.  3.  Prior cholecystectomy.  4.  Mild right hydronephrosis.      XR Chest 1 View [[id number]] Collected: 08/05/23 2204     Updated: 08/05/23 2227    Narrative:        INDICATION:  Hypotension    COMPARISON:  None.      Impression:      FINDINGS/IMPRESSION:  No consolidative pulmonary opacity, pleural effusion, or pneumothorax.    Heart appears mildly enlarged, probably at least partially due to  portable  technique.    Right upper extremity PICC terminates in the right axilla.    XR Abdomen KUB [[id number]] Collected: 08/05/23 1116     Updated: 08/05/23 1120    Narrative:      Comparison:  None    FINDINGS:  There is mild gaseous distention of the colon.  No significant stool burden.  No  evidence of small bowel obstruction.  Calcified phleboliths noted in the pelvis.      XR Chest 1 View [[id number]] Collected: 08/01/23 1458     Updated: 08/01/23 1529    Narrative:      FINDINGS:  Mild central vascular prominence.  There are low lung volumes.  Heart size is  upper limits of normal.  No significant change from 07/31/2023.    US Guided Vascular Access [[id number]] Collected: 08/01/23 1509     Updated: 08/01/23 1512    Narrative:      Ultrasound guidance vascular    FINDINGS:  Real-time ultrasound imaging was provided for vascular access.  Please refer to  procedure note for real-time exam findings.  The right cephalicVein was found to  be patent and compressible.  Access under direct sonographic visualization.  Permanent saved images sent to the PACS for documentation.      IR Insert Midline Without Port Pump 5 Plus [[id number]] Resulted: 08/01/23 1511     Updated: 08/01/23 1511    Narrative:      This procedure was auto-finalized with no dictation required.    XR Chest 1 View [[id number]] Collected: 07/31/23 0432     Updated: 07/31/23 0436    Narrative:      Comparison:  CT thorax 7/28/2023    FINDINGS:  There may be a small left pleural effusion.  Mild central pulmonary vascular  congestion noted.  No pneumothorax.  Cardiomediastinal silhouette is  unremarkable.      US Breast Left Limited [[id number]] Collected: 07/30/23 1243     Updated: 07/30/23 1333    Narrative:      INDICATION:  worsening mastitis/ assess for abscess.    COMPARISON:  None relevant.    TECHNIQUE:  Targeted high-resolution grayscale and color Doppler ultrasound was performed of  the left breast to assess for fluid collection in the emergency  setting.    FINDINGS:  Multiple hypoechoic areas are present within the left breast measuring 2.5 x 2.1  cm 10 o'clock position, 2.4 x 1.3 cm at the 6 o'clock position, and 4.6 x 3.7 cm  12 o'clock position.    These areas could represent areas of phlegmon and developing abscess.      Of note: This ultrasound was performed in the emergent setting for evaluation  for a breast fluid collection/abscess. If there is clinical concern for a breast  neoplasm, this examination is inappropriate for such a workup. Proper  mammographic evaluation is recommended if there is clinical suspicion for a  breast neoplasm.        US Renal Bilateral [[id number]] Collected: 07/30/23 1242     Updated: 07/30/23 1250    Narrative:      HISTORY:  BLAINE    COMPARISON:  None    TECHNIQUE:  Real-time ultrasound imaging and color Doppler used to evaluate the kidneys and  bladder.    FINDINGS:  The right kidney measures 9.5 cm. The left kidney measures 9.8 cm.  Cortical  thickness and echotexture are within normal limits. There is no hydronephrosis  or evidence of mass. No calculus was seen.    The bladder was unremarkable.      Impression:      Normal renal ultrasound.      XR Hip With or Without Pelvis 2 - 3 View Right [[id number]] Collected: 07/30/23 1105     Updated: 07/30/23 1108    Narrative:      HISTORY: Hip pain after fall    COMPARISON: None    FINDINGS:  AP and lateral views were obtained.    There is no fracture, dislocation or osseous destruction.      Impression:      No acute abnormality.    CT Chest Without Contrast Diagnostic [[id number]] Collected: 07/28/23 1603     Updated: 07/28/23 1619    Narrative:      INDICATION:  Possible breast abscess and uncertain renal function.    COMPARISON:  None relevant.    TECHNIQUE:  Helical CT of the chest was performed without intravenous contrast.  Multiplanar  reformations were provided.    FINDINGS:  Cardiac size is mildly enlarged.  Diffuse vascular calcification.  Moderate  hiatal hernia.   No pleural effusion or pneumothorax.  No dense airspace  consolidation.  No worrisome pulmonary nodules.      Impression:      1.  No acute intrathoracic process.  2.  Moderate hiatal hernia.  3.  Diffuse vascular calcification.        CT Head Without Contrast [[id number]] Collected: 07/28/23 1602     Updated: 07/28/23 1619    Narrative:      INDICATION:  Mental status change, unknown cause.    COMPARISON:  None relevant.    TECHNIQUE:  Axial images were performed from the calvarium through the skull base followed  by 2D multiplanar reformats.  No contrast was administered.    FINDINGS:  No mass, mass effect or midline shift.  The ventricles are midline and  symmetric.  No abnormal extra-axial fluid collection.  Pituitary and posterior  fossa are within normal limits.  Calvarium is intact.  Mild scattered small  vessels can be changes..      Impression:      No acute intracranial process.                  Chief Complaint on Day of Discharge: No new complaints    Hospital Course:  The patient is a 82 y.o. female with medical history notable for paroxysmal atrial fibrillation, hyperlipidemia, hypertension who presented to [facility] with altered mental status felt to be related to encephalopathy from sepsis and infection related to left breast mastitis.  Patient was seen and evaluated by general surgery during her stay and had the fluid collection drained.  Patient was also seen by nephrology due to worsening kidney disease and her medications were adjusted.  After her initial procedure for her fluid collection was drained from her left breast patient became unresponsive and required transfer to the CCU.  Patient subsequently woke up and did not require intubation at that time.  Patient made gradual improvement and was treated with appropriate antibiotic therapy.  She was transferred back to the floor and was thought to be doing well.  Patient unfortunately subsequently developed right-sided abdominal  "pain that initial work-up was unrevealing.  Patient decompensated further requiring transfer back to the CCU.  She underwent CT scan of her abdomen pelvis that showed developing right-sided retroperitoneal hematoma.  Her Coumadin was reversed and she was transfused appropriately.  Unfortunately during the process patient developed a hypoxic respiratory failure and required intubation mechanical ventilation.  Patient was subsequently able to be liberated from the ventilator but had been noted to have worsening renal function requiring initiation of hemodialysis.  Patient was also confused but this has gradually improved during her stay.  This is likely combination of both encephalopathy and delirium.  Patient was evaluated for LTAC and deemed appropriate.  Referral was made and she was accepted.  Patient was also seen at the time that she developed a retroperitoneal hematoma by urology due to right-sided hydronephrosis that was secondary to the hematoma.  She had stent placement per urology at that time as well.  Patient was otherwise noted to be in stable condition at the time of discharge and will be discharged to LTAC for ongoing care and continuation of hemodialysis initiation.      Condition on Discharge: Stable    Physical Exam on Discharge:  /67 (BP Location: Left arm, Patient Position: Lying)   Pulse 72   Temp 97.4 øF (36.3 øC) (Temporal)   Resp 18   Ht 165.1 cm (65\")   Wt 68.5 kg (151 lb)   SpO2 92%   BMI 25.13 kg/mý   Physical Exam  Constitutional:       General: She is not in acute distress.     Appearance: She is not toxic-appearing.   HENT:      Head: Normocephalic and atraumatic.      Right Ear: External ear normal.      Left Ear: External ear normal.      Nose: Nose normal.      Mouth/Throat:      Pharynx: Oropharynx is clear.   Eyes:      Conjunctiva/sclera: Conjunctivae normal.   Cardiovascular:      Rate and Rhythm: Normal rate and regular rhythm.      Heart sounds: Normal heart sounds. "   Pulmonary:      Effort: Pulmonary effort is normal. No respiratory distress.      Breath sounds: Normal breath sounds.   Abdominal:      General: Bowel sounds are normal.      Palpations: Abdomen is soft.      Tenderness: There is no abdominal tenderness.   Musculoskeletal:         General: No deformity.   Skin:     General: Skin is warm and dry.      Capillary Refill: Capillary refill takes less than 2 seconds.   Neurological:      General: No focal deficit present.   Psychiatric:         Behavior: Behavior normal.         Thought Content: Thought content normal.         Discharge Disposition:  Long Term Care (DC - External)    Discharge Medications:     Discharge Medications        New Medications        Instructions Start Date   HYDROcodone-acetaminophen 5-325 MG per tablet  Commonly known as: NORCO   1 tablet, Oral, Every 6 Hours PRN      hydrocortisone-bacitracin-zinc oxide-nystatin  Commonly known as: MAGIC BARRIER   1 application , Topical, 2 Times Daily PRN      metoprolol tartrate 100 MG tablet  Commonly known as: LOPRESSOR   100 mg, Oral, Every 12 Hours Scheduled      nystatin 170289 UNIT/GM powder  Commonly known as: MYCOSTATIN   Topical, Every 12 Hours Scheduled             Continue These Medications        Instructions Start Date   acetaminophen 500 MG tablet  Commonly known as: TYLENOL   500 mg, Oral, As Needed      isosorbide mononitrate 30 MG 24 hr tablet  Commonly known as: IMDUR   30 mg, Oral, Daily      lansoprazole 30 MG capsule  Commonly known as: PREVACID   1 capsule, Oral, Every 12 Hours      rosuvastatin 40 MG tablet  Commonly known as: CRESTOR   40 mg, Oral, Daily      Ventolin  (90 Base) MCG/ACT inhaler  Generic drug: albuterol sulfate HFA   No dose, route, or frequency recorded.             Stop These Medications      DULoxetine 60 MG capsule  Commonly known as: CYMBALTA     furosemide 20 MG tablet  Commonly known as: LASIX     gabapentin 300 MG capsule  Commonly known as:  NEURONTIN     losartan 100 MG tablet  Commonly known as: COZAAR     metoprolol succinate XL 50 MG 24 hr tablet  Commonly known as: TOPROL-XL     Mirabegron ER 50 MG tablet sustained-release 24 hour 24 hr tablet  Commonly known as: MYRBETRIQ     NIFEdipine XL 60 MG 24 hr tablet  Commonly known as: PROCARDIA XL     potassium chloride 20 MEQ CR tablet  Commonly known as: K-DUR,KLOR-CON     traMADol 50 MG tablet  Commonly known as: ULTRAM     warfarin 2.5 MG tablet  Commonly known as: COUMADIN              Discharge Diet:   Diet Instructions       Diet: Cardiac Diets, Diabetic Diets; No Salt Packet; Soft to Chew (NDD 3); Chopped Meat; Thin (IDDSI 0); Consistent Carbohydrate      Discharge Diet:  Cardiac Diets  Diabetic Diets       Cardiac Diet: No Salt Packet    Texture: Soft to Chew (NDD 3)    Soft to Chew: Chopped Meat    Fluid Consistency: Thin (IDDSI 0)    Diabetic Diet: Consistent Carbohydrate            Activity at Discharge:   Activity Instructions       Gradually Increase Activity Until at Pre-Hospitalization Level              Discharge Care Plan/Instructions: Take medications as prescribed, follow-up with PCP, general surgery, urology, and nephrology after LTAC stay.    Follow-up Appointments:   Future Appointments   Date Time Provider Department Center   2/20/2024  1:00 PM Dimitri Peterson MD [facility] None       Test Results Pending at Discharge: None      Juanito Harrington MD    Time: 35 minutes

## 2023-08-17 NOTE — THERAPY TREATMENT NOTE
"Acute Care - Speech Language Pathology   Swallow Treatment Note [facility]     Patient Name: Carol Sullivan  : 1940  MRN: [id number]  Today's Date: 2023               Admit Date: 2023    Visit Dx:     ICD-10-CM ICD-9-CM   1. Mastitis  N61.0 611.0   2. Somnolence  R40.0 780.09   3. Sepsis, due to unspecified organism, unspecified whether acute organ dysfunction present  A41.9 038.9     995.91   4. Impaired functional mobility, balance, gait, and endurance [Z74.09 (ICD-10-CM)]  Z74.09 V49.89   5. Impaired mobility and ADLs [Z74.09, Z78.9 (ICD-10-CM)]  Z74.09 V49.89    Z78.9    6. Other hydronephrosis  N13.39 591   7. Acute respiratory failure with hypoxia  J96.01 518.81   8. Oral phase dysphagia [R13.11 (ICD-10-CM)]  R13.11 787.21   9. ESRD (end stage renal disease) on dialysis  N18.6 585.6    Z99.2 V45.11     Patient Active Problem List   Diagnosis    Hypertension    Hyperlipidemia    Near syncope    GERD (gastroesophageal reflux disease)    Palpitation    PVC (premature ventricular contraction)    Breast calcifications on mammogram    Paroxysmal atrial fibrillation    Peripheral vascular disease, unspecified    Type 2 diabetes mellitus with other specified complication    Long term (current) use of anticoagulants    Encounter for therapeutic drug monitoring    Sepsis    Mastitis    Other hydronephrosis    ESRD (end stage renal disease) on dialysis     Past Medical History:   Diagnosis Date    Arthritis     Depression     GERD (gastroesophageal reflux disease)     Hyperlipidemia     Hypertension     Near syncope     Vascular disease      Past Surgical History:   Procedure Laterality Date    BLADDER SURGERY      ENDOSCOPY N/A 2019    Procedure: ESOPHAGOGASTRODUODENOSCOPY;  Surgeon: Alberto Sanchez DO;  Location: [facility] ENDOSCOPY;  Service: Gastroenterology    GALLBLADDER SURGERY      SHOULDER SURGERY      \"bone spurs\"    SUBTOTAL HYSTERECTOMY         SLP Recommendation and Plan     SLP " Diet Recommendation: thin liquids, chopped, soft to chew textures (08/17/23 0820)                          Therapy Frequency (Swallow): 2 days per week, 3 days per week (08/17/23 0820)  Predicted Duration Therapy Intervention (Days): 1 week (08/17/23 0820)           Daily Summary of Progress (SLP): progress toward functional goals as expected (08/17/23 0820)               Treatment Assessment (SLP): improved, oral dysphagia (08/17/23 0820)  Treatment Assessment Comments (SLP): ST: pt cognition is improved this date which has improved her understanding of eating tasks.  she is able to self feed.  pt independently using liquid wash with mech soft solid residue after the swallow.  pt conversing, although repeating herself.  recommend upgrade to mech soft/chopped and f/u for diet consistancy analysis. (08/17/23 0820)  Plan for Continued Treatment (SLP): continue treatment per plan of care (08/17/23 0820)            Plan of Care Reviewed With: patient, spouse  Progress: improving  Outcome Evaluation: ST: pt cognition is improved this date which has improved her understanding of eating tasks. she is able to self feed. pt independently using liquid wash with mech soft solid residue after the swallow. pt conversing, although repeating herself. recommend upgrade to mech soft/chopped and f/u for diet consistancy analysis.      SWALLOW EVALUATION (last 72 hours)       SLP Adult Swallow Evaluation       Row Name 08/17/23 0820 08/16/23 0715 08/14/23 1054             Rehab Evaluation    Document Type therapy note (daily note)  -EC therapy note (daily note)  -EC evaluation  -EC      Subjective Information no complaints  -EC no complaints  -EC complains of  dry mouth  -EC      Patient Observations alert;cooperative;agree to therapy  -EC alert;cooperative;agree to therapy  -EC alert;cooperative;agree to therapy  -EC      Patient/Family/Caregiver Comments/Observations   -EC none  -EC   -EC      Patient Effort adequate   -EC adequate  -EC adequate  -EC         General Information    Patient Profile Reviewed yes  -EC yes  -EC yes  -EC      Pertinent History Of Current Problem -- -- extubated and alert for PO trials at this time.  -EC      Current Method of Nutrition pureed;thin liquids  -EC mechanical ground textures  -EC nasogastric feedings  -EC      Prior Level of Function-Swallowing no diet consistency restrictions  -EC no diet consistency restrictions  -EC no diet consistency restrictions  -EC      Plans/Goals Discussed with patient;spouse/S.O.  -EC patient;spouse/S.O.  -EC patient;spouse/S.O.  -EC      Barriers to Rehab none identified  -EC none identified  -EC none identified  -EC      Patient's Goals for Discharge -- -- patient did not state  -EC         Pain Scale: Numbers Pre/Post-Treatment    Pretreatment Pain Rating 0/10 - no pain  -EC 0/10 - no pain  -EC 5/10  -EC      Posttreatment Pain Rating 0/10 - no pain  -EC 0/10 - no pain  -EC 5/10  -EC      Pain Location - Side/Orientation -- -- Right  -EC      Pain Location - -- -- abdomen  -EC      Pain Intervention(s) -- -- Repositioned;Nursing Notified  -EC         Oral Motor Structure and Function    Oral Lesions or Structural Abnormalities and/or variants -- -- very dry and crusty under tongue  -EC      Dentition Assessment upper dentures/partial in place  -EC upper dentures/partial in place  -EC other (see comments);dentures are ill fitting  unable towear top dentures for eval.  hassome natural bottom teeth  -EC      Secretion Management WNL/WFL  -EC WNL/WFL  -EC dried secretions in oral cavity  -EC      Mucosal Quality -- -- dry  -EC      Gag Response WFL  -EC WFL  -EC WFL  -EC      Volitional Swallow WFL  -EC WFL  -EC WFL  -EC         General Eating/Swallowing Observations    Respiratory Support Currently in Use room air  -EC room air  -EC room air  -EC      Eating/Swallowing Skills self-fed  -EC fed by SLP  -EC fed by SLP  -EC      Positioning During Eating upright 90  degree;upright in bed  -EC upright 90 degree;upright in bed  -EC upright 90 degree;upright in chair  -EC      Utensils Used spoon;cup;straw  -EC spoon;cup;straw  -EC spoon;cup;straw  -EC      Consistencies Trialed thin liquids;pureed;mixed consistency  cheerios in milk, applesauce, juice, water, milk  -EC soft to chew textures;regular textures;thin liquids  PB crackers, fruit cup/cheerios, milk, water/straw  -EC ice chips;pureed;soft to chew textures  water, ice, apples sauce, ryan cracker  -EC         Clinical Swallow Eval    Oral Prep Phase WFL  -EC impaired  -EC impaired  -EC      Oral Transit WFL  -EC impaired  -EC WFL  -EC      Oral Residue impaired  -EC impaired  -EC impaired  -EC      Pharyngeal Phase no overt signs/symptoms of pharyngeal impairment  -EC no overt signs/symptoms of pharyngeal impairment  -EC no overt signs/symptoms of pharyngeal impairment  -EC      Esophageal Phase unremarkable  -EC unremarkable  -EC unremarkable  -EC      Clinical Swallow Evaluation Summary Pt given trials of various consistancies this date.  tolerates puree and thin via cup and straw with good oral clearance and timely swallow.  there is improved cognitive status this date which has improved understanding of eating tasks and she is self feeding.  she does have some diffuse oral residue after cheerios that she indepenently clears with liquid wash.  -EC trials of chewable given with increased oral prep time noted, difficulty with oral propulsion and diffuse oral residue reqring cues for extra swallows and liquid washes to clear oral cavity.  pt is confused and picking at tray without meaning and speech is not always coherent.  -EC pt demonstrates some increased oral prep gideon with chewable solids d/t not able to wear top dentures.  adhesive will be brought to room and they will try top teeth with nursing.  pt has very dry mouth and there is some coughing  associated with first few swallows.  pt encouraged to use double  swallow with improvement noted.  good tolerance of applesauce.  -EC         Oral Prep Concerns    Oral Prep Concerns -- increased prep time  -EC increased prep time  -EC      Increased Prep Time -- mechanical soft;regular consistencies  -EC mechanical soft  -EC         Oral Transit Concerns    Oral Transit Concerns -- increased oral transit time  -EC --      Increased Oral Transit Time -- regular consistencies;mechanical soft  -EC --         Oral Residue Concerns    Oral Residue Concerns diffuse residue throughout oral cavity  -EC diffuse residue throughout oral cavity  -EC diffuse residue throughout oral cavity  -EC      Diffuse Residue Throughout Oral Cavity mixed consistencies  -EC mechanical soft;regular consistencies  -EC mechanical soft  -EC      Oral Residue Concerns, Comment -- -- cleared with liquid wash  -EC         SLP Evaluation Clinical Impression    SLP Swallowing Diagnosis -- -- mild;oral dysphagia  -EC      Functional Impact -- -- risk of malnutrition  -EC      Rehab Potential/Prognosis, Swallowing -- -- good, to achieve stated therapy goals  -EC      Swallow Criteria for Skilled Therapeutic Interventions Met -- -- demonstrates skilled criteria  -EC         SLP Treatment Clinical Impressions    Treatment Assessment (SLP) improved;oral dysphagia  -EC continued;oral dysphagia  -EC --      Treatment Assessment Comments (SLP) ST: pt cognition is improved this date which has improved her understanding of eating tasks.  she is able to self feed.  pt independently using liquid wash with mech soft solid residue after the swallow.  pt conversing, although repeating herself.  recommend upgrade to mech soft/chopped and f/u for diet consistancy analysis.  -EC ST: pt is confused and this is a barrier to self feeding and oral awareness for clearing chewable foods from mouth.  pt requires feeding assistance and cues to clear mouth.  recommend downgrade to puree at this time and f/u for diet advancement as  appropriate and caregiver education for safe swallow strategies.  -EC --      Daily Summary of Progress (SLP) progress toward functional goals as expected  -EC progress toward functional goals is gradual  -EC --      Barriers to Overall Progress (SLP) Cognitive status;Medically complex  -EC Cognitive status  -EC --      Plan for Continued Treatment (SLP) continue treatment per plan of care  -EC goals adjusted to reflect functional decline demonstrated  -EC --      Care Plan Review evaluation/treatment results reviewed;care plan/treatment goals reviewed;risks/benefits reviewed;current/potential barriers reviewed;patient/other agree to care plan  -EC evaluation/treatment results reviewed;care plan/treatment goals reviewed;risks/benefits reviewed;current/potential barriers reviewed;patient/other agree to care plan  -EC --         Recommendations    Therapy Frequency (Swallow) 2 days per week;3 days per week  -EC 2 days per week;3 days per week  -EC 2 days per week;3 days per week  -EC      Predicted Duration Therapy Intervention (Days) 1 week  -EC 1 week  -EC 1 week  -EC      SLP Diet Recommendation thin liquids;chopped;soft to chew textures  -EC thin liquids;puree  -EC mechanical ground textures;thin liquids  -EC         Swallow Goals (SLP)    Swallow LTGs Patient will demonstrate functional swallow for  -EC Patient will demonstrate functional swallow for  -EC Patient will demonstrate functional swallow for  -EC      Swallow STGs diet tolerance goal selection (SLP)  -EC diet tolerance goal selection (SLP)  -EC diet tolerance goal selection (SLP)  -EC      Diet Tolerance Goal Selection (SLP) Patient will tolerate trials of  -EC Patient will tolerate trials of  -EC Patient will tolerate trials of  -EC         (LTG) Patient will demonstrate functional swallow for    Diet Texture (Demonstrate functional swallow) soft to chew (chopped) textures  -EC soft to chew (chopped) textures  -EC regular textures  -EC      Liquid  viscosity (Demonstrate functional swallow) thin liquids  -EC thin liquids  -EC thin liquids  -EC      [place] (Demonstrate functional swallow) independently (over 90% accuracy)  -EC independently (over 90% accuracy)  -EC independently (over 90% accuracy)  -EC      Time Frame (Demonstrate functional swallow) 1 week  -EC 1 week  -EC 1 week  -EC      Barriers (Demonstrate functional swallow) deconditioning  -EC deconditioning  -EC deconditioning  -EC      Progress/Outcomes (Demonstrate functional swallow) goal ongoing  -EC goal ongoing;goal revised this date  -EC new goal  -EC         (STG) Patient will tolerate trials of    Consistencies Trialed (Tolerate trials) mechanical ground textures;thin liquids;pureed textures  -EC mechanical ground textures;thin liquids;pureed textures  -EC regular textures;mechanical ground textures;thin liquids  -EC      Desired Outcome (Tolerate trials) without signs/symptoms of aspiration;with adequate oral prep/transit/clearance  -EC without signs/symptoms of aspiration;with adequate oral prep/transit/clearance  -EC without signs/symptoms of aspiration;with adequate oral prep/transit/clearance  -EC      [place] (Tolerate trials) independently (over 90% accuracy)  -EC independently (over 90% accuracy)  -EC independently (over 90% accuracy)  -EC      Time Frame (Tolerate trials) 1 week  -EC 1 week  -EC 1 week  -EC      Progress/Outcomes (Tolerate trials) goal ongoing  -EC goal revised this date;goal ongoing  -EC new goal  -EC                User Key  (r) = Recorded By, (t) = Taken By, (c) = Cosigned By      Initials Name Effective Dates    Tena Escobar CCC-SLP 07/11/23 -                     EDUCATION  The patient has been educated in the following areas:   Dysphagia (Swallowing Impairment) Modified Diet Instruction.        SLP GOALS       Row Name 08/17/23 0820 08/16/23 0715 08/14/23 1054       (LTG) Patient will demonstrate functional swallow for    Diet Texture  (Demonstrate functional swallow) soft to chew (chopped) textures  -EC soft to chew (chopped) textures  -EC regular textures  -EC    Liquid viscosity (Demonstrate functional swallow) thin liquids  -EC thin liquids  -EC thin liquids  -EC    [place] (Demonstrate functional swallow) independently (over 90% accuracy)  -EC independently (over 90% accuracy)  -EC independently (over 90% accuracy)  -EC    Time Frame (Demonstrate functional swallow) 1 week  -EC 1 week  -EC 1 week  -EC    Barriers (Demonstrate functional swallow) deconditioning  -EC deconditioning  -EC deconditioning  -EC    Progress/Outcomes (Demonstrate functional swallow) goal ongoing  -EC goal ongoing;goal revised this date  -EC new goal  -EC       (STG) Patient will tolerate trials of    Consistencies Trialed (Tolerate trials) mechanical ground textures;thin liquids;pureed textures  -EC mechanical ground textures;thin liquids;pureed textures  -EC regular textures;mechanical ground textures;thin liquids  -EC    Desired Outcome (Tolerate trials) without signs/symptoms of aspiration;with adequate oral prep/transit/clearance  -EC without signs/symptoms of aspiration;with adequate oral prep/transit/clearance  -EC without signs/symptoms of aspiration;with adequate oral prep/transit/clearance  -EC    [place] (Tolerate trials) independently (over 90% accuracy)  -EC independently (over 90% accuracy)  -EC independently (over 90% accuracy)  -EC    Time Frame (Tolerate trials) 1 week  -EC 1 week  -EC 1 week  -EC    Progress/Outcomes (Tolerate trials) goal ongoing  -EC goal revised this date;goal ongoing  -EC new goal  -EC              User Key  (r) = Recorded By, (t) = Taken By, (c) = Cosigned By      Initials Name Provider Type    Tena Escobar CCC-SLP Speech and Language Pathologist                       Time Calculation:    Time Calculation- SLP       Row Name 08/17/23 1007             Time Calculation- SLP    SLP Start Time 0820  -EC      SLP  Stop Time 0858  -EC      SLP Time Calculation (min) 38 min  -EC      Total Timed Code Minutes- SLP 38 minute(s)  -EC      SLP Received On 08/17/23  -EC      SLP Goal Re-Cert Due Date 08/28/23  -EC         Untimed Charges    [id number] Treatment Swallow Minutes 38  -EC         Total Minutes    Untimed Charges Total Minutes 38  -EC       Total Minutes 38  -EC                User Key  (r) = Recorded By, (t) = Taken By, (c) = Cosigned By      Initials Name Provider Type    EC Tena Mendoza CCC-SLP Speech and Language Pathologist                    Therapy Charges for Today       Code Description Service Date Service Provider Modifiers Qty    86862623146 HC ST TREATMENT SWALLOW 3 8/16/2023 Tena Mendoza CCC-SLP GN 1    [id number] HC ST TREATMENT SWALLOW 3 8/17/2023 Tena Mendoza CCC-SLP GN 1                 GRANT Roberson  8/17/2023

## 2023-08-17 NOTE — SIGNIFICANT NOTE
08/17/23 0935   OTHER   Discipline occupational therapist   Rehab Time/Intention   Session Not Performed other (see comments)  (OT tx attempted. Pt on bed rest until 11:00 am. OT will f/u as able.)   Recommendation   OT - Next Appointment 08/18/23

## 2023-08-18 ENCOUNTER — OUTSIDE FACILITY SERVICE (OUTPATIENT)
Dept: PULMONOLOGY | Facility: CLINIC | Age: 83
End: 2023-08-18
Payer: MEDICARE

## 2023-08-18 LAB
ALBUMIN SERPL-MCNC: 2.9 G/DL (ref 3.5–5.2)
ALBUMIN/GLOB SERPL: 0.8 G/DL
ALP SERPL-CCNC: 318 U/L (ref 39–117)
ALT SERPL W P-5'-P-CCNC: 58 U/L (ref 1–33)
ANION GAP SERPL CALCULATED.3IONS-SCNC: 17 MMOL/L (ref 5–15)
AST SERPL-CCNC: 26 U/L (ref 1–32)
BASOPHILS # BLD AUTO: 0.06 10*3/MM3 (ref 0–0.2)
BASOPHILS NFR BLD AUTO: 0.4 % (ref 0–1.5)
BILIRUB SERPL-MCNC: 0.9 MG/DL (ref 0–1.2)
BUN SERPL-MCNC: 93 MG/DL (ref 8–23)
BUN/CREAT SERPL: 14.5 (ref 7–25)
CALCIUM SPEC-SCNC: 8.5 MG/DL (ref 8.6–10.5)
CHLORIDE SERPL-SCNC: 91 MMOL/L (ref 98–107)
CO2 SERPL-SCNC: 23 MMOL/L (ref 22–29)
CREAT SERPL-MCNC: 6.43 MG/DL (ref 0.57–1)
DEPRECATED RDW RBC AUTO: 49.1 FL (ref 37–54)
EGFRCR SERPLBLD CKD-EPI 2021: 6.1 ML/MIN/1.73
EOSINOPHIL # BLD AUTO: 0.77 10*3/MM3 (ref 0–0.4)
EOSINOPHIL NFR BLD AUTO: 4.6 % (ref 0.3–6.2)
ERYTHROCYTE [DISTWIDTH] IN BLOOD BY AUTOMATED COUNT: 15.8 % (ref 12.3–15.4)
GLOBULIN UR ELPH-MCNC: 3.5 GM/DL
GLUCOSE BLDC GLUCOMTR-MCNC: 109 MG/DL (ref 70–130)
GLUCOSE BLDC GLUCOMTR-MCNC: 147 MG/DL (ref 70–130)
GLUCOSE BLDC GLUCOMTR-MCNC: 290 MG/DL (ref 70–130)
GLUCOSE SERPL-MCNC: 109 MG/DL (ref 65–99)
HCT VFR BLD AUTO: 27.7 % (ref 34–46.6)
HGB BLD-MCNC: 9.6 G/DL (ref 12–15.9)
IMM GRANULOCYTES # BLD AUTO: 0.27 10*3/MM3 (ref 0–0.05)
IMM GRANULOCYTES NFR BLD AUTO: 1.6 % (ref 0–0.5)
LYMPHOCYTES # BLD AUTO: 0.76 10*3/MM3 (ref 0.7–3.1)
LYMPHOCYTES NFR BLD AUTO: 4.5 % (ref 19.6–45.3)
MAGNESIUM SERPL-MCNC: 1.8 MG/DL (ref 1.6–2.4)
MCH RBC QN AUTO: 29.9 PG (ref 26.6–33)
MCHC RBC AUTO-ENTMCNC: 34.7 G/DL (ref 31.5–35.7)
MCV RBC AUTO: 86.3 FL (ref 79–97)
MONOCYTES # BLD AUTO: 2.35 10*3/MM3 (ref 0.1–0.9)
MONOCYTES NFR BLD AUTO: 13.9 % (ref 5–12)
NEUTROPHILS NFR BLD AUTO: 12.66 10*3/MM3 (ref 1.7–7)
NEUTROPHILS NFR BLD AUTO: 75 % (ref 42.7–76)
NRBC BLD AUTO-RTO: 0 /100 WBC (ref 0–0.2)
PLATELET # BLD AUTO: 236 10*3/MM3 (ref 140–450)
PMV BLD AUTO: 11.3 FL (ref 6–12)
POTASSIUM SERPL-SCNC: 4.6 MMOL/L (ref 3.5–5.2)
PROT SERPL-MCNC: 6.4 G/DL (ref 6–8.5)
RBC # BLD AUTO: 3.21 10*6/MM3 (ref 3.77–5.28)
SODIUM SERPL-SCNC: 131 MMOL/L (ref 136–145)
WBC NRBC COR # BLD: 16.87 10*3/MM3 (ref 3.4–10.8)

## 2023-08-18 PROCEDURE — 82948 REAGENT STRIP/BLOOD GLUCOSE: CPT

## 2023-08-18 PROCEDURE — 85025 COMPLETE CBC W/AUTO DIFF WBC: CPT | Performed by: INTERNAL MEDICINE

## 2023-08-18 PROCEDURE — 92610 EVALUATE SWALLOWING FUNCTION: CPT | Performed by: SPEECH-LANGUAGE PATHOLOGIST

## 2023-08-18 PROCEDURE — 25010000002 HEPARIN (PORCINE) PER 1000 UNITS: Performed by: INTERNAL MEDICINE

## 2023-08-18 PROCEDURE — 80053 COMPREHEN METABOLIC PANEL: CPT | Performed by: INTERNAL MEDICINE

## 2023-08-18 PROCEDURE — 97166 OT EVAL MOD COMPLEX 45 MIN: CPT

## 2023-08-18 PROCEDURE — 83735 ASSAY OF MAGNESIUM: CPT | Performed by: INTERNAL MEDICINE

## 2023-08-18 RX ORDER — HYDROCODONE BITARTRATE AND ACETAMINOPHEN 5; 325 MG/1; MG/1
1 TABLET ORAL ONCE
Status: DISCONTINUED | OUTPATIENT
Start: 2023-08-18 | End: 2023-09-15 | Stop reason: HOSPADM

## 2023-08-18 RX ORDER — DILTIAZEM HYDROCHLORIDE 5 MG/ML
20 INJECTION INTRAVENOUS ONCE
Status: DISCONTINUED | OUTPATIENT
Start: 2023-08-18 | End: 2023-08-21

## 2023-08-18 NOTE — ACP (ADVANCE CARE PLANNING)
LTAC, located within [facility] @ [facility]  INPATIENT PROGRESS NOTE    PATIENT NAME: Carol Sullivan      PHYSICIAN: Josefina Perez MD  : 1940        MRN: [id number]  Patient Care Team:  Len Seo MD as PCP - General  Blaire Camara APRN as Nurse Practitioner (General Surgery)    Chief Complaint: Patient was brought to emergency room at [facility] for confusion.     History of Present Illness: 82-year-old female with significant medical history of hypertension, dyslipidemia, gastroesophageal reflux disease who was brought into the emergency room for confusion.  Patient was also found to have erythema and cellulitis of left breast.  Patient was thought of having mastitis.  Patient's condition was monitored now.  Patient had gradual decrease in her urine output and worsening in her kidney function was noted.  Patient off having renal failure which was worsening over time.  Patient subsequently required hemodialysis patient was placed on hemodialysis per nephrology recommendation.  Patient was admitted to the hospital setting for antibiotics and hemodialysis.  Patient's condition seem to be stabilized however patient remained extremely weak and deconditioned hemodialysis patient was recommended to be admitted to our facility for further therapy and rehabilitation along with monitoring for hemodialysis.     Discharge Summary and H&P: Reviewed from previous hospitalization and information documented above in HPI Section.     Radiology Studies from Previous Hospitalization: Reviewed and are as below:  XR Abdomen KUB     Result Date: 2023  FINDINGS/IMPRESSION: Radiopaque tip of the Dobbhoff tube projects over the second portion of the duodenum, similar to even slightly retracted when compared to the prior exam. Right-sided ureteral stent is again noted and incompletely visualized.  There are probably also right femoral catheters which are  incompletely visualized. Normal bowel gas pattern in the visualized abdomen.     XR Chest Post CVA Port     Result Date: 8/17/2023  Impression: 1. Mild interstitial opacities are likely due to low lung volumes. An element of interstitial edema is not entirely excluded.      EKG From previous hospitalization reviewed and documented below:  ECG 12 Lead Other; hx afib   Preliminary Result   Test Reason : Other~   Blood Pressure :   */*   mmHG   Vent. Rate :  67 BPM     Atrial Rate :  67 BPM      P-R Int : 188 ms          QRS Dur :  78 ms       QT Int : 392 ms       P-R-T Axes :  28   6  16 degrees      QTc Int : 414 ms       Normal sinus rhythm with sinus arrhythmia   Minimal voltage criteria for LVH, may be normal variant   Anteroseptal infarct (cited on or before 30-JUL-2023)   Abnormal ECG   When compared with ECG of 10-AUG-2023 15:02,   Sinus rhythm has replaced Atrial fibrillation   Vent. rate has decreased BY  62 BPM   Nonspecific T wave abnormality, worse in Anterior leads       Referred By:            Confirmed By:              Laboratory results from previous hospitalization is reviewed and below:        Lab Results   Component Value Date     GLUCOSE 139 (H) 08/17/2023     CALCIUM 8.6 08/17/2023      (L) 08/17/2023     K 3.9 08/17/2023     CO2 23.0 08/17/2023     CL 90 (L) 08/17/2023     BUN 72 (H) 08/17/2023     CREATININE 4.86 (H) 08/17/2023     EGFR 8.5 (L) 08/17/2023     BCR 14.8 08/17/2023     ANIONGAP 17.0 (H) 08/17/2023            Lab Results   Component Value Date     WBC 17.07 (H) 08/17/2023     HGB 10.2 (L) 08/17/2023     HCT 29.8 (L) 08/17/2023     MCV 85.9 08/17/2023     PLT         Assessment       Sepsis Secondary to Below.  Somnolence [R40.0]  Mastitis [N61.0]  Sepsis [A41.9]  Sepsis, due to unspecified organism, unspecified whether acute organ dysfunction present [A41.9]   ESRD on Hemodialysis.        DVT Prophylaxis: Heparin.  GI Prophylaxis: Lansoprazole.  Code Status: DNR/DNI.     Plan       -Continues with dialysis as per nephrology recommendations  -Therapy as indicated as patient tolerates.  -We will continue to monitor lab work as before.  -Wound care as before  -Tachycardic - we will give cardizem 20mg x1  -DVT and GI prophylaxis in place.  -We'll continue monitoring patient in hospital setting and treat patient as course dictates.  -Please review orders for detailed plan of care.  -For Aute and Chronic medical condition we will continue medications described below in medication section which have been reviewed and we will continue unless changed in plan of care.  -Laboratory and diagnostic studies have been independently reviewed and the reports are reviewed as documented below.    Subjective   Interval History:   Patient Complaints: Patient seen and examined.  Receiving dialysis at this time.  No overnight events noted  History taken from: Medical record and nursing staff.    Review of Systems:    Review of Systems   All other systems reviewed and are negative.    Objective   Vital Signs  Temp: 98.2 F         Heart Rate: 78         Resp: 18          Blood Pressure: 148/80         Pulse Ox: 98 %    Physical Exam:   Physical Exam  Vitals and nursing note reviewed.   Constitutional:       Appearance: She is well-developed.   HENT:      Head: Normocephalic and atraumatic.      Nose: Nose normal.   Eyes:      Conjunctiva/sclera: Conjunctivae normal.      Pupils: Pupils are equal, round, and reactive to light.   Neck:      Thyroid: No thyromegaly.      Vascular: No JVD.      Trachea: No tracheal deviation.   Cardiovascular:      Rate and Rhythm: Normal rate and regular rhythm.      Heart sounds: Normal heart sounds.   Pulmonary:      Effort: Pulmonary effort is normal. No respiratory distress.      Breath sounds: Normal breath sounds. No wheezing or rales.   Chest:      Chest wall: No tenderness.   Abdominal:      General: Bowel sounds are normal. There is no distension.      Palpations: Abdomen  is soft.      Tenderness: There is no abdominal tenderness. There is no guarding or rebound.   Musculoskeletal:         General: Normal range of motion.      Cervical back: Normal range of motion and neck supple.   Lymphadenopathy:      Cervical: No cervical adenopathy.   Skin:     General: Skin is warm and dry.      Comments: Intact   Neurological:      Mental Status: She is alert and oriented to person, place, and time.      Cranial Nerves: No cranial nerve deficit.      Deep Tendon Reflexes: Reflexes are normal and symmetric.     Results Review:       Results from last 7 days   Lab Units 08/18/23  0509 08/17/23  0551 08/16/23  0604 08/15/23  0414 08/14/23  0533 08/13/23  0518 08/12/23  0555   SODIUM mmol/L 131* 130* 131* 132* 137 137 134*   POTASSIUM mmol/L 4.6 3.9 3.3* 3.5 3.9 3.3* 3.7   CHLORIDE mmol/L 91* 90* 89* 91* 91* 93* 92*   CO2 mmol/L 23.0 23.0 18.0* 24.0 19.0* 20.0* 19.0*   BUN mg/dL 93* 72* 129* 102* 160* 121* 74*   CREATININE mg/dL 6.43* 4.86* 6.75* 5.23* 7.29* 5.99* 4.44*   GLUCOSE mg/dL 109* 139* 94 215* 363* 371* 441*   CALCIUM mg/dL 8.5* 8.6 8.6 8.2* 8.1* 8.2* 8.1*   BILIRUBIN mg/dL 0.9 1.0 1.0 0.8 0.7 0.9 1.1   ALK PHOS U/L 318* 372* 359* 438* 457* 425* 454*   ALT (SGPT) U/L 58* 76* 94* 134* 159* 179* 238*   AST (SGOT) U/L 26 33* 36* 66* 90* 52* 59*     Results from last 7 days   Lab Units 08/18/23  0509 08/15/23  0414   MAGNESIUM mg/dL 1.8 1.9   PHOSPHORUS mg/dL  --  5.7*     Results from last 7 days   Lab Units 08/18/23  0509 08/17/23  0551 08/16/23  1218 08/16/23  0604 08/16/23  0025 08/15/23  1159 08/15/23  0414 08/14/23  1151 08/14/23  0533 08/13/23  1212 08/13/23  0518 08/12/23  1143 08/12/23  0555   WBC 10*3/mm3 16.87* 17.07*  --  18.50*  --   --  19.80*  --  20.40*  --  18.17*  --  17.89*   HEMOGLOBIN g/dL 9.6* 10.2* 12.3 11.7* 11.3* 12.6 12.4   < > 11.4*   < > 11.1*   < > 10.8*   HEMATOCRIT % 27.7* 29.8* 35.5 33.2* 32.3* 37.0 35.1   < > 33.3*   < > 33.8*   < > 31.4*   PLATELETS 10*3/mm3  236 248  --  272  --   --  314  --  346  --  301  --  260    < > = values in this interval not displayed.     Lab Results   Component Value Date    TROPONINI 0.69 (H) 05/31/2020    TROPONINT 56 (C) 08/06/2023     pH, Arterial   Date Value Ref Range Status   08/11/2023 7.403 7.350 - 7.450 pH units Final     CO2   Date Value Ref Range Status   08/18/2023 23.0 22.0 - 29.0 mmol/L Final     Total CO2   Date Value Ref Range Status   10/27/2022 30 21 - 31 mmol/L Final     Results from last 7 days   Lab Units 08/17/23  0551 08/16/23  0604 08/15/23  0414 08/14/23  0533 08/13/23  0518 08/12/23  0555   INR  1.20 1.30* 1.32* 1.30* 1.41* 1.41*     Imaging Results (Most Recent)       None           No results found for: ACANTHNAEG, AFBCX, BPERTUSSISCX, BLOODCX  No results found for: BCIDPCR, CXREFLEX, CSFCX, CULTURETIS  No results found for: CULTURES, HSVCX, URCX  No results found for: EYECULTURE, GCCX, HSVCULTURE, LABHSV  No results found for: LEGIONELLA, MRSACX, MUMPSCX, MYCOPLASCX  No results found for: NOCARDIACX, STOOLCX  No results found for: THROATCX, UNSTIMCULT, URINECX, CULTURE, VZVCULTUR  No results found for: VIRALCULTU, WOUNDCX  Medication Reviewed and Will Continue Unless Documented in Plan:   heparin (porcine), 2,000 Units, Intravenous, Once  [START ON 8/21/2023] heparin (porcine), 2,000 Units, Intravenous, Once  Insulin Aspart, 2-12 Units, Subcutaneous, 4 times per day  isosorbide mononitrate, 30 mg, Oral, Daily  lansoprazole, 30 mg, Oral, Q12H  metoprolol tartrate, 100 mg, Oral, Q12H  rosuvastatin, 40 mg, Oral, Daily  sodium chloride 0.9 % flush, 10 mL, Intravenous, Q8H           acetaminophen    albumin human    dextrose    heparin (porcine)    HYDROcodone-acetaminophen    sodium chloride    sodium chloride 0.9 % flush      Dietary Orders (From admission, onward)       Start     Ordered    08/17/23 9814  Diet: Regular/House Diet, Renal Diets, Diabetic Diets; Consistent Carbohydrate; Low Sodium (2-3g); Texture:  Soft to Chew (NDD 3); Soft to Chew: Chopped Meat; Fluid Consistency: Thin (IDDSI 0)  Diet Effective Now        References:    Diet Order [place]   Question Answer Comment   Diets: Regular/House Diet    Diets: Renal Diets    Diets: Diabetic Diets    Diabetic Diet: Consistent Carbohydrate    Renal Diet: Low Sodium (2-3g)    Texture: Soft to Chew (NDD 3)    Soft to Chew: Chopped Meat    Fluid Consistency: Thin (IDDSI 0)        08/17/23 1156                  History, physical exam, assessment and plan may have been partly or fully copied from before, but  changes made to the copied record to reflect care on the date of service. Part of the lab and imaging  reports auto populated and corrected. Some of this note may be an electronic transcription of spoken  language to printed text. This may permit erroneous, or at times, nonsensical words or phrases to be  inadvertently transcribed. Although I have reviewed the note for such errors, some may still exist.      This document has been electronically signed by Josefina Perez MD on August 18, 2023 09:41 CDT

## 2023-08-18 NOTE — ACP (ADVANCE CARE PLANNING)
"Reason for assessment:  Nutrition   Initial assessment     Nutrition Diet/History:    -Typical Intake;  Pt receiving dialysis.  Reports that appetite is better but still depressed.  She is trying to eat. Agrees to M/S. She voices no requests or complaints.  No Gi distress  -Food Preferences:  No requests at this time.    -Food Intolerances:  No food intolerance and NKFA    Weight history:  CBW:  153.8 (8/18)  Admit Wt to [facility] on 7/28 156#  UBW:  Wt loss:  Ht:  65\"    Admitting Dx:  Medical Management; Dialysis; Therapy    Past Medical Hx:  Arthritis; Gerd; HTN; Vascular Dz;  BLAINE with Acute Tubular NEcrosis; Renal Failure retroperitoneal bleed with right J Stent placement; HTN; AFib with RVR; Metabolic Acidosis; Hypocalcemia; Sepsis; CDiff; UTI; Hyponatremia; AMS    Labs:  Na 131; Gluc 161/263/150/109; bun 93; Creat 6.43; ALT 58; Alb 2.9    Meds:  Heparin; SSI    Estimated Energy Needs:  ~1450 (25 kcal/kg IBW) and 74 gms of protein (1.3 gms/kg IBW)  1200cc fluid restrictions with hemodialysis    Nutrition Prescription Order:  Regular/Renal/Diabetic/Soft texture/chopped    Evaluation of Nutrition Intake:  0-25% based on intake acute care    Nutrition Assessment:  Pt known to RD from her admit to [facility].  Initally admitted for Cellulitis of the left breast--Mastitis.  She underwent an I&D and was on Abx.  During her admit her UOP & Kidney fxn worsened requiring hemodialysis to be started. At this time, there has been no renal recovery.  At one point she did require intubation and did receive EN.  She was extubated and eventually transferred to [facility] for hemodialysis and therapy.  She remains extremely weak and deconditioned.  Her intake since hospital admit has declined.  She reports that although her appetite has improved it remains depressed.  Nutrition is very important for healing as well as strengthening.  She Will need education on renal diet prior to her discharge.  .      Nutrition Intervention:   Homemade M/S " made with [employer] renal and magic cups twice daily.  [employer] Renal at breakfast    Last BM:  8/16    Wounds:  Left Breast Cellulitis and mastitis    7Edema:    1-2+ (8/17)    MSA:

## 2023-08-18 NOTE — ACP (ADVANCE CARE PLANNING)
[facility]  [address] [place] [address]. [address]  T - [phone]  F - [phone]     Progress Note          PATIENT  DEMOGRAPHICS   PATIENT NAME: Carol Sullivan                      PHYSICIAN: Stephanie Gómez MD  : 1940  MRN: [id number]   LOS: 0 days    Patient Care Team:  Len Seo MD as PCP - General  WillardBlaire ribera APRN as Nurse Practitioner (General Surgery)  Subjective   SUBJECTIVE   Now moved to ltac, comfortable         Objective   OBJECTIVE   Vital Signs  Temp:  [97.4 °F (36.3 °C)] 97.4 °F (36.3 °C)  Heart Rate:  [] 108  Resp:  [18] 18  BP: (150)/(67) 150/67         No intake/output data recorded.    PHYSICAL EXAM    Physical Exam  Constitutional:       Appearance: She is well-developed.   HENT:      Head: Normocephalic.   Eyes:      Pupils: Pupils are equal, round, and reactive to light.   Cardiovascular:      Rate and Rhythm: Normal rate and regular rhythm.      Heart sounds: Normal heart sounds.   Pulmonary:      Effort: Pulmonary effort is normal.      Breath sounds: Normal breath sounds.   Abdominal:      General: Bowel sounds are normal.      Palpations: Abdomen is soft.   Musculoskeletal:         General: No swelling.   Skin:     Coloration: Skin is not jaundiced.   Neurological:      General: No focal deficit present.      Mental Status: She is alert. She is disoriented.       RESULTS   Results Review:    Results from last 7 days   Lab Units 23  0509 23  0551 23  0604   SODIUM mmol/L 131* 130* 131*   POTASSIUM mmol/L 4.6 3.9 3.3*   CHLORIDE mmol/L 91* 90* 89*   CO2 mmol/L 23.0 23.0 18.0*   BUN mg/dL 93* 72* 129*   CREATININE mg/dL 6.43* 4.86* 6.75*   CALCIUM mg/dL 8.5* 8.6 8.6   BILIRUBIN mg/dL 0.9 1.0 1.0   ALK PHOS U/L 318* 372* 359*   ALT (SGPT) U/L 58* 76* 94*   AST (SGOT) U/L 26 33* 36*   GLUCOSE mg/dL 109* 139* 94       Estimated Creatinine Clearance: 6.7 mL/min (A) (by C-G formula based on SCr of 6.43 mg/dL (H)).    Results  from last 7 days   Lab Units 08/18/23  0509 08/15/23  0414   MAGNESIUM mg/dL 1.8 1.9   PHOSPHORUS mg/dL  --  5.7*             Results from last 7 days   Lab Units 08/18/23  0509 08/17/23  0551 08/16/23  1218 08/16/23  0604 08/16/23  0025 08/15/23  1159 08/15/23  0414 08/14/23  1151 08/14/23  0533   WBC 10*3/mm3 16.87* 17.07*  --  18.50*  --   --  19.80*  --  20.40*   HEMOGLOBIN g/dL 9.6* 10.2* 12.3 11.7* 11.3*   < > 12.4   < > 11.4*   PLATELETS 10*3/mm3 236 248  --  272  --   --  314  --  346    < > = values in this interval not displayed.       Results from last 7 days   Lab Units 08/17/23  0551 08/16/23  0604 08/15/23  0414 08/14/23  0533 08/13/23  0518   INR  1.20 1.30* 1.32* 1.30* 1.41*         Imaging Results (Last 24 Hours)       ** No results found for the last 24 hours. **             MEDICATIONS    heparin (porcine), 2,000 Units, Intravenous, Once  [START ON 8/21/2023] heparin (porcine), 2,000 Units, Intravenous, Once  Insulin Aspart, 2-12 Units, Subcutaneous, 4 times per day  isosorbide mononitrate, 30 mg, Oral, Daily  lansoprazole, 30 mg, Oral, Q12H  metoprolol tartrate, 100 mg, Oral, Q12H  rosuvastatin, 40 mg, Oral, Daily  sodium chloride 0.9 % flush, 10 mL, Intravenous, Q8H           Assessment & Plan   ASSESSMENT / PLAN      * No active hospital problems. *      1. Acute kidney injury/ Acute tubular necrosis:  - Baseline unknown.   - Creatinine was 1.5 in 10/2022.   - UA 3+ protein, trace leukocytes, 0-2 RBC, 6-12 WBC, 2+ bacteria.   - Urine sodium 26. CT abd large rt retroperitoneal hematoma causing moderate rt hydronephrosis.  - Creatinine since admission was 1.5-1.6 range and then rapidly worsening.   - Started HD due to worsening renal function 8/6/23     Hd next tomorrow. Orders are placed.      Marked uremia and elevated cr. required mannitol prn. S/p permcath now.     2. Renal failure retroperitoneal bleed s/p right J stent for hydronephrosis   -s/p J stent placement by urology 8/11  -Continue  to monitor renal function      3. Hypertension/ Afib with RVR:   - Blood pressure was low and now high. Off pressors. HR was high and now metoprolol. Losartan on hold     4. Metabolic acidosis:   - Was on bicarb drip. Now modulate with hd     5. Hypocalcemia     5. Sepsis:  - zosyn at present. C.diff carrier     6. UTI E.fecalis:   - became septic with mild hydro Dr Dallas is consulted. Now stent in place . E.fecalis     7. Cdiff:   -not on po vanc now      8. Hyponatremia:   - Sodium is stable     8. Anemia / retroperitoneal bleed:  - Hemoglobin is acceptable      9.  AMS                   This document has been electronically signed by Stephanie Gómez MD on August 18, 2023 09:27 CDT

## 2023-08-18 NOTE — PAYOR COMM NOTE
"[facility]  Case Managment Extender   Genesis Gomez  (P) [phone]  (F) [phone]        REF# [id number]   Claudia Sullivan (82 y.o. Female)       Date of Birth   1940    Social Security Number       Address   [address] [facility] [address]    Home Phone   [phone]    MRN   [id number]       Yazidism   Zoroastrian    Marital Status                               Admission Date   7/28/23    Admission Type   Emergency    Admitting Provider   Paul Martinez MD    Attending Provider       Department, Room/Bed   [facility] [address], 333/1       Discharge Date   8/17/2023    Discharge Disposition   Long Term Care (DC - External)    Discharge Destination                                 Attending Provider: (none)   Allergies: Tuberculin Tests, Hydrocodone, Lortab [Hydrocodone-acetaminophen]    Isolation: None   Infection: C.difficile (07/29/23)   Code Status: Prior    Ht: 165.1 cm (65\")   Wt: 68.5 kg (151 lb)    Admission Cmt: None   Principal Problem: Sepsis [A41.9]                   Active Insurance as of 7/28/2023       Primary Coverage       Payor Plan Insurance Group Employer/Plan Group    HUMANA MEDICARE REPLACEMENT HUMANA MEDICARE REPLACEMENT [id number]       Payor Plan Address Payor Plan Phone Number Payor Plan Fax Number Effective Dates    [address] [address] [phone]  5/1/2023 - None Entered    [facility] [id number]         Subscriber Name Subscriber Birth Date Member ID       CLAUDIA SULLIVAN 1940 [id number]               Secondary Coverage       Payor Plan Insurance Group Employer/Plan Group    [facility] SUP PLAN F       Payor Plan Address Payor Plan Phone Number Payor Plan Fax Number Effective Dates    [address] [phone]  5/1/2009 - None Entered    [facility] [id number]         Subscriber Name Subscriber Birth Date Member ID       CLAUDIA SULLIVAN 1940 [id number]                     Emergency " Contacts        (Rel.) Home Phone Work Phone Mobile Phone    NeTristan stokes (Spouse) [phone] -- [phone]    NeMurphy stokes (Son) [phone] -- [phone]    NateYanelis (Relative) -- -- [phone]                 Discharge Summary        Juanito Harrington MD at 08/17/23 1001              [facility] Medicine Services  DISCHARGE SUMMARY       Date of Admission: 7/28/2023  Date of Discharge:  8/17/2023  Primary Care Physician: Len Seo MD    Presenting Problem/History of Present Illness:  Somnolence [R40.0]  Mastitis [N61.0]  Sepsis [A41.9]  Sepsis, due to unspecified organism, unspecified whether acute organ dysfunction present [A41.9]       Final Discharge Diagnoses:  Active Hospital Problems    Diagnosis     **Sepsis     Mastitis     Other hydronephrosis     ESRD (end stage renal disease) on dialysis        Consults:   Consults       Date and Time Order Name Status Description    8/6/2023 12:40 PM Inpatient Cardiothoracic Surgery Consult Completed     8/6/2023  9:26 AM Inpatient Urology Consult      7/28/2023  7:33 PM Inpatient Nephrology Consult Completed     7/28/2023  7:33 PM Inpatient General Surgery Consult              Procedures Performed: Procedure(s):  tunneled central venous catheter placement                Pertinent Test Results:   Lab Results (most recent)       Procedure Component Value Units Date/Time    POC Glucose Once [[id number]]  (Abnormal) Collected: 08/17/23 0625    Specimen: Blood Updated: 08/17/23 0822     Glucose 161 mg/dL      Comment: : [id number] LATASHA Gasca ID: [id number]       Basic Metabolic Panel [[id number]]  (Abnormal) Collected: 08/17/23 0551    Specimen: Blood Updated: 08/17/23 0731     Glucose 139 mg/dL      BUN 72 mg/dL      Creatinine 4.86 mg/dL      Sodium 130 mmol/L      Potassium 3.9 mmol/L      Chloride 90 mmol/L      CO2 23.0 mmol/L      Calcium 8.6 mg/dL      BUN/Creatinine  Ratio 14.8     Anion Gap 17.0 mmol/L      eGFR 8.5 mL/min/1.73      Comment: <15 Indicative of kidney failure       Narrative:      GFR Normal >60  Chronic Kidney Disease <60  Kidney Failure <15    The GFR formula is only valid for adults with stable renal function between ages 18 and 70.    Hepatic Function Panel [[id number]]  (Abnormal) Collected: 08/17/23 0551    Specimen: Blood Updated: 08/17/23 0731     Total Protein 6.3 g/dL      Albumin 3.1 g/dL      ALT (SGPT) 76 U/L      AST (SGOT) 33 U/L      Alkaline Phosphatase 372 U/L      Total Bilirubin 1.0 mg/dL      Bilirubin, Direct 0.5 mg/dL      Bilirubin, Indirect 0.5 mg/dL     C-reactive Protein [[id number]]  (Abnormal) Collected: 08/17/23 0551    Specimen: Blood Updated: 08/17/23 0731     C-Reactive Protein 8.81 mg/dL     Protime-INR [[id number]]  (Normal) Collected: 08/17/23 0551    Specimen: Blood Updated: 08/17/23 0720     Protime 15.1 Seconds      INR 1.20    Narrative:      Therapeutic range for most indications is 2.0-3.0 INR,  or 2.5-3.5 for mechanical heart valves.    CBC & Differential [[id number]]  (Abnormal) Collected: 08/17/23 0551    Specimen: Blood Updated: 08/17/23 0711    Narrative:      The following orders were created for panel order CBC & Differential.  Procedure                               Abnormality         Status                     ---------                               -----------         ------                     CBC Auto Differential[[id number]]        Abnormal            Final result                 Please view results for these tests on the individual orders.    CBC Auto Differential [[id number]]  (Abnormal) Collected: 08/17/23 0551    Specimen: Blood Updated: 08/17/23 0711     WBC 17.07 10*3/mm3      RBC 3.47 10*6/mm3      Hemoglobin 10.2 g/dL      Hematocrit 29.8 %      MCV 85.9 fL      MCH 29.4 pg      MCHC 34.2 g/dL      RDW 15.9 %      RDW-SD 49.1 fl      MPV 11.6 fL      Platelets 248 10*3/mm3      Neutrophil % 76.2 %       Lymphocyte % 5.6 %      Monocyte % 14.7 %      Eosinophil % 1.5 %      Basophil % 0.4 %      Immature Grans % 1.6 %      Neutrophils, Absolute 12.99 10*3/mm3      Lymphocytes, Absolute 0.96 10*3/mm3      Monocytes, Absolute 2.51 10*3/mm3      Eosinophils, Absolute 0.26 10*3/mm3      Basophils, Absolute 0.07 10*3/mm3      Immature Grans, Absolute 0.28 10*3/mm3      nRBC 0.0 /100 WBC     POC Glucose Once [[id number]]  (Abnormal) Collected: 08/16/23 2012    Specimen: Blood Updated: 08/16/23 2040     Glucose 169 mg/dL      Comment: RN NotifiedOperator: [id number] FILI REBECCAMeter ID: [id number]       Hepatitis B Surface Antibody [[id number]]  (Normal) Collected: 08/16/23 1127    Specimen: Blood Updated: 08/16/23 1948     Hep B S Ab Non-Reactive    Narrative:      Non-Reactive - Individual is considered to be not immune to infection with HBV.    Equivocal - Unable to determine if anti-HBs is present at levels consistent with immunity. The individual should be further assessed by associated risk factors and the use of additional diagnositc information, or another sample may be collected and tested.    Reactive - Anti-HBs concentration detected at >10 mIU/mL. Individual is considered to be immune to infection with HBV.      Results may be falsely decreased if patient taking Biotin.      Hemoglobin & Hematocrit, Blood [[id number]]  (Normal) Collected: 08/16/23 1218    Specimen: Blood Updated: 08/16/23 1222     Hemoglobin 12.3 g/dL      Hematocrit 35.5 %     Hepatitis B Core Antibody, IgM [[id number]]  (Normal) Collected: 08/16/23 1127    Specimen: Blood Updated: 08/16/23 1202     Hep B C IgM Non-Reactive    Narrative:      Results may be falsely decreased if patient taking Biotin.      Protime-INR [[id number]]  (Abnormal) Collected: 08/16/23 0604    Specimen: Blood Updated: 08/16/23 0705     Protime 16.1 Seconds      INR 1.30    Narrative:      Therapeutic range for most indications is 2.0-3.0 INR,  or 2.5-3.5 for mechanical  heart valves.    Basic Metabolic Panel [[id number]]  (Abnormal) Collected: 08/16/23 0604    Specimen: Blood Updated: 08/16/23 0700     Glucose 94 mg/dL       mg/dL      Creatinine 6.75 mg/dL      Sodium 131 mmol/L      Potassium 3.3 mmol/L      Chloride 89 mmol/L      CO2 18.0 mmol/L      Calcium 8.6 mg/dL      BUN/Creatinine Ratio 19.1     Anion Gap 24.0 mmol/L      eGFR 5.7 mL/min/1.73      Comment: <15 Indicative of kidney failure       Narrative:      GFR Normal >60  Chronic Kidney Disease <60  Kidney Failure <15    The GFR formula is only valid for adults with stable renal function between ages 18 and 70.    Hepatic Function Panel [[id number]]  (Abnormal) Collected: 08/16/23 0604    Specimen: Blood Updated: 08/16/23 0649     Total Protein 6.2 g/dL      Albumin 2.9 g/dL      ALT (SGPT) 94 U/L      AST (SGOT) 36 U/L      Alkaline Phosphatase 359 U/L      Total Bilirubin 1.0 mg/dL      Bilirubin, Direct 0.6 mg/dL      Bilirubin, Indirect 0.4 mg/dL     C-reactive Protein [[id number]]  (Abnormal) Collected: 08/16/23 0604    Specimen: Blood Updated: 08/16/23 0649     C-Reactive Protein 3.87 mg/dL     CBC & Differential [[id number]]  (Abnormal) Collected: 08/16/23 0604    Specimen: Blood Updated: 08/16/23 0625    Narrative:      The following orders were created for panel order CBC & Differential.  Procedure                               Abnormality         Status                     ---------                               -----------         ------                     CBC Auto Differential[[id number]]        Abnormal            Final result                 Please view results for these tests on the individual orders.    CBC Auto Differential [[id number]]  (Abnormal) Collected: 08/16/23 0604    Specimen: Blood Updated: 08/16/23 0625     WBC 18.50 10*3/mm3      RBC 3.94 10*6/mm3      Hemoglobin 11.7 g/dL      Hematocrit 33.2 %      MCV 84.3 fL      MCH 29.7 pg      MCHC 35.2 g/dL      RDW 15.9 %      RDW-SD 47.9 fl       MPV 11.7 fL      Platelets 272 10*3/mm3      Neutrophil % 75.0 %      Lymphocyte % 6.1 %      Monocyte % 15.7 %      Eosinophil % 1.3 %      Basophil % 0.5 %      Immature Grans % 1.4 %      Neutrophils, Absolute 13.88 10*3/mm3      Lymphocytes, Absolute 1.13 10*3/mm3      Monocytes, Absolute 2.91 10*3/mm3      Eosinophils, Absolute 0.24 10*3/mm3      Basophils, Absolute 0.09 10*3/mm3      Immature Grans, Absolute 0.25 10*3/mm3      nRBC 0.0 /100 WBC     Hemoglobin & Hematocrit, Blood [[id number]]  (Abnormal) Collected: 08/16/23 0025    Specimen: Blood Updated: 08/16/23 0029     Hemoglobin 11.3 g/dL      Hematocrit 32.3 %     Phosphorus [[id number]]  (Abnormal) Collected: 08/15/23 0414    Specimen: Blood Updated: 08/15/23 1628     Phosphorus 5.7 mg/dL     Magnesium [[id number]]  (Normal) Collected: 08/15/23 0414    Specimen: Blood Updated: 08/15/23 1628     Magnesium 1.9 mg/dL     Scan Slide [[id number]] Collected: 08/14/23 0533    Specimen: Blood Updated: 08/14/23 0724     Anisocytosis Slight/1+     Dacrocytes Slight/1+     Hypochromia Slight/1+     Ovalocytes Slight/1+     WBC Morphology Normal     Platelet Estimate Adequate    Blood Gas, Arterial - [[id number]]  (Abnormal) Collected: 08/11/23 0137    Specimen: Arterial Blood Updated: 08/11/23 0136     Site Right Radial     Haroon's Test N/A     pH, Arterial 7.403 pH units      pCO2, Arterial 33.6 mm Hg      Comment: 84 Value below reference range        pO2, Arterial 76.4 mm Hg      Comment: 84 Value below reference range        HCO3, Arterial 20.9 mmol/L      Base Excess, Arterial -3.3 mmol/L      Comment: 84 Value below reference range        O2 Saturation, Arterial 95.7 %      Barometric Pressure for Blood Gas 746 mmHg      Modality HFNC     Flow Rate 15.0 lpm      Ventilator Mode NA     Collected by Lola     Comment: Meter: [id number]     :  [id number]       Blood Culture - Blood, Arm, Right [[id number]]  (Normal) Collected: 08/05/23 7523     Specimen: Blood from Arm, Right Updated: 08/10/23 2216     Blood Culture No growth at 5 days    Blood Culture - Blood, Arm, Right [[id number]]  (Normal) Collected: 08/05/23 2156    Specimen: Blood from Arm, Right Updated: 08/10/23 2201     Blood Culture No growth at 5 days    Blood Gas, Arterial - [[id number]]  (Abnormal) Collected: 08/10/23 1229    Specimen: Arterial Blood Updated: 08/10/23 1227     Site Arterial Line     Haroon's Test N/A     pH, Arterial 7.419 pH units      pCO2, Arterial 36.9 mm Hg      pO2, Arterial 54.8 mm Hg      Comment: 85 Value below critical limit        HCO3, Arterial 23.9 mmol/L      Base Excess, Arterial -0.4 mmol/L      Comment: 84 Value below reference range        O2 Saturation, Arterial 89.2 %      Comment: 84 Value below reference range        Barometric Pressure for Blood Gas 746 mmHg      Modality Room Air     FIO2 35 %      Ventilator Mode NA     PEEP 5.0     PSV 8.0 cmH2O      Collected by      Comment: Meter: [id number]     :  [id number]       Magnesium [[id number]]  (Normal) Collected: 08/09/23 0729    Specimen: Blood Updated: 08/09/23 0809     Magnesium 1.8 mg/dL     Occult Blood X 1, Stool - Stool, Per Rectum [[id number]]  (Abnormal) Collected: 08/08/23 1806    Specimen: Stool from Per Rectum Updated: 08/08/23 1851     Fecal Occult Blood Positive    Hepatitis B Surface Antigen [[id number]]  (Normal) Collected: 08/08/23 1148    Specimen: Blood Updated: 08/08/23 1257     Hepatitis B Surface Ag Non-Reactive    Urine Culture - Urine, Straight Cath [[id number]]  (Normal) Collected: 08/05/23 1109    Specimen: Urine from Straight Cath Updated: 08/07/23 1140     Urine Culture No growth    Vancomycin, Random [[id number]]  (Normal) Collected: 08/07/23 0555    Specimen: Blood Updated: 08/07/23 0637     Vancomycin Random 16.60 mcg/mL     STAT Lactic Acid, Reflex [[id number]]  (Abnormal) Collected: 08/06/23 2126    Specimen: Blood Updated: 08/06/23 2146     Lactate 2.6 mmol/L      STAT Lactic Acid, Reflex [[id number]]  (Abnormal) Collected: 08/06/23 1549    Specimen: Blood Updated: 08/06/23 1621     Lactate 3.2 mmol/L     High Sensitivity Troponin T [[id number]]  (Abnormal) Collected: 08/06/23 0414    Specimen: Blood Updated: 08/06/23 0814     HS Troponin T 56 ng/L     Narrative:      High Sensitive Troponin T Reference Range:  <10.0 ng/L- Negative Female for AMI  <15.0 ng/L- Negative Male for AMI  >=10 - Abnormal Female indicating possible myocardial injury.  >=15 - Abnormal Male indicating possible myocardial injury.   Clinicians would have to utilize clinical acumen, EKG, Troponin, and serial changes to determine if it is an Acute Myocardial Infarction or myocardial injury due to an underlying chronic condition.         Comprehensive Metabolic Panel [[id number]]  (Abnormal) Collected: 08/06/23 0414    Specimen: Blood Updated: 08/06/23 0457     Glucose 197 mg/dL      BUN 35 mg/dL      Creatinine 3.52 mg/dL      Sodium 139 mmol/L      Potassium 4.8 mmol/L      Chloride 98 mmol/L      CO2 17.0 mmol/L      Calcium 7.3 mg/dL      Total Protein 5.4 g/dL      Albumin 2.3 g/dL      ALT (SGPT) 1,591 U/L      AST (SGOT) 5,266 U/L      Alkaline Phosphatase 136 U/L      Total Bilirubin 0.5 mg/dL      Globulin 3.1 gm/dL      A/G Ratio 0.7 g/dL      BUN/Creatinine Ratio 9.9     Anion Gap 24.0 mmol/L      eGFR 12.5 mL/min/1.73      Comment: <15 Indicative of kidney failure       Narrative:      GFR Normal >60  Chronic Kidney Disease <60  Kidney Failure <15    The GFR formula is only valid for adults with stable renal function between ages 18 and 70.    Anaerobic Culture - Tissue, Breast, Left [[id number]]  (Normal) Collected: 08/01/23 1241    Specimen: Tissue from Breast, Left Updated: 08/06/23 0343     Anaerobic Culture No anaerobes isolated at 5 days    Procalcitonin [[id number]]  (Abnormal) Collected: 08/05/23 2156    Specimen: Blood Updated: 08/06/23 0247     Procalcitonin 1.01 ng/mL     Narrative:       "As a Marker for Sepsis (Non-Neonates):    1. <0.5 ng/mL represents a low risk of severe sepsis and/or septic shock.  2. >2 ng/mL represents a high risk of severe sepsis and/or septic shock.    As a Marker for Lower Respiratory Tract Infections that require antibiotic therapy:    PCT on Admission    Antibiotic Therapy       6-12 Hrs later    >0.5                Strongly Recommended  >0.25 - <0.5        Recommended   0.1 - 0.25          Discouraged              Remeasure/reassess PCT  <0.1                Strongly Discouraged     Remeasure/reassess PCT    As 28 day mortality risk marker: \"Change in Procalcitonin Result\" (>80% or <=80%) if Day 0 (or Day 1) and Day 4 values are available. Refer to [email or web address]    Change in PCT <=80%  A decrease of PCT levels below or equal to 80% defines a positive change in PCT test result representing a higher risk for 28-day all-cause mortality of patients diagnosed with severe sepsis for septic shock.    Change in PCT >80%  A decrease of PCT levels of more than 80% defines a negative change in PCT result representing a lower risk for 28-day all-cause mortality of patients diagnosed with severe sepsis or septic shock.       High Sensitivity Troponin T 2Hr [[id number]]  (Abnormal) Collected: 08/06/23 0022    Specimen: Blood Updated: 08/06/23 0051     HS Troponin T 47 ng/L      Troponin T Delta -4 ng/L     Narrative:      High Sensitive Troponin T Reference Range:  <10.0 ng/L- Negative Female for AMI  <15.0 ng/L- Negative Male for AMI  >=10 - Abnormal Female indicating possible myocardial injury.  >=15 - Abnormal Male indicating possible myocardial injury.   Clinicians would have to utilize clinical acumen, EKG, Troponin, and serial changes to determine if it is an Acute Myocardial Infarction or myocardial injury due to an underlying chronic condition.         Comprehensive Metabolic Panel [[id number]]  (Abnormal) Collected: 08/05/23 2156    Specimen: Blood " Updated: 08/05/23 2324     Glucose 115 mg/dL      BUN 30 mg/dL      Creatinine 3.57 mg/dL      Sodium 136 mmol/L      Potassium 5.3 mmol/L      Chloride 97 mmol/L      CO2 11.0 mmol/L      Calcium 8.1 mg/dL      Total Protein 5.7 g/dL      Albumin 2.6 g/dL      ALT (SGPT) 593 U/L      AST (SGOT) 1,456 U/L      Alkaline Phosphatase 141 U/L      Total Bilirubin 0.7 mg/dL      Globulin 3.1 gm/dL      A/G Ratio 0.8 g/dL      BUN/Creatinine Ratio 8.4     Anion Gap 28.0 mmol/L      eGFR 12.3 mL/min/1.73      Comment: <15 Indicative of kidney failure       Narrative:      GFR Normal >60  Chronic Kidney Disease <60  Kidney Failure <15    The GFR formula is only valid for adults with stable renal function between ages 18 and 70.    Extra Tubes [[id number]] Collected: 08/05/23 2201    Specimen: Blood, Venous Line Updated: 08/05/23 2315    Narrative:      The following orders were created for panel order Extra Tubes.  Procedure                               Abnormality         Status                     ---------                               -----------         ------                     Gold Top - SST[[id number]]                                   Final result                 Please view results for these tests on the individual orders.    Gold Top - SST [[id number]] Collected: 08/05/23 2201    Specimen: Blood Updated: 08/05/23 2315     Extra Tube Hold for add-ons.     Comment: Auto resulted.       Lactic Acid, Plasma [[id number]]  (Abnormal) Collected: 08/05/23 2156    Specimen: Blood Updated: 08/05/23 2306     Lactate 12.9 mmol/L     High Sensitivity Troponin T [[id number]]  (Abnormal) Collected: 08/05/23 2156    Specimen: Blood Updated: 08/05/23 2303     HS Troponin T 51 ng/L     Narrative:      High Sensitive Troponin T Reference Range:  <10.0 ng/L- Negative Female for AMI  <15.0 ng/L- Negative Male for AMI  >=10 - Abnormal Female indicating possible myocardial injury.  >=15 - Abnormal Male indicating possible myocardial  injury.   Clinicians would have to utilize clinical acumen, EKG, Troponin, and serial changes to determine if it is an Acute Myocardial Infarction or myocardial injury due to an underlying chronic condition.         BNP [[id number]]  (Abnormal) Collected: 08/05/23 2156    Specimen: Blood Updated: 08/05/23 2303     proBNP 3,011.0 pg/mL     Narrative:      Among patients with dyspnea, NT-proBNP is highly sensitive for the detection of acute congestive heart failure. In addition NT-proBNP of <300 pg/ml effectively rules out acute congestive heart failure with 99% negative predictive value.      CBC (No Diff) [[id number]]  (Abnormal) Collected: 08/05/23 2156    Specimen: Blood Updated: 08/05/23 2250     WBC 33.24 10*3/mm3      RBC 2.56 10*6/mm3      Hemoglobin 6.9 g/dL      Hematocrit 22.1 %      MCV 86.3 fL      MCH 27.0 pg      MCHC 31.2 g/dL      RDW 15.1 %      RDW-SD 46.4 fl      MPV 9.8 fL      Platelets 601 10*3/mm3     Urinalysis, Microscopic Only - Straight Cath [[id number]]  (Abnormal) Collected: 08/05/23 1109    Specimen: Urine from Straight Cath Updated: 08/05/23 1257     RBC, UA 0-2 /HPF      WBC, UA 31-50 /HPF      Bacteria, UA Trace /HPF      Squamous Epithelial Cells, UA 0-2 /HPF      Yeast, UA Small/1+ Yeast /HPF      Hyaline Casts, UA None Seen /LPF      Methodology Manual Light Microscopy    Urinalysis With Culture If Indicated - Straight Cath [[id number]]  (Abnormal) Collected: 08/05/23 1109    Specimen: Urine from Straight Cath Updated: 08/05/23 1155     Color, UA Yellow     Appearance, UA Clear     pH, UA 5.5     Specific Gravity, UA 1.015     Glucose, UA Negative     Ketones, UA Negative     Bilirubin, UA Negative     Blood, UA Negative     Protein, UA 30 mg/dL (1+)     Leuk Esterase, UA Small (1+)     Nitrite, UA Negative     Urobilinogen, UA 0.2 E.U./dL    Narrative:      In absence of clinical symptoms, the presence of pyuria, bacteria, and/or nitrites on the urinalysis result does not correlate  with infection.    Manual Differential [[id number]]  (Abnormal) Collected: 08/05/23 0827    Specimen: Blood Updated: 08/05/23 1017     Neutrophil % 72.0 %      Lymphocyte % 16.0 %      Monocyte % 3.0 %      Eosinophil % 1.0 %      Basophil % 3.0 %      Bands %  3.0 %      Atypical Lymphocyte % 2.0 %      Neutrophils Absolute 11.14 10*3/mm3      Lymphocytes Absolute 2.67 10*3/mm3      Monocytes Absolute 0.45 10*3/mm3      Eosinophils Absolute 0.15 10*3/mm3      Basophils Absolute 0.45 10*3/mm3      nRBC 3.0 /100 WBC      Anisocytosis Slight/1+     WBC Morphology Normal     Platelet Estimate Increased    Extra Tubes [[id number]] Collected: 08/05/23 0827    Specimen: Blood, Venous Line Updated: 08/05/23 0930    Narrative:      The following orders were created for panel order Extra Tubes.  Procedure                               Abnormality         Status                     ---------                               -----------         ------                     Green Top (Gel)[[id number]]                                  Final result                 Please view results for these tests on the individual orders.    Green Top (Gel) [[id number]] Collected: 08/05/23 0827    Specimen: Blood Updated: 08/05/23 0930     Extra Tube Hold for add-ons.     Comment: Auto resulted.       Tissue / Bone Culture - Tissue, Breast, Left [[id number]] Collected: 08/01/23 1241    Specimen: Tissue from Breast, Left Updated: 08/04/23 0527     Tissue Culture No growth at 3 days     Gram Stain Few (2+) WBCs seen      No organisms seen    Potassium [[id number]]  (Normal) Collected: 08/03/23 1726    Specimen: Blood Updated: 08/03/23 1819     Potassium 4.2 mmol/L     Potassium [[id number]]  (Normal) Collected: 08/01/23 1455    Specimen: Blood Updated: 08/01/23 1517     Potassium 3.5 mmol/L     Sodium, Urine, Random - Urine, Clean Catch [[id number]] Collected: 07/30/23 1516    Specimen: Urine, Clean Catch Updated: 07/30/23 1522     Sodium, Urine 26 mmol/L      Narrative:      Reference intervals for random urine have not been established.  Clinical usage is dependent upon physician's interpretation in combination with other laboratory tests.       Clostridioides difficile toxin Ag, Reflex - Stool, Per Rectum [[id number]]  (Normal) Collected: 07/29/23 2107    Specimen: Stool from Per Rectum Updated: 07/30/23 0814     C.diff Toxin Ag Negative    Narrative:      DNA from a toxigenic strain of C.difficile was detected, although the free toxin itself was not detected. These findings are consistent with C.difficile colonization and may not reflect actual C.difficile infection. Clinical correlation needed.    Urine Culture - Urine, Urine, Catheter [[id number]]  (Abnormal)  (Susceptibility) Collected: 07/28/23 1604    Specimen: Urine, Catheter Updated: 07/30/23 0428     Urine Culture >100,000 CFU/mL Enterococcus faecalis    Narrative:      Colonization of the urinary tract without infection is common. Treatment is discouraged unless the patient is symptomatic, pregnant, or undergoing an invasive urologic procedure.    Susceptibility        Enterococcus faecalis      NATALI      Ampicillin Susceptible      Levofloxacin Susceptible      Nitrofurantoin Intermediate      Tetracycline Resistant      Vancomycin Susceptible                           Gastrointestinal Panel, PCR - Stool, Per Rectum [[id number]]  (Normal) Collected: 07/29/23 2107    Specimen: Stool from Per Rectum Updated: 07/29/23 2255     Campylobacter Not Detected     Plesiomonas shigelloides Not Detected     Salmonella Not Detected     Vibrio Not Detected     Vibrio cholerae Not Detected     Yersinia enterocolitica Not Detected     Enteroaggregative E. coli (EAEC) Not Detected     Enteropathogenic E. coli (EPEC) Not Detected     Enterotoxigenic E. coli (ETEC) lt/st Not Detected     Shiga-like toxin-producing E. coli (STEC) stx1/stx2 Not Detected     Shigella/Enteroinvasive E. coli (EIEC) Not Detected     Cryptosporidium  Not Detected     Cyclospora cayetanensis Not Detected     Entamoeba histolytica Not Detected     Giardia lamblia Not Detected     Adenovirus F40/41 Not Detected     Astrovirus Not Detected     Norovirus GI/GII Not Detected     Rotavirus A Not Detected     Sapovirus (I, II, IV or V) Not Detected    Narrative:      Testing performed by multiplex PCR system.    Clostridioides difficile Toxin - Stool, Per Rectum [[id number]]  (Abnormal) Collected: 07/29/23 2107    Specimen: Stool from Per Rectum Updated: 07/29/23 2221    Narrative:      The following orders were created for panel order Clostridioides difficile Toxin - Stool, Per Rectum.  Procedure                               Abnormality         Status                     ---------                               -----------         ------                     Clostridioides difficile...[[id number]]  Abnormal            Final result                 Please view results for these tests on the individual orders.    Clostridioides difficile Toxin, PCR - Stool, Per Rectum [[id number]]  (Abnormal) Collected: 07/29/23 2107    Specimen: Stool from Per Rectum Updated: 07/29/23 2221     Toxigenic C. difficile by PCR Positive     Comment: CONTACT PRECAUTION       Narrative:      Performed by real-time polymerase chain reaction (qPCR).  DNA from a toxigenic strain of C.difficile has been detected. Antigen testing for the presence of free C.difficile toxin is currently in progress, to help determine the clinical significance of this PCR result.     Procalcitonin [[id number]]  (Abnormal) Collected: 07/28/23 1600    Specimen: Blood Updated: 07/28/23 2002     Procalcitonin 7.84 ng/mL     Narrative:      As a Marker for Sepsis (Non-Neonates):    1. <0.5 ng/mL represents a low risk of severe sepsis and/or septic shock.  2. >2 ng/mL represents a high risk of severe sepsis and/or septic shock.    As a Marker for Lower Respiratory Tract Infections that require antibiotic therapy:    PCT on  "Admission    Antibiotic Therapy       6-12 Hrs later    >0.5                Strongly Recommended  >0.25 - <0.5        Recommended   0.1 - 0.25          Discouraged              Remeasure/reassess PCT  <0.1                Strongly Discouraged     Remeasure/reassess PCT    As 28 day mortality risk marker: \"Change in Procalcitonin Result\" (>80% or <=80%) if Day 0 (or Day 1) and Day 4 values are available. Refer to http://[email or web address]-pct-[email or web address]    Change in PCT <=80%  A decrease of PCT levels below or equal to 80% defines a positive change in PCT test result representing a higher risk for 28-day all-cause mortality of patients diagnosed with severe sepsis for septic shock.    Change in PCT >80%  A decrease of PCT levels of more than 80% defines a negative change in PCT result representing a lower risk for 28-day all-cause mortality of patients diagnosed with severe sepsis or septic shock.       Single High Sensitivity Troponin T [[id number]]  (Abnormal) Collected: 07/28/23 1820    Specimen: Blood Updated: 07/28/23 1842     HS Troponin T 32 ng/L     Narrative:      High Sensitive Troponin T Reference Range:  <10.0 ng/L- Negative Female for AMI  <15.0 ng/L- Negative Male for AMI  >=10 - Abnormal Female indicating possible myocardial injury.  >=15 - Abnormal Male indicating possible myocardial injury.   Clinicians would have to utilize clinical acumen, EKG, Troponin, and serial changes to determine if it is an Acute Myocardial Infarction or myocardial injury due to an underlying chronic condition.         Fentanyl, Urine - Urine, Clean Catch [[id number]]  (Normal) Collected: 07/28/23 1603    Specimen: Urine, Clean Catch Updated: 07/28/23 1711     Fentanyl, Urine Negative    Narrative:      Negative Threshold:      Fentanyl 5 ng/mL     The normal value for the drug tested is negative. This report includes final unconfirmed screening results to be used for medical treatment purposes only. Unconfirmed results must not be " used for non-medical purposes such as employment or legal testing. Clinical consideration should be applied to any drug of abuse test, particularly when unconfirmed results are used.           Gold Top - SST [[id number]] Collected: 07/28/23 1600    Specimen: Blood Updated: 07/28/23 1700     Extra Tube Hold for add-ons.     Comment: Auto resulted.       BNP [[id number]]  (Abnormal) Collected: 07/28/23 1600    Specimen: Blood Updated: 07/28/23 1653     proBNP 9,052.0 pg/mL     Narrative:      Among patients with dyspnea, NT-proBNP is highly sensitive for the detection of acute congestive heart failure. In addition NT-proBNP of <300 pg/ml effectively rules out acute congestive heart failure with 99% negative predictive value.    Results may be falsely decreased if patient taking Biotin.      T4, Free [[id number]]  (Normal) Collected: 07/28/23 1600    Specimen: Blood Updated: 07/28/23 1653     Free T4 0.99 ng/dL     Narrative:      Results may be falsely increased if patient taking Biotin.      TSH [[id number]]  (Normal) Collected: 07/28/23 1600    Specimen: Blood Updated: 07/28/23 1653     TSH 1.180 uIU/mL     Urinalysis, Microscopic Only - Urine, Catheter [[id number]]  (Abnormal) Collected: 07/28/23 1604    Specimen: Urine, Catheter Updated: 07/28/23 1652     RBC, UA 0-2 /HPF      WBC, UA 6-12 /HPF      Bacteria, UA 2+ /HPF      Squamous Epithelial Cells, UA 3-5 /HPF      Yeast, UA Small/1+ Budding Yeast /HPF      Hyaline Casts, UA None Seen /LPF      Methodology Manual Light Microscopy    Ethanol [[id number]] Collected: 07/28/23 1600    Specimen: Blood Updated: 07/28/23 1648     Ethanol <10 mg/dL      Ethanol % <0.010 %     Acetaminophen Level [[id number]]  (Normal) Collected: 07/28/23 1600    Specimen: Blood Updated: 07/28/23 1648     Acetaminophen <5.0 mcg/mL     Salicylate Level [[id number]]  (Normal) Collected: 07/28/23 1600    Specimen: Blood Updated: 07/28/23 1648     Salicylate <0.3 mg/dL     Lactic Acid, Plasma  [[id number]]  (Normal) Collected: 07/28/23 1626    Specimen: Blood Updated: 07/28/23 1646     Lactate 1.6 mmol/L     Single High Sensitivity Troponin T [[id number]]  (Abnormal) Collected: 07/28/23 1600    Specimen: Blood Updated: 07/28/23 1644     HS Troponin T 37 ng/L     Narrative:      High Sensitive Troponin T Reference Range:  <10.0 ng/L- Negative Female for AMI  <15.0 ng/L- Negative Male for AMI  >=10 - Abnormal Female indicating possible myocardial injury.  >=15 - Abnormal Male indicating possible myocardial injury.   Clinicians would have to utilize clinical acumen, EKG, Troponin, and serial changes to determine if it is an Acute Myocardial Infarction or myocardial injury due to an underlying chronic condition.         COVID-19 and FLU A/B PCR - Swab, Nasopharynx [[id number]]  (Normal) Collected: 07/28/23 1605    Specimen: Swab from Nasopharynx Updated: 07/28/23 1642     COVID19 Not Detected     Influenza A PCR Not Detected     Influenza B PCR Not Detected    Narrative:      Fact sheet for providers: https://www.fda.gov/media/768321/download    Fact sheet for patients: https://www.fda.gov/media/503690/download    Test performed by PCR.    Urine Drug Screen - Urine, Clean Catch [[id number]]  (Normal) Collected: 07/28/23 1603    Specimen: Urine, Clean Catch Updated: 07/28/23 1636     THC, Screen, Urine Negative     Phencyclidine (PCP), Urine Negative     Cocaine Screen, Urine Negative     Methamphetamine, Ur Negative     Opiate Screen Negative     Amphetamine Screen, Urine Negative     Benzodiazepine Screen, Urine Negative     Tricyclic Antidepressants Screen Negative     Methadone Screen, Urine Negative     Barbiturates Screen, Urine Negative     Oxycodone Screen, Urine Negative     Propoxyphene Screen Negative     Buprenorphine, Screen, Urine Negative    Narrative:      Cutoff For Drugs Screened:    Amphetamines               500 ng/ml  Barbiturates               200 ng/ml  Benzodiazepines            150  ng/ml  Cocaine                    150 ng/ml  Methadone                  200 ng/ml  Opiates                    100 ng/ml  Phencyclidine               25 ng/ml  THC                            50 ng/ml  Methamphetamine            500 ng/ml  Tricyclic Antidepressants  300 ng/ml  Oxycodone                  100 ng/ml  Propoxyphene               300 ng/ml  Buprenorphine               10 ng/ml    The normal value for all drugs tested is negative. This report includes unconfirmed screening results, with the cutoff values listed, to be used for medical treatment purposes only.  Unconfirmed results must not be used for non-medical purposes such as employment or legal testing.  Clinical consideration should be applied to any drug of abuse test, particularly when unconfirmed results are used.      Urinalysis With Culture If Indicated - Urine, Catheter [[id number]]  (Abnormal) Collected: 07/28/23 1604    Specimen: Urine, Catheter Updated: 07/28/23 1628     Color, UA Dark Yellow     Appearance, UA Clear     pH, UA 7.5     Specific Gravity, UA 1.018     Glucose, UA Negative     Ketones, UA Negative     Bilirubin, UA Negative     Blood, UA Negative     Protein, UA >=300 mg/dL (3+)     Leuk Esterase, UA Trace     Nitrite, UA Negative     Urobilinogen, UA 1.0 E.U./dL    Narrative:      In absence of clinical symptoms, the presence of pyuria, bacteria, and/or nitrites on the urinalysis result does not correlate with infection.          Imaging Results (Most Recent)       Procedure Component Value Units Date/Time    XR Chest Post CVA Port [[id number]] Collected: 08/17/23 0808     Updated: 08/17/23 0812    Narrative:      XR CHEST PORTABLE    HISTORY: post CVA    COMPARISON: 8/6/2023.    FINDINGS:  Mild interstitial opacities are noted in both lungs. The lungs are slightly  hypoinflated. There is no focal consolidation or pneumothorax. A right upper  extremity midline catheter terminates near the axillary vein. A central venous  catheter  terminates in the region of the atriocaval junction. The heart is  stable in size. Aortic atherosclerosis is again noted. The pulmonary vasculature  is within normal limits.    The osseous structures and surrounding soft tissues demonstrate no acute  abnormality.      Impression:      Impression:    1. Mild interstitial opacities are likely due to low lung volumes. An element of  interstitial edema is not entirely excluded.    US Guided Vascular Access [[id number]] Resulted: 08/17/23 0714     Updated: 08/17/23 0757    XR Abdomen KUB [[id number]] Collected: 08/10/23 1024     Updated: 08/10/23 1137    Narrative:      COMPARISON:  Same examination 8/9/2023.    FINDINGS:  Enteric tube terminates in the fourth portion of the duodenum.  Right-sided  ureteral stent in place.  No definite calculus along the course of the right  ureteral stent.  There are numerous phleboliths within the pelvis.    No evidence of bowel obstruction or gross free intraperitoneal air.    XR Abdomen KUB [[id number]] Collected: 08/09/23 1653     Updated: 08/09/23 1703    Narrative:      Single view abdomen    COMPARISON:  Abdomen 8/9/2023    HISTORY:  Cortrak placement after adjustment of the tube.      Impression:      FINDINGS/IMPRESSION:  Radiopaque tip of the Dobbhoff tube projects over the second portion of the  duodenum, similar to even slightly retracted when compared to the prior exam.  Right-sided ureteral stent is again noted and incompletely visualized.  There  are probably also right femoral catheters which are incompletely visualized.    Normal bowel gas pattern in the visualized abdomen.    XR Abdomen KUB [[id number]] Collected: 08/09/23 1502     Updated: 08/09/23 1525    Narrative:      FINDINGS/    Impression:      Single view of the abdomen demonstrates a feeding tube present, projected over  the second portion of the duodenum.    US Guided Vascular Access [[id number]] Resulted: 08/08/23 1526     Updated: 08/08/23 1526    IR insert  non-tunneled central line 5+ [[id number]] Resulted: 08/08/23 1512     Updated: 08/08/23 1512    Narrative:      Images from the original result were not included.  OPERATIVE NOTE  Carol Sullivan  1940      PREOP DIAGNOSES:  Acute kidney injury requiring short term hemodialysis.    POSTOP DIAGNOSES:   Acute kidney injury requiring short term hemodialysis.    PROCEDURE:   Placement non-tunneled central venous catheter (12Fr Trialysis)  Vascular ultrasound guidance    SURGEON: CHRISTINA Yin MD FACS RPVI    ASSISTANT: Yanelis Peña RT     ANESTHESIA: Local 1% lidocaine    ESTIMATED BLOOD LOSS: Minimal    SPECIMEN:  None    COMPLICATIONS: None    DESCRIPTION OF OPERATION: In CCU, patient placed in supine position,   prepped and draped in sterile fashion.  Vascular ultrasound was used to   identify the RIGHT common femoral vein which was 10mm in diameter and   patent.  Cannulated via modified Seldinger technique with MiniStick under   ultrasound guidance, imaging saved.  Exchanged for a 0.035 guidewire and   serial dilators.  A 12Fr Trialysis catheter was placed, aspirated and   flushed without difficulty with Hep-Lock solution.  1000 units/mL heparin   instilled into lumens. Catheter secured to the skin with 2-0 Nylon suture.    Sterile dressing applied. Patient tolerated procedure well. Dialysis   notified.             This document has been electronically signed by Hardy Yin MD   on August 8, 2023 15:12 CDT         FL Retrograde Pyelogram In OR [[id number]] Resulted: 08/07/23 0707     Updated: 08/07/23 0707    XR Chest 1 View [[id number]] Collected: 08/06/23 1907     Updated: 08/06/23 1911    Narrative:      XR CHEST 1 VIEW    HISTORY: ET PLACEMENT    COMPARISON: 8/6/2023 at 1342 hours.    FINDINGS:  The endotracheal tube is in appropriate position. A defibrillator pad overlies  the right chest. The right ureteral stent is unchanged.    Mild atelectatic changes are noted in both lungs. There is  no pleural effusion  or pneumothorax.    XR Chest 1 View [[id number]] Collected: 08/06/23 1404     Updated: 08/06/23 1415    Narrative:      Comparison:  AP chest, 08/05/2023.    FINDINGS:  Heart size is borderline enlarged and stable.  Mild consolidation in the right  lung base likely representing atelectasis.  There is a small right-sided pleural  effusion.  No pneumothorax.  Osseous structures are age-appropriate.    Right upper extremity PICC terminating in the right axilla, similar in position  to previous exam.    CT Abdomen Pelvis Without Contrast [[id number]] Collected: 08/06/23 0911     Updated: 08/06/23 0942    Narrative:      CT ABDOMEN PELVIS WO CONTRAST    HISTORY: Severe sepsis    COMPARISON:    Radiographs dated 8/5/2023 and CT chest dated 7/28/2023 there is new moderate  right hydronephrosis. Small phlebolith is seen adjacent to the mid right ureter.  No convincing ureteral stones other evaluation is somewhat limited by the  hematoma. Tobar catheter noted in the urinary bladder.    TECHNIQUE:    Automated exposure control was used to reduce patient dose    CT ABDOMEN PELVIS WO CONTRAST    FINDINGS:    Trace left pleural effusion and moderate right pleural effusion. There is right  infrahilar consolidation with air bronchograms. Also some atelectasis in the  left lower lobe. Moderate-sized hiatal hernia containing fluid, similar to  prior.    There a right rectus sheath hematoma measuring 9.0 x 3.1 cm. This may  communicate with the extraperitoneal soft tissues as seen on axial images 69  through 73    There is a large right extraperitoneal/retroperitoneal hematoma extending from  the pelvis where it is displacing the urinary bladder and into the right  retroperitoneum at the lower right renal border. The pelvic component of the  hematoma measures 12.3 x 8.9 cm. The lower abdominal/upper pelvic component of  the hematoma measures 16.1 x 6.0 cm.    No significant iliac or psoas hematoma.    Stomach is  mildly dilated. Bowel is nonobstructed. Liver and spleen and pancreas  and adrenal glands are unremarkable.    Osseous degenerative changes are present with bowel suspicious osseous lesion.      Impression:        Large right retroperitoneal hematoma. This may have started as a rectal sheath  hematoma which decompressed into the extraperitoneal soft tissues and upward  into the retroperitoneum. It is compressing the mid to distal right ureter and  causing moderate right hydronephrosis. These findings were discussed with Dr. Harrington at 9:37 AM on 8/6/2023 by phone    Moderate right pleural effusion and trace left pleural effusion. There is  airspace disease in the bilateral lungs, this could be atelectasis or pneumonia.    US Liver [[id number]] Collected: 08/06/23 0806     Updated: 08/06/23 0812    Narrative:      RIGHT UPPER QUADRANT ABDOMINAL ULTRASOUND    HISTORY:  Right upper quadrant abdominal pain, elevated liver enzymes    COMPARISON:  None    TECHNIQUE:  High-resolution gray scale images of the liver, gallbladder, common bile duct,  pancreas, and right kidney were obtained.  Color and spectral Doppler ultrasound  evaluation of the main portal vein was also performed.    FINDINGS:    Limited examination secondary to patient confusion and motion.    The liver is normal in size, measuring 9.5 cm in length.  The hepatic parenchyma  is diffusely increased in echogenicity.  Main portal vein is patent with  antegrade flow.    The gallbladder is surgically absent. There is no biliary dilatation. The common  bile duct is 7 mm in caliber.    The pancreas is obscured by bowel gas. The right kidney measures 9.6 cm in  length.  There is mild hydronephrosis.      Impression:      1.  Limited examination secondary to patient confusion and motion.  2.  Hepatic steatosis.  3.  Prior cholecystectomy.  4.  Mild right hydronephrosis.      XR Chest 1 View [[id number]] Collected: 08/05/23 2204     Updated: 08/05/23 2227    Narrative:         INDICATION:  Hypotension    COMPARISON:  None.      Impression:      FINDINGS/IMPRESSION:  No consolidative pulmonary opacity, pleural effusion, or pneumothorax.    Heart appears mildly enlarged, probably at least partially due to portable  technique.    Right upper extremity PICC terminates in the right axilla.    XR Abdomen KUB [[id number]] Collected: 08/05/23 1116     Updated: 08/05/23 1120    Narrative:      Comparison:  None    FINDINGS:  There is mild gaseous distention of the colon.  No significant stool burden.  No  evidence of small bowel obstruction.  Calcified phleboliths noted in the pelvis.      XR Chest 1 View [[id number]] Collected: 08/01/23 1458     Updated: 08/01/23 1529    Narrative:      FINDINGS:  Mild central vascular prominence.  There are low lung volumes.  Heart size is  upper limits of normal.  No significant change from 07/31/2023.    US Guided Vascular Access [[id number]] Collected: 08/01/23 1509     Updated: 08/01/23 1512    Narrative:      Ultrasound guidance vascular    FINDINGS:  Real-time ultrasound imaging was provided for vascular access.  Please refer to  procedure note for real-time exam findings.  The right cephalicVein was found to  be patent and compressible.  Access under direct sonographic visualization.  Permanent saved images sent to the PACS for documentation.      IR Insert Midline Without Port Pump 5 Plus [[id number]] Resulted: 08/01/23 1511     Updated: 08/01/23 1511    Narrative:      This procedure was auto-finalized with no dictation required.    XR Chest 1 View [[id number]] Collected: 07/31/23 0432     Updated: 07/31/23 0436    Narrative:      Comparison:  CT thorax 7/28/2023    FINDINGS:  There may be a small left pleural effusion.  Mild central pulmonary vascular  congestion noted.  No pneumothorax.  Cardiomediastinal silhouette is  unremarkable.      US Breast Left Limited [[id number]] Collected: 07/30/23 1243     Updated: 07/30/23 1333    Narrative:       INDICATION:  worsening mastitis/ assess for abscess.    COMPARISON:  None relevant.    TECHNIQUE:  Targeted high-resolution grayscale and color Doppler ultrasound was performed of  the left breast to assess for fluid collection in the emergency setting.    FINDINGS:  Multiple hypoechoic areas are present within the left breast measuring 2.5 x 2.1  cm 10 o'clock position, 2.4 x 1.3 cm at the 6 o'clock position, and 4.6 x 3.7 cm  12 o'clock position.    These areas could represent areas of phlegmon and developing abscess.      Of note: This ultrasound was performed in the emergent setting for evaluation  for a breast fluid collection/abscess. If there is clinical concern for a breast  neoplasm, this examination is inappropriate for such a workup. Proper  mammographic evaluation is recommended if there is clinical suspicion for a  breast neoplasm.        US Renal Bilateral [[id number]] Collected: 07/30/23 1242     Updated: 07/30/23 1250    Narrative:      HISTORY:  BLAINE    COMPARISON:  None    TECHNIQUE:  Real-time ultrasound imaging and color Doppler used to evaluate the kidneys and  bladder.    FINDINGS:  The right kidney measures 9.5 cm. The left kidney measures 9.8 cm.  Cortical  thickness and echotexture are within normal limits. There is no hydronephrosis  or evidence of mass. No calculus was seen.    The bladder was unremarkable.      Impression:      Normal renal ultrasound.      XR Hip With or Without Pelvis 2 - 3 View Right [[id number]] Collected: 07/30/23 1105     Updated: 07/30/23 1108    Narrative:      HISTORY: Hip pain after fall    COMPARISON: None    FINDINGS:  AP and lateral views were obtained.    There is no fracture, dislocation or osseous destruction.      Impression:      No acute abnormality.    CT Chest Without Contrast Diagnostic [[id number]] Collected: 07/28/23 1603     Updated: 07/28/23 1619    Narrative:      INDICATION:  Possible breast abscess and uncertain renal  function.    COMPARISON:  None relevant.    TECHNIQUE:  Helical CT of the chest was performed without intravenous contrast.  Multiplanar  reformations were provided.    FINDINGS:  Cardiac size is mildly enlarged.  Diffuse vascular calcification.  Moderate  hiatal hernia.  No pleural effusion or pneumothorax.  No dense airspace  consolidation.  No worrisome pulmonary nodules.      Impression:      1.  No acute intrathoracic process.  2.  Moderate hiatal hernia.  3.  Diffuse vascular calcification.        CT Head Without Contrast [[id number]] Collected: 07/28/23 1602     Updated: 07/28/23 1619    Narrative:      INDICATION:  Mental status change, unknown cause.    COMPARISON:  None relevant.    TECHNIQUE:  Axial images were performed from the calvarium through the skull base followed  by 2D multiplanar reformats.  No contrast was administered.    FINDINGS:  No mass, mass effect or midline shift.  The ventricles are midline and  symmetric.  No abnormal extra-axial fluid collection.  Pituitary and posterior  fossa are within normal limits.  Calvarium is intact.  Mild scattered small  vessels can be changes..      Impression:      No acute intracranial process.                  Chief Complaint on Day of Discharge: No new complaints    Hospital Course:  The patient is a 82 y.o. female with medical history notable for paroxysmal atrial fibrillation, hyperlipidemia, hypertension who presented to [facility] with altered mental status felt to be related to encephalopathy from sepsis and infection related to left breast mastitis.  Patient was seen and evaluated by general surgery during her stay and had the fluid collection drained.  Patient was also seen by nephrology due to worsening kidney disease and her medications were adjusted.  After her initial procedure for her fluid collection was drained from her left breast patient became unresponsive and required transfer to the CCU.  Patient subsequently woke up  "and did not require intubation at that time.  Patient made gradual improvement and was treated with appropriate antibiotic therapy.  She was transferred back to the floor and was thought to be doing well.  Patient unfortunately subsequently developed right-sided abdominal pain that initial work-up was unrevealing.  Patient decompensated further requiring transfer back to the CCU.  She underwent CT scan of her abdomen pelvis that showed developing right-sided retroperitoneal hematoma.  Her Coumadin was reversed and she was transfused appropriately.  Unfortunately during the process patient developed a hypoxic respiratory failure and required intubation mechanical ventilation.  Patient was subsequently able to be liberated from the ventilator but had been noted to have worsening renal function requiring initiation of hemodialysis.  Patient was also confused but this has gradually improved during her stay.  This is likely combination of both encephalopathy and delirium.  Patient was evaluated for LTAC and deemed appropriate.  Referral was made and she was accepted.  Patient was also seen at the time that she developed a retroperitoneal hematoma by urology due to right-sided hydronephrosis that was secondary to the hematoma.  She had stent placement per urology at that time as well.  Patient was otherwise noted to be in stable condition at the time of discharge and will be discharged to LTAC for ongoing care and continuation of hemodialysis initiation.      Condition on Discharge: Stable    Physical Exam on Discharge:  /67 (BP Location: Left arm, Patient Position: Lying)   Pulse 72   Temp 97.4 øF (36.3 øC) (Temporal)   Resp 18   Ht 165.1 cm (65\")   Wt 68.5 kg (151 lb)   SpO2 92%   BMI 25.13 kg/mý   Physical Exam  Constitutional:       General: She is not in acute distress.     Appearance: She is not toxic-appearing.   HENT:      Head: Normocephalic and atraumatic.      Right Ear: External ear normal.      " Left Ear: External ear normal.      Nose: Nose normal.      Mouth/Throat:      Pharynx: Oropharynx is clear.   Eyes:      Conjunctiva/sclera: Conjunctivae normal.   Cardiovascular:      Rate and Rhythm: Normal rate and regular rhythm.      Heart sounds: Normal heart sounds.   Pulmonary:      Effort: Pulmonary effort is normal. No respiratory distress.      Breath sounds: Normal breath sounds.   Abdominal:      General: Bowel sounds are normal.      Palpations: Abdomen is soft.      Tenderness: There is no abdominal tenderness.   Musculoskeletal:         General: No deformity.   Skin:     General: Skin is warm and dry.      Capillary Refill: Capillary refill takes less than 2 seconds.   Neurological:      General: No focal deficit present.   Psychiatric:         Behavior: Behavior normal.         Thought Content: Thought content normal.         Discharge Disposition:  Long Term Care (DC - External)    Discharge Medications:     Discharge Medications        New Medications        Instructions Start Date   HYDROcodone-acetaminophen 5-325 MG per tablet  Commonly known as: NORCO   1 tablet, Oral, Every 6 Hours PRN      hydrocortisone-bacitracin-zinc oxide-nystatin  Commonly known as: MAGIC BARRIER   1 application , Topical, 2 Times Daily PRN      metoprolol tartrate 100 MG tablet  Commonly known as: LOPRESSOR   100 mg, Oral, Every 12 Hours Scheduled      nystatin [id number] UNIT/GM powder  Commonly known as: MYCOSTATIN   Topical, Every 12 Hours Scheduled             Continue These Medications        Instructions Start Date   acetaminophen 500 MG tablet  Commonly known as: TYLENOL   500 mg, Oral, As Needed      isosorbide mononitrate 30 MG 24 hr tablet  Commonly known as: IMDUR   30 mg, Oral, Daily      lansoprazole 30 MG capsule  Commonly known as: PREVACID   1 capsule, Oral, Every 12 Hours      rosuvastatin 40 MG tablet  Commonly known as: CRESTOR   40 mg, Oral, Daily      Ventolin  (90 Base) MCG/ACT inhaler  Generic  drug: albuterol sulfate HFA   No dose, route, or frequency recorded.             Stop These Medications      DULoxetine 60 MG capsule  Commonly known as: CYMBALTA     furosemide 20 MG tablet  Commonly known as: LASIX     gabapentin 300 MG capsule  Commonly known as: NEURONTIN     losartan 100 MG tablet  Commonly known as: COZAAR     metoprolol succinate XL 50 MG 24 hr tablet  Commonly known as: TOPROL-XL     Mirabegron ER 50 MG tablet sustained-release 24 hour 24 hr tablet  Commonly known as: MYRBETRIQ     NIFEdipine XL 60 MG 24 hr tablet  Commonly known as: PROCARDIA XL     potassium chloride 20 MEQ CR tablet  Commonly known as: K-DUR,KLOR-CON     traMADol 50 MG tablet  Commonly known as: ULTRAM     warfarin 2.5 MG tablet  Commonly known as: COUMADIN              Discharge Diet:   Diet Instructions       Diet: Cardiac Diets, Diabetic Diets; No Salt Packet; Soft to Chew (NDD 3); Chopped Meat; Thin (IDDSI 0); Consistent Carbohydrate      Discharge Diet:  Cardiac Diets  Diabetic Diets       Cardiac Diet: No Salt Packet    Texture: Soft to Chew (NDD 3)    Soft to Chew: Chopped Meat    Fluid Consistency: Thin (IDDSI 0)    Diabetic Diet: Consistent Carbohydrate            Activity at Discharge:   Activity Instructions       Gradually Increase Activity Until at Pre-Hospitalization Level              Discharge Care Plan/Instructions: Take medications as prescribed, follow-up with PCP, general surgery, urology, and nephrology after LTAC stay.    Follow-up Appointments:   Future Appointments   Date Time Provider Department Center   2/20/2024  1:00 PM Dimitri Peterson MD [facility] None       Test Results Pending at Discharge: None      Juanito Harrington MD    Time: 35 minutes      Electronically signed by Juanito Harrington MD at 08/17/23 2297

## 2023-08-18 NOTE — ACP (ADVANCE CARE PLANNING)
[facility]  [address] [place] [address]. [address]  T - [phone]  F - [phone]     Progress Note          PATIENT  DEMOGRAPHICS   PATIENT NAME: Carol Sullivan                      PHYSICIAN: Stephanie Gómez MD  : 1940  MRN: [id number]   LOS: 0 days    Patient Care Team:  Len Seo MD as PCP - General  DumontBlaire ribera APRN as Nurse Practitioner (General Surgery)  Subjective   SUBJECTIVE   Now moved to ltac, seen during hd.          Objective   OBJECTIVE   Vital Signs  Heart Rate:  [] 108    T 97.6   HR 57  RR 18   /78     No intake/output data recorded.    PHYSICAL EXAM    Physical Exam  Constitutional:       Appearance: She is well-developed.   HENT:      Head: Normocephalic.      Left Ear: There is no impacted cerumen.      Nose: No rhinorrhea.   Eyes:      Pupils: Pupils are equal, round, and reactive to light.   Cardiovascular:      Rate and Rhythm: Normal rate and regular rhythm.      Heart sounds: Normal heart sounds.   Pulmonary:      Effort: Pulmonary effort is normal.      Breath sounds: Normal breath sounds.   Abdominal:      General: Bowel sounds are normal.      Palpations: Abdomen is soft.   Musculoskeletal:         General: No swelling.   Skin:     Coloration: Skin is not jaundiced.   Neurological:      General: No focal deficit present.      Mental Status: She is alert. She is disoriented.       RESULTS   Results Review:    Results from last 7 days   Lab Units 23  0509 23  0551 23  0604   SODIUM mmol/L 131* 130* 131*   POTASSIUM mmol/L 4.6 3.9 3.3*   CHLORIDE mmol/L 91* 90* 89*   CO2 mmol/L 23.0 23.0 18.0*   BUN mg/dL 93* 72* 129*   CREATININE mg/dL 6.43* 4.86* 6.75*   CALCIUM mg/dL 8.5* 8.6 8.6   BILIRUBIN mg/dL 0.9 1.0 1.0   ALK PHOS U/L 318* 372* 359*   ALT (SGPT) U/L 58* 76* 94*   AST (SGOT) U/L 26 33* 36*   GLUCOSE mg/dL 109* 139* 94         Estimated Creatinine Clearance: 6.7 mL/min (A) (by C-G formula based on SCr of  6.43 mg/dL (H)).    Results from last 7 days   Lab Units 08/18/23  0509 08/15/23  0414   MAGNESIUM mg/dL 1.8 1.9   PHOSPHORUS mg/dL  --  5.7*               Results from last 7 days   Lab Units 08/18/23  0509 08/17/23  0551 08/16/23  1218 08/16/23  0604 08/16/23  0025 08/15/23  1159 08/15/23  0414 08/14/23  1151 08/14/23  0533   WBC 10*3/mm3 16.87* 17.07*  --  18.50*  --   --  19.80*  --  20.40*   HEMOGLOBIN g/dL 9.6* 10.2* 12.3 11.7* 11.3*   < > 12.4   < > 11.4*   PLATELETS 10*3/mm3 236 248  --  272  --   --  314  --  346    < > = values in this interval not displayed.         Results from last 7 days   Lab Units 08/17/23  0551 08/16/23  0604 08/15/23  0414 08/14/23  0533 08/13/23  0518   INR  1.20 1.30* 1.32* 1.30* 1.41*           Imaging Results (Last 24 Hours)       ** No results found for the last 24 hours. **             MEDICATIONS    heparin (porcine), 2,000 Units, Intravenous, Once  [START ON 8/21/2023] heparin (porcine), 2,000 Units, Intravenous, Once  Insulin Aspart, 2-12 Units, Subcutaneous, 4 times per day  isosorbide mononitrate, 30 mg, Oral, Daily  lansoprazole, 30 mg, Oral, Q12H  metoprolol tartrate, 100 mg, Oral, Q12H  rosuvastatin, 40 mg, Oral, Daily  sodium chloride 0.9 % flush, 10 mL, Intravenous, Q8H           Assessment & Plan   ASSESSMENT / PLAN      * No active hospital problems. *      1. Acute kidney injury/ Acute tubular necrosis:  - Baseline unknown.   - Creatinine was 1.5 in 10/2022.   - UA 3+ protein, trace leukocytes, 0-2 RBC, 6-12 WBC, 2+ bacteria.   - Urine sodium 26. CT abd large rt retroperitoneal hematoma causing moderate rt hydronephrosis.  - Creatinine since admission was 1.5-1.6 range and then rapidly worsening.   - Started HD due to worsening renal function 8/6/23     Hd today and tolerating it well so far     Marked uremia and elevated cr. required mannitol prn. S/p permcath now.     2. Renal failure retroperitoneal bleed s/p right J stent for hydronephrosis   -s/p J stent  placement by urology 8/11  -Continue to monitor renal function      3. Hypertension/ Afib with RVR:   - Blood pressure was low and now high. Off pressors. HR was high and now metoprolol. Losartan on hold     4. Metabolic acidosis:   - Was on bicarb drip. Now modulate with hd     5. Hypocalcemia     5. Sepsis:  - zosyn at present. C.diff carrier     6. UTI E.fecalis:   - became septic with mild hydro Dr Herkimer is consulted. Now stent in place . E.fecalis     7. Cdiff:   -not on po vanc now      8. Hyponatremia:   - Sodium is stable     8. Anemia / retroperitoneal bleed:  - Hemoglobin is acceptable      9.  AMS                   This document has been electronically signed by Stephanie Gómez MD on August 18, 2023 12:03 CDT

## 2023-08-19 ENCOUNTER — OUTSIDE FACILITY SERVICE (OUTPATIENT)
Dept: PULMONOLOGY | Facility: CLINIC | Age: 83
End: 2023-08-19
Payer: MEDICARE

## 2023-08-19 LAB
GLUCOSE BLDC GLUCOMTR-MCNC: 111 MG/DL (ref 70–130)
GLUCOSE BLDC GLUCOMTR-MCNC: 113 MG/DL (ref 70–130)
GLUCOSE BLDC GLUCOMTR-MCNC: 164 MG/DL (ref 70–130)
GLUCOSE BLDC GLUCOMTR-MCNC: 249 MG/DL (ref 70–130)

## 2023-08-19 PROCEDURE — 97162 PT EVAL MOD COMPLEX 30 MIN: CPT | Performed by: PHYSICAL THERAPIST

## 2023-08-19 PROCEDURE — 82948 REAGENT STRIP/BLOOD GLUCOSE: CPT

## 2023-08-19 NOTE — ACP (ADVANCE CARE PLANNING)
[facility]  INPATIENT PROGRESS NOTE    PATIENT NAME: Carol Sullivan      PHYSICIAN: Josefina Perez MD  : 1940        MRN: [id number]  Patient Care Team:  Len Seo MD as PCP - General  Blaire Camara APRN as Nurse Practitioner (General Surgery)    Chief Complaint: Patient was brought to emergency room at [facility] for confusion.     History of Present Illness: 82-year-old female with significant medical history of hypertension, dyslipidemia, gastroesophageal reflux disease who was brought into the emergency room for confusion.  Patient was also found to have erythema and cellulitis of left breast.  Patient was thought of having mastitis.  Patient's condition was monitored now.  Patient had gradual decrease in her urine output and worsening in her kidney function was noted.  Patient off having renal failure which was worsening over time.  Patient subsequently required hemodialysis patient was placed on hemodialysis per nephrology recommendation.  Patient was admitted to the hospital setting for antibiotics and hemodialysis.  Patient's condition seem to be stabilized however patient remained extremely weak and deconditioned hemodialysis patient was recommended to be admitted to our facility for further therapy and rehabilitation along with monitoring for hemodialysis.     Discharge Summary and H&P: Reviewed from previous hospitalization and information documented above in HPI Section.     Radiology Studies from Previous Hospitalization: Reviewed and are as below:  XR Abdomen KUB     Result Date: 2023  FINDINGS/IMPRESSION: Radiopaque tip of the Dobbhoff tube projects over the second portion of the duodenum, similar to even slightly retracted when compared to the prior exam. Right-sided ureteral stent is again noted and incompletely visualized.  There are probably also right femoral catheters which are  incompletely visualized. Normal bowel gas pattern in the visualized abdomen.     XR Chest Post CVA Port     Result Date: 8/17/2023  Impression: 1. Mild interstitial opacities are likely due to low lung volumes. An element of interstitial edema is not entirely excluded.      EKG From previous hospitalization reviewed and documented below:  ECG 12 Lead Other; hx afib   Preliminary Result   Test Reason : Other~   Blood Pressure :   */*   mmHG   Vent. Rate :  67 BPM     Atrial Rate :  67 BPM      P-R Int : 188 ms          QRS Dur :  78 ms       QT Int : 392 ms       P-R-T Axes :  28   6  16 degrees      QTc Int : 414 ms       Normal sinus rhythm with sinus arrhythmia   Minimal voltage criteria for LVH, may be normal variant   Anteroseptal infarct (cited on or before 30-JUL-2023)   Abnormal ECG   When compared with ECG of 10-AUG-2023 15:02,   Sinus rhythm has replaced Atrial fibrillation   Vent. rate has decreased BY  62 BPM   Nonspecific T wave abnormality, worse in Anterior leads       Referred By:            Confirmed By:              Laboratory results from previous hospitalization is reviewed and below:        Lab Results   Component Value Date     GLUCOSE 139 (H) 08/17/2023     CALCIUM 8.6 08/17/2023      (L) 08/17/2023     K 3.9 08/17/2023     CO2 23.0 08/17/2023     CL 90 (L) 08/17/2023     BUN 72 (H) 08/17/2023     CREATININE 4.86 (H) 08/17/2023     EGFR 8.5 (L) 08/17/2023     BCR 14.8 08/17/2023     ANIONGAP 17.0 (H) 08/17/2023            Lab Results   Component Value Date     WBC 17.07 (H) 08/17/2023     HGB 10.2 (L) 08/17/2023     HCT 29.8 (L) 08/17/2023     MCV 85.9 08/17/2023     PLT         Assessment       Sepsis Secondary to Below.  Somnolence [R40.0]  Mastitis [N61.0]  Sepsis [A41.9]  Sepsis, due to unspecified organism, unspecified whether acute organ dysfunction present [A41.9]   ESRD on Hemodialysis.        DVT Prophylaxis: Heparin.  GI Prophylaxis: Lansoprazole.  Code Status: DNR/DNI.     Plan       -Continue with dialysis as per nephrology recommendations.  -Therapy as indicated as patient tolerates.  -We will continue to monitor blood work closely.  -Wound care as before.  -Cardiology consult appreciated.  -DVT and GI prophylaxis in place.  -We'll continue monitoring patient in hospital setting and treat patient as course dictates.  -Please review orders for detailed plan of care.  -For Aute and Chronic medical condition we will continue medications described below in medication section which have been reviewed and we will continue unless changed in plan of care.  -Laboratory and diagnostic studies have been independently reviewed and the reports are reviewed as documented below.    Subjective   Interval History:   Patient Complaints:   Patient seen and examined. She is resting comfortably in bed with  at bedside. No overnight events reported. No needs at this time.  History taken from: Medical record and nursing staff.    Review of Systems:    Review of Systems   All other systems reviewed and are negative.    Objective   Vital Signs  Temp: 97.9 F         Heart Rate: 118         Resp: 18          Blood Pressure: 141/65         Pulse Ox: 95 %    Physical Exam:   Physical Exam  Vitals and nursing note reviewed.   Constitutional:       Appearance: She is well-developed.   HENT:      Head: Normocephalic and atraumatic.      Nose: Nose normal.   Eyes:      Conjunctiva/sclera: Conjunctivae normal.      Pupils: Pupils are equal, round, and reactive to light.   Neck:      Thyroid: No thyromegaly.      Vascular: No JVD.      Trachea: No tracheal deviation.   Cardiovascular:      Rate and Rhythm: Normal rate and regular rhythm.      Heart sounds: Normal heart sounds.   Pulmonary:      Effort: Pulmonary effort is normal. No respiratory distress.      Breath sounds: Normal breath sounds. No wheezing or rales.   Chest:      Chest wall: No tenderness.   Abdominal:      General: Bowel sounds are normal. There  is no distension.      Palpations: Abdomen is soft.      Tenderness: There is no abdominal tenderness. There is no guarding or rebound.   Musculoskeletal:         General: Normal range of motion.      Cervical back: Normal range of motion and neck supple.   Lymphadenopathy:      Cervical: No cervical adenopathy.   Skin:     General: Skin is warm and dry.      Comments: Intact   Neurological:      Mental Status: She is alert and oriented to person, place, and time.      Cranial Nerves: No cranial nerve deficit.      Deep Tendon Reflexes: Reflexes are normal and symmetric.     Results Review:       Results from last 7 days   Lab Units 08/18/23  0509 08/17/23  0551 08/16/23  0604 08/15/23  0414 08/14/23  0533 08/13/23  0518   SODIUM mmol/L 131* 130* 131* 132* 137 137   POTASSIUM mmol/L 4.6 3.9 3.3* 3.5 3.9 3.3*   CHLORIDE mmol/L 91* 90* 89* 91* 91* 93*   CO2 mmol/L 23.0 23.0 18.0* 24.0 19.0* 20.0*   BUN mg/dL 93* 72* 129* 102* 160* 121*   CREATININE mg/dL 6.43* 4.86* 6.75* 5.23* 7.29* 5.99*   GLUCOSE mg/dL 109* 139* 94 215* 363* 371*   CALCIUM mg/dL 8.5* 8.6 8.6 8.2* 8.1* 8.2*   BILIRUBIN mg/dL 0.9 1.0 1.0 0.8 0.7 0.9   ALK PHOS U/L 318* 372* 359* 438* 457* 425*   ALT (SGPT) U/L 58* 76* 94* 134* 159* 179*   AST (SGOT) U/L 26 33* 36* 66* 90* 52*       Results from last 7 days   Lab Units 08/18/23  0509 08/15/23  0414   MAGNESIUM mg/dL 1.8 1.9   PHOSPHORUS mg/dL  --  5.7*       Results from last 7 days   Lab Units 08/18/23  0509 08/17/23  0551 08/16/23  1218 08/16/23  0604 08/16/23  0025 08/15/23  1159 08/15/23  0414 08/14/23  1151 08/14/23  0533 08/13/23  1212 08/13/23  0518   WBC 10*3/mm3 16.87* 17.07*  --  18.50*  --   --  19.80*  --  20.40*  --  18.17*   HEMOGLOBIN g/dL 9.6* 10.2* 12.3 11.7* 11.3* 12.6 12.4   < > 11.4*   < > 11.1*   HEMATOCRIT % 27.7* 29.8* 35.5 33.2* 32.3* 37.0 35.1   < > 33.3*   < > 33.8*   PLATELETS 10*3/mm3 236 248  --  272  --   --  314  --  346  --  301    < > = values in this interval not  displayed.       Lab Results   Component Value Date    TROPONINI 0.69 (H) 05/31/2020    TROPONINT 56 (C) 08/06/2023     pH, Arterial   Date Value Ref Range Status   08/11/2023 7.403 7.350 - 7.450 pH units Final     CO2   Date Value Ref Range Status   08/18/2023 23.0 22.0 - 29.0 mmol/L Final     Total CO2   Date Value Ref Range Status   10/27/2022 30 21 - 31 mmol/L Final     Results from last 7 days   Lab Units 08/17/23  0551 08/16/23  0604 08/15/23  0414 08/14/23  0533 08/13/23  0518   INR  1.20 1.30* 1.32* 1.30* 1.41*       Imaging Results (Most Recent)       None           No results found for: ACANTHNAEG, AFBCX, BPERTUSSISCX, BLOODCX  No results found for: BCIDPCR, CXREFLEX, CSFCX, CULTURETIS  No results found for: CULTURES, HSVCX, URCX  No results found for: EYECULTURE, GCCX, HSVCULTURE, LABHSV  No results found for: LEGIONELLA, MRSACX, MUMPSCX, MYCOPLASCX  No results found for: NOCARDIACX, STOOLCX  No results found for: THROATCX, UNSTIMCULT, URINECX, CULTURE, VZVCULTUR  No results found for: VIRALCULTU, WOUNDCX  Medication Reviewed and Will Continue Unless Documented in Plan:   dilTIAZem, 20 mg, Intravenous, Once  [START ON 8/21/2023] heparin (porcine), 2,000 Units, Intravenous, Once  HYDROcodone-acetaminophen, 1 tablet, Oral, Once  Insulin Aspart, 2-12 Units, Subcutaneous, 4 times per day  isosorbide mononitrate, 30 mg, Oral, Daily  lansoprazole, 30 mg, Oral, Q12H  metoprolol tartrate, 100 mg, Oral, Q12H  rosuvastatin, 40 mg, Oral, Daily  sodium chloride 0.9 % flush, 10 mL, Intravenous, Q8H           acetaminophen    albumin human    dextrose    heparin (porcine)    HYDROcodone-acetaminophen    sodium chloride    sodium chloride 0.9 % flush      Dietary Orders (From admission, onward)       Start     Ordered    08/18/23 1800  Dietary Nutrition Supplements Other (See Comment); Homemade M/S made with Straw Novasource and Berry Magic cup  Daily With Dinner      Question Answer Comment   Select Supplement: Other  (See Comment)    Other Homemade M/S made with Straw Novasource and [place] Magic cup        08/18/23 1146    08/18/23 1200  Dietary Nutrition Supplements Other (See Comment); Homemade M/S made with Vanilla Novasource and vanilla magic cup (8ounces)  Daily With Lunch      Question Answer Comment   Select Supplement: Other (See Comment)    Other Homemade M/S made with Vanilla Novasource and vanilla magic cup (8ounces)        08/18/23 1146    08/17/23 1153  Diet: Regular/House Diet, Renal Diets, Diabetic Diets; Consistent Carbohydrate; Low Sodium (2-3g); Texture: Soft to Chew (NDD 3); Soft to Chew: Chopped Meat; Fluid Consistency: Thin (IDDSI 0)  Diet Effective Now        References:    Diet Order [place]   Question Answer Comment   Diets: Regular/House Diet    Diets: Renal Diets    Diets: Diabetic Diets    Diabetic Diet: Consistent Carbohydrate    Renal Diet: Low Sodium (2-3g)    Texture: Soft to Chew (NDD 3)    Soft to Chew: Chopped Meat    Fluid Consistency: Thin (IDDSI 0)        08/17/23 1156                  History, physical exam, assessment and plan may have been partly or fully copied from before, but  changes made to the copied record to reflect care on the date of service. Part of the lab and imaging  reports auto populated and corrected. Some of this note may be an electronic transcription of spoken  language to printed text. This may permit erroneous, or at times, nonsensical words or phrases to be  inadvertently transcribed. Although I have reviewed the note for such errors, some may still exist.      This document has been electronically signed by Josefina Perez MD on August 19, 2023 10:40 CDT

## 2023-08-19 NOTE — ACP (ADVANCE CARE PLANNING)
[facility]  [address] [place] [address]. [address]  T - [phone]  F - [phone]     Progress Note          PATIENT  DEMOGRAPHICS   PATIENT NAME: Carol Sullivan                      PHYSICIAN: Cris Moreno MD  : 1940  MRN: [id number]   LOS: 0 days    Patient Care Team:  Len Seo MD as PCP - General  ShackelfordBlaire ribera APRN as Nurse Practitioner (General Surgery)  Subjective   SUBJECTIVE   No acute events overnight. Awake but alerted.  at bedside.          Objective   OBJECTIVE   Vital Signs  Heart Rate:  [] 99    T 97.9    RR 18  /65     No intake/output data recorded.    PHYSICAL EXAM    Physical Exam  Constitutional:       Appearance: She is well-developed.   HENT:      Head: Normocephalic.      Left Ear: There is no impacted cerumen.      Nose: No rhinorrhea.   Eyes:      Pupils: Pupils are equal, round, and reactive to light.   Cardiovascular:      Rate and Rhythm: Normal rate and regular rhythm.      Heart sounds: Normal heart sounds.   Pulmonary:      Effort: Pulmonary effort is normal.      Breath sounds: Normal breath sounds.   Abdominal:      General: Bowel sounds are normal.      Palpations: Abdomen is soft.   Musculoskeletal:         General: No swelling.   Skin:     Coloration: Skin is not jaundiced.   Neurological:      General: No focal deficit present.      Mental Status: She is alert. She is disoriented.       RESULTS   Results Review:    Results from last 7 days   Lab Units 23  0509 23  0551 23  0604   SODIUM mmol/L 131* 130* 131*   POTASSIUM mmol/L 4.6 3.9 3.3*   CHLORIDE mmol/L 91* 90* 89*   CO2 mmol/L 23.0 23.0 18.0*   BUN mg/dL 93* 72* 129*   CREATININE mg/dL 6.43* 4.86* 6.75*   CALCIUM mg/dL 8.5* 8.6 8.6   BILIRUBIN mg/dL 0.9 1.0 1.0   ALK PHOS U/L 318* 372* 359*   ALT (SGPT) U/L 58* 76* 94*   AST (SGOT) U/L 26 33* 36*   GLUCOSE mg/dL 109* 139* 94         Estimated Creatinine Clearance: 6.7 mL/min  (A) (by C-G formula based on SCr of 6.43 mg/dL (H)).    Results from last 7 days   Lab Units 08/18/23  0509 08/15/23  0414   MAGNESIUM mg/dL 1.8 1.9   PHOSPHORUS mg/dL  --  5.7*               Results from last 7 days   Lab Units 08/18/23  0509 08/17/23  0551 08/16/23  1218 08/16/23  0604 08/16/23  0025 08/15/23  1159 08/15/23  0414 08/14/23  1151 08/14/23  0533   WBC 10*3/mm3 16.87* 17.07*  --  18.50*  --   --  19.80*  --  20.40*   HEMOGLOBIN g/dL 9.6* 10.2* 12.3 11.7* 11.3*   < > 12.4   < > 11.4*   PLATELETS 10*3/mm3 236 248  --  272  --   --  314  --  346    < > = values in this interval not displayed.         Results from last 7 days   Lab Units 08/17/23  0551 08/16/23  0604 08/15/23  0414 08/14/23  0533 08/13/23  0518   INR  1.20 1.30* 1.32* 1.30* 1.41*           Imaging Results (Last 24 Hours)       ** No results found for the last 24 hours. **             MEDICATIONS    dilTIAZem, 20 mg, Intravenous, Once  [START ON 8/21/2023] heparin (porcine), 2,000 Units, Intravenous, Once  HYDROcodone-acetaminophen, 1 tablet, Oral, Once  Insulin Aspart, 2-12 Units, Subcutaneous, 4 times per day  isosorbide mononitrate, 30 mg, Oral, Daily  lansoprazole, 30 mg, Oral, Q12H  metoprolol tartrate, 100 mg, Oral, Q12H  rosuvastatin, 40 mg, Oral, Daily  sodium chloride 0.9 % flush, 10 mL, Intravenous, Q8H           Assessment & Plan   ASSESSMENT / PLAN      * No active hospital problems. *      1. Acute kidney injury/ Acute tubular necrosis:  - Baseline unknown.   - Creatinine was 1.5 in 10/2022.   - UA 3+ protein, trace leukocytes, 0-2 RBC, 6-12 WBC, 2+ bacteria.   - Urine sodium 26. CT abd large rt retroperitoneal hematoma causing moderate rt hydronephrosis.  - Started HD 8/6/23  - Received HD yesterday and tolerated well. No HD needs today. Next HD most likely on Monday.      2. Renal failure retroperitoneal bleed s/p right J stent for hydronephrosis   - s/p J stent placement by urology 8/11  - Continue to monitor renal function       3. Hypertension/ Afib with RVR:   - Blood pressure is acceptable. HR was high and now metoprolol. Losartan on hold     4. UTI E.fecalis:   - became septic with mild hydro Dr Garcia is consulted. Now stent in place . E.fecalis     5. Cdiff:   -not on po vanc now      6. Hyponatremia:   - Sodium is stable     7. Anemia / retroperitoneal bleed:  - Hemoglobin is acceptable at 9.6     8.  AMS              This document has been electronically signed by Cris Moreno MD on August 19, 2023 15:36 CDT

## 2023-08-20 ENCOUNTER — OUTSIDE FACILITY SERVICE (OUTPATIENT)
Dept: PULMONOLOGY | Facility: CLINIC | Age: 83
End: 2023-08-20
Payer: MEDICARE

## 2023-08-20 LAB — GLUCOSE BLDC GLUCOMTR-MCNC: 112 MG/DL (ref 70–130)

## 2023-08-20 PROCEDURE — 82948 REAGENT STRIP/BLOOD GLUCOSE: CPT

## 2023-08-20 RX ORDER — CHOLECALCIFEROL (VITAMIN D3) 125 MCG
5 CAPSULE ORAL NIGHTLY PRN
Status: DISCONTINUED | OUTPATIENT
Start: 2023-08-20 | End: 2023-09-15 | Stop reason: HOSPADM

## 2023-08-20 RX ORDER — TRAZODONE HYDROCHLORIDE 50 MG/1
50 TABLET ORAL NIGHTLY PRN
Status: DISCONTINUED | OUTPATIENT
Start: 2023-08-20 | End: 2023-09-15 | Stop reason: HOSPADM

## 2023-08-20 NOTE — ACP (ADVANCE CARE PLANNING)
[facility]  [address] [place] [address]. [address]  T - [phone]  F - [phone]     Progress Note          PATIENT  DEMOGRAPHICS   PATIENT NAME: Carol Sullivan                      PHYSICIAN: Cris Moreno MD  : 1940  MRN: [id number]   LOS: 0 days    Patient Care Team:  Len Seo MD as PCP - General  ClaytonBlaire ribera APRN as Nurse Practitioner (General Surgery)  Subjective   SUBJECTIVE   No acute events overnight. Denied any complaints. Did not sleep well last night. Family at bedside.          Objective   OBJECTIVE   Vital Signs  Heart Rate:  [] 98    T 96.8    RR 18  /71     No intake/output data recorded.    PHYSICAL EXAM    Physical Exam  Constitutional:       Appearance: She is well-developed.   HENT:      Head: Normocephalic.      Left Ear: There is no impacted cerumen.      Nose: No rhinorrhea.   Eyes:      Pupils: Pupils are equal, round, and reactive to light.   Cardiovascular:      Rate and Rhythm: Normal rate and regular rhythm.      Heart sounds: Normal heart sounds.   Pulmonary:      Effort: Pulmonary effort is normal.      Breath sounds: Normal breath sounds.   Abdominal:      General: Bowel sounds are normal.      Palpations: Abdomen is soft.   Musculoskeletal:         General: No swelling.   Skin:     Coloration: Skin is not jaundiced.   Neurological:      General: No focal deficit present.      Mental Status: She is alert. She is disoriented.       RESULTS   Results Review:    Results from last 7 days   Lab Units 23  0509 23  0551 23  0604   SODIUM mmol/L 131* 130* 131*   POTASSIUM mmol/L 4.6 3.9 3.3*   CHLORIDE mmol/L 91* 90* 89*   CO2 mmol/L 23.0 23.0 18.0*   BUN mg/dL 93* 72* 129*   CREATININE mg/dL 6.43* 4.86* 6.75*   CALCIUM mg/dL 8.5* 8.6 8.6   BILIRUBIN mg/dL 0.9 1.0 1.0   ALK PHOS U/L 318* 372* 359*   ALT (SGPT) U/L 58* 76* 94*   AST (SGOT) U/L 26 33* 36*   GLUCOSE mg/dL 109* 139* 94          Estimated Creatinine Clearance: 6.7 mL/min (A) (by C-G formula based on SCr of 6.43 mg/dL (H)).    Results from last 7 days   Lab Units 08/18/23  0509 08/15/23  0414   MAGNESIUM mg/dL 1.8 1.9   PHOSPHORUS mg/dL  --  5.7*               Results from last 7 days   Lab Units 08/18/23  0509 08/17/23  0551 08/16/23  1218 08/16/23  0604 08/16/23  0025 08/15/23  1159 08/15/23  0414 08/14/23  1151 08/14/23  0533   WBC 10*3/mm3 16.87* 17.07*  --  18.50*  --   --  19.80*  --  20.40*   HEMOGLOBIN g/dL 9.6* 10.2* 12.3 11.7* 11.3*   < > 12.4   < > 11.4*   PLATELETS 10*3/mm3 236 248  --  272  --   --  314  --  346    < > = values in this interval not displayed.         Results from last 7 days   Lab Units 08/17/23  0551 08/16/23  0604 08/15/23  0414 08/14/23  0533   INR  1.20 1.30* 1.32* 1.30*           Imaging Results (Last 24 Hours)       ** No results found for the last 24 hours. **             MEDICATIONS    dilTIAZem, 20 mg, Intravenous, Once  [START ON 8/21/2023] heparin (porcine), 2,000 Units, Intravenous, Once  HYDROcodone-acetaminophen, 1 tablet, Oral, Once  Insulin Aspart, 2-12 Units, Subcutaneous, 4 times per day  isosorbide mononitrate, 30 mg, Oral, Daily  lansoprazole, 30 mg, Oral, Q12H  metoprolol tartrate, 100 mg, Oral, Q12H  rosuvastatin, 40 mg, Oral, Daily  sodium chloride 0.9 % flush, 10 mL, Intravenous, Q8H           Assessment & Plan   ASSESSMENT / PLAN      * No active hospital problems. *      1. Acute kidney injury/ Acute tubular necrosis:  - Baseline unknown.   - Creatinine was 1.5 in 10/2022.   - UA 3+ protein, trace leukocytes, 0-2 RBC, 6-12 WBC, 2+ bacteria.   - Urine sodium 26. CT abd large rt retroperitoneal hematoma causing moderate rt hydronephrosis.  - Started HD 8/6/23  - No HD needs today. Will do HD tomorrow.     2. Renal failure retroperitoneal bleed s/p right J stent for hydronephrosis   - s/p J stent placement by urology 8/11  - Continue to monitor renal function      3. Hypertension/  Afib with RVR:   - Blood pressure is acceptable. HR was high and now metoprolol. Losartan on hold     4. UTI E.fecalis:   - became septic with mild hydro Dr Radha is consulted. Now stent in place . E.fecalis     5. Cdiff:   -not on po vanc now      6. Hyponatremia:   - Sodium is stable     7. Anemia / retroperitoneal bleed:  - Hemoglobin is acceptable at 9.6     8.  AMS              This document has been electronically signed by Cris Moreno MD on August 20, 2023 13:48 CDT

## 2023-08-20 NOTE — ACP (ADVANCE CARE PLANNING)
[facility]  INPATIENT PROGRESS NOTE    PATIENT NAME: Carol Sullivan      PHYSICIAN: Josefina Perez MD  : 1940        MRN: [id number]  Patient Care Team:  Len Seo MD as PCP - General  Blaire Camara APRN as Nurse Practitioner (General Surgery)    Chief Complaint: Patient was brought to emergency room at [facility] for confusion.     History of Present Illness: 82-year-old female with significant medical history of hypertension, dyslipidemia, gastroesophageal reflux disease who was brought into the emergency room for confusion.  Patient was also found to have erythema and cellulitis of left breast.  Patient was thought of having mastitis.  Patient's condition was monitored now.  Patient had gradual decrease in her urine output and worsening in her kidney function was noted.  Patient off having renal failure which was worsening over time.  Patient subsequently required hemodialysis patient was placed on hemodialysis per nephrology recommendation.  Patient was admitted to the hospital setting for antibiotics and hemodialysis.  Patient's condition seem to be stabilized however patient remained extremely weak and deconditioned hemodialysis patient was recommended to be admitted to our facility for further therapy and rehabilitation along with monitoring for hemodialysis.     Discharge Summary and H&P: Reviewed from previous hospitalization and information documented above in HPI Section.     Radiology Studies from Previous Hospitalization: Reviewed and are as below:  XR Abdomen KUB     Result Date: 2023  FINDINGS/IMPRESSION: Radiopaque tip of the Dobbhoff tube projects over the second portion of the duodenum, similar to even slightly retracted when compared to the prior exam. Right-sided ureteral stent is again noted and incompletely visualized.  There are probably also right femoral catheters which are  incompletely visualized. Normal bowel gas pattern in the visualized abdomen.     XR Chest Post CVA Port     Result Date: 8/17/2023  Impression: 1. Mild interstitial opacities are likely due to low lung volumes. An element of interstitial edema is not entirely excluded.      EKG From previous hospitalization reviewed and documented below:  ECG 12 Lead Other; hx afib   Preliminary Result   Test Reason : Other~   Blood Pressure :   */*   mmHG   Vent. Rate :  67 BPM     Atrial Rate :  67 BPM      P-R Int : 188 ms          QRS Dur :  78 ms       QT Int : 392 ms       P-R-T Axes :  28   6  16 degrees      QTc Int : 414 ms       Normal sinus rhythm with sinus arrhythmia   Minimal voltage criteria for LVH, may be normal variant   Anteroseptal infarct (cited on or before 30-JUL-2023)   Abnormal ECG   When compared with ECG of 10-AUG-2023 15:02,   Sinus rhythm has replaced Atrial fibrillation   Vent. rate has decreased BY  62 BPM   Nonspecific T wave abnormality, worse in Anterior leads       Referred By:            Confirmed By:              Laboratory results from previous hospitalization is reviewed and below:        Lab Results   Component Value Date     GLUCOSE 139 (H) 08/17/2023     CALCIUM 8.6 08/17/2023      (L) 08/17/2023     K 3.9 08/17/2023     CO2 23.0 08/17/2023     CL 90 (L) 08/17/2023     BUN 72 (H) 08/17/2023     CREATININE 4.86 (H) 08/17/2023     EGFR 8.5 (L) 08/17/2023     BCR 14.8 08/17/2023     ANIONGAP 17.0 (H) 08/17/2023            Lab Results   Component Value Date     WBC 17.07 (H) 08/17/2023     HGB 10.2 (L) 08/17/2023     HCT 29.8 (L) 08/17/2023     MCV 85.9 08/17/2023     PLT         Assessment       Sepsis Secondary to Below.  Somnolence [R40.0]  Mastitis [N61.0]  Sepsis [A41.9]  Sepsis, due to unspecified organism, unspecified whether acute organ dysfunction present [A41.9]   ESRD on Hemodialysis.        DVT Prophylaxis: Heparin.  GI Prophylaxis: Lansoprazole.  Code Status: DNR/DNI.     Plan       -Continue with dialysis as per nephrology recommendations.  -Therapy as indicated as patient tolerates.  -We will continue to monitor blood work closely.  -Wound care as before.  -Cardiology consult appreciated.  -We will consider adding something to help with sleep.   -DVT and GI prophylaxis in place.  -We'll continue monitoring patient in hospital setting and treat patient as course dictates.  -Please review orders for detailed plan of care.  -For Aute and Chronic medical condition we will continue medications described below in medication section which have been reviewed and we will continue unless changed in plan of care.  -Laboratory and diagnostic studies have been independently reviewed and the reports are reviewed as documented below.    Subjective   Interval History:   Patient Complaints:   Patient seen and examined during morning rounds. She is resting comfortably in bed and appears to be sleeping. Family states she had a rough night and did not sleep much. Family is requesting something to help her sleep at night. No overnight events reported from nursing staff.   History taken from: Medical record and nursing staff.    Review of Systems:    Review of Systems   All other systems reviewed and are negative.    Objective   Vital Signs  Temp: 96.8 F         Heart Rate: 116         Resp: 18          Blood Pressure: 179/79         Pulse Ox: 93 %    Physical Exam:   Physical Exam  Vitals and nursing note reviewed.   Constitutional:       Appearance: She is well-developed.   HENT:      Head: Normocephalic and atraumatic.      Nose: Nose normal.   Eyes:      Conjunctiva/sclera: Conjunctivae normal.      Pupils: Pupils are equal, round, and reactive to light.   Neck:      Thyroid: No thyromegaly.      Vascular: No JVD.      Trachea: No tracheal deviation.   Cardiovascular:      Rate and Rhythm: Normal rate and regular rhythm.      Heart sounds: Normal heart sounds.   Pulmonary:      Effort: Pulmonary effort is  normal. No respiratory distress.      Breath sounds: Normal breath sounds. No wheezing or rales.   Chest:      Chest wall: No tenderness.   Abdominal:      General: Bowel sounds are normal. There is no distension.      Palpations: Abdomen is soft.      Tenderness: There is no abdominal tenderness. There is no guarding or rebound.   Musculoskeletal:         General: Normal range of motion.      Cervical back: Normal range of motion and neck supple.   Lymphadenopathy:      Cervical: No cervical adenopathy.   Skin:     General: Skin is warm and dry.      Comments: Intact   Neurological:      Mental Status: She is alert and oriented to person, place, and time.      Cranial Nerves: No cranial nerve deficit.      Deep Tendon Reflexes: Reflexes are normal and symmetric.     Results Review:       Results from last 7 days   Lab Units 08/18/23  0509 08/17/23  0551 08/16/23  0604 08/15/23  0414 08/14/23  0533   SODIUM mmol/L 131* 130* 131* 132* 137   POTASSIUM mmol/L 4.6 3.9 3.3* 3.5 3.9   CHLORIDE mmol/L 91* 90* 89* 91* 91*   CO2 mmol/L 23.0 23.0 18.0* 24.0 19.0*   BUN mg/dL 93* 72* 129* 102* 160*   CREATININE mg/dL 6.43* 4.86* 6.75* 5.23* 7.29*   GLUCOSE mg/dL 109* 139* 94 215* 363*   CALCIUM mg/dL 8.5* 8.6 8.6 8.2* 8.1*   BILIRUBIN mg/dL 0.9 1.0 1.0 0.8 0.7   ALK PHOS U/L 318* 372* 359* 438* 457*   ALT (SGPT) U/L 58* 76* 94* 134* 159*   AST (SGOT) U/L 26 33* 36* 66* 90*       Results from last 7 days   Lab Units 08/18/23  0509 08/15/23  0414   MAGNESIUM mg/dL 1.8 1.9   PHOSPHORUS mg/dL  --  5.7*       Results from last 7 days   Lab Units 08/18/23  0509 08/17/23  0551 08/16/23  1218 08/16/23  0604 08/16/23  0025 08/15/23  1159 08/15/23  0414 08/14/23  1151 08/14/23  0533   WBC 10*3/mm3 16.87* 17.07*  --  18.50*  --   --  19.80*  --  20.40*   HEMOGLOBIN g/dL 9.6* 10.2* 12.3 11.7* 11.3* 12.6 12.4   < > 11.4*   HEMATOCRIT % 27.7* 29.8* 35.5 33.2* 32.3* 37.0 35.1   < > 33.3*   PLATELETS 10*3/mm3 236 248  --  272  --   --  314   --  346    < > = values in this interval not displayed.       Lab Results   Component Value Date    TROPONINI 0.69 (H) 05/31/2020    TROPONINT 56 (C) 08/06/2023     pH, Arterial   Date Value Ref Range Status   08/11/2023 7.403 7.350 - 7.450 pH units Final     CO2   Date Value Ref Range Status   08/18/2023 23.0 22.0 - 29.0 mmol/L Final     Total CO2   Date Value Ref Range Status   10/27/2022 30 21 - 31 mmol/L Final     Results from last 7 days   Lab Units 08/17/23  0551 08/16/23  0604 08/15/23  0414 08/14/23  0533   INR  1.20 1.30* 1.32* 1.30*       Imaging Results (Most Recent)       None           No results found for: ACANTHNAEG, AFBCX, BPERTUSSISCX, BLOODCX  No results found for: BCIDPCR, CXREFLEX, CSFCX, CULTURETIS  No results found for: CULTURES, HSVCX, URCX  No results found for: EYECULTURE, GCCX, HSVCULTURE, LABHSV  No results found for: LEGIONELLA, MRSACX, MUMPSCX, MYCOPLASCX  No results found for: NOCARDIACX, STOOLCX  No results found for: THROATCX, UNSTIMCULT, URINECX, CULTURE, VZVCULTUR  No results found for: VIRALCULTU, WOUNDCX  Medication Reviewed and Will Continue Unless Documented in Plan:   dilTIAZem, 20 mg, Intravenous, Once  [START ON 8/21/2023] heparin (porcine), 2,000 Units, Intravenous, Once  HYDROcodone-acetaminophen, 1 tablet, Oral, Once  Insulin Aspart, 2-12 Units, Subcutaneous, 4 times per day  isosorbide mononitrate, 30 mg, Oral, Daily  lansoprazole, 30 mg, Oral, Q12H  metoprolol tartrate, 100 mg, Oral, Q12H  rosuvastatin, 40 mg, Oral, Daily  sodium chloride 0.9 % flush, 10 mL, Intravenous, Q8H           acetaminophen    albumin human    dextrose    heparin (porcine)    HYDROcodone-acetaminophen    sodium chloride    sodium chloride 0.9 % flush      Dietary Orders (From admission, onward)       Start     Ordered    08/18/23 1800  Dietary Nutrition Supplements Other (See Comment); Homemade M/S made with Straw Novasource and Berry Magic cup  Daily With Dinner      Question Answer Comment    Select Supplement: Other (See Comment)    Other Homemade M/S made with Straw Novasource and [place] Magic cup        08/18/23 1146    08/18/23 1200  Dietary Nutrition Supplements Other (See Comment); Homemade M/S made with Vanilla Novasource and vanilla magic cup (8ounces)  Daily With Lunch      Question Answer Comment   Select Supplement: Other (See Comment)    Other Homemade M/S made with Vanilla Novasource and vanilla magic cup (8ounces)        08/18/23 1146    08/17/23 1153  Diet: Regular/House Diet, Renal Diets, Diabetic Diets; Consistent Carbohydrate; Low Sodium (2-3g); Texture: Soft to Chew (NDD 3); Soft to Chew: Chopped Meat; Fluid Consistency: Thin (IDDSI 0)  Diet Effective Now        References:    Diet Order [facility]   Question Answer Comment   Diets: Regular/House Diet    Diets: Renal Diets    Diets: Diabetic Diets    Diabetic Diet: Consistent Carbohydrate    Renal Diet: Low Sodium (2-3g)    Texture: Soft to Chew (NDD 3)    Soft to Chew: Chopped Meat    Fluid Consistency: Thin (IDDSI 0)        08/17/23 1156                  History, physical exam, assessment and plan may have been partly or fully copied from before, but  changes made to the copied record to reflect care on the date of service. Part of the lab and imaging  reports auto populated and corrected. Some of this note may be an electronic transcription of spoken  language to printed text. This may permit erroneous, or at times, nonsensical words or phrases to be  inadvertently transcribed. Although I have reviewed the note for such errors, some may still exist.      This document has been electronically signed by Josefina Perez MD on August 20, 2023 10:18 CDT

## 2023-08-21 ENCOUNTER — OUTSIDE FACILITY SERVICE (OUTPATIENT)
Dept: PULMONOLOGY | Facility: CLINIC | Age: 83
End: 2023-08-21
Payer: MEDICARE

## 2023-08-21 LAB
ALBUMIN SERPL-MCNC: 2.7 G/DL (ref 3.5–5.2)
ALBUMIN/GLOB SERPL: 0.7 G/DL
ALP SERPL-CCNC: 365 U/L (ref 39–117)
ALT SERPL W P-5'-P-CCNC: 43 U/L (ref 1–33)
ANION GAP SERPL CALCULATED.3IONS-SCNC: 14 MMOL/L (ref 5–15)
AST SERPL-CCNC: 44 U/L (ref 1–32)
BASOPHILS # BLD AUTO: 0.02 10*3/MM3 (ref 0–0.2)
BASOPHILS NFR BLD AUTO: 0.2 % (ref 0–1.5)
BILIRUB SERPL-MCNC: 0.6 MG/DL (ref 0–1.2)
BUN SERPL-MCNC: 56 MG/DL (ref 8–23)
BUN/CREAT SERPL: 8.7 (ref 7–25)
CALCIUM SPEC-SCNC: 8.1 MG/DL (ref 8.6–10.5)
CHLORIDE SERPL-SCNC: 95 MMOL/L (ref 98–107)
CO2 SERPL-SCNC: 24 MMOL/L (ref 22–29)
CREAT SERPL-MCNC: 6.43 MG/DL (ref 0.57–1)
DEPRECATED RDW RBC AUTO: 51.4 FL (ref 37–54)
EGFRCR SERPLBLD CKD-EPI 2021: 6.1 ML/MIN/1.73
EOSINOPHIL # BLD AUTO: 0.33 10*3/MM3 (ref 0–0.4)
EOSINOPHIL NFR BLD AUTO: 3.2 % (ref 0.3–6.2)
ERYTHROCYTE [DISTWIDTH] IN BLOOD BY AUTOMATED COUNT: 15.9 % (ref 12.3–15.4)
GLOBULIN UR ELPH-MCNC: 3.7 GM/DL
GLUCOSE BLDC GLUCOMTR-MCNC: 107 MG/DL (ref 70–130)
GLUCOSE BLDC GLUCOMTR-MCNC: 155 MG/DL (ref 70–130)
GLUCOSE SERPL-MCNC: 141 MG/DL (ref 65–99)
HCT VFR BLD AUTO: 27.6 % (ref 34–46.6)
HGB BLD-MCNC: 9 G/DL (ref 12–15.9)
IMM GRANULOCYTES # BLD AUTO: 0.1 10*3/MM3 (ref 0–0.05)
IMM GRANULOCYTES NFR BLD AUTO: 1 % (ref 0–0.5)
LYMPHOCYTES # BLD AUTO: 0.67 10*3/MM3 (ref 0.7–3.1)
LYMPHOCYTES NFR BLD AUTO: 6.5 % (ref 19.6–45.3)
MAGNESIUM SERPL-MCNC: 1.6 MG/DL (ref 1.6–2.4)
MCH RBC QN AUTO: 29 PG (ref 26.6–33)
MCHC RBC AUTO-ENTMCNC: 32.6 G/DL (ref 31.5–35.7)
MCV RBC AUTO: 89 FL (ref 79–97)
MONOCYTES # BLD AUTO: 0.91 10*3/MM3 (ref 0.1–0.9)
MONOCYTES NFR BLD AUTO: 8.8 % (ref 5–12)
NEUTROPHILS NFR BLD AUTO: 8.34 10*3/MM3 (ref 1.7–7)
NEUTROPHILS NFR BLD AUTO: 80.3 % (ref 42.7–76)
NRBC BLD AUTO-RTO: 0 /100 WBC (ref 0–0.2)
PLATELET # BLD AUTO: 239 10*3/MM3 (ref 140–450)
PMV BLD AUTO: 10 FL (ref 6–12)
POTASSIUM SERPL-SCNC: 4.4 MMOL/L (ref 3.5–5.2)
PROT SERPL-MCNC: 6.4 G/DL (ref 6–8.5)
RBC # BLD AUTO: 3.1 10*6/MM3 (ref 3.77–5.28)
SODIUM SERPL-SCNC: 133 MMOL/L (ref 136–145)
WBC NRBC COR # BLD: 10.37 10*3/MM3 (ref 3.4–10.8)

## 2023-08-21 PROCEDURE — 97530 THERAPEUTIC ACTIVITIES: CPT

## 2023-08-21 PROCEDURE — 80053 COMPREHEN METABOLIC PANEL: CPT | Performed by: INTERNAL MEDICINE

## 2023-08-21 PROCEDURE — 82948 REAGENT STRIP/BLOOD GLUCOSE: CPT

## 2023-08-21 PROCEDURE — 85025 COMPLETE CBC W/AUTO DIFF WBC: CPT | Performed by: INTERNAL MEDICINE

## 2023-08-21 PROCEDURE — 25010000002 HEPARIN (PORCINE) PER 1000 UNITS: Performed by: INTERNAL MEDICINE

## 2023-08-21 PROCEDURE — 97535 SELF CARE MNGMENT TRAINING: CPT

## 2023-08-21 PROCEDURE — 25010000002 LORAZEPAM PER 2 MG

## 2023-08-21 PROCEDURE — 83735 ASSAY OF MAGNESIUM: CPT | Performed by: INTERNAL MEDICINE

## 2023-08-21 RX ORDER — HEPARIN SODIUM 1000 [USP'U]/ML
2000 INJECTION, SOLUTION INTRAVENOUS; SUBCUTANEOUS ONCE AS NEEDED
Status: ACTIVE | OUTPATIENT
Start: 2023-08-21 | End: 2023-08-21

## 2023-08-21 RX ORDER — DILTIAZEM HYDROCHLORIDE 5 MG/ML
20 INJECTION INTRAVENOUS ONCE
Status: DISCONTINUED | OUTPATIENT
Start: 2023-08-21 | End: 2023-08-24

## 2023-08-21 RX ORDER — DILTIAZEM HYDROCHLORIDE 5 MG/ML
20 INJECTION INTRAVENOUS EVERY 4 HOURS PRN
Status: DISCONTINUED | OUTPATIENT
Start: 2023-08-21 | End: 2023-09-15 | Stop reason: HOSPADM

## 2023-08-21 RX ORDER — LORAZEPAM 2 MG/ML
1 INJECTION INTRAMUSCULAR EVERY 6 HOURS PRN
Status: DISCONTINUED | OUTPATIENT
Start: 2023-08-21 | End: 2023-08-24

## 2023-08-21 NOTE — ACP (ADVANCE CARE PLANNING)
[facility] @ [facility]  INPATIENT PROGRESS NOTE    PATIENT NAME: Carol Sullivan      PHYSICIAN: Josefina Perez MD  : 1940        MRN: [id number]  Patient Care Team:  Len Seo MD as PCP - General  Blaire Camara APRN as Nurse Practitioner (General Surgery)    Chief Complaint: Patient was brought to emergency room at [facility] for confusion.     History of Present Illness: 82-year-old female with significant medical history of hypertension, dyslipidemia, gastroesophageal reflux disease who was brought into the emergency room for confusion.  Patient was also found to have erythema and cellulitis of left breast.  Patient was thought of having mastitis.  Patient's condition was monitored now.  Patient had gradual decrease in her urine output and worsening in her kidney function was noted.  Patient off having renal failure which was worsening over time.  Patient subsequently required hemodialysis patient was placed on hemodialysis per nephrology recommendation.  Patient was admitted to the hospital setting for antibiotics and hemodialysis.  Patient's condition seem to be stabilized however patient remained extremely weak and deconditioned hemodialysis patient was recommended to be admitted to our facility for further therapy and rehabilitation along with monitoring for hemodialysis.     Discharge Summary and H&P: Reviewed from previous hospitalization and information documented above in HPI Section.     Radiology Studies from Previous Hospitalization: Reviewed and are as below:  XR Abdomen KUB     Result Date: 2023  FINDINGS/IMPRESSION: Radiopaque tip of the Dobbhoff tube projects over the second portion of the duodenum, similar to even slightly retracted when compared to the prior exam. Right-sided ureteral stent is again noted and incompletely visualized.  There are probably also right femoral catheters which are  incompletely visualized. Normal bowel gas pattern in the visualized abdomen.     XR Chest Post CVA Port     Result Date: 8/17/2023  Impression: 1. Mild interstitial opacities are likely due to low lung volumes. An element of interstitial edema is not entirely excluded.      EKG From previous hospitalization reviewed and documented below:  ECG 12 Lead Other; hx afib   Preliminary Result   Test Reason : Other~   Blood Pressure :   */*   mmHG   Vent. Rate :  67 BPM     Atrial Rate :  67 BPM      P-R Int : 188 ms          QRS Dur :  78 ms       QT Int : 392 ms       P-R-T Axes :  28   6  16 degrees      QTc Int : 414 ms       Normal sinus rhythm with sinus arrhythmia   Minimal voltage criteria for LVH, may be normal variant   Anteroseptal infarct (cited on or before 30-JUL-2023)   Abnormal ECG   When compared with ECG of 10-AUG-2023 15:02,   Sinus rhythm has replaced Atrial fibrillation   Vent. rate has decreased BY  62 BPM   Nonspecific T wave abnormality, worse in Anterior leads       Referred By:            Confirmed By:              Laboratory results from previous hospitalization is reviewed and below:        Lab Results   Component Value Date     GLUCOSE 139 (H) 08/17/2023     CALCIUM 8.6 08/17/2023      (L) 08/17/2023     K 3.9 08/17/2023     CO2 23.0 08/17/2023     CL 90 (L) 08/17/2023     BUN 72 (H) 08/17/2023     CREATININE 4.86 (H) 08/17/2023     EGFR 8.5 (L) 08/17/2023     BCR 14.8 08/17/2023     ANIONGAP 17.0 (H) 08/17/2023            Lab Results   Component Value Date     WBC 17.07 (H) 08/17/2023     HGB 10.2 (L) 08/17/2023     HCT 29.8 (L) 08/17/2023     MCV 85.9 08/17/2023     PLT         Assessment       Sepsis Secondary to Below.  Somnolence [R40.0]  Mastitis [N61.0]  Sepsis [A41.9]  Sepsis, due to unspecified organism, unspecified whether acute organ dysfunction present [A41.9]   ESRD on Hemodialysis.        DVT Prophylaxis: Heparin.  GI Prophylaxis: Lansoprazole.  Code Status: DNR/DNI.     Plan       -Continue with dialysis as per nephrology recommendations.  -Therapy as indicated as patient tolerates.  -No reports of insomnia last night  -Wound care as before.  -Appreciate cardiology assistance and recommendations.  -DVT and GI prophylaxis in place.  -We'll continue monitoring patient in hospital setting and treat patient as course dictates.  -Please review orders for detailed plan of care.  -For Aute and Chronic medical condition we will continue medications described below in medication section which have been reviewed and we will continue unless changed in plan of care.  -Laboratory and diagnostic studies have been independently reviewed and the reports are reviewed as documented below.    Subjective   Interval History:   Patient Complaints:   Patient seen and examined.  Up in chair in room.  No concerns.  Ready to get back in bed.  No overnight events noted.   History taken from: Medical record and nursing staff.    Review of Systems:    Review of Systems   All other systems reviewed and are negative.    Objective   Vital Signs  Temp: 97.6 F         Heart Rate: 117         Resp: 20          Blood Pressure: 148/67         Pulse Ox: 94 %    Physical Exam:   Physical Exam  Vitals and nursing note reviewed.   Constitutional:       Appearance: She is well-developed.   HENT:      Head: Normocephalic and atraumatic.      Nose: Nose normal.   Eyes:      Conjunctiva/sclera: Conjunctivae normal.      Pupils: Pupils are equal, round, and reactive to light.   Neck:      Thyroid: No thyromegaly.      Vascular: No JVD.      Trachea: No tracheal deviation.   Cardiovascular:      Rate and Rhythm: Normal rate and regular rhythm.      Heart sounds: Normal heart sounds.   Pulmonary:      Effort: Pulmonary effort is normal. No respiratory distress.      Breath sounds: Normal breath sounds. No wheezing or rales.   Chest:      Chest wall: No tenderness.   Abdominal:      General: Bowel sounds are normal. There is no  distension.      Palpations: Abdomen is soft.      Tenderness: There is no abdominal tenderness. There is no guarding or rebound.   Musculoskeletal:         General: Normal range of motion.      Cervical back: Normal range of motion and neck supple.   Lymphadenopathy:      Cervical: No cervical adenopathy.   Skin:     General: Skin is warm and dry.      Comments: Intact   Neurological:      Mental Status: She is alert and oriented to person, place, and time.      Cranial Nerves: No cranial nerve deficit.      Deep Tendon Reflexes: Reflexes are normal and symmetric.     Results Review:       Results from last 7 days   Lab Units 08/21/23  0525 08/18/23  0509 08/17/23  0551 08/16/23  0604 08/15/23  0414   SODIUM mmol/L 133* 131* 130* 131* 132*   POTASSIUM mmol/L 4.4 4.6 3.9 3.3* 3.5   CHLORIDE mmol/L 95* 91* 90* 89* 91*   CO2 mmol/L 24.0 23.0 23.0 18.0* 24.0   BUN mg/dL 56* 93* 72* 129* 102*   CREATININE mg/dL 6.43* 6.43* 4.86* 6.75* 5.23*   GLUCOSE mg/dL 141* 109* 139* 94 215*   CALCIUM mg/dL 8.1* 8.5* 8.6 8.6 8.2*   BILIRUBIN mg/dL 0.6 0.9 1.0 1.0 0.8   ALK PHOS U/L 365* 318* 372* 359* 438*   ALT (SGPT) U/L 43* 58* 76* 94* 134*   AST (SGOT) U/L 44* 26 33* 36* 66*       Results from last 7 days   Lab Units 08/21/23  0525 08/18/23  0509 08/15/23  0414   MAGNESIUM mg/dL 1.6 1.8 1.9   PHOSPHORUS mg/dL  --   --  5.7*       Results from last 7 days   Lab Units 08/21/23  0525 08/18/23  0509 08/17/23  0551 08/16/23  1218 08/16/23  0604 08/16/23  0025 08/15/23  1159 08/15/23  0414   WBC 10*3/mm3 10.37 16.87* 17.07*  --  18.50*  --   --  19.80*   HEMOGLOBIN g/dL 9.0* 9.6* 10.2* 12.3 11.7* 11.3* 12.6 12.4   HEMATOCRIT % 27.6* 27.7* 29.8* 35.5 33.2* 32.3* 37.0 35.1   PLATELETS 10*3/mm3 239 236 248  --  272  --   --  314       Lab Results   Component Value Date    TROPONINI 0.69 (H) 05/31/2020    TROPONINT 56 (C) 08/06/2023     pH, Arterial   Date Value Ref Range Status   08/11/2023 7.403 7.350 - 7.450 pH units Final     CO2    Date Value Ref Range Status   08/21/2023 24.0 22.0 - 29.0 mmol/L Final     Total CO2   Date Value Ref Range Status   10/27/2022 30 21 - 31 mmol/L Final     Results from last 7 days   Lab Units 08/17/23  0551 08/16/23  0604 08/15/23  0414   INR  1.20 1.30* 1.32*       Imaging Results (Most Recent)       None           No results found for: ACANTHNAEG, AFBCX, BPERTUSSISCX, BLOODCX  No results found for: BCIDPCR, CXREFLEX, CSFCX, CULTURETIS  No results found for: CULTURES, HSVCX, URCX  No results found for: EYECULTURE, GCCX, HSVCULTURE, LABHSV  No results found for: LEGIONELLA, MRSACX, MUMPSCX, MYCOPLASCX  No results found for: NOCARDIACX, STOOLCX  No results found for: THROATCX, UNSTIMCULT, URINECX, CULTURE, VZVCULTUR  No results found for: VIRALCULTU, WOUNDCX  Medication Reviewed and Will Continue Unless Documented in Plan:   dilTIAZem, 20 mg, Intravenous, Once  heparin (porcine), 2,000 Units, Intravenous, Once  HYDROcodone-acetaminophen, 1 tablet, Oral, Once  Insulin Aspart, 2-12 Units, Subcutaneous, 4 times per day  isosorbide mononitrate, 30 mg, Oral, Daily  lansoprazole, 30 mg, Oral, Q12H  metoprolol tartrate, 100 mg, Oral, Q12H  rosuvastatin, 40 mg, Oral, Daily  sodium chloride 0.9 % flush, 10 mL, Intravenous, Q8H           acetaminophen    albumin human    dextrose    heparin (porcine)    heparin (porcine)    HYDROcodone-acetaminophen    hydrocortisone-bacitracin-zinc oxide-nystatin    melatonin    sodium chloride    sodium chloride 0.9 % flush    traZODone      Dietary Orders (From admission, onward)       Start     Ordered    08/20/23 1800  Dietary Nutrition Supplements Other (See Comment); Mighty shake with every meal  Daily With Breakfast, Lunch & Dinner      Question Answer Comment   Select Supplement: Other (See Comment)    Other Mighty shake with every meal        08/20/23 1655    08/18/23 1800  Dietary Nutrition Supplements Other (See Comment); Homemade M/S made with Straw Novasource and Berry Magic  cup  Daily With Dinner      Question Answer Comment   Select Supplement: Other (See Comment)    Other Homemade M/S made with Straw Novasource and [place] Magic cup        08/18/23 1146    08/18/23 1200  Dietary Nutrition Supplements Other (See Comment); Homemade M/S made with Vanilla Novasource and vanilla magic cup (8ounces)  Daily With Lunch      Question Answer Comment   Select Supplement: Other (See Comment)    Other Homemade M/S made with Vanilla Novasource and vanilla magic cup (8ounces)        08/18/23 1146    08/17/23 1153  Diet: Regular/House Diet, Renal Diets, Diabetic Diets; Consistent Carbohydrate; Low Sodium (2-3g); Texture: Soft to Chew (NDD 3); Soft to Chew: Chopped Meat; Fluid Consistency: Thin (IDDSI 0)  Diet Effective Now        References:    Diet Order [place]   Question Answer Comment   Diets: Regular/House Diet    Diets: Renal Diets    Diets: Diabetic Diets    Diabetic Diet: Consistent Carbohydrate    Renal Diet: Low Sodium (2-3g)    Texture: Soft to Chew (NDD 3)    Soft to Chew: Chopped Meat    Fluid Consistency: Thin (IDDSI 0)        08/17/23 1156                  History, physical exam, assessment and plan may have been partly or fully copied from before, but  changes made to the copied record to reflect care on the date of service. Part of the lab and imaging  reports auto populated and corrected. Some of this note may be an electronic transcription of spoken  language to printed text. This may permit erroneous, or at times, nonsensical words or phrases to be  inadvertently transcribed. Although I have reviewed the note for such errors, some may still exist.      This document has been electronically signed by Josefina Perez MD on August 21, 2023 09:49 CDT

## 2023-08-21 NOTE — ACP (ADVANCE CARE PLANNING)
"Reason for assessment:  Nutrition f/u     Nutrition Diet/History:    -Typical Intake, limited documentation available noted 40-50% for a couple of meals;  Pt receiving dialysis.  Pt seems a little confused,  present and says pt isn't eating much. She voices no requests or complaints.  No Gi distress  -Food Preferences:  No requests at this time.    -Food Intolerances:  No food intolerance and NKFA     Weight history:  CBW:  152 (8/21)  Admit Wt to [facility] on 7/28 156#  UBW:  Wt loss:  Ht:  65\"     Admitting Dx:  Medical Management; Dialysis; Therapy     Past Medical Hx:  Arthritis; Gerd; HTN; Vascular Dz;  BLAINE with Acute Tubular NEcrosis; Renal Failure retroperitoneal bleed with right J Stent placement; HTN; AFib with RVR; Metabolic Acidosis; Hypocalcemia; Sepsis; CDiff; UTI; Hyponatremia; AMS     Labs:  Na 133; bun 56; Creat 6.43; ALT 58; Alb 2.7; phos 5.7     Meds:  Heparin; SSI     Estimated Energy Needs:  ~1450 (25 kcal/kg IBW) and 74 gms of protein (1.3 gms/kg IBW)  1200cc fluid restrictions with hemodialysis     Nutrition Prescription Order:  Regular/Renal/Diabetic/Soft texture/chopped     Evaluation of Nutrition Intake:  limited intakes recorded, 40-50% for a couple meals 8/19     Nutrition Assessment: RD f/u.  Initally admitted for Cellulitis of the left breast--Mastitis.  She underwent an I&D and was on Abx.  During her admit her UOP & Kidney fxn worsened requiring hemodialysis to be started. At this time, there has been little renal recovery. She remains weak and deconditioned.  Pt seems a little confused during visit, pt's  reports \"she's not eating very much\". She will need education on renal diet prior to her discharge.  .       Nutrition Intervention:   Homemade M/S made with Novasource renal and magic cups twice daily.  Novasource Renal at breakfast. Pt has order for mighty shakes (put in by RN) and while these are not normally on a renal diet, RD will allow for now d/t pt's overall low " intake at this time.      Last BM:      Wounds:  Left Breast Cellulitis and mastitis

## 2023-08-21 NOTE — ACP (ADVANCE CARE PLANNING)
[facility]  [address] [place] [address]. [address]  T - [phone]  F - [phone]     Progress Note          PATIENT  DEMOGRAPHICS   PATIENT NAME: Carol Sullivan                      PHYSICIAN: COY Chong  : 1940  MRN: [id number]   LOS: 0 days    Patient Care Team:  Len Seo MD as PCP - General  [place] ribera APRN as Nurse Practitioner (General Surgery)  Subjective   SUBJECTIVE   No acute events overnight. Denied any complaints.          Objective   OBJECTIVE   Vital Signs  Heart Rate:  [] 105    T 97.6    RR 18  /67     No intake/output data recorded.    PHYSICAL EXAM    Physical Exam  Constitutional:       Appearance: She is well-developed.   HENT:      Head: Normocephalic.      Left Ear: There is no impacted cerumen.      Nose: No rhinorrhea.   Eyes:      Pupils: Pupils are equal, round, and reactive to light.   Cardiovascular:      Rate and Rhythm: Normal rate and regular rhythm.      Heart sounds: Normal heart sounds.   Pulmonary:      Effort: Pulmonary effort is normal.      Breath sounds: Normal breath sounds.   Abdominal:      General: Bowel sounds are normal.      Palpations: Abdomen is soft.   Musculoskeletal:         General: No swelling.   Skin:     Coloration: Skin is not jaundiced.   Neurological:      General: No focal deficit present.      Mental Status: She is alert. She is disoriented.       RESULTS   Results Review:    Results from last 7 days   Lab Units 23  0525 23  0509 23  0551   SODIUM mmol/L 133* 131* 130*   POTASSIUM mmol/L 4.4 4.6 3.9   CHLORIDE mmol/L 95* 91* 90*   CO2 mmol/L 24.0 23.0 23.0   BUN mg/dL 56* 93* 72*   CREATININE mg/dL 6.43* 6.43* 4.86*   CALCIUM mg/dL 8.1* 8.5* 8.6   BILIRUBIN mg/dL 0.6 0.9 1.0   ALK PHOS U/L 365* 318* 372*   ALT (SGPT) U/L 43* 58* 76*   AST (SGOT) U/L 44* 26 33*   GLUCOSE mg/dL 141* 109* 139*         Estimated Creatinine Clearance: 6.7 mL/min (A) (by C-G formula  based on SCr of 6.43 mg/dL (H)).    Results from last 7 days   Lab Units 08/21/23  0525 08/18/23  0509 08/15/23  0414   MAGNESIUM mg/dL 1.6 1.8 1.9   PHOSPHORUS mg/dL  --   --  5.7*               Results from last 7 days   Lab Units 08/21/23  0525 08/18/23  0509 08/17/23  0551 08/16/23  1218 08/16/23  0604 08/15/23  1159 08/15/23  0414   WBC 10*3/mm3 10.37 16.87* 17.07*  --  18.50*  --  19.80*   HEMOGLOBIN g/dL 9.0* 9.6* 10.2* 12.3 11.7*   < > 12.4   PLATELETS 10*3/mm3 239 236 248  --  272  --  314    < > = values in this interval not displayed.         Results from last 7 days   Lab Units 08/17/23  0551 08/16/23  0604 08/15/23  0414   INR  1.20 1.30* 1.32*           Imaging Results (Last 24 Hours)       ** No results found for the last 24 hours. **             MEDICATIONS    dilTIAZem, 20 mg, Intravenous, Once  heparin (porcine), 2,000 Units, Intravenous, Once  HYDROcodone-acetaminophen, 1 tablet, Oral, Once  Insulin Aspart, 2-12 Units, Subcutaneous, 4 times per day  isosorbide mononitrate, 30 mg, Oral, Daily  lansoprazole, 30 mg, Oral, Q12H  metoprolol tartrate, 100 mg, Oral, Q12H  rosuvastatin, 40 mg, Oral, Daily  sodium chloride 0.9 % flush, 10 mL, Intravenous, Q8H           Assessment & Plan   ASSESSMENT / PLAN      * No active hospital problems. *      1. Acute kidney injury/ Acute tubular necrosis:  - Baseline unknown.   - Creatinine was 1.5 in 10/2022.   - UA 3+ protein, trace leukocytes, 0-2 RBC, 6-12 WBC, 2+ bacteria.   - Urine sodium 26. CT abd large rt retroperitoneal hematoma causing moderate rt hydronephrosis.  - Started HD 8/6/23  -  HD today     2. Renal failure retroperitoneal bleed s/p right J stent for hydronephrosis   - s/p J stent placement by urology 8/11  - Continue to monitor renal function      3. Hypertension/ Afib with RVR:   - Blood pressure is acceptable. HR was high and now metoprolol. Losartan on hold     4. UTI E.fecalis:   - became septic with mild hydro Dr Garcia is consulted. Now  stent in place . E.fecalis     5. Cdiff:   -not on po vanc now      6. Hyponatremia:   - Sodium is stable     7. Anemia / retroperitoneal bleed:  - Hemoglobin is acceptable at 9.0     8.  AMS              This document has been electronically signed by COY Chong on August 21, 2023 13:23 CDT      For this patient encounter, I have reviewed the Nurse Practitioner's documentation, medical decision making, and treatment plan and personally spent time with the patient.

## 2023-08-22 ENCOUNTER — OUTSIDE FACILITY SERVICE (OUTPATIENT)
Dept: PULMONOLOGY | Facility: CLINIC | Age: 83
End: 2023-08-22
Payer: MEDICARE

## 2023-08-22 LAB
GLUCOSE BLDC GLUCOMTR-MCNC: 102 MG/DL (ref 70–130)
GLUCOSE BLDC GLUCOMTR-MCNC: 121 MG/DL (ref 70–130)

## 2023-08-22 PROCEDURE — 93005 ELECTROCARDIOGRAM TRACING: CPT | Performed by: INTERNAL MEDICINE

## 2023-08-22 PROCEDURE — 93010 ELECTROCARDIOGRAM REPORT: CPT | Performed by: INTERNAL MEDICINE

## 2023-08-22 PROCEDURE — 25010000002 MAGNESIUM SULFATE 2 GM/50ML SOLUTION: Performed by: INTERNAL MEDICINE

## 2023-08-22 PROCEDURE — 97110 THERAPEUTIC EXERCISES: CPT

## 2023-08-22 PROCEDURE — 25010000002 AMIODARONE IN DEXTROSE 5% 150-4.21 MG/100ML-% SOLUTION: Performed by: INTERNAL MEDICINE

## 2023-08-22 PROCEDURE — 82948 REAGENT STRIP/BLOOD GLUCOSE: CPT

## 2023-08-22 PROCEDURE — 97530 THERAPEUTIC ACTIVITIES: CPT

## 2023-08-22 PROCEDURE — 25010000002 LORAZEPAM PER 2 MG

## 2023-08-22 PROCEDURE — 25010000002 AMIODARONE IN DEXTROSE 5% 360-4.14 MG/200ML-% SOLUTION: Performed by: INTERNAL MEDICINE

## 2023-08-22 PROCEDURE — 25010000002 HEPARIN (PORCINE) PER 1000 UNITS: Performed by: INTERNAL MEDICINE

## 2023-08-22 RX ORDER — DRONABINOL 2.5 MG/1
5 CAPSULE ORAL
Status: DISCONTINUED | OUTPATIENT
Start: 2023-08-23 | End: 2023-08-29

## 2023-08-22 RX ORDER — HEPARIN SODIUM 5000 [USP'U]/ML
5000 INJECTION, SOLUTION INTRAVENOUS; SUBCUTANEOUS EVERY 12 HOURS SCHEDULED
Status: DISCONTINUED | OUTPATIENT
Start: 2023-08-22 | End: 2023-09-05 | Stop reason: ALTCHOICE

## 2023-08-22 RX ORDER — ACETAMINOPHEN 650 MG/1
650 SUPPOSITORY RECTAL EVERY 6 HOURS PRN
Status: DISCONTINUED | OUTPATIENT
Start: 2023-08-22 | End: 2023-09-15 | Stop reason: HOSPADM

## 2023-08-22 RX ORDER — MAGNESIUM SULFATE HEPTAHYDRATE 40 MG/ML
2 INJECTION, SOLUTION INTRAVENOUS ONCE
Status: DISCONTINUED | OUTPATIENT
Start: 2023-08-22 | End: 2023-09-13

## 2023-08-22 NOTE — ACP (ADVANCE CARE PLANNING)
[facility]  [address] [place] [address]. [address]  T - [phone]  F - [phone]     Progress Note          PATIENT  DEMOGRAPHICS   PATIENT NAME: Carol Sullivan                      PHYSICIAN: COY Chong  : 1940  MRN: [id number]   LOS: 0 days    Patient Care Team:  Len Seo MD as PCP - General  [place] ribera APRN as Nurse Practitioner (General Surgery)  Subjective   SUBJECTIVE   No acute events overnight. Denied any complaints. Was agitated earlier and now resting after med. Poor oral intake per          Objective   OBJECTIVE   Vital Signs  Heart Rate:  [] 108    T 102.6    RR 18  /63     No intake/output data recorded.    PHYSICAL EXAM    Physical Exam  Constitutional:       Appearance: She is well-developed.   HENT:      Head: Normocephalic.      Left Ear: There is no impacted cerumen.      Nose: No rhinorrhea.   Eyes:      Pupils: Pupils are equal, round, and reactive to light.   Cardiovascular:      Rate and Rhythm: Normal rate and regular rhythm.      Heart sounds: Normal heart sounds.   Pulmonary:      Effort: Pulmonary effort is normal.      Breath sounds: Normal breath sounds.   Abdominal:      General: Bowel sounds are normal.      Palpations: Abdomen is soft.   Musculoskeletal:         General: No swelling.   Skin:     Coloration: Skin is not jaundiced.   Neurological:      General: No focal deficit present.      Mental Status: She is alert. She is disoriented.       RESULTS   Results Review:    Results from last 7 days   Lab Units 23  0525 23  0509 23  0551   SODIUM mmol/L 133* 131* 130*   POTASSIUM mmol/L 4.4 4.6 3.9   CHLORIDE mmol/L 95* 91* 90*   CO2 mmol/L 24.0 23.0 23.0   BUN mg/dL 56* 93* 72*   CREATININE mg/dL 6.43* 6.43* 4.86*   CALCIUM mg/dL 8.1* 8.5* 8.6   BILIRUBIN mg/dL 0.6 0.9 1.0   ALK PHOS U/L 365* 318* 372*   ALT (SGPT) U/L 43* 58* 76*   AST (SGOT) U/L 44* 26 33*   GLUCOSE mg/dL 141* 109*  139*         Estimated Creatinine Clearance: 6.7 mL/min (A) (by C-G formula based on SCr of 6.43 mg/dL (H)).    Results from last 7 days   Lab Units 08/21/23  0525 08/18/23  0509   MAGNESIUM mg/dL 1.6 1.8               Results from last 7 days   Lab Units 08/21/23  0525 08/18/23  0509 08/17/23  0551 08/16/23  1218 08/16/23  0604   WBC 10*3/mm3 10.37 16.87* 17.07*  --  18.50*   HEMOGLOBIN g/dL 9.0* 9.6* 10.2* 12.3 11.7*   PLATELETS 10*3/mm3 239 236 248  --  272         Results from last 7 days   Lab Units 08/17/23  0551 08/16/23  0604   INR  1.20 1.30*           Imaging Results (Last 24 Hours)       ** No results found for the last 24 hours. **             MEDICATIONS    amiodarone, 150 mg, Intravenous, Once  dilTIAZem, 20 mg, Intravenous, Once  heparin (porcine), 5,000 Units, Subcutaneous, Q12H  heparin (porcine), 2,000 Units, Intravenous, Once  HYDROcodone-acetaminophen, 1 tablet, Oral, Once  Insulin Aspart, 2-12 Units, Subcutaneous, 4 times per day  isosorbide mononitrate, 30 mg, Oral, Daily  lansoprazole, 30 mg, Oral, Q12H  magnesium sulfate, 2 g, Intravenous, Once  metoprolol tartrate, 100 mg, Oral, Q12H  rosuvastatin, 40 mg, Oral, Daily  sodium chloride 0.9 % flush, 10 mL, Intravenous, Q8H      amiodarone, 0.5 mg/min  dilTIAZem, 5-15 mg/hr      Assessment & Plan   ASSESSMENT / PLAN      * No active hospital problems. *      1. Acute kidney injury/ Acute tubular necrosis:  - Baseline unknown.   - Creatinine was 1.5 in 10/2022.   - UA 3+ protein, trace leukocytes, 0-2 RBC, 6-12 WBC, 2+ bacteria.   - Urine sodium 26. CT abd large rt retroperitoneal hematoma causing moderate rt hydronephrosis.  - Started HD 8/6/23  -  HD tomorrow     2. Renal failure retroperitoneal bleed s/p right J stent for hydronephrosis   - s/p J stent placement by urology 8/11  - Continue to monitor renal function      3. Hypertension/ Afib with RVR:   - Blood pressure is acceptable. HR was high and now metoprolol. Losartan on hold     4. UTI  E.fecalis:   - became septic with mild hydro Dr Garcia is consulted. Now stent in place . E.fecalis     5. Cdiff:   -not on po vanc now      6. Hyponatremia:   - Sodium is stable     7. Anemia / retroperitoneal bleed:  - Hemoglobin is acceptable at 9.0 check fe b12 and folate     8.  AMS              This document has been electronically signed by COY Chong on August 22, 2023 13:25 CDT      For this patient encounter, I have reviewed the Nurse Practitioner's documentation, medical decision making, and treatment plan and personally spent time with the patient. Dw  and family in detail

## 2023-08-22 NOTE — ACP (ADVANCE CARE PLANNING)
[facility] @ [facility]  INPATIENT PROGRESS NOTE    PATIENT NAME: Carol Sullivan      PHYSICIAN: Josefina Perez MD  : 1940        MRN: [id number]  Patient Care Team:  Len Seo MD as PCP - General  Blaire Camara APRN as Nurse Practitioner (General Surgery)    Chief Complaint: Patient was brought to emergency room at [facility] for confusion.     History of Present Illness: 82-year-old female with significant medical history of hypertension, dyslipidemia, gastroesophageal reflux disease who was brought into the emergency room for confusion.  Patient was also found to have erythema and cellulitis of left breast.  Patient was thought of having mastitis.  Patient's condition was monitored now.  Patient had gradual decrease in her urine output and worsening in her kidney function was noted.  Patient off having renal failure which was worsening over time.  Patient subsequently required hemodialysis patient was placed on hemodialysis per nephrology recommendation.  Patient was admitted to the hospital setting for antibiotics and hemodialysis.  Patient's condition seem to be stabilized however patient remained extremely weak and deconditioned hemodialysis patient was recommended to be admitted to our facility for further therapy and rehabilitation along with monitoring for hemodialysis.     Discharge Summary and H&P: Reviewed from previous hospitalization and information documented above in HPI Section.     Radiology Studies from Previous Hospitalization: Reviewed and are as below:  XR Abdomen KUB     Result Date: 2023  FINDINGS/IMPRESSION: Radiopaque tip of the Dobbhoff tube projects over the second portion of the duodenum, similar to even slightly retracted when compared to the prior exam. Right-sided ureteral stent is again noted and incompletely visualized.  There are probably also right femoral catheters which are  incompletely visualized. Normal bowel gas pattern in the visualized abdomen.     XR Chest Post CVA Port     Result Date: 8/17/2023  Impression: 1. Mild interstitial opacities are likely due to low lung volumes. An element of interstitial edema is not entirely excluded.      EKG From previous hospitalization reviewed and documented below:  ECG 12 Lead Other; hx afib   Preliminary Result   Test Reason : Other~   Blood Pressure :   */*   mmHG   Vent. Rate :  67 BPM     Atrial Rate :  67 BPM      P-R Int : 188 ms          QRS Dur :  78 ms       QT Int : 392 ms       P-R-T Axes :  28   6  16 degrees      QTc Int : 414 ms       Normal sinus rhythm with sinus arrhythmia   Minimal voltage criteria for LVH, may be normal variant   Anteroseptal infarct (cited on or before 30-JUL-2023)   Abnormal ECG   When compared with ECG of 10-AUG-2023 15:02,   Sinus rhythm has replaced Atrial fibrillation   Vent. rate has decreased BY  62 BPM   Nonspecific T wave abnormality, worse in Anterior leads       Referred By:            Confirmed By:              Laboratory results from previous hospitalization is reviewed and below:        Lab Results   Component Value Date     GLUCOSE 139 (H) 08/17/2023     CALCIUM 8.6 08/17/2023      (L) 08/17/2023     K 3.9 08/17/2023     CO2 23.0 08/17/2023     CL 90 (L) 08/17/2023     BUN 72 (H) 08/17/2023     CREATININE 4.86 (H) 08/17/2023     EGFR 8.5 (L) 08/17/2023     BCR 14.8 08/17/2023     ANIONGAP 17.0 (H) 08/17/2023            Lab Results   Component Value Date     WBC 17.07 (H) 08/17/2023     HGB 10.2 (L) 08/17/2023     HCT 29.8 (L) 08/17/2023     MCV 85.9 08/17/2023     PLT         Assessment       Sepsis Secondary to Below.  Somnolence [R40.0]  Mastitis [N61.0]  Sepsis [A41.9]  Sepsis, due to unspecified organism, unspecified whether acute organ dysfunction present [A41.9]   ESRD on Hemodialysis.        DVT Prophylaxis: Heparin.  GI Prophylaxis: Lansoprazole.  Code Status: DNR/DNI.     Plan       -Stable at time of exam.  -Continue with dialysis as per nephrology recommendations.  -Therapy as indicated as patient tolerates.  -Remains on Amiodarone drip per cardiology  -Remains on Cardizem drip per cardiology  -Appreciate cardiology assistance and recommendations.  -Tachycardia improved.  -DVT and GI prophylaxis in place.  -We'll continue monitoring patient in hospital setting and treat patient as course dictates.  -Please review orders for detailed plan of care.  -For Aute and Chronic medical condition we will continue medications described below in medication section which have been reviewed and we will continue unless changed in plan of care.  -Laboratory and diagnostic studies have been independently reviewed and the reports are reviewed as documented below.    Subjective   Interval History:   Patient Complaints:   Patient seen and examined.  Resting comfortably in bed.  No overnight events noted.  No concerns at this time.    History taken from: Medical record and nursing staff.    Review of Systems:    Review of Systems   All other systems reviewed and are negative.    Objective   Vital Signs  Temp: 99.4 F         Heart Rate: 95         Resp: 20          Blood Pressure: 169/69         Pulse Ox: 97%    Physical Exam:   Physical Exam  Vitals and nursing note reviewed.   Constitutional:       Appearance: She is well-developed.   HENT:      Head: Normocephalic and atraumatic.      Nose: Nose normal.   Eyes:      Conjunctiva/sclera: Conjunctivae normal.      Pupils: Pupils are equal, round, and reactive to light.   Neck:      Thyroid: No thyromegaly.      Vascular: No JVD.      Trachea: No tracheal deviation.   Cardiovascular:      Rate and Rhythm: Normal rate and regular rhythm.      Heart sounds: Normal heart sounds.   Pulmonary:      Effort: Pulmonary effort is normal. No respiratory distress.      Breath sounds: Normal breath sounds. No wheezing or rales.   Chest:      Chest wall: No tenderness.    Abdominal:      General: Bowel sounds are normal. There is no distension.      Palpations: Abdomen is soft.      Tenderness: There is no abdominal tenderness. There is no guarding or rebound.   Musculoskeletal:         General: Normal range of motion.      Cervical back: Normal range of motion and neck supple.   Lymphadenopathy:      Cervical: No cervical adenopathy.   Skin:     General: Skin is warm and dry.      Comments: Intact   Neurological:      Mental Status: She is alert and oriented to person, place, and time.      Cranial Nerves: No cranial nerve deficit.      Deep Tendon Reflexes: Reflexes are normal and symmetric.     Results Review:       Results from last 7 days   Lab Units 08/21/23  0525 08/18/23  0509 08/17/23  0551 08/16/23  0604   SODIUM mmol/L 133* 131* 130* 131*   POTASSIUM mmol/L 4.4 4.6 3.9 3.3*   CHLORIDE mmol/L 95* 91* 90* 89*   CO2 mmol/L 24.0 23.0 23.0 18.0*   BUN mg/dL 56* 93* 72* 129*   CREATININE mg/dL 6.43* 6.43* 4.86* 6.75*   GLUCOSE mg/dL 141* 109* 139* 94   CALCIUM mg/dL 8.1* 8.5* 8.6 8.6   BILIRUBIN mg/dL 0.6 0.9 1.0 1.0   ALK PHOS U/L 365* 318* 372* 359*   ALT (SGPT) U/L 43* 58* 76* 94*   AST (SGOT) U/L 44* 26 33* 36*       Results from last 7 days   Lab Units 08/21/23  0525 08/18/23  0509   MAGNESIUM mg/dL 1.6 1.8       Results from last 7 days   Lab Units 08/21/23  0525 08/18/23  0509 08/17/23  0551 08/16/23  1218 08/16/23  0604 08/16/23  0025 08/15/23  1159   WBC 10*3/mm3 10.37 16.87* 17.07*  --  18.50*  --   --    HEMOGLOBIN g/dL 9.0* 9.6* 10.2* 12.3 11.7* 11.3* 12.6   HEMATOCRIT % 27.6* 27.7* 29.8* 35.5 33.2* 32.3* 37.0   PLATELETS 10*3/mm3 239 236 248  --  272  --   --        Lab Results   Component Value Date    TROPONINI 0.69 (H) 05/31/2020    TROPONINT 56 (C) 08/06/2023     pH, Arterial   Date Value Ref Range Status   08/11/2023 7.403 7.350 - 7.450 pH units Final     CO2   Date Value Ref Range Status   08/21/2023 24.0 22.0 - 29.0 mmol/L Final     Total CO2   Date Value  Ref Range Status   10/27/2022 30 21 - 31 mmol/L Final     Results from last 7 days   Lab Units 08/17/23  0551 08/16/23  0604   INR  1.20 1.30*       Imaging Results (Most Recent)       None           No results found for: ACANTHNAEG, AFBCX, BPERTUSSISCX, BLOODCX  No results found for: BCIDPCR, CXREFLEX, CSFCX, CULTURETIS  No results found for: CULTURES, HSVCX, URCX  No results found for: EYECULTURE, GCCX, HSVCULTURE, LABHSV  No results found for: LEGIONELLA, MRSACX, MUMPSCX, MYCOPLASCX  No results found for: NOCARDIACX, STOOLCX  No results found for: THROATCX, UNSTIMCULT, URINECX, CULTURE, VZVCULTUR  No results found for: VIRALCULTU, WOUNDCX  Medication Reviewed and Will Continue Unless Documented in Plan:   amiodarone, 150 mg, Intravenous, Once  dilTIAZem, 20 mg, Intravenous, Once  heparin (porcine), 5,000 Units, Subcutaneous, Q12H  heparin (porcine), 2,000 Units, Intravenous, Once  HYDROcodone-acetaminophen, 1 tablet, Oral, Once  Insulin Aspart, 2-12 Units, Subcutaneous, 4 times per day  isosorbide mononitrate, 30 mg, Oral, Daily  lansoprazole, 30 mg, Oral, Q12H  magnesium sulfate, 2 g, Intravenous, Once  metoprolol tartrate, 100 mg, Oral, Q12H  rosuvastatin, 40 mg, Oral, Daily  sodium chloride 0.9 % flush, 10 mL, Intravenous, Q8H      amiodarone, 0.5 mg/min  dilTIAZem, 5-15 mg/hr      acetaminophen    acetaminophen    dextrose    dilTIAZem    HYDROcodone-acetaminophen    hydrocortisone-bacitracin-zinc oxide-nystatin    LORazepam    melatonin    sodium chloride 0.9 % flush    traZODone  amiodarone, 0.5 mg/min  dilTIAZem, 5-15 mg/hr       Dietary Orders (From admission, onward)       Start     Ordered    08/20/23 1800  Dietary Nutrition Supplements Other (See Comment); Mighty shake with every meal  Daily With Breakfast, Lunch & Dinner      Question Answer Comment   Select Supplement: Other (See Comment)    Other Mighty shake with every meal        08/20/23 1655    08/18/23 1800  Dietary Nutrition Supplements Other  (See Comment); Homemade M/S made with Straw Novasource and Berry Magic cup  Daily With Dinner      Question Answer Comment   Select Supplement: Other (See Comment)    Other Homemade M/S made with Straw Novasource and [place] Magic cup        08/18/23 1146    08/18/23 1200  Dietary Nutrition Supplements Other (See Comment); Homemade M/S made with Vanilla Novasource and vanilla magic cup (8ounces)  Daily With Lunch      Question Answer Comment   Select Supplement: Other (See Comment)    Other Homemade M/S made with Vanilla Novasource and vanilla magic cup (8ounces)        08/18/23 1146    08/17/23 1153  Diet: Regular/House Diet, Renal Diets, Diabetic Diets; Consistent Carbohydrate; Low Sodium (2-3g); Texture: Soft to Chew (NDD 3); Soft to Chew: Chopped Meat; Fluid Consistency: Thin (IDDSI 0)  Diet Effective Now        References:    Diet Order [place]   Question Answer Comment   Diets: Regular/House Diet    Diets: Renal Diets    Diets: Diabetic Diets    Diabetic Diet: Consistent Carbohydrate    Renal Diet: Low Sodium (2-3g)    Texture: Soft to Chew (NDD 3)    Soft to Chew: Chopped Meat    Fluid Consistency: Thin (IDDSI 0)        08/17/23 1156                  History, physical exam, assessment and plan may have been partly or fully copied from before, but  changes made to the copied record to reflect care on the date of service. Part of the lab and imaging  reports auto populated and corrected. Some of this note may be an electronic transcription of spoken  language to printed text. This may permit erroneous, or at times, nonsensical words or phrases to be  inadvertently transcribed. Although I have reviewed the note for such errors, some may still exist.      This document has been electronically signed by Josefina Perez MD on August 22, 2023 09:32 CDT

## 2023-08-23 ENCOUNTER — OUTSIDE FACILITY SERVICE (OUTPATIENT)
Dept: PULMONOLOGY | Facility: CLINIC | Age: 83
End: 2023-08-23
Payer: MEDICARE

## 2023-08-23 LAB
ALBUMIN SERPL-MCNC: 2.9 G/DL (ref 3.5–5.2)
ALBUMIN/GLOB SERPL: 0.7 G/DL
ALP SERPL-CCNC: 336 U/L (ref 39–117)
ALT SERPL W P-5'-P-CCNC: 51 U/L (ref 1–33)
ANION GAP SERPL CALCULATED.3IONS-SCNC: 17 MMOL/L (ref 5–15)
AST SERPL-CCNC: 55 U/L (ref 1–32)
B PARAPERT DNA SPEC QL NAA+PROBE: NOT DETECTED
B PERT DNA SPEC QL NAA+PROBE: NOT DETECTED
BACTERIA UR QL AUTO: ABNORMAL /HPF
BASOPHILS # BLD AUTO: 0.06 10*3/MM3 (ref 0–0.2)
BASOPHILS NFR BLD AUTO: 0.6 % (ref 0–1.5)
BILIRUB SERPL-MCNC: 0.6 MG/DL (ref 0–1.2)
BILIRUB UR QL STRIP: NEGATIVE
BUN SERPL-MCNC: 38 MG/DL (ref 8–23)
BUN/CREAT SERPL: 8 (ref 7–25)
C PNEUM DNA NPH QL NAA+NON-PROBE: NOT DETECTED
CALCIUM SPEC-SCNC: 8.8 MG/DL (ref 8.6–10.5)
CHLORIDE SERPL-SCNC: 94 MMOL/L (ref 98–107)
CLARITY UR: ABNORMAL
CO2 SERPL-SCNC: 24 MMOL/L (ref 22–29)
COLOR UR: YELLOW
CREAT SERPL-MCNC: 4.77 MG/DL (ref 0.57–1)
DEPRECATED RDW RBC AUTO: 56.4 FL (ref 37–54)
EGFRCR SERPLBLD CKD-EPI 2021: 8.7 ML/MIN/1.73
EOSINOPHIL # BLD AUTO: 0.59 10*3/MM3 (ref 0–0.4)
EOSINOPHIL NFR BLD AUTO: 5.6 % (ref 0.3–6.2)
ERYTHROCYTE [DISTWIDTH] IN BLOOD BY AUTOMATED COUNT: 16.8 % (ref 12.3–15.4)
FERRITIN SERPL-MCNC: 384.8 NG/ML (ref 13–150)
FLUAV SUBTYP SPEC NAA+PROBE: NOT DETECTED
FLUBV RNA ISLT QL NAA+PROBE: NOT DETECTED
FOLATE SERPL-MCNC: 7.48 NG/ML (ref 4.78–24.2)
GLOBULIN UR ELPH-MCNC: 4.1 GM/DL
GLUCOSE BLDC GLUCOMTR-MCNC: 120 MG/DL (ref 70–130)
GLUCOSE BLDC GLUCOMTR-MCNC: 123 MG/DL (ref 70–130)
GLUCOSE BLDC GLUCOMTR-MCNC: 168 MG/DL (ref 70–130)
GLUCOSE BLDC GLUCOMTR-MCNC: 228 MG/DL (ref 70–130)
GLUCOSE BLDC GLUCOMTR-MCNC: 230 MG/DL (ref 70–130)
GLUCOSE SERPL-MCNC: 124 MG/DL (ref 65–99)
GLUCOSE UR STRIP-MCNC: NEGATIVE MG/DL
HADV DNA SPEC NAA+PROBE: NOT DETECTED
HCOV 229E RNA SPEC QL NAA+PROBE: NOT DETECTED
HCOV HKU1 RNA SPEC QL NAA+PROBE: NOT DETECTED
HCOV NL63 RNA SPEC QL NAA+PROBE: NOT DETECTED
HCOV OC43 RNA SPEC QL NAA+PROBE: NOT DETECTED
HCT VFR BLD AUTO: 32 % (ref 34–46.6)
HGB BLD-MCNC: 10 G/DL (ref 12–15.9)
HGB UR QL STRIP.AUTO: ABNORMAL
HMPV RNA NPH QL NAA+NON-PROBE: NOT DETECTED
HPIV1 RNA ISLT QL NAA+PROBE: NOT DETECTED
HPIV2 RNA SPEC QL NAA+PROBE: NOT DETECTED
HPIV3 RNA NPH QL NAA+PROBE: NOT DETECTED
HPIV4 P GENE NPH QL NAA+PROBE: NOT DETECTED
HYALINE CASTS UR QL AUTO: ABNORMAL /LPF
IMM GRANULOCYTES # BLD AUTO: 0.07 10*3/MM3 (ref 0–0.05)
IMM GRANULOCYTES NFR BLD AUTO: 0.7 % (ref 0–0.5)
IRON 24H UR-MRATE: 25 MCG/DL (ref 37–145)
IRON SATN MFR SERPL: 11 % (ref 20–50)
KETONES UR QL STRIP: NEGATIVE
LEUKOCYTE ESTERASE UR QL STRIP.AUTO: ABNORMAL
LYMPHOCYTES # BLD AUTO: 0.83 10*3/MM3 (ref 0.7–3.1)
LYMPHOCYTES NFR BLD AUTO: 7.9 % (ref 19.6–45.3)
M PNEUMO IGG SER IA-ACNC: NOT DETECTED
MAGNESIUM SERPL-MCNC: 1.9 MG/DL (ref 1.6–2.4)
MCH RBC QN AUTO: 28.9 PG (ref 26.6–33)
MCHC RBC AUTO-ENTMCNC: 31.3 G/DL (ref 31.5–35.7)
MCV RBC AUTO: 92.5 FL (ref 79–97)
MONOCYTES # BLD AUTO: 0.81 10*3/MM3 (ref 0.1–0.9)
MONOCYTES NFR BLD AUTO: 7.7 % (ref 5–12)
NEUTROPHILS NFR BLD AUTO: 77.5 % (ref 42.7–76)
NEUTROPHILS NFR BLD AUTO: 8.1 10*3/MM3 (ref 1.7–7)
NITRITE UR QL STRIP: NEGATIVE
NRBC BLD AUTO-RTO: 0 /100 WBC (ref 0–0.2)
PH UR STRIP.AUTO: 6 [PH] (ref 5–9)
PLATELET # BLD AUTO: 183 10*3/MM3 (ref 140–450)
PMV BLD AUTO: 10.3 FL (ref 6–12)
POTASSIUM SERPL-SCNC: 4.3 MMOL/L (ref 3.5–5.2)
PROT SERPL-MCNC: 7 G/DL (ref 6–8.5)
PROT UR QL STRIP: ABNORMAL
RBC # BLD AUTO: 3.46 10*6/MM3 (ref 3.77–5.28)
RBC # UR STRIP: ABNORMAL /HPF
REF LAB TEST METHOD: ABNORMAL
RHINOVIRUS RNA SPEC NAA+PROBE: NOT DETECTED
RSV RNA NPH QL NAA+NON-PROBE: NOT DETECTED
SARS-COV-2 RNA NPH QL NAA+NON-PROBE: NOT DETECTED
SODIUM SERPL-SCNC: 135 MMOL/L (ref 136–145)
SP GR UR STRIP: 1.02 (ref 1–1.03)
SQUAMOUS #/AREA URNS HPF: ABNORMAL /HPF
TIBC SERPL-MCNC: 229 MCG/DL (ref 298–536)
TRANSFERRIN SERPL-MCNC: 154 MG/DL (ref 200–360)
UROBILINOGEN UR QL STRIP: ABNORMAL
VIT B12 BLD-MCNC: 1027 PG/ML (ref 211–946)
WBC # UR STRIP: ABNORMAL /HPF
WBC NRBC COR # BLD: 10.46 10*3/MM3 (ref 3.4–10.8)
YEAST URNS QL MICRO: ABNORMAL /HPF

## 2023-08-23 PROCEDURE — 25010000002 AMIODARONE IN DEXTROSE 5% 360-4.14 MG/200ML-% SOLUTION: Performed by: INTERNAL MEDICINE

## 2023-08-23 PROCEDURE — 82607 VITAMIN B-12: CPT | Performed by: INTERNAL MEDICINE

## 2023-08-23 PROCEDURE — P9047 ALBUMIN (HUMAN), 25%, 50ML: HCPCS | Performed by: INTERNAL MEDICINE

## 2023-08-23 PROCEDURE — 87070 CULTURE OTHR SPECIMN AEROBIC: CPT | Performed by: INTERNAL MEDICINE

## 2023-08-23 PROCEDURE — 87086 URINE CULTURE/COLONY COUNT: CPT | Performed by: INTERNAL MEDICINE

## 2023-08-23 PROCEDURE — 87205 SMEAR GRAM STAIN: CPT | Performed by: INTERNAL MEDICINE

## 2023-08-23 PROCEDURE — 83735 ASSAY OF MAGNESIUM: CPT | Performed by: INTERNAL MEDICINE

## 2023-08-23 PROCEDURE — 81001 URINALYSIS AUTO W/SCOPE: CPT | Performed by: INTERNAL MEDICINE

## 2023-08-23 PROCEDURE — 84466 ASSAY OF TRANSFERRIN: CPT | Performed by: INTERNAL MEDICINE

## 2023-08-23 PROCEDURE — 63710000001 DRONABINOL PER 2.5 MG: Performed by: INTERNAL MEDICINE

## 2023-08-23 PROCEDURE — 87040 BLOOD CULTURE FOR BACTERIA: CPT | Performed by: INTERNAL MEDICINE

## 2023-08-23 PROCEDURE — 25010000002 HEPARIN (PORCINE) PER 1000 UNITS: Performed by: INTERNAL MEDICINE

## 2023-08-23 PROCEDURE — 0202U NFCT DS 22 TRGT SARS-COV-2: CPT | Performed by: INTERNAL MEDICINE

## 2023-08-23 PROCEDURE — 82728 ASSAY OF FERRITIN: CPT | Performed by: INTERNAL MEDICINE

## 2023-08-23 PROCEDURE — 82746 ASSAY OF FOLIC ACID SERUM: CPT | Performed by: INTERNAL MEDICINE

## 2023-08-23 PROCEDURE — 83540 ASSAY OF IRON: CPT | Performed by: INTERNAL MEDICINE

## 2023-08-23 PROCEDURE — 85025 COMPLETE CBC W/AUTO DIFF WBC: CPT | Performed by: INTERNAL MEDICINE

## 2023-08-23 PROCEDURE — 93010 ELECTROCARDIOGRAM REPORT: CPT | Performed by: INTERNAL MEDICINE

## 2023-08-23 PROCEDURE — 80053 COMPREHEN METABOLIC PANEL: CPT | Performed by: INTERNAL MEDICINE

## 2023-08-23 PROCEDURE — 93005 ELECTROCARDIOGRAM TRACING: CPT | Performed by: INTERNAL MEDICINE

## 2023-08-23 PROCEDURE — 25010000002 ALBUMIN HUMAN 25% PER 50 ML: Performed by: INTERNAL MEDICINE

## 2023-08-23 PROCEDURE — 82948 REAGENT STRIP/BLOOD GLUCOSE: CPT

## 2023-08-23 PROCEDURE — 25010000002 LORAZEPAM PER 2 MG

## 2023-08-23 PROCEDURE — 25010000002 EPOETIN ALFA PER 1000 UNITS: Performed by: INTERNAL MEDICINE

## 2023-08-23 RX ORDER — ALBUMIN (HUMAN) 12.5 G/50ML
50 SOLUTION INTRAVENOUS AS NEEDED
Status: DISCONTINUED | OUTPATIENT
Start: 2023-08-23 | End: 2023-09-15 | Stop reason: HOSPADM

## 2023-08-23 RX ORDER — HEPARIN SODIUM 1000 [USP'U]/ML
3700 INJECTION, SOLUTION INTRAVENOUS; SUBCUTANEOUS ONCE
Status: DISCONTINUED | OUTPATIENT
Start: 2023-08-23 | End: 2023-08-28

## 2023-08-23 RX ORDER — HEPARIN SODIUM 1000 [USP'U]/ML
2000 INJECTION, SOLUTION INTRAVENOUS; SUBCUTANEOUS ONCE
Status: DISCONTINUED | OUTPATIENT
Start: 2023-08-23 | End: 2023-08-28

## 2023-08-23 NOTE — ACP (ADVANCE CARE PLANNING)
[facility]  [address] [place] [address]. [id number]  T - [phone]  F - [phone]     Progress Note          PATIENT  DEMOGRAPHICS   PATIENT NAME: Carol Sullivan                      PHYSICIAN: COY Chong  : 1940  MRN: [id number]   LOS: 0 days    Patient Care Team:  Len Seo MD as PCP - General  [place]la[place] ribera APRN as Nurse Practitioner (General Surgery)  Subjective   SUBJECTIVE   No acute events overnight. Denied any complaints.          Objective   OBJECTIVE   Vital Signs  Heart Rate:  [] 142    T 100.3    RR 18  /72     No intake/output data recorded.    PHYSICAL EXAM    Physical Exam  Constitutional:       Appearance: She is well-developed.   HENT:      Head: Normocephalic.      Left Ear: There is no impacted cerumen.      Nose: No rhinorrhea.   Eyes:      Pupils: Pupils are equal, round, and reactive to light.   Cardiovascular:      Rate and Rhythm: Normal rate and regular rhythm.      Heart sounds: Normal heart sounds.   Pulmonary:      Effort: Pulmonary effort is normal.      Breath sounds: Normal breath sounds.   Abdominal:      General: Bowel sounds are normal.      Palpations: Abdomen is soft.   Musculoskeletal:         General: No swelling.   Skin:     Coloration: Skin is not jaundiced.   Neurological:      General: No focal deficit present.      Mental Status: She is alert. She is disoriented.       RESULTS   Results Review:    Results from last 7 days   Lab Units 23  0508 23  0525 23  0509   SODIUM mmol/L 135* 133* 131*   POTASSIUM mmol/L 4.3 4.4 4.6   CHLORIDE mmol/L 94* 95* 91*   CO2 mmol/L 24.0 24.0 23.0   BUN mg/dL 38* 56* 93*   CREATININE mg/dL 4.77* 6.43* 6.43*   CALCIUM mg/dL 8.8 8.1* 8.5*   BILIRUBIN mg/dL 0.6 0.6 0.9   ALK PHOS U/L 336* 365* 318*   ALT (SGPT) U/L 51* 43* 58*   AST (SGOT) U/L 55* 44* 26   GLUCOSE mg/dL 124* 141* 109*         Estimated Creatinine Clearance: 9 mL/min (A) (by C-G formula  based on SCr of 4.77 mg/dL (H)).    Results from last 7 days   Lab Units 08/23/23  0508 08/21/23  0525 08/18/23  0509   MAGNESIUM mg/dL 1.9 1.6 1.8               Results from last 7 days   Lab Units 08/23/23  0508 08/21/23  0525 08/18/23  0509 08/17/23  0551   WBC 10*3/mm3 10.46 10.37 16.87* 17.07*   HEMOGLOBIN g/dL 10.0* 9.0* 9.6* 10.2*   PLATELETS 10*3/mm3 183 239 236 248         Results from last 7 days   Lab Units 08/17/23  0551   INR  1.20           Imaging Results (Last 24 Hours)       ** No results found for the last 24 hours. **             MEDICATIONS    dilTIAZem, 20 mg, Intravenous, Once  dronabinol, 5 mg, Oral, BID AC  epoetin karen/karen-epbx, 6,000 Units, Intravenous, Once per day on Mon Wed Fri  heparin (porcine), 5,000 Units, Subcutaneous, Q12H  heparin (porcine), 2,000 Units, Intravenous, Once  heparin (porcine), 3,700 Units, Intracatheter, Once  HYDROcodone-acetaminophen, 1 tablet, Oral, Once  Insulin Aspart, 2-12 Units, Subcutaneous, 4 times per day  isosorbide mononitrate, 30 mg, Oral, Daily  lansoprazole, 30 mg, Oral, Q12H  magnesium sulfate, 2 g, Intravenous, Once  metoprolol tartrate, 2.5 mg, Intravenous, Once  metoprolol tartrate, 100 mg, Oral, Q12H  rosuvastatin, 40 mg, Oral, Daily  sodium chloride 0.9 % flush, 10 mL, Intravenous, Q8H      amiodarone, 0.5 mg/min  dilTIAZem, 5-15 mg/hr      Assessment & Plan   ASSESSMENT / PLAN      * No active hospital problems. *      1. Acute kidney injury/ Acute tubular necrosis:  - Baseline unknown.   - Creatinine was 1.5 in 10/2022.   - UA 3+ protein, trace leukocytes, 0-2 RBC, 6-12 WBC, 2+ bacteria.   - Urine sodium 26. CT abd large rt retroperitoneal hematoma causing moderate rt hydronephrosis.  - Started HD 8/6/23  -  HD today     2. Renal failure retroperitoneal bleed s/p right J stent for hydronephrosis   - s/p J stent placement by urology 8/11  - Continue to monitor renal function      3. Hypertension/ Afib with RVR:   - Blood pressure is  acceptable. HR was high and now metoprolol. Losartan on hold     4. UTI E.fecalis:   - became septic with mild hydro Dr Garcia is consulted. Now stent in place . E.fecalis     5. Cdiff:   -not on po vanc now      6. Hyponatremia:   - Sodium is stable     7. Anemia / retroperitoneal bleed:  - Hemoglobin is acceptable at 10.0 check fe b12 and folate     8.  AMS              This document has been electronically signed by COY Chong on August 23, 2023 13:02 CDT      For this patient encounter, I have reviewed the Nurse Practitioner's documentation, medical decision making, and treatment plan and personally spent time with the patient.

## 2023-08-23 NOTE — ACP (ADVANCE CARE PLANNING)
[facility] ED @ [facility]  INPATIENT PROGRESS NOTE    PATIENT NAME: Carol Sullivan      PHYSICIAN: Josefina Perez MD  : 1940        MRN: [id number]  Patient Care Team:  Len Seo MD as PCP - General  Blaire Camara APRN as Nurse Practitioner (General Surgery)    Chief Complaint: Patient was brought to emergency room at [facility] for confusion.     History of Present Illness: 82-year-old female with significant medical history of hypertension, dyslipidemia, gastroesophageal reflux disease who was brought into the emergency room for confusion.  Patient was also found to have erythema and cellulitis of left breast.  Patient was thought of having mastitis.  Patient's condition was monitored now.  Patient had gradual decrease in her urine output and worsening in her kidney function was noted.  Patient off having renal failure which was worsening over time.  Patient subsequently required hemodialysis patient was placed on hemodialysis per nephrology recommendation.  Patient was admitted to the hospital setting for antibiotics and hemodialysis.  Patient's condition seem to be stabilized however patient remained extremely weak and deconditioned hemodialysis patient was recommended to be admitted to our facility for further therapy and rehabilitation along with monitoring for hemodialysis.     Discharge Summary and H&P: Reviewed from previous hospitalization and information documented above in HPI Section.     Radiology Studies from Previous Hospitalization: Reviewed and are as below:  XR Abdomen KUB     Result Date: 2023  FINDINGS/IMPRESSION: Radiopaque tip of the Dobbhoff tube projects over the second portion of the duodenum, similar to even slightly retracted when compared to the prior exam. Right-sided ureteral stent is again noted and incompletely visualized.  There are probably also right femoral catheters which are  incompletely visualized. Normal bowel gas pattern in the visualized abdomen.     XR Chest Post CVA Port     Result Date: 8/17/2023  Impression: 1. Mild interstitial opacities are likely due to low lung volumes. An element of interstitial edema is not entirely excluded.      EKG From previous hospitalization reviewed and documented below:  ECG 12 Lead Other; hx afib   Preliminary Result   Test Reason : Other~   Blood Pressure :   */*   mmHG   Vent. Rate :  67 BPM     Atrial Rate :  67 BPM      P-R Int : 188 ms          QRS Dur :  78 ms       QT Int : 392 ms       P-R-T Axes :  28   6  16 degrees      QTc Int : 414 ms       Normal sinus rhythm with sinus arrhythmia   Minimal voltage criteria for LVH, may be normal variant   Anteroseptal infarct (cited on or before 30-JUL-2023)   Abnormal ECG   When compared with ECG of 10-AUG-2023 15:02,   Sinus rhythm has replaced Atrial fibrillation   Vent. rate has decreased BY  62 BPM   Nonspecific T wave abnormality, worse in Anterior leads       Referred By:            Confirmed By:              Laboratory results from previous hospitalization is reviewed and below:        Lab Results   Component Value Date     GLUCOSE 139 (H) 08/17/2023     CALCIUM 8.6 08/17/2023      (L) 08/17/2023     K 3.9 08/17/2023     CO2 23.0 08/17/2023     CL 90 (L) 08/17/2023     BUN 72 (H) 08/17/2023     CREATININE 4.86 (H) 08/17/2023     EGFR 8.5 (L) 08/17/2023     BCR 14.8 08/17/2023     ANIONGAP 17.0 (H) 08/17/2023            Lab Results   Component Value Date     WBC 17.07 (H) 08/17/2023     HGB 10.2 (L) 08/17/2023     HCT 29.8 (L) 08/17/2023     MCV 85.9 08/17/2023     PLT         Assessment       Sepsis Secondary to Below.  Somnolence [R40.0]  Mastitis [N61.0]  Sepsis [A41.9]  Sepsis, due to unspecified organism, unspecified whether acute organ dysfunction present [A41.9]   ESRD on Hemodialysis.        DVT Prophylaxis: Heparin.  GI Prophylaxis: Lansoprazole.  Code Status: DNR/DNI.     Plan       -Stable at time of exam.  -Continue with dialysis as per nephrology recommendations.  -Therapy as indicated as patient tolerates.  -Remains on Amiodarone drip per cardiology  -Remains on Cardizem drip per cardiology  -Febrile today.  We will obtain cultures  -We will recheck Covid swab due to possible exposure from visitors.  -DVT and GI prophylaxis in place.  -We'll continue monitoring patient in hospital setting and treat patient as course dictates.  -Please review orders for detailed plan of care.  -For Aute and Chronic medical condition we will continue medications described below in medication section which have been reviewed and we will continue unless changed in plan of care.  -Laboratory and diagnostic studies have been independently reviewed and the reports are reviewed as documented below.    Subjective   Interval History:   Patient Complaints:   Patient seen and examined.  Receiving dialysis.  No concerns at this time.  Remains deconditioned.   History taken from: Medical record and nursing staff.    Review of Systems:    Review of Systems   All other systems reviewed and are negative.    Objective   Vital Signs  Temp: 100.3 F         Heart Rate: 94         Resp: 20          Blood Pressure: 159/72         Pulse Ox: 99%    Physical Exam:   Physical Exam  Vitals and nursing note reviewed.   Constitutional:       Appearance: She is well-developed.   HENT:      Head: Normocephalic and atraumatic.      Nose: Nose normal.   Eyes:      Conjunctiva/sclera: Conjunctivae normal.      Pupils: Pupils are equal, round, and reactive to light.   Neck:      Thyroid: No thyromegaly.      Vascular: No JVD.      Trachea: No tracheal deviation.   Cardiovascular:      Rate and Rhythm: Normal rate and regular rhythm.      Heart sounds: Normal heart sounds.   Pulmonary:      Effort: Pulmonary effort is normal. No respiratory distress.      Breath sounds: Normal breath sounds. No wheezing or rales.   Chest:      Chest wall:  No tenderness.   Abdominal:      General: Bowel sounds are normal. There is no distension.      Palpations: Abdomen is soft.      Tenderness: There is no abdominal tenderness. There is no guarding or rebound.   Musculoskeletal:         General: Normal range of motion.      Cervical back: Normal range of motion and neck supple.   Lymphadenopathy:      Cervical: No cervical adenopathy.   Skin:     General: Skin is warm and dry.      Comments: Intact   Neurological:      Mental Status: She is alert and oriented to person, place, and time.      Cranial Nerves: No cranial nerve deficit.      Deep Tendon Reflexes: Reflexes are normal and symmetric.     Results Review:       Results from last 7 days   Lab Units 08/23/23  0508 08/21/23  0525 08/18/23  0509 08/17/23  0551   SODIUM mmol/L 135* 133* 131* 130*   POTASSIUM mmol/L 4.3 4.4 4.6 3.9   CHLORIDE mmol/L 94* 95* 91* 90*   CO2 mmol/L 24.0 24.0 23.0 23.0   BUN mg/dL 38* 56* 93* 72*   CREATININE mg/dL 4.77* 6.43* 6.43* 4.86*   GLUCOSE mg/dL 124* 141* 109* 139*   CALCIUM mg/dL 8.8 8.1* 8.5* 8.6   BILIRUBIN mg/dL 0.6 0.6 0.9 1.0   ALK PHOS U/L 336* 365* 318* 372*   ALT (SGPT) U/L 51* 43* 58* 76*   AST (SGOT) U/L 55* 44* 26 33*       Results from last 7 days   Lab Units 08/23/23  0508 08/21/23  0525 08/18/23  0509   MAGNESIUM mg/dL 1.9 1.6 1.8       Results from last 7 days   Lab Units 08/23/23  0508 08/21/23  0525 08/18/23  0509 08/17/23  0551 08/16/23  1218   WBC 10*3/mm3 10.46 10.37 16.87* 17.07*  --    HEMOGLOBIN g/dL 10.0* 9.0* 9.6* 10.2* 12.3   HEMATOCRIT % 32.0* 27.6* 27.7* 29.8* 35.5   PLATELETS 10*3/mm3 183 239 236 248  --        Lab Results   Component Value Date    TROPONINI 0.69 (H) 05/31/2020    TROPONINT 56 (C) 08/06/2023     pH, Arterial   Date Value Ref Range Status   08/11/2023 7.403 7.350 - 7.450 pH units Final     CO2   Date Value Ref Range Status   08/23/2023 24.0 22.0 - 29.0 mmol/L Final     Total CO2   Date Value Ref Range Status   10/27/2022 30 21 - 31  mmol/L Final     Results from last 7 days   Lab Units 08/17/23  0551   INR  1.20       Imaging Results (Most Recent)       None           No results found for: ACANTHNAEG, AFBCX, BPERTUSSISCX, BLOODCX  No results found for: BCIDPCR, CXREFLEX, CSFCX, CULTURETIS  No results found for: CULTURES, HSVCX, URCX  No results found for: EYECULTURE, GCCX, HSVCULTURE, LABHSV  No results found for: LEGIONELLA, MRSACX, MUMPSCX, MYCOPLASCX  No results found for: NOCARDIACX, STOOLCX  No results found for: THROATCX, UNSTIMCULT, URINECX, CULTURE, VZVCULTUR  No results found for: VIRALCULTU, WOUNDCX  Medication Reviewed and Will Continue Unless Documented in Plan:   dilTIAZem, 20 mg, Intravenous, Once  dronabinol, 5 mg, Oral, BID AC  epoetin karen/karen-epbx, 6,000 Units, Intravenous, Once per day on Mon Wed Fri  heparin (porcine), 5,000 Units, Subcutaneous, Q12H  heparin (porcine), 2,000 Units, Intravenous, Once  heparin (porcine), 3,700 Units, Intracatheter, Once  HYDROcodone-acetaminophen, 1 tablet, Oral, Once  Insulin Aspart, 2-12 Units, Subcutaneous, 4 times per day  isosorbide mononitrate, 30 mg, Oral, Daily  lansoprazole, 30 mg, Oral, Q12H  magnesium sulfate, 2 g, Intravenous, Once  metoprolol tartrate, 2.5 mg, Intravenous, Once  metoprolol tartrate, 100 mg, Oral, Q12H  rosuvastatin, 40 mg, Oral, Daily  sodium chloride 0.9 % flush, 10 mL, Intravenous, Q8H      amiodarone, 0.5 mg/min  dilTIAZem, 5-15 mg/hr      acetaminophen    acetaminophen    albumin human    dextrose    dilTIAZem    HYDROcodone-acetaminophen    hydrocortisone-bacitracin-zinc oxide-nystatin    LORazepam    melatonin    sodium chloride    sodium chloride 0.9 % flush    traZODone  amiodarone, 0.5 mg/min  dilTIAZem, 5-15 mg/hr       Dietary Orders (From admission, onward)       Start     Ordered    08/20/23 1800  Dietary Nutrition Supplements Other (See Comment); Mighty shake with every meal  Daily With Breakfast, Lunch & Dinner      Question Answer Comment    Select Supplement: Other (See Comment)    Other Mighty shake with every meal        08/20/23 1655    08/18/23 1800  Dietary Nutrition Supplements Other (See Comment); Homemade M/S made with Straw Novasource and Berry Magic cup  Daily With Dinner      Question Answer Comment   Select Supplement: Other (See Comment)    Other Homemade M/S made with Straw Novasource and [place] Magic cup        08/18/23 1146    08/18/23 1200  Dietary Nutrition Supplements Other (See Comment); Homemade M/S made with Vanilla Novasource and vanilla magic cup (8ounces)  Daily With Lunch      Question Answer Comment   Select Supplement: Other (See Comment)    Other Homemade M/S made with Vanilla Novasource and vanilla magic cup (8ounces)        08/18/23 1146    08/17/23 1153  Diet: Regular/House Diet, Renal Diets, Diabetic Diets; Consistent Carbohydrate; Low Sodium (2-3g); Texture: Soft to Chew (NDD 3); Soft to Chew: Chopped Meat; Fluid Consistency: Thin (IDDSI 0)  Diet Effective Now        References:    Diet Order [place]   Question Answer Comment   Diets: Regular/House Diet    Diets: Renal Diets    Diets: Diabetic Diets    Diabetic Diet: Consistent Carbohydrate    Renal Diet: Low Sodium (2-3g)    Texture: Soft to Chew (NDD 3)    Soft to Chew: Chopped Meat    Fluid Consistency: Thin (IDDSI 0)        08/17/23 1156                  History, physical exam, assessment and plan may have been partly or fully copied from before, but  changes made to the copied record to reflect care on the date of service. Part of the lab and imaging  reports auto populated and corrected. Some of this note may be an electronic transcription of spoken  language to printed text. This may permit erroneous, or at times, nonsensical words or phrases to be  inadvertently transcribed. Although I have reviewed the note for such errors, some may still exist.      This document has been electronically signed by Josefina Perez MD on August 23, 2023 09:41 CDT

## 2023-08-24 ENCOUNTER — OUTSIDE FACILITY SERVICE (OUTPATIENT)
Dept: PULMONOLOGY | Facility: CLINIC | Age: 83
End: 2023-08-24
Payer: MEDICARE

## 2023-08-24 LAB
BACTERIA SPEC AEROBE CULT: ABNORMAL
GLUCOSE BLDC GLUCOMTR-MCNC: 125 MG/DL (ref 70–130)
GLUCOSE BLDC GLUCOMTR-MCNC: 197 MG/DL (ref 70–130)
GLUCOSE BLDC GLUCOMTR-MCNC: 249 MG/DL (ref 70–130)
GLUCOSE BLDC GLUCOMTR-MCNC: 263 MG/DL (ref 70–130)
QT INTERVAL: 392 MS
QTC INTERVAL: 414 MS

## 2023-08-24 PROCEDURE — 63710000001 DRONABINOL PER 2.5 MG: Performed by: INTERNAL MEDICINE

## 2023-08-24 PROCEDURE — 25010000002 HEPARIN (PORCINE) PER 1000 UNITS: Performed by: INTERNAL MEDICINE

## 2023-08-24 PROCEDURE — 25010000002 AMIODARONE IN DEXTROSE 5% 360-4.14 MG/200ML-% SOLUTION: Performed by: INTERNAL MEDICINE

## 2023-08-24 PROCEDURE — 93010 ELECTROCARDIOGRAM REPORT: CPT | Performed by: INTERNAL MEDICINE

## 2023-08-24 PROCEDURE — 97530 THERAPEUTIC ACTIVITIES: CPT

## 2023-08-24 PROCEDURE — 93005 ELECTROCARDIOGRAM TRACING: CPT | Performed by: INTERNAL MEDICINE

## 2023-08-24 PROCEDURE — 25010000002 LORAZEPAM PER 2 MG: Performed by: INTERNAL MEDICINE

## 2023-08-24 PROCEDURE — 82948 REAGENT STRIP/BLOOD GLUCOSE: CPT

## 2023-08-24 RX ORDER — LORAZEPAM 2 MG/ML
0.5 INJECTION INTRAMUSCULAR EVERY 6 HOURS PRN
Status: DISCONTINUED | OUTPATIENT
Start: 2023-08-24 | End: 2023-08-27

## 2023-08-24 RX ORDER — HEPARIN SODIUM 1000 [USP'U]/ML
2000 INJECTION, SOLUTION INTRAVENOUS; SUBCUTANEOUS ONCE
Status: DISCONTINUED | OUTPATIENT
Start: 2023-08-30 | End: 2023-08-31

## 2023-08-24 RX ORDER — HEPARIN SODIUM 1000 [USP'U]/ML
2000 INJECTION, SOLUTION INTRAVENOUS; SUBCUTANEOUS ONCE
Status: DISCONTINUED | OUTPATIENT
Start: 2023-08-25 | End: 2023-08-28

## 2023-08-24 RX ORDER — HEPARIN SODIUM 1000 [USP'U]/ML
2000 INJECTION, SOLUTION INTRAVENOUS; SUBCUTANEOUS ONCE
Status: DISCONTINUED | OUTPATIENT
Start: 2023-08-28 | End: 2023-08-31

## 2023-08-24 RX ORDER — AMIODARONE HYDROCHLORIDE 200 MG/1
200 TABLET ORAL 2 TIMES DAILY WITH MEALS
Status: DISCONTINUED | OUTPATIENT
Start: 2023-08-24 | End: 2023-09-15 | Stop reason: HOSPADM

## 2023-08-24 RX ORDER — DILTIAZEM HYDROCHLORIDE 60 MG/1
60 TABLET, FILM COATED ORAL EVERY 6 HOURS SCHEDULED
Status: DISCONTINUED | OUTPATIENT
Start: 2023-08-24 | End: 2023-09-15 | Stop reason: HOSPADM

## 2023-08-24 RX ORDER — AMIODARONE HYDROCHLORIDE 200 MG/1
200 TABLET ORAL 2 TIMES DAILY WITH MEALS
Status: DISCONTINUED | OUTPATIENT
Start: 2023-08-24 | End: 2023-08-24

## 2023-08-24 NOTE — ACP (ADVANCE CARE PLANNING)
[facility]  [address] [place] [address]. [id number]  T - [phone]  F - [phone]     Progress Note          PATIENT  DEMOGRAPHICS   PATIENT NAME: Carol Sullivan                      PHYSICIAN: COY Chong  : 1940  MRN: [id number]   LOS: 0 days    Patient Care Team:  Len Seo MD as PCP - General  East BernardBlaire ribera APRN as Nurse Practitioner (General Surgery)  Subjective   SUBJECTIVE   No acute events overnight.          Objective   OBJECTIVE   Vital Signs  Heart Rate:  [56-81] 81    T 100.3    RR 18  /72     No intake/output data recorded.    PHYSICAL EXAM    Physical Exam  Constitutional:       Appearance: She is well-developed.   HENT:      Head: Normocephalic.      Left Ear: There is no impacted cerumen.      Nose: No rhinorrhea.   Eyes:      Pupils: Pupils are equal, round, and reactive to light.   Cardiovascular:      Rate and Rhythm: Normal rate and regular rhythm.      Heart sounds: Normal heart sounds.   Pulmonary:      Effort: Pulmonary effort is normal.      Breath sounds: Normal breath sounds.   Abdominal:      General: Bowel sounds are normal.      Palpations: Abdomen is soft.   Musculoskeletal:         General: No swelling.   Skin:     Coloration: Skin is not jaundiced.   Neurological:      General: No focal deficit present.      Mental Status: She is alert. She is disoriented.       RESULTS   Results Review:    Results from last 7 days   Lab Units 23  0508 23  0525 23  0509   SODIUM mmol/L 135* 133* 131*   POTASSIUM mmol/L 4.3 4.4 4.6   CHLORIDE mmol/L 94* 95* 91*   CO2 mmol/L 24.0 24.0 23.0   BUN mg/dL 38* 56* 93*   CREATININE mg/dL 4.77* 6.43* 6.43*   CALCIUM mg/dL 8.8 8.1* 8.5*   BILIRUBIN mg/dL 0.6 0.6 0.9   ALK PHOS U/L 336* 365* 318*   ALT (SGPT) U/L 51* 43* 58*   AST (SGOT) U/L 55* 44* 26   GLUCOSE mg/dL 124* 141* 109*         Estimated Creatinine Clearance: 9 mL/min (A) (by C-G formula based on SCr of 4.77 mg/dL  (H)).    Results from last 7 days   Lab Units 08/23/23  0508 08/21/23  0525 08/18/23  0509   MAGNESIUM mg/dL 1.9 1.6 1.8               Results from last 7 days   Lab Units 08/23/23  0508 08/21/23  0525 08/18/23  0509   WBC 10*3/mm3 10.46 10.37 16.87*   HEMOGLOBIN g/dL 10.0* 9.0* 9.6*   PLATELETS 10*3/mm3 183 239 236                   Imaging Results (Last 24 Hours)       ** No results found for the last 24 hours. **             MEDICATIONS    amiodarone, 200 mg, Oral, BID With Meals  dilTIAZem, 20 mg, Intravenous, Once  dronabinol, 5 mg, Oral, BID AC  epoetin karen/karen-epbx, 6,000 Units, Intravenous, Once per day on Mon Wed Fri  heparin (porcine), 5,000 Units, Subcutaneous, Q12H  heparin (porcine), 2,000 Units, Intravenous, Once  [START ON 8/25/2023] heparin (porcine), 2,000 Units, Intravenous, Once  [START ON 8/28/2023] heparin (porcine), 2,000 Units, Intravenous, Once  [START ON 8/30/2023] heparin (porcine), 2,000 Units, Intravenous, Once  heparin (porcine), 3,700 Units, Intracatheter, Once  HYDROcodone-acetaminophen, 1 tablet, Oral, Once  Insulin Aspart, 2-12 Units, Subcutaneous, 4 times per day  isosorbide mononitrate, 30 mg, Oral, Daily  lansoprazole, 30 mg, Oral, Q12H  magnesium sulfate, 2 g, Intravenous, Once  metoprolol tartrate, 2.5 mg, Intravenous, Once  metoprolol tartrate, 100 mg, Oral, Q12H  rosuvastatin, 40 mg, Oral, Daily  sodium chloride 0.9 % flush, 10 mL, Intravenous, Q8H      amiodarone, 0.5 mg/min  dilTIAZem, 5-15 mg/hr      Assessment & Plan   ASSESSMENT / PLAN      * No active hospital problems. *      1. Acute kidney injury/ Acute tubular necrosis:  - Baseline unknown.   - Creatinine was 1.5 in 10/2022.   - UA 3+ protein, trace leukocytes, 0-2 RBC, 6-12 WBC, 2+ bacteria.   - Urine sodium 26. CT abd large rt retroperitoneal hematoma causing moderate rt hydronephrosis.  - Started HD 8/6/23  -  HD tomorrow     2. Renal failure retroperitoneal bleed s/p right J stent for hydronephrosis   - s/p J  stent placement by urology 8/11  - Continue to monitor renal function      3. Hypertension/ Afib with RVR:   - Blood pressure is acceptable. HR was high and now metoprolol. Losartan on hold     4. UTI E.fecalis:   - became septic with mild hydro Dr Garcia is consulted. Now stent in place . E.fecalis     5. Cdiff:   -not on po vanc now      6. Hyponatremia:   - Sodium is stable     7. Anemia / retroperitoneal bleed:  - Hemoglobin is acceptable at 10.0 check fe b12 and folate     8.  AMS              This document has been electronically signed by COY Chong on August 24, 2023 13:20 CDT      For this patient encounter, I have reviewed the Nurse Practitioner's documentation, medical decision making, and treatment plan and personally spent time with the patient.

## 2023-08-25 ENCOUNTER — OUTSIDE FACILITY SERVICE (OUTPATIENT)
Dept: PULMONOLOGY | Facility: CLINIC | Age: 83
End: 2023-08-25
Payer: MEDICARE

## 2023-08-25 LAB
ALBUMIN SERPL-MCNC: 2.6 G/DL (ref 3.5–5.2)
ALBUMIN/GLOB SERPL: 0.7 G/DL
ALP SERPL-CCNC: 294 U/L (ref 39–117)
ALT SERPL W P-5'-P-CCNC: 48 U/L (ref 1–33)
ANION GAP SERPL CALCULATED.3IONS-SCNC: 13 MMOL/L (ref 5–15)
AST SERPL-CCNC: 35 U/L (ref 1–32)
BACTERIA SPEC AEROBE CULT: ABNORMAL
BACTERIA SPEC AEROBE CULT: ABNORMAL
BASOPHILS # BLD AUTO: 0.06 10*3/MM3 (ref 0–0.2)
BASOPHILS NFR BLD AUTO: 0.9 % (ref 0–1.5)
BILIRUB SERPL-MCNC: 0.5 MG/DL (ref 0–1.2)
BUN SERPL-MCNC: 27 MG/DL (ref 8–23)
BUN/CREAT SERPL: 6.4 (ref 7–25)
CALCIUM SPEC-SCNC: 8.4 MG/DL (ref 8.6–10.5)
CHLORIDE SERPL-SCNC: 94 MMOL/L (ref 98–107)
CO2 SERPL-SCNC: 26 MMOL/L (ref 22–29)
CREAT SERPL-MCNC: 4.24 MG/DL (ref 0.57–1)
DEPRECATED RDW RBC AUTO: 55 FL (ref 37–54)
EGFRCR SERPLBLD CKD-EPI 2021: 10 ML/MIN/1.73
EOSINOPHIL # BLD AUTO: 0.56 10*3/MM3 (ref 0–0.4)
EOSINOPHIL NFR BLD AUTO: 8.1 % (ref 0.3–6.2)
ERYTHROCYTE [DISTWIDTH] IN BLOOD BY AUTOMATED COUNT: 16.8 % (ref 12.3–15.4)
GLOBULIN UR ELPH-MCNC: 3.8 GM/DL
GLUCOSE BLDC GLUCOMTR-MCNC: 112 MG/DL (ref 70–130)
GLUCOSE BLDC GLUCOMTR-MCNC: 116 MG/DL (ref 70–130)
GLUCOSE BLDC GLUCOMTR-MCNC: 171 MG/DL (ref 70–130)
GLUCOSE SERPL-MCNC: 132 MG/DL (ref 65–99)
GRAM STN SPEC: ABNORMAL
GRAM STN SPEC: ABNORMAL
HCT VFR BLD AUTO: 26.9 % (ref 34–46.6)
HGB BLD-MCNC: 8.6 G/DL (ref 12–15.9)
IMM GRANULOCYTES # BLD AUTO: 0.05 10*3/MM3 (ref 0–0.05)
IMM GRANULOCYTES NFR BLD AUTO: 0.7 % (ref 0–0.5)
LYMPHOCYTES # BLD AUTO: 0.72 10*3/MM3 (ref 0.7–3.1)
LYMPHOCYTES NFR BLD AUTO: 10.4 % (ref 19.6–45.3)
MAGNESIUM SERPL-MCNC: 1.7 MG/DL (ref 1.6–2.4)
MCH RBC QN AUTO: 28.5 PG (ref 26.6–33)
MCHC RBC AUTO-ENTMCNC: 32 G/DL (ref 31.5–35.7)
MCV RBC AUTO: 89.1 FL (ref 79–97)
MONOCYTES # BLD AUTO: 0.62 10*3/MM3 (ref 0.1–0.9)
MONOCYTES NFR BLD AUTO: 8.9 % (ref 5–12)
NEUTROPHILS NFR BLD AUTO: 4.94 10*3/MM3 (ref 1.7–7)
NEUTROPHILS NFR BLD AUTO: 71 % (ref 42.7–76)
NRBC BLD AUTO-RTO: 0 /100 WBC (ref 0–0.2)
PLATELET # BLD AUTO: 167 10*3/MM3 (ref 140–450)
PMV BLD AUTO: 9.8 FL (ref 6–12)
POTASSIUM SERPL-SCNC: 4 MMOL/L (ref 3.5–5.2)
PROT SERPL-MCNC: 6.4 G/DL (ref 6–8.5)
RBC # BLD AUTO: 3.02 10*6/MM3 (ref 3.77–5.28)
SODIUM SERPL-SCNC: 133 MMOL/L (ref 136–145)
WBC NRBC COR # BLD: 6.95 10*3/MM3 (ref 3.4–10.8)

## 2023-08-25 PROCEDURE — 25010000002 HEPARIN (PORCINE) PER 1000 UNITS: Performed by: INTERNAL MEDICINE

## 2023-08-25 PROCEDURE — 63710000001 DRONABINOL PER 2.5 MG: Performed by: INTERNAL MEDICINE

## 2023-08-25 PROCEDURE — 80053 COMPREHEN METABOLIC PANEL: CPT | Performed by: INTERNAL MEDICINE

## 2023-08-25 PROCEDURE — 82948 REAGENT STRIP/BLOOD GLUCOSE: CPT

## 2023-08-25 PROCEDURE — 85025 COMPLETE CBC W/AUTO DIFF WBC: CPT | Performed by: INTERNAL MEDICINE

## 2023-08-25 PROCEDURE — 93010 ELECTROCARDIOGRAM REPORT: CPT | Performed by: INTERNAL MEDICINE

## 2023-08-25 PROCEDURE — 97530 THERAPEUTIC ACTIVITIES: CPT

## 2023-08-25 PROCEDURE — 83735 ASSAY OF MAGNESIUM: CPT | Performed by: INTERNAL MEDICINE

## 2023-08-25 PROCEDURE — 93005 ELECTROCARDIOGRAM TRACING: CPT | Performed by: INTERNAL MEDICINE

## 2023-08-25 PROCEDURE — 97535 SELF CARE MNGMENT TRAINING: CPT

## 2023-08-25 PROCEDURE — 25010000002 EPOETIN ALFA PER 1000 UNITS: Performed by: INTERNAL MEDICINE

## 2023-08-25 NOTE — ACP (ADVANCE CARE PLANNING)
[facility] @ [facility]  INPATIENT PROGRESS NOTE    PATIENT NAME: Carol Sullivan      PHYSICIAN: Josefina Perez MD  : 1940        MRN: [id number]  Patient Care Team:  Len Seo MD as PCP - General  Blaire Camara APRN as Nurse Practitioner (General Surgery)    Chief Complaint: Patient was brought to emergency room at [facility] for confusion.     History of Present Illness: 82-year-old female with significant medical history of hypertension, dyslipidemia, gastroesophageal reflux disease who was brought into the emergency room for confusion.  Patient was also found to have erythema and cellulitis of left breast.  Patient was thought of having mastitis.  Patient's condition was monitored now.  Patient had gradual decrease in her urine output and worsening in her kidney function was noted.  Patient off having renal failure which was worsening over time.  Patient subsequently required hemodialysis patient was placed on hemodialysis per nephrology recommendation.  Patient was admitted to the hospital setting for antibiotics and hemodialysis.  Patient's condition seem to be stabilized however patient remained extremely weak and deconditioned hemodialysis patient was recommended to be admitted to our facility for further therapy and rehabilitation along with monitoring for hemodialysis.     Discharge Summary and H&P: Reviewed from previous hospitalization and information documented above in HPI Section.     Radiology Studies from Previous Hospitalization: Reviewed and are as below:  XR Abdomen KUB     Result Date: 2023  FINDINGS/IMPRESSION: Radiopaque tip of the Dobbhoff tube projects over the second portion of the duodenum, similar to even slightly retracted when compared to the prior exam. Right-sided ureteral stent is again noted and incompletely visualized.  There are probably also right femoral catheters which are  incompletely visualized. Normal bowel gas pattern in the visualized abdomen.     XR Chest Post CVA Port     Result Date: 8/17/2023  Impression: 1. Mild interstitial opacities are likely due to low lung volumes. An element of interstitial edema is not entirely excluded.      EKG From previous hospitalization reviewed and documented below:  ECG 12 Lead Other; hx afib   Preliminary Result   Test Reason : Other~   Blood Pressure :   */*   mmHG   Vent. Rate :  67 BPM     Atrial Rate :  67 BPM      P-R Int : 188 ms          QRS Dur :  78 ms       QT Int : 392 ms       P-R-T Axes :  28   6  16 degrees      QTc Int : 414 ms       Normal sinus rhythm with sinus arrhythmia   Minimal voltage criteria for LVH, may be normal variant   Anteroseptal infarct (cited on or before 30-JUL-2023)   Abnormal ECG   When compared with ECG of 10-AUG-2023 15:02,   Sinus rhythm has replaced Atrial fibrillation   Vent. rate has decreased BY  62 BPM   Nonspecific T wave abnormality, worse in Anterior leads       Referred By:            Confirmed By:              Laboratory results from previous hospitalization is reviewed and below:        Lab Results   Component Value Date     GLUCOSE 139 (H) 08/17/2023     CALCIUM 8.6 08/17/2023      (L) 08/17/2023     K 3.9 08/17/2023     CO2 23.0 08/17/2023     CL 90 (L) 08/17/2023     BUN 72 (H) 08/17/2023     CREATININE 4.86 (H) 08/17/2023     EGFR 8.5 (L) 08/17/2023     BCR 14.8 08/17/2023     ANIONGAP 17.0 (H) 08/17/2023            Lab Results   Component Value Date     WBC 17.07 (H) 08/17/2023     HGB 10.2 (L) 08/17/2023     HCT 29.8 (L) 08/17/2023     MCV 85.9 08/17/2023     PLT         Assessment       Sepsis Secondary to Below.  Somnolence [R40.0]  Mastitis [N61.0]  Sepsis [A41.9]  Sepsis, due to unspecified organism, unspecified whether acute organ dysfunction present [A41.9]   ESRD on Hemodialysis.        DVT Prophylaxis: Heparin.  GI Prophylaxis: Lansoprazole.  Code Status: DNR/DNI.     Plan       -Stable at time of exam.  -Continue with dialysis as per nephrology recommendations.  -Therapy as indicated as patient tolerates.  -Continues with IV Amiodarone  -Has remained afebrile  -Final culture results negative  -Respiratory panel negative  -DVT and GI prophylaxis in place.  -We'll continue monitoring patient in hospital setting and treat patient as course dictates.  -Please review orders for detailed plan of care.  -For Aute and Chronic medical condition we will continue medications described below in medication section which have been reviewed and we will continue unless changed in plan of care.  -Laboratory and diagnostic studies have been independently reviewed and the reports are reviewed as documented below.    Subjective   Interval History:   Patient Complaints:   Patient seen and examined. Resting in bed.  Alert and pleasant with no concerns.  No overnight events noted.   History taken from: Medical record and nursing staff.    Review of Systems:    Review of Systems   All other systems reviewed and are negative.    Objective   Vital Signs  Temp: 97.9 F         Heart Rate: 54         Resp: 20          Blood Pressure: 122/53         Pulse Ox: 91%    Physical Exam:   Physical Exam  Vitals and nursing note reviewed.   Constitutional:       Appearance: She is well-developed.   HENT:      Head: Normocephalic and atraumatic.      Nose: Nose normal.   Eyes:      Conjunctiva/sclera: Conjunctivae normal.      Pupils: Pupils are equal, round, and reactive to light.   Neck:      Thyroid: No thyromegaly.      Vascular: No JVD.      Trachea: No tracheal deviation.   Cardiovascular:      Rate and Rhythm: Normal rate and regular rhythm.      Heart sounds: Normal heart sounds.   Pulmonary:      Effort: Pulmonary effort is normal. No respiratory distress.      Breath sounds: Normal breath sounds. No wheezing or rales.   Chest:      Chest wall: No tenderness.   Abdominal:      General: Bowel sounds are normal.  There is no distension.      Palpations: Abdomen is soft.      Tenderness: There is no abdominal tenderness. There is no guarding or rebound.   Musculoskeletal:         General: Normal range of motion.      Cervical back: Normal range of motion and neck supple.   Lymphadenopathy:      Cervical: No cervical adenopathy.   Skin:     General: Skin is warm and dry.      Comments: Intact   Neurological:      Mental Status: She is alert and oriented to person, place, and time.      Cranial Nerves: No cranial nerve deficit.      Deep Tendon Reflexes: Reflexes are normal and symmetric.     Results Review:       Results from last 7 days   Lab Units 08/25/23  0420 08/23/23  0508 08/21/23  0525   SODIUM mmol/L 133* 135* 133*   POTASSIUM mmol/L 4.0 4.3 4.4   CHLORIDE mmol/L 94* 94* 95*   CO2 mmol/L 26.0 24.0 24.0   BUN mg/dL 27* 38* 56*   CREATININE mg/dL 4.24* 4.77* 6.43*   GLUCOSE mg/dL 132* 124* 141*   CALCIUM mg/dL 8.4* 8.8 8.1*   BILIRUBIN mg/dL 0.5 0.6 0.6   ALK PHOS U/L 294* 336* 365*   ALT (SGPT) U/L 48* 51* 43*   AST (SGOT) U/L 35* 55* 44*       Results from last 7 days   Lab Units 08/25/23  0420 08/23/23  0508 08/21/23  0525   MAGNESIUM mg/dL 1.7 1.9 1.6       Results from last 7 days   Lab Units 08/25/23  0420 08/23/23  0508 08/21/23  0525   WBC 10*3/mm3 6.95 10.46 10.37   HEMOGLOBIN g/dL 8.6* 10.0* 9.0*   HEMATOCRIT % 26.9* 32.0* 27.6*   PLATELETS 10*3/mm3 167 183 239       Lab Results   Component Value Date    TROPONINI 0.69 (H) 05/31/2020    TROPONINT 56 (C) 08/06/2023     pH, Arterial   Date Value Ref Range Status   08/11/2023 7.403 7.350 - 7.450 pH units Final     CO2   Date Value Ref Range Status   08/25/2023 26.0 22.0 - 29.0 mmol/L Final     Total CO2   Date Value Ref Range Status   10/27/2022 30 21 - 31 mmol/L Final           Imaging Results (Most Recent)       None           No results found for: ACANTHNAEG, AFBCX, BPERTUSSISCX, BLOODCX  No results found for: BCIDPCR, CXREFLEX, CSFCX, CULTURETIS  No results  found for: CULTURES, HSVCX, URCX  No results found for: EYECULTURE, GCCX, HSVCULTURE, LABHSV  No results found for: LEGIONELLA, MRSACX, MUMPSCX, MYCOPLASCX  No results found for: NOCARDIACX, STOOLCX  No results found for: THROATCX, UNSTIMCULT, URINECX, CULTURE, VZVCULTUR  No results found for: VIRALCULTU, WOUNDCX  Medication Reviewed and Will Continue Unless Documented in Plan:   amiodarone, 200 mg, Oral, BID With Meals  dilTIAZem, 60 mg, Oral, Q6H  dronabinol, 5 mg, Oral, BID AC  epoetin karen/karen-epbx, 6,000 Units, Intravenous, Once per day on Mon Wed Fri  heparin (porcine), 5,000 Units, Subcutaneous, Q12H  heparin (porcine), 2,000 Units, Intravenous, Once  heparin (porcine), 2,000 Units, Intravenous, Once  [START ON 8/28/2023] heparin (porcine), 2,000 Units, Intravenous, Once  [START ON 8/30/2023] heparin (porcine), 2,000 Units, Intravenous, Once  heparin (porcine), 3,700 Units, Intracatheter, Once  HYDROcodone-acetaminophen, 1 tablet, Oral, Once  Insulin Aspart, 2-12 Units, Subcutaneous, 4 times per day  isosorbide mononitrate, 30 mg, Oral, Daily  lansoprazole, 30 mg, Oral, Q12H  magnesium sulfate, 2 g, Intravenous, Once  metoprolol tartrate, 2.5 mg, Intravenous, Once  metoprolol tartrate, 100 mg, Oral, Q12H  rosuvastatin, 40 mg, Oral, Daily  sodium chloride 0.9 % flush, 10 mL, Intravenous, Q8H      amiodarone, 0.5 mg/min  dilTIAZem, 5-15 mg/hr      acetaminophen    acetaminophen    albumin human    dextrose    dilTIAZem    HYDROcodone-acetaminophen    hydrocortisone-bacitracin-zinc oxide-nystatin    LORazepam    melatonin    sodium chloride    sodium chloride 0.9 % flush    traZODone  amiodarone, 0.5 mg/min  dilTIAZem, 5-15 mg/hr       Dietary Orders (From admission, onward)       Start     Ordered    08/24/23 1800  Dietary Nutrition Supplements Other (See Comment); Mighty shake with every meal, Apple sauce with every meal  Daily With Breakfast, Lunch & Dinner      Question Answer Comment   Select Supplement:  Other (See Comment)    Other Mighty shake with every meal, Apple sauce with every meal        08/24/23 1447    08/18/23 1800  Dietary Nutrition Supplements Other (See Comment); Homemade M/S made with Straw Novasource and Berry Magic cup  Daily With Dinner      Question Answer Comment   Select Supplement: Other (See Comment)    Other Homemade M/S made with Straw Novasource and [place] Magic cup        08/18/23 1146    08/18/23 1200  Dietary Nutrition Supplements Other (See Comment); Homemade M/S made with Vanilla Novasource and vanilla magic cup (8ounces)  Daily With Lunch      Question Answer Comment   Select Supplement: Other (See Comment)    Other Homemade M/S made with Vanilla Novasource and vanilla magic cup (8ounces)        08/18/23 1146    08/17/23 1153  Diet: Regular/House Diet, Renal Diets, Diabetic Diets; Consistent Carbohydrate; Low Sodium (2-3g); Texture: Soft to Chew (NDD 3); Soft to Chew: Chopped Meat; Fluid Consistency: Thin (IDDSI 0)  Diet Effective Now        References:    Diet Order [place]   Question Answer Comment   Diets: Regular/House Diet    Diets: Renal Diets    Diets: Diabetic Diets    Diabetic Diet: Consistent Carbohydrate    Renal Diet: Low Sodium (2-3g)    Texture: Soft to Chew (NDD 3)    Soft to Chew: Chopped Meat    Fluid Consistency: Thin (IDDSI 0)        08/17/23 1156                  History, physical exam, assessment and plan may have been partly or fully copied from before, but  changes made to the copied record to reflect care on the date of service. Part of the lab and imaging  reports auto populated and corrected. Some of this note may be an electronic transcription of spoken  language to printed text. This may permit erroneous, or at times, nonsensical words or phrases to be  inadvertently transcribed. Although I have reviewed the note for such errors, some may still exist.      This document has been electronically signed by Josefina Perez MD on August 25, 2023 14:25  CDT

## 2023-08-25 NOTE — ACP (ADVANCE CARE PLANNING)
[facility]  [address] [place] [address]. [id number]  T - [phone]  F - [phone]     Progress Note          PATIENT  DEMOGRAPHICS   PATIENT NAME: Carol Sullivan                      PHYSICIAN: COY Chong  : 1940  MRN: [id number]   LOS: 0 days    Patient Care Team:  Len Seo MD as PCP - General  McFarlanBlaire ribera APRN as Nurse Practitioner (General Surgery)  Subjective   SUBJECTIVE   No acute events overnight.          Objective   OBJECTIVE   Vital Signs  Heart Rate:  [] 106    T 97.9  HR 54  RR 18  /53     No intake/output data recorded.    PHYSICAL EXAM    Physical Exam  Constitutional:       Appearance: She is well-developed.   HENT:      Head: Normocephalic.      Left Ear: There is no impacted cerumen.      Nose: No rhinorrhea.   Eyes:      Pupils: Pupils are equal, round, and reactive to light.   Cardiovascular:      Rate and Rhythm: Normal rate and regular rhythm.      Heart sounds: Normal heart sounds.   Pulmonary:      Effort: Pulmonary effort is normal.      Breath sounds: Normal breath sounds.   Abdominal:      General: Bowel sounds are normal.      Palpations: Abdomen is soft.   Musculoskeletal:         General: No swelling.   Skin:     Coloration: Skin is not jaundiced.   Neurological:      General: No focal deficit present.      Mental Status: She is alert. She is disoriented.       RESULTS   Results Review:    Results from last 7 days   Lab Units 23  0420 23  0508 23  0525   SODIUM mmol/L 133* 135* 133*   POTASSIUM mmol/L 4.0 4.3 4.4   CHLORIDE mmol/L 94* 94* 95*   CO2 mmol/L 26.0 24.0 24.0   BUN mg/dL 27* 38* 56*   CREATININE mg/dL 4.24* 4.77* 6.43*   CALCIUM mg/dL 8.4* 8.8 8.1*   BILIRUBIN mg/dL 0.5 0.6 0.6   ALK PHOS U/L 294* 336* 365*   ALT (SGPT) U/L 48* 51* 43*   AST (SGOT) U/L 35* 55* 44*   GLUCOSE mg/dL 132* 124* 141*         Estimated Creatinine Clearance: 10.1 mL/min (A) (by C-G formula based on SCr of 4.24  mg/dL (H)).    Results from last 7 days   Lab Units 08/25/23  0420 08/23/23  0508 08/21/23  0525   MAGNESIUM mg/dL 1.7 1.9 1.6               Results from last 7 days   Lab Units 08/25/23  0420 08/23/23  0508 08/21/23  0525   WBC 10*3/mm3 6.95 10.46 10.37   HEMOGLOBIN g/dL 8.6* 10.0* 9.0*   PLATELETS 10*3/mm3 167 183 239                   Imaging Results (Last 24 Hours)       ** No results found for the last 24 hours. **             MEDICATIONS    amiodarone, 200 mg, Oral, BID With Meals  dilTIAZem, 60 mg, Oral, Q6H  dronabinol, 5 mg, Oral, BID AC  epoetin karen/karen-epbx, 6,000 Units, Intravenous, Once per day on Mon Wed Fri  heparin (porcine), 5,000 Units, Subcutaneous, Q12H  heparin (porcine), 2,000 Units, Intravenous, Once  heparin (porcine), 2,000 Units, Intravenous, Once  [START ON 8/28/2023] heparin (porcine), 2,000 Units, Intravenous, Once  [START ON 8/30/2023] heparin (porcine), 2,000 Units, Intravenous, Once  heparin (porcine), 3,700 Units, Intracatheter, Once  HYDROcodone-acetaminophen, 1 tablet, Oral, Once  Insulin Aspart, 2-12 Units, Subcutaneous, 4 times per day  isosorbide mononitrate, 30 mg, Oral, Daily  lansoprazole, 30 mg, Oral, Q12H  magnesium sulfate, 2 g, Intravenous, Once  metoprolol tartrate, 2.5 mg, Intravenous, Once  metoprolol tartrate, 100 mg, Oral, Q12H  rosuvastatin, 40 mg, Oral, Daily  sodium chloride 0.9 % flush, 10 mL, Intravenous, Q8H      amiodarone, 0.5 mg/min  dilTIAZem, 5-15 mg/hr      Assessment & Plan   ASSESSMENT / PLAN      * No active hospital problems. *      1. Acute kidney injury/ Acute tubular necrosis:  - Baseline unknown.   - Creatinine was 1.5 in 10/2022.   - UA 3+ protein, trace leukocytes, 0-2 RBC, 6-12 WBC, 2+ bacteria.   - Urine sodium 26. CT abd large rt retroperitoneal hematoma causing moderate rt hydronephrosis.  - Started HD 8/6/23  -  HD today     2. Renal failure retroperitoneal bleed s/p right J stent for hydronephrosis   - s/p J stent placement by urology  8/11  - Continue to monitor renal function      3. Hypertension/ Afib with RVR:   - Blood pressure is acceptable. HR was high and now metoprolol. Losartan on hold     4. UTI E.fecalis:   - became septic with mild hydro Dr Garcia is consulted. Now stent in place . E.fecalis     5. Cdiff:   -not on po vanc now      6. Hyponatremia:   - Sodium is stable     7. Anemia / retroperitoneal bleed:  - Hemoglobin is acceptable at 8.6, B12 and folate are good on epogen      8.  AMS              This document has been electronically signed by COY Chong on August 25, 2023 11:47 CDT      For this patient encounter, I have reviewed the Nurse Practitioner's documentation, medical decision making, and treatment plan and personally spent time with the patient.

## 2023-08-26 ENCOUNTER — OUTSIDE FACILITY SERVICE (OUTPATIENT)
Dept: PULMONOLOGY | Facility: CLINIC | Age: 83
End: 2023-08-26
Payer: MEDICARE

## 2023-08-26 LAB
GLUCOSE BLDC GLUCOMTR-MCNC: 121 MG/DL (ref 70–130)
GLUCOSE BLDC GLUCOMTR-MCNC: 186 MG/DL (ref 70–130)

## 2023-08-26 PROCEDURE — 25010000002 HEPARIN (PORCINE) PER 1000 UNITS: Performed by: INTERNAL MEDICINE

## 2023-08-26 PROCEDURE — 25010000002 LORAZEPAM PER 2 MG: Performed by: INTERNAL MEDICINE

## 2023-08-26 PROCEDURE — 82948 REAGENT STRIP/BLOOD GLUCOSE: CPT

## 2023-08-26 PROCEDURE — 63710000001 DRONABINOL PER 2.5 MG: Performed by: INTERNAL MEDICINE

## 2023-08-26 NOTE — ACP (ADVANCE CARE PLANNING)
[facility] @ [facility]  INPATIENT PROGRESS NOTE    PATIENT NAME: Carol Sullivan      PHYSICIAN: Josefina Perez MD  : 1940        MRN: [id number]  Patient Care Team:  Len Seo MD as PCP - General  Blaire Camara APRN as Nurse Practitioner (General Surgery)    Chief Complaint: Patient was brought to emergency room at [facility] for confusion.     History of Present Illness: 82-year-old female with significant medical history of hypertension, dyslipidemia, gastroesophageal reflux disease who was brought into the emergency room for confusion.  Patient was also found to have erythema and cellulitis of left breast.  Patient was thought of having mastitis.  Patient's condition was monitored now.  Patient had gradual decrease in her urine output and worsening in her kidney function was noted.  Patient off having renal failure which was worsening over time.  Patient subsequently required hemodialysis patient was placed on hemodialysis per nephrology recommendation.  Patient was admitted to the hospital setting for antibiotics and hemodialysis.  Patient's condition seem to be stabilized however patient remained extremely weak and deconditioned hemodialysis patient was recommended to be admitted to our facility for further therapy and rehabilitation along with monitoring for hemodialysis.     Discharge Summary and H&P: Reviewed from previous hospitalization and information documented above in HPI Section.     Radiology Studies from Previous Hospitalization: Reviewed and are as below:  XR Abdomen KUB     Result Date: 2023  FINDINGS/IMPRESSION: Radiopaque tip of the Dobbhoff tube projects over the second portion of the duodenum, similar to even slightly retracted when compared to the prior exam. Right-sided ureteral stent is again noted and incompletely visualized.  There are probably also right femoral catheters which are  incompletely visualized. Normal bowel gas pattern in the visualized abdomen.     XR Chest Post CVA Port     Result Date: 8/17/2023  Impression: 1. Mild interstitial opacities are likely due to low lung volumes. An element of interstitial edema is not entirely excluded.      EKG From previous hospitalization reviewed and documented below:  ECG 12 Lead Other; hx afib   Preliminary Result   Test Reason : Other~   Blood Pressure :   */*   mmHG   Vent. Rate :  67 BPM     Atrial Rate :  67 BPM      P-R Int : 188 ms          QRS Dur :  78 ms       QT Int : 392 ms       P-R-T Axes :  28   6  16 degrees      QTc Int : 414 ms       Normal sinus rhythm with sinus arrhythmia   Minimal voltage criteria for LVH, may be normal variant   Anteroseptal infarct (cited on or before 30-JUL-2023)   Abnormal ECG   When compared with ECG of 10-AUG-2023 15:02,   Sinus rhythm has replaced Atrial fibrillation   Vent. rate has decreased BY  62 BPM   Nonspecific T wave abnormality, worse in Anterior leads       Referred By:            Confirmed By:              Laboratory results from previous hospitalization is reviewed and below:        Lab Results   Component Value Date     GLUCOSE 139 (H) 08/17/2023     CALCIUM 8.6 08/17/2023      (L) 08/17/2023     K 3.9 08/17/2023     CO2 23.0 08/17/2023     CL 90 (L) 08/17/2023     BUN 72 (H) 08/17/2023     CREATININE 4.86 (H) 08/17/2023     EGFR 8.5 (L) 08/17/2023     BCR 14.8 08/17/2023     ANIONGAP 17.0 (H) 08/17/2023            Lab Results   Component Value Date     WBC 17.07 (H) 08/17/2023     HGB 10.2 (L) 08/17/2023     HCT 29.8 (L) 08/17/2023     MCV 85.9 08/17/2023     PLT         Assessment       Sepsis Secondary to Below.  Somnolence [R40.0]  Mastitis [N61.0]  Sepsis [A41.9]  Sepsis, due to unspecified organism, unspecified whether acute organ dysfunction present [A41.9]   ESRD on Hemodialysis.        DVT Prophylaxis: Heparin.  GI Prophylaxis: Lansoprazole.  Code Status: DNR/DNI.     Plan       -Clinically improving.  -Hemodialysis per nephrology.  -PT/OT as tolerated.  -Heart rate is controlled.  -We will try to get patient out of bed to chair as tolerated.  -Continue other medications as before.  -DVT and GI prophylaxis in place.  -We'll continue monitoring patient in hospital setting and treat patient as course dictates.  -Please review orders for detailed plan of care.  -For Aute and Chronic medical condition we will continue medications described below in medication section which have been reviewed and we will continue unless changed in plan of care.  -Laboratory and diagnostic studies have been independently reviewed and the reports are reviewed as documented below.    Subjective   Interval History:   Patient Complaints:   Patient seen and examined during rounds.  No overnight events noted.  Patient is pleasant.  Family at bedside.  Patient is eager to be discharged.  History taken from: Medical record and nursing staff.    Review of Systems:    Review of Systems   All other systems reviewed and are negative.    Objective   Vital Signs  Temp: 97.7 F         Heart Rate: 82.         Resp: 16.          Blood Pressure: 113/54.         Pulse Ox: 97 %    Physical Exam:   Physical Exam  Vitals and nursing note reviewed.   Constitutional:       Appearance: She is well-developed.   HENT:      Head: Normocephalic and atraumatic.      Nose: Nose normal.   Eyes:      Conjunctiva/sclera: Conjunctivae normal.      Pupils: Pupils are equal, round, and reactive to light.   Neck:      Thyroid: No thyromegaly.      Vascular: No JVD.      Trachea: No tracheal deviation.   Cardiovascular:      Rate and Rhythm: Normal rate and regular rhythm.      Heart sounds: Normal heart sounds.   Pulmonary:      Effort: Pulmonary effort is normal. No respiratory distress.      Breath sounds: Normal breath sounds. No wheezing or rales.   Chest:      Chest wall: No tenderness.   Abdominal:      General: Bowel sounds are normal.  There is no distension.      Palpations: Abdomen is soft.      Tenderness: There is no abdominal tenderness. There is no guarding or rebound.   Musculoskeletal:         General: Normal range of motion.      Cervical back: Normal range of motion and neck supple.   Lymphadenopathy:      Cervical: No cervical adenopathy.   Skin:     General: Skin is warm and dry.      Comments: Intact   Neurological:      Mental Status: She is alert and oriented to person, place, and time.      Cranial Nerves: No cranial nerve deficit.      Deep Tendon Reflexes: Reflexes are normal and symmetric.     Results Review:       Results from last 7 days   Lab Units 08/25/23  0420 08/23/23  0508 08/21/23  0525   SODIUM mmol/L 133* 135* 133*   POTASSIUM mmol/L 4.0 4.3 4.4   CHLORIDE mmol/L 94* 94* 95*   CO2 mmol/L 26.0 24.0 24.0   BUN mg/dL 27* 38* 56*   CREATININE mg/dL 4.24* 4.77* 6.43*   GLUCOSE mg/dL 132* 124* 141*   CALCIUM mg/dL 8.4* 8.8 8.1*   BILIRUBIN mg/dL 0.5 0.6 0.6   ALK PHOS U/L 294* 336* 365*   ALT (SGPT) U/L 48* 51* 43*   AST (SGOT) U/L 35* 55* 44*       Results from last 7 days   Lab Units 08/25/23  0420 08/23/23  0508 08/21/23  0525   MAGNESIUM mg/dL 1.7 1.9 1.6       Results from last 7 days   Lab Units 08/25/23  0420 08/23/23  0508 08/21/23  0525   WBC 10*3/mm3 6.95 10.46 10.37   HEMOGLOBIN g/dL 8.6* 10.0* 9.0*   HEMATOCRIT % 26.9* 32.0* 27.6*   PLATELETS 10*3/mm3 167 183 239       Lab Results   Component Value Date    TROPONINI 0.69 (H) 05/31/2020    TROPONINT 56 (C) 08/06/2023     pH, Arterial   Date Value Ref Range Status   08/11/2023 7.403 7.350 - 7.450 pH units Final     CO2   Date Value Ref Range Status   08/25/2023 26.0 22.0 - 29.0 mmol/L Final     Total CO2   Date Value Ref Range Status   10/27/2022 30 21 - 31 mmol/L Final           Imaging Results (Most Recent)       None           No results found for: ACANTHNAEG, AFBCX, BPERTUSSISCX, BLOODCX  No results found for: BCIDPCR, CXREFLEX, CSFCX, CULTURETIS  No results  found for: CULTURES, HSVCX, URCX  No results found for: EYECULTURE, GCCX, HSVCULTURE, LABHSV  No results found for: LEGIONELLA, MRSACX, MUMPSCX, MYCOPLASCX  No results found for: NOCARDIACX, STOOLCX  No results found for: THROATCX, UNSTIMCULT, URINECX, CULTURE, VZVCULTUR  No results found for: VIRALCULTU, WOUNDCX  Medication Reviewed and Will Continue Unless Documented in Plan:   amiodarone, 200 mg, Oral, BID With Meals  dilTIAZem, 60 mg, Oral, Q6H  dronabinol, 5 mg, Oral, BID AC  epoetin karen/karen-epbx, 6,000 Units, Intravenous, Once per day on Mon Wed Fri  heparin (porcine), 5,000 Units, Subcutaneous, Q12H  heparin (porcine), 2,000 Units, Intravenous, Once  heparin (porcine), 2,000 Units, Intravenous, Once  [START ON 8/28/2023] heparin (porcine), 2,000 Units, Intravenous, Once  [START ON 8/30/2023] heparin (porcine), 2,000 Units, Intravenous, Once  heparin (porcine), 3,700 Units, Intracatheter, Once  HYDROcodone-acetaminophen, 1 tablet, Oral, Once  Insulin Aspart, 2-12 Units, Subcutaneous, 4 times per day  isosorbide mononitrate, 30 mg, Oral, Daily  lansoprazole, 30 mg, Oral, Q12H  magnesium sulfate, 2 g, Intravenous, Once  metoprolol tartrate, 2.5 mg, Intravenous, Once  metoprolol tartrate, 100 mg, Oral, Q12H  rosuvastatin, 40 mg, Oral, Daily  sodium chloride 0.9 % flush, 10 mL, Intravenous, Q8H      amiodarone, 0.5 mg/min  dilTIAZem, 5-15 mg/hr      acetaminophen    acetaminophen    albumin human    dextrose    dilTIAZem    HYDROcodone-acetaminophen    hydrocortisone-bacitracin-zinc oxide-nystatin    LORazepam    melatonin    sodium chloride    sodium chloride 0.9 % flush    traZODone  amiodarone, 0.5 mg/min  dilTIAZem, 5-15 mg/hr       Dietary Orders (From admission, onward)       Start     Ordered    08/24/23 1800  Dietary Nutrition Supplements Other (See Comment); Mighty shake with every meal, Apple sauce with every meal  Daily With Breakfast, Lunch & Dinner      Question Answer Comment   Select Supplement:  Other (See Comment)    Other Mighty shake with every meal, Apple sauce with every meal        08/24/23 1447    08/18/23 1800  Dietary Nutrition Supplements Other (See Comment); Homemade M/S made with Straw Novasource and Berry Magic cup  Daily With Dinner      Question Answer Comment   Select Supplement: Other (See Comment)    Other Homemade M/S made with Straw Novasource and [place] Magic cup        08/18/23 1146    08/18/23 1200  Dietary Nutrition Supplements Other (See Comment); Homemade M/S made with Vanilla Novasource and vanilla magic cup (8ounces)  Daily With Lunch      Question Answer Comment   Select Supplement: Other (See Comment)    Other Homemade M/S made with Vanilla Novasource and vanilla magic cup (8ounces)        08/18/23 1146    08/17/23 1153  Diet: Regular/House Diet, Renal Diets, Diabetic Diets; Consistent Carbohydrate; Low Sodium (2-3g); Texture: Soft to Chew (NDD 3); Soft to Chew: Chopped Meat; Fluid Consistency: Thin (IDDSI 0)  Diet Effective Now        References:    Diet Order [place]   Question Answer Comment   Diets: Regular/House Diet    Diets: Renal Diets    Diets: Diabetic Diets    Diabetic Diet: Consistent Carbohydrate    Renal Diet: Low Sodium (2-3g)    Texture: Soft to Chew (NDD 3)    Soft to Chew: Chopped Meat    Fluid Consistency: Thin (IDDSI 0)        08/17/23 1156                  History, physical exam, assessment and plan may have been partly or fully copied from before, but  changes made to the copied record to reflect care on the date of service. Part of the lab and imaging  reports auto populated and corrected. Some of this note may be an electronic transcription of spoken  language to printed text. This may permit erroneous, or at times, nonsensical words or phrases to be  inadvertently transcribed. Although I have reviewed the note for such errors, some may still exist.      This document has been electronically signed by Josefina Perez MD on August 26, 2023 15:17  CDT

## 2023-08-26 NOTE — ACP (ADVANCE CARE PLANNING)
[facility]  [address] [place] [address]. [address]  T - [phone]  F - [phone]     Progress Note          PATIENT  DEMOGRAPHICS   PATIENT NAME: Carol Sullivan                      PHYSICIAN: COY Prakash  : 1940  MRN: [id number]   LOS: 0 days    Patient Care Team:  Len Seo MD as PCP - General  BluejacketBlaire ribera APRN as Nurse Practitioner (General Surgery)  Subjective   SUBJECTIVE   No acute events overnight.          Objective   OBJECTIVE   Vital Signs  Heart Rate:  [66-75] 75    T 97.7  HR 82  RR 16  /54  O2: 97% RA     No intake/output data recorded.    PHYSICAL EXAM    Physical Exam  Constitutional:       Appearance: She is well-developed.   HENT:      Head: Normocephalic.      Left Ear: There is no impacted cerumen.      Nose: No rhinorrhea.   Eyes:      Pupils: Pupils are equal, round, and reactive to light.   Cardiovascular:      Rate and Rhythm: Normal rate and regular rhythm.      Heart sounds: Normal heart sounds.   Pulmonary:      Effort: Pulmonary effort is normal.      Breath sounds: Normal breath sounds.   Abdominal:      General: Bowel sounds are normal.      Palpations: Abdomen is soft.   Musculoskeletal:         General: No swelling.   Skin:     Coloration: Skin is not jaundiced.   Neurological:      General: No focal deficit present.      Mental Status: She is alert. She is disoriented.       RESULTS   Results Review:    Results from last 7 days   Lab Units 23  0420 23  0508 23  0525   SODIUM mmol/L 133* 135* 133*   POTASSIUM mmol/L 4.0 4.3 4.4   CHLORIDE mmol/L 94* 94* 95*   CO2 mmol/L 26.0 24.0 24.0   BUN mg/dL 27* 38* 56*   CREATININE mg/dL 4.24* 4.77* 6.43*   CALCIUM mg/dL 8.4* 8.8 8.1*   BILIRUBIN mg/dL 0.5 0.6 0.6   ALK PHOS U/L 294* 336* 365*   ALT (SGPT) U/L 48* 51* 43*   AST (SGOT) U/L 35* 55* 44*   GLUCOSE mg/dL 132* 124* 141*         Estimated Creatinine Clearance: 10.1 mL/min (A) (by C-G formula based on SCr  of 4.24 mg/dL (H)).    Results from last 7 days   Lab Units 08/25/23  0420 08/23/23  0508 08/21/23  0525   MAGNESIUM mg/dL 1.7 1.9 1.6               Results from last 7 days   Lab Units 08/25/23  0420 08/23/23  0508 08/21/23  0525   WBC 10*3/mm3 6.95 10.46 10.37   HEMOGLOBIN g/dL 8.6* 10.0* 9.0*   PLATELETS 10*3/mm3 167 183 239                   Imaging Results (Last 24 Hours)       ** No results found for the last 24 hours. **             MEDICATIONS    amiodarone, 200 mg, Oral, BID With Meals  dilTIAZem, 60 mg, Oral, Q6H  dronabinol, 5 mg, Oral, BID AC  epoetin karen/karen-epbx, 6,000 Units, Intravenous, Once per day on Mon Wed Fri  heparin (porcine), 5,000 Units, Subcutaneous, Q12H  heparin (porcine), 2,000 Units, Intravenous, Once  heparin (porcine), 2,000 Units, Intravenous, Once  [START ON 8/28/2023] heparin (porcine), 2,000 Units, Intravenous, Once  [START ON 8/30/2023] heparin (porcine), 2,000 Units, Intravenous, Once  heparin (porcine), 3,700 Units, Intracatheter, Once  HYDROcodone-acetaminophen, 1 tablet, Oral, Once  Insulin Aspart, 2-12 Units, Subcutaneous, 4 times per day  isosorbide mononitrate, 30 mg, Oral, Daily  lansoprazole, 30 mg, Oral, Q12H  magnesium sulfate, 2 g, Intravenous, Once  metoprolol tartrate, 2.5 mg, Intravenous, Once  metoprolol tartrate, 100 mg, Oral, Q12H  rosuvastatin, 40 mg, Oral, Daily  sodium chloride 0.9 % flush, 10 mL, Intravenous, Q8H      amiodarone, 0.5 mg/min  dilTIAZem, 5-15 mg/hr      Assessment & Plan   ASSESSMENT / PLAN      * No active hospital problems. *      1. Acute kidney injury/ Acute tubular necrosis:  - Baseline unknown.   - Creatinine was 1.5 in 10/2022.   - UA 3+ protein, trace leukocytes, 0-2 RBC, 6-12 WBC, 2+ bacteria.   - Urine sodium 26. CT abd large rt retroperitoneal hematoma causing moderate rt hydronephrosis.  - Started HD 8/6/23  -  HD yesterday     2. Renal failure retroperitoneal bleed s/p right J stent for hydronephrosis   - s/p J stent placement  by urology 8/11  - Continue to monitor renal function      3. Hypertension/ Afib with RVR:   - Blood pressure is acceptable. HR was high and now metoprolol. Losartan on hold     4. UTI E.fecalis:   - became septic with mild hydro Dr Garcia is consulted. Now stent in place . E.fecalis     5. Cdiff:   -not on po vanc now      6. Hyponatremia:   - Sodium is stable     7. Anemia / retroperitoneal bleed:  - Hemoglobin is acceptable at 8.6, B12 and folate are good on epogen      8.  AMS              This document has been electronically signed by COY Prakash on August 26, 2023 11:45 CDT

## 2023-08-27 ENCOUNTER — OUTSIDE FACILITY SERVICE (OUTPATIENT)
Dept: PULMONOLOGY | Facility: CLINIC | Age: 83
End: 2023-08-27
Payer: MEDICARE

## 2023-08-27 LAB
GLUCOSE BLDC GLUCOMTR-MCNC: 105 MG/DL (ref 70–130)
GLUCOSE BLDC GLUCOMTR-MCNC: 122 MG/DL (ref 70–130)
GLUCOSE BLDC GLUCOMTR-MCNC: 156 MG/DL (ref 70–130)
GLUCOSE BLDC GLUCOMTR-MCNC: 161 MG/DL (ref 70–130)
QT INTERVAL: 308 MS
QT INTERVAL: 328 MS
QT INTERVAL: 330 MS
QT INTERVAL: 352 MS
QT INTERVAL: 400 MS
QTC INTERVAL: 396 MS
QTC INTERVAL: 432 MS
QTC INTERVAL: 440 MS
QTC INTERVAL: 446 MS
QTC INTERVAL: 447 MS

## 2023-08-27 PROCEDURE — 82948 REAGENT STRIP/BLOOD GLUCOSE: CPT

## 2023-08-27 PROCEDURE — 25010000002 HEPARIN (PORCINE) PER 1000 UNITS: Performed by: INTERNAL MEDICINE

## 2023-08-27 PROCEDURE — 63710000001 DRONABINOL PER 2.5 MG: Performed by: INTERNAL MEDICINE

## 2023-08-27 PROCEDURE — 97530 THERAPEUTIC ACTIVITIES: CPT

## 2023-08-27 RX ORDER — BUSPIRONE HYDROCHLORIDE 5 MG/1
5 TABLET ORAL 2 TIMES DAILY
Status: DISCONTINUED | OUTPATIENT
Start: 2023-08-27 | End: 2023-09-15 | Stop reason: HOSPADM

## 2023-08-27 NOTE — ACP (ADVANCE CARE PLANNING)
[facility]  [address] [place] [address]. [address]  T - [phone]  F - [phone]     Progress Note          PATIENT  DEMOGRAPHICS   PATIENT NAME: Carol Sullivan                      PHYSICIAN: COY Prakash  : 1940  MRN: [id number]   LOS: 0 days    Patient Care Team:  Len Seo MD as PCP - General  OltonBlaire ribera APRN as Nurse Practitioner (General Surgery)  Subjective   SUBJECTIVE   No acute events overnight.          Objective   OBJECTIVE   Vital Signs  Heart Rate:  [63-89] 89    T 97.7  HR 82  RR 16  /54  O2: 97% RA     No intake/output data recorded.    PHYSICAL EXAM    Physical Exam  Constitutional:       Appearance: She is well-developed.   HENT:      Head: Normocephalic.      Left Ear: There is no impacted cerumen.      Nose: No rhinorrhea.   Eyes:      Pupils: Pupils are equal, round, and reactive to light.   Cardiovascular:      Rate and Rhythm: Normal rate and regular rhythm.      Heart sounds: Normal heart sounds.   Pulmonary:      Effort: Pulmonary effort is normal.      Breath sounds: Normal breath sounds.   Abdominal:      General: Bowel sounds are normal.      Palpations: Abdomen is soft.   Musculoskeletal:         General: No swelling.   Skin:     Coloration: Skin is not jaundiced.   Neurological:      General: No focal deficit present.      Mental Status: She is alert. She is disoriented.       RESULTS   Results Review:    Results from last 7 days   Lab Units 23  0420 23  0508 23  0525   SODIUM mmol/L 133* 135* 133*   POTASSIUM mmol/L 4.0 4.3 4.4   CHLORIDE mmol/L 94* 94* 95*   CO2 mmol/L 26.0 24.0 24.0   BUN mg/dL 27* 38* 56*   CREATININE mg/dL 4.24* 4.77* 6.43*   CALCIUM mg/dL 8.4* 8.8 8.1*   BILIRUBIN mg/dL 0.5 0.6 0.6   ALK PHOS U/L 294* 336* 365*   ALT (SGPT) U/L 48* 51* 43*   AST (SGOT) U/L 35* 55* 44*   GLUCOSE mg/dL 132* 124* 141*         Estimated Creatinine Clearance: 10.1 mL/min (A) (by C-G formula based on SCr  of 4.24 mg/dL (H)).    Results from last 7 days   Lab Units 08/25/23  0420 08/23/23  0508 08/21/23  0525   MAGNESIUM mg/dL 1.7 1.9 1.6               Results from last 7 days   Lab Units 08/25/23  0420 08/23/23  0508 08/21/23  0525   WBC 10*3/mm3 6.95 10.46 10.37   HEMOGLOBIN g/dL 8.6* 10.0* 9.0*   PLATELETS 10*3/mm3 167 183 239                   Imaging Results (Last 24 Hours)       ** No results found for the last 24 hours. **             MEDICATIONS    amiodarone, 200 mg, Oral, BID With Meals  dilTIAZem, 60 mg, Oral, Q6H  dronabinol, 5 mg, Oral, BID AC  epoetin karen/karen-epbx, 6,000 Units, Intravenous, Once per day on Mon Wed Fri  heparin (porcine), 5,000 Units, Subcutaneous, Q12H  heparin (porcine), 2,000 Units, Intravenous, Once  heparin (porcine), 2,000 Units, Intravenous, Once  [START ON 8/28/2023] heparin (porcine), 2,000 Units, Intravenous, Once  [START ON 8/30/2023] heparin (porcine), 2,000 Units, Intravenous, Once  heparin (porcine), 3,700 Units, Intracatheter, Once  HYDROcodone-acetaminophen, 1 tablet, Oral, Once  Insulin Aspart, 2-12 Units, Subcutaneous, 4 times per day  isosorbide mononitrate, 30 mg, Oral, Daily  lansoprazole, 30 mg, Oral, Q12H  magnesium sulfate, 2 g, Intravenous, Once  metoprolol tartrate, 2.5 mg, Intravenous, Once  metoprolol tartrate, 100 mg, Oral, Q12H  rosuvastatin, 40 mg, Oral, Daily  sodium chloride 0.9 % flush, 10 mL, Intravenous, Q8H      amiodarone, 0.5 mg/min  dilTIAZem, 5-15 mg/hr      Assessment & Plan   ASSESSMENT / PLAN      * No active hospital problems. *      1. Acute kidney injury/ Acute tubular necrosis:  - Baseline unknown.   - Creatinine was 1.5 in 10/2022.   - UA 3+ protein, trace leukocytes, 0-2 RBC, 6-12 WBC, 2+ bacteria.   - Urine sodium 26. CT abd large rt retroperitoneal hematoma causing moderate rt hydronephrosis.  - Started HD 8/6/23  -  HD last friday     2. Renal failure retroperitoneal bleed s/p right J stent for hydronephrosis   - s/p J stent placement  by urology 8/11  - Continue to monitor renal function      3. Hypertension/ Afib with RVR:   - Blood pressure is acceptable. HR was high and now metoprolol. Losartan on hold     4. UTI E.fecalis:   - became septic with mild hydro Dr Garcia is consulted. Now stent in place . E.fecalis     5. Cdiff:   -not on po vanc now      6. Hyponatremia:   - Sodium is stable     7. Anemia / retroperitoneal bleed:  - Hemoglobin is acceptable at 8.6, B12 and folate are good on epogen      8.  AMS              This document has been electronically signed by COY Prakash on August 27, 2023 14:16 CDT

## 2023-08-27 NOTE — ACP (ADVANCE CARE PLANNING)
[facility] @ [facility]  INPATIENT PROGRESS NOTE    PATIENT NAME: Carol Sullivan      PHYSICIAN: Josefina Perez MD  : 1940        MRN: [id number]  Patient Care Team:  Len Seo MD as PCP - General  Blaire Camara APRN as Nurse Practitioner (General Surgery)    Chief Complaint: Patient was brought to emergency room at [facility] for confusion.     History of Present Illness: 82-year-old female with significant medical history of hypertension, dyslipidemia, gastroesophageal reflux disease who was brought into the emergency room for confusion.  Patient was also found to have erythema and cellulitis of left breast.  Patient was thought of having mastitis.  Patient's condition was monitored now.  Patient had gradual decrease in her urine output and worsening in her kidney function was noted.  Patient off having renal failure which was worsening over time.  Patient subsequently required hemodialysis patient was placed on hemodialysis per nephrology recommendation.  Patient was admitted to the hospital setting for antibiotics and hemodialysis.  Patient's condition seem to be stabilized however patient remained extremely weak and deconditioned hemodialysis patient was recommended to be admitted to our facility for further therapy and rehabilitation along with monitoring for hemodialysis.     Discharge Summary and H&P: Reviewed from previous hospitalization and information documented above in HPI Section.     Radiology Studies from Previous Hospitalization: Reviewed and are as below:  XR Abdomen KUB     Result Date: 2023  FINDINGS/IMPRESSION: Radiopaque tip of the Dobbhoff tube projects over the second portion of the duodenum, similar to even slightly retracted when compared to the prior exam. Right-sided ureteral stent is again noted and incompletely visualized.  There are probably also right femoral catheters which are  incompletely visualized. Normal bowel gas pattern in the visualized abdomen.     XR Chest Post CVA Port     Result Date: 8/17/2023  Impression: 1. Mild interstitial opacities are likely due to low lung volumes. An element of interstitial edema is not entirely excluded.      EKG From previous hospitalization reviewed and documented below:  ECG 12 Lead Other; hx afib   Preliminary Result   Test Reason : Other~   Blood Pressure :   */*   mmHG   Vent. Rate :  67 BPM     Atrial Rate :  67 BPM      P-R Int : 188 ms          QRS Dur :  78 ms       QT Int : 392 ms       P-R-T Axes :  28   6  16 degrees      QTc Int : 414 ms       Normal sinus rhythm with sinus arrhythmia   Minimal voltage criteria for LVH, may be normal variant   Anteroseptal infarct (cited on or before 30-JUL-2023)   Abnormal ECG   When compared with ECG of 10-AUG-2023 15:02,   Sinus rhythm has replaced Atrial fibrillation   Vent. rate has decreased BY  62 BPM   Nonspecific T wave abnormality, worse in Anterior leads       Referred By:            Confirmed By:              Laboratory results from previous hospitalization is reviewed and below:        Lab Results   Component Value Date     GLUCOSE 139 (H) 08/17/2023     CALCIUM 8.6 08/17/2023      (L) 08/17/2023     K 3.9 08/17/2023     CO2 23.0 08/17/2023     CL 90 (L) 08/17/2023     BUN 72 (H) 08/17/2023     CREATININE 4.86 (H) 08/17/2023     EGFR 8.5 (L) 08/17/2023     BCR 14.8 08/17/2023     ANIONGAP 17.0 (H) 08/17/2023            Lab Results   Component Value Date     WBC 17.07 (H) 08/17/2023     HGB 10.2 (L) 08/17/2023     HCT 29.8 (L) 08/17/2023     MCV 85.9 08/17/2023     PLT         Assessment       Sepsis Secondary to Below.  Somnolence [R40.0]  Mastitis [N61.0]  Sepsis [A41.9]  Sepsis, due to unspecified organism, unspecified whether acute organ dysfunction present [A41.9]   ESRD on Hemodialysis.        DVT Prophylaxis: Heparin.  GI Prophylaxis: Lansoprazole.  Code Status: DNR/DNI.     Plan       -Continue with hemodialysis.  -Nephrology follow-up appreciated.  -Aggressive therapy recommended.  -PT/OT as tolerated.  -We will start patient on BuSpar instead of benzodiazepine.  -We will consider getting patient out of bed to chair as tolerated.  -DVT and GI prophylaxis in place.  -We'll continue monitoring patient in hospital setting and treat patient as course dictates.  -Please review orders for detailed plan of care.  -For Aute and Chronic medical condition we will continue medications described below in medication section which have been reviewed and we will continue unless changed in plan of care.  -Laboratory and diagnostic studies have been independently reviewed and the reports are reviewed as documented below.    Subjective   Interval History:   Patient Complaints:   Patient seen and examined.  No overnight events noted.  Patient denies any complaints.  Eager to be discharged.  History taken from: Medical record and nursing staff.    Review of Systems:    Review of Systems   All other systems reviewed and are negative.    Objective   Vital Signs  Temp: 99.5 F         Heart Rate: 75.         Resp: 20.          Blood Pressure: 154/71.         Pulse Ox: 93. %    Physical Exam:   Physical Exam  Vitals and nursing note reviewed.   Constitutional:       Appearance: She is well-developed.   HENT:      Head: Normocephalic and atraumatic.      Nose: Nose normal.   Eyes:      Conjunctiva/sclera: Conjunctivae normal.      Pupils: Pupils are equal, round, and reactive to light.   Neck:      Thyroid: No thyromegaly.      Vascular: No JVD.      Trachea: No tracheal deviation.   Cardiovascular:      Rate and Rhythm: Normal rate and regular rhythm.      Heart sounds: Normal heart sounds.   Pulmonary:      Effort: Pulmonary effort is normal. No respiratory distress.      Breath sounds: Normal breath sounds. No wheezing or rales.   Chest:      Chest wall: No tenderness.   Abdominal:      General: Bowel sounds are  normal. There is no distension.      Palpations: Abdomen is soft.      Tenderness: There is no abdominal tenderness. There is no guarding or rebound.   Musculoskeletal:         General: Normal range of motion.      Cervical back: Normal range of motion and neck supple.   Lymphadenopathy:      Cervical: No cervical adenopathy.   Skin:     General: Skin is warm and dry.      Comments: Intact   Neurological:      Mental Status: She is alert and oriented to person, place, and time.      Cranial Nerves: No cranial nerve deficit.      Deep Tendon Reflexes: Reflexes are normal and symmetric.     Results Review:       Results from last 7 days   Lab Units 08/25/23  0420 08/23/23  0508 08/21/23  0525   SODIUM mmol/L 133* 135* 133*   POTASSIUM mmol/L 4.0 4.3 4.4   CHLORIDE mmol/L 94* 94* 95*   CO2 mmol/L 26.0 24.0 24.0   BUN mg/dL 27* 38* 56*   CREATININE mg/dL 4.24* 4.77* 6.43*   GLUCOSE mg/dL 132* 124* 141*   CALCIUM mg/dL 8.4* 8.8 8.1*   BILIRUBIN mg/dL 0.5 0.6 0.6   ALK PHOS U/L 294* 336* 365*   ALT (SGPT) U/L 48* 51* 43*   AST (SGOT) U/L 35* 55* 44*       Results from last 7 days   Lab Units 08/25/23  0420 08/23/23  0508 08/21/23  0525   MAGNESIUM mg/dL 1.7 1.9 1.6       Results from last 7 days   Lab Units 08/25/23  0420 08/23/23  0508 08/21/23  0525   WBC 10*3/mm3 6.95 10.46 10.37   HEMOGLOBIN g/dL 8.6* 10.0* 9.0*   HEMATOCRIT % 26.9* 32.0* 27.6*   PLATELETS 10*3/mm3 167 183 239       Lab Results   Component Value Date    TROPONINI 0.69 (H) 05/31/2020    TROPONINT 56 (C) 08/06/2023     pH, Arterial   Date Value Ref Range Status   08/11/2023 7.403 7.350 - 7.450 pH units Final     CO2   Date Value Ref Range Status   08/25/2023 26.0 22.0 - 29.0 mmol/L Final     Total CO2   Date Value Ref Range Status   10/27/2022 30 21 - 31 mmol/L Final           Imaging Results (Most Recent)       None           No results found for: ACANTHNAEG, AFBCX, BPERTUSSISCX, BLOODCX  No results found for: BCIDPCR, CXREFLEX, CSFCX, CULTURETIS  No  results found for: CULTURES, HSVCX, URCX  No results found for: EYECULTURE, GCCX, HSVCULTURE, LABHSV  No results found for: LEGIONELLA, MRSACX, MUMPSCX, MYCOPLASCX  No results found for: NOCARDIACX, STOOLCX  No results found for: THROATCX, UNSTIMCULT, URINECX, CULTURE, VZVCULTUR  No results found for: VIRALCULTU, WOUNDCX  Medication Reviewed and Will Continue Unless Documented in Plan:   amiodarone, 200 mg, Oral, BID With Meals  dilTIAZem, 60 mg, Oral, Q6H  dronabinol, 5 mg, Oral, BID AC  epoetin karen/karen-epbx, 6,000 Units, Intravenous, Once per day on Mon Wed Fri  heparin (porcine), 5,000 Units, Subcutaneous, Q12H  heparin (porcine), 2,000 Units, Intravenous, Once  heparin (porcine), 2,000 Units, Intravenous, Once  [START ON 8/28/2023] heparin (porcine), 2,000 Units, Intravenous, Once  [START ON 8/30/2023] heparin (porcine), 2,000 Units, Intravenous, Once  heparin (porcine), 3,700 Units, Intracatheter, Once  HYDROcodone-acetaminophen, 1 tablet, Oral, Once  Insulin Aspart, 2-12 Units, Subcutaneous, 4 times per day  isosorbide mononitrate, 30 mg, Oral, Daily  lansoprazole, 30 mg, Oral, Q12H  magnesium sulfate, 2 g, Intravenous, Once  metoprolol tartrate, 2.5 mg, Intravenous, Once  metoprolol tartrate, 100 mg, Oral, Q12H  rosuvastatin, 40 mg, Oral, Daily  sodium chloride 0.9 % flush, 10 mL, Intravenous, Q8H      amiodarone, 0.5 mg/min  dilTIAZem, 5-15 mg/hr      acetaminophen    acetaminophen    albumin human    dextrose    dilTIAZem    HYDROcodone-acetaminophen    hydrocortisone-bacitracin-zinc oxide-nystatin    LORazepam    melatonin    sodium chloride    sodium chloride 0.9 % flush    traZODone  amiodarone, 0.5 mg/min  dilTIAZem, 5-15 mg/hr       Dietary Orders (From admission, onward)       Start     Ordered    08/24/23 1800  Dietary Nutrition Supplements Other (See Comment); Mighty shake with every meal, Apple sauce with every meal  Daily With Breakfast, Lunch & Dinner      Question Answer Comment   Select  Supplement: Other (See Comment)    Other Mighty shake with every meal, Apple sauce with every meal        08/24/23 1447    08/18/23 1800  Dietary Nutrition Supplements Other (See Comment); Homemade M/S made with Straw Novasource and Berry Magic cup  Daily With Dinner      Question Answer Comment   Select Supplement: Other (See Comment)    Other Homemade M/S made with Straw Novasource and [place] Magic cup        08/18/23 1146    08/18/23 1200  Dietary Nutrition Supplements Other (See Comment); Homemade M/S made with Vanilla Novasource and vanilla magic cup (8ounces)  Daily With Lunch      Question Answer Comment   Select Supplement: Other (See Comment)    Other Homemade M/S made with Vanilla Novasource and vanilla magic cup (8ounces)        08/18/23 1146    08/17/23 1153  Diet: Regular/House Diet, Renal Diets, Diabetic Diets; Consistent Carbohydrate; Low Sodium (2-3g); Texture: Soft to Chew (NDD 3); Soft to Chew: Chopped Meat; Fluid Consistency: Thin (IDDSI 0)  Diet Effective Now        References:    Diet Order Crosswalk   Question Answer Comment   Diets: Regular/House Diet    Diets: Renal Diets    Diets: Diabetic Diets    Diabetic Diet: Consistent Carbohydrate    Renal Diet: Low Sodium (2-3g)    Texture: Soft to Chew (NDD 3)    Soft to Chew: Chopped Meat    Fluid Consistency: Thin (IDDSI 0)        08/17/23 1156                  History, physical exam, assessment and plan may have been partly or fully copied from before, but  changes made to the copied record to reflect care on the date of service. Part of the lab and imaging  reports auto populated and corrected. Some of this note may be an electronic transcription of spoken  language to printed text. This may permit erroneous, or at times, nonsensical words or phrases to be  inadvertently transcribed. Although I have reviewed the note for such errors, some may still exist.      This document has been electronically signed by Josefina Perez MD on August 27,  2023 15:08 CDT

## 2023-08-28 ENCOUNTER — OUTSIDE FACILITY SERVICE (OUTPATIENT)
Dept: PULMONOLOGY | Facility: CLINIC | Age: 83
End: 2023-08-28
Payer: MEDICARE

## 2023-08-28 LAB
ALBUMIN SERPL-MCNC: 2.6 G/DL (ref 3.5–5.2)
ALBUMIN/GLOB SERPL: 0.6 G/DL
ALP SERPL-CCNC: 262 U/L (ref 39–117)
ALT SERPL W P-5'-P-CCNC: 28 U/L (ref 1–33)
ANION GAP SERPL CALCULATED.3IONS-SCNC: 12 MMOL/L (ref 5–15)
AST SERPL-CCNC: 22 U/L (ref 1–32)
BACTERIA SPEC AEROBE CULT: NORMAL
BACTERIA SPEC AEROBE CULT: NORMAL
BASOPHILS # BLD AUTO: 0.07 10*3/MM3 (ref 0–0.2)
BASOPHILS NFR BLD AUTO: 1.5 % (ref 0–1.5)
BILIRUB SERPL-MCNC: 0.5 MG/DL (ref 0–1.2)
BUN SERPL-MCNC: 30 MG/DL (ref 8–23)
BUN/CREAT SERPL: 5.5 (ref 7–25)
CALCIUM SPEC-SCNC: 8.2 MG/DL (ref 8.6–10.5)
CHLORIDE SERPL-SCNC: 94 MMOL/L (ref 98–107)
CO2 SERPL-SCNC: 27 MMOL/L (ref 22–29)
CREAT SERPL-MCNC: 5.5 MG/DL (ref 0.57–1)
DEPRECATED RDW RBC AUTO: 54.2 FL (ref 37–54)
EGFRCR SERPLBLD CKD-EPI 2021: 7.3 ML/MIN/1.73
EOSINOPHIL # BLD AUTO: 0.45 10*3/MM3 (ref 0–0.4)
EOSINOPHIL NFR BLD AUTO: 9.8 % (ref 0.3–6.2)
ERYTHROCYTE [DISTWIDTH] IN BLOOD BY AUTOMATED COUNT: 17 % (ref 12.3–15.4)
GLOBULIN UR ELPH-MCNC: 4.2 GM/DL
GLUCOSE BLDC GLUCOMTR-MCNC: 127 MG/DL (ref 70–130)
GLUCOSE BLDC GLUCOMTR-MCNC: 216 MG/DL (ref 70–130)
GLUCOSE SERPL-MCNC: 105 MG/DL (ref 65–99)
HCT VFR BLD AUTO: 30.7 % (ref 34–46.6)
HGB BLD-MCNC: 10 G/DL (ref 12–15.9)
IMM GRANULOCYTES # BLD AUTO: 0.03 10*3/MM3 (ref 0–0.05)
IMM GRANULOCYTES NFR BLD AUTO: 0.7 % (ref 0–0.5)
LYMPHOCYTES # BLD AUTO: 0.98 10*3/MM3 (ref 0.7–3.1)
LYMPHOCYTES NFR BLD AUTO: 21.4 % (ref 19.6–45.3)
MAGNESIUM SERPL-MCNC: 1.6 MG/DL (ref 1.6–2.4)
MCH RBC QN AUTO: 28.9 PG (ref 26.6–33)
MCHC RBC AUTO-ENTMCNC: 32.6 G/DL (ref 31.5–35.7)
MCV RBC AUTO: 88.7 FL (ref 79–97)
MONOCYTES # BLD AUTO: 0.58 10*3/MM3 (ref 0.1–0.9)
MONOCYTES NFR BLD AUTO: 12.7 % (ref 5–12)
NEUTROPHILS NFR BLD AUTO: 2.47 10*3/MM3 (ref 1.7–7)
NEUTROPHILS NFR BLD AUTO: 53.9 % (ref 42.7–76)
NRBC BLD AUTO-RTO: 0 /100 WBC (ref 0–0.2)
PLATELET # BLD AUTO: 226 10*3/MM3 (ref 140–450)
PMV BLD AUTO: 9.7 FL (ref 6–12)
POTASSIUM SERPL-SCNC: 4.3 MMOL/L (ref 3.5–5.2)
PROT SERPL-MCNC: 6.8 G/DL (ref 6–8.5)
RBC # BLD AUTO: 3.46 10*6/MM3 (ref 3.77–5.28)
SODIUM SERPL-SCNC: 133 MMOL/L (ref 136–145)
WBC NRBC COR # BLD: 4.58 10*3/MM3 (ref 3.4–10.8)

## 2023-08-28 PROCEDURE — 83735 ASSAY OF MAGNESIUM: CPT | Performed by: INTERNAL MEDICINE

## 2023-08-28 PROCEDURE — 25010000002 ALBUMIN HUMAN 25% PER 50 ML: Performed by: INTERNAL MEDICINE

## 2023-08-28 PROCEDURE — 80053 COMPREHEN METABOLIC PANEL: CPT | Performed by: INTERNAL MEDICINE

## 2023-08-28 PROCEDURE — 82948 REAGENT STRIP/BLOOD GLUCOSE: CPT

## 2023-08-28 PROCEDURE — 97530 THERAPEUTIC ACTIVITIES: CPT

## 2023-08-28 PROCEDURE — P9047 ALBUMIN (HUMAN), 25%, 50ML: HCPCS | Performed by: INTERNAL MEDICINE

## 2023-08-28 PROCEDURE — 25010000002 EPOETIN ALFA PER 1000 UNITS: Performed by: INTERNAL MEDICINE

## 2023-08-28 PROCEDURE — 25010000002 HEPARIN (PORCINE) PER 1000 UNITS: Performed by: INTERNAL MEDICINE

## 2023-08-28 PROCEDURE — 63710000001 DRONABINOL PER 2.5 MG: Performed by: INTERNAL MEDICINE

## 2023-08-28 PROCEDURE — 85025 COMPLETE CBC W/AUTO DIFF WBC: CPT | Performed by: INTERNAL MEDICINE

## 2023-08-28 RX ORDER — ALBUMIN (HUMAN) 12.5 G/50ML
25 SOLUTION INTRAVENOUS ONCE
Status: DISCONTINUED | OUTPATIENT
Start: 2023-08-28 | End: 2023-08-31

## 2023-08-28 RX ORDER — METOPROLOL TARTRATE 50 MG/1
50 TABLET, FILM COATED ORAL EVERY 12 HOURS SCHEDULED
Status: DISCONTINUED | OUTPATIENT
Start: 2023-08-29 | End: 2023-09-03

## 2023-08-28 RX ORDER — CYCLOBENZAPRINE HCL 5 MG
5 TABLET ORAL 3 TIMES DAILY PRN
Status: DISCONTINUED | OUTPATIENT
Start: 2023-08-28 | End: 2023-09-15 | Stop reason: HOSPADM

## 2023-08-28 RX ORDER — GABAPENTIN 100 MG/1
100 CAPSULE ORAL 3 TIMES DAILY
Status: DISCONTINUED | OUTPATIENT
Start: 2023-08-28 | End: 2023-09-15 | Stop reason: HOSPADM

## 2023-08-28 NOTE — ACP (ADVANCE CARE PLANNING)
[facility] @ [facility]  INPATIENT PROGRESS NOTE    PATIENT NAME: Carol Sullivan      PHYSICIAN: Josefina Perez MD  : 1940        MRN: [id number]  Patient Care Team:  Len Seo MD as PCP - General  Blaire Camara APRN as Nurse Practitioner (General Surgery)    Chief Complaint: Patient was brought to emergency room at [facility] for confusion.     History of Present Illness: 82-year-old female with significant medical history of hypertension, dyslipidemia, gastroesophageal reflux disease who was brought into the emergency room for confusion.  Patient was also found to have erythema and cellulitis of left breast.  Patient was thought of having mastitis.  Patient's condition was monitored now.  Patient had gradual decrease in her urine output and worsening in her kidney function was noted.  Patient off having renal failure which was worsening over time.  Patient subsequently required hemodialysis patient was placed on hemodialysis per nephrology recommendation.  Patient was admitted to the hospital setting for antibiotics and hemodialysis.  Patient's condition seem to be stabilized however patient remained extremely weak and deconditioned hemodialysis patient was recommended to be admitted to our facility for further therapy and rehabilitation along with monitoring for hemodialysis.     Discharge Summary and H&P: Reviewed from previous hospitalization and information documented above in HPI Section.     Radiology Studies from Previous Hospitalization: Reviewed and are as below:  XR Abdomen KUB     Result Date: 2023  FINDINGS/IMPRESSION: Radiopaque tip of the Dobbhoff tube projects over the second portion of the duodenum, similar to even slightly retracted when compared to the prior exam. Right-sided ureteral stent is again noted and incompletely visualized.  There are probably also right femoral catheters which are  incompletely visualized. Normal bowel gas pattern in the visualized abdomen.     XR Chest Post CVA Port     Result Date: 8/17/2023  Impression: 1. Mild interstitial opacities are likely due to low lung volumes. An element of interstitial edema is not entirely excluded.      EKG From previous hospitalization reviewed and documented below:  ECG 12 Lead Other; hx afib   Preliminary Result   Test Reason : Other~   Blood Pressure :   */*   mmHG   Vent. Rate :  67 BPM     Atrial Rate :  67 BPM      P-R Int : 188 ms          QRS Dur :  78 ms       QT Int : 392 ms       P-R-T Axes :  28   6  16 degrees      QTc Int : 414 ms       Normal sinus rhythm with sinus arrhythmia   Minimal voltage criteria for LVH, may be normal variant   Anteroseptal infarct (cited on or before 30-JUL-2023)   Abnormal ECG   When compared with ECG of 10-AUG-2023 15:02,   Sinus rhythm has replaced Atrial fibrillation   Vent. rate has decreased BY  62 BPM   Nonspecific T wave abnormality, worse in Anterior leads       Referred By:            Confirmed By:              Laboratory results from previous hospitalization is reviewed and below:        Lab Results   Component Value Date     GLUCOSE 139 (H) 08/17/2023     CALCIUM 8.6 08/17/2023      (L) 08/17/2023     K 3.9 08/17/2023     CO2 23.0 08/17/2023     CL 90 (L) 08/17/2023     BUN 72 (H) 08/17/2023     CREATININE 4.86 (H) 08/17/2023     EGFR 8.5 (L) 08/17/2023     BCR 14.8 08/17/2023     ANIONGAP 17.0 (H) 08/17/2023            Lab Results   Component Value Date     WBC 17.07 (H) 08/17/2023     HGB 10.2 (L) 08/17/2023     HCT 29.8 (L) 08/17/2023     MCV 85.9 08/17/2023     PLT         Assessment       Sepsis Secondary to Below.  Somnolence [R40.0]  Mastitis [N61.0]  Sepsis [A41.9]  Sepsis, due to unspecified organism, unspecified whether acute organ dysfunction present [A41.9]   ESRD on Hemodialysis.        DVT Prophylaxis: Heparin.  GI Prophylaxis: Lansoprazole.  Code Status: DNR/DNI.     Plan       -Continue with hemodialysis.  -Nephrology follow-up appreciated.  -Aggressive therapy recommended.  -PT/OT as tolerated.  -Spoke with patient son.  All questions/concerns addressed.    -We will review home medications and place patient on gabapentin as before  -Patient is very restless today  -DVT and GI prophylaxis in place.  -We'll continue monitoring patient in hospital setting and treat patient as course dictates.  -Please review orders for detailed plan of care.  -For Aute and Chronic medical condition we will continue medications described below in medication section which have been reviewed and we will continue unless changed in plan of care.  -Laboratory and diagnostic studies have been independently reviewed and the reports are reviewed as documented below.    Subjective   Interval History:   Patient Complaints:   Patient seen and examined.  Resting comfortably in bed. Discussed diagnosis and prognosis with patient son and significant other.   History taken from: Medical record and nursing staff.    Review of Systems:    Review of Systems   All other systems reviewed and are negative.    Objective   Vital Signs  Temp: 99.6 F         Heart Rate: 90         Resp: 18          Blood Pressure: 113/78         Pulse Ox: 97%    Physical Exam:   Physical Exam  Vitals and nursing note reviewed.   Constitutional:       Appearance: She is well-developed.   HENT:      Head: Normocephalic and atraumatic.      Nose: Nose normal.   Eyes:      Conjunctiva/sclera: Conjunctivae normal.      Pupils: Pupils are equal, round, and reactive to light.   Neck:      Thyroid: No thyromegaly.      Vascular: No JVD.      Trachea: No tracheal deviation.   Cardiovascular:      Rate and Rhythm: Normal rate and regular rhythm.      Heart sounds: Normal heart sounds.   Pulmonary:      Effort: Pulmonary effort is normal. No respiratory distress.      Breath sounds: Normal breath sounds. No wheezing or rales.   Chest:      Chest wall: No  tenderness.   Abdominal:      General: Bowel sounds are normal. There is no distension.      Palpations: Abdomen is soft.      Tenderness: There is no abdominal tenderness. There is no guarding or rebound.   Musculoskeletal:         General: Normal range of motion.      Cervical back: Normal range of motion and neck supple.   Lymphadenopathy:      Cervical: No cervical adenopathy.   Skin:     General: Skin is warm and dry.      Comments: Intact   Neurological:      Mental Status: She is alert and oriented to person, place, and time.      Cranial Nerves: No cranial nerve deficit.      Deep Tendon Reflexes: Reflexes are normal and symmetric.     Results Review:       Results from last 7 days   Lab Units 08/28/23  0345 08/25/23  0420 08/23/23  0508   SODIUM mmol/L 133* 133* 135*   POTASSIUM mmol/L 4.3 4.0 4.3   CHLORIDE mmol/L 94* 94* 94*   CO2 mmol/L 27.0 26.0 24.0   BUN mg/dL 30* 27* 38*   CREATININE mg/dL 5.50* 4.24* 4.77*   GLUCOSE mg/dL 105* 132* 124*   CALCIUM mg/dL 8.2* 8.4* 8.8   BILIRUBIN mg/dL 0.5 0.5 0.6   ALK PHOS U/L 262* 294* 336*   ALT (SGPT) U/L 28 48* 51*   AST (SGOT) U/L 22 35* 55*       Results from last 7 days   Lab Units 08/28/23  0345 08/25/23  0420 08/23/23  0508   MAGNESIUM mg/dL 1.6 1.7 1.9       Results from last 7 days   Lab Units 08/28/23  0345 08/25/23  0420 08/23/23  0508   WBC 10*3/mm3 4.58 6.95 10.46   HEMOGLOBIN g/dL 10.0* 8.6* 10.0*   HEMATOCRIT % 30.7* 26.9* 32.0*   PLATELETS 10*3/mm3 226 167 183       Lab Results   Component Value Date    TROPONINI 0.69 (H) 05/31/2020    TROPONINT 56 (C) 08/06/2023     pH, Arterial   Date Value Ref Range Status   08/11/2023 7.403 7.350 - 7.450 pH units Final     CO2   Date Value Ref Range Status   08/28/2023 27.0 22.0 - 29.0 mmol/L Final     Total CO2   Date Value Ref Range Status   10/27/2022 30 21 - 31 mmol/L Final           Imaging Results (Most Recent)       None           No results found for: ACANTHNAEG, AFBCX, BPERTUSSISCX, BLOODCX  No results  found for: BCIDPCR, CXREFLEX, CSFCX, CULTURETIS  No results found for: CULTURES, HSVCX, URCX  No results found for: EYECULTURE, GCCX, HSVCULTURE, LABHSV  No results found for: LEGIONELLA, MRSACX, MUMPSCX, MYCOPLASCX  No results found for: NOCARDIACX, STOOLCX  No results found for: THROATCX, UNSTIMCULT, URINECX, CULTURE, VZVCULTUR  No results found for: VIRALCULTU, WOUNDCX  Medication Reviewed and Will Continue Unless Documented in Plan:   amiodarone, 200 mg, Oral, BID With Meals  busPIRone, 5 mg, Oral, BID  dilTIAZem, 60 mg, Oral, Q6H  dronabinol, 5 mg, Oral, BID AC  epoetin karen/karen-epbx, 6,000 Units, Intravenous, Once per day on Mon Wed Fri  heparin (porcine), 5,000 Units, Subcutaneous, Q12H  heparin (porcine), 2,000 Units, Intravenous, Once  [START ON 8/30/2023] heparin (porcine), 2,000 Units, Intravenous, Once  HYDROcodone-acetaminophen, 1 tablet, Oral, Once  Insulin Aspart, 2-12 Units, Subcutaneous, 4 times per day  isosorbide mononitrate, 30 mg, Oral, Daily  lansoprazole, 30 mg, Oral, Q12H  magnesium sulfate, 2 g, Intravenous, Once  metoprolol tartrate, 2.5 mg, Intravenous, Once  metoprolol tartrate, 100 mg, Oral, Q12H  rosuvastatin, 40 mg, Oral, Daily  sodium chloride 0.9 % flush, 10 mL, Intravenous, Q8H      amiodarone, 0.5 mg/min  dilTIAZem, 5-15 mg/hr      acetaminophen    acetaminophen    albumin human    cyclobenzaprine    dextrose    dilTIAZem    HYDROcodone-acetaminophen    hydrocortisone-bacitracin-zinc oxide-nystatin    melatonin    sodium chloride    sodium chloride 0.9 % flush    traZODone  amiodarone, 0.5 mg/min  dilTIAZem, 5-15 mg/hr       Dietary Orders (From admission, onward)       Start     Ordered    08/24/23 1800  Dietary Nutrition Supplements Other (See Comment); Mighty shake with every meal, Apple sauce with every meal  Daily With Breakfast, Lunch & Dinner      Question Answer Comment   Select Supplement: Other (See Comment)    Other Mighty shake with every meal, Apple sauce with every  meal        08/24/23 1447    08/18/23 1800  Dietary Nutrition Supplements Other (See Comment); Homemade M/S made with Straw Novasource and Berry Magic cup  Daily With Dinner      Question Answer Comment   Select Supplement: Other (See Comment)    Other Homemade M/S made with Straw Novasource and [place] Magic cup        08/18/23 1146    08/18/23 1200  Dietary Nutrition Supplements Other (See Comment); Homemade M/S made with Vanilla Novasource and vanilla magic cup (8ounces)  Daily With Lunch      Question Answer Comment   Select Supplement: Other (See Comment)    Other Homemade M/S made with Vanilla Novasource and vanilla magic cup (8ounces)        08/18/23 1146    08/17/23 1153  Diet: Regular/House Diet, Renal Diets, Diabetic Diets; Consistent Carbohydrate; Low Sodium (2-3g); Texture: Soft to Chew (NDD 3); Soft to Chew: Chopped Meat; Fluid Consistency: Thin (IDDSI 0)  Diet Effective Now        References:    Diet Order [facility]   Question Answer Comment   Diets: Regular/House Diet    Diets: Renal Diets    Diets: Diabetic Diets    Diabetic Diet: Consistent Carbohydrate    Renal Diet: Low Sodium (2-3g)    Texture: Soft to Chew (NDD 3)    Soft to Chew: Chopped Meat    Fluid Consistency: Thin (IDDSI 0)        08/17/23 1156                  History, physical exam, assessment and plan may have been partly or fully copied from before, but  changes made to the copied record to reflect care on the date of service. Part of the lab and imaging  reports auto populated and corrected. Some of this note may be an electronic transcription of spoken  language to printed text. This may permit erroneous, or at times, nonsensical words or phrases to be  inadvertently transcribed. Although I have reviewed the note for such errors, some may still exist.      This document has been electronically signed by Josefina Perez MD on August 28, 2023 16:38 CDT

## 2023-08-28 NOTE — ACP (ADVANCE CARE PLANNING)
[facility]  [address] [place] [address]. [address]  T - [phone]  F - [phone]     Progress Note          PATIENT  DEMOGRAPHICS   PATIENT NAME: Carol Sullivan                      PHYSICIAN: COY Chong  : 1940  MRN: [id number]   LOS: 0 days    Patient Care Team:  Len Seo MD as PCP - General  Blaire Camara APRN as Nurse Practitioner (General Surgery)  Subjective   SUBJECTIVE   No acute events overnight.          Objective   OBJECTIVE   Vital Signs  Heart Rate:  [] 121    T 97.7  HR 82  RR 16  /78  O2: 97% RA     No intake/output data recorded.    PHYSICAL EXAM    Physical Exam  Constitutional:       Appearance: She is well-developed.   HENT:      Head: Normocephalic.      Left Ear: There is no impacted cerumen.      Nose: No rhinorrhea.   Eyes:      Pupils: Pupils are equal, round, and reactive to light.   Cardiovascular:      Rate and Rhythm: Normal rate and regular rhythm.      Heart sounds: Normal heart sounds.   Pulmonary:      Effort: Pulmonary effort is normal.      Breath sounds: Normal breath sounds.   Abdominal:      General: Bowel sounds are normal.      Palpations: Abdomen is soft.   Musculoskeletal:         General: No swelling.   Skin:     Coloration: Skin is not jaundiced.   Neurological:      General: No focal deficit present.      Mental Status: She is alert. She is disoriented.       RESULTS   Results Review:    Results from last 7 days   Lab Units 23  0345 23  0420 23  0508   SODIUM mmol/L 133* 133* 135*   POTASSIUM mmol/L 4.3 4.0 4.3   CHLORIDE mmol/L 94* 94* 94*   CO2 mmol/L 27.0 26.0 24.0   BUN mg/dL 30* 27* 38*   CREATININE mg/dL 5.50* 4.24* 4.77*   CALCIUM mg/dL 8.2* 8.4* 8.8   BILIRUBIN mg/dL 0.5 0.5 0.6   ALK PHOS U/L 262* 294* 336*   ALT (SGPT) U/L 28 48* 51*   AST (SGOT) U/L 22 35* 55*   GLUCOSE mg/dL 105* 132* 124*         Estimated Creatinine Clearance: 7.8 mL/min (A) (by C-G formula based on SCr of  5.5 mg/dL (H)).    Results from last 7 days   Lab Units 08/28/23  0345 08/25/23  0420 08/23/23  0508   MAGNESIUM mg/dL 1.6 1.7 1.9               Results from last 7 days   Lab Units 08/28/23  0345 08/25/23  0420 08/23/23  0508   WBC 10*3/mm3 4.58 6.95 10.46   HEMOGLOBIN g/dL 10.0* 8.6* 10.0*   PLATELETS 10*3/mm3 226 167 183                   Imaging Results (Last 24 Hours)       ** No results found for the last 24 hours. **             MEDICATIONS    amiodarone, 200 mg, Oral, BID With Meals  busPIRone, 5 mg, Oral, BID  dilTIAZem, 60 mg, Oral, Q6H  dronabinol, 5 mg, Oral, BID AC  epoetin karen/karen-epbx, 6,000 Units, Intravenous, Once per day on Mon Wed Fri  heparin (porcine), 5,000 Units, Subcutaneous, Q12H  heparin (porcine), 2,000 Units, Intravenous, Once  [START ON 8/30/2023] heparin (porcine), 2,000 Units, Intravenous, Once  HYDROcodone-acetaminophen, 1 tablet, Oral, Once  Insulin Aspart, 2-12 Units, Subcutaneous, 4 times per day  isosorbide mononitrate, 30 mg, Oral, Daily  lansoprazole, 30 mg, Oral, Q12H  magnesium sulfate, 2 g, Intravenous, Once  metoprolol tartrate, 2.5 mg, Intravenous, Once  metoprolol tartrate, 100 mg, Oral, Q12H  rosuvastatin, 40 mg, Oral, Daily  sodium chloride 0.9 % flush, 10 mL, Intravenous, Q8H      amiodarone, 0.5 mg/min  dilTIAZem, 5-15 mg/hr      Assessment & Plan   ASSESSMENT / PLAN      * No active hospital problems. *      1. Acute kidney injury/ Acute tubular necrosis:  - Baseline unknown.   - Creatinine was 1.5 in 10/2022.   - UA 3+ protein, trace leukocytes, 0-2 RBC, 6-12 WBC, 2+ bacteria.   - Urine sodium 26. CT abd large rt retroperitoneal hematoma causing moderate rt hydronephrosis.  - Started HD 8/6/23  -  HD today     2. Renal failure retroperitoneal bleed s/p right J stent for hydronephrosis   - s/p J stent placement by urology 8/11  - Continue to monitor renal function      3. Hypertension/ Afib with RVR:   - Blood pressure is acceptable. HR was high and now metoprolol.  Losartan on hold     4. UTI E.fecalis:   - became septic with mild hydro Dr Garcia is consulted. Now stent in place . E.fecalis     5. Cdiff:   -not on po vanc now      6. Hyponatremia:   - Sodium is stable     7. Anemia / retroperitoneal bleed:  - Hemoglobin is acceptable at 10.0, B12 and folate are good on epogen      8.  AMS              This document has been electronically signed by COY Chong on August 28, 2023 13:30 CDT    For this patient encounter, I have reviewed the Nurse Practitioner's documentation, medical decision making, and treatment plan and personally spent time with the patient.

## 2023-08-28 NOTE — ACP (ADVANCE CARE PLANNING)
"  Nutrition F/U:    Nutrition Hx:  Pt is eating so much better than she was per her family.  She took the M/S well as well.  No Gi distress noted per pt.  She will voice no requests    Weight history:  CBW:  148.6 (8/28)  CBW:  152 (8/21)  Admit Wt to [facility] on 7/28 156#  UBW:  Wt loss:  Ht:  65\"     Labs:  Na 133; Gluc 105; bun 30; Creat 5.50; Alb 2.6; Gluc 186/161/122/105  Meds:  Amio; Buspar; Cardizem; Marinol; Epogen; Heparin; SSi; Mg sulfate; AMio; Cardizem    Nutrition Prescription Order:  Regular/Renal/Diabetic/Soft texture/chopped; Novasource Renal at breakfast; Milkshake made with Novasource Renal and magic cups twice daily    Evaluation of nutrition intake:  ~25% average for the paset 3 days    Nutrition Assessment:  Pt seems to be more interested in taking po.  Currently on Marinol.  She is taking some of the supplements and requesting preferences.  She continues on dialysis.   Wt treding down with decreased oral intake and dialysis.      Nutrition Intervention:    Will monitor POC and po intake.   Continues milkshake twice daily  Novasource Renal at breakfast  Menu suggestions and alternatives provided    Last BM:  8/26    Edema None noted    Wounds:  Left breast Cellulitis and Mastitis          "

## 2023-08-29 ENCOUNTER — OUTSIDE FACILITY SERVICE (OUTPATIENT)
Dept: PULMONOLOGY | Facility: CLINIC | Age: 83
End: 2023-08-29
Payer: MEDICARE

## 2023-08-29 LAB
GLUCOSE BLDC GLUCOMTR-MCNC: 103 MG/DL (ref 70–130)
GLUCOSE BLDC GLUCOMTR-MCNC: 192 MG/DL (ref 70–130)
GLUCOSE BLDC GLUCOMTR-MCNC: 202 MG/DL (ref 70–130)
GLUCOSE BLDC GLUCOMTR-MCNC: 233 MG/DL (ref 70–130)

## 2023-08-29 PROCEDURE — 63710000001 DRONABINOL PER 5 MG

## 2023-08-29 PROCEDURE — 63710000001 DRONABINOL PER 2.5 MG

## 2023-08-29 PROCEDURE — 82948 REAGENT STRIP/BLOOD GLUCOSE: CPT

## 2023-08-29 PROCEDURE — 25010000002 EPOETIN ALFA PER 1000 UNITS: Performed by: INTERNAL MEDICINE

## 2023-08-29 PROCEDURE — 63710000001 DRONABINOL PER 2.5 MG: Performed by: INTERNAL MEDICINE

## 2023-08-29 PROCEDURE — 25010000002 HEPARIN (PORCINE) PER 1000 UNITS: Performed by: INTERNAL MEDICINE

## 2023-08-29 PROCEDURE — 97530 THERAPEUTIC ACTIVITIES: CPT

## 2023-08-29 NOTE — ACP (ADVANCE CARE PLANNING)
[facility] @ [facility]  INPATIENT PROGRESS NOTE    PATIENT NAME: Carol Sullivan      PHYSICIAN: Josefina Perez MD  : 1940        MRN: [id number]  Patient Care Team:  Len Seo MD as PCP - General  Blaire Camara APRN as Nurse Practitioner (General Surgery)    Chief Complaint: Patient was brought to emergency room at [facility] for confusion.     History of Present Illness: 82-year-old female with significant medical history of hypertension, dyslipidemia, gastroesophageal reflux disease who was brought into the emergency room for confusion.  Patient was also found to have erythema and cellulitis of left breast.  Patient was thought of having mastitis.  Patient's condition was monitored now.  Patient had gradual decrease in her urine output and worsening in her kidney function was noted.  Patient off having renal failure which was worsening over time.  Patient subsequently required hemodialysis patient was placed on hemodialysis per nephrology recommendation.  Patient was admitted to the hospital setting for antibiotics and hemodialysis.  Patient's condition seem to be stabilized however patient remained extremely weak and deconditioned hemodialysis patient was recommended to be admitted to our facility for further therapy and rehabilitation along with monitoring for hemodialysis.     Discharge Summary and H&P: Reviewed from previous hospitalization and information documented above in HPI Section.     Radiology Studies from Previous Hospitalization: Reviewed and are as below:  XR Abdomen KUB     Result Date: 2023  FINDINGS/IMPRESSION: Radiopaque tip of the Dobbhoff tube projects over the second portion of the duodenum, similar to even slightly retracted when compared to the prior exam. Right-sided ureteral stent is again noted and incompletely visualized.  There are probably also right femoral catheters which are  incompletely visualized. Normal bowel gas pattern in the visualized abdomen.     XR Chest Post CVA Port     Result Date: 8/17/2023  Impression: 1. Mild interstitial opacities are likely due to low lung volumes. An element of interstitial edema is not entirely excluded.      EKG From previous hospitalization reviewed and documented below:  ECG 12 Lead Other; hx afib   Preliminary Result   Test Reason : Other~   Blood Pressure :   */*   mmHG   Vent. Rate :  67 BPM     Atrial Rate :  67 BPM      P-R Int : 188 ms          QRS Dur :  78 ms       QT Int : 392 ms       P-R-T Axes :  28   6  16 degrees      QTc Int : 414 ms       Normal sinus rhythm with sinus arrhythmia   Minimal voltage criteria for LVH, may be normal variant   Anteroseptal infarct (cited on or before 30-JUL-2023)   Abnormal ECG   When compared with ECG of 10-AUG-2023 15:02,   Sinus rhythm has replaced Atrial fibrillation   Vent. rate has decreased BY  62 BPM   Nonspecific T wave abnormality, worse in Anterior leads       Referred By:            Confirmed By:              Laboratory results from previous hospitalization is reviewed and below:        Lab Results   Component Value Date     GLUCOSE 139 (H) 08/17/2023     CALCIUM 8.6 08/17/2023      (L) 08/17/2023     K 3.9 08/17/2023     CO2 23.0 08/17/2023     CL 90 (L) 08/17/2023     BUN 72 (H) 08/17/2023     CREATININE 4.86 (H) 08/17/2023     EGFR 8.5 (L) 08/17/2023     BCR 14.8 08/17/2023     ANIONGAP 17.0 (H) 08/17/2023            Lab Results   Component Value Date     WBC 17.07 (H) 08/17/2023     HGB 10.2 (L) 08/17/2023     HCT 29.8 (L) 08/17/2023     MCV 85.9 08/17/2023     PLT         Assessment       Sepsis Secondary to Below.  Somnolence [R40.0]  Mastitis [N61.0]  Sepsis [A41.9]  Sepsis, due to unspecified organism, unspecified whether acute organ dysfunction present [A41.9]   ESRD on Hemodialysis.        DVT Prophylaxis: Heparin.  GI Prophylaxis: Lansoprazole.  Code Status: DNR/DNI.     Plan       -Appears to feel much better today  -BLE with less restlessness  -We will increase Marinol to see if this helps with her appetite  -Dialysis as per nephrology   -Aggressive therapy as before.   -Strongly recommend out of bed daily  -DVT and GI prophylaxis in place.  -We'll continue monitoring patient in hospital setting and treat patient as course dictates.  -Please review orders for detailed plan of care.  -For Aute and Chronic medical condition we will continue medications described below in medication section which have been reviewed and we will continue unless changed in plan of care.  -Laboratory and diagnostic studies have been independently reviewed and the reports are reviewed as documented below.    Subjective   Interval History:   Patient Complaints:   Patient seen and examined.  Up in chair in room.   at bedside.  States she does not ever feel hungry.  History taken from: Medical record and nursing staff.    Review of Systems:    Review of Systems   All other systems reviewed and are negative.    Objective   Vital Signs  Temp: 97 F         Heart Rate: 75         Resp: 21          Blood Pressure: 115/50         Pulse Ox: 92 %    Physical Exam:   Physical Exam  Vitals and nursing note reviewed.   Constitutional:       Appearance: She is well-developed.   HENT:      Head: Normocephalic and atraumatic.      Nose: Nose normal.   Eyes:      Conjunctiva/sclera: Conjunctivae normal.      Pupils: Pupils are equal, round, and reactive to light.   Neck:      Thyroid: No thyromegaly.      Vascular: No JVD.      Trachea: No tracheal deviation.   Cardiovascular:      Rate and Rhythm: Normal rate and regular rhythm.      Heart sounds: Normal heart sounds.   Pulmonary:      Effort: Pulmonary effort is normal. No respiratory distress.      Breath sounds: Normal breath sounds. No wheezing or rales.   Chest:      Chest wall: No tenderness.   Abdominal:      General: Bowel sounds are normal. There is no  distension.      Palpations: Abdomen is soft.      Tenderness: There is no abdominal tenderness. There is no guarding or rebound.   Musculoskeletal:         General: Normal range of motion.      Cervical back: Normal range of motion and neck supple.   Lymphadenopathy:      Cervical: No cervical adenopathy.   Skin:     General: Skin is warm and dry.      Comments: Intact   Neurological:      Mental Status: She is alert and oriented to person, place, and time.      Cranial Nerves: No cranial nerve deficit.      Deep Tendon Reflexes: Reflexes are normal and symmetric.     Results Review:       Results from last 7 days   Lab Units 08/28/23  0345 08/25/23  0420 08/23/23  0508   SODIUM mmol/L 133* 133* 135*   POTASSIUM mmol/L 4.3 4.0 4.3   CHLORIDE mmol/L 94* 94* 94*   CO2 mmol/L 27.0 26.0 24.0   BUN mg/dL 30* 27* 38*   CREATININE mg/dL 5.50* 4.24* 4.77*   GLUCOSE mg/dL 105* 132* 124*   CALCIUM mg/dL 8.2* 8.4* 8.8   BILIRUBIN mg/dL 0.5 0.5 0.6   ALK PHOS U/L 262* 294* 336*   ALT (SGPT) U/L 28 48* 51*   AST (SGOT) U/L 22 35* 55*       Results from last 7 days   Lab Units 08/28/23  0345 08/25/23  0420 08/23/23  0508   MAGNESIUM mg/dL 1.6 1.7 1.9       Results from last 7 days   Lab Units 08/28/23  0345 08/25/23  0420 08/23/23  0508   WBC 10*3/mm3 4.58 6.95 10.46   HEMOGLOBIN g/dL 10.0* 8.6* 10.0*   HEMATOCRIT % 30.7* 26.9* 32.0*   PLATELETS 10*3/mm3 226 167 183       Lab Results   Component Value Date    TROPONINI 0.69 (H) 05/31/2020    TROPONINT 56 (C) 08/06/2023     pH, Arterial   Date Value Ref Range Status   08/11/2023 7.403 7.350 - 7.450 pH units Final     CO2   Date Value Ref Range Status   08/28/2023 27.0 22.0 - 29.0 mmol/L Final     Total CO2   Date Value Ref Range Status   10/27/2022 30 21 - 31 mmol/L Final           Imaging Results (Most Recent)       None           No results found for: ACANTHNAEG, AFBCX, BPERTUSSISCX, BLOODCX  No results found for: BCIDPCR, CXREFLEX, CSFCX, CULTURETIS  No results found for:  CULTURES, HSVCX, URCX  No results found for: EYECULTURE, GCCX, HSVCULTURE, LABHSV  No results found for: LEGIONELLA, MRSACX, MUMPSCX, MYCOPLASCX  No results found for: NOCARDIACX, STOOLCX  No results found for: THROATCX, UNSTIMCULT, URINECX, CULTURE, VZVCULTUR  No results found for: VIRALCULTU, WOUNDCX  Medication Reviewed and Will Continue Unless Documented in Plan:   albumin human, 25 g, Intravenous, Once  amiodarone, 200 mg, Oral, BID With Meals  busPIRone, 5 mg, Oral, BID  dilTIAZem, 60 mg, Oral, Q6H  dronabinol, 5 mg, Oral, BID AC  epoetin karen/karen-epbx, 6,000 Units, Intravenous, Once per day on Mon Wed Fri  gabapentin, 100 mg, Oral, TID  heparin (porcine), 5,000 Units, Subcutaneous, Q12H  heparin (porcine), 2,000 Units, Intravenous, Once  [START ON 8/30/2023] heparin (porcine), 2,000 Units, Intravenous, Once  HYDROcodone-acetaminophen, 1 tablet, Oral, Once  Insulin Aspart, 2-12 Units, Subcutaneous, 4 times per day  isosorbide mononitrate, 30 mg, Oral, Daily  lansoprazole, 30 mg, Oral, Q12H  magnesium sulfate, 2 g, Intravenous, Once  metoprolol tartrate, 2.5 mg, Intravenous, Once  metoprolol tartrate, 50 mg, Oral, Q12H  rosuvastatin, 40 mg, Oral, Daily  sodium chloride, 150 mL, Intravenous, Once  sodium chloride 0.9 % flush, 10 mL, Intravenous, Q8H      amiodarone, 0.5 mg/min  dilTIAZem, 5-15 mg/hr      acetaminophen    acetaminophen    albumin human    cyclobenzaprine    dextrose    dilTIAZem    HYDROcodone-acetaminophen    hydrocortisone-bacitracin-zinc oxide-nystatin    melatonin    sodium chloride    sodium chloride 0.9 % flush    traZODone  amiodarone, 0.5 mg/min  dilTIAZem, 5-15 mg/hr       Dietary Orders (From admission, onward)       Start     Ordered    08/24/23 1800  Dietary Nutrition Supplements Other (See Comment); Mighty shake with every meal, Apple sauce with every meal  Daily With Breakfast, Lunch & Dinner      Question Answer Comment   Select Supplement: Other (See Comment)    Other Mighty  shake with every meal, Apple sauce with every meal        08/24/23 1447    08/18/23 1800  Dietary Nutrition Supplements Other (See Comment); Homemade M/S made with Straw Novasource and Berry Magic cup  Daily With Dinner      Question Answer Comment   Select Supplement: Other (See Comment)    Other Homemade M/S made with Straw Novasource and [place] Magic cup        08/18/23 1146    08/18/23 1200  Dietary Nutrition Supplements Other (See Comment); Homemade M/S made with Vanilla Novasource and vanilla magic cup (8ounces)  Daily With Lunch      Question Answer Comment   Select Supplement: Other (See Comment)    Other Homemade M/S made with Vanilla Novasource and vanilla magic cup (8ounces)        08/18/23 1146    08/17/23 1153  Diet: Regular/House Diet, Renal Diets, Diabetic Diets; Consistent Carbohydrate; Low Sodium (2-3g); Texture: Soft to Chew (NDD 3); Soft to Chew: Chopped Meat; Fluid Consistency: Thin (IDDSI 0)  Diet Effective Now        References:    Diet Order [facility]   Question Answer Comment   Diets: Regular/House Diet    Diets: Renal Diets    Diets: Diabetic Diets    Diabetic Diet: Consistent Carbohydrate    Renal Diet: Low Sodium (2-3g)    Texture: Soft to Chew (NDD 3)    Soft to Chew: Chopped Meat    Fluid Consistency: Thin (IDDSI 0)        08/17/23 1156                  History, physical exam, assessment and plan may have been partly or fully copied from before, but  changes made to the copied record to reflect care on the date of service. Part of the lab and imaging  reports auto populated and corrected. Some of this note may be an electronic transcription of spoken  language to printed text. This may permit erroneous, or at times, nonsensical words or phrases to be  inadvertently transcribed. Although I have reviewed the note for such errors, some may still exist.      This document has been electronically signed by Jsoefina Perez MD on August 29, 2023 13:19 CDT

## 2023-08-29 NOTE — ACP (ADVANCE CARE PLANNING)
[facility]  [address] [place] [address]. [address]  T - [phone]  F - [phone]     Progress Note          PATIENT  DEMOGRAPHICS   PATIENT NAME: Carol Sullivan                      PHYSICIAN: COY Chong  : 1940  MRN: [id number]   LOS: 0 days    Patient Care Team:  Len Seo MD as PCP - General  Clay CityBlaire ribera APRN as Nurse Practitioner (General Surgery)  Subjective   SUBJECTIVE   No acute events overnight.          Objective   OBJECTIVE   Vital Signs  Heart Rate:  [68-73] 73    T 98.8  HR 82  RR 16  /53  O2: 97% RA     No intake/output data recorded.    PHYSICAL EXAM    Physical Exam  Constitutional:       Appearance: She is well-developed.   HENT:      Head: Normocephalic.      Left Ear: There is no impacted cerumen.      Nose: No rhinorrhea.   Eyes:      Pupils: Pupils are equal, round, and reactive to light.   Cardiovascular:      Rate and Rhythm: Normal rate and regular rhythm.      Heart sounds: Normal heart sounds.   Pulmonary:      Effort: Pulmonary effort is normal.      Breath sounds: Normal breath sounds.   Abdominal:      General: Bowel sounds are normal.      Palpations: Abdomen is soft.   Musculoskeletal:         General: No swelling.   Skin:     Coloration: Skin is not jaundiced.   Neurological:      General: No focal deficit present.      Mental Status: She is alert. She is disoriented.       RESULTS   Results Review:    Results from last 7 days   Lab Units 23  0345 23  0420 23  0508   SODIUM mmol/L 133* 133* 135*   POTASSIUM mmol/L 4.3 4.0 4.3   CHLORIDE mmol/L 94* 94* 94*   CO2 mmol/L 27.0 26.0 24.0   BUN mg/dL 30* 27* 38*   CREATININE mg/dL 5.50* 4.24* 4.77*   CALCIUM mg/dL 8.2* 8.4* 8.8   BILIRUBIN mg/dL 0.5 0.5 0.6   ALK PHOS U/L 262* 294* 336*   ALT (SGPT) U/L 28 48* 51*   AST (SGOT) U/L 22 35* 55*   GLUCOSE mg/dL 105* 132* 124*         Estimated Creatinine Clearance: 7.7 mL/min (A) (by C-G formula based on SCr of  5.5 mg/dL (H)).    Results from last 7 days   Lab Units 08/28/23  0345 08/25/23  0420 08/23/23  0508   MAGNESIUM mg/dL 1.6 1.7 1.9               Results from last 7 days   Lab Units 08/28/23  0345 08/25/23  0420 08/23/23  0508   WBC 10*3/mm3 4.58 6.95 10.46   HEMOGLOBIN g/dL 10.0* 8.6* 10.0*   PLATELETS 10*3/mm3 226 167 183                   Imaging Results (Last 24 Hours)       ** No results found for the last 24 hours. **             MEDICATIONS    albumin human, 25 g, Intravenous, Once  amiodarone, 200 mg, Oral, BID With Meals  busPIRone, 5 mg, Oral, BID  dilTIAZem, 60 mg, Oral, Q6H  dronabinol, 5 mg, Oral, BID AC  epoetin karen/karen-epbx, 6,000 Units, Intravenous, Once per day on Mon Wed Fri  gabapentin, 100 mg, Oral, TID  heparin (porcine), 5,000 Units, Subcutaneous, Q12H  heparin (porcine), 2,000 Units, Intravenous, Once  [START ON 8/30/2023] heparin (porcine), 2,000 Units, Intravenous, Once  HYDROcodone-acetaminophen, 1 tablet, Oral, Once  Insulin Aspart, 2-12 Units, Subcutaneous, 4 times per day  isosorbide mononitrate, 30 mg, Oral, Daily  lansoprazole, 30 mg, Oral, Q12H  magnesium sulfate, 2 g, Intravenous, Once  metoprolol tartrate, 2.5 mg, Intravenous, Once  metoprolol tartrate, 50 mg, Oral, Q12H  rosuvastatin, 40 mg, Oral, Daily  sodium chloride, 150 mL, Intravenous, Once  sodium chloride 0.9 % flush, 10 mL, Intravenous, Q8H      amiodarone, 0.5 mg/min  dilTIAZem, 5-15 mg/hr      Assessment & Plan   ASSESSMENT / PLAN      * No active hospital problems. *      1. Acute kidney injury/ Acute tubular necrosis:  - Baseline unknown.   - Creatinine was 1.5 in 10/2022.   - UA 3+ protein, trace leukocytes, 0-2 RBC, 6-12 WBC, 2+ bacteria.   - Urine sodium 26. CT abd large rt retroperitoneal hematoma causing moderate rt hydronephrosis.  - Started HD 8/6/23  -  HD tomorrow     2. Renal failure retroperitoneal bleed s/p right J stent for hydronephrosis   - s/p J stent placement by urology 8/11  - Continue to monitor  renal function      3. Hypertension/ Afib with RVR:   - Blood pressure is acceptable. HR was high and now metoprolol. Losartan on hold     4. UTI E.fecalis:   - became septic with mild hydro Dr Garcia is consulted. Now stent in place . E.fecalis     5. Cdiff:   -not on po vanc now      6. Hyponatremia:   - Sodium is stable     7. Anemia / retroperitoneal bleed:  - Hemoglobin is acceptable at 10.0, B12 and folate are good on epogen      8.  AMS              This document has been electronically signed by COY Chong on August 29, 2023 11:24 CDT      For this patient encounter, I have reviewed the Nurse Practitioner's documentation, medical decision making, and treatment plan and personally spent time with the patient.

## 2023-08-30 ENCOUNTER — OUTSIDE FACILITY SERVICE (OUTPATIENT)
Dept: PULMONOLOGY | Facility: CLINIC | Age: 83
End: 2023-08-30
Payer: MEDICARE

## 2023-08-30 LAB
ALBUMIN SERPL-MCNC: 2.6 G/DL (ref 3.5–5.2)
ALBUMIN/GLOB SERPL: 0.7 G/DL
ALP SERPL-CCNC: 214 U/L (ref 39–117)
ALT SERPL W P-5'-P-CCNC: 18 U/L (ref 1–33)
ANION GAP SERPL CALCULATED.3IONS-SCNC: 11 MMOL/L (ref 5–15)
AST SERPL-CCNC: 21 U/L (ref 1–32)
BASOPHILS # BLD AUTO: 0.12 10*3/MM3 (ref 0–0.2)
BASOPHILS NFR BLD AUTO: 2.3 % (ref 0–1.5)
BILIRUB SERPL-MCNC: 0.5 MG/DL (ref 0–1.2)
BUN SERPL-MCNC: 30 MG/DL (ref 8–23)
BUN/CREAT SERPL: 6 (ref 7–25)
CALCIUM SPEC-SCNC: 8.4 MG/DL (ref 8.6–10.5)
CHLORIDE SERPL-SCNC: 95 MMOL/L (ref 98–107)
CO2 SERPL-SCNC: 27 MMOL/L (ref 22–29)
CREAT SERPL-MCNC: 5.01 MG/DL (ref 0.57–1)
DEPRECATED RDW RBC AUTO: 58 FL (ref 37–54)
EGFRCR SERPLBLD CKD-EPI 2021: 8.2 ML/MIN/1.73
EOSINOPHIL # BLD AUTO: 0.46 10*3/MM3 (ref 0–0.4)
EOSINOPHIL NFR BLD AUTO: 8.8 % (ref 0.3–6.2)
ERYTHROCYTE [DISTWIDTH] IN BLOOD BY AUTOMATED COUNT: 17.2 % (ref 12.3–15.4)
GLOBULIN UR ELPH-MCNC: 3.9 GM/DL
GLUCOSE BLDC GLUCOMTR-MCNC: 102 MG/DL (ref 70–130)
GLUCOSE BLDC GLUCOMTR-MCNC: 107 MG/DL (ref 70–130)
GLUCOSE BLDC GLUCOMTR-MCNC: 118 MG/DL (ref 70–130)
GLUCOSE BLDC GLUCOMTR-MCNC: 98 MG/DL (ref 70–130)
GLUCOSE SERPL-MCNC: 104 MG/DL (ref 65–99)
HCT VFR BLD AUTO: 28.1 % (ref 34–46.6)
HGB BLD-MCNC: 9 G/DL (ref 12–15.9)
IMM GRANULOCYTES # BLD AUTO: 0.19 10*3/MM3 (ref 0–0.05)
IMM GRANULOCYTES NFR BLD AUTO: 3.7 % (ref 0–0.5)
LYMPHOCYTES # BLD AUTO: 1.32 10*3/MM3 (ref 0.7–3.1)
LYMPHOCYTES NFR BLD AUTO: 25.4 % (ref 19.6–45.3)
MAGNESIUM SERPL-MCNC: 1.7 MG/DL (ref 1.6–2.4)
MCH RBC QN AUTO: 29.5 PG (ref 26.6–33)
MCHC RBC AUTO-ENTMCNC: 32 G/DL (ref 31.5–35.7)
MCV RBC AUTO: 92.1 FL (ref 79–97)
MONOCYTES # BLD AUTO: 0.54 10*3/MM3 (ref 0.1–0.9)
MONOCYTES NFR BLD AUTO: 10.4 % (ref 5–12)
NEUTROPHILS NFR BLD AUTO: 2.57 10*3/MM3 (ref 1.7–7)
NEUTROPHILS NFR BLD AUTO: 49.4 % (ref 42.7–76)
NRBC BLD AUTO-RTO: 0 /100 WBC (ref 0–0.2)
PLATELET # BLD AUTO: 280 10*3/MM3 (ref 140–450)
PMV BLD AUTO: 10.1 FL (ref 6–12)
POTASSIUM SERPL-SCNC: 4.5 MMOL/L (ref 3.5–5.2)
PROT SERPL-MCNC: 6.5 G/DL (ref 6–8.5)
RBC # BLD AUTO: 3.05 10*6/MM3 (ref 3.77–5.28)
SODIUM SERPL-SCNC: 133 MMOL/L (ref 136–145)
WBC NRBC COR # BLD: 5.2 10*3/MM3 (ref 3.4–10.8)

## 2023-08-30 PROCEDURE — 83735 ASSAY OF MAGNESIUM: CPT | Performed by: INTERNAL MEDICINE

## 2023-08-30 PROCEDURE — 63710000001 DRONABINOL PER 2.5 MG

## 2023-08-30 PROCEDURE — 25010000002 HEPARIN (PORCINE) PER 1000 UNITS: Performed by: INTERNAL MEDICINE

## 2023-08-30 PROCEDURE — 97110 THERAPEUTIC EXERCISES: CPT

## 2023-08-30 PROCEDURE — 97530 THERAPEUTIC ACTIVITIES: CPT

## 2023-08-30 PROCEDURE — 80053 COMPREHEN METABOLIC PANEL: CPT | Performed by: INTERNAL MEDICINE

## 2023-08-30 PROCEDURE — 85025 COMPLETE CBC W/AUTO DIFF WBC: CPT | Performed by: INTERNAL MEDICINE

## 2023-08-30 PROCEDURE — 63710000001 DRONABINOL PER 5 MG

## 2023-08-30 PROCEDURE — 82948 REAGENT STRIP/BLOOD GLUCOSE: CPT

## 2023-08-30 RX ORDER — MUSCLE RUB CREAM 100; 150 MG/G; MG/G
1 CREAM TOPICAL 3 TIMES DAILY PRN
Status: DISCONTINUED | OUTPATIENT
Start: 2023-08-30 | End: 2023-09-15 | Stop reason: HOSPADM

## 2023-08-30 NOTE — ACP (ADVANCE CARE PLANNING)
[facility]  INPATIENT PROGRESS NOTE    PATIENT NAME: Carol Sullivan      PHYSICIAN: Josefina Perez MD  : 1940        MRN: [id number]  Patient Care Team:  Len Seo MD as PCP - General  Blarie Camara APRN as Nurse Practitioner (General Surgery)    Chief Complaint: Patient was brought to emergency room at [facility] for confusion.     History of Present Illness: 82-year-old female with significant medical history of hypertension, dyslipidemia, gastroesophageal reflux disease who was brought into the emergency room for confusion.  Patient was also found to have erythema and cellulitis of left breast.  Patient was thought of having mastitis.  Patient's condition was monitored now.  Patient had gradual decrease in her urine output and worsening in her kidney function was noted.  Patient off having renal failure which was worsening over time.  Patient subsequently required hemodialysis patient was placed on hemodialysis per nephrology recommendation.  Patient was admitted to the hospital setting for antibiotics and hemodialysis.  Patient's condition seem to be stabilized however patient remained extremely weak and deconditioned hemodialysis patient was recommended to be admitted to our facility for further therapy and rehabilitation along with monitoring for hemodialysis.     Discharge Summary and H&P: Reviewed from previous hospitalization and information documented above in HPI Section.     Radiology Studies from Previous Hospitalization: Reviewed and are as below:  XR Abdomen KUB     Result Date: 2023  FINDINGS/IMPRESSION: Radiopaque tip of the Dobbhoff tube projects over the second portion of the duodenum, similar to even slightly retracted when compared to the prior exam. Right-sided ureteral stent is again noted and incompletely visualized.  There are probably also right femoral catheters which are  incompletely visualized. Normal bowel gas pattern in the visualized abdomen.     XR Chest Post CVA Port     Result Date: 8/17/2023  Impression: 1. Mild interstitial opacities are likely due to low lung volumes. An element of interstitial edema is not entirely excluded.      EKG From previous hospitalization reviewed and documented below:  ECG 12 Lead Other; hx afib   Preliminary Result   Test Reason : Other~   Blood Pressure :   */*   mmHG   Vent. Rate :  67 BPM     Atrial Rate :  67 BPM      P-R Int : 188 ms          QRS Dur :  78 ms       QT Int : 392 ms       P-R-T Axes :  28   6  16 degrees      QTc Int : 414 ms       Normal sinus rhythm with sinus arrhythmia   Minimal voltage criteria for LVH, may be normal variant   Anteroseptal infarct (cited on or before 30-JUL-2023)   Abnormal ECG   When compared with ECG of 10-AUG-2023 15:02,   Sinus rhythm has replaced Atrial fibrillation   Vent. rate has decreased BY  62 BPM   Nonspecific T wave abnormality, worse in Anterior leads       Referred By:            Confirmed By:              Laboratory results from previous hospitalization is reviewed and below:        Lab Results   Component Value Date     GLUCOSE 139 (H) 08/17/2023     CALCIUM 8.6 08/17/2023      (L) 08/17/2023     K 3.9 08/17/2023     CO2 23.0 08/17/2023     CL 90 (L) 08/17/2023     BUN 72 (H) 08/17/2023     CREATININE 4.86 (H) 08/17/2023     EGFR 8.5 (L) 08/17/2023     BCR 14.8 08/17/2023     ANIONGAP 17.0 (H) 08/17/2023            Lab Results   Component Value Date     WBC 17.07 (H) 08/17/2023     HGB 10.2 (L) 08/17/2023     HCT 29.8 (L) 08/17/2023     MCV 85.9 08/17/2023     PLT         Assessment       Sepsis Secondary to Below.  Somnolence [R40.0]  Mastitis [N61.0]  Sepsis [A41.9]  Sepsis, due to unspecified organism, unspecified whether acute organ dysfunction present [A41.9]   ESRD on Hemodialysis.        DVT Prophylaxis: Heparin.  GI Prophylaxis: Lansoprazole.  Code Status: DNR/DNI.     Plan       -Clinically stable  -Much more alert today  -Less restlessness  -We will initiate muscle rub to hips for pain  -Slight improvement in appetite  -Dialysis as per nephrology   -Aggressive therapy as before.   -Strongly recommend out of bed daily  -DVT and GI prophylaxis in place.  -We'll continue monitoring patient in hospital setting and treat patient as course dictates.  -Please review orders for detailed plan of care.  -For Aute and Chronic medical condition we will continue medications described below in medication section which have been reviewed and we will continue unless changed in plan of care.  -Laboratory and diagnostic studies have been independently reviewed and the reports are reviewed as documented below.    Subjective   Interval History:   Patient Complaints:   Patient seen and examined.  Sitting up in bed drinking sprite.  Complains of hips hurting.  History of arthritis.  No other concerns.   History taken from: Medical record and nursing staff.    Review of Systems:    Review of Systems   All other systems reviewed and are negative.    Objective   Vital Signs  Temp: 97 F         Heart Rate: 65         Resp: 19          Blood Pressure: 135/62         Pulse Ox: 100 %    Physical Exam:   Physical Exam  Vitals and nursing note reviewed.   Constitutional:       Appearance: She is well-developed.   HENT:      Head: Normocephalic and atraumatic.      Nose: Nose normal.   Eyes:      Conjunctiva/sclera: Conjunctivae normal.      Pupils: Pupils are equal, round, and reactive to light.   Neck:      Thyroid: No thyromegaly.      Vascular: No JVD.      Trachea: No tracheal deviation.   Cardiovascular:      Rate and Rhythm: Normal rate and regular rhythm.      Heart sounds: Normal heart sounds.   Pulmonary:      Effort: Pulmonary effort is normal. No respiratory distress.      Breath sounds: Normal breath sounds. No wheezing or rales.   Chest:      Chest wall: No tenderness.   Abdominal:      General: Bowel  sounds are normal. There is no distension.      Palpations: Abdomen is soft.      Tenderness: There is no abdominal tenderness. There is no guarding or rebound.   Musculoskeletal:         General: Normal range of motion.      Cervical back: Normal range of motion and neck supple.   Lymphadenopathy:      Cervical: No cervical adenopathy.   Skin:     General: Skin is warm and dry.      Comments: Intact   Neurological:      Mental Status: She is alert and oriented to person, place, and time.      Cranial Nerves: No cranial nerve deficit.      Deep Tendon Reflexes: Reflexes are normal and symmetric.     Results Review:       Results from last 7 days   Lab Units 08/30/23  0500 08/28/23  0345 08/25/23  0420   SODIUM mmol/L 133* 133* 133*   POTASSIUM mmol/L 4.5 4.3 4.0   CHLORIDE mmol/L 95* 94* 94*   CO2 mmol/L 27.0 27.0 26.0   BUN mg/dL 30* 30* 27*   CREATININE mg/dL 5.01* 5.50* 4.24*   GLUCOSE mg/dL 104* 105* 132*   CALCIUM mg/dL 8.4* 8.2* 8.4*   BILIRUBIN mg/dL 0.5 0.5 0.5   ALK PHOS U/L 214* 262* 294*   ALT (SGPT) U/L 18 28 48*   AST (SGOT) U/L 21 22 35*       Results from last 7 days   Lab Units 08/30/23  0500 08/28/23  0345 08/25/23  0420   MAGNESIUM mg/dL 1.7 1.6 1.7       Results from last 7 days   Lab Units 08/30/23  0500 08/28/23  0345 08/25/23  0420   WBC 10*3/mm3 5.20 4.58 6.95   HEMOGLOBIN g/dL 9.0* 10.0* 8.6*   HEMATOCRIT % 28.1* 30.7* 26.9*   PLATELETS 10*3/mm3 280 226 167       Lab Results   Component Value Date    TROPONINI 0.69 (H) 05/31/2020    TROPONINT 56 (C) 08/06/2023     pH, Arterial   Date Value Ref Range Status   08/11/2023 7.403 7.350 - 7.450 pH units Final     CO2   Date Value Ref Range Status   08/30/2023 27.0 22.0 - 29.0 mmol/L Final     Total CO2   Date Value Ref Range Status   10/27/2022 30 21 - 31 mmol/L Final           Imaging Results (Most Recent)       None           No results found for: ACANTHNAEG, AFBCX, BPERTUSSISCX, BLOODCX  No results found for: BCIDPCR, CXREFLEX, CSFCX,  CULTURETIS  No results found for: CULTURES, HSVCX, URCX  No results found for: EYECULTURE, GCCX, HSVCULTURE, LABHSV  No results found for: LEGIONELLA, MRSACX, MUMPSCX, MYCOPLASCX  No results found for: NOCARDIACX, STOOLCX  No results found for: THROATCX, UNSTIMCULT, URINECX, CULTURE, VZVCULTUR  No results found for: VIRALCULTU, WOUNDCX  Medication Reviewed and Will Continue Unless Documented in Plan:   albumin human, 25 g, Intravenous, Once  amiodarone, 200 mg, Oral, BID With Meals  busPIRone, 5 mg, Oral, BID  dilTIAZem, 60 mg, Oral, Q6H  dronabinol, 7.5 mg, Oral, BID AC  epoetin karen/karen-epbx, 6,000 Units, Intravenous, Once per day on Mon Wed Fri  gabapentin, 100 mg, Oral, TID  heparin (porcine), 5,000 Units, Subcutaneous, Q12H  heparin (porcine), 2,000 Units, Intravenous, Once  heparin (porcine), 2,000 Units, Intravenous, Once  HYDROcodone-acetaminophen, 1 tablet, Oral, Once  Insulin Aspart, 2-12 Units, Subcutaneous, 4 times per day  isosorbide mononitrate, 30 mg, Oral, Daily  lansoprazole, 30 mg, Oral, Q12H  magnesium sulfate, 2 g, Intravenous, Once  metoprolol tartrate, 2.5 mg, Intravenous, Once  metoprolol tartrate, 50 mg, Oral, Q12H  rosuvastatin, 40 mg, Oral, Daily  sodium chloride, 150 mL, Intravenous, Once  sodium chloride 0.9 % flush, 10 mL, Intravenous, Q8H      amiodarone, 0.5 mg/min  dilTIAZem, 5-15 mg/hr      acetaminophen    acetaminophen    albumin human    cyclobenzaprine    dextrose    dilTIAZem    HYDROcodone-acetaminophen    hydrocortisone-bacitracin-zinc oxide-nystatin    melatonin    muscle rub    sodium chloride    sodium chloride 0.9 % flush    traZODone  amiodarone, 0.5 mg/min  dilTIAZem, 5-15 mg/hr       Dietary Orders (From admission, onward)       Start     Ordered    08/30/23 0800  Dietary Nutrition Supplements Other (See Comment); Add  to each meal  Daily With Breakfast, Lunch & Dinner      Question Answer Comment   Select Supplement: Other (See Comment)    Other Add   to each meal        08/30/23 0758    08/24/23 1800  Dietary Nutrition Supplements Other (See Comment); Mighty shake with every meal, Apple sauce with every meal  Daily With Breakfast, Lunch & Dinner      Question Answer Comment   Select Supplement: Other (See Comment)    Other Mighty shake with every meal, Apple sauce with every meal        08/24/23 1447    08/18/23 1800  Dietary Nutrition Supplements Other (See Comment); Homemade M/S made with Straw [employer] and Berry Magic cup  Daily With Dinner      Question Answer Comment   Select Supplement: Other (See Comment)    Other Homemade M/S made with Straw [employer] and [place] Magic cup        08/18/23 1146    08/18/23 1200  Dietary Nutrition Supplements Other (See Comment); Homemade M/S made with Vanilla [employer] and vanilla magic cup (8ounces)  Daily With Lunch      Question Answer Comment   Select Supplement: Other (See Comment)    Other Homemade M/S made with Vanilla [employer] and vanilla magic cup (8ounces)        08/18/23 1146    08/17/23 1153  Diet: Regular/House Diet, Renal Diets, Diabetic Diets; Consistent Carbohydrate; Low Sodium (2-3g); Texture: Soft to Chew (NDD 3); Soft to Chew: Chopped Meat; Fluid Consistency: Thin (IDDSI 0)  Diet Effective Now        References:    Diet Order [place]   Question Answer Comment   Diets: Regular/House Diet    Diets: Renal Diets    Diets: Diabetic Diets    Diabetic Diet: Consistent Carbohydrate    Renal Diet: Low Sodium (2-3g)    Texture: Soft to Chew (NDD 3)    Soft to Chew: Chopped Meat    Fluid Consistency: Thin (IDDSI 0)        08/17/23 1156                  History, physical exam, assessment and plan may have been partly or fully copied from before, but  changes made to the copied record to reflect care on the date of service. Part of the lab and imaging  reports auto populated and corrected. Some of this note may be an electronic transcription of spoken  language to printed text. This may permit erroneous, or at  times, nonsensical words or phrases to be  inadvertently transcribed. Although I have reviewed the note for such errors, some may still exist.      This document has been electronically signed by Josefina Perez MD on August 30, 2023 10:36 CDT

## 2023-08-30 NOTE — ACP (ADVANCE CARE PLANNING)
[facility]  [address] [place] [address]. [address]  T - [phone]  F - [phone]     Progress Note          PATIENT  DEMOGRAPHICS   PATIENT NAME: Carol Sullivan                      PHYSICIAN: COY Chong  : 1940  MRN: [id number]   LOS: 0 days    Patient Care Team:  Len Seo MD as PCP - General  Blaire Camara APRN as Nurse Practitioner (General Surgery)  Subjective   SUBJECTIVE   No acute events overnight.          Objective   OBJECTIVE   Vital Signs  Heart Rate:  [] 112    T 98.8  HR 82  RR 16  /62  O2: 97% RA     No intake/output data recorded.    PHYSICAL EXAM    Physical Exam  Constitutional:       Appearance: She is well-developed.   HENT:      Head: Normocephalic.      Left Ear: There is no impacted cerumen.      Nose: No rhinorrhea.   Eyes:      Pupils: Pupils are equal, round, and reactive to light.   Cardiovascular:      Rate and Rhythm: Normal rate and regular rhythm.      Heart sounds: Normal heart sounds.   Pulmonary:      Effort: Pulmonary effort is normal.      Breath sounds: Normal breath sounds.   Abdominal:      General: Bowel sounds are normal.      Palpations: Abdomen is soft.   Musculoskeletal:         General: No swelling.   Skin:     Coloration: Skin is not jaundiced.   Neurological:      General: No focal deficit present.      Mental Status: She is alert. She is disoriented.       RESULTS   Results Review:    Results from last 7 days   Lab Units 23  0500 23  0345 23  0420   SODIUM mmol/L 133* 133* 133*   POTASSIUM mmol/L 4.5 4.3 4.0   CHLORIDE mmol/L 95* 94* 94*   CO2 mmol/L 27.0 27.0 26.0   BUN mg/dL 30* 30* 27*   CREATININE mg/dL 5.01* 5.50* 4.24*   CALCIUM mg/dL 8.4* 8.2* 8.4*   BILIRUBIN mg/dL 0.5 0.5 0.5   ALK PHOS U/L 214* 262* 294*   ALT (SGPT) U/L 18 28 48*   AST (SGOT) U/L 21 22 35*   GLUCOSE mg/dL 104* 105* 132*         Estimated Creatinine Clearance: 8.4 mL/min (A) (by C-G formula based on SCr of  5.01 mg/dL (H)).    Results from last 7 days   Lab Units 08/30/23  0500 08/28/23  0345 08/25/23  0420   MAGNESIUM mg/dL 1.7 1.6 1.7               Results from last 7 days   Lab Units 08/30/23  0500 08/28/23  0345 08/25/23  0420   WBC 10*3/mm3 5.20 4.58 6.95   HEMOGLOBIN g/dL 9.0* 10.0* 8.6*   PLATELETS 10*3/mm3 280 226 167                   Imaging Results (Last 24 Hours)       ** No results found for the last 24 hours. **             MEDICATIONS    albumin human, 25 g, Intravenous, Once  amiodarone, 200 mg, Oral, BID With Meals  busPIRone, 5 mg, Oral, BID  dilTIAZem, 60 mg, Oral, Q6H  dronabinol, 7.5 mg, Oral, BID AC  epoetin karen/karen-epbx, 6,000 Units, Intravenous, Once per day on Mon Wed Fri  gabapentin, 100 mg, Oral, TID  heparin (porcine), 5,000 Units, Subcutaneous, Q12H  heparin (porcine), 2,000 Units, Intravenous, Once  heparin (porcine), 2,000 Units, Intravenous, Once  HYDROcodone-acetaminophen, 1 tablet, Oral, Once  Insulin Aspart, 2-12 Units, Subcutaneous, 4 times per day  isosorbide mononitrate, 30 mg, Oral, Daily  lansoprazole, 30 mg, Oral, Q12H  magnesium sulfate, 2 g, Intravenous, Once  metoprolol tartrate, 2.5 mg, Intravenous, Once  metoprolol tartrate, 50 mg, Oral, Q12H  rosuvastatin, 40 mg, Oral, Daily  sodium chloride, 150 mL, Intravenous, Once  sodium chloride 0.9 % flush, 10 mL, Intravenous, Q8H      amiodarone, 0.5 mg/min  dilTIAZem, 5-15 mg/hr      Assessment & Plan   ASSESSMENT / PLAN      * No active hospital problems. *      1. Acute kidney injury/ Acute tubular necrosis:  - Baseline unknown.   - Creatinine was 1.5 in 10/2022.   - UA 3+ protein, trace leukocytes, 0-2 RBC, 6-12 WBC, 2+ bacteria.   - Urine sodium 26. CT abd large rt retroperitoneal hematoma causing moderate rt hydronephrosis.  - Started HD 8/6/23  -  HD today     2. Renal failure retroperitoneal bleed s/p right J stent for hydronephrosis   - s/p J stent placement by urology 8/11  - Continue to monitor renal function      3.  Hypertension/ Afib with RVR:   - Blood pressure is acceptable. HR was high and now metoprolol. Losartan on hold     4. UTI E.fecalis:   - became septic with mild hydro Dr Garcia is consulted. Now stent in place . E.fecalis     5. Cdiff:   -not on po vanc now      6. Hyponatremia:   - Sodium is stable     7. Anemia / retroperitoneal bleed:  - Hemoglobin is acceptable at 9.0, B12 and folate are good on epogen      8.  AMS              This document has been electronically signed by COY Chong on August 30, 2023 09:21 CDT      For this patient encounter, I have reviewed the Nurse Practitioner's documentation, medical decision making, and treatment plan and personally spent time with the patient.

## 2023-08-31 ENCOUNTER — OUTSIDE FACILITY SERVICE (OUTPATIENT)
Dept: PULMONOLOGY | Facility: CLINIC | Age: 83
End: 2023-08-31
Payer: MEDICARE

## 2023-08-31 LAB
GLUCOSE BLDC GLUCOMTR-MCNC: 100 MG/DL (ref 70–130)
GLUCOSE BLDC GLUCOMTR-MCNC: 150 MG/DL (ref 70–130)
GLUCOSE BLDC GLUCOMTR-MCNC: 180 MG/DL (ref 70–130)

## 2023-08-31 PROCEDURE — 25010000002 HEPARIN (PORCINE) PER 1000 UNITS: Performed by: INTERNAL MEDICINE

## 2023-08-31 PROCEDURE — 63710000001 DRONABINOL PER 2.5 MG

## 2023-08-31 PROCEDURE — 97530 THERAPEUTIC ACTIVITIES: CPT

## 2023-08-31 PROCEDURE — 82948 REAGENT STRIP/BLOOD GLUCOSE: CPT

## 2023-08-31 PROCEDURE — 63710000001 DRONABINOL PER 5 MG

## 2023-08-31 PROCEDURE — 97535 SELF CARE MNGMENT TRAINING: CPT

## 2023-08-31 RX ORDER — HEPARIN SODIUM 1000 [USP'U]/ML
3700 INJECTION, SOLUTION INTRAVENOUS; SUBCUTANEOUS TAKE AS DIRECTED
Status: DISCONTINUED | OUTPATIENT
Start: 2023-09-01 | End: 2023-09-07

## 2023-08-31 RX ORDER — HYDROCODONE BITARTRATE AND ACETAMINOPHEN 5; 325 MG/1; MG/1
1 TABLET ORAL EVERY 6 HOURS PRN
Status: DISPENSED | OUTPATIENT
Start: 2023-08-31 | End: 2023-09-07

## 2023-08-31 RX ORDER — HEPARIN SODIUM 1000 [USP'U]/ML
2000 INJECTION, SOLUTION INTRAVENOUS; SUBCUTANEOUS 3 TIMES WEEKLY
Status: DISCONTINUED | OUTPATIENT
Start: 2023-09-01 | End: 2023-09-07

## 2023-08-31 NOTE — ACP (ADVANCE CARE PLANNING)
Nutrition F/U    Nutrition diet hx:  Pt currently eating lunch.  She was not feeling well today.  She continues to drink her supplements most of the time.  No gi distress      Weight history:  CBW:  145.2  CBW:  148.6 (8/28)  CBW:  152 (8/21)  Admit Wt to DM on 7/28 156#  UBW:  Wt loss:  My Note    Labs:  Na 133; Bun 30; Creat 5.0; Alb 2.6' Gluc 98/102/107/100  Meds:   Iv Alb; Amio; Buspar; Cardizem; Marinol; Epogen; Heparin; SSi; Mg sulfate; AMio; Cardizem    Nutrition Prescription Order:  Regular/Renal/Diabetic/Soft texture/chopped; Milkshake made with [employer] Renal and magic cups twice daily; Mighty shake on all trays;     Evaluation of nutrition Intake:  80% average for the past 6 documented meals.    Nutrition Assessment:  Pt with improved oral intake.  She is taking supplements well.  Not feeling well this afternoon--blood pressure down per staff.  Continues to receive dialysis.  Will monitor wt trend.      Nutrition Intervention:    Will monitor POC and po intake  Continue Mighty shakes and Renal Milkshakes  Menu suggestions ant alternatives provided    Last BM:  8/28    Edema:  None noted.        Wounds:  Left breast Cellulitis and Mastitis

## 2023-08-31 NOTE — ACP (ADVANCE CARE PLANNING)
[facility]  [address] [place] [address]. [address]  T - [phone]  F - [phone]     Progress Note          PATIENT  DEMOGRAPHICS   PATIENT NAME: Carol Sullivan                      PHYSICIAN: COY Chong  : 1940  MRN: [id number]   LOS: 0 days    Patient Care Team:  Len Seo MD as PCP - General  Blaire Caamra APRN as Nurse Practitioner (General Surgery)  Subjective   SUBJECTIVE   No acute events overnight.          Objective   OBJECTIVE   Vital Signs  Heart Rate:  [] 124    T 99.1  HR 97  RR 20  /58  O2: 97% RA     No intake/output data recorded.    PHYSICAL EXAM    Physical Exam  Constitutional:       Appearance: She is well-developed.   HENT:      Head: Normocephalic.      Left Ear: There is no impacted cerumen.      Nose: No rhinorrhea.   Eyes:      Pupils: Pupils are equal, round, and reactive to light.   Cardiovascular:      Rate and Rhythm: Normal rate and regular rhythm.      Heart sounds: Normal heart sounds.   Pulmonary:      Effort: Pulmonary effort is normal.      Breath sounds: Normal breath sounds.   Abdominal:      General: Bowel sounds are normal.      Palpations: Abdomen is soft.   Musculoskeletal:         General: No swelling.   Skin:     Coloration: Skin is not jaundiced.   Neurological:      General: No focal deficit present.      Mental Status: She is alert. She is disoriented.       RESULTS   Results Review:    Results from last 7 days   Lab Units 23  0500 23  0345 23  0420   SODIUM mmol/L 133* 133* 133*   POTASSIUM mmol/L 4.5 4.3 4.0   CHLORIDE mmol/L 95* 94* 94*   CO2 mmol/L 27.0 27.0 26.0   BUN mg/dL 30* 30* 27*   CREATININE mg/dL 5.01* 5.50* 4.24*   CALCIUM mg/dL 8.4* 8.2* 8.4*   BILIRUBIN mg/dL 0.5 0.5 0.5   ALK PHOS U/L 214* 262* 294*   ALT (SGPT) U/L 18 28 48*   AST (SGOT) U/L 21 22 35*   GLUCOSE mg/dL 104* 105* 132*         Estimated Creatinine Clearance: 8.4 mL/min (A) (by C-G formula based on SCr of  5.01 mg/dL (H)).    Results from last 7 days   Lab Units 08/30/23  0500 08/28/23  0345 08/25/23  0420   MAGNESIUM mg/dL 1.7 1.6 1.7               Results from last 7 days   Lab Units 08/30/23  0500 08/28/23  0345 08/25/23  0420   WBC 10*3/mm3 5.20 4.58 6.95   HEMOGLOBIN g/dL 9.0* 10.0* 8.6*   PLATELETS 10*3/mm3 280 226 167                   Imaging Results (Last 24 Hours)       ** No results found for the last 24 hours. **             MEDICATIONS    albumin human, 25 g, Intravenous, Once  amiodarone, 200 mg, Oral, BID With Meals  busPIRone, 5 mg, Oral, BID  dilTIAZem, 60 mg, Oral, Q6H  dronabinol, 7.5 mg, Oral, BID AC  epoetin karen/karen-epbx, 6,000 Units, Intravenous, Once per day on Mon Wed Fri  gabapentin, 100 mg, Oral, TID  heparin (porcine), 5,000 Units, Subcutaneous, Q12H  heparin (porcine), 2,000 Units, Intravenous, Once  heparin (porcine), 2,000 Units, Intravenous, Once  HYDROcodone-acetaminophen, 1 tablet, Oral, Once  Insulin Aspart, 2-12 Units, Subcutaneous, 4 times per day  isosorbide mononitrate, 30 mg, Oral, Daily  lansoprazole, 30 mg, Oral, Q12H  magnesium sulfate, 2 g, Intravenous, Once  metoprolol tartrate, 2.5 mg, Intravenous, Once  metoprolol tartrate, 50 mg, Oral, Q12H  rosuvastatin, 40 mg, Oral, Daily  sodium chloride, 150 mL, Intravenous, Once  sodium chloride 0.9 % flush, 10 mL, Intravenous, Q8H      amiodarone, 0.5 mg/min  dilTIAZem, 5-15 mg/hr      Assessment & Plan   ASSESSMENT / PLAN      * No active hospital problems. *      1. Acute kidney injury/ Acute tubular necrosis:  - Baseline unknown.   - Creatinine was 1.5 in 10/2022.   - UA 3+ protein, trace leukocytes, 0-2 RBC, 6-12 WBC, 2+ bacteria.   - Urine sodium 26. CT abd large rt retroperitoneal hematoma causing moderate rt hydronephrosis.  - Started HD 8/6/23  -  HD tomorrow     2. Renal failure retroperitoneal bleed s/p right J stent for hydronephrosis   - s/p J stent placement by urology 8/11  - Continue to monitor renal function       3. Hypertension/ Afib with RVR:   - Blood pressure is acceptable. HR was high and now metoprolol. Losartan on hold     4. UTI E.fecalis:   - became septic with mild hydro Dr Garcia is consulted. Now stent in place . E.fecalis     5. Cdiff:   -not on po vanc now      6. Hyponatremia:   - Sodium is stable     7. Anemia / retroperitoneal bleed:  - Hemoglobin is acceptable at 9.0, B12 and folate are good on epogen      8.  AMS              This document has been electronically signed by COY Chong on August 31, 2023 13:22 CDT      For this patient encounter, I have reviewed the Nurse Practitioner's documentation, medical decision making, and treatment plan and personally spent time with the patient.

## 2023-09-01 ENCOUNTER — OUTSIDE FACILITY SERVICE (OUTPATIENT)
Dept: PULMONOLOGY | Facility: CLINIC | Age: 83
End: 2023-09-01
Payer: MEDICARE

## 2023-09-01 LAB
ALBUMIN SERPL-MCNC: 2.8 G/DL (ref 3.5–5.2)
ALBUMIN/GLOB SERPL: 0.6 G/DL
ALP SERPL-CCNC: 222 U/L (ref 39–117)
ALT SERPL W P-5'-P-CCNC: 15 U/L (ref 1–33)
ANION GAP SERPL CALCULATED.3IONS-SCNC: 13 MMOL/L (ref 5–15)
ANISOCYTOSIS BLD QL: ABNORMAL
AST SERPL-CCNC: 22 U/L (ref 1–32)
BASOPHILS # BLD MANUAL: 0.26 10*3/MM3 (ref 0–0.2)
BASOPHILS NFR BLD MANUAL: 3 % (ref 0–1.5)
BILIRUB SERPL-MCNC: 0.6 MG/DL (ref 0–1.2)
BUN SERPL-MCNC: 34 MG/DL (ref 8–23)
BUN/CREAT SERPL: 6.7 (ref 7–25)
CALCIUM SPEC-SCNC: 8.6 MG/DL (ref 8.6–10.5)
CHLORIDE SERPL-SCNC: 90 MMOL/L (ref 98–107)
CO2 SERPL-SCNC: 25 MMOL/L (ref 22–29)
CREAT SERPL-MCNC: 5.06 MG/DL (ref 0.57–1)
DEPRECATED RDW RBC AUTO: 57.1 FL (ref 37–54)
EGFRCR SERPLBLD CKD-EPI 2021: 8.1 ML/MIN/1.73
EOSINOPHIL # BLD MANUAL: 1.11 10*3/MM3 (ref 0–0.4)
EOSINOPHIL NFR BLD MANUAL: 13 % (ref 0.3–6.2)
ERYTHROCYTE [DISTWIDTH] IN BLOOD BY AUTOMATED COUNT: 17.5 % (ref 12.3–15.4)
GLOBULIN UR ELPH-MCNC: 4.5 GM/DL
GLUCOSE BLDC GLUCOMTR-MCNC: 131 MG/DL (ref 70–130)
GLUCOSE BLDC GLUCOMTR-MCNC: 143 MG/DL (ref 70–130)
GLUCOSE BLDC GLUCOMTR-MCNC: 170 MG/DL (ref 70–130)
GLUCOSE BLDC GLUCOMTR-MCNC: 186 MG/DL (ref 70–130)
GLUCOSE BLDC GLUCOMTR-MCNC: 384 MG/DL (ref 70–130)
GLUCOSE BLDC GLUCOMTR-MCNC: 92 MG/DL (ref 70–130)
GLUCOSE SERPL-MCNC: 128 MG/DL (ref 65–99)
HCT VFR BLD AUTO: 33 % (ref 34–46.6)
HGB BLD-MCNC: 10.3 G/DL (ref 12–15.9)
LYMPHOCYTES # BLD MANUAL: 1.7 10*3/MM3 (ref 0.7–3.1)
LYMPHOCYTES NFR BLD MANUAL: 7 % (ref 5–12)
MAGNESIUM SERPL-MCNC: 1.7 MG/DL (ref 1.6–2.4)
MCH RBC QN AUTO: 28.4 PG (ref 26.6–33)
MCHC RBC AUTO-ENTMCNC: 31.2 G/DL (ref 31.5–35.7)
MCV RBC AUTO: 90.9 FL (ref 79–97)
MONOCYTES # BLD: 0.6 10*3/MM3 (ref 0.1–0.9)
NEUTROPHILS # BLD AUTO: 4.86 10*3/MM3 (ref 1.7–7)
NEUTROPHILS NFR BLD MANUAL: 57 % (ref 42.7–76)
NRBC SPEC MANUAL: 1 /100 WBC (ref 0–0.2)
PLATELET # BLD AUTO: 394 10*3/MM3 (ref 140–450)
PMV BLD AUTO: 9.7 FL (ref 6–12)
POTASSIUM SERPL-SCNC: 5.1 MMOL/L (ref 3.5–5.2)
PROT SERPL-MCNC: 7.3 G/DL (ref 6–8.5)
QT INTERVAL: 372 MS
QTC INTERVAL: 460 MS
RBC # BLD AUTO: 3.63 10*6/MM3 (ref 3.77–5.28)
SMALL PLATELETS BLD QL SMEAR: ADEQUATE
SODIUM SERPL-SCNC: 128 MMOL/L (ref 136–145)
VARIANT LYMPHS NFR BLD MANUAL: 17 % (ref 19.6–45.3)
VARIANT LYMPHS NFR BLD MANUAL: 3 % (ref 0–5)
WBC MORPH BLD: NORMAL
WBC NRBC COR # BLD: 8.52 10*3/MM3 (ref 3.4–10.8)

## 2023-09-01 PROCEDURE — 85025 COMPLETE CBC W/AUTO DIFF WBC: CPT | Performed by: INTERNAL MEDICINE

## 2023-09-01 PROCEDURE — 25010000002 HEPARIN (PORCINE) PER 1000 UNITS: Performed by: INTERNAL MEDICINE

## 2023-09-01 PROCEDURE — P9047 ALBUMIN (HUMAN), 25%, 50ML: HCPCS | Performed by: INTERNAL MEDICINE

## 2023-09-01 PROCEDURE — 82948 REAGENT STRIP/BLOOD GLUCOSE: CPT

## 2023-09-01 PROCEDURE — 63710000001 DRONABINOL PER 2.5 MG

## 2023-09-01 PROCEDURE — 80053 COMPREHEN METABOLIC PANEL: CPT | Performed by: INTERNAL MEDICINE

## 2023-09-01 PROCEDURE — 83735 ASSAY OF MAGNESIUM: CPT | Performed by: INTERNAL MEDICINE

## 2023-09-01 PROCEDURE — 93005 ELECTROCARDIOGRAM TRACING: CPT | Performed by: INTERNAL MEDICINE

## 2023-09-01 PROCEDURE — 25010000002 ALBUMIN HUMAN 25% PER 50 ML: Performed by: INTERNAL MEDICINE

## 2023-09-01 PROCEDURE — 85007 BL SMEAR W/DIFF WBC COUNT: CPT | Performed by: INTERNAL MEDICINE

## 2023-09-01 PROCEDURE — 63710000001 DRONABINOL PER 5 MG

## 2023-09-01 PROCEDURE — 93010 ELECTROCARDIOGRAM REPORT: CPT | Performed by: INTERNAL MEDICINE

## 2023-09-01 PROCEDURE — 25010000002 ALTEPLASE 2 MG RECONSTITUTED SOLUTION: Performed by: INTERNAL MEDICINE

## 2023-09-01 PROCEDURE — 25010000002 EPOETIN ALFA PER 1000 UNITS: Performed by: INTERNAL MEDICINE

## 2023-09-01 RX ORDER — NOREPINEPHRINE BITARTRATE 0.03 MG/ML
.02-.3 INJECTION, SOLUTION INTRAVENOUS
Status: DISCONTINUED | OUTPATIENT
Start: 2023-09-01 | End: 2023-09-07

## 2023-09-01 RX ORDER — MIDODRINE HYDROCHLORIDE 5 MG/1
5 TABLET ORAL
Status: DISCONTINUED | OUTPATIENT
Start: 2023-09-01 | End: 2023-09-04

## 2023-09-01 RX ORDER — HEPARIN SODIUM 1000 [USP'U]/ML
1000 INJECTION, SOLUTION INTRAVENOUS; SUBCUTANEOUS
Status: DISPENSED | OUTPATIENT
Start: 2023-09-01 | End: 2023-09-08

## 2023-09-01 RX ORDER — ALBUMIN (HUMAN) 12.5 G/50ML
25 SOLUTION INTRAVENOUS ONCE
Status: DISCONTINUED | OUTPATIENT
Start: 2023-09-01 | End: 2023-09-13

## 2023-09-01 NOTE — ACP (ADVANCE CARE PLANNING)
[facility]  [address] [place] [address]. [address]  T - [phone]  F - [phone]     Progress Note          PATIENT  DEMOGRAPHICS   PATIENT NAME: Carol Sullivan                      PHYSICIAN: COY Chong  : 1940  MRN: [id number]   LOS: 0 days    Patient Care Team:  Len Seo MD as PCP - General  Blaire Camara APRN as Nurse Practitioner (General Surgery)  Subjective   SUBJECTIVE   BP low this morning         Objective   OBJECTIVE   Vital Signs  Heart Rate:  [] 80    T 98.6  HR 90  RR 20  BP 96/55  O2: 97% RA     No intake/output data recorded.    PHYSICAL EXAM    Physical Exam  Constitutional:       Appearance: She is well-developed.   HENT:      Head: Normocephalic.      Left Ear: There is no impacted cerumen.      Nose: No rhinorrhea.   Eyes:      Pupils: Pupils are equal, round, and reactive to light.   Cardiovascular:      Rate and Rhythm: Normal rate and regular rhythm.      Heart sounds: Normal heart sounds.   Pulmonary:      Effort: Pulmonary effort is normal.      Breath sounds: Normal breath sounds.   Abdominal:      General: Bowel sounds are normal.      Palpations: Abdomen is soft.   Musculoskeletal:         General: No swelling.   Skin:     Coloration: Skin is not jaundiced.   Neurological:      General: No focal deficit present.      Mental Status: She is alert. She is disoriented.       RESULTS   Results Review:    Results from last 7 days   Lab Units 23  0339 23  0500 23  0345   SODIUM mmol/L 128* 133* 133*   POTASSIUM mmol/L 5.1 4.5 4.3   CHLORIDE mmol/L 90* 95* 94*   CO2 mmol/L 25.0 27.0 27.0   BUN mg/dL 34* 30* 30*   CREATININE mg/dL 5.06* 5.01* 5.50*   CALCIUM mg/dL 8.6 8.4* 8.2*   BILIRUBIN mg/dL 0.6 0.5 0.5   ALK PHOS U/L 222* 214* 262*   ALT (SGPT) U/L 15 18 28   AST (SGOT) U/L 22 21 22   GLUCOSE mg/dL 128* 104* 105*         Estimated Creatinine Clearance: 8.3 mL/min (A) (by C-G formula based on SCr of 5.06 mg/dL  (H)).    Results from last 7 days   Lab Units 09/01/23  0339 08/30/23  0500 08/28/23  0345   MAGNESIUM mg/dL 1.7 1.7 1.6               Results from last 7 days   Lab Units 09/01/23  0339 08/30/23  0500 08/28/23  0345   WBC 10*3/mm3 8.52 5.20 4.58   HEMOGLOBIN g/dL 10.3* 9.0* 10.0*   PLATELETS 10*3/mm3 394 280 226                   Imaging Results (Last 24 Hours)       ** No results found for the last 24 hours. **             MEDICATIONS    albumin human, 25 g, Intravenous, Once  alteplase, 2 mg, Intracatheter, Once in Dialysis  alteplase, 2 mg, Intracatheter, Once in Dialysis  amiodarone, 200 mg, Oral, BID With Meals  busPIRone, 5 mg, Oral, BID  dilTIAZem, 60 mg, Oral, Q6H  dronabinol, 7.5 mg, Oral, BID AC  epoetin karen/karen-epbx, 6,000 Units, Intravenous, Once per day on Mon Wed Fri  [START ON 9/4/2023] ferric gluconate (FERRLECIT) IVPB, 100 mg, Intravenous, Weekly  gabapentin, 100 mg, Oral, TID  heparin (porcine), 5,000 Units, Subcutaneous, Q12H  heparin (porcine), 2,000 Units, Intravenous, Once per day on Mon Wed Fri  heparin (porcine), 3,700 Units, Intracatheter, Take As Directed  HYDROcodone-acetaminophen, 1 tablet, Oral, Once  Insulin Aspart, 2-12 Units, Subcutaneous, 4 times per day  lansoprazole, 30 mg, Oral, Q12H  magnesium sulfate, 2 g, Intravenous, Once  metoprolol tartrate, 2.5 mg, Intravenous, Once  metoprolol tartrate, 50 mg, Oral, Q12H  rosuvastatin, 40 mg, Oral, Daily  sodium chloride 0.9 % flush, 10 mL, Intravenous, Q8H      amiodarone, 0.5 mg/min  dilTIAZem, 5-15 mg/hr  norepinephrine, 0.02-0.3 mcg/kg/min      Assessment & Plan   ASSESSMENT / PLAN      * No active hospital problems. *      1. Acute kidney injury/ Acute tubular necrosis:  - Baseline unknown.   - Creatinine was 1.5 in 10/2022.   - UA 3+ protein, trace leukocytes, 0-2 RBC, 6-12 WBC, 2+ bacteria.   - Urine sodium 26. CT abd large rt retroperitoneal hematoma causing moderate rt hydronephrosis.  - Started HD 8/6/23  - Received call 9/1  with low BP, started levophed. BP is better now on low dose levophed. Cardiology has stopped Imdur.  -  HD today with minimal UF, titrate levophed as needed     2. Renal failure retroperitoneal bleed s/p right J stent for hydronephrosis   - s/p J stent placement by urology 8/11  - Continue to monitor renal function      3. Hypertension/ Afib with RVR:   - Blood pressure is low, on levophed     4. UTI E.fecalis:   - became septic with mild hydro Dr Radha is consulted. Now stent in place . E.fecalis     5. Cdiff:   -not on po vanc now      6. Hyponatremia:   - Sodium is stable     7. Anemia / retroperitoneal bleed:  - Hemoglobin is acceptable at 10.3, B12 and folate are good on epogen      8.  AMS              This document has been electronically signed by COY Chong on September 1, 2023 10:21 CDT      For this patient encounter, I have reviewed the Nurse Practitioner's documentation, medical decision making, and treatment plan and personally spent time with the patient.

## 2023-09-01 NOTE — ACP (ADVANCE CARE PLANNING)
[facility] @ [facility]  INPATIENT PROGRESS NOTE    PATIENT NAME: Carol Sullivan      PHYSICIAN: Josefina Perez MD  : 1940        MRN: [id number]  Patient Care Team:  Len Seo MD as PCP - General  Blaire Camara APRN as Nurse Practitioner (General Surgery)    Chief Complaint: Patient was brought to emergency room at [facility] for confusion.     History of Present Illness: 82-year-old female with significant medical history of hypertension, dyslipidemia, gastroesophageal reflux disease who was brought into the emergency room for confusion.  Patient was also found to have erythema and cellulitis of left breast.  Patient was thought of having mastitis.  Patient's condition was monitored now.  Patient had gradual decrease in her urine output and worsening in her kidney function was noted.  Patient off having renal failure which was worsening over time.  Patient subsequently required hemodialysis patient was placed on hemodialysis per nephrology recommendation.  Patient was admitted to the hospital setting for antibiotics and hemodialysis.  Patient's condition seem to be stabilized however patient remained extremely weak and deconditioned hemodialysis patient was recommended to be admitted to our facility for further therapy and rehabilitation along with monitoring for hemodialysis.     Discharge Summary and H&P: Reviewed from previous hospitalization and information documented above in HPI Section.     Radiology Studies from Previous Hospitalization: Reviewed and are as below:  XR Abdomen KUB     Result Date: 2023  FINDINGS/IMPRESSION: Radiopaque tip of the Dobbhoff tube projects over the second portion of the duodenum, similar to even slightly retracted when compared to the prior exam. Right-sided ureteral stent is again noted and incompletely visualized.  There are probably also right femoral catheters which are  incompletely visualized. Normal bowel gas pattern in the visualized abdomen.     XR Chest Post CVA Port     Result Date: 8/17/2023  Impression: 1. Mild interstitial opacities are likely due to low lung volumes. An element of interstitial edema is not entirely excluded.      EKG From previous hospitalization reviewed and documented below:  ECG 12 Lead Other; hx afib   Preliminary Result   Test Reason : Other~   Blood Pressure :   */*   mmHG   Vent. Rate :  67 BPM     Atrial Rate :  67 BPM      P-R Int : 188 ms          QRS Dur :  78 ms       QT Int : 392 ms       P-R-T Axes :  28   6  16 degrees      QTc Int : 414 ms       Normal sinus rhythm with sinus arrhythmia   Minimal voltage criteria for LVH, may be normal variant   Anteroseptal infarct (cited on or before 30-JUL-2023)   Abnormal ECG   When compared with ECG of 10-AUG-2023 15:02,   Sinus rhythm has replaced Atrial fibrillation   Vent. rate has decreased BY  62 BPM   Nonspecific T wave abnormality, worse in Anterior leads       Referred By:            Confirmed By:              Laboratory results from previous hospitalization is reviewed and below:        Lab Results   Component Value Date     GLUCOSE 139 (H) 08/17/2023     CALCIUM 8.6 08/17/2023      (L) 08/17/2023     K 3.9 08/17/2023     CO2 23.0 08/17/2023     CL 90 (L) 08/17/2023     BUN 72 (H) 08/17/2023     CREATININE 4.86 (H) 08/17/2023     EGFR 8.5 (L) 08/17/2023     BCR 14.8 08/17/2023     ANIONGAP 17.0 (H) 08/17/2023            Lab Results   Component Value Date     WBC 17.07 (H) 08/17/2023     HGB 10.2 (L) 08/17/2023     HCT 29.8 (L) 08/17/2023     MCV 85.9 08/17/2023     PLT         Assessment       Sepsis Secondary to Below.  Somnolence [R40.0]  Mastitis [N61.0]  Sepsis [A41.9]  Sepsis, due to unspecified organism, unspecified whether acute organ dysfunction present [A41.9]   ESRD on Hemodialysis.        DVT Prophylaxis: Heparin.  GI Prophylaxis: Lansoprazole.  Code Status: DNR/DNI.     Plan       -Hypotensive today.  -Bolus, albumin ineffective.  -Levophed initiated.  -Requiring close monitoring due to Levophed drip as well as hypotensive status and dialysis.  Patient has the potential to continue with severe hypotension possibly leading to cardiac arrest.  -Dialysis as per nephrology   -Aggressive therapy as before.   -Strongly recommend out of bed daily  -DVT and GI prophylaxis in place.  -We'll continue monitoring patient in hospital setting and treat patient as course dictates.  -Please review orders for detailed plan of care.  -For Aute and Chronic medical condition we will continue medications described below in medication section which have been reviewed and we will continue unless changed in plan of care.  -Laboratory and diagnostic studies have been independently reviewed and the reports are reviewed as documented below.    Subjective   Interval History:   Patient Complaints:   Patient seen and examined.  Trendelenburg in bed.  Alert but fatigued.  B/P and MAP remain low.  Will continue with dialysis today per nephrology.  Remains on pressors for B/P control.   History taken from: Medical record and nursing staff.    Review of Systems:    Review of Systems   All other systems reviewed and are negative.    Objective   Vital Signs  Temp: 97.4 F         Heart Rate: 85         Resp: 20          Blood Pressure: 86/42         Pulse Ox: 94 %    Physical Exam:   Physical Exam  Vitals and nursing note reviewed.   Constitutional:       Appearance: She is well-developed.   HENT:      Head: Normocephalic and atraumatic.      Nose: Nose normal.   Eyes:      Conjunctiva/sclera: Conjunctivae normal.      Pupils: Pupils are equal, round, and reactive to light.   Neck:      Thyroid: No thyromegaly.      Vascular: No JVD.      Trachea: No tracheal deviation.   Cardiovascular:      Rate and Rhythm: Normal rate and regular rhythm.      Heart sounds: Normal heart sounds.   Pulmonary:      Effort: Pulmonary effort  is normal. No respiratory distress.      Breath sounds: Normal breath sounds. No wheezing or rales.   Chest:      Chest wall: No tenderness.   Abdominal:      General: Bowel sounds are normal. There is no distension.      Palpations: Abdomen is soft.      Tenderness: There is no abdominal tenderness. There is no guarding or rebound.   Musculoskeletal:         General: Normal range of motion.      Cervical back: Normal range of motion and neck supple.   Lymphadenopathy:      Cervical: No cervical adenopathy.   Skin:     General: Skin is warm and dry.      Comments: Intact   Neurological:      Mental Status: She is alert and oriented to person, place, and time.      Cranial Nerves: No cranial nerve deficit.      Deep Tendon Reflexes: Reflexes are normal and symmetric.     Results Review:       Results from last 7 days   Lab Units 09/01/23  0339 08/30/23  0500 08/28/23  0345   SODIUM mmol/L 128* 133* 133*   POTASSIUM mmol/L 5.1 4.5 4.3   CHLORIDE mmol/L 90* 95* 94*   CO2 mmol/L 25.0 27.0 27.0   BUN mg/dL 34* 30* 30*   CREATININE mg/dL 5.06* 5.01* 5.50*   GLUCOSE mg/dL 128* 104* 105*   CALCIUM mg/dL 8.6 8.4* 8.2*   BILIRUBIN mg/dL 0.6 0.5 0.5   ALK PHOS U/L 222* 214* 262*   ALT (SGPT) U/L 15 18 28   AST (SGOT) U/L 22 21 22       Results from last 7 days   Lab Units 09/01/23  0339 08/30/23  0500 08/28/23  0345   MAGNESIUM mg/dL 1.7 1.7 1.6       Results from last 7 days   Lab Units 09/01/23  0339 08/30/23  0500 08/28/23  0345   WBC 10*3/mm3 8.52 5.20 4.58   HEMOGLOBIN g/dL 10.3* 9.0* 10.0*   HEMATOCRIT % 33.0* 28.1* 30.7*   PLATELETS 10*3/mm3 394 280 226       Lab Results   Component Value Date    TROPONINI 0.69 (H) 05/31/2020    TROPONINT 56 (C) 08/06/2023     pH, Arterial   Date Value Ref Range Status   08/11/2023 7.403 7.350 - 7.450 pH units Final     CO2   Date Value Ref Range Status   09/01/2023 25.0 22.0 - 29.0 mmol/L Final     Total CO2   Date Value Ref Range Status   10/27/2022 30 21 - 31 mmol/L Final            Imaging Results (Most Recent)       None           No results found for: ACANTHNAEG, AFBCX, BPERTUSSISCX, BLOODCX  No results found for: BCIDPCR, CXREFLEX, CSFCX, CULTURETIS  No results found for: CULTURES, HSVCX, URCX  No results found for: EYECULTURE, GCCX, HSVCULTURE, LABHSV  No results found for: LEGIONELLA, MRSACX, MUMPSCX, MYCOPLASCX  No results found for: NOCARDIACX, STOOLCX  No results found for: THROATCX, UNSTIMCULT, URINECX, CULTURE, VZVCULTUR  No results found for: VIRALCULTU, WOUNDCX  Medication Reviewed and Will Continue Unless Documented in Plan:   albumin human, 25 g, Intravenous, Once  alteplase, 2 mg, Intracatheter, Once in Dialysis  alteplase, 2 mg, Intracatheter, Once in Dialysis  amiodarone, 200 mg, Oral, BID With Meals  busPIRone, 5 mg, Oral, BID  dilTIAZem, 60 mg, Oral, Q6H  dronabinol, 7.5 mg, Oral, BID AC  epoetin karen/karen-epbx, 6,000 Units, Intravenous, Once per day on Mon Wed Fri  [START ON 9/4/2023] ferric gluconate (FERRLECIT) IVPB, 100 mg, Intravenous, Weekly  gabapentin, 100 mg, Oral, TID  heparin (porcine), 5,000 Units, Subcutaneous, Q12H  heparin (porcine), 2,000 Units, Intravenous, Once per day on Mon Wed Fri  heparin (porcine), 3,700 Units, Intracatheter, Take As Directed  HYDROcodone-acetaminophen, 1 tablet, Oral, Once  Insulin Aspart, 2-12 Units, Subcutaneous, 4 times per day  lansoprazole, 30 mg, Oral, Q12H  magnesium sulfate, 2 g, Intravenous, Once  metoprolol tartrate, 2.5 mg, Intravenous, Once  metoprolol tartrate, 50 mg, Oral, Q12H  rosuvastatin, 40 mg, Oral, Daily  sodium chloride 0.9 % flush, 10 mL, Intravenous, Q8H      amiodarone, 0.5 mg/min  dilTIAZem, 5-15 mg/hr  norepinephrine, 0.02-0.3 mcg/kg/min      acetaminophen    acetaminophen    albumin human    cyclobenzaprine    dextrose    dilTIAZem    HYDROcodone-acetaminophen    hydrocortisone-bacitracin-zinc oxide-nystatin    melatonin    muscle rub    sodium chloride    sodium chloride 0.9 % flush     traZODone  amiodarone, 0.5 mg/min  dilTIAZem, 5-15 mg/hr  norepinephrine, 0.02-0.3 mcg/kg/min       Dietary Orders (From admission, onward)       Start     Ordered    08/30/23 0800  Dietary Nutrition Supplements Other (See Comment); Add  to each meal  Daily With Breakfast, Lunch & Dinner      Question Answer Comment   Select Supplement: Other (See Comment)    Other Add  to each meal        08/30/23 0758    08/24/23 1800  Dietary Nutrition Supplements Other (See Comment); Mighty shake with every meal, Apple sauce with every meal  Daily With Breakfast, Lunch & Dinner      Question Answer Comment   Select Supplement: Other (See Comment)    Other Mighty shake with every meal, Apple sauce with every meal        08/24/23 1447    08/18/23 1800  Dietary Nutrition Supplements Other (See Comment); Homemade M/S made with Straw Novasource and Berry Magic cup  Daily With Dinner      Question Answer Comment   Select Supplement: Other (See Comment)    Other Homemade M/S made with Straw Novasource and [place] Magic cup        08/18/23 1146    08/18/23 1200  Dietary Nutrition Supplements Other (See Comment); Homemade M/S made with Vanilla Novasource and vanilla magic cup (8ounces)  Daily With Lunch      Question Answer Comment   Select Supplement: Other (See Comment)    Other Homemade M/S made with Vanilla Novasource and vanilla magic cup (8ounces)        08/18/23 1146    08/17/23 1153  Diet: Regular/House Diet, Renal Diets, Diabetic Diets; Consistent Carbohydrate; Low Sodium (2-3g); Texture: Soft to Chew (NDD 3); Soft to Chew: Chopped Meat; Fluid Consistency: Thin (IDDSI 0)  Diet Effective Now        References:    Diet Order [place]   Question Answer Comment   Diets: Regular/House Diet    Diets: Renal Diets    Diets: Diabetic Diets    Diabetic Diet: Consistent Carbohydrate    Renal Diet: Low Sodium (2-3g)    Texture: Soft to Chew (NDD 3)    Soft to Chew: Chopped Meat    Fluid Consistency: Thin (IDDSI 0)         08/17/23 1156                  History, physical exam, assessment and plan may have been partly or fully copied from before, but  changes made to the copied record to reflect care on the date of service. Part of the lab and imaging  reports auto populated and corrected. Some of this note may be an electronic transcription of spoken  language to printed text. This may permit erroneous, or at times, nonsensical words or phrases to be  inadvertently transcribed. Although I have reviewed the note for such errors, some may still exist.      This document has been electronically signed by Josefina Perez MD on September 1, 2023 11:47 CDT

## 2023-09-02 ENCOUNTER — OUTSIDE FACILITY SERVICE (OUTPATIENT)
Dept: PULMONOLOGY | Facility: CLINIC | Age: 83
End: 2023-09-02
Payer: MEDICARE

## 2023-09-02 LAB
GLUCOSE BLDC GLUCOMTR-MCNC: 144 MG/DL (ref 70–130)
GLUCOSE BLDC GLUCOMTR-MCNC: 146 MG/DL (ref 70–130)
GLUCOSE BLDC GLUCOMTR-MCNC: 155 MG/DL (ref 70–130)
GLUCOSE BLDC GLUCOMTR-MCNC: 202 MG/DL (ref 70–130)

## 2023-09-02 PROCEDURE — 63710000001 DRONABINOL PER 2.5 MG

## 2023-09-02 PROCEDURE — 82948 REAGENT STRIP/BLOOD GLUCOSE: CPT

## 2023-09-02 PROCEDURE — 25010000002 HEPARIN (PORCINE) PER 1000 UNITS: Performed by: INTERNAL MEDICINE

## 2023-09-02 PROCEDURE — 63710000001 DRONABINOL PER 5 MG

## 2023-09-02 NOTE — ACP (ADVANCE CARE PLANNING)
[facility]  [address] [place] [address]. [address]  T - [phone]  F - [phone]     Progress Note          PATIENT  DEMOGRAPHICS   PATIENT NAME: Carol Sullivan                      PHYSICIAN: Stephanie Gómez MD  : 1940  MRN: [id number]   LOS: 0 days    Patient Care Team:  Len Seo MD as PCP - General  University Park[place]la[place] APRN as Nurse Practitioner (General Surgery)  Subjective   SUBJECTIVE   BP still low and is on levophed         Objective   OBJECTIVE   Vital Signs  Heart Rate:  [] 96    T 97.3  HR 94  RR 20  /54  O2: 98% RA     No intake/output data recorded.    PHYSICAL EXAM    Physical Exam  Constitutional:       Appearance: She is well-developed.   HENT:      Head: Normocephalic.      Left Ear: There is no impacted cerumen.      Nose: No rhinorrhea.   Eyes:      Pupils: Pupils are equal, round, and reactive to light.   Cardiovascular:      Rate and Rhythm: Normal rate and regular rhythm.      Heart sounds: Normal heart sounds.   Pulmonary:      Effort: Pulmonary effort is normal.      Breath sounds: Normal breath sounds.   Abdominal:      General: Bowel sounds are normal.      Palpations: Abdomen is soft.   Musculoskeletal:         General: No swelling.   Skin:     Coloration: Skin is not jaundiced.   Neurological:      General: No focal deficit present.      Mental Status: She is alert. She is disoriented.       RESULTS   Results Review:    Results from last 7 days   Lab Units 23  0339 23  0500 23  0345   SODIUM mmol/L 128* 133* 133*   POTASSIUM mmol/L 5.1 4.5 4.3   CHLORIDE mmol/L 90* 95* 94*   CO2 mmol/L 25.0 27.0 27.0   BUN mg/dL 34* 30* 30*   CREATININE mg/dL 5.06* 5.01* 5.50*   CALCIUM mg/dL 8.6 8.4* 8.2*   BILIRUBIN mg/dL 0.6 0.5 0.5   ALK PHOS U/L 222* 214* 262*   ALT (SGPT) U/L 15 18 28   AST (SGOT) U/L 22 21 22   GLUCOSE mg/dL 128* 104* 105*         Estimated Creatinine Clearance: 8.3 mL/min (A) (by C-G formula based on SCr of  5.06 mg/dL (H)).    Results from last 7 days   Lab Units 09/01/23  0339 08/30/23  0500 08/28/23  0345   MAGNESIUM mg/dL 1.7 1.7 1.6               Results from last 7 days   Lab Units 09/01/23  0339 08/30/23  0500 08/28/23  0345   WBC 10*3/mm3 8.52 5.20 4.58   HEMOGLOBIN g/dL 10.3* 9.0* 10.0*   PLATELETS 10*3/mm3 394 280 226                   Imaging Results (Last 24 Hours)       ** No results found for the last 24 hours. **             MEDICATIONS    albumin human, 25 g, Intravenous, Once  alteplase, 2 mg, Intracatheter, Once in Dialysis  alteplase, 2 mg, Intracatheter, Once in Dialysis  amiodarone, 200 mg, Oral, BID With Meals  busPIRone, 5 mg, Oral, BID  dilTIAZem, 60 mg, Oral, Q6H  dronabinol, 7.5 mg, Oral, BID AC  epoetin karen/karen-epbx, 6,000 Units, Intravenous, Once per day on Mon Wed Fri  [START ON 9/4/2023] ferric gluconate (FERRLECIT) IVPB, 100 mg, Intravenous, Weekly  gabapentin, 100 mg, Oral, TID  heparin (porcine), 5,000 Units, Subcutaneous, Q12H  heparin (porcine), 2,000 Units, Intravenous, Once per day on Mon Wed Fri  heparin (porcine), 3,700 Units, Intracatheter, Take As Directed  HYDROcodone-acetaminophen, 1 tablet, Oral, Once  Insulin Aspart, 2-12 Units, Subcutaneous, 4 times per day  lansoprazole, 30 mg, Oral, Q12H  magnesium sulfate, 2 g, Intravenous, Once  metoprolol tartrate, 2.5 mg, Intravenous, Once  metoprolol tartrate, 50 mg, Oral, Q12H  midodrine, 5 mg, Oral, TID AC  rosuvastatin, 40 mg, Oral, Daily  sodium chloride 0.9 % flush, 10 mL, Intravenous, Q8H      amiodarone, 0.5 mg/min  dilTIAZem, 5-15 mg/hr  norepinephrine, 0.02-0.3 mcg/kg/min      Assessment & Plan   ASSESSMENT / PLAN      * No active hospital problems. *      1. Acute kidney injury/ Acute tubular necrosis:  - Baseline unknown.   - Creatinine was 1.5 in 10/2022.   - UA 3+ protein, trace leukocytes, 0-2 RBC, 6-12 WBC, 2+ bacteria.   - Urine sodium 26. CT abd large rt retroperitoneal hematoma causing moderate rt  hydronephrosis.  - Started HD 8/6/23  - Received call 9/1 with low BP, started levophed. BP is better now on low dose levophed. Cardiology has stopped Imdur.  -  HD next on monday     2. Renal failure retroperitoneal bleed s/p right J stent for hydronephrosis   - s/p J stent placement by urology 8/11  - Continue to monitor renal function      3. Hypertension/ Afib with RVR:   - Blood pressure is low, on levophed     4. UTI E.fecalis:   - became septic with mild hydro Dr Casper is consulted. Now stent in place . E.fecalis     5. Cdiff:   -not on po vanc now      6. Hyponatremia:   - Sodium is stable     7. Anemia / retroperitoneal bleed:  - Hemoglobin is acceptable at 10.3, B12 and folate are good on epogen      8.  AMS              This document has been electronically signed by Stephanie Gómez MD on September 2, 2023 11:25 CDT

## 2023-09-02 NOTE — ACP (ADVANCE CARE PLANNING)
Formerly [facility] @ [facility]  INPATIENT PROGRESS NOTE    PATIENT NAME: Carol Sullivan      PHYSICIAN: Josefina Perez MD  : 1940        MRN: [id number]  Patient Care Team:  Len Seo MD as PCP - General  Blaire Camara APRN as Nurse Practitioner (General Surgery)    Chief Complaint: Patient was brought to emergency room at [facility] for confusion.     History of Present Illness: 82-year-old female with significant medical history of hypertension, dyslipidemia, gastroesophageal reflux disease who was brought into the emergency room for confusion.  Patient was also found to have erythema and cellulitis of left breast.  Patient was thought of having mastitis.  Patient's condition was monitored now.  Patient had gradual decrease in her urine output and worsening in her kidney function was noted.  Patient off having renal failure which was worsening over time.  Patient subsequently required hemodialysis patient was placed on hemodialysis per nephrology recommendation.  Patient was admitted to the hospital setting for antibiotics and hemodialysis.  Patient's condition seem to be stabilized however patient remained extremely weak and deconditioned hemodialysis patient was recommended to be admitted to our facility for further therapy and rehabilitation along with monitoring for hemodialysis.     Discharge Summary and H&P: Reviewed from previous hospitalization and information documented above in HPI Section.     Radiology Studies from Previous Hospitalization: Reviewed and are as below:  XR Abdomen KUB     Result Date: 2023  FINDINGS/IMPRESSION: Radiopaque tip of the Dobbhoff tube projects over the second portion of the duodenum, similar to even slightly retracted when compared to the prior exam. Right-sided ureteral stent is again noted and incompletely visualized.  There are probably also right femoral catheters which are  incompletely visualized. Normal bowel gas pattern in the visualized abdomen.     XR Chest Post CVA Port     Result Date: 8/17/2023  Impression: 1. Mild interstitial opacities are likely due to low lung volumes. An element of interstitial edema is not entirely excluded.      EKG From previous hospitalization reviewed and documented below:  ECG 12 Lead Other; hx afib   Preliminary Result   Test Reason : Other~   Blood Pressure :   */*   mmHG   Vent. Rate :  67 BPM     Atrial Rate :  67 BPM      P-R Int : 188 ms          QRS Dur :  78 ms       QT Int : 392 ms       P-R-T Axes :  28   6  16 degrees      QTc Int : 414 ms       Normal sinus rhythm with sinus arrhythmia   Minimal voltage criteria for LVH, may be normal variant   Anteroseptal infarct (cited on or before 30-JUL-2023)   Abnormal ECG   When compared with ECG of 10-AUG-2023 15:02,   Sinus rhythm has replaced Atrial fibrillation   Vent. rate has decreased BY  62 BPM   Nonspecific T wave abnormality, worse in Anterior leads       Referred By:            Confirmed By:              Laboratory results from previous hospitalization is reviewed and below:        Lab Results   Component Value Date     GLUCOSE 139 (H) 08/17/2023     CALCIUM 8.6 08/17/2023      (L) 08/17/2023     K 3.9 08/17/2023     CO2 23.0 08/17/2023     CL 90 (L) 08/17/2023     BUN 72 (H) 08/17/2023     CREATININE 4.86 (H) 08/17/2023     EGFR 8.5 (L) 08/17/2023     BCR 14.8 08/17/2023     ANIONGAP 17.0 (H) 08/17/2023            Lab Results   Component Value Date     WBC 17.07 (H) 08/17/2023     HGB 10.2 (L) 08/17/2023     HCT 29.8 (L) 08/17/2023     MCV 85.9 08/17/2023     PLT         Assessment       Sepsis Secondary to Below.  Somnolence [R40.0]  Mastitis [N61.0]  Sepsis [A41.9]  Sepsis, due to unspecified organism, unspecified whether acute organ dysfunction present [A41.9]   ESRD on Hemodialysis.        DVT Prophylaxis: Heparin.  GI Prophylaxis: Lansoprazole.  Code Status: DNR/DNI.     Plan       -Stable at time of exam  -Remains on Levophed for pressure control  -Continues to require close monitoring due to Levophed drip as well as hypotensive status and dialysis.  Patient has the potential to continue with severe hypotension possibly leading to cardiac arrest.  -Dialysis as per nephrology   -Aggressive therapy as before.   -Strongly recommend out of bed daily  -DVT and GI prophylaxis in place.  -We'll continue monitoring patient in hospital setting and treat patient as course dictates.  -Please review orders for detailed plan of care.  -For Aute and Chronic medical condition we will continue medications described below in medication section which have been reviewed and we will continue unless changed in plan of care.  -Laboratory and diagnostic studies have been independently reviewed and the reports are reviewed as documented below.    Subjective   Interval History:   Patient Complaints:   Patient seen and examined.  Resting comfortably in bed.  Easily arouses and answering questions.  No overnight events reported.  History taken from: Medical record and nursing staff.    Review of Systems:    Review of Systems   All other systems reviewed and are negative.    Objective   Vital Signs  Temp: 97.2 F         Heart Rate: 95         Resp: 18          Blood Pressure: 115/68         Pulse Ox: 98 %    Physical Exam:   Physical Exam  Vitals and nursing note reviewed.   Constitutional:       Appearance: She is well-developed.   HENT:      Head: Normocephalic and atraumatic.      Nose: Nose normal.   Eyes:      Conjunctiva/sclera: Conjunctivae normal.      Pupils: Pupils are equal, round, and reactive to light.   Neck:      Thyroid: No thyromegaly.      Vascular: No JVD.      Trachea: No tracheal deviation.   Cardiovascular:      Rate and Rhythm: Normal rate and regular rhythm.      Heart sounds: Normal heart sounds.   Pulmonary:      Effort: Pulmonary effort is normal. No respiratory distress.      Breath  sounds: Normal breath sounds. No wheezing or rales.   Chest:      Chest wall: No tenderness.   Abdominal:      General: Bowel sounds are normal. There is no distension.      Palpations: Abdomen is soft.      Tenderness: There is no abdominal tenderness. There is no guarding or rebound.   Musculoskeletal:         General: Normal range of motion.      Cervical back: Normal range of motion and neck supple.   Lymphadenopathy:      Cervical: No cervical adenopathy.   Skin:     General: Skin is warm and dry.      Comments: Intact   Neurological:      Mental Status: She is alert and oriented to person, place, and time.      Cranial Nerves: No cranial nerve deficit.      Deep Tendon Reflexes: Reflexes are normal and symmetric.     Results Review:       Results from last 7 days   Lab Units 09/01/23  0339 08/30/23  0500 08/28/23  0345   SODIUM mmol/L 128* 133* 133*   POTASSIUM mmol/L 5.1 4.5 4.3   CHLORIDE mmol/L 90* 95* 94*   CO2 mmol/L 25.0 27.0 27.0   BUN mg/dL 34* 30* 30*   CREATININE mg/dL 5.06* 5.01* 5.50*   GLUCOSE mg/dL 128* 104* 105*   CALCIUM mg/dL 8.6 8.4* 8.2*   BILIRUBIN mg/dL 0.6 0.5 0.5   ALK PHOS U/L 222* 214* 262*   ALT (SGPT) U/L 15 18 28   AST (SGOT) U/L 22 21 22       Results from last 7 days   Lab Units 09/01/23  0339 08/30/23  0500 08/28/23  0345   MAGNESIUM mg/dL 1.7 1.7 1.6       Results from last 7 days   Lab Units 09/01/23  0339 08/30/23  0500 08/28/23  0345   WBC 10*3/mm3 8.52 5.20 4.58   HEMOGLOBIN g/dL 10.3* 9.0* 10.0*   HEMATOCRIT % 33.0* 28.1* 30.7*   PLATELETS 10*3/mm3 394 280 226       Lab Results   Component Value Date    TROPONINI 0.69 (H) 05/31/2020    TROPONINT 56 (C) 08/06/2023     pH, Arterial   Date Value Ref Range Status   08/11/2023 7.403 7.350 - 7.450 pH units Final     CO2   Date Value Ref Range Status   09/01/2023 25.0 22.0 - 29.0 mmol/L Final     Total CO2   Date Value Ref Range Status   10/27/2022 30 21 - 31 mmol/L Final           Imaging Results (Most Recent)       None            No results found for: ACANTHNAEG, AFBCX, BPERTUSSISCX, BLOODCX  No results found for: BCIDPCR, CXREFLEX, CSFCX, CULTURETIS  No results found for: CULTURES, HSVCX, URCX  No results found for: EYECULTURE, GCCX, HSVCULTURE, LABHSV  No results found for: LEGIONELLA, MRSACX, MUMPSCX, MYCOPLASCX  No results found for: NOCARDIACX, STOOLCX  No results found for: THROATCX, UNSTIMCULT, URINECX, CULTURE, VZVCULTUR  No results found for: VIRALCULTU, WOUNDCX  Medication Reviewed and Will Continue Unless Documented in Plan:   albumin human, 25 g, Intravenous, Once  alteplase, 2 mg, Intracatheter, Once in Dialysis  alteplase, 2 mg, Intracatheter, Once in Dialysis  amiodarone, 200 mg, Oral, BID With Meals  busPIRone, 5 mg, Oral, BID  dilTIAZem, 60 mg, Oral, Q6H  dronabinol, 7.5 mg, Oral, BID AC  epoetin karen/karen-epbx, 6,000 Units, Intravenous, Once per day on Mon Wed Fri  [START ON 9/4/2023] ferric gluconate (FERRLECIT) IVPB, 100 mg, Intravenous, Weekly  gabapentin, 100 mg, Oral, TID  heparin (porcine), 5,000 Units, Subcutaneous, Q12H  heparin (porcine), 2,000 Units, Intravenous, Once per day on Mon Wed Fri  heparin (porcine), 3,700 Units, Intracatheter, Take As Directed  HYDROcodone-acetaminophen, 1 tablet, Oral, Once  Insulin Aspart, 2-12 Units, Subcutaneous, 4 times per day  lansoprazole, 30 mg, Oral, Q12H  magnesium sulfate, 2 g, Intravenous, Once  metoprolol tartrate, 2.5 mg, Intravenous, Once  metoprolol tartrate, 50 mg, Oral, Q12H  midodrine, 5 mg, Oral, TID AC  rosuvastatin, 40 mg, Oral, Daily  sodium chloride 0.9 % flush, 10 mL, Intravenous, Q8H      amiodarone, 0.5 mg/min  dilTIAZem, 5-15 mg/hr  norepinephrine, 0.02-0.3 mcg/kg/min      acetaminophen    acetaminophen    albumin human    cyclobenzaprine    dextrose    dilTIAZem    heparin (porcine)    HYDROcodone-acetaminophen    hydrocortisone-bacitracin-zinc oxide-nystatin    melatonin    muscle rub    sodium chloride    sodium chloride 0.9 % flush     traZODone  amiodarone, 0.5 mg/min  dilTIAZem, 5-15 mg/hr  norepinephrine, 0.02-0.3 mcg/kg/min       Dietary Orders (From admission, onward)       Start     Ordered    08/30/23 0800  Dietary Nutrition Supplements Other (See Comment); Add  to each meal  Daily With Breakfast, Lunch & Dinner      Question Answer Comment   Select Supplement: Other (See Comment)    Other Add  to each meal        08/30/23 0758    08/24/23 1800  Dietary Nutrition Supplements Other (See Comment); Mighty shake with every meal, Apple sauce with every meal  Daily With Breakfast, Lunch & Dinner      Question Answer Comment   Select Supplement: Other (See Comment)    Other Mighty shake with every meal, Apple sauce with every meal        08/24/23 1447    08/18/23 1800  Dietary Nutrition Supplements Other (See Comment); Homemade M/S made with Straw Novasource and Berry Magic cup  Daily With Dinner      Question Answer Comment   Select Supplement: Other (See Comment)    Other Homemade M/S made with Straw Novasource and [place] Magic cup        08/18/23 1146    08/18/23 1200  Dietary Nutrition Supplements Other (See Comment); Homemade M/S made with Vanilla Novasource and vanilla magic cup (8ounces)  Daily With Lunch      Question Answer Comment   Select Supplement: Other (See Comment)    Other Homemade M/S made with Vanilla Novasource and vanilla magic cup (8ounces)        08/18/23 1146    08/17/23 1153  Diet: Regular/House Diet, Renal Diets, Diabetic Diets; Consistent Carbohydrate; Low Sodium (2-3g); Texture: Soft to Chew (NDD 3); Soft to Chew: Chopped Meat; Fluid Consistency: Thin (IDDSI 0)  Diet Effective Now        References:    Diet Order [place]   Question Answer Comment   Diets: Regular/House Diet    Diets: Renal Diets    Diets: Diabetic Diets    Diabetic Diet: Consistent Carbohydrate    Renal Diet: Low Sodium (2-3g)    Texture: Soft to Chew (NDD 3)    Soft to Chew: Chopped Meat    Fluid Consistency: Thin (IDDSI 0)         08/17/23 1156                  History, physical exam, assessment and plan may have been partly or fully copied from before, but  changes made to the copied record to reflect care on the date of service. Part of the lab and imaging  reports auto populated and corrected. Some of this note may be an electronic transcription of spoken  language to printed text. This may permit erroneous, or at times, nonsensical words or phrases to be  inadvertently transcribed. Although I have reviewed the note for such errors, some may still exist.      This document has been electronically signed by Josefina Perez MD on September 2, 2023 10:10 CDT

## 2023-09-03 ENCOUNTER — OUTSIDE FACILITY SERVICE (OUTPATIENT)
Dept: PULMONOLOGY | Facility: CLINIC | Age: 83
End: 2023-09-03
Payer: MEDICARE

## 2023-09-03 LAB
GLUCOSE BLDC GLUCOMTR-MCNC: 119 MG/DL (ref 70–130)
GLUCOSE BLDC GLUCOMTR-MCNC: 138 MG/DL (ref 70–130)
GLUCOSE BLDC GLUCOMTR-MCNC: 167 MG/DL (ref 70–130)
GLUCOSE BLDC GLUCOMTR-MCNC: 250 MG/DL (ref 70–130)

## 2023-09-03 PROCEDURE — 63710000001 DRONABINOL PER 5 MG

## 2023-09-03 PROCEDURE — 82948 REAGENT STRIP/BLOOD GLUCOSE: CPT

## 2023-09-03 PROCEDURE — 63710000001 DRONABINOL PER 2.5 MG

## 2023-09-03 PROCEDURE — 25010000002 HEPARIN (PORCINE) PER 1000 UNITS: Performed by: INTERNAL MEDICINE

## 2023-09-03 NOTE — ACP (ADVANCE CARE PLANNING)
[facility] @ [facility]  INPATIENT PROGRESS NOTE    PATIENT NAME: Carol Sullivan      PHYSICIAN: Josefina Perez MD  : 1940        MRN: [id number]  Patient Care Team:  Len Seo MD as PCP - General  Blaire Camara APRN as Nurse Practitioner (General Surgery)    Chief Complaint: Patient was brought to emergency room at [facility] for confusion.     History of Present Illness: 82-year-old female with significant medical history of hypertension, dyslipidemia, gastroesophageal reflux disease who was brought into the emergency room for confusion.  Patient was also found to have erythema and cellulitis of left breast.  Patient was thought of having mastitis.  Patient's condition was monitored now.  Patient had gradual decrease in her urine output and worsening in her kidney function was noted.  Patient off having renal failure which was worsening over time.  Patient subsequently required hemodialysis patient was placed on hemodialysis per nephrology recommendation.  Patient was admitted to the hospital setting for antibiotics and hemodialysis.  Patient's condition seem to be stabilized however patient remained extremely weak and deconditioned hemodialysis patient was recommended to be admitted to our facility for further therapy and rehabilitation along with monitoring for hemodialysis.     Discharge Summary and H&P: Reviewed from previous hospitalization and information documented above in HPI Section.     Radiology Studies from Previous Hospitalization: Reviewed and are as below:  XR Abdomen KUB     Result Date: 2023  FINDINGS/IMPRESSION: Radiopaque tip of the Dobbhoff tube projects over the second portion of the duodenum, similar to even slightly retracted when compared to the prior exam. Right-sided ureteral stent is again noted and incompletely visualized.  There are probably also right femoral catheters which are  incompletely visualized. Normal bowel gas pattern in the visualized abdomen.     XR Chest Post CVA Port     Result Date: 8/17/2023  Impression: 1. Mild interstitial opacities are likely due to low lung volumes. An element of interstitial edema is not entirely excluded.      EKG From previous hospitalization reviewed and documented below:  ECG 12 Lead Other; hx afib   Preliminary Result   Test Reason : Other~   Blood Pressure :   */*   mmHG   Vent. Rate :  67 BPM     Atrial Rate :  67 BPM      P-R Int : 188 ms          QRS Dur :  78 ms       QT Int : 392 ms       P-R-T Axes :  28   6  16 degrees      QTc Int : 414 ms       Normal sinus rhythm with sinus arrhythmia   Minimal voltage criteria for LVH, may be normal variant   Anteroseptal infarct (cited on or before 30-JUL-2023)   Abnormal ECG   When compared with ECG of 10-AUG-2023 15:02,   Sinus rhythm has replaced Atrial fibrillation   Vent. rate has decreased BY  62 BPM   Nonspecific T wave abnormality, worse in Anterior leads       Referred By:            Confirmed By:              Laboratory results from previous hospitalization is reviewed and below:        Lab Results   Component Value Date     GLUCOSE 139 (H) 08/17/2023     CALCIUM 8.6 08/17/2023      (L) 08/17/2023     K 3.9 08/17/2023     CO2 23.0 08/17/2023     CL 90 (L) 08/17/2023     BUN 72 (H) 08/17/2023     CREATININE 4.86 (H) 08/17/2023     EGFR 8.5 (L) 08/17/2023     BCR 14.8 08/17/2023     ANIONGAP 17.0 (H) 08/17/2023            Lab Results   Component Value Date     WBC 17.07 (H) 08/17/2023     HGB 10.2 (L) 08/17/2023     HCT 29.8 (L) 08/17/2023     MCV 85.9 08/17/2023     PLT         Assessment       Sepsis Secondary to Below.  Somnolence [R40.0]  Mastitis [N61.0]  Sepsis [A41.9]  Sepsis, due to unspecified organism, unspecified whether acute organ dysfunction present [A41.9]   ESRD on Hemodialysis.        DVT Prophylaxis: Heparin.  GI Prophylaxis: Lansoprazole.  Code Status: DNR/DNI.     Plan       -Stable at time of exam  -Remains on Levophed for pressure control  -Continues to require close monitoring due to Levophed drip as well as hypotensive status and dialysis.  Patient has the potential to continue with severe hypotension possibly leading to cardiac arrest.  -Dialysis as per nephrology   -Aggressive therapy as before.   -Strongly recommend out of bed daily  -Seems to be more alert today  -Patient appetite and intake continues to improve.  -DVT and GI prophylaxis in place.  -We'll continue monitoring patient in hospital setting and treat patient as course dictates.  -Please review orders for detailed plan of care.  -For Aute and Chronic medical condition we will continue medications described below in medication section which have been reviewed and we will continue unless changed in plan of care.  -Laboratory and diagnostic studies have been independently reviewed and the reports are reviewed as documented below.    Subjective   Interval History:   Patient Complaints:   Patient seen and examined.  Resting in bed.   at bedside.  All questions and concerns addressed.  No overnight events noted.   History taken from: Medical record and nursing staff.    Review of Systems:    Review of Systems   All other systems reviewed and are negative.    Objective   Vital Signs  Temp: 97.6 F         Heart Rate: 76         Resp: 20          Blood Pressure: 144/62         Pulse Ox: 95 %    Physical Exam:   Physical Exam  Vitals and nursing note reviewed.   Constitutional:       Appearance: She is well-developed.   HENT:      Head: Normocephalic and atraumatic.      Nose: Nose normal.   Eyes:      Conjunctiva/sclera: Conjunctivae normal.      Pupils: Pupils are equal, round, and reactive to light.   Neck:      Thyroid: No thyromegaly.      Vascular: No JVD.      Trachea: No tracheal deviation.   Cardiovascular:      Rate and Rhythm: Normal rate and regular rhythm.      Heart sounds: Normal heart sounds.    Pulmonary:      Effort: Pulmonary effort is normal. No respiratory distress.      Breath sounds: Normal breath sounds. No wheezing or rales.   Chest:      Chest wall: No tenderness.   Abdominal:      General: Bowel sounds are normal. There is no distension.      Palpations: Abdomen is soft.      Tenderness: There is no abdominal tenderness. There is no guarding or rebound.   Musculoskeletal:         General: Normal range of motion.      Cervical back: Normal range of motion and neck supple.   Lymphadenopathy:      Cervical: No cervical adenopathy.   Skin:     General: Skin is warm and dry.      Comments: Intact   Neurological:      Mental Status: She is alert and oriented to person, place, and time.      Cranial Nerves: No cranial nerve deficit.      Deep Tendon Reflexes: Reflexes are normal and symmetric.     Results Review:       Results from last 7 days   Lab Units 09/01/23 0339 08/30/23  0500 08/28/23  0345   SODIUM mmol/L 128* 133* 133*   POTASSIUM mmol/L 5.1 4.5 4.3   CHLORIDE mmol/L 90* 95* 94*   CO2 mmol/L 25.0 27.0 27.0   BUN mg/dL 34* 30* 30*   CREATININE mg/dL 5.06* 5.01* 5.50*   GLUCOSE mg/dL 128* 104* 105*   CALCIUM mg/dL 8.6 8.4* 8.2*   BILIRUBIN mg/dL 0.6 0.5 0.5   ALK PHOS U/L 222* 214* 262*   ALT (SGPT) U/L 15 18 28   AST (SGOT) U/L 22 21 22       Results from last 7 days   Lab Units 09/01/23  0339 08/30/23  0500 08/28/23  0345   MAGNESIUM mg/dL 1.7 1.7 1.6       Results from last 7 days   Lab Units 09/01/23  0339 08/30/23  0500 08/28/23  0345   WBC 10*3/mm3 8.52 5.20 4.58   HEMOGLOBIN g/dL 10.3* 9.0* 10.0*   HEMATOCRIT % 33.0* 28.1* 30.7*   PLATELETS 10*3/mm3 394 280 226       Lab Results   Component Value Date    TROPONINI 0.69 (H) 05/31/2020    TROPONINT 56 (C) 08/06/2023     pH, Arterial   Date Value Ref Range Status   08/11/2023 7.403 7.350 - 7.450 pH units Final     CO2   Date Value Ref Range Status   09/01/2023 25.0 22.0 - 29.0 mmol/L Final     Total CO2   Date Value Ref Range Status    10/27/2022 30 21 - 31 mmol/L Final           Imaging Results (Most Recent)       None           No results found for: ACANTHNAEG, AFBCX, BPERTUSSISCX, BLOODCX  No results found for: BCIDPCR, CXREFLEX, CSFCX, CULTURETIS  No results found for: CULTURES, HSVCX, URCX  No results found for: EYECULTURE, GCCX, HSVCULTURE, LABHSV  No results found for: LEGIONELLA, MRSACX, MUMPSCX, MYCOPLASCX  No results found for: NOCARDIACX, STOOLCX  No results found for: THROATCX, UNSTIMCULT, URINECX, CULTURE, VZVCULTUR  No results found for: VIRALCULTU, WOUNDCX  Medication Reviewed and Will Continue Unless Documented in Plan:   albumin human, 25 g, Intravenous, Once  alteplase, 2 mg, Intracatheter, Once in Dialysis  alteplase, 2 mg, Intracatheter, Once in Dialysis  amiodarone, 200 mg, Oral, BID With Meals  busPIRone, 5 mg, Oral, BID  dilTIAZem, 60 mg, Oral, Q6H  dronabinol, 7.5 mg, Oral, BID AC  epoetin karen/karen-epbx, 6,000 Units, Intravenous, Once per day on Mon Wed Fri  [START ON 9/4/2023] ferric gluconate (FERRLECIT) IVPB, 100 mg, Intravenous, Weekly  gabapentin, 100 mg, Oral, TID  heparin (porcine), 5,000 Units, Subcutaneous, Q12H  heparin (porcine), 2,000 Units, Intravenous, Once per day on Mon Wed Fri  heparin (porcine), 3,700 Units, Intracatheter, Take As Directed  HYDROcodone-acetaminophen, 1 tablet, Oral, Once  Insulin Aspart, 2-12 Units, Subcutaneous, 4 times per day  lansoprazole, 30 mg, Oral, Q12H  magnesium sulfate, 2 g, Intravenous, Once  metoprolol tartrate, 2.5 mg, Intravenous, Once  metoprolol tartrate, 50 mg, Oral, Q12H  midodrine, 5 mg, Oral, TID AC  rosuvastatin, 40 mg, Oral, Daily  sodium chloride 0.9 % flush, 10 mL, Intravenous, Q8H      amiodarone, 0.5 mg/min  dilTIAZem, 5-15 mg/hr  norepinephrine, 0.02-0.3 mcg/kg/min      acetaminophen    acetaminophen    albumin human    cyclobenzaprine    dextrose    dilTIAZem    heparin (porcine)    HYDROcodone-acetaminophen    hydrocortisone-bacitracin-zinc oxide-nystatin     melatonin    muscle rub    sodium chloride    sodium chloride 0.9 % flush    traZODone  amiodarone, 0.5 mg/min  dilTIAZem, 5-15 mg/hr  norepinephrine, 0.02-0.3 mcg/kg/min       Dietary Orders (From admission, onward)       Start     Ordered    09/02/23 1800  Dietary Nutrition Supplements Other (See Comment); Add Diet  to each meal  Daily With Breakfast, Lunch & Dinner      Question Answer Comment   Select Supplement: Other (See Comment)    Other Add Diet  to each meal        09/02/23 1656    09/02/23 1800  Dietary Nutrition Supplements Other (See Comment); Mighty shake with every meal, Apple sauce with every meal  Daily With Breakfast, Lunch & Dinner      Question Answer Comment   Select Supplement: Other (See Comment)    Other Mighty shake with every meal, Apple sauce with every meal        09/02/23 1656    08/18/23 1800  Dietary Nutrition Supplements Other (See Comment); Homemade M/S made with Straw Novasource and Berry Magic cup  Daily With Dinner      Question Answer Comment   Select Supplement: Other (See Comment)    Other Homemade M/S made with Straw Novasource and [place] Magic cup        08/18/23 1146    08/17/23 1153  Diet: Regular/House Diet, Renal Diets, Diabetic Diets; Consistent Carbohydrate; Low Sodium (2-3g); Texture: Soft to Chew (NDD 3); Soft to Chew: Chopped Meat; Fluid Consistency: Thin (IDDSI 0)  Diet Effective Now        References:    Diet Order [place]   Question Answer Comment   Diets: Regular/House Diet    Diets: Renal Diets    Diets: Diabetic Diets    Diabetic Diet: Consistent Carbohydrate    Renal Diet: Low Sodium (2-3g)    Texture: Soft to Chew (NDD 3)    Soft to Chew: Chopped Meat    Fluid Consistency: Thin (IDDSI 0)        08/17/23 1156                  History, physical exam, assessment and plan may have been partly or fully copied from before, but  changes made to the copied record to reflect care on the date of service. Part of the lab and imaging  reports auto  populated and corrected. Some of this note may be an electronic transcription of spoken  language to printed text. This may permit erroneous, or at times, nonsensical words or phrases to be  inadvertently transcribed. Although I have reviewed the note for such errors, some may still exist.      This document has been electronically signed by Josefina Perez MD on September 3, 2023 12:23 CDT

## 2023-09-03 NOTE — ACP (ADVANCE CARE PLANNING)
[facility]  [address] [place] [address]. [address]  T - [phone]  F - [phone]     Progress Note          PATIENT  DEMOGRAPHICS   PATIENT NAME: Carol Sullivan                      PHYSICIAN: Stephanie Gómez MD  : 1940  MRN: [id number]   LOS: 0 days    Patient Care Team:  Len Seo MD as PCP - General  Laurel BloomeryBlaire APRN as Nurse Practitioner (General Surgery)  Subjective   SUBJECTIVE   BP stable, on levophed         Objective   OBJECTIVE   Vital Signs  Heart Rate:  [69-70] 69    T 97.6  HR 78  RR 20  /58  O2: 98% RA     No intake/output data recorded.    PHYSICAL EXAM    Physical Exam  Constitutional:       Appearance: She is well-developed.   HENT:      Head: Normocephalic.      Left Ear: There is no impacted cerumen.      Nose: No rhinorrhea.   Eyes:      Pupils: Pupils are equal, round, and reactive to light.   Cardiovascular:      Rate and Rhythm: Normal rate and regular rhythm.      Heart sounds: Normal heart sounds.   Pulmonary:      Effort: Pulmonary effort is normal.      Breath sounds: Normal breath sounds.   Abdominal:      General: Bowel sounds are normal.      Palpations: Abdomen is soft.   Musculoskeletal:         General: No swelling.   Skin:     Coloration: Skin is not jaundiced.   Neurological:      General: No focal deficit present.      Mental Status: She is alert. She is disoriented.       RESULTS   Results Review:    Results from last 7 days   Lab Units 23  0339 23  0500 23  0345   SODIUM mmol/L 128* 133* 133*   POTASSIUM mmol/L 5.1 4.5 4.3   CHLORIDE mmol/L 90* 95* 94*   CO2 mmol/L 25.0 27.0 27.0   BUN mg/dL 34* 30* 30*   CREATININE mg/dL 5.06* 5.01* 5.50*   CALCIUM mg/dL 8.6 8.4* 8.2*   BILIRUBIN mg/dL 0.6 0.5 0.5   ALK PHOS U/L 222* 214* 262*   ALT (SGPT) U/L 15 18 28   AST (SGOT) U/L 22 21 22   GLUCOSE mg/dL 128* 104* 105*         Estimated Creatinine Clearance: 8.3 mL/min (A) (by C-G formula based on SCr of 5.06 mg/dL  (H)).    Results from last 7 days   Lab Units 09/01/23  0339 08/30/23  0500 08/28/23  0345   MAGNESIUM mg/dL 1.7 1.7 1.6               Results from last 7 days   Lab Units 09/01/23  0339 08/30/23  0500 08/28/23  0345   WBC 10*3/mm3 8.52 5.20 4.58   HEMOGLOBIN g/dL 10.3* 9.0* 10.0*   PLATELETS 10*3/mm3 394 280 226                   Imaging Results (Last 24 Hours)       ** No results found for the last 24 hours. **             MEDICATIONS    albumin human, 25 g, Intravenous, Once  alteplase, 2 mg, Intracatheter, Once in Dialysis  alteplase, 2 mg, Intracatheter, Once in Dialysis  amiodarone, 200 mg, Oral, BID With Meals  busPIRone, 5 mg, Oral, BID  dilTIAZem, 60 mg, Oral, Q6H  dronabinol, 7.5 mg, Oral, BID AC  epoetin karen/karen-epbx, 6,000 Units, Intravenous, Once per day on Mon Wed Fri  [START ON 9/4/2023] ferric gluconate (FERRLECIT) IVPB, 100 mg, Intravenous, Weekly  gabapentin, 100 mg, Oral, TID  heparin (porcine), 5,000 Units, Subcutaneous, Q12H  heparin (porcine), 2,000 Units, Intravenous, Once per day on Mon Wed Fri  heparin (porcine), 3,700 Units, Intracatheter, Take As Directed  HYDROcodone-acetaminophen, 1 tablet, Oral, Once  Insulin Aspart, 2-12 Units, Subcutaneous, 4 times per day  lansoprazole, 30 mg, Oral, Q12H  magnesium sulfate, 2 g, Intravenous, Once  metoprolol tartrate, 2.5 mg, Intravenous, Once  metoprolol tartrate, 50 mg, Oral, Q12H  midodrine, 5 mg, Oral, TID AC  rosuvastatin, 40 mg, Oral, Daily  sodium chloride 0.9 % flush, 10 mL, Intravenous, Q8H      amiodarone, 0.5 mg/min  dilTIAZem, 5-15 mg/hr  norepinephrine, 0.02-0.3 mcg/kg/min      Assessment & Plan   ASSESSMENT / PLAN      * No active hospital problems. *      1. Acute kidney injury/ Acute tubular necrosis:  - Baseline unknown.   - Creatinine was 1.5 in 10/2022.   - UA 3+ protein, trace leukocytes, 0-2 RBC, 6-12 WBC, 2+ bacteria.   - Urine sodium 26. CT abd large rt retroperitoneal hematoma causing moderate rt hydronephrosis.  - Started  HD 8/6/23  - Received call 9/1 with low BP, started levophed. BP is better now on low dose levophed. Cardiology has stopped Imdur.  -  HD next on Monday  - making more urine now and upto 550cc/24 hrs     2. Renal failure retroperitoneal bleed s/p right J stent for hydronephrosis   - s/p J stent placement by urology 8/11  - Continue to monitor renal function      3. Hypertension/ Afib with RVR:   - Blood pressure is low, on levophed     4. UTI E.fecalis:   - became septic with mild hydro Dr Vassar is consulted. Now stent in place . E.fecalis     5. Cdiff:   -not on po vanc now      6. Hyponatremia:   - Sodium is stable     7. Anemia / retroperitoneal bleed:  - Hemoglobin is acceptable at 10.3, B12 and folate are good on epogen      8.  AMS              This document has been electronically signed by Stephanie Gómez MD on September 3, 2023 12:37 CDT

## 2023-09-04 ENCOUNTER — OUTSIDE FACILITY SERVICE (OUTPATIENT)
Dept: PULMONOLOGY | Facility: CLINIC | Age: 83
End: 2023-09-04
Payer: MEDICARE

## 2023-09-04 LAB
ALBUMIN SERPL-MCNC: 2.5 G/DL (ref 3.5–5.2)
ALBUMIN/GLOB SERPL: 0.6 G/DL
ALP SERPL-CCNC: 170 U/L (ref 39–117)
ALT SERPL W P-5'-P-CCNC: 9 U/L (ref 1–33)
ANION GAP SERPL CALCULATED.3IONS-SCNC: 12 MMOL/L (ref 5–15)
AST SERPL-CCNC: 19 U/L (ref 1–32)
BASOPHILS # BLD AUTO: 0.09 10*3/MM3 (ref 0–0.2)
BASOPHILS NFR BLD AUTO: 1 % (ref 0–1.5)
BILIRUB SERPL-MCNC: 0.6 MG/DL (ref 0–1.2)
BUN SERPL-MCNC: 34 MG/DL (ref 8–23)
BUN/CREAT SERPL: 7.9 (ref 7–25)
CALCIUM SPEC-SCNC: 8.3 MG/DL (ref 8.6–10.5)
CHLORIDE SERPL-SCNC: 92 MMOL/L (ref 98–107)
CO2 SERPL-SCNC: 25 MMOL/L (ref 22–29)
CREAT SERPL-MCNC: 4.28 MG/DL (ref 0.57–1)
DEPRECATED RDW RBC AUTO: 55.9 FL (ref 37–54)
EGFRCR SERPLBLD CKD-EPI 2021: 9.8 ML/MIN/1.73
EOSINOPHIL # BLD AUTO: 0.35 10*3/MM3 (ref 0–0.4)
EOSINOPHIL NFR BLD AUTO: 4 % (ref 0.3–6.2)
ERYTHROCYTE [DISTWIDTH] IN BLOOD BY AUTOMATED COUNT: 17 % (ref 12.3–15.4)
GLOBULIN UR ELPH-MCNC: 4.1 GM/DL
GLUCOSE BLDC GLUCOMTR-MCNC: 124 MG/DL (ref 70–130)
GLUCOSE BLDC GLUCOMTR-MCNC: 136 MG/DL (ref 70–130)
GLUCOSE BLDC GLUCOMTR-MCNC: 166 MG/DL (ref 70–130)
GLUCOSE BLDC GLUCOMTR-MCNC: 99 MG/DL (ref 70–130)
GLUCOSE SERPL-MCNC: 106 MG/DL (ref 65–99)
HCT VFR BLD AUTO: 30.7 % (ref 34–46.6)
HGB BLD-MCNC: 9.7 G/DL (ref 12–15.9)
IMM GRANULOCYTES # BLD AUTO: 0.32 10*3/MM3 (ref 0–0.05)
IMM GRANULOCYTES NFR BLD AUTO: 3.7 % (ref 0–0.5)
LYMPHOCYTES # BLD AUTO: 1.68 10*3/MM3 (ref 0.7–3.1)
LYMPHOCYTES NFR BLD AUTO: 19.2 % (ref 19.6–45.3)
MAGNESIUM SERPL-MCNC: 1.6 MG/DL (ref 1.6–2.4)
MCH RBC QN AUTO: 28.4 PG (ref 26.6–33)
MCHC RBC AUTO-ENTMCNC: 31.6 G/DL (ref 31.5–35.7)
MCV RBC AUTO: 89.8 FL (ref 79–97)
MONOCYTES # BLD AUTO: 1.07 10*3/MM3 (ref 0.1–0.9)
MONOCYTES NFR BLD AUTO: 12.3 % (ref 5–12)
NEUTROPHILS NFR BLD AUTO: 5.22 10*3/MM3 (ref 1.7–7)
NEUTROPHILS NFR BLD AUTO: 59.8 % (ref 42.7–76)
NRBC BLD AUTO-RTO: 0 /100 WBC (ref 0–0.2)
PLATELET # BLD AUTO: 442 10*3/MM3 (ref 140–450)
PMV BLD AUTO: 9.1 FL (ref 6–12)
POTASSIUM SERPL-SCNC: 4.5 MMOL/L (ref 3.5–5.2)
PROT SERPL-MCNC: 6.6 G/DL (ref 6–8.5)
RBC # BLD AUTO: 3.42 10*6/MM3 (ref 3.77–5.28)
SODIUM SERPL-SCNC: 129 MMOL/L (ref 136–145)
WBC NRBC COR # BLD: 8.73 10*3/MM3 (ref 3.4–10.8)

## 2023-09-04 PROCEDURE — 25010000002 HEPARIN (PORCINE) PER 1000 UNITS: Performed by: INTERNAL MEDICINE

## 2023-09-04 PROCEDURE — 83735 ASSAY OF MAGNESIUM: CPT | Performed by: INTERNAL MEDICINE

## 2023-09-04 PROCEDURE — 25010000002 EPOETIN ALFA PER 1000 UNITS: Performed by: INTERNAL MEDICINE

## 2023-09-04 PROCEDURE — 82948 REAGENT STRIP/BLOOD GLUCOSE: CPT

## 2023-09-04 PROCEDURE — 25010000002 NA FERRIC GLUC CPLX PER 12.5 MG: Performed by: INTERNAL MEDICINE

## 2023-09-04 PROCEDURE — 80053 COMPREHEN METABOLIC PANEL: CPT | Performed by: INTERNAL MEDICINE

## 2023-09-04 PROCEDURE — 85025 COMPLETE CBC W/AUTO DIFF WBC: CPT | Performed by: INTERNAL MEDICINE

## 2023-09-04 PROCEDURE — 63710000001 DRONABINOL PER 2.5 MG

## 2023-09-04 PROCEDURE — 63710000001 DRONABINOL PER 5 MG

## 2023-09-04 NOTE — ACP (ADVANCE CARE PLANNING)
Formerly [facility] @ [facility]  INPATIENT PROGRESS NOTE    PATIENT NAME: Carol Sullivan      PHYSICIAN: Josefina Perez MD  : 1940        MRN: [id number]  Patient Care Team:  Len Seo MD as PCP - General  Blaire Camara APRN as Nurse Practitioner (General Surgery)    Chief Complaint: Patient was brought to emergency room at [facility] for confusion.     History of Present Illness: 82-year-old female with significant medical history of hypertension, dyslipidemia, gastroesophageal reflux disease who was brought into the emergency room for confusion.  Patient was also found to have erythema and cellulitis of left breast.  Patient was thought of having mastitis.  Patient's condition was monitored now.  Patient had gradual decrease in her urine output and worsening in her kidney function was noted.  Patient off having renal failure which was worsening over time.  Patient subsequently required hemodialysis patient was placed on hemodialysis per nephrology recommendation.  Patient was admitted to the hospital setting for antibiotics and hemodialysis.  Patient's condition seem to be stabilized however patient remained extremely weak and deconditioned hemodialysis patient was recommended to be admitted to our facility for further therapy and rehabilitation along with monitoring for hemodialysis.     Discharge Summary and H&P: Reviewed from previous hospitalization and information documented above in HPI Section.     Radiology Studies from Previous Hospitalization: Reviewed and are as below:  XR Abdomen KUB     Result Date: 2023  FINDINGS/IMPRESSION: Radiopaque tip of the Dobbhoff tube projects over the second portion of the duodenum, similar to even slightly retracted when compared to the prior exam. Right-sided ureteral stent is again noted and incompletely visualized.  There are probably also right femoral catheters which are  incompletely visualized. Normal bowel gas pattern in the visualized abdomen.     XR Chest Post CVA Port     Result Date: 8/17/2023  Impression: 1. Mild interstitial opacities are likely due to low lung volumes. An element of interstitial edema is not entirely excluded.      EKG From previous hospitalization reviewed and documented below:  ECG 12 Lead Other; hx afib   Preliminary Result   Test Reason : Other~   Blood Pressure :   */*   mmHG   Vent. Rate :  67 BPM     Atrial Rate :  67 BPM      P-R Int : 188 ms          QRS Dur :  78 ms       QT Int : 392 ms       P-R-T Axes :  28   6  16 degrees      QTc Int : 414 ms       Normal sinus rhythm with sinus arrhythmia   Minimal voltage criteria for LVH, may be normal variant   Anteroseptal infarct (cited on or before 30-JUL-2023)   Abnormal ECG   When compared with ECG of 10-AUG-2023 15:02,   Sinus rhythm has replaced Atrial fibrillation   Vent. rate has decreased BY  62 BPM   Nonspecific T wave abnormality, worse in Anterior leads       Referred By:            Confirmed By:              Laboratory results from previous hospitalization is reviewed and below:        Lab Results   Component Value Date     GLUCOSE 139 (H) 08/17/2023     CALCIUM 8.6 08/17/2023      (L) 08/17/2023     K 3.9 08/17/2023     CO2 23.0 08/17/2023     CL 90 (L) 08/17/2023     BUN 72 (H) 08/17/2023     CREATININE 4.86 (H) 08/17/2023     EGFR 8.5 (L) 08/17/2023     BCR 14.8 08/17/2023     ANIONGAP 17.0 (H) 08/17/2023            Lab Results   Component Value Date     WBC 17.07 (H) 08/17/2023     HGB 10.2 (L) 08/17/2023     HCT 29.8 (L) 08/17/2023     MCV 85.9 08/17/2023     PLT         Assessment       Sepsis Secondary to Below.  Somnolence [R40.0]  Mastitis [N61.0]  Sepsis [A41.9]  Sepsis, due to unspecified organism, unspecified whether acute organ dysfunction present [A41.9]   ESRD on Hemodialysis.        DVT Prophylaxis: Heparin.  GI Prophylaxis: Lansoprazole.  Code Status: DNR/DNI.     Plan       -Stable at time of exam  -Remains on Levophed for pressure control  -Continues to require close monitoring due to Levophed drip as well as hypotensive status and dialysis.  Patient has the potential to continue with severe hypotension possibly leading to cardiac arrest.  -Dialysis as per nephrology   -Aggressive therapy as before.   -Strongly recommend out of bed daily  -Nauseated today   -Patient appetite and intake continues to improve.  -DVT and GI prophylaxis in place.  -We'll continue monitoring patient in hospital setting and treat patient as course dictates.  -Please review orders for detailed plan of care.  -For Aute and Chronic medical condition we will continue medications described below in medication section which have been reviewed and we will continue unless changed in plan of care.  -Laboratory and diagnostic studies have been independently reviewed and the reports are reviewed as documented below.    Subjective   Interval History:   Patient Complaints:   Patient seen and examined.  Complains of nausea.  Did not eat well, but did consume fluids.  Ate well yesterday.  Dialysis completed.  History taken from: Medical record and nursing staff.    Review of Systems:    Review of Systems   All other systems reviewed and are negative.    Objective   Vital Signs  Temp: 97 F         Heart Rate: 91         Resp: 18          Blood Pressure: 113/63         Pulse Ox: 97 %    Physical Exam:   Physical Exam  Vitals and nursing note reviewed.   Constitutional:       Appearance: She is well-developed.   HENT:      Head: Normocephalic and atraumatic.      Nose: Nose normal.   Eyes:      Conjunctiva/sclera: Conjunctivae normal.      Pupils: Pupils are equal, round, and reactive to light.   Neck:      Thyroid: No thyromegaly.      Vascular: No JVD.      Trachea: No tracheal deviation.   Cardiovascular:      Rate and Rhythm: Normal rate and regular rhythm.      Heart sounds: Normal heart sounds.   Pulmonary:       Effort: Pulmonary effort is normal. No respiratory distress.      Breath sounds: Normal breath sounds. No wheezing or rales.   Chest:      Chest wall: No tenderness.   Abdominal:      General: Bowel sounds are normal. There is no distension.      Palpations: Abdomen is soft.      Tenderness: There is no abdominal tenderness. There is no guarding or rebound.   Musculoskeletal:         General: Normal range of motion.      Cervical back: Normal range of motion and neck supple.   Lymphadenopathy:      Cervical: No cervical adenopathy.   Skin:     General: Skin is warm and dry.      Comments: Intact   Neurological:      Mental Status: She is alert and oriented to person, place, and time.      Cranial Nerves: No cranial nerve deficit.      Deep Tendon Reflexes: Reflexes are normal and symmetric.     Results Review:       Results from last 7 days   Lab Units 09/04/23 0438 09/01/23 0339 08/30/23  0500   SODIUM mmol/L 129* 128* 133*   POTASSIUM mmol/L 4.5 5.1 4.5   CHLORIDE mmol/L 92* 90* 95*   CO2 mmol/L 25.0 25.0 27.0   BUN mg/dL 34* 34* 30*   CREATININE mg/dL 4.28* 5.06* 5.01*   GLUCOSE mg/dL 106* 128* 104*   CALCIUM mg/dL 8.3* 8.6 8.4*   BILIRUBIN mg/dL 0.6 0.6 0.5   ALK PHOS U/L 170* 222* 214*   ALT (SGPT) U/L 9 15 18   AST (SGOT) U/L 19 22 21       Results from last 7 days   Lab Units 09/04/23 0438 09/01/23 0339 08/30/23  0500   MAGNESIUM mg/dL 1.6 1.7 1.7       Results from last 7 days   Lab Units 09/04/23 0438 09/01/23  0339 08/30/23  0500   WBC 10*3/mm3 8.73 8.52 5.20   HEMOGLOBIN g/dL 9.7* 10.3* 9.0*   HEMATOCRIT % 30.7* 33.0* 28.1*   PLATELETS 10*3/mm3 442 394 280       Lab Results   Component Value Date    TROPONINI 0.69 (H) 05/31/2020    TROPONINT 56 (C) 08/06/2023     pH, Arterial   Date Value Ref Range Status   08/11/2023 7.403 7.350 - 7.450 pH units Final     CO2   Date Value Ref Range Status   09/04/2023 25.0 22.0 - 29.0 mmol/L Final     Total CO2   Date Value Ref Range Status   10/27/2022 30 21 - 31  mmol/L Final           Imaging Results (Most Recent)       None           No results found for: ACANTHNAEG, AFBCX, BPERTUSSISCX, BLOODCX  No results found for: BCIDPCR, CXREFLEX, CSFCX, CULTURETIS  No results found for: CULTURES, HSVCX, URCX  No results found for: EYECULTURE, GCCX, HSVCULTURE, LABHSV  No results found for: LEGIONELLA, MRSACX, MUMPSCX, MYCOPLASCX  No results found for: NOCARDIACX, STOOLCX  No results found for: THROATCX, UNSTIMCULT, URINECX, CULTURE, VZVCULTUR  No results found for: VIRALCULTU, WOUNDCX  Medication Reviewed and Will Continue Unless Documented in Plan:   albumin human, 25 g, Intravenous, Once  alteplase, 2 mg, Intracatheter, Once in Dialysis  alteplase, 2 mg, Intracatheter, Once in Dialysis  amiodarone, 200 mg, Oral, BID With Meals  busPIRone, 5 mg, Oral, BID  dilTIAZem, 60 mg, Oral, Q6H  dronabinol, 7.5 mg, Oral, BID AC  epoetin karen/karen-epbx, 6,000 Units, Intravenous, Once per day on Mon Wed Fri  ferric gluconate (FERRLECIT) IVPB, 100 mg, Intravenous, Weekly  gabapentin, 100 mg, Oral, TID  heparin (porcine), 5,000 Units, Subcutaneous, Q12H  heparin (porcine), 2,000 Units, Intravenous, Once per day on Mon Wed Fri  heparin (porcine), 3,700 Units, Intracatheter, Take As Directed  HYDROcodone-acetaminophen, 1 tablet, Oral, Once  Insulin Aspart, 2-12 Units, Subcutaneous, 4 times per day  lansoprazole, 30 mg, Oral, Q12H  magnesium sulfate, 2 g, Intravenous, Once  midodrine, 5 mg, Oral, TID AC  rosuvastatin, 40 mg, Oral, Daily  sodium chloride 0.9 % flush, 10 mL, Intravenous, Q8H      amiodarone, 0.5 mg/min  dilTIAZem, 5-15 mg/hr  norepinephrine, 0.02-0.3 mcg/kg/min      acetaminophen    acetaminophen    albumin human    cyclobenzaprine    dextrose    dilTIAZem    heparin (porcine)    HYDROcodone-acetaminophen    hydrocortisone-bacitracin-zinc oxide-nystatin    melatonin    muscle rub    sodium chloride    sodium chloride 0.9 % flush    traZODone  amiodarone, 0.5 mg/min  dilTIAZem, 5-15  mg/hr  norepinephrine, 0.02-0.3 mcg/kg/min       Dietary Orders (From admission, onward)       Start     Ordered    09/02/23 1800  Dietary Nutrition Supplements Other (See Comment); Add Diet  to each meal  Daily With Breakfast, Lunch & Dinner      Question Answer Comment   Select Supplement: Other (See Comment)    Other Add Diet  to each meal        09/02/23 1656    09/02/23 1800  Dietary Nutrition Supplements Other (See Comment); Mighty shake with every meal, Apple sauce with every meal  Daily With Breakfast, Lunch & Dinner      Question Answer Comment   Select Supplement: Other (See Comment)    Other Mighty shake with every meal, Apple sauce with every meal        09/02/23 1656    08/18/23 1800  Dietary Nutrition Supplements Other (See Comment); Homemade M/S made with Straw Novasource and Berry Magic cup  Daily With Dinner      Question Answer Comment   Select Supplement: Other (See Comment)    Other Homemade M/S made with Straw Novasource and [place] Magic cup        08/18/23 1146    08/17/23 1153  Diet: Regular/House Diet, Renal Diets, Diabetic Diets; Consistent Carbohydrate; Low Sodium (2-3g); Texture: Soft to Chew (NDD 3); Soft to Chew: Chopped Meat; Fluid Consistency: Thin (IDDSI 0)  Diet Effective Now        References:    Diet Order Crosswalk   Question Answer Comment   Diets: Regular/House Diet    Diets: Renal Diets    Diets: Diabetic Diets    Diabetic Diet: Consistent Carbohydrate    Renal Diet: Low Sodium (2-3g)    Texture: Soft to Chew (NDD 3)    Soft to Chew: Chopped Meat    Fluid Consistency: Thin (IDDSI 0)        08/17/23 1156                  History, physical exam, assessment and plan may have been partly or fully copied from before, but  changes made to the copied record to reflect care on the date of service. Part of the lab and imaging  reports auto populated and corrected. Some of this note may be an electronic transcription of spoken  language to printed text. This may permit  erroneous, or at times, nonsensical words or phrases to be  inadvertently transcribed. Although I have reviewed the note for such errors, some may still exist.      This document has been electronically signed by Josefina Perez MD on September 4, 2023 10:48 CDT

## 2023-09-05 ENCOUNTER — APPOINTMENT (OUTPATIENT)
Dept: ULTRASOUND IMAGING | Facility: HOSPITAL | Age: 83
End: 2023-09-05
Payer: COMMERCIAL

## 2023-09-05 ENCOUNTER — OUTSIDE FACILITY SERVICE (OUTPATIENT)
Dept: PULMONOLOGY | Facility: CLINIC | Age: 83
End: 2023-09-05
Payer: MEDICARE

## 2023-09-05 LAB
ACANTHOCYTES BLD QL SMEAR: ABNORMAL
ANISOCYTOSIS BLD QL: ABNORMAL
APTT PPP: 44 SECONDS (ref 20–40.3)
BASOPHILS # BLD MANUAL: 0.1 10*3/MM3 (ref 0–0.2)
BASOPHILS NFR BLD MANUAL: 1 % (ref 0–1.5)
DACRYOCYTES BLD QL SMEAR: ABNORMAL
DEPRECATED RDW RBC AUTO: 56.8 FL (ref 37–54)
EOSINOPHIL # BLD MANUAL: 0.58 10*3/MM3 (ref 0–0.4)
EOSINOPHIL NFR BLD MANUAL: 6 % (ref 0.3–6.2)
ERYTHROCYTE [DISTWIDTH] IN BLOOD BY AUTOMATED COUNT: 17.4 % (ref 12.3–15.4)
GLUCOSE BLDC GLUCOMTR-MCNC: 101 MG/DL (ref 70–130)
GLUCOSE BLDC GLUCOMTR-MCNC: 124 MG/DL (ref 70–130)
GLUCOSE BLDC GLUCOMTR-MCNC: 127 MG/DL (ref 70–130)
GLUCOSE BLDC GLUCOMTR-MCNC: 87 MG/DL (ref 70–130)
HCT VFR BLD AUTO: 31.1 % (ref 34–46.6)
HGB BLD-MCNC: 9.7 G/DL (ref 12–15.9)
HYPOCHROMIA BLD QL: ABNORMAL
INR PPP: 1.11 (ref 0.8–1.2)
LYMPHOCYTES # BLD MANUAL: 1.25 10*3/MM3 (ref 0.7–3.1)
LYMPHOCYTES NFR BLD MANUAL: 6 % (ref 5–12)
MCH RBC QN AUTO: 28.3 PG (ref 26.6–33)
MCHC RBC AUTO-ENTMCNC: 31.2 G/DL (ref 31.5–35.7)
MCV RBC AUTO: 90.7 FL (ref 79–97)
METAMYELOCYTES NFR BLD MANUAL: 1 % (ref 0–0)
MICROCYTES BLD QL: ABNORMAL
MONOCYTES # BLD: 0.58 10*3/MM3 (ref 0.1–0.9)
MYELOCYTES NFR BLD MANUAL: 2 % (ref 0–0)
NEUTROPHILS # BLD AUTO: 6.81 10*3/MM3 (ref 1.7–7)
NEUTROPHILS NFR BLD MANUAL: 69 % (ref 42.7–76)
NEUTS BAND NFR BLD MANUAL: 2 % (ref 0–5)
PLAT MORPH BLD: NORMAL
PLATELET # BLD AUTO: 377 10*3/MM3 (ref 140–450)
PMV BLD AUTO: 9 FL (ref 6–12)
POIKILOCYTOSIS BLD QL SMEAR: ABNORMAL
PROTHROMBIN TIME: 14.3 SECONDS (ref 11.1–15.3)
RBC # BLD AUTO: 3.43 10*6/MM3 (ref 3.77–5.28)
STOMATOCYTES BLD QL SMEAR: ABNORMAL
VARIANT LYMPHS NFR BLD MANUAL: 13 % (ref 19.6–45.3)
WBC MORPH BLD: NORMAL
WBC NRBC COR # BLD: 9.59 10*3/MM3 (ref 3.4–10.8)

## 2023-09-05 PROCEDURE — 82948 REAGENT STRIP/BLOOD GLUCOSE: CPT

## 2023-09-05 PROCEDURE — 97530 THERAPEUTIC ACTIVITIES: CPT

## 2023-09-05 PROCEDURE — 63710000001 DRONABINOL PER 2.5 MG

## 2023-09-05 PROCEDURE — 85730 THROMBOPLASTIN TIME PARTIAL: CPT

## 2023-09-05 PROCEDURE — 25010000002 HEPARIN (PORCINE) PER 1000 UNITS

## 2023-09-05 PROCEDURE — 63710000001 DRONABINOL PER 5 MG

## 2023-09-05 PROCEDURE — 85025 COMPLETE CBC W/AUTO DIFF WBC: CPT

## 2023-09-05 PROCEDURE — 85610 PROTHROMBIN TIME: CPT

## 2023-09-05 PROCEDURE — 93971 EXTREMITY STUDY: CPT

## 2023-09-05 PROCEDURE — 85007 BL SMEAR W/DIFF WBC COUNT: CPT

## 2023-09-05 PROCEDURE — 25010000002 HEPARIN (PORCINE) PER 1000 UNITS: Performed by: INTERNAL MEDICINE

## 2023-09-05 PROCEDURE — 97535 SELF CARE MNGMENT TRAINING: CPT

## 2023-09-05 RX ORDER — HEPARIN SODIUM 5000 [USP'U]/ML
40 INJECTION, SOLUTION INTRAVENOUS; SUBCUTANEOUS AS NEEDED
Status: DISCONTINUED | OUTPATIENT
Start: 2023-09-05 | End: 2023-09-07

## 2023-09-05 RX ORDER — HEPARIN SODIUM 5000 [USP'U]/ML
80 INJECTION, SOLUTION INTRAVENOUS; SUBCUTANEOUS AS NEEDED
Status: DISCONTINUED | OUTPATIENT
Start: 2023-09-05 | End: 2023-09-07

## 2023-09-05 RX ORDER — HEPARIN SODIUM 10000 [USP'U]/100ML
9 INJECTION, SOLUTION INTRAVENOUS
Status: DISCONTINUED | OUTPATIENT
Start: 2023-09-05 | End: 2023-09-07

## 2023-09-05 RX ORDER — DOCUSATE SODIUM 100 MG/1
100 CAPSULE, LIQUID FILLED ORAL 2 TIMES DAILY
Status: DISCONTINUED | OUTPATIENT
Start: 2023-09-05 | End: 2023-09-15 | Stop reason: HOSPADM

## 2023-09-05 RX ORDER — HEPARIN SODIUM 5000 [USP'U]/ML
80 INJECTION, SOLUTION INTRAVENOUS; SUBCUTANEOUS ONCE
Status: DISCONTINUED | OUTPATIENT
Start: 2023-09-05 | End: 2023-09-07

## 2023-09-05 NOTE — ACP (ADVANCE CARE PLANNING)
Formerly [facility] @ [facility]  INPATIENT PROGRESS NOTE    PATIENT NAME: Carol Sullivan      PHYSICIAN: Josefina Perez MD  : 1940        MRN: [id number]  Patient Care Team:  Len Seo MD as PCP - General  Blaire Camara APRN as Nurse Practitioner (General Surgery)    Chief Complaint: Patient was brought to emergency room at [facility] for confusion.     History of Present Illness: 82-year-old female with significant medical history of hypertension, dyslipidemia, gastroesophageal reflux disease who was brought into the emergency room for confusion.  Patient was also found to have erythema and cellulitis of left breast.  Patient was thought of having mastitis.  Patient's condition was monitored now.  Patient had gradual decrease in her urine output and worsening in her kidney function was noted.  Patient off having renal failure which was worsening over time.  Patient subsequently required hemodialysis patient was placed on hemodialysis per nephrology recommendation.  Patient was admitted to the hospital setting for antibiotics and hemodialysis.  Patient's condition seem to be stabilized however patient remained extremely weak and deconditioned hemodialysis patient was recommended to be admitted to our facility for further therapy and rehabilitation along with monitoring for hemodialysis.     Discharge Summary and H&P: Reviewed from previous hospitalization and information documented above in HPI Section.     Radiology Studies from Previous Hospitalization: Reviewed and are as below:  XR Abdomen KUB     Result Date: 2023  FINDINGS/IMPRESSION: Radiopaque tip of the Dobbhoff tube projects over the second portion of the duodenum, similar to even slightly retracted when compared to the prior exam. Right-sided ureteral stent is again noted and incompletely visualized.  There are probably also right femoral catheters which are  incompletely visualized. Normal bowel gas pattern in the visualized abdomen.     XR Chest Post CVA Port     Result Date: 8/17/2023  Impression: 1. Mild interstitial opacities are likely due to low lung volumes. An element of interstitial edema is not entirely excluded.      EKG From previous hospitalization reviewed and documented below:  ECG 12 Lead Other; hx afib   Preliminary Result   Test Reason : Other~   Blood Pressure :   */*   mmHG   Vent. Rate :  67 BPM     Atrial Rate :  67 BPM      P-R Int : 188 ms          QRS Dur :  78 ms       QT Int : 392 ms       P-R-T Axes :  28   6  16 degrees      QTc Int : 414 ms       Normal sinus rhythm with sinus arrhythmia   Minimal voltage criteria for LVH, may be normal variant   Anteroseptal infarct (cited on or before 30-JUL-2023)   Abnormal ECG   When compared with ECG of 10-AUG-2023 15:02,   Sinus rhythm has replaced Atrial fibrillation   Vent. rate has decreased BY  62 BPM   Nonspecific T wave abnormality, worse in Anterior leads       Referred By:            Confirmed By:              Laboratory results from previous hospitalization is reviewed and below:        Lab Results   Component Value Date     GLUCOSE 139 (H) 08/17/2023     CALCIUM 8.6 08/17/2023      (L) 08/17/2023     K 3.9 08/17/2023     CO2 23.0 08/17/2023     CL 90 (L) 08/17/2023     BUN 72 (H) 08/17/2023     CREATININE 4.86 (H) 08/17/2023     EGFR 8.5 (L) 08/17/2023     BCR 14.8 08/17/2023     ANIONGAP 17.0 (H) 08/17/2023            Lab Results   Component Value Date     WBC 17.07 (H) 08/17/2023     HGB 10.2 (L) 08/17/2023     HCT 29.8 (L) 08/17/2023     MCV 85.9 08/17/2023     PLT         Assessment       Sepsis Secondary to Below.  Somnolence [R40.0]  Mastitis [N61.0]  Sepsis [A41.9]  Sepsis, due to unspecified organism, unspecified whether acute organ dysfunction present [A41.9]   ESRD on Hemodialysis.        DVT Prophylaxis: Heparin.  GI Prophylaxis: Lansoprazole.  Code Status: DNR/DNI.     Plan       -Stable at time of exam  -Normotensive without levophed  -Clinical improvement today  -Dialysis as per nephrology   -Aggressive therapy as before.   -Strongly recommend out of bed daily  -Doing well today.  -Patient appetite and intake continues to improve.  -DVT and GI prophylaxis in place.  -We'll continue monitoring patient in hospital setting and treat patient as course dictates.  -Please review orders for detailed plan of care.  -For Aute and Chronic medical condition we will continue medications described below in medication section which have been reviewed and we will continue unless changed in plan of care.  -Laboratory and diagnostic studies have been independently reviewed and the reports are reviewed as documented below.    Subjective   Interval History:   Patient Complaints:   Patient seen and examined.  Up in w/c without any concerns.  Family is at her side.  No overnight events noted.  Doing much better today  History taken from: Medical record and nursing staff.    Review of Systems:    Review of Systems   All other systems reviewed and are negative.    Objective   Vital Signs  Temp: 97.8 F         Heart Rate: 58         Resp: 18          Blood Pressure: 127/76         Pulse Ox: 96 %    Physical Exam:   Physical Exam  Vitals and nursing note reviewed.   Constitutional:       Appearance: She is well-developed.   HENT:      Head: Normocephalic and atraumatic.      Nose: Nose normal.   Eyes:      Conjunctiva/sclera: Conjunctivae normal.      Pupils: Pupils are equal, round, and reactive to light.   Neck:      Thyroid: No thyromegaly.      Vascular: No JVD.      Trachea: No tracheal deviation.   Cardiovascular:      Rate and Rhythm: Normal rate and regular rhythm.      Heart sounds: Normal heart sounds.   Pulmonary:      Effort: Pulmonary effort is normal. No respiratory distress.      Breath sounds: Normal breath sounds. No wheezing or rales.   Chest:      Chest wall: No tenderness.   Abdominal:       General: Bowel sounds are normal. There is no distension.      Palpations: Abdomen is soft.      Tenderness: There is no abdominal tenderness. There is no guarding or rebound.   Musculoskeletal:         General: Normal range of motion.      Cervical back: Normal range of motion and neck supple.   Lymphadenopathy:      Cervical: No cervical adenopathy.   Skin:     General: Skin is warm and dry.      Comments: Intact   Neurological:      Mental Status: She is alert and oriented to person, place, and time.      Cranial Nerves: No cranial nerve deficit.      Deep Tendon Reflexes: Reflexes are normal and symmetric.     Results Review:       Results from last 7 days   Lab Units 09/04/23 0438 09/01/23  0339 08/30/23  0500   SODIUM mmol/L 129* 128* 133*   POTASSIUM mmol/L 4.5 5.1 4.5   CHLORIDE mmol/L 92* 90* 95*   CO2 mmol/L 25.0 25.0 27.0   BUN mg/dL 34* 34* 30*   CREATININE mg/dL 4.28* 5.06* 5.01*   GLUCOSE mg/dL 106* 128* 104*   CALCIUM mg/dL 8.3* 8.6 8.4*   BILIRUBIN mg/dL 0.6 0.6 0.5   ALK PHOS U/L 170* 222* 214*   ALT (SGPT) U/L 9 15 18   AST (SGOT) U/L 19 22 21       Results from last 7 days   Lab Units 09/04/23 0438 09/01/23  0339 08/30/23  0500   MAGNESIUM mg/dL 1.6 1.7 1.7       Results from last 7 days   Lab Units 09/04/23 0438 09/01/23 0339 08/30/23  0500   WBC 10*3/mm3 8.73 8.52 5.20   HEMOGLOBIN g/dL 9.7* 10.3* 9.0*   HEMATOCRIT % 30.7* 33.0* 28.1*   PLATELETS 10*3/mm3 442 394 280       Lab Results   Component Value Date    TROPONINI 0.69 (H) 05/31/2020    TROPONINT 56 (C) 08/06/2023     pH, Arterial   Date Value Ref Range Status   08/11/2023 7.403 7.350 - 7.450 pH units Final     CO2   Date Value Ref Range Status   09/04/2023 25.0 22.0 - 29.0 mmol/L Final     Total CO2   Date Value Ref Range Status   10/27/2022 30 21 - 31 mmol/L Final           Imaging Results (Most Recent)       None           No results found for: ACANTHNAEG, AFBCX, BPERTUSSISCX, BLOODCX  No results found for: BCIDPCR, CXREFLEX,  CSFCX, CULTURETIS  No results found for: CULTURES, HSVCX, URCX  No results found for: EYECULTURE, GCCX, HSVCULTURE, LABHSV  No results found for: LEGIONELLA, MRSACX, MUMPSCX, MYCOPLASCX  No results found for: NOCARDIACX, STOOLCX  No results found for: THROATCX, UNSTIMCULT, URINECX, CULTURE, VZVCULTUR  No results found for: VIRALCULTU, WOUNDCX  Medication Reviewed and Will Continue Unless Documented in Plan:   albumin human, 25 g, Intravenous, Once  alteplase, 2 mg, Intracatheter, Once in Dialysis  alteplase, 2 mg, Intracatheter, Once in Dialysis  amiodarone, 200 mg, Oral, BID With Meals  busPIRone, 5 mg, Oral, BID  dilTIAZem, 60 mg, Oral, Q6H  docusate sodium, 100 mg, Oral, BID  dronabinol, 7.5 mg, Oral, BID AC  epoetin karen/karen-epbx, 6,000 Units, Intravenous, Once per day on Mon Wed Fri  ferric gluconate (FERRLECIT) IVPB, 100 mg, Intravenous, Weekly  gabapentin, 100 mg, Oral, TID  heparin (porcine), 5,000 Units, Subcutaneous, Q12H  heparin (porcine), 2,000 Units, Intravenous, Once per day on Mon Wed Fri  heparin (porcine), 3,700 Units, Intracatheter, Take As Directed  HYDROcodone-acetaminophen, 1 tablet, Oral, Once  Insulin Aspart, 2-12 Units, Subcutaneous, 4 times per day  lansoprazole, 30 mg, Oral, Q12H  magnesium sulfate, 2 g, Intravenous, Once  midodrine, 7.5 mg, Oral, TID AC  rosuvastatin, 40 mg, Oral, Daily  sodium chloride 0.9 % flush, 10 mL, Intravenous, Q8H      amiodarone, 0.5 mg/min  dilTIAZem, 5-15 mg/hr  norepinephrine, 0.02-0.3 mcg/kg/min      acetaminophen    acetaminophen    albumin human    cyclobenzaprine    dextrose    dilTIAZem    heparin (porcine)    HYDROcodone-acetaminophen    hydrocortisone-bacitracin-zinc oxide-nystatin    melatonin    muscle rub    sodium chloride    sodium chloride 0.9 % flush    traZODone  amiodarone, 0.5 mg/min  dilTIAZem, 5-15 mg/hr  norepinephrine, 0.02-0.3 mcg/kg/min       Dietary Orders (From admission, onward)       Start     Ordered    09/02/23 1800  Dietary  Nutrition Supplements Other (See Comment); Add Diet  to each meal  Daily With Breakfast, Lunch & Dinner      Question Answer Comment   Select Supplement: Other (See Comment)    Other Add Diet  to each meal        09/02/23 1656    09/02/23 1800  Dietary Nutrition Supplements Other (See Comment); Mighty shake with every meal, Apple sauce with every meal  Daily With Breakfast, Lunch & Dinner      Question Answer Comment   Select Supplement: Other (See Comment)    Other Mighty shake with every meal, Apple sauce with every meal        09/02/23 1656    08/18/23 1800  Dietary Nutrition Supplements Other (See Comment); Homemade M/S made with Straw Novasource and Berry Magic cup  Daily With Dinner      Question Answer Comment   Select Supplement: Other (See Comment)    Other Homemade M/S made with Straw Novasource and [place] Magic cup        08/18/23 1146    08/17/23 1153  Diet: Regular/House Diet, Renal Diets, Diabetic Diets; Consistent Carbohydrate; Low Sodium (2-3g); Texture: Soft to Chew (NDD 3); Soft to Chew: Chopped Meat; Fluid Consistency: Thin (IDDSI 0)  Diet Effective Now        References:    Diet Order [facility]   Question Answer Comment   Diets: Regular/House Diet    Diets: Renal Diets    Diets: Diabetic Diets    Diabetic Diet: Consistent Carbohydrate    Renal Diet: Low Sodium (2-3g)    Texture: Soft to Chew (NDD 3)    Soft to Chew: Chopped Meat    Fluid Consistency: Thin (IDDSI 0)        08/17/23 1156                  History, physical exam, assessment and plan may have been partly or fully copied from before, but  changes made to the copied record to reflect care on the date of service. Part of the lab and imaging  reports auto populated and corrected. Some of this note may be an electronic transcription of spoken  language to printed text. This may permit erroneous, or at times, nonsensical words or phrases to be  inadvertently transcribed. Although I have reviewed the note for such errors,  some may still exist.      This document has been electronically signed by Josefina Perez MD on September 5, 2023 09:25 CDT

## 2023-09-05 NOTE — ACP (ADVANCE CARE PLANNING)
[facility]  [address] [place] [address]. [address]  T - [phone]  F - [phone]     Progress Note          PATIENT  DEMOGRAPHICS   PATIENT NAME: Carol Sullivan                      PHYSICIAN: Stephanie Gómez MD  : 1940  MRN: [id number]   LOS: 0 days    Patient Care Team:  Len Seo MD as PCP - General  PhoenixBlaire APRN as Nurse Practitioner (General Surgery)  Subjective   SUBJECTIVE   BP stable, now off levophed         Objective   OBJECTIVE   Vital Signs  Heart Rate:  [92-93] 93    T 97.6  HR 91  RR 18  /63  O2: 98% RA     No intake/output data recorded.    PHYSICAL EXAM    Physical Exam  Constitutional:       Appearance: She is well-developed.   HENT:      Head: Normocephalic.      Left Ear: There is no impacted cerumen.      Nose: No rhinorrhea.   Eyes:      Pupils: Pupils are equal, round, and reactive to light.   Cardiovascular:      Rate and Rhythm: Normal rate and regular rhythm.      Heart sounds: Normal heart sounds.   Pulmonary:      Effort: Pulmonary effort is normal.      Breath sounds: Normal breath sounds.   Abdominal:      General: Bowel sounds are normal.      Palpations: Abdomen is soft.   Musculoskeletal:         General: No swelling.   Skin:     Coloration: Skin is not jaundiced.   Neurological:      General: No focal deficit present.      Mental Status: She is alert. She is disoriented.       RESULTS   Results Review:    Results from last 7 days   Lab Units 23  0438 23  0339 23  0500   SODIUM mmol/L 129* 128* 133*   POTASSIUM mmol/L 4.5 5.1 4.5   CHLORIDE mmol/L 92* 90* 95*   CO2 mmol/L 25.0 25.0 27.0   BUN mg/dL 34* 34* 30*   CREATININE mg/dL 4.28* 5.06* 5.01*   CALCIUM mg/dL 8.3* 8.6 8.4*   BILIRUBIN mg/dL 0.6 0.6 0.5   ALK PHOS U/L 170* 222* 214*   ALT (SGPT) U/L 9 15 18   AST (SGOT) U/L 19 22 21   GLUCOSE mg/dL 106* 128* 104*         Estimated Creatinine Clearance: 9.9 mL/min (A) (by C-G formula based on SCr of 4.28  mg/dL (H)).    Results from last 7 days   Lab Units 09/04/23  0438 09/01/23  0339 08/30/23  0500   MAGNESIUM mg/dL 1.6 1.7 1.7               Results from last 7 days   Lab Units 09/04/23  0438 09/01/23  0339 08/30/23  0500   WBC 10*3/mm3 8.73 8.52 5.20   HEMOGLOBIN g/dL 9.7* 10.3* 9.0*   PLATELETS 10*3/mm3 442 394 280                   Imaging Results (Last 24 Hours)       ** No results found for the last 24 hours. **             MEDICATIONS    albumin human, 25 g, Intravenous, Once  alteplase, 2 mg, Intracatheter, Once in Dialysis  alteplase, 2 mg, Intracatheter, Once in Dialysis  amiodarone, 200 mg, Oral, BID With Meals  busPIRone, 5 mg, Oral, BID  dilTIAZem, 60 mg, Oral, Q6H  dronabinol, 7.5 mg, Oral, BID AC  epoetin karen/karen-epbx, 6,000 Units, Intravenous, Once per day on Mon Wed Fri  ferric gluconate (FERRLECIT) IVPB, 100 mg, Intravenous, Weekly  gabapentin, 100 mg, Oral, TID  heparin (porcine), 5,000 Units, Subcutaneous, Q12H  heparin (porcine), 2,000 Units, Intravenous, Once per day on Mon Wed Fri  heparin (porcine), 3,700 Units, Intracatheter, Take As Directed  HYDROcodone-acetaminophen, 1 tablet, Oral, Once  Insulin Aspart, 2-12 Units, Subcutaneous, 4 times per day  lansoprazole, 30 mg, Oral, Q12H  magnesium sulfate, 2 g, Intravenous, Once  midodrine, 7.5 mg, Oral, TID AC  rosuvastatin, 40 mg, Oral, Daily  sodium chloride 0.9 % flush, 10 mL, Intravenous, Q8H      amiodarone, 0.5 mg/min  dilTIAZem, 5-15 mg/hr  norepinephrine, 0.02-0.3 mcg/kg/min      Assessment & Plan   ASSESSMENT / PLAN      * No active hospital problems. *      1. Acute kidney injury/ Acute tubular necrosis:  - Baseline unknown.   - Creatinine was 1.5 in 10/2022.   - UA 3+ protein, trace leukocytes, 0-2 RBC, 6-12 WBC, 2+ bacteria.   - Urine sodium 26. CT abd large rt retroperitoneal hematoma causing moderate rt hydronephrosis.  - Started HD 8/6/23  - Received call 9/1 with low BP, started levophed. BP is better now on low dose levophed.  Cardiology has stopped Imdur.  -  HD next on wed  - making more urine . No leg edema     2. Renal failure retroperitoneal bleed s/p right J stent for hydronephrosis   - s/p J stent placement by urology 8/11  - Continue to monitor renal function      3. Hypertension/ Afib with RVR:   - Blood pressure is low, but now better and off levophed     4. UTI E.fecalis:   - became septic with mild hydro Dr Lilly is consulted. Now stent in place . E.fecalis     5. Cdiff:   -not on po vanc now      6. Hyponatremia:   - Sodium is stable     7. Anemia / retroperitoneal bleed:  - Hemoglobin is acceptable at 10.3, B12 and folate are good on epogen      8.  AMS improving              This document has been electronically signed by Stephanie Gómez MD on September 4, 2023 19:00 CDT

## 2023-09-05 NOTE — ACP (ADVANCE CARE PLANNING)
[facility]  [address] [place] [address]. [address]  T - [phone]  F - [phone]     Progress Note          PATIENT  DEMOGRAPHICS   PATIENT NAME: Carol Sullivan                      PHYSICIAN: COY Chong  : 1940  MRN: [id number]   LOS: 0 days    Patient Care Team:  Len Seo MD as PCP - General  Blaire Camara APRN as Nurse Practitioner (General Surgery)  Subjective   SUBJECTIVE   BP stable now         Objective   OBJECTIVE   Vital Signs  Heart Rate:  [57-58] 57    T 98.6  HR 57  RR 18  BP 95/50  O2: 98% RA     No intake/output data recorded.    PHYSICAL EXAM    Physical Exam  Constitutional:       Appearance: She is well-developed.   HENT:      Head: Normocephalic.      Left Ear: There is no impacted cerumen.      Nose: No rhinorrhea.   Eyes:      Pupils: Pupils are equal, round, and reactive to light.   Cardiovascular:      Rate and Rhythm: Normal rate and regular rhythm.      Heart sounds: Normal heart sounds.   Pulmonary:      Effort: Pulmonary effort is normal.      Breath sounds: Normal breath sounds.   Abdominal:      General: Bowel sounds are normal.      Palpations: Abdomen is soft.   Musculoskeletal:         General: No swelling.   Skin:     Coloration: Skin is not jaundiced.   Neurological:      General: No focal deficit present.      Mental Status: She is alert. She is disoriented.       RESULTS   Results Review:    Results from last 7 days   Lab Units 23  0438 23  0339 23  0500   SODIUM mmol/L 129* 128* 133*   POTASSIUM mmol/L 4.5 5.1 4.5   CHLORIDE mmol/L 92* 90* 95*   CO2 mmol/L 25.0 25.0 27.0   BUN mg/dL 34* 34* 30*   CREATININE mg/dL 4.28* 5.06* 5.01*   CALCIUM mg/dL 8.3* 8.6 8.4*   BILIRUBIN mg/dL 0.6 0.6 0.5   ALK PHOS U/L 170* 222* 214*   ALT (SGPT) U/L 9 15 18   AST (SGOT) U/L 19 22 21   GLUCOSE mg/dL 106* 128* 104*         Estimated Creatinine Clearance: 9.9 mL/min (A) (by C-G formula based on SCr of 4.28 mg/dL  (H)).    Results from last 7 days   Lab Units 09/04/23  0438 09/01/23  0339 08/30/23  0500   MAGNESIUM mg/dL 1.6 1.7 1.7               Results from last 7 days   Lab Units 09/04/23  0438 09/01/23  0339 08/30/23  0500   WBC 10*3/mm3 8.73 8.52 5.20   HEMOGLOBIN g/dL 9.7* 10.3* 9.0*   PLATELETS 10*3/mm3 442 394 280                   Imaging Results (Last 24 Hours)       ** No results found for the last 24 hours. **             MEDICATIONS    albumin human, 25 g, Intravenous, Once  alteplase, 2 mg, Intracatheter, Once in Dialysis  alteplase, 2 mg, Intracatheter, Once in Dialysis  amiodarone, 200 mg, Oral, BID With Meals  busPIRone, 5 mg, Oral, BID  dilTIAZem, 60 mg, Oral, Q6H  docusate sodium, 100 mg, Oral, BID  dronabinol, 7.5 mg, Oral, BID AC  epoetin karen/karen-epbx, 6,000 Units, Intravenous, Once per day on Mon Wed Fri  ferric gluconate (FERRLECIT) IVPB, 100 mg, Intravenous, Weekly  gabapentin, 100 mg, Oral, TID  heparin (porcine), 5,000 Units, Subcutaneous, Q12H  heparin (porcine), 2,000 Units, Intravenous, Once per day on Mon Wed Fri  heparin (porcine), 3,700 Units, Intracatheter, Take As Directed  HYDROcodone-acetaminophen, 1 tablet, Oral, Once  Insulin Aspart, 2-12 Units, Subcutaneous, 4 times per day  lansoprazole, 30 mg, Oral, Q12H  magnesium sulfate, 2 g, Intravenous, Once  midodrine, 7.5 mg, Oral, TID AC  rosuvastatin, 40 mg, Oral, Daily  sodium chloride 0.9 % flush, 10 mL, Intravenous, Q8H      amiodarone, 0.5 mg/min  dilTIAZem, 5-15 mg/hr  norepinephrine, 0.02-0.3 mcg/kg/min      Assessment & Plan   ASSESSMENT / PLAN      * No active hospital problems. *      1. Acute kidney injury/ Acute tubular necrosis:  - Baseline unknown.   - Creatinine was 1.5 in 10/2022.   - UA 3+ protein, trace leukocytes, 0-2 RBC, 6-12 WBC, 2+ bacteria.   - Urine sodium 26. CT abd large rt retroperitoneal hematoma causing moderate rt hydronephrosis.  - Started HD 8/6/23  - Received call 9/1 with low BP, started levophed. BP is  better now off levophed. Cardiology has stopped Imdur.  - HD next on wed  - Making more urine .     2. Renal failure retroperitoneal bleed s/p right J stent for hydronephrosis   - s/p J stent placement by urology 8/11  - Continue to monitor renal function      3. Hypertension/ Afib with RVR:   - Blood pressure is low, but now better and off levophed     4. UTI E.fecalis:   - became septic with mild hydro Dr Garcia is consulted. Now stent in place . E.fecalis     5. Cdiff:   -not on po vanc now      6. Hyponatremia:   - Sodium is stable     7. Anemia / retroperitoneal bleed:  - Hemoglobin is acceptable at 9.7, B12 and folate are good on epogen      8.  AMS improving    9. RLE edema- check doppler to r/o DVT              This document has been electronically signed by COY Chong on September 5, 2023 12:55 CDT      For this patient encounter, I have reviewed the Nurse Practitioner's documentation, medical decision making, and treatment plan and personally spent time with the patient.

## 2023-09-05 NOTE — ACP (ADVANCE CARE PLANNING)
Nutrition F/U:      Nutrition diet Hx:  Pt sleeping.  Her  reports that she is tired.  She is tired because she has been up.  Eating but still at times not a lot.  She will drink the M/S.     Weight history:  CBW:  152.5# (9/5)  CBW:  145.2 (8/31)  CBW:  148.6 (8/28)  CBW:  152 (8/21)  Admit Wt to [facility] on 7/28 156#  UBW:  Wt loss:    Labs:  Na 129;  Bun 34; Creat 4.28; Alb 2.5  Meds:  IV Alb; Alteplase; Amio; Colace; Cardizem; Epogen; Marinol; Ferric Gluconate; Heparin; MgSulfate; Midodrine; SSI; Amio; Levophed     Nutrition Prescription Order:  Regular/Renal/Diabetic/Soft texture/chopped; Milkshake with all meals; and magic cups twice daily     Evaluation of Nutrition Intake:  ~35% average for the past 9 documented meals    Nutrition Assessment:  Pt with a decline in oral po. She is still on Marinol to increased appetite.  Pt has not felt well and has required Levophed for hypotension along with Midodrine.  Dialysis continues.  She will take the Mighty Shakes and Magic cups most of the time.  Hyponatremia with Na of 129.  Hgb stable--Epogen continues.  Wt flucuates with fluid status and dialysis.  Current wt is up from wt on 8/31.      Nutrition Intervention:    Will continue to monitor POC and po intake.    Continues Mighty Shakes and Magic cups  Menu suggestions and alternatives provided.     Last BM:  9/5 Per Staff    Edema:  none noted    Wounds:  Left Breast Cellulitis and Mastitis.

## 2023-09-06 ENCOUNTER — OUTSIDE FACILITY SERVICE (OUTPATIENT)
Dept: PULMONOLOGY | Facility: CLINIC | Age: 83
End: 2023-09-06
Payer: MEDICARE

## 2023-09-06 LAB
ALBUMIN SERPL-MCNC: 2.3 G/DL (ref 3.5–5.2)
ALBUMIN/GLOB SERPL: 0.6 G/DL
ALP SERPL-CCNC: 147 U/L (ref 39–117)
ALT SERPL W P-5'-P-CCNC: 7 U/L (ref 1–33)
ANION GAP SERPL CALCULATED.3IONS-SCNC: 12 MMOL/L (ref 5–15)
APTT PPP: 151.4 SECONDS (ref 20–40.3)
APTT PPP: 65.4 SECONDS (ref 20–40.3)
APTT PPP: >200 SECONDS (ref 20–40.3)
APTT PPP: >200 SECONDS (ref 20–40.3)
AST SERPL-CCNC: 15 U/L (ref 1–32)
BASOPHILS # BLD AUTO: 0.14 10*3/MM3 (ref 0–0.2)
BASOPHILS NFR BLD AUTO: 1.5 % (ref 0–1.5)
BILIRUB SERPL-MCNC: 0.5 MG/DL (ref 0–1.2)
BUN SERPL-MCNC: 31 MG/DL (ref 8–23)
BUN/CREAT SERPL: 7.2 (ref 7–25)
CALCIUM SPEC-SCNC: 8.1 MG/DL (ref 8.6–10.5)
CHLORIDE SERPL-SCNC: 93 MMOL/L (ref 98–107)
CO2 SERPL-SCNC: 24 MMOL/L (ref 22–29)
CREAT SERPL-MCNC: 4.32 MG/DL (ref 0.57–1)
DEPRECATED RDW RBC AUTO: 56.2 FL (ref 37–54)
EGFRCR SERPLBLD CKD-EPI 2021: 9.7 ML/MIN/1.73
EOSINOPHIL # BLD AUTO: 0.42 10*3/MM3 (ref 0–0.4)
EOSINOPHIL NFR BLD AUTO: 4.4 % (ref 0.3–6.2)
ERYTHROCYTE [DISTWIDTH] IN BLOOD BY AUTOMATED COUNT: 17.4 % (ref 12.3–15.4)
GLOBULIN UR ELPH-MCNC: 3.9 GM/DL
GLUCOSE BLDC GLUCOMTR-MCNC: 113 MG/DL (ref 70–130)
GLUCOSE BLDC GLUCOMTR-MCNC: 120 MG/DL (ref 70–130)
GLUCOSE BLDC GLUCOMTR-MCNC: 123 MG/DL (ref 70–130)
GLUCOSE BLDC GLUCOMTR-MCNC: 93 MG/DL (ref 70–130)
GLUCOSE SERPL-MCNC: 85 MG/DL (ref 65–99)
HCT VFR BLD AUTO: 27.5 % (ref 34–46.6)
HGB BLD-MCNC: 8.7 G/DL (ref 12–15.9)
IMM GRANULOCYTES # BLD AUTO: 0.44 10*3/MM3 (ref 0–0.05)
IMM GRANULOCYTES NFR BLD AUTO: 4.6 % (ref 0–0.5)
LYMPHOCYTES # BLD AUTO: 1.66 10*3/MM3 (ref 0.7–3.1)
LYMPHOCYTES NFR BLD AUTO: 17.4 % (ref 19.6–45.3)
MAGNESIUM SERPL-MCNC: 1.7 MG/DL (ref 1.6–2.4)
MCH RBC QN AUTO: 28.4 PG (ref 26.6–33)
MCHC RBC AUTO-ENTMCNC: 31.6 G/DL (ref 31.5–35.7)
MCV RBC AUTO: 89.9 FL (ref 79–97)
MONOCYTES # BLD AUTO: 1.07 10*3/MM3 (ref 0.1–0.9)
MONOCYTES NFR BLD AUTO: 11.2 % (ref 5–12)
NEUTROPHILS NFR BLD AUTO: 5.8 10*3/MM3 (ref 1.7–7)
NEUTROPHILS NFR BLD AUTO: 60.9 % (ref 42.7–76)
NRBC BLD AUTO-RTO: 0 /100 WBC (ref 0–0.2)
PLATELET # BLD AUTO: 376 10*3/MM3 (ref 140–450)
PMV BLD AUTO: 8.9 FL (ref 6–12)
POTASSIUM SERPL-SCNC: 4.3 MMOL/L (ref 3.5–5.2)
PROT SERPL-MCNC: 6.2 G/DL (ref 6–8.5)
RBC # BLD AUTO: 3.06 10*6/MM3 (ref 3.77–5.28)
SODIUM SERPL-SCNC: 129 MMOL/L (ref 136–145)
WBC NRBC COR # BLD: 9.53 10*3/MM3 (ref 3.4–10.8)

## 2023-09-06 PROCEDURE — 97110 THERAPEUTIC EXERCISES: CPT

## 2023-09-06 PROCEDURE — 85730 THROMBOPLASTIN TIME PARTIAL: CPT | Performed by: INTERNAL MEDICINE

## 2023-09-06 PROCEDURE — 25010000002 EPOETIN ALFA PER 1000 UNITS: Performed by: INTERNAL MEDICINE

## 2023-09-06 PROCEDURE — 85025 COMPLETE CBC W/AUTO DIFF WBC: CPT

## 2023-09-06 PROCEDURE — 25010000002 HEPARIN (PORCINE) PER 1000 UNITS: Performed by: INTERNAL MEDICINE

## 2023-09-06 PROCEDURE — 80053 COMPREHEN METABOLIC PANEL: CPT | Performed by: INTERNAL MEDICINE

## 2023-09-06 PROCEDURE — 82948 REAGENT STRIP/BLOOD GLUCOSE: CPT

## 2023-09-06 PROCEDURE — 63710000001 DRONABINOL PER 2.5 MG

## 2023-09-06 PROCEDURE — 97530 THERAPEUTIC ACTIVITIES: CPT

## 2023-09-06 PROCEDURE — 25010000002 HEPARIN (PORCINE) PER 1000 UNITS

## 2023-09-06 PROCEDURE — 83735 ASSAY OF MAGNESIUM: CPT | Performed by: INTERNAL MEDICINE

## 2023-09-06 PROCEDURE — 63710000001 DRONABINOL PER 5 MG

## 2023-09-06 NOTE — ACP (ADVANCE CARE PLANNING)
[facility] @ [facility]  INPATIENT PROGRESS NOTE    PATIENT NAME: Carol Sullivan      PHYSICIAN: Josefina Perez MD  : 1940        MRN: [id number]  Patient Care Team:  Len Seo MD as PCP - General  Blaire Camara APRN as Nurse Practitioner (General Surgery)    Chief Complaint: Patient was brought to emergency room at [facility] for confusion.     History of Present Illness: 82-year-old female with significant medical history of hypertension, dyslipidemia, gastroesophageal reflux disease who was brought into the emergency room for confusion.  Patient was also found to have erythema and cellulitis of left breast.  Patient was thought of having mastitis.  Patient's condition was monitored now.  Patient had gradual decrease in her urine output and worsening in her kidney function was noted.  Patient off having renal failure which was worsening over time.  Patient subsequently required hemodialysis patient was placed on hemodialysis per nephrology recommendation.  Patient was admitted to the hospital setting for antibiotics and hemodialysis.  Patient's condition seem to be stabilized however patient remained extremely weak and deconditioned hemodialysis patient was recommended to be admitted to our facility for further therapy and rehabilitation along with monitoring for hemodialysis.     Discharge Summary and H&P: Reviewed from previous hospitalization and information documented above in HPI Section.     Radiology Studies from Previous Hospitalization: Reviewed and are as below:  XR Abdomen KUB     Result Date: 2023  FINDINGS/IMPRESSION: Radiopaque tip of the Dobbhoff tube projects over the second portion of the duodenum, similar to even slightly retracted when compared to the prior exam. Right-sided ureteral stent is again noted and incompletely visualized.  There are probably also right femoral catheters which are  incompletely visualized. Normal bowel gas pattern in the visualized abdomen.     XR Chest Post CVA Port     Result Date: 8/17/2023  Impression: 1. Mild interstitial opacities are likely due to low lung volumes. An element of interstitial edema is not entirely excluded.      EKG From previous hospitalization reviewed and documented below:  ECG 12 Lead Other; hx afib   Preliminary Result   Test Reason : Other~   Blood Pressure :   */*   mmHG   Vent. Rate :  67 BPM     Atrial Rate :  67 BPM      P-R Int : 188 ms          QRS Dur :  78 ms       QT Int : 392 ms       P-R-T Axes :  28   6  16 degrees      QTc Int : 414 ms       Normal sinus rhythm with sinus arrhythmia   Minimal voltage criteria for LVH, may be normal variant   Anteroseptal infarct (cited on or before 30-JUL-2023)   Abnormal ECG   When compared with ECG of 10-AUG-2023 15:02,   Sinus rhythm has replaced Atrial fibrillation   Vent. rate has decreased BY  62 BPM   Nonspecific T wave abnormality, worse in Anterior leads       Referred By:            Confirmed By:              Laboratory results from previous hospitalization is reviewed and below:        Lab Results   Component Value Date     GLUCOSE 139 (H) 08/17/2023     CALCIUM 8.6 08/17/2023      (L) 08/17/2023     K 3.9 08/17/2023     CO2 23.0 08/17/2023     CL 90 (L) 08/17/2023     BUN 72 (H) 08/17/2023     CREATININE 4.86 (H) 08/17/2023     EGFR 8.5 (L) 08/17/2023     BCR 14.8 08/17/2023     ANIONGAP 17.0 (H) 08/17/2023            Lab Results   Component Value Date     WBC 17.07 (H) 08/17/2023     HGB 10.2 (L) 08/17/2023     HCT 29.8 (L) 08/17/2023     MCV 85.9 08/17/2023     PLT         Assessment       Sepsis Secondary to Below.  Somnolence [R40.0]  Mastitis [N61.0]  Sepsis [A41.9]  Sepsis, due to unspecified organism, unspecified whether acute organ dysfunction present [A41.9]   ESRD on Hemodialysis.        DVT Prophylaxis: Heparin.  GI Prophylaxis: Lansoprazole.  Code Status: DNR/DNI.     Plan       -Stable at time of exam  -Remains normotensive  -DVT to right lower extremity  -Heparin drip initiated.  -Dialysis as per nephrology   -Continue therapy as patient tolerates   -Strongly recommend out of bed daily  -Patient appetite and intake continues to improve.  -DVT and GI prophylaxis in place.  -We'll continue monitoring patient in hospital setting and treat patient as course dictates.  -Please review orders for detailed plan of care.  -For Aute and Chronic medical condition we will continue medications described below in medication section which have been reviewed and we will continue unless changed in plan of care.  -Laboratory and diagnostic studies have been independently reviewed and the reports are reviewed as documented below.    Subjective   Interval History:   Patient Complaints:   Patient seen and examined.  Receiving dialysis at this time.  No concerns.  No overnight events reported.  History taken from: Medical record and nursing staff.    Review of Systems:    Review of Systems   All other systems reviewed and are negative.    Objective   Vital Signs  Temp: 97.2 F         Heart Rate: 64         Resp: 18          Blood Pressure: 110/54         Pulse Ox: 94 %    Physical Exam:   Physical Exam  Vitals and nursing note reviewed.   Constitutional:       Appearance: She is well-developed.   HENT:      Head: Normocephalic and atraumatic.      Nose: Nose normal.   Eyes:      Conjunctiva/sclera: Conjunctivae normal.      Pupils: Pupils are equal, round, and reactive to light.   Neck:      Thyroid: No thyromegaly.      Vascular: No JVD.      Trachea: No tracheal deviation.   Cardiovascular:      Rate and Rhythm: Normal rate and regular rhythm.      Heart sounds: Normal heart sounds.   Pulmonary:      Effort: Pulmonary effort is normal. No respiratory distress.      Breath sounds: Normal breath sounds. No wheezing or rales.   Chest:      Chest wall: No tenderness.   Abdominal:      General: Bowel sounds  are normal. There is no distension.      Palpations: Abdomen is soft.      Tenderness: There is no abdominal tenderness. There is no guarding or rebound.   Musculoskeletal:         General: Normal range of motion.      Cervical back: Normal range of motion and neck supple.   Lymphadenopathy:      Cervical: No cervical adenopathy.   Skin:     General: Skin is warm and dry.      Comments: Intact   Neurological:      Mental Status: She is alert and oriented to person, place, and time.      Cranial Nerves: No cranial nerve deficit.      Deep Tendon Reflexes: Reflexes are normal and symmetric.     Results Review:       Results from last 7 days   Lab Units 09/06/23 0429 09/04/23 0438 09/01/23  0339   SODIUM mmol/L 129* 129* 128*   POTASSIUM mmol/L 4.3 4.5 5.1   CHLORIDE mmol/L 93* 92* 90*   CO2 mmol/L 24.0 25.0 25.0   BUN mg/dL 31* 34* 34*   CREATININE mg/dL 4.32* 4.28* 5.06*   GLUCOSE mg/dL 85 106* 128*   CALCIUM mg/dL 8.1* 8.3* 8.6   BILIRUBIN mg/dL 0.5 0.6 0.6   ALK PHOS U/L 147* 170* 222*   ALT (SGPT) U/L 7 9 15   AST (SGOT) U/L 15 19 22       Results from last 7 days   Lab Units 09/06/23 0429 09/04/23 0438 09/01/23  0339   MAGNESIUM mg/dL 1.7 1.6 1.7       Results from last 7 days   Lab Units 09/06/23 0429 09/05/23  1857 09/04/23 0438 09/01/23  0339   WBC 10*3/mm3 9.53 9.59 8.73 8.52   HEMOGLOBIN g/dL 8.7* 9.7* 9.7* 10.3*   HEMATOCRIT % 27.5* 31.1* 30.7* 33.0*   PLATELETS 10*3/mm3 376 377 442 394       Lab Results   Component Value Date    TROPONINI 0.69 (H) 05/31/2020    TROPONINT 56 (C) 08/06/2023     pH, Arterial   Date Value Ref Range Status   08/11/2023 7.403 7.350 - 7.450 pH units Final     CO2   Date Value Ref Range Status   09/06/2023 24.0 22.0 - 29.0 mmol/L Final     Total CO2   Date Value Ref Range Status   10/27/2022 30 21 - 31 mmol/L Final     Results from last 7 days   Lab Units 09/05/23  1857   INR  1.11       Imaging Results (Most Recent)       Procedure Component Value Units Date/Time    US  Venous Doppler Lower Extremity Right (duplex) [[id number]] Collected: 09/05/23 1708     Updated: 09/05/23 1712    Narrative:      PROCEDURE:  Ultrasound venous Doppler lower extremity Right.    TECHNIQUE:  High-resolution gray scale imaging, color flow Doppler, compression were  performed from the groin to the calf of the right lower extremity.  Augmentation  was performed of the common femoral vein and popliteal vein.    FINDINGS:  Extensive occlusive deep venous thrombosis within the visualized right common  femoral vein, superficial femoral vein, and popliteal vein.           No results found for: ACANTHNAEG, AFBCX, BPERTUSSISCX, BLOODCX  No results found for: BCIDPCR, CXREFLEX, CSFCX, CULTURETIS  No results found for: CULTURES, HSVCX, URCX  No results found for: EYECULTURE, GCCX, HSVCULTURE, LABHSV  No results found for: LEGIONELLA, MRSACX, MUMPSCX, MYCOPLASCX  No results found for: NOCARDIACX, STOOLCX  No results found for: THROATCX, UNSTIMCULT, URINECX, CULTURE, VZVCULTUR  No results found for: VIRALCULTU, WOUNDCX  Medication Reviewed and Will Continue Unless Documented in Plan:   albumin human, 25 g, Intravenous, Once  alteplase, 2 mg, Intracatheter, Once in Dialysis  alteplase, 2 mg, Intracatheter, Once in Dialysis  amiodarone, 200 mg, Oral, BID With Meals  busPIRone, 5 mg, Oral, BID  dilTIAZem, 60 mg, Oral, Q6H  docusate sodium, 100 mg, Oral, BID  dronabinol, 7.5 mg, Oral, BID AC  epoetin karen/karen-epbx, 6,000 Units, Intravenous, Once per day on Mon Wed Fri  ferric gluconate (FERRLECIT) IVPB, 100 mg, Intravenous, Weekly  gabapentin, 100 mg, Oral, TID  heparin (porcine), 80 Units/kg, Intravenous, Once  heparin (porcine), 2,000 Units, Intravenous, Once per day on Mon Wed Fri  heparin (porcine), 3,700 Units, Intracatheter, Take As Directed  HYDROcodone-acetaminophen, 1 tablet, Oral, Once  Insulin Aspart, 2-12 Units, Subcutaneous, 4 times per day  lansoprazole, 30 mg, Oral, Q12H  magnesium sulfate, 2 g,  Intravenous, Once  midodrine, 7.5 mg, Oral, TID AC  rosuvastatin, 40 mg, Oral, Daily  sodium chloride 0.9 % flush, 10 mL, Intravenous, Q8H      amiodarone, 0.5 mg/min  dilTIAZem, 5-15 mg/hr  heparin, 15 Units/kg/hr  norepinephrine, 0.02-0.3 mcg/kg/min      acetaminophen    acetaminophen    albumin human    cyclobenzaprine    dextrose    dilTIAZem    heparin (porcine) **AND** heparin **AND** heparin (porcine) **AND** heparin (porcine)    heparin (porcine)    HYDROcodone-acetaminophen    hydrocortisone-bacitracin-zinc oxide-nystatin    melatonin    muscle rub    sodium chloride    sodium chloride 0.9 % flush    traZODone  amiodarone, 0.5 mg/min  dilTIAZem, 5-15 mg/hr  heparin, 15 Units/kg/hr  norepinephrine, 0.02-0.3 mcg/kg/min       Dietary Orders (From admission, onward)       Start     Ordered    09/06/23 0800  Dietary Nutrition Supplements Other (See Comment); Add Diet Dr.Pepper and cranberry juice to each meal  Daily With Breakfast, Lunch & Dinner      Question Answer Comment   Select Supplement: Other (See Comment)    Other Add Diet  and cranberry juice to each meal        09/05/23 2022 09/05/23 1800  Dietary Nutrition Supplements Other (See Comment); Mighty shake with every meal,  Daily With Breakfast, Lunch & Dinner      Question Answer Comment   Select Supplement: Other (See Comment)    Other Mighty shake with every meal,        09/05/23 1359    09/05/23 1800  Dietary Nutrition Supplements Magic Cup  Daily With Lunch & Dinner      Comments: [place]   Question:  Select Supplement:  Answer:  Magic Cup    09/05/23 1359    08/17/23 1153  Diet: Regular/House Diet, Renal Diets, Diabetic Diets; Consistent Carbohydrate; Low Sodium (2-3g); Texture: Soft to Chew (NDD 3); Soft to Chew: Chopped Meat; Fluid Consistency: Thin (IDDSI 0)  Diet Effective Now        References:    Diet Order [place]   Question Answer Comment   Diets: Regular/House Diet    Diets: Renal Diets    Diets: Diabetic Diets    Diabetic  Diet: Consistent Carbohydrate    Renal Diet: Low Sodium (2-3g)    Texture: Soft to Chew (NDD 3)    Soft to Chew: Chopped Meat    Fluid Consistency: Thin (IDDSI 0)        08/17/23 1156                  History, physical exam, assessment and plan may have been partly or fully copied from before, but  changes made to the copied record to reflect care on the date of service. Part of the lab and imaging  reports auto populated and corrected. Some of this note may be an electronic transcription of spoken  language to printed text. This may permit erroneous, or at times, nonsensical words or phrases to be  inadvertently transcribed. Although I have reviewed the note for such errors, some may still exist.      This document has been electronically signed by Josefina Perez MD on September 6, 2023 08:58 CDT

## 2023-09-06 NOTE — ACP (ADVANCE CARE PLANNING)
[facility]  [address] [place] [address]. [address]  T - [phone]  F - [phone]     Progress Note          PATIENT  DEMOGRAPHICS   PATIENT NAME: Carol Sullivan                      PHYSICIAN: COY Chong  : 1940  MRN: [id number]   LOS: 0 days    Patient Care Team:  Len Seo MD as PCP - General  Blaire Camara APRN as Nurse Practitioner (General Surgery)  Subjective   SUBJECTIVE   BP stable now         Objective   OBJECTIVE   Vital Signs  Heart Rate:  [50-62] 62    T 97.5  HR 57  RR 18  /55  O2: 98% RA     No intake/output data recorded.    PHYSICAL EXAM    Physical Exam  Constitutional:       Appearance: She is well-developed.   HENT:      Head: Normocephalic.      Left Ear: There is no impacted cerumen.      Nose: No rhinorrhea.   Eyes:      Pupils: Pupils are equal, round, and reactive to light.   Cardiovascular:      Rate and Rhythm: Normal rate and regular rhythm.      Heart sounds: Normal heart sounds.   Pulmonary:      Effort: Pulmonary effort is normal.      Breath sounds: Normal breath sounds.   Abdominal:      General: Bowel sounds are normal.      Palpations: Abdomen is soft.   Musculoskeletal:         General: No swelling.   Skin:     Coloration: Skin is not jaundiced.   Neurological:      General: No focal deficit present.      Mental Status: She is alert. She is disoriented.       RESULTS   Results Review:    Results from last 7 days   Lab Units 23  0429 23  0438 23  0339   SODIUM mmol/L 129* 129* 128*   POTASSIUM mmol/L 4.3 4.5 5.1   CHLORIDE mmol/L 93* 92* 90*   CO2 mmol/L 24.0 25.0 25.0   BUN mg/dL 31* 34* 34*   CREATININE mg/dL 4.32* 4.28* 5.06*   CALCIUM mg/dL 8.1* 8.3* 8.6   BILIRUBIN mg/dL 0.5 0.6 0.6   ALK PHOS U/L 147* 170* 222*   ALT (SGPT) U/L 7 9 15   AST (SGOT) U/L 15 19 22   GLUCOSE mg/dL 85 106* 128*         Estimated Creatinine Clearance: 9.8 mL/min (A) (by C-G formula based on SCr of 4.32 mg/dL  (H)).    Results from last 7 days   Lab Units 09/06/23  0429 09/04/23  0438 09/01/23  0339   MAGNESIUM mg/dL 1.7 1.6 1.7               Results from last 7 days   Lab Units 09/06/23  0429 09/05/23  1857 09/04/23 0438 09/01/23  0339   WBC 10*3/mm3 9.53 9.59 8.73 8.52   HEMOGLOBIN g/dL 8.7* 9.7* 9.7* 10.3*   PLATELETS 10*3/mm3 376 377 442 394         Results from last 7 days   Lab Units 09/05/23 1857   INR  1.11           Imaging Results (Last 24 Hours)       Procedure Component Value Units Date/Time    US Venous Doppler Lower Extremity Right (duplex) [[id number]] Collected: 09/05/23 1708     Updated: 09/05/23 1712    Narrative:      PROCEDURE:  Ultrasound venous Doppler lower extremity Right.    TECHNIQUE:  High-resolution gray scale imaging, color flow Doppler, compression were  performed from the groin to the calf of the right lower extremity.  Augmentation  was performed of the common femoral vein and popliteal vein.    FINDINGS:  Extensive occlusive deep venous thrombosis within the visualized right common  femoral vein, superficial femoral vein, and popliteal vein.             MEDICATIONS    albumin human, 25 g, Intravenous, Once  alteplase, 2 mg, Intracatheter, Once in Dialysis  alteplase, 2 mg, Intracatheter, Once in Dialysis  amiodarone, 200 mg, Oral, BID With Meals  busPIRone, 5 mg, Oral, BID  dilTIAZem, 60 mg, Oral, Q6H  docusate sodium, 100 mg, Oral, BID  dronabinol, 7.5 mg, Oral, BID AC  epoetin karen/karen-epbx, 6,000 Units, Intravenous, Once per day on Mon Wed Fri  ferric gluconate (FERRLECIT) IVPB, 100 mg, Intravenous, Weekly  gabapentin, 100 mg, Oral, TID  heparin (porcine), 80 Units/kg, Intravenous, Once  heparin (porcine), 2,000 Units, Intravenous, Once per day on Mon Wed Fri  heparin (porcine), 3,700 Units, Intracatheter, Take As Directed  HYDROcodone-acetaminophen, 1 tablet, Oral, Once  Insulin Aspart, 2-12 Units, Subcutaneous, 4 times per day  lansoprazole, 30 mg, Oral, Q12H  magnesium sulfate, 2  g, Intravenous, Once  midodrine, 7.5 mg, Oral, TID AC  rosuvastatin, 40 mg, Oral, Daily  sodium chloride 0.9 % flush, 10 mL, Intravenous, Q8H      amiodarone, 0.5 mg/min  dilTIAZem, 5-15 mg/hr  heparin, 15 Units/kg/hr  norepinephrine, 0.02-0.3 mcg/kg/min      Assessment & Plan   ASSESSMENT / PLAN      * No active hospital problems. *      1. Acute kidney injury/ Acute tubular necrosis:  - Baseline unknown.   - Creatinine was 1.5 in 10/2022.   - UA 3+ protein, trace leukocytes, 0-2 RBC, 6-12 WBC, 2+ bacteria.   - Urine sodium 26. CT abd large rt retroperitoneal hematoma causing moderate rt hydronephrosis.  - Started HD 8/6/23  - Received call 9/1 with low BP, started levophed. BP is better now off levophed. Cardiology has stopped Imdur.  - HD today, no renal recovery      2. Renal failure retroperitoneal bleed s/p right J stent for hydronephrosis   - s/p J stent placement by urology 8/11  - Continue to monitor renal function      3. Hypertension/ Afib with RVR:   - Blood pressure is low, but now better and off levophed     4. UTI E.fecalis:   - became septic with mild hydro Dr [place] is consulted. Now stent in place . E.fecalis     5. Cdiff:   -not on po vanc now      6. Hyponatremia:   - Sodium is stable     7. Anemia / retroperitoneal bleed:  - Hemoglobin is acceptable at 8.7, B12 and folate are good on epogen      8.  AMS improving    9.  RLE edema- checked doppler and she has extensive DVT. On heparin gtt per primary team.              This document has been electronically signed by COY Chong on September 6, 2023 13:03 CDT      For this patient encounter, I have reviewed the Nurse Practitioner's documentation, medical decision making, and treatment plan and personally spent time with the patient.

## 2023-09-07 ENCOUNTER — OUTSIDE FACILITY SERVICE (OUTPATIENT)
Dept: PULMONOLOGY | Facility: CLINIC | Age: 83
End: 2023-09-07
Payer: MEDICARE

## 2023-09-07 LAB
APTT PPP: 101.8 SECONDS (ref 20–40.3)
APTT PPP: 72.1 SECONDS (ref 20–40.3)
GLUCOSE BLDC GLUCOMTR-MCNC: 146 MG/DL (ref 70–130)
GLUCOSE BLDC GLUCOMTR-MCNC: 179 MG/DL (ref 70–130)
GLUCOSE BLDC GLUCOMTR-MCNC: 84 MG/DL (ref 70–130)
GLUCOSE BLDC GLUCOMTR-MCNC: 94 MG/DL (ref 70–130)

## 2023-09-07 PROCEDURE — 85730 THROMBOPLASTIN TIME PARTIAL: CPT | Performed by: INTERNAL MEDICINE

## 2023-09-07 PROCEDURE — 82948 REAGENT STRIP/BLOOD GLUCOSE: CPT

## 2023-09-07 PROCEDURE — 63710000001 DRONABINOL PER 2.5 MG

## 2023-09-07 PROCEDURE — 63710000001 DRONABINOL PER 5 MG

## 2023-09-07 NOTE — ACP (ADVANCE CARE PLANNING)
[facility]  [address] [place] [address]. [address]  T - [phone]  F - [phone]     Progress Note          PATIENT  DEMOGRAPHICS   PATIENT NAME: Carol Sullivan                      PHYSICIAN: COY Chong  : 1940  MRN: [id number]   LOS: 0 days    Patient Care Team:  Len Seo MD as PCP - General  [place] APRN as Nurse Practitioner (General Surgery)  Subjective   SUBJECTIVE   BP stable, no new events         Objective   OBJECTIVE   Vital Signs  Heart Rate:  [56-67] 67    T 98.8  HR 68  RR 18  /55  O2: 98% RA     No intake/output data recorded.    PHYSICAL EXAM    Physical Exam  Constitutional:       Appearance: She is well-developed.   HENT:      Head: Normocephalic.      Left Ear: There is no impacted cerumen.      Nose: No rhinorrhea.   Eyes:      Pupils: Pupils are equal, round, and reactive to light.   Cardiovascular:      Rate and Rhythm: Normal rate and regular rhythm.      Heart sounds: Normal heart sounds.   Pulmonary:      Effort: Pulmonary effort is normal.      Breath sounds: Normal breath sounds.   Abdominal:      General: Bowel sounds are normal.      Palpations: Abdomen is soft.   Musculoskeletal:         General: No swelling.   Skin:     Coloration: Skin is not jaundiced.   Neurological:      General: No focal deficit present.      Mental Status: She is alert. She is disoriented.       RESULTS   Results Review:    Results from last 7 days   Lab Units 23  0429 23  0438 23  0339   SODIUM mmol/L 129* 129* 128*   POTASSIUM mmol/L 4.3 4.5 5.1   CHLORIDE mmol/L 93* 92* 90*   CO2 mmol/L 24.0 25.0 25.0   BUN mg/dL 31* 34* 34*   CREATININE mg/dL 4.32* 4.28* 5.06*   CALCIUM mg/dL 8.1* 8.3* 8.6   BILIRUBIN mg/dL 0.5 0.6 0.6   ALK PHOS U/L 147* 170* 222*   ALT (SGPT) U/L 7 9 15   AST (SGOT) U/L 15 19 22   GLUCOSE mg/dL 85 106* 128*         Estimated Creatinine Clearance: 9.8 mL/min (A) (by C-G formula based on SCr of 4.32 mg/dL  (H)).    Results from last 7 days   Lab Units 09/06/23  0429 09/04/23  0438 09/01/23  0339   MAGNESIUM mg/dL 1.7 1.6 1.7               Results from last 7 days   Lab Units 09/06/23  0429 09/05/23  1857 09/04/23 0438 09/01/23  0339   WBC 10*3/mm3 9.53 9.59 8.73 8.52   HEMOGLOBIN g/dL 8.7* 9.7* 9.7* 10.3*   PLATELETS 10*3/mm3 376 377 442 394         Results from last 7 days   Lab Units 09/05/23  1857   INR  1.11           Imaging Results (Last 24 Hours)       ** No results found for the last 24 hours. **             MEDICATIONS    albumin human, 25 g, Intravenous, Once  alteplase, 2 mg, Intracatheter, Once in Dialysis  alteplase, 2 mg, Intracatheter, Once in Dialysis  amiodarone, 200 mg, Oral, BID With Meals  apixaban, 10 mg, Oral, BID   Followed by  [START ON 9/14/2023] apixaban, 5 mg, Oral, BID  busPIRone, 5 mg, Oral, BID  dilTIAZem, 60 mg, Oral, Q6H  docusate sodium, 100 mg, Oral, BID  dronabinol, 7.5 mg, Oral, BID AC  epoetin karen/karen-epbx, 6,000 Units, Intravenous, Once per day on Mon Wed Fri  ferric gluconate (FERRLECIT) IVPB, 100 mg, Intravenous, Weekly  gabapentin, 100 mg, Oral, TID  HYDROcodone-acetaminophen, 1 tablet, Oral, Once  Insulin Aspart, 2-12 Units, Subcutaneous, 4 times per day  lansoprazole, 30 mg, Oral, Q12H  magnesium sulfate, 2 g, Intravenous, Once  midodrine, 7.5 mg, Oral, TID AC  rosuvastatin, 40 mg, Oral, Daily  sodium chloride 0.9 % flush, 10 mL, Intravenous, Q8H           Assessment & Plan   ASSESSMENT / PLAN      * No active hospital problems. *      1. Acute kidney injury/ Acute tubular necrosis:  - Baseline unknown.   - Creatinine was 1.5 in 10/2022.   - UA 3+ protein, trace leukocytes, 0-2 RBC, 6-12 WBC, 2+ bacteria.   - Urine sodium 26. CT abd large rt retroperitoneal hematoma causing moderate rt hydronephrosis.  - Started HD 8/6/23  - Received call 9/1 with low BP, started levophed. BP is better now off levophed. Cardiology has stopped Imdur.  - HD tomorrow     2. Renal failure  retroperitoneal bleed s/p right J stent for hydronephrosis   - s/p J stent placement by urology 8/11  - Continue to monitor renal function      3. Hypertension/ Afib with RVR:   - Blood pressure is low, but now better and off levophed     4. UTI E.fecalis:   - became septic with mild hydro Dr [employer] is consulted. Now stent in place . E.fecalis     5. Cdiff:   -not on po vanc now      6. Hyponatremia:   - Sodium is stable     7. Anemia / retroperitoneal bleed:  - Hemoglobin is acceptable at 8.7, B12 and folate are good on epogen      8.  AMS improving    9.  RLE edema- checked doppler and she has extensive DVT. On heparin gtt per primary team.              This document has been electronically signed by COY Chong on September 7, 2023 13:28 CDT      For this patient encounter, I have reviewed the Nurse Practitioner's documentation, medical decision making, and treatment plan and personally spent time with the patient.

## 2023-09-08 ENCOUNTER — OUTSIDE FACILITY SERVICE (OUTPATIENT)
Dept: PULMONOLOGY | Facility: CLINIC | Age: 83
End: 2023-09-08
Payer: MEDICARE

## 2023-09-08 LAB
ALBUMIN SERPL-MCNC: 2.4 G/DL (ref 3.5–5.2)
ALBUMIN/GLOB SERPL: 0.5 G/DL
ALP SERPL-CCNC: 167 U/L (ref 39–117)
ALT SERPL W P-5'-P-CCNC: 7 U/L (ref 1–33)
ANION GAP SERPL CALCULATED.3IONS-SCNC: 13 MMOL/L (ref 5–15)
AST SERPL-CCNC: 26 U/L (ref 1–32)
BASOPHILS # BLD AUTO: 0.12 10*3/MM3 (ref 0–0.2)
BASOPHILS NFR BLD AUTO: 1.6 % (ref 0–1.5)
BILIRUB SERPL-MCNC: 0.8 MG/DL (ref 0–1.2)
BUN SERPL-MCNC: 8 MG/DL (ref 8–23)
BUN/CREAT SERPL: 4.5 (ref 7–25)
CALCIUM SPEC-SCNC: 7.9 MG/DL (ref 8.6–10.5)
CHLORIDE SERPL-SCNC: 97 MMOL/L (ref 98–107)
CO2 SERPL-SCNC: 23 MMOL/L (ref 22–29)
CREAT SERPL-MCNC: 1.78 MG/DL (ref 0.57–1)
DEPRECATED RDW RBC AUTO: 58.8 FL (ref 37–54)
EGFRCR SERPLBLD CKD-EPI 2021: 28.2 ML/MIN/1.73
EOSINOPHIL # BLD AUTO: 0.15 10*3/MM3 (ref 0–0.4)
EOSINOPHIL NFR BLD AUTO: 2 % (ref 0.3–6.2)
ERYTHROCYTE [DISTWIDTH] IN BLOOD BY AUTOMATED COUNT: 18 % (ref 12.3–15.4)
GLOBULIN UR ELPH-MCNC: 4.5 GM/DL
GLUCOSE BLDC GLUCOMTR-MCNC: 129 MG/DL (ref 70–130)
GLUCOSE BLDC GLUCOMTR-MCNC: 138 MG/DL (ref 70–130)
GLUCOSE BLDC GLUCOMTR-MCNC: 159 MG/DL (ref 70–130)
GLUCOSE BLDC GLUCOMTR-MCNC: 85 MG/DL (ref 70–130)
GLUCOSE SERPL-MCNC: 146 MG/DL (ref 65–99)
HCT VFR BLD AUTO: 28.3 % (ref 34–46.6)
HGB BLD-MCNC: 9 G/DL (ref 12–15.9)
IMM GRANULOCYTES # BLD AUTO: 0.26 10*3/MM3 (ref 0–0.05)
IMM GRANULOCYTES NFR BLD AUTO: 3.4 % (ref 0–0.5)
LYMPHOCYTES # BLD AUTO: 1.68 10*3/MM3 (ref 0.7–3.1)
LYMPHOCYTES NFR BLD AUTO: 22.2 % (ref 19.6–45.3)
MAGNESIUM SERPL-MCNC: 1.6 MG/DL (ref 1.6–2.4)
MCH RBC QN AUTO: 29 PG (ref 26.6–33)
MCHC RBC AUTO-ENTMCNC: 31.8 G/DL (ref 31.5–35.7)
MCV RBC AUTO: 91.3 FL (ref 79–97)
MONOCYTES # BLD AUTO: 1.05 10*3/MM3 (ref 0.1–0.9)
MONOCYTES NFR BLD AUTO: 13.9 % (ref 5–12)
NEUTROPHILS NFR BLD AUTO: 4.32 10*3/MM3 (ref 1.7–7)
NEUTROPHILS NFR BLD AUTO: 56.9 % (ref 42.7–76)
NRBC BLD AUTO-RTO: 0 /100 WBC (ref 0–0.2)
PLATELET # BLD AUTO: 323 10*3/MM3 (ref 140–450)
PMV BLD AUTO: 9 FL (ref 6–12)
POTASSIUM SERPL-SCNC: 3.6 MMOL/L (ref 3.5–5.2)
PROT SERPL-MCNC: 6.9 G/DL (ref 6–8.5)
QT INTERVAL: 220 MS
QTC INTERVAL: 319 MS
RBC # BLD AUTO: 3.1 10*6/MM3 (ref 3.77–5.28)
SODIUM SERPL-SCNC: 133 MMOL/L (ref 136–145)
WBC NRBC COR # BLD: 7.58 10*3/MM3 (ref 3.4–10.8)
WHOLE BLOOD HOLD SPECIMEN: NORMAL
WHOLE BLOOD HOLD SPECIMEN: NORMAL

## 2023-09-08 PROCEDURE — 25010000002 EPOETIN ALFA PER 1000 UNITS: Performed by: INTERNAL MEDICINE

## 2023-09-08 PROCEDURE — 25010000002 HEPARIN (PORCINE) PER 1000 UNITS: Performed by: INTERNAL MEDICINE

## 2023-09-08 PROCEDURE — 63710000001 DRONABINOL PER 2.5 MG

## 2023-09-08 PROCEDURE — P9047 ALBUMIN (HUMAN), 25%, 50ML: HCPCS | Performed by: INTERNAL MEDICINE

## 2023-09-08 PROCEDURE — 93005 ELECTROCARDIOGRAM TRACING: CPT | Performed by: INTERNAL MEDICINE

## 2023-09-08 PROCEDURE — 63710000001 DRONABINOL PER 5 MG

## 2023-09-08 PROCEDURE — 83735 ASSAY OF MAGNESIUM: CPT | Performed by: INTERNAL MEDICINE

## 2023-09-08 PROCEDURE — 80053 COMPREHEN METABOLIC PANEL: CPT | Performed by: INTERNAL MEDICINE

## 2023-09-08 PROCEDURE — 85025 COMPLETE CBC W/AUTO DIFF WBC: CPT | Performed by: INTERNAL MEDICINE

## 2023-09-08 PROCEDURE — 97110 THERAPEUTIC EXERCISES: CPT

## 2023-09-08 PROCEDURE — 25010000002 ALBUMIN HUMAN 25% PER 50 ML: Performed by: INTERNAL MEDICINE

## 2023-09-08 PROCEDURE — 82948 REAGENT STRIP/BLOOD GLUCOSE: CPT

## 2023-09-08 RX ORDER — HYDROCODONE BITARTRATE AND ACETAMINOPHEN 5; 325 MG/1; MG/1
1 TABLET ORAL EVERY 6 HOURS PRN
Status: DISPENSED | OUTPATIENT
Start: 2023-09-08 | End: 2023-09-15

## 2023-09-08 NOTE — ACP (ADVANCE CARE PLANNING)
"    Nutrition F/U:      Nutrition Hx:  pt reports that she is eating \"pretty good.\"  She was a nauseated this afternoon.  Continues to drink the Mighty shakes.      Weight history:  CBW:  159.5#  CBW:  152.5# (9/5)  CBW:  145.2 (8/31)  CBW:  148.6 (8/28)  CBW:  152 (8/21)  Admit Wt to [facility] on 7/28 156#  UBW:  Wt loss:     Labs:  gluc 120/94/84   Meds:  IV Alb; Alteplase; Amio; Colace; Cardizem; Epogen; Marinol; Ferric Gluconate; Heparin; MgSulfate; Midodrine; SSI; Amio; Levophed      Nutrition Prescription Order:  Regular/Renal/Diabetic/Soft texture/chopped; Mighty shake milkshakes with all meals; and magic cups twice daily     Evaluation of Nutrition Intake:  ~38% average for the past 98documented meals     Nutrition Assessment:  Pt with intake of ~38% average.  However, she is drinking the mighty shakes at each meal which is helping to increased her calorie and protein intake.  She continues on Marinol for her appetite and Midodrine for hypotension.  Dialysis continues.  She has no complaints or requests and we are attempting to send foods/beverages that she likes.  Wt fluctuates between dialysis.    Pt has an extensive DVT noted in the RLE--on Heparin.    Nutrition Intervention:    Will continue to monitor POC and po intake.    Continues Mighty Shakes and Magic cups  Menu suggestions and alternatives provided.      Last BM:  9/6     Edema:  2+ right lower leg      Wounds:  Left Breast Cellulitis and Mastitis.              "

## 2023-09-08 NOTE — ACP (ADVANCE CARE PLANNING)
Formerly [facility] @ [facility]  INPATIENT PROGRESS NOTE    PATIENT NAME: Carol Sullivan      PHYSICIAN: Josefina Perez MD  : 1940        MRN: [id number]  Patient Care Team:  Len Seo MD as PCP - General  Blaire Camara APRN as Nurse Practitioner (General Surgery)    Chief Complaint: Patient was brought to emergency room at [facility] for confusion.     History of Present Illness: 82-year-old female with significant medical history of hypertension, dyslipidemia, gastroesophageal reflux disease who was brought into the emergency room for confusion.  Patient was also found to have erythema and cellulitis of left breast.  Patient was thought of having mastitis.  Patient's condition was monitored now.  Patient had gradual decrease in her urine output and worsening in her kidney function was noted.  Patient off having renal failure which was worsening over time.  Patient subsequently required hemodialysis patient was placed on hemodialysis per nephrology recommendation.  Patient was admitted to the hospital setting for antibiotics and hemodialysis.  Patient's condition seem to be stabilized however patient remained extremely weak and deconditioned hemodialysis patient was recommended to be admitted to our facility for further therapy and rehabilitation along with monitoring for hemodialysis.     Discharge Summary and H&P: Reviewed from previous hospitalization and information documented above in HPI Section.     Radiology Studies from Previous Hospitalization: Reviewed and are as below:  XR Abdomen KUB     Result Date: 2023  FINDINGS/IMPRESSION: Radiopaque tip of the Dobbhoff tube projects over the second portion of the duodenum, similar to even slightly retracted when compared to the prior exam. Right-sided ureteral stent is again noted and incompletely visualized.  There are probably also right femoral catheters which are  incompletely visualized. Normal bowel gas pattern in the visualized abdomen.     XR Chest Post CVA Port     Result Date: 8/17/2023  Impression: 1. Mild interstitial opacities are likely due to low lung volumes. An element of interstitial edema is not entirely excluded.      EKG From previous hospitalization reviewed and documented below:  ECG 12 Lead Other; hx afib   Preliminary Result   Test Reason : Other~   Blood Pressure :   */*   mmHG   Vent. Rate :  67 BPM     Atrial Rate :  67 BPM      P-R Int : 188 ms          QRS Dur :  78 ms       QT Int : 392 ms       P-R-T Axes :  28   6  16 degrees      QTc Int : 414 ms       Normal sinus rhythm with sinus arrhythmia   Minimal voltage criteria for LVH, may be normal variant   Anteroseptal infarct (cited on or before 30-JUL-2023)   Abnormal ECG   When compared with ECG of 10-AUG-2023 15:02,   Sinus rhythm has replaced Atrial fibrillation   Vent. rate has decreased BY  62 BPM   Nonspecific T wave abnormality, worse in Anterior leads       Referred By:            Confirmed By:              Laboratory results from previous hospitalization is reviewed and below:        Lab Results   Component Value Date     GLUCOSE 139 (H) 08/17/2023     CALCIUM 8.6 08/17/2023      (L) 08/17/2023     K 3.9 08/17/2023     CO2 23.0 08/17/2023     CL 90 (L) 08/17/2023     BUN 72 (H) 08/17/2023     CREATININE 4.86 (H) 08/17/2023     EGFR 8.5 (L) 08/17/2023     BCR 14.8 08/17/2023     ANIONGAP 17.0 (H) 08/17/2023            Lab Results   Component Value Date     WBC 17.07 (H) 08/17/2023     HGB 10.2 (L) 08/17/2023     HCT 29.8 (L) 08/17/2023     MCV 85.9 08/17/2023     PLT         Assessment       Sepsis Secondary to Below.  Somnolence [R40.0]  Mastitis [N61.0]  Sepsis [A41.9]  Sepsis, due to unspecified organism, unspecified whether acute organ dysfunction present [A41.9]   ESRD on Hemodialysis.        DVT Prophylaxis: Heparin.  GI Prophylaxis: Lansoprazole.  Code Status: DNR/DNI.     Plan       -Stable at time of exam  -Tachycardic this AM.  EKG shows A-fib with RVR.  Cardizem administered.    -Now on Eliquis and heparin drip stopped.  -Cardiology follow up appreciated.  -Dialysis as per nephrology   -Continue therapy as patient tolerates   -Strongly recommend out of bed daily  -Intake continues to improve.  -DVT and GI prophylaxis in place.  -We'll continue monitoring patient in hospital setting and treat patient as course dictates.  -Please review orders for detailed plan of care.  -For Aute and Chronic medical condition we will continue medications described below in medication section which have been reviewed and we will continue unless changed in plan of care.  -Laboratory and diagnostic studies have been independently reviewed and the reports are reviewed as documented below.    Subjective   Interval History:   Patient Complaints:   Patient seen and examined.  Resting comfortably in bed.  A&O with no concerns.  Pleasant and states she does feel better today.    History taken from: Medical record and nursing staff.    Review of Systems:    Review of Systems   All other systems reviewed and are negative.    Objective   Vital Signs  Temp: 97.8 F         Heart Rate: 56         Resp: 20          Blood Pressure: 136/66         Pulse Ox: 98 %    Physical Exam:   Physical Exam  Vitals and nursing note reviewed.   Constitutional:       Appearance: She is well-developed.   HENT:      Head: Normocephalic and atraumatic.      Nose: Nose normal.   Eyes:      Conjunctiva/sclera: Conjunctivae normal.      Pupils: Pupils are equal, round, and reactive to light.   Neck:      Thyroid: No thyromegaly.      Vascular: No JVD.      Trachea: No tracheal deviation.   Cardiovascular:      Rate and Rhythm: Normal rate and regular rhythm.      Heart sounds: Normal heart sounds.   Pulmonary:      Effort: Pulmonary effort is normal. No respiratory distress.      Breath sounds: Normal breath sounds. No wheezing or rales.    Chest:      Chest wall: No tenderness.   Abdominal:      General: Bowel sounds are normal. There is no distension.      Palpations: Abdomen is soft.      Tenderness: There is no abdominal tenderness. There is no guarding or rebound.   Musculoskeletal:         General: Normal range of motion.      Cervical back: Normal range of motion and neck supple.   Lymphadenopathy:      Cervical: No cervical adenopathy.   Skin:     General: Skin is warm and dry.      Comments: Intact   Neurological:      Mental Status: She is alert and oriented to person, place, and time.      Cranial Nerves: No cranial nerve deficit.      Deep Tendon Reflexes: Reflexes are normal and symmetric.     Results Review:       Results from last 7 days   Lab Units 09/06/23  0429 09/04/23  0438   SODIUM mmol/L 129* 129*   POTASSIUM mmol/L 4.3 4.5   CHLORIDE mmol/L 93* 92*   CO2 mmol/L 24.0 25.0   BUN mg/dL 31* 34*   CREATININE mg/dL 4.32* 4.28*   GLUCOSE mg/dL 85 106*   CALCIUM mg/dL 8.1* 8.3*   BILIRUBIN mg/dL 0.5 0.6   ALK PHOS U/L 147* 170*   ALT (SGPT) U/L 7 9   AST (SGOT) U/L 15 19       Results from last 7 days   Lab Units 09/08/23  0413 09/06/23  0429 09/04/23  0438   MAGNESIUM mg/dL 1.6 1.7 1.6       Results from last 7 days   Lab Units 09/08/23  0430 09/06/23  0429 09/05/23  1857 09/04/23  0438   WBC 10*3/mm3 7.58 9.53 9.59 8.73   HEMOGLOBIN g/dL 9.0* 8.7* 9.7* 9.7*   HEMATOCRIT % 28.3* 27.5* 31.1* 30.7*   PLATELETS 10*3/mm3 323 376 377 442       Lab Results   Component Value Date    TROPONINI 0.69 (H) 05/31/2020    TROPONINT 56 (C) 08/06/2023     pH, Arterial   Date Value Ref Range Status   08/11/2023 7.403 7.350 - 7.450 pH units Final     CO2   Date Value Ref Range Status   09/06/2023 24.0 22.0 - 29.0 mmol/L Final     Total CO2   Date Value Ref Range Status   10/27/2022 30 21 - 31 mmol/L Final     Results from last 7 days   Lab Units 09/05/23  1857   INR  1.11       Imaging Results (Most Recent)       Procedure Component Value Units  Date/Time    US Venous Doppler Lower Extremity Right (duplex) [[id number]] Collected: 09/05/23 1708     Updated: 09/05/23 1712    Narrative:      PROCEDURE:  Ultrasound venous Doppler lower extremity Right.    TECHNIQUE:  High-resolution gray scale imaging, color flow Doppler, compression were  performed from the groin to the calf of the right lower extremity.  Augmentation  was performed of the common femoral vein and popliteal vein.    FINDINGS:  Extensive occlusive deep venous thrombosis within the visualized right common  femoral vein, superficial femoral vein, and popliteal vein.           No results found for: ACANTHNAEG, AFBCX, BPERTUSSISCX, BLOODCX  No results found for: BCIDPCR, CXREFLEX, CSFCX, CULTURETIS  No results found for: CULTURES, HSVCX, URCX  No results found for: EYECULTURE, GCCX, HSVCULTURE, LABHSV  No results found for: LEGIONELLA, MRSACX, MUMPSCX, MYCOPLASCX  No results found for: NOCARDIACX, STOOLCX  No results found for: THROATCX, UNSTIMCULT, URINECX, CULTURE, VZVCULTUR  No results found for: VIRALCULTU, WOUNDCX  Medication Reviewed and Will Continue Unless Documented in Plan:   albumin human, 25 g, Intravenous, Once  alteplase, 2 mg, Intracatheter, Once in Dialysis  alteplase, 2 mg, Intracatheter, Once in Dialysis  amiodarone, 200 mg, Oral, BID With Meals  apixaban, 10 mg, Oral, BID   Followed by  [START ON 9/14/2023] apixaban, 5 mg, Oral, BID  busPIRone, 5 mg, Oral, BID  dilTIAZem, 60 mg, Oral, Q6H  docusate sodium, 100 mg, Oral, BID  dronabinol, 7.5 mg, Oral, BID AC  epoetin karen/karen-epbx, 6,000 Units, Intravenous, Once per day on Mon Wed Fri  ferric gluconate (FERRLECIT) IVPB, 100 mg, Intravenous, Weekly  gabapentin, 100 mg, Oral, TID  HYDROcodone-acetaminophen, 1 tablet, Oral, Once  Insulin Aspart, 2-12 Units, Subcutaneous, 4 times per day  lansoprazole, 30 mg, Oral, Q12H  magnesium sulfate, 2 g, Intravenous, Once  midodrine, 7.5 mg, Oral, TID AC  rosuvastatin, 40 mg, Oral, Daily  sodium  chloride 0.9 % flush, 10 mL, Intravenous, Q8H           acetaminophen    acetaminophen    albumin human    cyclobenzaprine    dextrose    dilTIAZem    heparin (porcine)    HYDROcodone-acetaminophen    hydrocortisone-bacitracin-zinc oxide-nystatin    melatonin    muscle rub    sodium chloride    sodium chloride 0.9 % flush    traZODone        Dietary Orders (From admission, onward)       Start     Ordered    09/06/23 0800  Dietary Nutrition Supplements Other (See Comment); Add Diet Dr.Pepper and cranberry juice to each meal  Daily With Breakfast, Lunch & Dinner      Question Answer Comment   Select Supplement: Other (See Comment)    Other Add Diet  and cranberry juice to each meal        09/05/23 2022 09/05/23 1800  Dietary Nutrition Supplements Other (See Comment); Mighty shake with every meal,  Daily With Breakfast, Lunch & Dinner      Question Answer Comment   Select Supplement: Other (See Comment)    Other Mighty shake with every meal,        09/05/23 1359    09/05/23 1800  Dietary Nutrition Supplements Magic Cup  Daily With Lunch & Dinner      Comments: [place]   Question:  Select Supplement:  Answer:  Magic Cup    09/05/23 1359    08/17/23 1153  Diet: Regular/House Diet, Renal Diets, Diabetic Diets; Consistent Carbohydrate; Low Sodium (2-3g); Texture: Soft to Chew (NDD 3); Soft to Chew: Chopped Meat; Fluid Consistency: Thin (IDDSI 0)  Diet Effective Now        References:    Diet Order [place]   Question Answer Comment   Diets: Regular/House Diet    Diets: Renal Diets    Diets: Diabetic Diets    Diabetic Diet: Consistent Carbohydrate    Renal Diet: Low Sodium (2-3g)    Texture: Soft to Chew (NDD 3)    Soft to Chew: Chopped Meat    Fluid Consistency: Thin (IDDSI 0)        08/17/23 1156                  History, physical exam, assessment and plan may have been partly or fully copied from before, but  changes made to the copied record to reflect care on the date of service. Part of the lab and  imaging  reports auto populated and corrected. Some of this note may be an electronic transcription of spoken  language to printed text. This may permit erroneous, or at times, nonsensical words or phrases to be  inadvertently transcribed. Although I have reviewed the note for such errors, some may still exist.      This document has been electronically signed by Josefina Perez MD on September 8, 2023 10:32 CDT

## 2023-09-08 NOTE — ACP (ADVANCE CARE PLANNING)
[facility]  [address] [place] [address]. [address]  T - [phone]  F - [phone]     Progress Note          PATIENT  DEMOGRAPHICS   PATIENT NAME: Carol Sullivan                      PHYSICIAN: Stephanie Gómez MD  : 1940  MRN: [id number]   LOS: 0 days    Patient Care Team:  Len Seo MD as PCP - General  KiowaBlaire ribera APRN as Nurse Practitioner (General Surgery)  Subjective   SUBJECTIVE   BP stable, no new events. Tired post hd         Objective   OBJECTIVE   Vital Signs  Heart Rate:  [] 58    T 97.8  HR 54  RR 20  /59  O2: 98% RA     No intake/output data recorded.    PHYSICAL EXAM    Physical Exam  Constitutional:       Appearance: She is well-developed.   HENT:      Head: Normocephalic.      Left Ear: There is no impacted cerumen.      Nose: No rhinorrhea.   Eyes:      Pupils: Pupils are equal, round, and reactive to light.   Cardiovascular:      Rate and Rhythm: Normal rate and regular rhythm.      Heart sounds: Normal heart sounds.   Pulmonary:      Effort: Pulmonary effort is normal.      Breath sounds: Normal breath sounds.   Abdominal:      General: Bowel sounds are normal.      Palpations: Abdomen is soft.   Musculoskeletal:         General: No swelling.   Skin:     Coloration: Skin is not jaundiced.   Neurological:      General: No focal deficit present.      Mental Status: She is alert. She is disoriented.       RESULTS   Results Review:    Results from last 7 days   Lab Units 23  1053 23  0429 23  0438   SODIUM mmol/L 133* 129* 129*   POTASSIUM mmol/L 3.6 4.3 4.5   CHLORIDE mmol/L 97* 93* 92*   CO2 mmol/L 23.0 24.0 25.0   BUN mg/dL 8 31* 34*   CREATININE mg/dL 1.78* 4.32* 4.28*   CALCIUM mg/dL 7.9* 8.1* 8.3*   BILIRUBIN mg/dL 0.8 0.5 0.6   ALK PHOS U/L 167* 147* 170*   ALT (SGPT) U/L 7 7 9   AST (SGOT) U/L 26 15 19   GLUCOSE mg/dL 146* 85 106*         Estimated Creatinine Clearance: 23.8 mL/min (A) (by C-G formula based on SCr of  1.78 mg/dL (H)).    Results from last 7 days   Lab Units 09/08/23  0413 09/06/23  0429 09/04/23  0438   MAGNESIUM mg/dL 1.6 1.7 1.6               Results from last 7 days   Lab Units 09/08/23  0430 09/06/23  0429 09/05/23  1857 09/04/23  0438   WBC 10*3/mm3 7.58 9.53 9.59 8.73   HEMOGLOBIN g/dL 9.0* 8.7* 9.7* 9.7*   PLATELETS 10*3/mm3 323 376 377 442         Results from last 7 days   Lab Units 09/05/23  1857   INR  1.11           Imaging Results (Last 24 Hours)       ** No results found for the last 24 hours. **             MEDICATIONS    albumin human, 25 g, Intravenous, Once  alteplase, 2 mg, Intracatheter, Once in Dialysis  alteplase, 2 mg, Intracatheter, Once in Dialysis  amiodarone, 200 mg, Oral, BID With Meals  apixaban, 10 mg, Oral, BID   Followed by  [START ON 9/14/2023] apixaban, 5 mg, Oral, BID  busPIRone, 5 mg, Oral, BID  dilTIAZem, 60 mg, Oral, Q6H  docusate sodium, 100 mg, Oral, BID  dronabinol, 7.5 mg, Oral, BID AC  epoetin karen/karen-epbx, 6,000 Units, Intravenous, Once per day on Mon Wed Fri  ferric gluconate (FERRLECIT) IVPB, 100 mg, Intravenous, Weekly  gabapentin, 100 mg, Oral, TID  HYDROcodone-acetaminophen, 1 tablet, Oral, Once  Insulin Aspart, 2-12 Units, Subcutaneous, 4 times per day  lansoprazole, 30 mg, Oral, Q12H  magnesium sulfate, 2 g, Intravenous, Once  midodrine, 7.5 mg, Oral, TID AC  rosuvastatin, 40 mg, Oral, Daily  sodium chloride 0.9 % flush, 10 mL, Intravenous, Q8H           Assessment & Plan   ASSESSMENT / PLAN      * No active hospital problems. *      1. Acute kidney injury/ Acute tubular necrosis:  - Baseline unknown.   - Creatinine was 1.5 in 10/2022.   - UA 3+ protein, trace leukocytes, 0-2 RBC, 6-12 WBC, 2+ bacteria.   - Urine sodium 26. CT abd large rt retroperitoneal hematoma causing moderate rt hydronephrosis.  - Started HD 8/6/23  - Received call 9/1 with low BP, started levophed. BP is better now off levophed. Cardiology has stopped Imdur.  - HD today  - cr down to 1.7  was 4.3 on wed. May have renal recovery. UO is better per family. May have lab error. Check lab in next 3 days     2. Renal failure retroperitoneal bleed s/p right J stent for hydronephrosis   - s/p J stent placement by urology 8/11  - Continue to monitor renal function      3. Hypertension/ Afib with RVR:   - Blood pressure is low, but now better and off levophed     4. UTI E.fecalis:   - became septic with mild hydro Dr [place] is consulted. Now stent in place . E.fecalis     5. Cdiff:   -not on po vanc now      6. Hyponatremia:   - Sodium is stable     7. Anemia / retroperitoneal bleed:  - Hemoglobin is acceptable at 8.7, B12 and folate are good on epogen      8.  AMS improving    9.  RLE edema- checked doppler and she has extensive DVT. On heparin gtt per primary team.    Copied text in this note has been reviewed and is accurate as of 9/8/2023.                This document has been electronically signed by Stephanie Gómez MD on September 8, 2023 17:58 CDT

## 2023-09-09 ENCOUNTER — OUTSIDE FACILITY SERVICE (OUTPATIENT)
Dept: PULMONOLOGY | Facility: CLINIC | Age: 83
End: 2023-09-09
Payer: MEDICARE

## 2023-09-09 LAB
ANION GAP SERPL CALCULATED.3IONS-SCNC: 12 MMOL/L (ref 5–15)
BUN SERPL-MCNC: 17 MG/DL (ref 8–23)
BUN/CREAT SERPL: 5.5 (ref 7–25)
CALCIUM SPEC-SCNC: 8.1 MG/DL (ref 8.6–10.5)
CHLORIDE SERPL-SCNC: 93 MMOL/L (ref 98–107)
CO2 SERPL-SCNC: 24 MMOL/L (ref 22–29)
CREAT SERPL-MCNC: 3.08 MG/DL (ref 0.57–1)
EGFRCR SERPLBLD CKD-EPI 2021: 14.6 ML/MIN/1.73
GLUCOSE BLDC GLUCOMTR-MCNC: 150 MG/DL (ref 70–130)
GLUCOSE BLDC GLUCOMTR-MCNC: 177 MG/DL (ref 70–130)
GLUCOSE BLDC GLUCOMTR-MCNC: 235 MG/DL (ref 70–130)
GLUCOSE BLDC GLUCOMTR-MCNC: 94 MG/DL (ref 70–130)
GLUCOSE SERPL-MCNC: 98 MG/DL (ref 65–99)
POTASSIUM SERPL-SCNC: 4.1 MMOL/L (ref 3.5–5.2)
SODIUM SERPL-SCNC: 129 MMOL/L (ref 136–145)
WHOLE BLOOD HOLD SPECIMEN: NORMAL

## 2023-09-09 PROCEDURE — 82948 REAGENT STRIP/BLOOD GLUCOSE: CPT

## 2023-09-09 PROCEDURE — 63710000001 DRONABINOL PER 5 MG

## 2023-09-09 PROCEDURE — 63710000001 DRONABINOL PER 2.5 MG

## 2023-09-09 PROCEDURE — 80048 BASIC METABOLIC PNL TOTAL CA: CPT | Performed by: INTERNAL MEDICINE

## 2023-09-09 RX ORDER — HEPARIN SODIUM 1000 [USP'U]/ML
2000 INJECTION, SOLUTION INTRAVENOUS; SUBCUTANEOUS 3 TIMES WEEKLY
Status: DISCONTINUED | OUTPATIENT
Start: 2023-09-11 | End: 2023-09-15 | Stop reason: HOSPADM

## 2023-09-09 RX ORDER — HEPARIN SODIUM 1000 [USP'U]/ML
3700 INJECTION, SOLUTION INTRAVENOUS; SUBCUTANEOUS AS NEEDED
Status: DISCONTINUED | OUTPATIENT
Start: 2023-09-11 | End: 2023-09-15 | Stop reason: HOSPADM

## 2023-09-09 NOTE — ACP (ADVANCE CARE PLANNING)
[facility]  [address] [place] [address]. [address]  T - [phone]  F - [phone]     Progress Note          PATIENT  DEMOGRAPHICS   PATIENT NAME: Carol Sullivan                      PHYSICIAN: COY Chong  : 1940  MRN: [id number]   LOS: 0 days    Patient Care Team:  Len Seo MD as PCP - General  Blaire Camara APRN as Nurse Practitioner (General Surgery)  Subjective   SUBJECTIVE   BP stable, no new events.          Objective   OBJECTIVE   Vital Signs  Heart Rate:  [58-61] 61    T 97.8  HR 54  RR 20  /72  O2: 98% RA     No intake/output data recorded.    PHYSICAL EXAM    Physical Exam  Constitutional:       Appearance: She is well-developed.   HENT:      Head: Normocephalic.      Left Ear: There is no impacted cerumen.      Nose: No rhinorrhea.   Eyes:      Pupils: Pupils are equal, round, and reactive to light.   Cardiovascular:      Rate and Rhythm: Normal rate and regular rhythm.      Heart sounds: Normal heart sounds.   Pulmonary:      Effort: Pulmonary effort is normal.      Breath sounds: Normal breath sounds.   Abdominal:      General: Bowel sounds are normal.      Palpations: Abdomen is soft.   Musculoskeletal:         General: No swelling.   Skin:     Coloration: Skin is not jaundiced.   Neurological:      General: No focal deficit present.      Mental Status: She is alert. She is disoriented.       RESULTS   Results Review:    Results from last 7 days   Lab Units 23  0631 23  1053 23  0429 23  0438   SODIUM mmol/L 129* 133* 129* 129*   POTASSIUM mmol/L 4.1 3.6 4.3 4.5   CHLORIDE mmol/L 93* 97* 93* 92*   CO2 mmol/L 24.0 23.0 24.0 25.0   BUN mg/dL 17 8 31* 34*   CREATININE mg/dL 3.08* 1.78* 4.32* 4.28*   CALCIUM mg/dL 8.1* 7.9* 8.1* 8.3*   BILIRUBIN mg/dL  --  0.8 0.5 0.6   ALK PHOS U/L  --  167* 147* 170*   ALT (SGPT) U/L  --  7 7 9   AST (SGOT) U/L  --  26 15 19   GLUCOSE mg/dL 98 146* 85 106*         Estimated Creatinine  Clearance: 13.8 mL/min (A) (by C-G formula based on SCr of 3.08 mg/dL (H)).    Results from last 7 days   Lab Units 09/08/23  0413 09/06/23  0429 09/04/23  0438   MAGNESIUM mg/dL 1.6 1.7 1.6               Results from last 7 days   Lab Units 09/08/23  0430 09/06/23  0429 09/05/23  1857 09/04/23  0438   WBC 10*3/mm3 7.58 9.53 9.59 8.73   HEMOGLOBIN g/dL 9.0* 8.7* 9.7* 9.7*   PLATELETS 10*3/mm3 323 376 377 442         Results from last 7 days   Lab Units 09/05/23  1857   INR  1.11           Imaging Results (Last 24 Hours)       ** No results found for the last 24 hours. **             MEDICATIONS    albumin human, 25 g, Intravenous, Once  alteplase, 2 mg, Intracatheter, Once in Dialysis  alteplase, 2 mg, Intracatheter, Once in Dialysis  amiodarone, 200 mg, Oral, BID With Meals  apixaban, 10 mg, Oral, BID   Followed by  [START ON 9/14/2023] apixaban, 5 mg, Oral, BID  busPIRone, 5 mg, Oral, BID  dilTIAZem, 60 mg, Oral, Q6H  docusate sodium, 100 mg, Oral, BID  dronabinol, 7.5 mg, Oral, BID AC  epoetin karen/karen-epbx, 6,000 Units, Intravenous, Once per day on Mon Wed Fri  ferric gluconate (FERRLECIT) IVPB, 100 mg, Intravenous, Weekly  gabapentin, 100 mg, Oral, TID  [START ON 9/11/2023] heparin (porcine), 2,000 Units, Intravenous, Once per day on Mon Wed Fri  HYDROcodone-acetaminophen, 1 tablet, Oral, Once  Insulin Aspart, 2-12 Units, Subcutaneous, 4 times per day  lansoprazole, 30 mg, Oral, Q12H  magnesium sulfate, 2 g, Intravenous, Once  midodrine, 7.5 mg, Oral, TID AC  rosuvastatin, 40 mg, Oral, Daily  sodium chloride 0.9 % flush, 10 mL, Intravenous, Q8H           Assessment & Plan   ASSESSMENT / PLAN      * No active hospital problems. *      1. Acute kidney injury/ Acute tubular necrosis:  - Baseline unknown.   - Creatinine was 1.5 in 10/2022.   - UA 3+ protein, trace leukocytes, 0-2 RBC, 6-12 WBC, 2+ bacteria.   - Urine sodium 26. CT abd large rt retroperitoneal hematoma causing moderate rt hydronephrosis.  - Started  HD 8/6/23  - Received call 9/1 with low BP, started levophed. BP is better now off levophed. Cardiology has stopped Imdur.  - HD Monday if Cr continues to rise     2. Renal failure retroperitoneal bleed s/p right J stent for hydronephrosis   - s/p J stent placement by urology 8/11  - Continue to monitor renal function      3. Hypertension/ Afib with RVR:   - Blood pressure is low, but now better and off levophed     4. UTI E.fecalis:   - became septic with mild hydro Dr [place] is consulted. Now stent in place . E.fecalis     5. Cdiff:   -not on po vanc now      6. Hyponatremia:   - Sodium is stable     7. Anemia / retroperitoneal bleed:  - Hemoglobin is acceptable at 9.0, B12 and folate are good on epogen      8.  AMS improving    9.  RLE edema- checked doppler and she has extensive DVT. On heparin gtt per primary team.    Copied text in this note has been reviewed and is accurate as of 9/9/2023.                This document has been electronically signed by COY Chong on September 9, 2023 09:10 CDT

## 2023-09-09 NOTE — ACP (ADVANCE CARE PLANNING)
[facility] @ [facility]  INPATIENT PROGRESS NOTE    PATIENT NAME: Carol Sullivan      PHYSICIAN: Josefina Perez MD  : 1940        MRN: [id number]  Patient Care Team:  Len Seo MD as PCP - General  Blaire Camara APRN as Nurse Practitioner (General Surgery)    Chief Complaint: Patient was brought to emergency room at [facility] for confusion.     History of Present Illness: 82-year-old female with significant medical history of hypertension, dyslipidemia, gastroesophageal reflux disease who was brought into the emergency room for confusion.  Patient was also found to have erythema and cellulitis of left breast.  Patient was thought of having mastitis.  Patient's condition was monitored now.  Patient had gradual decrease in her urine output and worsening in her kidney function was noted.  Patient off having renal failure which was worsening over time.  Patient subsequently required hemodialysis patient was placed on hemodialysis per nephrology recommendation.  Patient was admitted to the hospital setting for antibiotics and hemodialysis.  Patient's condition seem to be stabilized however patient remained extremely weak and deconditioned hemodialysis patient was recommended to be admitted to our facility for further therapy and rehabilitation along with monitoring for hemodialysis.     Discharge Summary and H&P: Reviewed from previous hospitalization and information documented above in HPI Section.     Radiology Studies from Previous Hospitalization: Reviewed and are as below:  XR Abdomen KUB     Result Date: 2023  FINDINGS/IMPRESSION: Radiopaque tip of the Dobbhoff tube projects over the second portion of the duodenum, similar to even slightly retracted when compared to the prior exam. Right-sided ureteral stent is again noted and incompletely visualized.  There are probably also right femoral catheters which are  incompletely visualized. Normal bowel gas pattern in the visualized abdomen.     XR Chest Post CVA Port     Result Date: 8/17/2023  Impression: 1. Mild interstitial opacities are likely due to low lung volumes. An element of interstitial edema is not entirely excluded.      EKG From previous hospitalization reviewed and documented below:  ECG 12 Lead Other; hx afib   Preliminary Result   Test Reason : Other~   Blood Pressure :   */*   mmHG   Vent. Rate :  67 BPM     Atrial Rate :  67 BPM      P-R Int : 188 ms          QRS Dur :  78 ms       QT Int : 392 ms       P-R-T Axes :  28   6  16 degrees      QTc Int : 414 ms       Normal sinus rhythm with sinus arrhythmia   Minimal voltage criteria for LVH, may be normal variant   Anteroseptal infarct (cited on or before 30-JUL-2023)   Abnormal ECG   When compared with ECG of 10-AUG-2023 15:02,   Sinus rhythm has replaced Atrial fibrillation   Vent. rate has decreased BY  62 BPM   Nonspecific T wave abnormality, worse in Anterior leads       Referred By:            Confirmed By:              Laboratory results from previous hospitalization is reviewed and below:        Lab Results   Component Value Date     GLUCOSE 139 (H) 08/17/2023     CALCIUM 8.6 08/17/2023      (L) 08/17/2023     K 3.9 08/17/2023     CO2 23.0 08/17/2023     CL 90 (L) 08/17/2023     BUN 72 (H) 08/17/2023     CREATININE 4.86 (H) 08/17/2023     EGFR 8.5 (L) 08/17/2023     BCR 14.8 08/17/2023     ANIONGAP 17.0 (H) 08/17/2023            Lab Results   Component Value Date     WBC 17.07 (H) 08/17/2023     HGB 10.2 (L) 08/17/2023     HCT 29.8 (L) 08/17/2023     MCV 85.9 08/17/2023     PLT         Assessment       Sepsis Secondary to Below.  Somnolence [R40.0]  Mastitis [N61.0]  Sepsis [A41.9]  Sepsis, due to unspecified organism, unspecified whether acute organ dysfunction present [A41.9]   ESRD on Hemodialysis.        DVT Prophylaxis: Heparin.  GI Prophylaxis: Lansoprazole.  Code Status: DNR/DNI.     Plan       -Stable at time of exam  -Tachycardia resolved today  -Remains on eliquis for anticoagulation  -Continues MWF dialysis as per nephrology  -Therapy as before as patient tolerates.  -Now on Eliquis and heparin drip stopped.   -Strongly recommend out of bed daily  -Intake continues to improve.  -DVT and GI prophylaxis in place.  -We'll continue monitoring patient in hospital setting and treat patient as course dictates.  -Please review orders for detailed plan of care.  -For Aute and Chronic medical condition we will continue medications described below in medication section which have been reviewed and we will continue unless changed in plan of care.  -Laboratory and diagnostic studies have been independently reviewed and the reports are reviewed as documented below.    Subjective   Interval History:   Patient Complaints:   Patient seen and examined.  Resting in bed.   at bedside.  No concerns.  No overnight events reported.  History taken from: Medical record and nursing staff.    Review of Systems:    Review of Systems   All other systems reviewed and are negative.    Objective   Vital Signs  Temp: 97.6 F         Heart Rate: 61         Resp: 18          Blood Pressure: 128/89         Pulse Ox: 98 %    Physical Exam:   Physical Exam  Vitals and nursing note reviewed.   Constitutional:       Appearance: She is well-developed.   HENT:      Head: Normocephalic and atraumatic.      Nose: Nose normal.   Eyes:      Conjunctiva/sclera: Conjunctivae normal.      Pupils: Pupils are equal, round, and reactive to light.   Neck:      Thyroid: No thyromegaly.      Vascular: No JVD.      Trachea: No tracheal deviation.   Cardiovascular:      Rate and Rhythm: Normal rate and regular rhythm.      Heart sounds: Normal heart sounds.   Pulmonary:      Effort: Pulmonary effort is normal. No respiratory distress.      Breath sounds: Normal breath sounds. No wheezing or rales.   Chest:      Chest wall: No tenderness.    Abdominal:      General: Bowel sounds are normal. There is no distension.      Palpations: Abdomen is soft.      Tenderness: There is no abdominal tenderness. There is no guarding or rebound.   Musculoskeletal:         General: Normal range of motion.      Cervical back: Normal range of motion and neck supple.   Lymphadenopathy:      Cervical: No cervical adenopathy.   Skin:     General: Skin is warm and dry.      Comments: Intact   Neurological:      Mental Status: She is alert and oriented to person, place, and time.      Cranial Nerves: No cranial nerve deficit.      Deep Tendon Reflexes: Reflexes are normal and symmetric.     Results Review:       Results from last 7 days   Lab Units 09/09/23  0631 09/08/23  1053 09/06/23  0429 09/04/23  0438   SODIUM mmol/L 129* 133* 129* 129*   POTASSIUM mmol/L 4.1 3.6 4.3 4.5   CHLORIDE mmol/L 93* 97* 93* 92*   CO2 mmol/L 24.0 23.0 24.0 25.0   BUN mg/dL 17 8 31* 34*   CREATININE mg/dL 3.08* 1.78* 4.32* 4.28*   GLUCOSE mg/dL 98 146* 85 106*   CALCIUM mg/dL 8.1* 7.9* 8.1* 8.3*   BILIRUBIN mg/dL  --  0.8 0.5 0.6   ALK PHOS U/L  --  167* 147* 170*   ALT (SGPT) U/L  --  7 7 9   AST (SGOT) U/L  --  26 15 19       Results from last 7 days   Lab Units 09/08/23  0413 09/06/23  0429 09/04/23  0438   MAGNESIUM mg/dL 1.6 1.7 1.6       Results from last 7 days   Lab Units 09/08/23  0430 09/06/23  0429 09/05/23  1857 09/04/23  0438   WBC 10*3/mm3 7.58 9.53 9.59 8.73   HEMOGLOBIN g/dL 9.0* 8.7* 9.7* 9.7*   HEMATOCRIT % 28.3* 27.5* 31.1* 30.7*   PLATELETS 10*3/mm3 323 376 377 442       Lab Results   Component Value Date    TROPONINI 0.69 (H) 05/31/2020    TROPONINT 56 (C) 08/06/2023     pH, Arterial   Date Value Ref Range Status   08/11/2023 7.403 7.350 - 7.450 pH units Final     CO2   Date Value Ref Range Status   09/09/2023 24.0 22.0 - 29.0 mmol/L Final     Total CO2   Date Value Ref Range Status   10/27/2022 30 21 - 31 mmol/L Final     Results from last 7 days   Lab Units 09/05/23  6543    INR  1.11       Imaging Results (Most Recent)       Procedure Component Value Units Date/Time    US Venous Doppler Lower Extremity Right (duplex) [[id number]] Collected: 09/05/23 1708     Updated: 09/05/23 1712    Narrative:      PROCEDURE:  Ultrasound venous Doppler lower extremity Right.    TECHNIQUE:  High-resolution gray scale imaging, color flow Doppler, compression were  performed from the groin to the calf of the right lower extremity.  Augmentation  was performed of the common femoral vein and popliteal vein.    FINDINGS:  Extensive occlusive deep venous thrombosis within the visualized right common  femoral vein, superficial femoral vein, and popliteal vein.           No results found for: ACANTHNAEG, AFBCX, BPERTUSSISCX, BLOODCX  No results found for: BCIDPCR, CXREFLEX, CSFCX, CULTURETIS  No results found for: CULTURES, HSVCX, URCX  No results found for: EYECULTURE, GCCX, HSVCULTURE, LABHSV  No results found for: LEGIONELLA, MRSACX, MUMPSCX, MYCOPLASCX  No results found for: NOCARDIACX, STOOLCX  No results found for: THROATCX, UNSTIMCULT, URINECX, CULTURE, VZVCULTUR  No results found for: VIRALCULTU, WOUNDCX  Medication Reviewed and Will Continue Unless Documented in Plan:   albumin human, 25 g, Intravenous, Once  alteplase, 2 mg, Intracatheter, Once in Dialysis  alteplase, 2 mg, Intracatheter, Once in Dialysis  amiodarone, 200 mg, Oral, BID With Meals  apixaban, 10 mg, Oral, BID   Followed by  [START ON 9/14/2023] apixaban, 5 mg, Oral, BID  busPIRone, 5 mg, Oral, BID  dilTIAZem, 60 mg, Oral, Q6H  docusate sodium, 100 mg, Oral, BID  dronabinol, 7.5 mg, Oral, BID AC  epoetin karen/karen-epbx, 6,000 Units, Intravenous, Once per day on Mon Wed Fri  ferric gluconate (FERRLECIT) IVPB, 100 mg, Intravenous, Weekly  gabapentin, 100 mg, Oral, TID  [START ON 9/11/2023] heparin (porcine), 2,000 Units, Intravenous, Once per day on Mon Wed Fri  HYDROcodone-acetaminophen, 1 tablet, Oral, Once  Insulin Aspart, 2-12 Units,  Subcutaneous, 4 times per day  lansoprazole, 30 mg, Oral, Q12H  magnesium sulfate, 2 g, Intravenous, Once  midodrine, 7.5 mg, Oral, TID AC  rosuvastatin, 40 mg, Oral, Daily  sodium chloride 0.9 % flush, 10 mL, Intravenous, Q8H           acetaminophen    acetaminophen    albumin human    cyclobenzaprine    dextrose    dilTIAZem    [START ON 9/11/2023] heparin (porcine)    HYDROcodone-acetaminophen    hydrocortisone-bacitracin-zinc oxide-nystatin    melatonin    muscle rub    sodium chloride    sodium chloride 0.9 % flush    traZODone        Dietary Orders (From admission, onward)       Start     Ordered    09/06/23 0800  Dietary Nutrition Supplements Other (See Comment); Add Diet Dr.Pepper and cranberry juice to each meal  Daily With Breakfast, Lunch & Dinner      Question Answer Comment   Select Supplement: Other (See Comment)    Other Add Diet  and cranberry juice to each meal        09/05/23 2022 09/05/23 1800  Dietary Nutrition Supplements Other (See Comment); Mighty shake with every meal,  Daily With Breakfast, Lunch & Dinner      Question Answer Comment   Select Supplement: Other (See Comment)    Other Mighty shake with every meal,        09/05/23 1359 09/05/23 1800  Dietary Nutrition Supplements Magic Cup  Daily With Lunch & Dinner      Comments: [place]   Question:  Select Supplement:  Answer:  Magic Cup    09/05/23 1359    08/17/23 1153  Diet: Regular/House Diet, Renal Diets, Diabetic Diets; Consistent Carbohydrate; Low Sodium (2-3g); Texture: Soft to Chew (NDD 3); Soft to Chew: Chopped Meat; Fluid Consistency: Thin (IDDSI 0)  Diet Effective Now        References:    Diet Order [place]   Question Answer Comment   Diets: Regular/House Diet    Diets: Renal Diets    Diets: Diabetic Diets    Diabetic Diet: Consistent Carbohydrate    Renal Diet: Low Sodium (2-3g)    Texture: Soft to Chew (NDD 3)    Soft to Chew: Chopped Meat    Fluid Consistency: Thin (IDDSI 0)        08/17/23 1156                   History, physical exam, assessment and plan may have been partly or fully copied from before, but  changes made to the copied record to reflect care on the date of service. Part of the lab and imaging  reports auto populated and corrected. Some of this note may be an electronic transcription of spoken  language to printed text. This may permit erroneous, or at times, nonsensical words or phrases to be  inadvertently transcribed. Although I have reviewed the note for such errors, some may still exist.      This document has been electronically signed by Josefina Perez MD on September 9, 2023 11:36 CDT

## 2023-09-10 ENCOUNTER — OUTSIDE FACILITY SERVICE (OUTPATIENT)
Dept: PULMONOLOGY | Facility: CLINIC | Age: 83
End: 2023-09-10
Payer: MEDICARE

## 2023-09-10 LAB
ANION GAP SERPL CALCULATED.3IONS-SCNC: 11 MMOL/L (ref 5–15)
BUN SERPL-MCNC: 26 MG/DL (ref 8–23)
BUN/CREAT SERPL: 7 (ref 7–25)
CALCIUM SPEC-SCNC: 8 MG/DL (ref 8.6–10.5)
CHLORIDE SERPL-SCNC: 95 MMOL/L (ref 98–107)
CO2 SERPL-SCNC: 23 MMOL/L (ref 22–29)
CREAT SERPL-MCNC: 3.74 MG/DL (ref 0.57–1)
EGFRCR SERPLBLD CKD-EPI 2021: 11.6 ML/MIN/1.73
GLUCOSE BLDC GLUCOMTR-MCNC: 105 MG/DL (ref 70–130)
GLUCOSE BLDC GLUCOMTR-MCNC: 139 MG/DL (ref 70–130)
GLUCOSE BLDC GLUCOMTR-MCNC: 142 MG/DL (ref 70–130)
GLUCOSE BLDC GLUCOMTR-MCNC: 87 MG/DL (ref 70–130)
GLUCOSE SERPL-MCNC: 112 MG/DL (ref 65–99)
POTASSIUM SERPL-SCNC: 4.2 MMOL/L (ref 3.5–5.2)
SODIUM SERPL-SCNC: 129 MMOL/L (ref 136–145)

## 2023-09-10 PROCEDURE — 25010000002 EPOETIN ALFA PER 1000 UNITS: Performed by: INTERNAL MEDICINE

## 2023-09-10 PROCEDURE — 63710000001 DRONABINOL PER 5 MG

## 2023-09-10 PROCEDURE — 63710000001 DRONABINOL PER 2.5 MG

## 2023-09-10 PROCEDURE — 80048 BASIC METABOLIC PNL TOTAL CA: CPT | Performed by: INTERNAL MEDICINE

## 2023-09-10 PROCEDURE — 82948 REAGENT STRIP/BLOOD GLUCOSE: CPT

## 2023-09-10 NOTE — ACP (ADVANCE CARE PLANNING)
[facility]  [address] [place] [address]. [id number]  T - [phone]  F - [phone]     Progress Note          PATIENT  DEMOGRAPHICS   PATIENT NAME: Carol Sullivan                      PHYSICIAN: COY Chong  : 1940  MRN: [id number]   LOS: 0 days    Patient Care Team:  Len Seo MD as PCP - General  Blaire Camara APRN as Nurse Practitioner (General Surgery)  Subjective   SUBJECTIVE   BP stable, no new events.          Objective   OBJECTIVE   Vital Signs  Heart Rate:  [58-63] 63    T 97.8  HR 62  RR 20  /60  O2: 98% RA     No intake/output data recorded.    PHYSICAL EXAM    Physical Exam  Constitutional:       Appearance: She is well-developed.   HENT:      Head: Normocephalic.      Left Ear: There is no impacted cerumen.      Nose: No rhinorrhea.   Eyes:      Pupils: Pupils are equal, round, and reactive to light.   Cardiovascular:      Rate and Rhythm: Normal rate and regular rhythm.      Heart sounds: Normal heart sounds.   Pulmonary:      Effort: Pulmonary effort is normal.      Breath sounds: Normal breath sounds.   Abdominal:      General: Bowel sounds are normal.      Palpations: Abdomen is soft.   Musculoskeletal:         General: No swelling.   Skin:     Coloration: Skin is not jaundiced.   Neurological:      General: No focal deficit present.      Mental Status: She is alert. She is disoriented.       RESULTS   Results Review:    Results from last 7 days   Lab Units 09/10/23  0653 23  0631 23  1053 23  0429 23  0438   SODIUM mmol/L 129* 129* 133* 129* 129*   POTASSIUM mmol/L 4.2 4.1 3.6 4.3 4.5   CHLORIDE mmol/L 95* 93* 97* 93* 92*   CO2 mmol/L 23.0 24.0 23.0 24.0 25.0   BUN mg/dL 26* 17 8 31* 34*   CREATININE mg/dL 3.74* 3.08* 1.78* 4.32* 4.28*   CALCIUM mg/dL 8.0* 8.1* 7.9* 8.1* 8.3*   BILIRUBIN mg/dL  --   --  0.8 0.5 0.6   ALK PHOS U/L  --   --  167* 147* 170*   ALT (SGPT) U/L  --   --  7 7 9   AST (SGOT) U/L  --   --  26  15 19   GLUCOSE mg/dL 112* 98 146* 85 106*         Estimated Creatinine Clearance: 11.3 mL/min (A) (by C-G formula based on SCr of 3.74 mg/dL (H)).    Results from last 7 days   Lab Units 09/08/23  0413 09/06/23  0429 09/04/23  0438   MAGNESIUM mg/dL 1.6 1.7 1.6               Results from last 7 days   Lab Units 09/08/23  0430 09/06/23  0429 09/05/23  1857 09/04/23  0438   WBC 10*3/mm3 7.58 9.53 9.59 8.73   HEMOGLOBIN g/dL 9.0* 8.7* 9.7* 9.7*   PLATELETS 10*3/mm3 323 376 377 442         Results from last 7 days   Lab Units 09/05/23  1857   INR  1.11           Imaging Results (Last 24 Hours)       ** No results found for the last 24 hours. **             MEDICATIONS    albumin human, 25 g, Intravenous, Once  alteplase, 2 mg, Intracatheter, Once in Dialysis  alteplase, 2 mg, Intracatheter, Once in Dialysis  amiodarone, 200 mg, Oral, BID With Meals  apixaban, 10 mg, Oral, BID   Followed by  [START ON 9/14/2023] apixaban, 5 mg, Oral, BID  busPIRone, 5 mg, Oral, BID  dilTIAZem, 60 mg, Oral, Q6H  docusate sodium, 100 mg, Oral, BID  dronabinol, 7.5 mg, Oral, BID AC  epoetin karen/karen-epbx, 6,000 Units, Intravenous, Once per day on Mon Wed Fri  ferric gluconate (FERRLECIT) IVPB, 100 mg, Intravenous, Weekly  gabapentin, 100 mg, Oral, TID  [START ON 9/11/2023] heparin (porcine), 2,000 Units, Intravenous, Once per day on Mon Wed Fri  HYDROcodone-acetaminophen, 1 tablet, Oral, Once  Insulin Aspart, 2-12 Units, Subcutaneous, 4 times per day  lansoprazole, 30 mg, Oral, Q12H  magnesium sulfate, 2 g, Intravenous, Once  midodrine, 7.5 mg, Oral, TID AC  rosuvastatin, 40 mg, Oral, Daily  sodium chloride 0.9 % flush, 10 mL, Intravenous, Q8H           Assessment & Plan   ASSESSMENT / PLAN      * No active hospital problems. *      1. Acute kidney injury/ Acute tubular necrosis:  - Baseline unknown.   - Creatinine was 1.5 in 10/2022.   - UA 3+ protein, trace leukocytes, 0-2 RBC, 6-12 WBC, 2+ bacteria.   - Urine sodium 26. CT abd large rt  retroperitoneal hematoma causing moderate rt hydronephrosis.  - Started HD 8/6/23  - Received call 9/1 with low BP, started levophed. BP is better now off levophed. Cardiology has stopped Imdur.  - HD Monday if Cr continues to rise     2. Renal failure retroperitoneal bleed s/p right J stent for hydronephrosis   - s/p J stent placement by urology 8/11  - Continue to monitor renal function      3. Hypertension/ Afib with RVR:   - Blood pressure is low, but now better and off levophed     4. UTI E.fecalis:   - became septic with mild hydro Dr Warsaw is consulted. Now stent in place . E.fecalis     5. Cdiff:   -not on po vanc now      6. Hyponatremia:   - Sodium is stable     7. Anemia / retroperitoneal bleed:  - Hemoglobin is acceptable at 9.0, B12 and folate are good on epogen      8.  AMS improving    9.  RLE edema- checked doppler and she has extensive DVT. On heparin gtt per primary team.    Copied text in this note has been reviewed and is accurate as of 9/10/2023.                This document has been electronically signed by COY Chong on September 10, 2023 10:52 CDT

## 2023-09-10 NOTE — ACP (ADVANCE CARE PLANNING)
[facility] @ [facility]  INPATIENT PROGRESS NOTE    PATIENT NAME: Carol Sullivan      PHYSICIAN: Josefina Perez MD  : 1940        MRN: [id number]  Patient Care Team:  Len Seo MD as PCP - General  Blaire Camara APRN as Nurse Practitioner (General Surgery)    Chief Complaint: Patient was brought to emergency room at [facility] for confusion.     History of Present Illness: 82-year-old female with significant medical history of hypertension, dyslipidemia, gastroesophageal reflux disease who was brought into the emergency room for confusion.  Patient was also found to have erythema and cellulitis of left breast.  Patient was thought of having mastitis.  Patient's condition was monitored now.  Patient had gradual decrease in her urine output and worsening in her kidney function was noted.  Patient off having renal failure which was worsening over time.  Patient subsequently required hemodialysis patient was placed on hemodialysis per nephrology recommendation.  Patient was admitted to the hospital setting for antibiotics and hemodialysis.  Patient's condition seem to be stabilized however patient remained extremely weak and deconditioned hemodialysis patient was recommended to be admitted to our facility for further therapy and rehabilitation along with monitoring for hemodialysis.     Discharge Summary and H&P: Reviewed from previous hospitalization and information documented above in HPI Section.     Radiology Studies from Previous Hospitalization: Reviewed and are as below:  XR Abdomen KUB     Result Date: 2023  FINDINGS/IMPRESSION: Radiopaque tip of the Dobbhoff tube projects over the second portion of the duodenum, similar to even slightly retracted when compared to the prior exam. Right-sided ureteral stent is again noted and incompletely visualized.  There are probably also right femoral catheters which are  incompletely visualized. Normal bowel gas pattern in the visualized abdomen.     XR Chest Post CVA Port     Result Date: 8/17/2023  Impression: 1. Mild interstitial opacities are likely due to low lung volumes. An element of interstitial edema is not entirely excluded.      EKG From previous hospitalization reviewed and documented below:  ECG 12 Lead Other; hx afib   Preliminary Result   Test Reason : Other~   Blood Pressure :   */*   mmHG   Vent. Rate :  67 BPM     Atrial Rate :  67 BPM      P-R Int : 188 ms          QRS Dur :  78 ms       QT Int : 392 ms       P-R-T Axes :  28   6  16 degrees      QTc Int : 414 ms       Normal sinus rhythm with sinus arrhythmia   Minimal voltage criteria for LVH, may be normal variant   Anteroseptal infarct (cited on or before 30-JUL-2023)   Abnormal ECG   When compared with ECG of 10-AUG-2023 15:02,   Sinus rhythm has replaced Atrial fibrillation   Vent. rate has decreased BY  62 BPM   Nonspecific T wave abnormality, worse in Anterior leads       Referred By:            Confirmed By:              Laboratory results from previous hospitalization is reviewed and below:        Lab Results   Component Value Date     GLUCOSE 139 (H) 08/17/2023     CALCIUM 8.6 08/17/2023      (L) 08/17/2023     K 3.9 08/17/2023     CO2 23.0 08/17/2023     CL 90 (L) 08/17/2023     BUN 72 (H) 08/17/2023     CREATININE 4.86 (H) 08/17/2023     EGFR 8.5 (L) 08/17/2023     BCR 14.8 08/17/2023     ANIONGAP 17.0 (H) 08/17/2023            Lab Results   Component Value Date     WBC 17.07 (H) 08/17/2023     HGB 10.2 (L) 08/17/2023     HCT 29.8 (L) 08/17/2023     MCV 85.9 08/17/2023     PLT         Assessment       Sepsis Secondary to Below.  Somnolence [R40.0]  Mastitis [N61.0]  Sepsis [A41.9]  Sepsis, due to unspecified organism, unspecified whether acute organ dysfunction present [A41.9]   ESRD on Hemodialysis.        DVT Prophylaxis: Heparin.  GI Prophylaxis: Lansoprazole.  Code Status: DNR/DNI.     Plan       -Stable at time of exam  -Remains in NSR  -Continues MWF dialysis as per nephrology  -Therapy as before as patient tolerates.  -Strongly recommend out of bed daily  -Intake continues to improve.  -Remains weak after dialysis  -DVT and GI prophylaxis in place.  -We'll continue monitoring patient in hospital setting and treat patient as course dictates.  -Please review orders for detailed plan of care.  -For Aute and Chronic medical condition we will continue medications described below in medication section which have been reviewed and we will continue unless changed in plan of care.  -Laboratory and diagnostic studies have been independently reviewed and the reports are reviewed as documented below.    Subjective   Interval History:   Patient Complaints:   Patient seen and examined.  Resting in bed.  No concerns and no overnight events noted.   History taken from: Medical record and nursing staff.    Review of Systems:    Review of Systems   All other systems reviewed and are negative.    Objective   Vital Signs  Temp: 97.3 F         Heart Rate: 62         Resp: 20          Blood Pressure: 127/60         Pulse Ox: 97 %    Physical Exam:   Physical Exam  Vitals and nursing note reviewed.   Constitutional:       Appearance: She is well-developed.   HENT:      Head: Normocephalic and atraumatic.      Nose: Nose normal.   Eyes:      Conjunctiva/sclera: Conjunctivae normal.      Pupils: Pupils are equal, round, and reactive to light.   Neck:      Thyroid: No thyromegaly.      Vascular: No JVD.      Trachea: No tracheal deviation.   Cardiovascular:      Rate and Rhythm: Normal rate and regular rhythm.      Heart sounds: Normal heart sounds.   Pulmonary:      Effort: Pulmonary effort is normal. No respiratory distress.      Breath sounds: Normal breath sounds. No wheezing or rales.   Chest:      Chest wall: No tenderness.   Abdominal:      General: Bowel sounds are normal. There is no distension.      Palpations:  Abdomen is soft.      Tenderness: There is no abdominal tenderness. There is no guarding or rebound.   Musculoskeletal:         General: Normal range of motion.      Cervical back: Normal range of motion and neck supple.   Lymphadenopathy:      Cervical: No cervical adenopathy.   Skin:     General: Skin is warm and dry.      Comments: Intact   Neurological:      Mental Status: She is alert and oriented to person, place, and time.      Cranial Nerves: No cranial nerve deficit.      Deep Tendon Reflexes: Reflexes are normal and symmetric.     Results Review:       Results from last 7 days   Lab Units 09/10/23  0653 09/09/23  0631 09/08/23  1053 09/06/23  0429 09/04/23  0438   SODIUM mmol/L 129* 129* 133* 129* 129*   POTASSIUM mmol/L 4.2 4.1 3.6 4.3 4.5   CHLORIDE mmol/L 95* 93* 97* 93* 92*   CO2 mmol/L 23.0 24.0 23.0 24.0 25.0   BUN mg/dL 26* 17 8 31* 34*   CREATININE mg/dL 3.74* 3.08* 1.78* 4.32* 4.28*   GLUCOSE mg/dL 112* 98 146* 85 106*   CALCIUM mg/dL 8.0* 8.1* 7.9* 8.1* 8.3*   BILIRUBIN mg/dL  --   --  0.8 0.5 0.6   ALK PHOS U/L  --   --  167* 147* 170*   ALT (SGPT) U/L  --   --  7 7 9   AST (SGOT) U/L  --   --  26 15 19       Results from last 7 days   Lab Units 09/08/23  0413 09/06/23  0429 09/04/23  0438   MAGNESIUM mg/dL 1.6 1.7 1.6       Results from last 7 days   Lab Units 09/08/23  0430 09/06/23  0429 09/05/23  1857 09/04/23  0438   WBC 10*3/mm3 7.58 9.53 9.59 8.73   HEMOGLOBIN g/dL 9.0* 8.7* 9.7* 9.7*   HEMATOCRIT % 28.3* 27.5* 31.1* 30.7*   PLATELETS 10*3/mm3 323 376 377 442       Lab Results   Component Value Date    TROPONINI 0.69 (H) 05/31/2020    TROPONINT 56 (C) 08/06/2023     pH, Arterial   Date Value Ref Range Status   08/11/2023 7.403 7.350 - 7.450 pH units Final     CO2   Date Value Ref Range Status   09/10/2023 23.0 22.0 - 29.0 mmol/L Final     Total CO2   Date Value Ref Range Status   10/27/2022 30 21 - 31 mmol/L Final     Results from last 7 days   Lab Units 09/05/23  1857   INR  1.11        Imaging Results (Most Recent)       Procedure Component Value Units Date/Time    US Venous Doppler Lower Extremity Right (duplex) [[id number]] Collected: 09/05/23 1708     Updated: 09/05/23 1712    Narrative:      PROCEDURE:  Ultrasound venous Doppler lower extremity Right.    TECHNIQUE:  High-resolution gray scale imaging, color flow Doppler, compression were  performed from the groin to the calf of the right lower extremity.  Augmentation  was performed of the common femoral vein and popliteal vein.    FINDINGS:  Extensive occlusive deep venous thrombosis within the visualized right common  femoral vein, superficial femoral vein, and popliteal vein.           No results found for: ACANTHNAEG, AFBCX, BPERTUSSISCX, BLOODCX  No results found for: BCIDPCR, CXREFLEX, CSFCX, CULTURETIS  No results found for: CULTURES, HSVCX, URCX  No results found for: EYECULTURE, GCCX, HSVCULTURE, LABHSV  No results found for: LEGIONELLA, MRSACX, MUMPSCX, MYCOPLASCX  No results found for: NOCARDIACX, STOOLCX  No results found for: THROATCX, UNSTIMCULT, URINECX, CULTURE, VZVCULTUR  No results found for: VIRALCULTU, WOUNDCX  Medication Reviewed and Will Continue Unless Documented in Plan:   albumin human, 25 g, Intravenous, Once  alteplase, 2 mg, Intracatheter, Once in Dialysis  alteplase, 2 mg, Intracatheter, Once in Dialysis  amiodarone, 200 mg, Oral, BID With Meals  apixaban, 10 mg, Oral, BID   Followed by  [START ON 9/14/2023] apixaban, 5 mg, Oral, BID  busPIRone, 5 mg, Oral, BID  dilTIAZem, 60 mg, Oral, Q6H  docusate sodium, 100 mg, Oral, BID  dronabinol, 7.5 mg, Oral, BID AC  epoetin karen/karen-epbx, 6,000 Units, Intravenous, Once per day on Mon Wed Fri  ferric gluconate (FERRLECIT) IVPB, 100 mg, Intravenous, Weekly  gabapentin, 100 mg, Oral, TID  [START ON 9/11/2023] heparin (porcine), 2,000 Units, Intravenous, Once per day on Mon Wed Fri  HYDROcodone-acetaminophen, 1 tablet, Oral, Once  Insulin Aspart, 2-12 Units,  Subcutaneous, 4 times per day  lansoprazole, 30 mg, Oral, Q12H  magnesium sulfate, 2 g, Intravenous, Once  midodrine, 7.5 mg, Oral, TID AC  rosuvastatin, 40 mg, Oral, Daily  sodium chloride 0.9 % flush, 10 mL, Intravenous, Q8H           acetaminophen    acetaminophen    albumin human    cyclobenzaprine    dextrose    dilTIAZem    [START ON 9/11/2023] heparin (porcine)    HYDROcodone-acetaminophen    hydrocortisone-bacitracin-zinc oxide-nystatin    melatonin    muscle rub    sodium chloride    sodium chloride 0.9 % flush    traZODone        Dietary Orders (From admission, onward)       Start     Ordered    09/06/23 0800  Dietary Nutrition Supplements Other (See Comment); Add Diet Dr.Pepper and cranberry juice to each meal  Daily With Breakfast, Lunch & Dinner      Question Answer Comment   Select Supplement: Other (See Comment)    Other Add Diet  and cranberry juice to each meal        09/05/23 2022 09/05/23 1800  Dietary Nutrition Supplements Other (See Comment); Mighty shake with every meal,  Daily With Breakfast, Lunch & Dinner      Question Answer Comment   Select Supplement: Other (See Comment)    Other Mighty shake with every meal,        09/05/23 1359 09/05/23 1800  Dietary Nutrition Supplements Magic Cup  Daily With Lunch & Dinner      Comments: [place]   Question:  Select Supplement:  Answer:  Magic Cup    09/05/23 1359    08/17/23 1153  Diet: Regular/House Diet, Renal Diets, Diabetic Diets; Consistent Carbohydrate; Low Sodium (2-3g); Texture: Soft to Chew (NDD 3); Soft to Chew: Chopped Meat; Fluid Consistency: Thin (IDDSI 0)  Diet Effective Now        References:    Diet Order [place]   Question Answer Comment   Diets: Regular/House Diet    Diets: Renal Diets    Diets: Diabetic Diets    Diabetic Diet: Consistent Carbohydrate    Renal Diet: Low Sodium (2-3g)    Texture: Soft to Chew (NDD 3)    Soft to Chew: Chopped Meat    Fluid Consistency: Thin (IDDSI 0)        08/17/23 1156                   History, physical exam, assessment and plan may have been partly or fully copied from before, but  changes made to the copied record to reflect care on the date of service. Part of the lab and imaging  reports auto populated and corrected. Some of this note may be an electronic transcription of spoken  language to printed text. This may permit erroneous, or at times, nonsensical words or phrases to be  inadvertently transcribed. Although I have reviewed the note for such errors, some may still exist.      This document has been electronically signed by Josefina Perez MD on September 10, 2023 12:22 CDT

## 2023-09-11 ENCOUNTER — OUTSIDE FACILITY SERVICE (OUTPATIENT)
Dept: PULMONOLOGY | Facility: CLINIC | Age: 83
End: 2023-09-11
Payer: MEDICARE

## 2023-09-11 LAB
ALBUMIN SERPL-MCNC: 2.3 G/DL (ref 3.5–5.2)
ALBUMIN/GLOB SERPL: 0.6 G/DL
ALP SERPL-CCNC: 137 U/L (ref 39–117)
ALT SERPL W P-5'-P-CCNC: <5 U/L (ref 1–33)
ANION GAP SERPL CALCULATED.3IONS-SCNC: 9 MMOL/L (ref 5–15)
ANION GAP SERPL CALCULATED.3IONS-SCNC: 9 MMOL/L (ref 5–15)
AST SERPL-CCNC: 16 U/L (ref 1–32)
BASOPHILS # BLD AUTO: 0.1 10*3/MM3 (ref 0–0.2)
BASOPHILS NFR BLD AUTO: 1.6 % (ref 0–1.5)
BILIRUB SERPL-MCNC: 0.5 MG/DL (ref 0–1.2)
BUN SERPL-MCNC: 30 MG/DL (ref 8–23)
BUN SERPL-MCNC: 30 MG/DL (ref 8–23)
BUN/CREAT SERPL: 7.5 (ref 7–25)
BUN/CREAT SERPL: 7.5 (ref 7–25)
CALCIUM SPEC-SCNC: 8.1 MG/DL (ref 8.6–10.5)
CALCIUM SPEC-SCNC: 8.1 MG/DL (ref 8.6–10.5)
CHLORIDE SERPL-SCNC: 96 MMOL/L (ref 98–107)
CHLORIDE SERPL-SCNC: 96 MMOL/L (ref 98–107)
CO2 SERPL-SCNC: 25 MMOL/L (ref 22–29)
CO2 SERPL-SCNC: 25 MMOL/L (ref 22–29)
CREAT SERPL-MCNC: 4.01 MG/DL (ref 0.57–1)
CREAT SERPL-MCNC: 4.01 MG/DL (ref 0.57–1)
DEPRECATED RDW RBC AUTO: 58.4 FL (ref 37–54)
EGFRCR SERPLBLD CKD-EPI 2021: 10.6 ML/MIN/1.73
EGFRCR SERPLBLD CKD-EPI 2021: 10.6 ML/MIN/1.73
EOSINOPHIL # BLD AUTO: 0.14 10*3/MM3 (ref 0–0.4)
EOSINOPHIL NFR BLD AUTO: 2.3 % (ref 0.3–6.2)
ERYTHROCYTE [DISTWIDTH] IN BLOOD BY AUTOMATED COUNT: 17.4 % (ref 12.3–15.4)
GLOBULIN UR ELPH-MCNC: 4 GM/DL
GLUCOSE BLDC GLUCOMTR-MCNC: 107 MG/DL (ref 70–130)
GLUCOSE BLDC GLUCOMTR-MCNC: 121 MG/DL (ref 70–130)
GLUCOSE BLDC GLUCOMTR-MCNC: 125 MG/DL (ref 70–130)
GLUCOSE BLDC GLUCOMTR-MCNC: 85 MG/DL (ref 70–130)
GLUCOSE SERPL-MCNC: 97 MG/DL (ref 65–99)
GLUCOSE SERPL-MCNC: 97 MG/DL (ref 65–99)
HCT VFR BLD AUTO: 29.8 % (ref 34–46.6)
HGB BLD-MCNC: 9.3 G/DL (ref 12–15.9)
IMM GRANULOCYTES # BLD AUTO: 0.14 10*3/MM3 (ref 0–0.05)
IMM GRANULOCYTES NFR BLD AUTO: 2.3 % (ref 0–0.5)
LYMPHOCYTES # BLD AUTO: 1.44 10*3/MM3 (ref 0.7–3.1)
LYMPHOCYTES NFR BLD AUTO: 23.3 % (ref 19.6–45.3)
MAGNESIUM SERPL-MCNC: 1.6 MG/DL (ref 1.6–2.4)
MCH RBC QN AUTO: 28.7 PG (ref 26.6–33)
MCHC RBC AUTO-ENTMCNC: 31.2 G/DL (ref 31.5–35.7)
MCV RBC AUTO: 92 FL (ref 79–97)
MONOCYTES # BLD AUTO: 0.83 10*3/MM3 (ref 0.1–0.9)
MONOCYTES NFR BLD AUTO: 13.4 % (ref 5–12)
NEUTROPHILS NFR BLD AUTO: 3.53 10*3/MM3 (ref 1.7–7)
NEUTROPHILS NFR BLD AUTO: 57.1 % (ref 42.7–76)
NRBC BLD AUTO-RTO: 0 /100 WBC (ref 0–0.2)
PLATELET # BLD AUTO: 346 10*3/MM3 (ref 140–450)
PMV BLD AUTO: 8.7 FL (ref 6–12)
POTASSIUM SERPL-SCNC: 4 MMOL/L (ref 3.5–5.2)
POTASSIUM SERPL-SCNC: 4 MMOL/L (ref 3.5–5.2)
PROT SERPL-MCNC: 6.3 G/DL (ref 6–8.5)
RBC # BLD AUTO: 3.24 10*6/MM3 (ref 3.77–5.28)
SODIUM SERPL-SCNC: 130 MMOL/L (ref 136–145)
SODIUM SERPL-SCNC: 130 MMOL/L (ref 136–145)
WBC NRBC COR # BLD: 6.18 10*3/MM3 (ref 3.4–10.8)

## 2023-09-11 PROCEDURE — 97530 THERAPEUTIC ACTIVITIES: CPT

## 2023-09-11 PROCEDURE — 63710000001 DRONABINOL PER 5 MG

## 2023-09-11 PROCEDURE — 85025 COMPLETE CBC W/AUTO DIFF WBC: CPT | Performed by: INTERNAL MEDICINE

## 2023-09-11 PROCEDURE — 80053 COMPREHEN METABOLIC PANEL: CPT | Performed by: INTERNAL MEDICINE

## 2023-09-11 PROCEDURE — 82948 REAGENT STRIP/BLOOD GLUCOSE: CPT

## 2023-09-11 PROCEDURE — 63710000001 DRONABINOL PER 2.5 MG

## 2023-09-11 PROCEDURE — 83735 ASSAY OF MAGNESIUM: CPT | Performed by: INTERNAL MEDICINE

## 2023-09-11 NOTE — ACP (ADVANCE CARE PLANNING)
[facility] @ [facility]  INPATIENT PROGRESS NOTE    PATIENT NAME: Carol Sullivan      PHYSICIAN: Josefina Perez MD  : 1940        MRN: [id number]  Patient Care Team:  Len Seo MD as PCP - General  Blaire Camara APRN as Nurse Practitioner (General Surgery)    Chief Complaint: Patient was brought to emergency room at [facility] for confusion.     History of Present Illness: 82-year-old female with significant medical history of hypertension, dyslipidemia, gastroesophageal reflux disease who was brought into the emergency room for confusion.  Patient was also found to have erythema and cellulitis of left breast.  Patient was thought of having mastitis.  Patient's condition was monitored now.  Patient had gradual decrease in her urine output and worsening in her kidney function was noted.  Patient off having renal failure which was worsening over time.  Patient subsequently required hemodialysis patient was placed on hemodialysis per nephrology recommendation.  Patient was admitted to the hospital setting for antibiotics and hemodialysis.  Patient's condition seem to be stabilized however patient remained extremely weak and deconditioned hemodialysis patient was recommended to be admitted to our facility for further therapy and rehabilitation along with monitoring for hemodialysis.     Discharge Summary and H&P: Reviewed from previous hospitalization and information documented above in HPI Section.     Radiology Studies from Previous Hospitalization: Reviewed and are as below:  XR Abdomen KUB     Result Date: 2023  FINDINGS/IMPRESSION: Radiopaque tip of the Dobbhoff tube projects over the second portion of the duodenum, similar to even slightly retracted when compared to the prior exam. Right-sided ureteral stent is again noted and incompletely visualized.  There are probably also right femoral catheters which are  incompletely visualized. Normal bowel gas pattern in the visualized abdomen.     XR Chest Post CVA Port     Result Date: 8/17/2023  Impression: 1. Mild interstitial opacities are likely due to low lung volumes. An element of interstitial edema is not entirely excluded.      EKG From previous hospitalization reviewed and documented below:  ECG 12 Lead Other; hx afib   Preliminary Result   Test Reason : Other~   Blood Pressure :   */*   mmHG   Vent. Rate :  67 BPM     Atrial Rate :  67 BPM      P-R Int : 188 ms          QRS Dur :  78 ms       QT Int : 392 ms       P-R-T Axes :  28   6  16 degrees      QTc Int : 414 ms       Normal sinus rhythm with sinus arrhythmia   Minimal voltage criteria for LVH, may be normal variant   Anteroseptal infarct (cited on or before 30-JUL-2023)   Abnormal ECG   When compared with ECG of 10-AUG-2023 15:02,   Sinus rhythm has replaced Atrial fibrillation   Vent. rate has decreased BY  62 BPM   Nonspecific T wave abnormality, worse in Anterior leads       Referred By:            Confirmed By:              Laboratory results from previous hospitalization is reviewed and below:        Lab Results   Component Value Date     GLUCOSE 139 (H) 08/17/2023     CALCIUM 8.6 08/17/2023      (L) 08/17/2023     K 3.9 08/17/2023     CO2 23.0 08/17/2023     CL 90 (L) 08/17/2023     BUN 72 (H) 08/17/2023     CREATININE 4.86 (H) 08/17/2023     EGFR 8.5 (L) 08/17/2023     BCR 14.8 08/17/2023     ANIONGAP 17.0 (H) 08/17/2023            Lab Results   Component Value Date     WBC 17.07 (H) 08/17/2023     HGB 10.2 (L) 08/17/2023     HCT 29.8 (L) 08/17/2023     MCV 85.9 08/17/2023     PLT         Assessment       Sepsis Secondary to Below.  Somnolence [R40.0]  Mastitis [N61.0]  Sepsis [A41.9]  Sepsis, due to unspecified organism, unspecified whether acute organ dysfunction present [A41.9]   ESRD on Hemodialysis.        DVT Prophylaxis: Heparin.  GI Prophylaxis: Lansoprazole.  Code Status: DNR/DNI.     Plan       -Stable at time of exam  -Plans to discharge this week  -Continues MWF dialysis as per nephrology  -Therapy as before as patient tolerates.  -Strongly recommend out of bed daily  -Intake continues to improve.  -Remains weak after dialysis  -Case management for discharge planning  -DVT and GI prophylaxis in place.  -We'll continue monitoring patient in hospital setting and treat patient as course dictates.  -Please review orders for detailed plan of care.  -For Aute and Chronic medical condition we will continue medications described below in medication section which have been reviewed and we will continue unless changed in plan of care.  -Laboratory and diagnostic studies have been independently reviewed and the reports are reviewed as documented below.    Subjective   Interval History:   Patient Complaints:   Patient seen and examined.  Pleasant and eager to discharge soon.  No overnight events noted.    History taken from: Medical record and nursing staff.    Review of Systems:    Review of Systems   All other systems reviewed and are negative.    Objective   Vital Signs  Temp: 98.4 F         Heart Rate: 70         Resp: 18          Blood Pressure: 122/56         Pulse Ox: 94 %    Physical Exam:   Physical Exam  Vitals and nursing note reviewed.   Constitutional:       Appearance: She is well-developed.   HENT:      Head: Normocephalic and atraumatic.      Nose: Nose normal.   Eyes:      Conjunctiva/sclera: Conjunctivae normal.      Pupils: Pupils are equal, round, and reactive to light.   Neck:      Thyroid: No thyromegaly.      Vascular: No JVD.      Trachea: No tracheal deviation.   Cardiovascular:      Rate and Rhythm: Normal rate and regular rhythm.      Heart sounds: Normal heart sounds.   Pulmonary:      Effort: Pulmonary effort is normal. No respiratory distress.      Breath sounds: Normal breath sounds. No wheezing or rales.   Chest:      Chest wall: No tenderness.   Abdominal:      General: Bowel  sounds are normal. There is no distension.      Palpations: Abdomen is soft.      Tenderness: There is no abdominal tenderness. There is no guarding or rebound.   Musculoskeletal:         General: Normal range of motion.      Cervical back: Normal range of motion and neck supple.   Lymphadenopathy:      Cervical: No cervical adenopathy.   Skin:     General: Skin is warm and dry.      Comments: Intact   Neurological:      Mental Status: She is alert and oriented to person, place, and time.      Cranial Nerves: No cranial nerve deficit.      Deep Tendon Reflexes: Reflexes are normal and symmetric.     Results Review:       Results from last 7 days   Lab Units 09/11/23 0410 09/10/23  0653 09/09/23  0631 09/08/23  1053 09/06/23  0429   SODIUM mmol/L 130*  130* 129* 129* 133* 129*   POTASSIUM mmol/L 4.0  4.0 4.2 4.1 3.6 4.3   CHLORIDE mmol/L 96*  96* 95* 93* 97* 93*   CO2 mmol/L 25.0  25.0 23.0 24.0 23.0 24.0   BUN mg/dL 30*  30* 26* 17 8 31*   CREATININE mg/dL 4.01*  4.01* 3.74* 3.08* 1.78* 4.32*   GLUCOSE mg/dL 97  97 112* 98 146* 85   CALCIUM mg/dL 8.1*  8.1* 8.0* 8.1* 7.9* 8.1*   BILIRUBIN mg/dL 0.5  --   --  0.8 0.5   ALK PHOS U/L 137*  --   --  167* 147*   ALT (SGPT) U/L <5  --   --  7 7   AST (SGOT) U/L 16  --   --  26 15       Results from last 7 days   Lab Units 09/11/23 0410 09/08/23  0413 09/06/23  0429   MAGNESIUM mg/dL 1.6 1.6 1.7       Results from last 7 days   Lab Units 09/11/23  0410 09/08/23  0430 09/06/23  0429 09/05/23  1857   WBC 10*3/mm3 6.18 7.58 9.53 9.59   HEMOGLOBIN g/dL 9.3* 9.0* 8.7* 9.7*   HEMATOCRIT % 29.8* 28.3* 27.5* 31.1*   PLATELETS 10*3/mm3 346 323 376 377       Lab Results   Component Value Date    TROPONINI 0.69 (H) 05/31/2020    TROPONINT 56 (C) 08/06/2023     pH, Arterial   Date Value Ref Range Status   08/11/2023 7.403 7.350 - 7.450 pH units Final     CO2   Date Value Ref Range Status   09/11/2023 25.0 22.0 - 29.0 mmol/L Final   09/11/2023 25.0 22.0 - 29.0 mmol/L Final      Total CO2   Date Value Ref Range Status   10/27/2022 30 21 - 31 mmol/L Final     Results from last 7 days   Lab Units 09/05/23  1857   INR  1.11       Imaging Results (Most Recent)       Procedure Component Value Units Date/Time    US Venous Doppler Lower Extremity Right (duplex) [[id number]] Collected: 09/05/23 1708     Updated: 09/05/23 1712    Narrative:      PROCEDURE:  Ultrasound venous Doppler lower extremity Right.    TECHNIQUE:  High-resolution gray scale imaging, color flow Doppler, compression were  performed from the groin to the calf of the right lower extremity.  Augmentation  was performed of the common femoral vein and popliteal vein.    FINDINGS:  Extensive occlusive deep venous thrombosis within the visualized right common  femoral vein, superficial femoral vein, and popliteal vein.           No results found for: ACANTHNAEG, AFBCX, BPERTUSSISCX, BLOODCX  No results found for: BCIDPCR, CXREFLEX, CSFCX, CULTURETIS  No results found for: CULTURES, HSVCX, URCX  No results found for: EYECULTURE, GCCX, HSVCULTURE, LABHSV  No results found for: LEGIONELLA, MRSACX, MUMPSCX, MYCOPLASCX  No results found for: NOCARDIACX, STOOLCX  No results found for: THROATCX, UNSTIMCULT, URINECX, CULTURE, VZVCULTUR  No results found for: VIRALCULTU, WOUNDCX  Medication Reviewed and Will Continue Unless Documented in Plan:   albumin human, 25 g, Intravenous, Once  alteplase, 2 mg, Intracatheter, Once in Dialysis  alteplase, 2 mg, Intracatheter, Once in Dialysis  amiodarone, 200 mg, Oral, BID With Meals  apixaban, 10 mg, Oral, BID   Followed by  [START ON 9/14/2023] apixaban, 5 mg, Oral, BID  busPIRone, 5 mg, Oral, BID  dilTIAZem, 60 mg, Oral, Q6H  docusate sodium, 100 mg, Oral, BID  dronabinol, 7.5 mg, Oral, BID AC  epoetin karen/karen-epbx, 6,000 Units, Intravenous, Once per day on Mon Wed Fri  gabapentin, 100 mg, Oral, TID  heparin (porcine), 2,000 Units, Intravenous, Once per day on Mon Wed  Fri  HYDROcodone-acetaminophen, 1 tablet, Oral, Once  Insulin Aspart, 2-12 Units, Subcutaneous, 4 times per day  lansoprazole, 30 mg, Oral, Q12H  magnesium sulfate, 2 g, Intravenous, Once  midodrine, 7.5 mg, Oral, TID AC  rosuvastatin, 40 mg, Oral, Daily  sodium chloride 0.9 % flush, 10 mL, Intravenous, Q8H           acetaminophen    acetaminophen    albumin human    cyclobenzaprine    dextrose    dilTIAZem    heparin (porcine)    HYDROcodone-acetaminophen    hydrocortisone-bacitracin-zinc oxide-nystatin    melatonin    muscle rub    sodium chloride    sodium chloride 0.9 % flush    traZODone        Dietary Orders (From admission, onward)       Start     Ordered    09/06/23 0800  Dietary Nutrition Supplements Other (See Comment); Add Diet Dr.Pepper and cranberry juice to each meal  Daily With Breakfast, Lunch & Dinner      Question Answer Comment   Select Supplement: Other (See Comment)    Other Add Diet  and cranberry juice to each meal        09/05/23 2022 09/05/23 1800  Dietary Nutrition Supplements Other (See Comment); Mighty shake with every meal,  Daily With Breakfast, Lunch & Dinner      Question Answer Comment   Select Supplement: Other (See Comment)    Other Mighty shake with every meal,        09/05/23 1359    09/05/23 1800  Dietary Nutrition Supplements Magic Cup  Daily With Lunch & Dinner      Comments: [place]   Question:  Select Supplement:  Answer:  Magic Cup    09/05/23 1359    08/17/23 1153  Diet: Regular/House Diet, Renal Diets, Diabetic Diets; Consistent Carbohydrate; Low Sodium (2-3g); Texture: Soft to Chew (NDD 3); Soft to Chew: Chopped Meat; Fluid Consistency: Thin (IDDSI 0)  Diet Effective Now        References:    Diet Order [place]   Question Answer Comment   Diets: Regular/House Diet    Diets: Renal Diets    Diets: Diabetic Diets    Diabetic Diet: Consistent Carbohydrate    Renal Diet: Low Sodium (2-3g)    Texture: Soft to Chew (NDD 3)    Soft to Chew: Chopped Meat    Fluid  Consistency: Thin (IDDSI 0)        08/17/23 6502                  History, physical exam, assessment and plan may have been partly or fully copied from before, but  changes made to the copied record to reflect care on the date of service. Part of the lab and imaging  reports auto populated and corrected. Some of this note may be an electronic transcription of spoken  language to printed text. This may permit erroneous, or at times, nonsensical words or phrases to be  inadvertently transcribed. Although I have reviewed the note for such errors, some may still exist.      This document has been electronically signed by Josefina Perez MD on September 11, 2023 10:53 CDT

## 2023-09-11 NOTE — ACP (ADVANCE CARE PLANNING)
[facility]  [address] [place] [address]. [id number]  T - [phone]  F - [phone]     Progress Note          PATIENT  DEMOGRAPHICS   PATIENT NAME: Carol Sullivan                      PHYSICIAN: COY Chong  : 1940  MRN: [id number]   LOS: 0 days    Patient Care Team:  Len Seo MD as PCP - General  Blaire Camara APRN as Nurse Practitioner (General Surgery)  Subjective   SUBJECTIVE   BP stable, no new events.          Objective   OBJECTIVE   Vital Signs  Heart Rate:  [58-70] 66    T 97.3  HR 62  RR 20  /65  O2: 98% RA     No intake/output data recorded.    PHYSICAL EXAM    Physical Exam  Constitutional:       Appearance: She is well-developed.   HENT:      Head: Normocephalic.      Left Ear: There is no impacted cerumen.      Nose: No rhinorrhea.   Eyes:      Pupils: Pupils are equal, round, and reactive to light.   Cardiovascular:      Rate and Rhythm: Normal rate and regular rhythm.      Heart sounds: Normal heart sounds.   Pulmonary:      Effort: Pulmonary effort is normal.      Breath sounds: Normal breath sounds.   Abdominal:      General: Bowel sounds are normal.      Palpations: Abdomen is soft.   Musculoskeletal:         General: No swelling.   Skin:     Coloration: Skin is not jaundiced.   Neurological:      General: No focal deficit present.      Mental Status: She is alert. She is disoriented.       RESULTS   Results Review:    Results from last 7 days   Lab Units 23  0410 09/10/23  0653 23  0631 23  1053 23  0429   SODIUM mmol/L 130*  130* 129* 129* 133* 129*   POTASSIUM mmol/L 4.0  4.0 4.2 4.1 3.6 4.3   CHLORIDE mmol/L 96*  96* 95* 93* 97* 93*   CO2 mmol/L 25.0  25.0 23.0 24.0 23.0 24.0   BUN mg/dL 30*  30* 26* 17 8 31*   CREATININE mg/dL 4.01*  4.01* 3.74* 3.08* 1.78* 4.32*   CALCIUM mg/dL 8.1*  8.1* 8.0* 8.1* 7.9* 8.1*   BILIRUBIN mg/dL 0.5  --   --  0.8 0.5   ALK PHOS U/L 137*  --   --  167* 147*   ALT (SGPT)  U/L <5  --   --  7 7   AST (SGOT) U/L 16  --   --  26 15   GLUCOSE mg/dL 97  97 112* 98 146* 85         Estimated Creatinine Clearance: 10.6 mL/min (A) (by C-G formula based on SCr of 4.01 mg/dL (H)).    Results from last 7 days   Lab Units 09/11/23  0410 09/08/23  0413 09/06/23  0429   MAGNESIUM mg/dL 1.6 1.6 1.7               Results from last 7 days   Lab Units 09/11/23  0410 09/08/23  0430 09/06/23  0429 09/05/23  1857   WBC 10*3/mm3 6.18 7.58 9.53 9.59   HEMOGLOBIN g/dL 9.3* 9.0* 8.7* 9.7*   PLATELETS 10*3/mm3 346 323 376 377         Results from last 7 days   Lab Units 09/05/23  1857   INR  1.11           Imaging Results (Last 24 Hours)       ** No results found for the last 24 hours. **             MEDICATIONS    albumin human, 25 g, Intravenous, Once  alteplase, 2 mg, Intracatheter, Once in Dialysis  alteplase, 2 mg, Intracatheter, Once in Dialysis  amiodarone, 200 mg, Oral, BID With Meals  apixaban, 10 mg, Oral, BID   Followed by  [START ON 9/14/2023] apixaban, 5 mg, Oral, BID  busPIRone, 5 mg, Oral, BID  dilTIAZem, 60 mg, Oral, Q6H  docusate sodium, 100 mg, Oral, BID  dronabinol, 7.5 mg, Oral, BID AC  epoetin karen/karen-epbx, 6,000 Units, Intravenous, Once per day on Mon Wed Fri  gabapentin, 100 mg, Oral, TID  heparin (porcine), 2,000 Units, Intravenous, Once per day on Mon Wed Fri  HYDROcodone-acetaminophen, 1 tablet, Oral, Once  Insulin Aspart, 2-12 Units, Subcutaneous, 4 times per day  lansoprazole, 30 mg, Oral, Q12H  magnesium sulfate, 2 g, Intravenous, Once  midodrine, 7.5 mg, Oral, TID AC  rosuvastatin, 40 mg, Oral, Daily  sodium chloride 0.9 % flush, 10 mL, Intravenous, Q8H           Assessment & Plan   ASSESSMENT / PLAN      * No active hospital problems. *      1. Acute kidney injury/ Acute tubular necrosis:  - Baseline unknown.   - Creatinine was 1.5 in 10/2022.   - UA 3+ protein, trace leukocytes, 0-2 RBC, 6-12 WBC, 2+ bacteria.   - Urine sodium 26. CT abd large rt retroperitoneal hematoma  causing moderate rt hydronephrosis.  - Started HD 8/6/23  - Received call 9/1 with low BP, started levophed. BP is better now and off levophed. Cardiology has stopped Imdur.  - HD today     2. Renal failure retroperitoneal bleed s/p right J stent for hydronephrosis   - s/p J stent placement by urology 8/11  - Continue to monitor renal function      3. Hypertension/ Afib with RVR:   - Blood pressure is low, but now better and off levophed     4. UTI E.fecalis:   - became septic with mild hydro Dr Rahway is consulted. Now stent in place . E.fecalis     5. Cdiff:   -not on po vanc now      6. Hyponatremia:   - Sodium is stable     7. Anemia / retroperitoneal bleed:  - Hemoglobin is acceptable at 9.3, B12 and folate are good on epogen      8.  AMS improving    9.  RLE edema- checked doppler and she has extensive DVT. On heparin gtt per primary team.    Copied text in this note has been reviewed and is accurate as of 9/11/2023.                This document has been electronically signed by COY Chong on September 11, 2023 12:46 CDT      For this patient encounter, I have reviewed the Nurse Practitioner's documentation, medical decision making, and treatment plan and personally spent time with the patient.patient is seen on 9/11/23

## 2023-09-12 ENCOUNTER — OUTSIDE FACILITY SERVICE (OUTPATIENT)
Dept: PULMONOLOGY | Facility: CLINIC | Age: 83
End: 2023-09-12
Payer: MEDICARE

## 2023-09-12 LAB
GLUCOSE BLDC GLUCOMTR-MCNC: 104 MG/DL (ref 70–130)
GLUCOSE BLDC GLUCOMTR-MCNC: 115 MG/DL (ref 70–130)
GLUCOSE BLDC GLUCOMTR-MCNC: 146 MG/DL (ref 70–130)
GLUCOSE BLDC GLUCOMTR-MCNC: 160 MG/DL (ref 70–130)
GLUCOSE BLDC GLUCOMTR-MCNC: 87 MG/DL (ref 70–130)

## 2023-09-12 PROCEDURE — 63710000001 DRONABINOL PER 2.5 MG

## 2023-09-12 PROCEDURE — 63710000001 DRONABINOL PER 5 MG

## 2023-09-12 PROCEDURE — 97535 SELF CARE MNGMENT TRAINING: CPT

## 2023-09-12 PROCEDURE — 82948 REAGENT STRIP/BLOOD GLUCOSE: CPT

## 2023-09-12 NOTE — ACP (ADVANCE CARE PLANNING)
[facility]  INPATIENT PROGRESS NOTE    PATIENT NAME: Carol Sullivan      PHYSICIAN: Josefina Perez MD  : 1940        MRN: [id number]  Patient Care Team:  Len Seo MD as PCP - General  Blaire Camara APRN as Nurse Practitioner (General Surgery)    Chief Complaint: Patient was brought to emergency room at [facility] for confusion.     History of Present Illness: 82-year-old female with significant medical history of hypertension, dyslipidemia, gastroesophageal reflux disease who was brought into the emergency room for confusion.  Patient was also found to have erythema and cellulitis of left breast.  Patient was thought of having mastitis.  Patient's condition was monitored now.  Patient had gradual decrease in her urine output and worsening in her kidney function was noted.  Patient off having renal failure which was worsening over time.  Patient subsequently required hemodialysis patient was placed on hemodialysis per nephrology recommendation.  Patient was admitted to the hospital setting for antibiotics and hemodialysis.  Patient's condition seem to be stabilized however patient remained extremely weak and deconditioned hemodialysis patient was recommended to be admitted to our facility for further therapy and rehabilitation along with monitoring for hemodialysis.     Discharge Summary and H&P: Reviewed from previous hospitalization and information documented above in HPI Section.     Radiology Studies from Previous Hospitalization: Reviewed and are as below:  XR Abdomen KUB     Result Date: 2023  FINDINGS/IMPRESSION: Radiopaque tip of the Dobbhoff tube projects over the second portion of the duodenum, similar to even slightly retracted when compared to the prior exam. Right-sided ureteral stent is again noted and incompletely visualized.  There are probably also right femoral catheters which are  incompletely visualized. Normal bowel gas pattern in the visualized abdomen.     XR Chest Post CVA Port     Result Date: 8/17/2023  Impression: 1. Mild interstitial opacities are likely due to low lung volumes. An element of interstitial edema is not entirely excluded.      EKG From previous hospitalization reviewed and documented below:  ECG 12 Lead Other; hx afib   Preliminary Result   Test Reason : Other~   Blood Pressure :   */*   mmHG   Vent. Rate :  67 BPM     Atrial Rate :  67 BPM      P-R Int : 188 ms          QRS Dur :  78 ms       QT Int : 392 ms       P-R-T Axes :  28   6  16 degrees      QTc Int : 414 ms       Normal sinus rhythm with sinus arrhythmia   Minimal voltage criteria for LVH, may be normal variant   Anteroseptal infarct (cited on or before 30-JUL-2023)   Abnormal ECG   When compared with ECG of 10-AUG-2023 15:02,   Sinus rhythm has replaced Atrial fibrillation   Vent. rate has decreased BY  62 BPM   Nonspecific T wave abnormality, worse in Anterior leads       Referred By:            Confirmed By:              Laboratory results from previous hospitalization is reviewed and below:        Lab Results   Component Value Date     GLUCOSE 139 (H) 08/17/2023     CALCIUM 8.6 08/17/2023      (L) 08/17/2023     K 3.9 08/17/2023     CO2 23.0 08/17/2023     CL 90 (L) 08/17/2023     BUN 72 (H) 08/17/2023     CREATININE 4.86 (H) 08/17/2023     EGFR 8.5 (L) 08/17/2023     BCR 14.8 08/17/2023     ANIONGAP 17.0 (H) 08/17/2023            Lab Results   Component Value Date     WBC 17.07 (H) 08/17/2023     HGB 10.2 (L) 08/17/2023     HCT 29.8 (L) 08/17/2023     MCV 85.9 08/17/2023     PLT         Assessment       Sepsis Secondary to Below.  Somnolence [R40.0]  Mastitis [N61.0]  Sepsis [A41.9]  Sepsis, due to unspecified organism, unspecified whether acute organ dysfunction present [A41.9]   ESRD on Hemodialysis.        DVT Prophylaxis: Heparin.  GI Prophylaxis: Lansoprazole.  Code Status: DNR/DNI.     Plan       -Stable at time of exam  -Plans to discharge this week  -Continues MWF dialysis as per nephrology  -Therapy as before as patient tolerates.  -Strongly recommend out of bed daily  -Intake continues to improve.  -Remains weak after dialysis  -Hematuria in F/C.  Nephrology aware.  -Case management for discharge planning-awaiting insurance approval  -DVT and GI prophylaxis in place.  -We'll continue monitoring patient in hospital setting and treat patient as course dictates.  -Please review orders for detailed plan of care.  -For Aute and Chronic medical condition we will continue medications described below in medication section which have been reviewed and we will continue unless changed in plan of care.  -Laboratory and diagnostic studies have been independently reviewed and the reports are reviewed as documented below.    Subjective   Interval History:   Patient Complaints:   Patient seen and examined.  Resting in bed.   No  concerns today.  Pleasant mood.  No overnight events noted.  Eager to discharge home  History taken from: Medical record and nursing staff.    Review of Systems:    Review of Systems   All other systems reviewed and are negative.    Objective   Vital Signs  Temp: 97.5 F         Heart Rate: 62         Resp: 20          Blood Pressure: 141/64         Pulse Ox: 97 %    Physical Exam:   Physical Exam  Vitals and nursing note reviewed.   Constitutional:       Appearance: She is well-developed.   HENT:      Head: Normocephalic and atraumatic.      Nose: Nose normal.   Eyes:      Conjunctiva/sclera: Conjunctivae normal.      Pupils: Pupils are equal, round, and reactive to light.   Neck:      Thyroid: No thyromegaly.      Vascular: No JVD.      Trachea: No tracheal deviation.   Cardiovascular:      Rate and Rhythm: Normal rate and regular rhythm.      Heart sounds: Normal heart sounds.   Pulmonary:      Effort: Pulmonary effort is normal. No respiratory distress.      Breath sounds: Normal breath  sounds. No wheezing or rales.   Chest:      Chest wall: No tenderness.   Abdominal:      General: Bowel sounds are normal. There is no distension.      Palpations: Abdomen is soft.      Tenderness: There is no abdominal tenderness. There is no guarding or rebound.   Musculoskeletal:         General: Normal range of motion.      Cervical back: Normal range of motion and neck supple.   Lymphadenopathy:      Cervical: No cervical adenopathy.   Skin:     General: Skin is warm and dry.      Comments: Intact   Neurological:      Mental Status: She is alert and oriented to person, place, and time.      Cranial Nerves: No cranial nerve deficit.      Deep Tendon Reflexes: Reflexes are normal and symmetric.     Results Review:       Results from last 7 days   Lab Units 09/11/23  0410 09/10/23  0653 09/09/23  0631 09/08/23  1053 09/06/23  0429   SODIUM mmol/L 130*  130* 129* 129* 133* 129*   POTASSIUM mmol/L 4.0  4.0 4.2 4.1 3.6 4.3   CHLORIDE mmol/L 96*  96* 95* 93* 97* 93*   CO2 mmol/L 25.0  25.0 23.0 24.0 23.0 24.0   BUN mg/dL 30*  30* 26* 17 8 31*   CREATININE mg/dL 4.01*  4.01* 3.74* 3.08* 1.78* 4.32*   GLUCOSE mg/dL 97  97 112* 98 146* 85   CALCIUM mg/dL 8.1*  8.1* 8.0* 8.1* 7.9* 8.1*   BILIRUBIN mg/dL 0.5  --   --  0.8 0.5   ALK PHOS U/L 137*  --   --  167* 147*   ALT (SGPT) U/L <5  --   --  7 7   AST (SGOT) U/L 16  --   --  26 15       Results from last 7 days   Lab Units 09/11/23  0410 09/08/23  0413 09/06/23  0429   MAGNESIUM mg/dL 1.6 1.6 1.7       Results from last 7 days   Lab Units 09/11/23  0410 09/08/23  0430 09/06/23  0429 09/05/23  1857   WBC 10*3/mm3 6.18 7.58 9.53 9.59   HEMOGLOBIN g/dL 9.3* 9.0* 8.7* 9.7*   HEMATOCRIT % 29.8* 28.3* 27.5* 31.1*   PLATELETS 10*3/mm3 346 323 376 377       Lab Results   Component Value Date    TROPONINI 0.69 (H) 05/31/2020    TROPONINT 56 (C) 08/06/2023     pH, Arterial   Date Value Ref Range Status   08/11/2023 7.403 7.350 - 7.450 pH units Final     CO2   Date Value  Ref Range Status   09/11/2023 25.0 22.0 - 29.0 mmol/L Final   09/11/2023 25.0 22.0 - 29.0 mmol/L Final     Total CO2   Date Value Ref Range Status   10/27/2022 30 21 - 31 mmol/L Final     Results from last 7 days   Lab Units 09/05/23  1857   INR  1.11       Imaging Results (Most Recent)       Procedure Component Value Units Date/Time    US Venous Doppler Lower Extremity Right (duplex) [[id number]] Collected: 09/05/23 1708     Updated: 09/05/23 1712    Narrative:      PROCEDURE:  Ultrasound venous Doppler lower extremity Right.    TECHNIQUE:  High-resolution gray scale imaging, color flow Doppler, compression were  performed from the groin to the calf of the right lower extremity.  Augmentation  was performed of the common femoral vein and popliteal vein.    FINDINGS:  Extensive occlusive deep venous thrombosis within the visualized right common  femoral vein, superficial femoral vein, and popliteal vein.           No results found for: ACANTHNAEG, AFBCX, BPERTUSSISCX, BLOODCX  No results found for: BCIDPCR, CXREFLEX, CSFCX, CULTURETIS  No results found for: CULTURES, HSVCX, URCX  No results found for: EYECULTURE, GCCX, HSVCULTURE, LABHSV  No results found for: LEGIONELLA, MRSACX, MUMPSCX, MYCOPLASCX  No results found for: NOCARDIACX, STOOLCX  No results found for: THROATCX, UNSTIMCULT, URINECX, CULTURE, VZVCULTUR  No results found for: VIRALCULTU, WOUNDCX  Medication Reviewed and Will Continue Unless Documented in Plan:   albumin human, 25 g, Intravenous, Once  alteplase, 2 mg, Intracatheter, Once in Dialysis  alteplase, 2 mg, Intracatheter, Once in Dialysis  amiodarone, 200 mg, Oral, BID With Meals  apixaban, 10 mg, Oral, BID   Followed by  [START ON 9/14/2023] apixaban, 5 mg, Oral, BID  busPIRone, 5 mg, Oral, BID  dilTIAZem, 60 mg, Oral, Q6H  docusate sodium, 100 mg, Oral, BID  dronabinol, 7.5 mg, Oral, BID AC  epoetin karen/karen-epbx, 6,000 Units, Intravenous, Once per day on Mon Wed Fri  gabapentin, 100 mg, Oral,  TID  heparin (porcine), 2,000 Units, Intravenous, Once per day on Mon Wed Fri  HYDROcodone-acetaminophen, 1 tablet, Oral, Once  Insulin Aspart, 2-12 Units, Subcutaneous, 4 times per day  lansoprazole, 30 mg, Oral, Q12H  magnesium sulfate, 2 g, Intravenous, Once  midodrine, 7.5 mg, Oral, TID AC  rosuvastatin, 40 mg, Oral, Daily  sodium chloride 0.9 % flush, 10 mL, Intravenous, Q8H           acetaminophen    acetaminophen    albumin human    cyclobenzaprine    dextrose    dilTIAZem    heparin (porcine)    HYDROcodone-acetaminophen    hydrocortisone-bacitracin-zinc oxide-nystatin    melatonin    muscle rub    sodium chloride    sodium chloride 0.9 % flush    traZODone        Dietary Orders (From admission, onward)       Start     Ordered    09/06/23 0800  Dietary Nutrition Supplements Other (See Comment); Add Diet Dr.Pepper and cranberry juice to each meal  Daily With Breakfast, Lunch & Dinner      Question Answer Comment   Select Supplement: Other (See Comment)    Other Add Diet  and cranberry juice to each meal        09/05/23 2022 09/05/23 1800  Dietary Nutrition Supplements Other (See Comment); Mighty shake with every meal,  Daily With Breakfast, Lunch & Dinner      Question Answer Comment   Select Supplement: Other (See Comment)    Other Mighty shake with every meal,        09/05/23 1359 09/05/23 1800  Dietary Nutrition Supplements Magic Cup  Daily With Lunch & Dinner      Comments: [place]   Question:  Select Supplement:  Answer:  Magic Cup    09/05/23 1359    08/17/23 1153  Diet: Regular/House Diet, Renal Diets, Diabetic Diets; Consistent Carbohydrate; Low Sodium (2-3g); Texture: Soft to Chew (NDD 3); Soft to Chew: Chopped Meat; Fluid Consistency: Thin (IDDSI 0)  Diet Effective Now        References:    Diet Order [place]   Question Answer Comment   Diets: Regular/House Diet    Diets: Renal Diets    Diets: Diabetic Diets    Diabetic Diet: Consistent Carbohydrate    Renal Diet: Low Sodium (2-3g)     Texture: Soft to Chew (NDD 3)    Soft to Chew: Chopped Meat    Fluid Consistency: Thin (IDDSI 0)        08/17/23 1156                  History, physical exam, assessment and plan may have been partly or fully copied from before, but  changes made to the copied record to reflect care on the date of service. Part of the lab and imaging  reports auto populated and corrected. Some of this note may be an electronic transcription of spoken  language to printed text. This may permit erroneous, or at times, nonsensical words or phrases to be  inadvertently transcribed. Although I have reviewed the note for such errors, some may still exist.      This document has been electronically signed by Josefina Perez MD on September 12, 2023 11:09 CDT

## 2023-09-12 NOTE — ACP (ADVANCE CARE PLANNING)
[facility]  [address] [place] [address]. [address]  T - [phone]  F - [phone]     Progress Note          PATIENT  DEMOGRAPHICS   PATIENT NAME: Carol Sullivan                      PHYSICIAN: COY Chong  : 1940  MRN: [id number]   LOS: 0 days    Patient Care Team:  Len Seo MD as PCP - General  Blaire Camara APRN as Nurse Practitioner (General Surgery)  Subjective   SUBJECTIVE   BP stable, no new events.          Objective   OBJECTIVE   Vital Signs  Heart Rate:  [60-65] 60    T 97.3  HR 62  RR 20  /64  O2: 98% RA     No intake/output data recorded.    141/64 EXAM    Physical Exam  Constitutional:       Appearance: She is well-developed.   HENT:      Head: Normocephalic.      Left Ear: There is no impacted cerumen.      Nose: No rhinorrhea.   Eyes:      Pupils: Pupils are equal, round, and reactive to light.   Cardiovascular:      Rate and Rhythm: Normal rate and regular rhythm.      Heart sounds: Normal heart sounds.   Pulmonary:      Effort: Pulmonary effort is normal.      Breath sounds: Normal breath sounds.   Abdominal:      General: Bowel sounds are normal.      Palpations: Abdomen is soft.   Musculoskeletal:         General: No swelling.   Skin:     Coloration: Skin is not jaundiced.   Neurological:      General: No focal deficit present.      Mental Status: She is alert. She is disoriented.       RESULTS   Results Review:    Results from last 7 days   Lab Units 23  0410 09/10/23  0653 23  0631 23  1053 23  0429   SODIUM mmol/L 130*  130* 129* 129* 133* 129*   POTASSIUM mmol/L 4.0  4.0 4.2 4.1 3.6 4.3   CHLORIDE mmol/L 96*  96* 95* 93* 97* 93*   CO2 mmol/L 25.0  25.0 23.0 24.0 23.0 24.0   BUN mg/dL 30*  30* 26* 17 8 31*   CREATININE mg/dL 4.01*  4.01* 3.74* 3.08* 1.78* 4.32*   CALCIUM mg/dL 8.1*  8.1* 8.0* 8.1* 7.9* 8.1*   BILIRUBIN mg/dL 0.5  --   --  0.8 0.5   ALK PHOS U/L 137*  --   --  167* 147*   ALT (SGPT) U/L  <5  --   --  7 7   AST (SGOT) U/L 16  --   --  26 15   GLUCOSE mg/dL 97  97 112* 98 146* 85         Estimated Creatinine Clearance: 10.6 mL/min (A) (by C-G formula based on SCr of 4.01 mg/dL (H)).    Results from last 7 days   Lab Units 09/11/23  0410 09/08/23  0413 09/06/23  0429   MAGNESIUM mg/dL 1.6 1.6 1.7               Results from last 7 days   Lab Units 09/11/23  0410 09/08/23  0430 09/06/23  0429 09/05/23  1857   WBC 10*3/mm3 6.18 7.58 9.53 9.59   HEMOGLOBIN g/dL 9.3* 9.0* 8.7* 9.7*   PLATELETS 10*3/mm3 346 323 376 377         Results from last 7 days   Lab Units 09/05/23  1857   INR  1.11           Imaging Results (Last 24 Hours)       ** No results found for the last 24 hours. **             MEDICATIONS    albumin human, 25 g, Intravenous, Once  alteplase, 2 mg, Intracatheter, Once in Dialysis  alteplase, 2 mg, Intracatheter, Once in Dialysis  amiodarone, 200 mg, Oral, BID With Meals  apixaban, 10 mg, Oral, BID   Followed by  [START ON 9/14/2023] apixaban, 5 mg, Oral, BID  busPIRone, 5 mg, Oral, BID  dilTIAZem, 60 mg, Oral, Q6H  docusate sodium, 100 mg, Oral, BID  dronabinol, 7.5 mg, Oral, BID AC  epoetin karen/karen-epbx, 6,000 Units, Intravenous, Once per day on Mon Wed Fri  gabapentin, 100 mg, Oral, TID  heparin (porcine), 2,000 Units, Intravenous, Once per day on Mon Wed Fri  HYDROcodone-acetaminophen, 1 tablet, Oral, Once  Insulin Aspart, 2-12 Units, Subcutaneous, 4 times per day  lansoprazole, 30 mg, Oral, Q12H  magnesium sulfate, 2 g, Intravenous, Once  midodrine, 7.5 mg, Oral, TID AC  rosuvastatin, 40 mg, Oral, Daily  sodium chloride 0.9 % flush, 10 mL, Intravenous, Q8H           Assessment & Plan   ASSESSMENT / PLAN      * No active hospital problems. *      1. Acute kidney injury/ Acute tubular necrosis:  - Baseline unknown.   - Creatinine was 1.5 in 10/2022.   - UA 3+ protein, trace leukocytes, 0-2 RBC, 6-12 WBC, 2+ bacteria.   - Urine sodium 26. CT abd large rt retroperitoneal hematoma causing  moderate rt hydronephrosis.  - Started HD 8/6/23  - Received call 9/1 with low BP, started levophed. BP is better now and off levophed. Cardiology has stopped Imdur.  - HD tomorrow     2. Renal failure retroperitoneal bleed s/p right J stent for hydronephrosis   - s/p J stent placement by urology 8/11  - Continue to monitor renal function   - Checking with urology about mitchell removal     3. Hypertension/ Afib with RVR:   - Blood pressure is low, but now better and off levophed     4. UTI E.fecalis:   - became septic with mild hydro Dr [place] is consulted. Now stent in place . E.fecalis     5. Cdiff:   -not on po vanc now      6. Hyponatremia:   - Sodium is stable     7. Anemia / retroperitoneal bleed:  - Hemoglobin is acceptable at 9.3, B12 and folate are good on epogen      8.  AMS improving    9.  RLE edema- checked doppler and she has extensive DVT. On heparin gtt per primary team.    Copied text in this note has been reviewed and is accurate as of 9/12/2023.                This document has been electronically signed by COY Chong on September 12, 2023 14:25 CDT      For this patient encounter, I have reviewed the Nurse Practitioner's documentation, medical decision making, and treatment plan and personally spent time with the patient.

## 2023-09-13 ENCOUNTER — OUTSIDE FACILITY SERVICE (OUTPATIENT)
Dept: PULMONOLOGY | Facility: CLINIC | Age: 83
End: 2023-09-13
Payer: MEDICARE

## 2023-09-13 ENCOUNTER — APPOINTMENT (OUTPATIENT)
Dept: ULTRASOUND IMAGING | Facility: HOSPITAL | Age: 83
End: 2023-09-13
Payer: COMMERCIAL

## 2023-09-13 LAB
ALBUMIN SERPL-MCNC: 2.4 G/DL (ref 3.5–5.2)
ALBUMIN/GLOB SERPL: 0.6 G/DL
ALP SERPL-CCNC: 134 U/L (ref 39–117)
ALT SERPL W P-5'-P-CCNC: 5 U/L (ref 1–33)
ANION GAP SERPL CALCULATED.3IONS-SCNC: 11 MMOL/L (ref 5–15)
AST SERPL-CCNC: 17 U/L (ref 1–32)
BASOPHILS # BLD AUTO: 0.11 10*3/MM3 (ref 0–0.2)
BASOPHILS NFR BLD AUTO: 1.6 % (ref 0–1.5)
BILIRUB SERPL-MCNC: 0.5 MG/DL (ref 0–1.2)
BILIRUB UR QL STRIP: ABNORMAL
BUN SERPL-MCNC: 24 MG/DL (ref 8–23)
BUN/CREAT SERPL: 7.6 (ref 7–25)
CALCIUM SPEC-SCNC: 8.3 MG/DL (ref 8.6–10.5)
CHLORIDE SERPL-SCNC: 96 MMOL/L (ref 98–107)
CLARITY UR: ABNORMAL
CO2 SERPL-SCNC: 24 MMOL/L (ref 22–29)
COLOR UR: ABNORMAL
CREAT SERPL-MCNC: 3.17 MG/DL (ref 0.57–1)
DEPRECATED RDW RBC AUTO: 56.5 FL (ref 37–54)
EGFRCR SERPLBLD CKD-EPI 2021: 14.1 ML/MIN/1.73
EOSINOPHIL # BLD AUTO: 0.18 10*3/MM3 (ref 0–0.4)
EOSINOPHIL NFR BLD AUTO: 2.6 % (ref 0.3–6.2)
ERYTHROCYTE [DISTWIDTH] IN BLOOD BY AUTOMATED COUNT: 17 % (ref 12.3–15.4)
GLOBULIN UR ELPH-MCNC: 4.2 GM/DL
GLUCOSE BLDC GLUCOMTR-MCNC: 138 MG/DL (ref 70–130)
GLUCOSE BLDC GLUCOMTR-MCNC: 139 MG/DL (ref 70–130)
GLUCOSE BLDC GLUCOMTR-MCNC: 78 MG/DL (ref 70–130)
GLUCOSE BLDC GLUCOMTR-MCNC: 93 MG/DL (ref 70–130)
GLUCOSE SERPL-MCNC: 87 MG/DL (ref 65–99)
GLUCOSE UR STRIP-MCNC: NEGATIVE MG/DL
HBV SURFACE AG SERPL QL IA: NORMAL
HCT VFR BLD AUTO: 30.4 % (ref 34–46.6)
HGB BLD-MCNC: 9.6 G/DL (ref 12–15.9)
HGB UR QL STRIP.AUTO: ABNORMAL
IMM GRANULOCYTES # BLD AUTO: 0.05 10*3/MM3 (ref 0–0.05)
IMM GRANULOCYTES NFR BLD AUTO: 0.7 % (ref 0–0.5)
KETONES UR QL STRIP: ABNORMAL
LEUKOCYTE ESTERASE UR QL STRIP.AUTO: ABNORMAL
LYMPHOCYTES # BLD AUTO: 1.58 10*3/MM3 (ref 0.7–3.1)
LYMPHOCYTES NFR BLD AUTO: 23.2 % (ref 19.6–45.3)
MAGNESIUM SERPL-MCNC: 1.6 MG/DL (ref 1.6–2.4)
MCH RBC QN AUTO: 28.6 PG (ref 26.6–33)
MCHC RBC AUTO-ENTMCNC: 31.6 G/DL (ref 31.5–35.7)
MCV RBC AUTO: 90.5 FL (ref 79–97)
MONOCYTES # BLD AUTO: 0.87 10*3/MM3 (ref 0.1–0.9)
MONOCYTES NFR BLD AUTO: 12.8 % (ref 5–12)
NEUTROPHILS NFR BLD AUTO: 4.03 10*3/MM3 (ref 1.7–7)
NEUTROPHILS NFR BLD AUTO: 59.1 % (ref 42.7–76)
NITRITE UR QL STRIP: POSITIVE
NRBC BLD AUTO-RTO: 0 /100 WBC (ref 0–0.2)
PH UR STRIP.AUTO: 6 [PH] (ref 5–9)
PLATELET # BLD AUTO: 359 10*3/MM3 (ref 140–450)
PMV BLD AUTO: 8.8 FL (ref 6–12)
POTASSIUM SERPL-SCNC: 3.6 MMOL/L (ref 3.5–5.2)
PROT SERPL-MCNC: 6.6 G/DL (ref 6–8.5)
PROT UR QL STRIP: ABNORMAL
RBC # BLD AUTO: 3.36 10*6/MM3 (ref 3.77–5.28)
SODIUM SERPL-SCNC: 131 MMOL/L (ref 136–145)
SP GR UR STRIP: 1.02 (ref 1–1.03)
UROBILINOGEN UR QL STRIP: ABNORMAL
WBC NRBC COR # BLD: 6.82 10*3/MM3 (ref 3.4–10.8)

## 2023-09-13 PROCEDURE — 93971 EXTREMITY STUDY: CPT

## 2023-09-13 PROCEDURE — 81001 URINALYSIS AUTO W/SCOPE: CPT | Performed by: INTERNAL MEDICINE

## 2023-09-13 PROCEDURE — 85025 COMPLETE CBC W/AUTO DIFF WBC: CPT | Performed by: INTERNAL MEDICINE

## 2023-09-13 PROCEDURE — 97116 GAIT TRAINING THERAPY: CPT

## 2023-09-13 PROCEDURE — 80053 COMPREHEN METABOLIC PANEL: CPT | Performed by: INTERNAL MEDICINE

## 2023-09-13 PROCEDURE — 25010000002 HEPARIN (PORCINE) PER 1000 UNITS: Performed by: INTERNAL MEDICINE

## 2023-09-13 PROCEDURE — 25010000002 EPOETIN ALFA PER 1000 UNITS: Performed by: INTERNAL MEDICINE

## 2023-09-13 PROCEDURE — 87340 HEPATITIS B SURFACE AG IA: CPT | Performed by: INTERNAL MEDICINE

## 2023-09-13 PROCEDURE — 87086 URINE CULTURE/COLONY COUNT: CPT | Performed by: INTERNAL MEDICINE

## 2023-09-13 PROCEDURE — 97530 THERAPEUTIC ACTIVITIES: CPT

## 2023-09-13 PROCEDURE — 83735 ASSAY OF MAGNESIUM: CPT | Performed by: INTERNAL MEDICINE

## 2023-09-13 PROCEDURE — 82948 REAGENT STRIP/BLOOD GLUCOSE: CPT

## 2023-09-13 RX ORDER — HEPARIN SODIUM 1000 [USP'U]/ML
2000 INJECTION, SOLUTION INTRAVENOUS; SUBCUTANEOUS 3 TIMES WEEKLY
Status: DISCONTINUED | OUTPATIENT
Start: 2023-09-13 | End: 2023-09-15 | Stop reason: HOSPADM

## 2023-09-13 RX ORDER — HEPARIN SODIUM 1000 [USP'U]/ML
1000 INJECTION, SOLUTION INTRAVENOUS; SUBCUTANEOUS
Status: DISCONTINUED | OUTPATIENT
Start: 2023-09-13 | End: 2023-09-15 | Stop reason: HOSPADM

## 2023-09-13 NOTE — ACP (ADVANCE CARE PLANNING)
[facility]  INPATIENT PROGRESS NOTE    PATIENT NAME: Carol Sullivan      PHYSICIAN: Josefina Perez MD  : 1940        MRN: [id number]  Patient Care Team:  Len Seo MD as PCP - General  Blaire Camara APRN as Nurse Practitioner (General Surgery)    Chief Complaint: Patient was brought to emergency room at [facility] for confusion.     History of Present Illness: 82-year-old female with significant medical history of hypertension, dyslipidemia, gastroesophageal reflux disease who was brought into the emergency room for confusion.  Patient was also found to have erythema and cellulitis of left breast.  Patient was thought of having mastitis.  Patient's condition was monitored now.  Patient had gradual decrease in her urine output and worsening in her kidney function was noted.  Patient off having renal failure which was worsening over time.  Patient subsequently required hemodialysis patient was placed on hemodialysis per nephrology recommendation.  Patient was admitted to the hospital setting for antibiotics and hemodialysis.  Patient's condition seem to be stabilized however patient remained extremely weak and deconditioned hemodialysis patient was recommended to be admitted to our facility for further therapy and rehabilitation along with monitoring for hemodialysis.     Discharge Summary and H&P: Reviewed from previous hospitalization and information documented above in HPI Section.     Radiology Studies from Previous Hospitalization: Reviewed and are as below:  XR Abdomen KUB     Result Date: 2023  FINDINGS/IMPRESSION: Radiopaque tip of the Dobbhoff tube projects over the second portion of the duodenum, similar to even slightly retracted when compared to the prior exam. Right-sided ureteral stent is again noted and incompletely visualized.  There are probably also right femoral catheters which are  incompletely visualized. Normal bowel gas pattern in the visualized abdomen.     XR Chest Post CVA Port     Result Date: 8/17/2023  Impression: 1. Mild interstitial opacities are likely due to low lung volumes. An element of interstitial edema is not entirely excluded.      EKG From previous hospitalization reviewed and documented below:  ECG 12 Lead Other; hx afib   Preliminary Result   Test Reason : Other~   Blood Pressure :   */*   mmHG   Vent. Rate :  67 BPM     Atrial Rate :  67 BPM      P-R Int : 188 ms          QRS Dur :  78 ms       QT Int : 392 ms       P-R-T Axes :  28   6  16 degrees      QTc Int : 414 ms       Normal sinus rhythm with sinus arrhythmia   Minimal voltage criteria for LVH, may be normal variant   Anteroseptal infarct (cited on or before 30-JUL-2023)   Abnormal ECG   When compared with ECG of 10-AUG-2023 15:02,   Sinus rhythm has replaced Atrial fibrillation   Vent. rate has decreased BY  62 BPM   Nonspecific T wave abnormality, worse in Anterior leads       Referred By:            Confirmed By:              Laboratory results from previous hospitalization is reviewed and below:        Lab Results   Component Value Date     GLUCOSE 139 (H) 08/17/2023     CALCIUM 8.6 08/17/2023      (L) 08/17/2023     K 3.9 08/17/2023     CO2 23.0 08/17/2023     CL 90 (L) 08/17/2023     BUN 72 (H) 08/17/2023     CREATININE 4.86 (H) 08/17/2023     EGFR 8.5 (L) 08/17/2023     BCR 14.8 08/17/2023     ANIONGAP 17.0 (H) 08/17/2023            Lab Results   Component Value Date     WBC 17.07 (H) 08/17/2023     HGB 10.2 (L) 08/17/2023     HCT 29.8 (L) 08/17/2023     MCV 85.9 08/17/2023     PLT         Assessment       Sepsis Secondary to Below.  Somnolence [R40.0]  Mastitis [N61.0]  Sepsis [A41.9]  Sepsis, due to unspecified organism, unspecified whether acute organ dysfunction present [A41.9]   ESRD on Hemodialysis.        DVT Prophylaxis: Heparin.  GI Prophylaxis: Lansoprazole.  Code Status: DNR/DNI.     Plan       -Stable at time of exam  -Plans to discharge this week  -Continues MWF dialysis as per nephrology  -Therapy as before as patient tolerates.  -Strongly recommend out of bed daily  -Remains weak and deconditioned.  Continues to work with therapy.  Has been transferring with assistance to w/c.  Remains weak on dialysis days with difficulty during therapy.  Patient goal is to return home after inpatient therapy is completed.  -Will require SNF prior to returning home due to deconditioning and therapy requirements.  -Intake continues to improve.  -Remains weak after dialysis  -Hematuria in F/C.  Nephrology aware. - F/C changed.    -Case management update on discharge appreciated.  -DVT and GI prophylaxis in place.  -We'll continue monitoring patient in hospital setting and treat patient as course dictates.  -Please review orders for detailed plan of care.  -For Aute and Chronic medical condition we will continue medications described below in medication section which have been reviewed and we will continue unless changed in plan of care.  -Laboratory and diagnostic studies have been independently reviewed and the reports are reviewed as documented below.    Subjective   Interval History:   Patient Complaints:   Patient seen and examined.  Resting in bed.  Dialysis this morning.  Patient has no concerns at this time.   History taken from: Medical record and nursing staff.    Review of Systems:    Review of Systems   All other systems reviewed and are negative.    Objective   Vital Signs  Temp: 98.8 F         Heart Rate: 84         Resp: 20          Blood Pressure: 106/52         Pulse Ox: 98 %    Physical Exam:   Physical Exam  Vitals and nursing note reviewed.   Constitutional:       Appearance: She is well-developed.   HENT:      Head: Normocephalic and atraumatic.      Nose: Nose normal.   Eyes:      Conjunctiva/sclera: Conjunctivae normal.      Pupils: Pupils are equal, round, and reactive to light.   Neck:       Thyroid: No thyromegaly.      Vascular: No JVD.      Trachea: No tracheal deviation.   Cardiovascular:      Rate and Rhythm: Normal rate and regular rhythm.      Heart sounds: Normal heart sounds.   Pulmonary:      Effort: Pulmonary effort is normal. No respiratory distress.      Breath sounds: Normal breath sounds. No wheezing or rales.   Chest:      Chest wall: No tenderness.   Abdominal:      General: Bowel sounds are normal. There is no distension.      Palpations: Abdomen is soft.      Tenderness: There is no abdominal tenderness. There is no guarding or rebound.   Musculoskeletal:         General: Normal range of motion.      Cervical back: Normal range of motion and neck supple.   Lymphadenopathy:      Cervical: No cervical adenopathy.   Skin:     General: Skin is warm and dry.      Comments: Intact   Neurological:      Mental Status: She is alert and oriented to person, place, and time.      Cranial Nerves: No cranial nerve deficit.      Deep Tendon Reflexes: Reflexes are normal and symmetric.     Results Review:       Results from last 7 days   Lab Units 09/13/23  0401 09/11/23  0410 09/10/23  0653 09/09/23  0631 09/08/23  1053   SODIUM mmol/L 131* 130*  130* 129* 129* 133*   POTASSIUM mmol/L 3.6 4.0  4.0 4.2 4.1 3.6   CHLORIDE mmol/L 96* 96*  96* 95* 93* 97*   CO2 mmol/L 24.0 25.0  25.0 23.0 24.0 23.0   BUN mg/dL 24* 30*  30* 26* 17 8   CREATININE mg/dL 3.17* 4.01*  4.01* 3.74* 3.08* 1.78*   GLUCOSE mg/dL 87 97  97 112* 98 146*   CALCIUM mg/dL 8.3* 8.1*  8.1* 8.0* 8.1* 7.9*   BILIRUBIN mg/dL 0.5 0.5  --   --  0.8   ALK PHOS U/L 134* 137*  --   --  167*   ALT (SGPT) U/L 5 <5  --   --  7   AST (SGOT) U/L 17 16  --   --  26       Results from last 7 days   Lab Units 09/13/23  0401 09/11/23  0410 09/08/23  0413   MAGNESIUM mg/dL 1.6 1.6 1.6       Results from last 7 days   Lab Units 09/13/23  0401 09/11/23  0410 09/08/23  0430   WBC 10*3/mm3 6.82 6.18 7.58   HEMOGLOBIN g/dL 9.6* 9.3* 9.0*   HEMATOCRIT  % 30.4* 29.8* 28.3*   PLATELETS 10*3/mm3 359 346 323       Lab Results   Component Value Date    TROPONINI 0.69 (H) 05/31/2020    TROPONINT 56 (C) 08/06/2023     pH, Arterial   Date Value Ref Range Status   08/11/2023 7.403 7.350 - 7.450 pH units Final     CO2   Date Value Ref Range Status   09/13/2023 24.0 22.0 - 29.0 mmol/L Final     Total CO2   Date Value Ref Range Status   10/27/2022 30 21 - 31 mmol/L Final           Imaging Results (Most Recent)       Procedure Component Value Units Date/Time    US Venous Doppler Lower Extremity Right (duplex) [[id number]] Collected: 09/05/23 1708     Updated: 09/05/23 1712    Narrative:      PROCEDURE:  Ultrasound venous Doppler lower extremity Right.    TECHNIQUE:  High-resolution gray scale imaging, color flow Doppler, compression were  performed from the groin to the calf of the right lower extremity.  Augmentation  was performed of the common femoral vein and popliteal vein.    FINDINGS:  Extensive occlusive deep venous thrombosis within the visualized right common  femoral vein, superficial femoral vein, and popliteal vein.           No results found for: ACANTHNAEG, AFBCX, BPERTUSSISCX, BLOODCX  No results found for: BCIDPCR, CXREFLEX, CSFCX, CULTURETIS  No results found for: CULTURES, HSVCX, URCX  No results found for: EYECULTURE, GCCX, HSVCULTURE, LABHSV  No results found for: LEGIONELLA, MRSACX, MUMPSCX, MYCOPLASCX  No results found for: NOCARDIACX, STOOLCX  No results found for: THROATCX, UNSTIMCULT, URINECX, CULTURE, VZVCULTUR  No results found for: VIRALCULTU, WOUNDCX  Medication Reviewed and Will Continue Unless Documented in Plan:   amiodarone, 200 mg, Oral, BID With Meals  apixaban, 10 mg, Oral, BID   Followed by  [START ON 9/14/2023] apixaban, 5 mg, Oral, BID  busPIRone, 5 mg, Oral, BID  dilTIAZem, 60 mg, Oral, Q6H  docusate sodium, 100 mg, Oral, BID  epoetin karen/karen-epbx, 6,000 Units, Intravenous, Once per day on Mon Wed Fri  gabapentin, 100 mg, Oral,  TID  heparin (porcine), 2,000 Units, Intravenous, Once per day on Mon Wed Fri  heparin (porcine), 2,000 Units, Intravenous, Once per day on Mon Wed Fri  HYDROcodone-acetaminophen, 1 tablet, Oral, Once  Insulin Aspart, 2-12 Units, Subcutaneous, 4 times per day  lansoprazole, 30 mg, Oral, Q12H  midodrine, 7.5 mg, Oral, TID AC  rosuvastatin, 40 mg, Oral, Daily  sodium chloride 0.9 % flush, 10 mL, Intravenous, Q8H           acetaminophen    acetaminophen    albumin human    cyclobenzaprine    dextrose    dilTIAZem    heparin (porcine)    heparin (porcine)    HYDROcodone-acetaminophen    hydrocortisone-bacitracin-zinc oxide-nystatin    melatonin    muscle rub    sodium chloride    sodium chloride 0.9 % flush    traZODone        Dietary Orders (From admission, onward)       Start     Ordered    09/06/23 0800  Dietary Nutrition Supplements Other (See Comment); Add Diet Dr.Pepper and cranberry juice to each meal  Daily With Breakfast, Lunch & Dinner      Question Answer Comment   Select Supplement: Other (See Comment)    Other Add Diet  and cranberry juice to each meal        09/05/23 2022 09/05/23 1800  Dietary Nutrition Supplements Other (See Comment); Mighty shake with every meal,  Daily With Breakfast, Lunch & Dinner      Question Answer Comment   Select Supplement: Other (See Comment)    Other Mighty shake with every meal,        09/05/23 1359 09/05/23 1800  Dietary Nutrition Supplements Magic Cup  Daily With Lunch & Dinner      Comments: [place]   Question:  Select Supplement:  Answer:  Magic Cup    09/05/23 1359    08/17/23 1153  Diet: Regular/House Diet, Renal Diets, Diabetic Diets; Consistent Carbohydrate; Low Sodium (2-3g); Texture: Soft to Chew (NDD 3); Soft to Chew: Chopped Meat; Fluid Consistency: Thin (IDDSI 0)  Diet Effective Now        References:    Diet Order [place]   Question Answer Comment   Diets: Regular/House Diet    Diets: Renal Diets    Diets: Diabetic Diets    Diabetic Diet:  Consistent Carbohydrate    Renal Diet: Low Sodium (2-3g)    Texture: Soft to Chew (NDD 3)    Soft to Chew: Chopped Meat    Fluid Consistency: Thin (IDDSI 0)        08/17/23 1156                  History, physical exam, assessment and plan may have been partly or fully copied from before, but  changes made to the copied record to reflect care on the date of service. Part of the lab and imaging  reports auto populated and corrected. Some of this note may be an electronic transcription of spoken  language to printed text. This may permit erroneous, or at times, nonsensical words or phrases to be  inadvertently transcribed. Although I have reviewed the note for such errors, some may still exist.      This document has been electronically signed by Josefina Perez MD on September 13, 2023 08:53 CDT

## 2023-09-13 NOTE — ACP (ADVANCE CARE PLANNING)
"  Nutrition f/U:      Nutrition Diet Hx:  Pt reports \"dialysis wears me out\"  She states that she has just been so tired.  She is eating \"some\"    States that she is going to the NH today or tomorrow.     Weight history:  DBW:  142.3 (9/11)  CBW:  159.5# (9/7)  CBW:  152.5# (9/5)  CBW:  145.2 (8/31)  CBW:  148.6 (8/28)  CBW:  152 (8/21)  Admit Wt to DM on 7/28 156#  UBW:  Wt loss:    Labs: Na 130; Bun 30; Creat 4.01; Alb 2.3  Meds:  IV Alb; Alteplase; Amio; Cardizem; Colace; Marinol; SSi; Mg sulfate; Midodrine    Nutrition Prescription Order:  Regular/Renal/Diabetic/Soft texture/chopped; Mighty shake milkshakes with all meals; and magic cups twice daily    Evaluation of Nutrition intake:  ~33% average for the last 9 documented meals    Nutrition Assessment:  Po intake remains low even with pt on Marinol.  She is taking the mighty shakes at most meals.  Dialysis seems to really make her exhausted.  Wt dowon since last visit --it fluctuates widely with dialysis.  However, I am certain she has lost wt with her very complex recent medical hx.  She will voice no requests or complaints.  Discharge planning awaiting insurance approval.  Still requiring Midodrine at times.      Last BM:  9/10    Edema:  None noted    Wounds:  Left breast cellulitis and mastitis.      "

## 2023-09-13 NOTE — ACP (ADVANCE CARE PLANNING)
[facility]  [address] [place] [address]. [id number]  T - [phone]  F - [phone]     Progress Note          PATIENT  DEMOGRAPHICS   PATIENT NAME: Carol Sullivan                      PHYSICIAN: COY Chong  : 1940  MRN: [id number]   LOS: 0 days    Patient Care Team:  Len Seo MD as PCP - General  Blaire Camara APRN as Nurse Practitioner (General Surgery)  Subjective   SUBJECTIVE   BP stable, no new events.          Objective   OBJECTIVE   Vital Signs  Heart Rate:  [60-88] 88    T 99.0  HR 62  RR 20  /64  O2: 98% RA     No intake/output data recorded.    121/59 EXAM    Physical Exam  Constitutional:       Appearance: She is well-developed.   HENT:      Head: Normocephalic.      Left Ear: There is no impacted cerumen.      Nose: No rhinorrhea.   Eyes:      Pupils: Pupils are equal, round, and reactive to light.   Cardiovascular:      Rate and Rhythm: Normal rate and regular rhythm.      Heart sounds: Normal heart sounds.   Pulmonary:      Effort: Pulmonary effort is normal.      Breath sounds: Normal breath sounds.   Abdominal:      General: Bowel sounds are normal.      Palpations: Abdomen is soft.   Musculoskeletal:         General: No swelling.   Skin:     Coloration: Skin is not jaundiced.   Neurological:      General: No focal deficit present.      Mental Status: She is alert. She is disoriented.       RESULTS   Results Review:    Results from last 7 days   Lab Units 23  0401 23  0410 09/10/23  0653 23  0631 23  1053   SODIUM mmol/L 131* 130*  130* 129*   < > 133*   POTASSIUM mmol/L 3.6 4.0  4.0 4.2   < > 3.6   CHLORIDE mmol/L 96* 96*  96* 95*   < > 97*   CO2 mmol/L 24.0 25.0  25.0 23.0   < > 23.0   BUN mg/dL 24* 30*  30* 26*   < > 8   CREATININE mg/dL 3.17* 4.01*  4.01* 3.74*   < > 1.78*   CALCIUM mg/dL 8.3* 8.1*  8.1* 8.0*   < > 7.9*   BILIRUBIN mg/dL 0.5 0.5  --   --  0.8   ALK PHOS U/L 134* 137*  --   --  167*   ALT  (SGPT) U/L 5 <5  --   --  7   AST (SGOT) U/L 17 16  --   --  26   GLUCOSE mg/dL 87 97  97 112*   < > 146*    < > = values in this interval not displayed.         Estimated Creatinine Clearance: 13.4 mL/min (A) (by C-G formula based on SCr of 3.17 mg/dL (H)).    Results from last 7 days   Lab Units 09/13/23  0401 09/11/23 0410 09/08/23 0413   MAGNESIUM mg/dL 1.6 1.6 1.6               Results from last 7 days   Lab Units 09/13/23  0401 09/11/23  0410 09/08/23  0430   WBC 10*3/mm3 6.82 6.18 7.58   HEMOGLOBIN g/dL 9.6* 9.3* 9.0*   PLATELETS 10*3/mm3 359 346 323                   Imaging Results (Last 24 Hours)       Procedure Component Value Units Date/Time    US Venous Doppler Lower Extremity Right (duplex) [[id number]] Collected: 09/13/23 1331     Updated: 09/13/23 1336    Narrative:      RIGHT LOWER EXTREMITY VENOUS ULTRASOUND    HISTORY:  Follow-up deep vein thrombosis.    COMPARISON:  09/05/2023.    TECHNIQUE:  Real-time ultrasound imaging, color and spectral Doppler used to evaluate the  deep venous system of the leg(s) from the calf confluence to the common femoral  veins. Compression and augmentation techniques were utilized.    FINDINGS:  Redemonstration of mostly occlusive thrombus throughout the right leg from the  peroneal veins to the external iliac vein.  The superior margin of the thrombus  could not be visualized.      Impression:      No appreciable interval change in the short interval.                 MEDICATIONS    amiodarone, 200 mg, Oral, BID With Meals  apixaban, 10 mg, Oral, BID   Followed by  [START ON 9/14/2023] apixaban, 5 mg, Oral, BID  busPIRone, 5 mg, Oral, BID  dilTIAZem, 60 mg, Oral, Q6H  docusate sodium, 100 mg, Oral, BID  epoetin karen/karen-epbx, 6,000 Units, Intravenous, Once per day on Mon Wed Fri  gabapentin, 100 mg, Oral, TID  heparin (porcine), 2,000 Units, Intravenous, Once per day on Mon Wed Fri  heparin (porcine), 2,000 Units, Intravenous, Once per day on Mon Wed  Fri  HYDROcodone-acetaminophen, 1 tablet, Oral, Once  Insulin Aspart, 2-12 Units, Subcutaneous, 4 times per day  lansoprazole, 30 mg, Oral, Q12H  midodrine, 7.5 mg, Oral, TID AC  rosuvastatin, 40 mg, Oral, Daily  sodium chloride 0.9 % flush, 10 mL, Intravenous, Q8H           Assessment & Plan   ASSESSMENT / PLAN      * No active hospital problems. *      1. Acute kidney injury/ Acute tubular necrosis:  - Baseline unknown.   - Creatinine was 1.5 in 10/2022.   - UA 3+ protein, trace leukocytes, 0-2 RBC, 6-12 WBC, 2+ bacteria.   - Urine sodium 26. CT abd large rt retroperitoneal hematoma causing moderate rt hydronephrosis.  - Started HD 8/6/23  - Received call 9/1 with low BP, started levophed. BP is better now and off levophed. Cardiology has stopped Imdur.  - HD today     2. Renal failure retroperitoneal bleed s/p right J stent for hydronephrosis   - s/p J stent placement by urology 8/11  - Continue to monitor renal function   - Tobar replaced, Dr. Garcia to see again     3. Hypertension/ Afib with RVR:   - Blood pressure is low, but now better and off levophed     4. UTI E.fecalis:   - became septic with mild hydro Dr Garcia is consulted. Now stent in place . E.fecalis     5. Cdiff:   -not on po vanc now      6. Hyponatremia:   - Sodium is stable     7. Anemia / retroperitoneal bleed:  - Hemoglobin is acceptable at 9.6, B12 and folate are good on epogen      8.  AMS improving    9.  RLE edema- checked doppler and she has extensive DVT. On heparin gtt per primary team.    Copied text in this note has been reviewed and is accurate as of 9/13/2023.                This document has been electronically signed by COY Chong on September 13, 2023 16:00 CDT      For this patient encounter, I have reviewed the Nurse Practitioner's documentation, medical decision making, and treatment plan and personally spent time with the patient.

## 2023-09-14 ENCOUNTER — OUTSIDE FACILITY SERVICE (OUTPATIENT)
Dept: PULMONOLOGY | Facility: CLINIC | Age: 83
End: 2023-09-14
Payer: MEDICARE

## 2023-09-14 LAB
BACTERIA UR QL AUTO: ABNORMAL /HPF
GLUCOSE BLDC GLUCOMTR-MCNC: 107 MG/DL (ref 70–130)
GLUCOSE BLDC GLUCOMTR-MCNC: 113 MG/DL (ref 70–130)
GLUCOSE BLDC GLUCOMTR-MCNC: 85 MG/DL (ref 70–130)
GLUCOSE BLDC GLUCOMTR-MCNC: 98 MG/DL (ref 70–130)
HYALINE CASTS UR QL AUTO: ABNORMAL /LPF
RBC # UR STRIP: ABNORMAL /HPF
REF LAB TEST METHOD: ABNORMAL
SARS-COV-2 RNA RESP QL NAA+PROBE: NOT DETECTED
SQUAMOUS #/AREA URNS HPF: ABNORMAL /HPF
WBC # UR STRIP: ABNORMAL /HPF
YEAST URNS QL MICRO: ABNORMAL /HPF

## 2023-09-14 PROCEDURE — 87635 SARS-COV-2 COVID-19 AMP PRB: CPT | Performed by: INTERNAL MEDICINE

## 2023-09-14 PROCEDURE — 97530 THERAPEUTIC ACTIVITIES: CPT

## 2023-09-14 PROCEDURE — 97116 GAIT TRAINING THERAPY: CPT

## 2023-09-14 PROCEDURE — 82948 REAGENT STRIP/BLOOD GLUCOSE: CPT

## 2023-09-14 PROCEDURE — 97535 SELF CARE MNGMENT TRAINING: CPT

## 2023-09-14 PROCEDURE — 97110 THERAPEUTIC EXERCISES: CPT

## 2023-09-14 NOTE — ACP (ADVANCE CARE PLANNING)
[facility]  [address] [place] [address]. [address]  T - [phone]  F - [phone]     Progress Note          PATIENT  DEMOGRAPHICS   PATIENT NAME: Carol Sullivan                      PHYSICIAN: COY Chong  : 1940  MRN: [id number]   LOS: 0 days    Patient Care Team:  Len Seo MD as PCP - General  Blaire Camara APRN as Nurse Practitioner (General Surgery)  Subjective   SUBJECTIVE   BP stable, no new events.          Objective   OBJECTIVE   Vital Signs  Heart Rate:  [60-77] 77    T 99..3  HR 62  RR 20  /82  O2: 98% RA     No intake/output data recorded.    121/59 EXAM    Physical Exam  Constitutional:       Appearance: She is well-developed.   HENT:      Head: Normocephalic.      Left Ear: There is no impacted cerumen.      Nose: No rhinorrhea.   Eyes:      Pupils: Pupils are equal, round, and reactive to light.   Cardiovascular:      Rate and Rhythm: Normal rate and regular rhythm.      Heart sounds: Normal heart sounds.   Pulmonary:      Effort: Pulmonary effort is normal.      Breath sounds: Normal breath sounds.   Abdominal:      General: Bowel sounds are normal.      Palpations: Abdomen is soft.   Musculoskeletal:         General: No swelling.   Skin:     Coloration: Skin is not jaundiced.   Neurological:      General: No focal deficit present.      Mental Status: She is alert. She is disoriented.       RESULTS   Results Review:    Results from last 7 days   Lab Units 23  0401 23  0410 09/10/23  0653 23  0631 23  1053   SODIUM mmol/L 131* 130*  130* 129*   < > 133*   POTASSIUM mmol/L 3.6 4.0  4.0 4.2   < > 3.6   CHLORIDE mmol/L 96* 96*  96* 95*   < > 97*   CO2 mmol/L 24.0 25.0  25.0 23.0   < > 23.0   BUN mg/dL 24* 30*  30* 26*   < > 8   CREATININE mg/dL 3.17* 4.01*  4.01* 3.74*   < > 1.78*   CALCIUM mg/dL 8.3* 8.1*  8.1* 8.0*   < > 7.9*   BILIRUBIN mg/dL 0.5 0.5  --   --  0.8   ALK PHOS U/L 134* 137*  --   --  167*   ALT  (SGPT) U/L 5 <5  --   --  7   AST (SGOT) U/L 17 16  --   --  26   GLUCOSE mg/dL 87 97  97 112*   < > 146*    < > = values in this interval not displayed.         Estimated Creatinine Clearance: 13.4 mL/min (A) (by C-G formula based on SCr of 3.17 mg/dL (H)).    Results from last 7 days   Lab Units 09/13/23  0401 09/11/23 0410 09/08/23 0413   MAGNESIUM mg/dL 1.6 1.6 1.6               Results from last 7 days   Lab Units 09/13/23  0401 09/11/23 0410 09/08/23  0430   WBC 10*3/mm3 6.82 6.18 7.58   HEMOGLOBIN g/dL 9.6* 9.3* 9.0*   PLATELETS 10*3/mm3 359 346 323                   Imaging Results (Last 24 Hours)       Procedure Component Value Units Date/Time    US Venous Doppler Lower Extremity Right (duplex) [[id number]] Collected: 09/13/23 1331     Updated: 09/13/23 1336    Narrative:      RIGHT LOWER EXTREMITY VENOUS ULTRASOUND    HISTORY:  Follow-up deep vein thrombosis.    COMPARISON:  09/05/2023.    TECHNIQUE:  Real-time ultrasound imaging, color and spectral Doppler used to evaluate the  deep venous system of the leg(s) from the calf confluence to the common femoral  veins. Compression and augmentation techniques were utilized.    FINDINGS:  Redemonstration of mostly occlusive thrombus throughout the right leg from the  peroneal veins to the external iliac vein.  The superior margin of the thrombus  could not be visualized.      Impression:      No appreciable interval change in the short interval.                 MEDICATIONS    amiodarone, 200 mg, Oral, BID With Meals  apixaban, 5 mg, Oral, BID  busPIRone, 5 mg, Oral, BID  dilTIAZem, 60 mg, Oral, Q6H  docusate sodium, 100 mg, Oral, BID  epoetin karen/karen-epbx, 6,000 Units, Intravenous, Once per day on Mon Wed Fri  gabapentin, 100 mg, Oral, TID  heparin (porcine), 2,000 Units, Intravenous, Once per day on Mon Wed Fri  heparin (porcine), 2,000 Units, Intravenous, Once per day on Mon Wed Fri  HYDROcodone-acetaminophen, 1 tablet, Oral, Once  Insulin Aspart, 2-12  Units, Subcutaneous, 4 times per day  lansoprazole, 30 mg, Oral, Q12H  midodrine, 7.5 mg, Oral, TID AC  rosuvastatin, 40 mg, Oral, Daily  sodium chloride 0.9 % flush, 10 mL, Intravenous, Q8H           Assessment & Plan   ASSESSMENT / PLAN      * No active hospital problems. *      1. Acute kidney injury/ Acute tubular necrosis:  - Baseline unknown.   - Creatinine was 1.5 in 10/2022.   - UA 3+ protein, trace leukocytes, 0-2 RBC, 6-12 WBC, 2+ bacteria.   - Urine sodium 26. CT abd large rt retroperitoneal hematoma causing moderate rt hydronephrosis.  - Started HD 8/6/23  - Received call 9/1 with low BP, started levophed. BP is better now and off levophed. Cardiology has stopped Imdur.  - HD tomorrow     2. Renal failure retroperitoneal bleed s/p right J stent for hydronephrosis   - s/p J stent placement by urology 8/11  - Continue to monitor renal function   - Tobar replaced, Dr. Garcia to see again     3. Hypertension/ Afib with RVR:   - Blood pressure is low, but now better and off levophed     4. UTI E.fecalis:   - became septic with mild hydro Dr Garcia is consulted. Now stent in place . E.fecalis     5. Cdiff:   -not on po vanc now      6. Hyponatremia:   - Sodium is stable     7. Anemia / retroperitoneal bleed:  - Hemoglobin is acceptable at 9.6, B12 and folate are good on epogen      8.  AMS improving    9.  RLE edema- checked doppler and she has extensive DVT. On heparin gtt per primary team.    Copied text in this note has been reviewed and is accurate as of 9/14/2023.                This document has been electronically signed by COY Chong on September 14, 2023 12:49 CDT      For this patient encounter, I have reviewed the Nurse Practitioner's documentation, medical decision making, and treatment plan and personally spent time with the patient.

## 2023-09-15 ENCOUNTER — OUTSIDE FACILITY SERVICE (OUTPATIENT)
Dept: PULMONOLOGY | Facility: CLINIC | Age: 83
End: 2023-09-15
Payer: MEDICARE

## 2023-09-15 VITALS — BODY MASS INDEX: 25.38 KG/M2 | WEIGHT: 152.5 LBS | HEART RATE: 87 BPM

## 2023-09-15 LAB
ALBUMIN SERPL-MCNC: 2.6 G/DL (ref 3.5–5.2)
ALBUMIN/GLOB SERPL: 0.7 G/DL
ALP SERPL-CCNC: 147 U/L (ref 39–117)
ALT SERPL W P-5'-P-CCNC: 6 U/L (ref 1–33)
ANION GAP SERPL CALCULATED.3IONS-SCNC: 10 MMOL/L (ref 5–15)
AST SERPL-CCNC: 17 U/L (ref 1–32)
BACTERIA SPEC AEROBE CULT: ABNORMAL
BASOPHILS # BLD AUTO: 0.11 10*3/MM3 (ref 0–0.2)
BASOPHILS NFR BLD AUTO: 1.7 % (ref 0–1.5)
BILIRUB SERPL-MCNC: 0.6 MG/DL (ref 0–1.2)
BUN SERPL-MCNC: 23 MG/DL (ref 8–23)
BUN/CREAT SERPL: 7.4 (ref 7–25)
CALCIUM SPEC-SCNC: 8.7 MG/DL (ref 8.6–10.5)
CHLORIDE SERPL-SCNC: 95 MMOL/L (ref 98–107)
CO2 SERPL-SCNC: 28 MMOL/L (ref 22–29)
CREAT SERPL-MCNC: 3.1 MG/DL (ref 0.57–1)
DEPRECATED RDW RBC AUTO: 57.6 FL (ref 37–54)
EGFRCR SERPLBLD CKD-EPI 2021: 14.5 ML/MIN/1.73
EOSINOPHIL # BLD AUTO: 0.29 10*3/MM3 (ref 0–0.4)
EOSINOPHIL NFR BLD AUTO: 4.4 % (ref 0.3–6.2)
ERYTHROCYTE [DISTWIDTH] IN BLOOD BY AUTOMATED COUNT: 17.2 % (ref 12.3–15.4)
GLOBULIN UR ELPH-MCNC: 3.9 GM/DL
GLUCOSE BLDC GLUCOMTR-MCNC: 123 MG/DL (ref 70–130)
GLUCOSE BLDC GLUCOMTR-MCNC: 143 MG/DL (ref 70–130)
GLUCOSE SERPL-MCNC: 93 MG/DL (ref 65–99)
HCT VFR BLD AUTO: 33.1 % (ref 34–46.6)
HGB BLD-MCNC: 10.3 G/DL (ref 12–15.9)
IMM GRANULOCYTES # BLD AUTO: 0.05 10*3/MM3 (ref 0–0.05)
IMM GRANULOCYTES NFR BLD AUTO: 0.8 % (ref 0–0.5)
LYMPHOCYTES # BLD AUTO: 1.79 10*3/MM3 (ref 0.7–3.1)
LYMPHOCYTES NFR BLD AUTO: 26.9 % (ref 19.6–45.3)
MAGNESIUM SERPL-MCNC: 1.8 MG/DL (ref 1.6–2.4)
MCH RBC QN AUTO: 28.5 PG (ref 26.6–33)
MCHC RBC AUTO-ENTMCNC: 31.1 G/DL (ref 31.5–35.7)
MCV RBC AUTO: 91.4 FL (ref 79–97)
MONOCYTES # BLD AUTO: 0.85 10*3/MM3 (ref 0.1–0.9)
MONOCYTES NFR BLD AUTO: 12.8 % (ref 5–12)
NEUTROPHILS NFR BLD AUTO: 3.57 10*3/MM3 (ref 1.7–7)
NEUTROPHILS NFR BLD AUTO: 53.4 % (ref 42.7–76)
NRBC BLD AUTO-RTO: 0 /100 WBC (ref 0–0.2)
PLATELET # BLD AUTO: 388 10*3/MM3 (ref 140–450)
PMV BLD AUTO: 8.6 FL (ref 6–12)
POTASSIUM SERPL-SCNC: 4.2 MMOL/L (ref 3.5–5.2)
PROT SERPL-MCNC: 6.5 G/DL (ref 6–8.5)
RBC # BLD AUTO: 3.62 10*6/MM3 (ref 3.77–5.28)
SODIUM SERPL-SCNC: 133 MMOL/L (ref 136–145)
WBC NRBC COR # BLD: 6.66 10*3/MM3 (ref 3.4–10.8)

## 2023-09-15 PROCEDURE — 83735 ASSAY OF MAGNESIUM: CPT | Performed by: INTERNAL MEDICINE

## 2023-09-15 PROCEDURE — 85025 COMPLETE CBC W/AUTO DIFF WBC: CPT | Performed by: INTERNAL MEDICINE

## 2023-09-15 PROCEDURE — 25010000002 EPOETIN ALFA PER 1000 UNITS: Performed by: INTERNAL MEDICINE

## 2023-09-15 PROCEDURE — P9047 ALBUMIN (HUMAN), 25%, 50ML: HCPCS | Performed by: INTERNAL MEDICINE

## 2023-09-15 PROCEDURE — 82948 REAGENT STRIP/BLOOD GLUCOSE: CPT

## 2023-09-15 PROCEDURE — 80053 COMPREHEN METABOLIC PANEL: CPT | Performed by: INTERNAL MEDICINE

## 2023-09-15 PROCEDURE — 25010000002 ALBUMIN HUMAN 25% PER 50 ML: Performed by: INTERNAL MEDICINE

## 2023-09-15 PROCEDURE — 97535 SELF CARE MNGMENT TRAINING: CPT

## 2023-09-15 NOTE — ACP (ADVANCE CARE PLANNING)
[facility] @ [facility]  DISCHARGE SUMMARY    PATIENT NAME: Carol Sullivan      PHYSICIAN: Josefina Perez MD  : 1940        MRN: [id number]  Patient Care Team:  Len Seo MD as PCP - Blaire Love APRN as Nurse Practitioner (General Surgery)      Date of Discharge:  9/15/2023    Discharge Diagnosis:   Sepsis Secondary to Below.  Somnolence [R40.0]  Mastitis [N61.0]  Sepsis [A41.9]  Sepsis, due to unspecified organism, unspecified whether acute organ dysfunction present [A41.9]   ESRD on Hemodialysis.  Hematuria    Presenting Problem/History of Present Illness  Chief Complaint: Patient was brought to emergency room at [facility] for confusion.     History of Present Illness: 82-year-old female with significant medical history of hypertension, dyslipidemia, gastroesophageal reflux disease who was brought into the emergency room for confusion.  Patient was also found to have erythema and cellulitis of left breast.  Patient was thought of having mastitis.  Patient's condition was monitored now.  Patient had gradual decrease in her urine output and worsening in her kidney function was noted.  Patient off having renal failure which was worsening over time.  Patient subsequently required hemodialysis patient was placed on hemodialysis per nephrology recommendation.  Patient was admitted to the hospital setting for antibiotics and hemodialysis.  Patient's condition seem to be stabilized however patient remained extremely weak and deconditioned hemodialysis patient was recommended to be admitted to our facility for further therapy and rehabilitation along with monitoring for hemodialysis.    Hospital Course  Patient is a 83 y.o. female who came to our facility for deconditioning and continued hemodialysis.  Throughout stay, patient appetite was poor as well as her consumption.  She was started on Marinol and encouraged by  family and staff and her appetite was noted to improve.  She continued on hemodialysis on M-W-F, tolerated this well.  She continued with therapy services and went through a point where she was refusing to participate due to increased weakness and fatigue.  Patient eventually began participating and was able to ambulate with assist prior to discharge.  She developed a DVT to her right femoral vein, superficial femoral vein, and popliteal vein and was started on Eliquis starter pack on 09/05/2023.  On 09/12/2023, she developed hematuria.  Urine culture results are still pending at this time.  Her H&H has remained stable and anticoagulation was continued.  She has a follow up appointment with Dr. Garcia scheduled for 09/25/2023.  Today, she is lying in bed.  She is alert and fatigued due to dialysis this AM.  She denies any pain and has no s/s of distress.  She is hemodynamically stable for discharge to SNF for continued rehab.      Procedures Performed:  dialysis MWF    Consults:   Consults       Date and Time Order Name Status Description    9/8/2023 10:11 AM Inpatient Cardiology Consult      8/21/2023 10:55 AM Inpatient Cardiology Consult      8/18/2023  1:07 PM Inpatient Cardiology Consult      8/17/2023 11:56 AM Inpatient Nephrology Consult      8/6/2023 12:40 PM Inpatient Cardiothoracic Surgery Consult Completed     7/28/2023  7:33 PM Inpatient Nephrology Consult Completed             Pertinent Test Results:   Lab Results (last 24 hours)       Procedure Component Value Units Date/Time    Urine Culture - Urine, Urine, Clean Catch [[id number]]  (Abnormal) Collected: 09/13/23 2301    Specimen: Urine, Clean Catch Updated: 09/15/23 1141     Urine Culture Yeast isolated     Comment: No further workup.       Narrative:      Colonization of the urinary tract without infection is common. Treatment is discouraged unless the patient is symptomatic, pregnant, or undergoing an invasive urologic procedure.    POC Glucose Once  [[id number]]  (Normal) Collected: 09/15/23 1117    Specimen: Blood Updated: 09/15/23 1137     Glucose 123 mg/dL      Comment: : [id number] KAITLYNN DOEIEMeter ID: [id number]       POC Glucose Once [[id number]]  (Abnormal) Collected: 09/15/23 0508    Specimen: Blood Updated: 09/15/23 0520     Glucose 143 mg/dL      Comment: Result Not ConfirmedOperator: [id number] MATILDA AMBERMeter ID: [id number]       Magnesium [[id number]]  (Normal) Collected: 09/15/23 0430    Specimen: Blood Updated: 09/15/23 0504     Magnesium 1.8 mg/dL     Comprehensive Metabolic Panel [[id number]]  (Abnormal) Collected: 09/15/23 0430    Specimen: Blood Updated: 09/15/23 0504     Glucose 93 mg/dL      BUN 23 mg/dL      Creatinine 3.10 mg/dL      Sodium 133 mmol/L      Potassium 4.2 mmol/L      Chloride 95 mmol/L      CO2 28.0 mmol/L      Calcium 8.7 mg/dL      Total Protein 6.5 g/dL      Albumin 2.6 g/dL      ALT (SGPT) 6 U/L      AST (SGOT) 17 U/L      Alkaline Phosphatase 147 U/L      Total Bilirubin 0.6 mg/dL      Globulin 3.9 gm/dL      A/G Ratio 0.7 g/dL      BUN/Creatinine Ratio 7.4     Anion Gap 10.0 mmol/L      eGFR 14.5 mL/min/1.73      Comment: <15 Indicative of kidney failure       Narrative:      GFR Normal >60  Chronic Kidney Disease <60  Kidney Failure <15    The GFR formula is only valid for adults with stable renal function between ages 18 and 70.    CBC & Differential [[id number]]  (Abnormal) Collected: 09/15/23 0430    Specimen: Blood Updated: 09/15/23 0448    Narrative:      The following orders were created for panel order CBC & Differential.  Procedure                               Abnormality         Status                     ---------                               -----------         ------                     CBC Auto Differential[[id number]]        Abnormal            Final result                 Please view results for these tests on the individual orders.    CBC Auto Differential [[id number]]  (Abnormal) Collected: 09/15/23  0430    Specimen: Blood Updated: 09/15/23 0448     WBC 6.66 10*3/mm3      RBC 3.62 10*6/mm3      Hemoglobin 10.3 g/dL      Hematocrit 33.1 %      MCV 91.4 fL      MCH 28.5 pg      MCHC 31.1 g/dL      RDW 17.2 %      RDW-SD 57.6 fl      MPV 8.6 fL      Platelets 388 10*3/mm3      Neutrophil % 53.4 %      Lymphocyte % 26.9 %      Monocyte % 12.8 %      Eosinophil % 4.4 %      Basophil % 1.7 %      Immature Grans % 0.8 %      Neutrophils, Absolute 3.57 10*3/mm3      Lymphocytes, Absolute 1.79 10*3/mm3      Monocytes, Absolute 0.85 10*3/mm3      Eosinophils, Absolute 0.29 10*3/mm3      Basophils, Absolute 0.11 10*3/mm3      Immature Grans, Absolute 0.05 10*3/mm3      nRBC 0.0 /100 WBC     POC Glucose Once [[id number]]  (Normal) Collected: 09/14/23 2223    Specimen: Blood Updated: 09/14/23 2237     Glucose 113 mg/dL      Comment: Result Not ConfirmedOperator: [id number] Clinic Medina HospitalMATILDA AMBERMeter ID: [id number]       COVID PRE-OP / PRE-PROCEDURE SCREENING ORDER (NO ISOLATION) - Swab, Nasopharynx [[id number]]  (Normal) Collected: 09/14/23 1651    Specimen: Swab from Nasopharynx Updated: 09/14/23 1723    Narrative:      The following orders were created for panel order COVID PRE-OP / PRE-PROCEDURE SCREENING ORDER (NO ISOLATION) - Swab, Nasopharynx.  Procedure                               Abnormality         Status                     ---------                               -----------         ------                     COVID-19,CEPHEID/ANTONIETTA,CO...[[id number]]  Normal              Final result                 Please view results for these tests on the individual orders.    COVID-19,CEPHEID/ANTONIETTA,COR/MAIKOL/PAD/DEBBIE/MAD IN-HOUSE(OR EMERGENT/ADD-ON),NP SWAB IN TRANSPORT MEDIA 3-4 HR TAT, RT-PCR - Swab, Nasopharynx [[id number]]  (Normal) Collected: 09/14/23 1651    Specimen: Swab from Nasopharynx Updated: 09/14/23 1723     COVID19 Not Detected    Narrative:      Fact sheet for providers: https://www.fda.gov/media/440845/download     Fact sheet for  patients: https://www.fda.gov/media/316371/download  Fact sheet for providers: https://www.fda.gov/media/951197/download    Fact sheet for patients: https://www.fda.gov/media/779892/download    Test performed by PCR.    POC Glucose Once [[id number]]  (Normal) Collected: 09/14/23 1659    Specimen: Blood Updated: 09/14/23 1716     Glucose 98 mg/dL      Comment: : [id number] CURRAN DEANNAMeter ID: [id number]       POC Glucose Once [[id number]]  (Normal) Collected: 09/14/23 1147    Specimen: Blood Updated: 09/14/23 1213     Glucose 107 mg/dL      Comment: : [id number] CURRAN DEANNAMeter ID: [id number]               Condition on Discharge:  stable  Vital Signs  Temp: 98 F         Heart Rate: 69         Resp: 18          Blood Pressure: 163/72         Pulse Ox: 97 %    Physical Exam:   Physical Exam  Vitals and nursing note reviewed.   Constitutional:       Appearance: She is well-developed.   HENT:      Head: Normocephalic and atraumatic.      Nose: Nose normal.   Eyes:      Conjunctiva/sclera: Conjunctivae normal.      Pupils: Pupils are equal, round, and reactive to light.   Neck:      Thyroid: No thyromegaly.      Vascular: No JVD.      Trachea: No tracheal deviation.   Cardiovascular:      Rate and Rhythm: Normal rate and regular rhythm.      Heart sounds: Normal heart sounds.   Pulmonary:      Effort: Pulmonary effort is normal. No respiratory distress.      Breath sounds: Normal breath sounds. No wheezing or rales.   Chest:      Chest wall: No tenderness.   Abdominal:      General: Bowel sounds are normal. There is no distension.      Palpations: Abdomen is soft.      Tenderness: There is no abdominal tenderness. There is no guarding or rebound.   Musculoskeletal:         General: Normal range of motion.      Cervical back: Normal range of motion and neck supple.   Lymphadenopathy:      Cervical: No cervical adenopathy.   Skin:     General: Skin is warm and dry.      Comments: Intact   Neurological:       Mental Status: She is alert and oriented to person, place, and time.      Cranial Nerves: No cranial nerve deficit.      Deep Tendon Reflexes: Reflexes are normal and symmetric.       LABS Reviewed Prior to Discharge:  Results from last 7 days   Lab Units 09/15/23  0430 09/13/23  0401 09/11/23  0410 09/10/23  0653 09/09/23  0631   SODIUM mmol/L 133* 131* 130*  130* 129* 129*   POTASSIUM mmol/L 4.2 3.6 4.0  4.0 4.2 4.1   CHLORIDE mmol/L 95* 96* 96*  96* 95* 93*   CO2 mmol/L 28.0 24.0 25.0  25.0 23.0 24.0   BUN mg/dL 23 24* 30*  30* 26* 17   CREATININE mg/dL 3.10* 3.17* 4.01*  4.01* 3.74* 3.08*   GLUCOSE mg/dL 93 87 97  97 112* 98   CALCIUM mg/dL 8.7 8.3* 8.1*  8.1* 8.0* 8.1*   BILIRUBIN mg/dL 0.6 0.5 0.5  --   --    ALK PHOS U/L 147* 134* 137*  --   --    ALT (SGPT) U/L 6 5 <5  --   --    AST (SGOT) U/L 17 17 16  --   --        Results from last 7 days   Lab Units 09/15/23  0430 09/13/23  0401 09/11/23  0410   MAGNESIUM mg/dL 1.8 1.6 1.6       Results from last 7 days   Lab Units 09/15/23  0430 09/13/23  0401 09/11/23  0410   WBC 10*3/mm3 6.66 6.82 6.18   HEMOGLOBIN g/dL 10.3* 9.6* 9.3*   HEMATOCRIT % 33.1* 30.4* 29.8*   PLATELETS 10*3/mm3 388 359 346         Discharge Disposition:  SNF      Discharge Medications  See discharge medication reconciliation form.      Discharge Diet:    Dietary Orders (From admission, onward)       Start     Ordered    09/06/23 0800  Dietary Nutrition Supplements Other (See Comment); Add Diet Dr.Pepper and cranberry juice to each meal  Daily With Breakfast, Lunch & Dinner      Question Answer Comment   Select Supplement: Other (See Comment)    Other Add Diet Dr.Pepper and cranberry juice to each meal        09/05/23 2022 09/05/23 1800  Dietary Nutrition Supplements Other (See Comment); Mighty shake with every meal,  Daily With Breakfast, Lunch & Dinner      Question Answer Comment   Select Supplement: Other (See Comment)    Other Mighty shake with every meal,        09/05/23  1359    09/05/23 1800  Dietary Nutrition Supplements Magic Cup  Daily With Lunch & Dinner      Comments: [place]   Question:  Select Supplement:  Answer:  Magic Cup    09/05/23 1359    08/17/23 1153  Diet: Regular/House Diet, Renal Diets, Diabetic Diets; Consistent Carbohydrate; Low Sodium (2-3g); Texture: Soft to Chew (NDD 3); Soft to Chew: Chopped Meat; Fluid Consistency: Thin (IDDSI 0)  Diet Effective Now        References:    Diet Order [place]   Question Answer Comment   Diets: Regular/House Diet    Diets: Renal Diets    Diets: Diabetic Diets    Diabetic Diet: Consistent Carbohydrate    Renal Diet: Low Sodium (2-3g)    Texture: Soft to Chew (NDD 3)    Soft to Chew: Chopped Meat    Fluid Consistency: Thin (IDDSI 0)        08/17/23 1156                    Activity at Discharge: As tolerated    Follow-up Appointments  Future Appointments   Date Time Provider Department Center   2/20/2024  1:00 PM Dimitri Peterson MD [facility] None       Test Results Pending at Discharge:  Urine culture pending         Time: Discharge 38 min  History, physical exam, assessment and plan may have been partly or fully copied from before, but  changes made to the copied record to reflect care on the date of service. Part of the lab and imaging  reports auto populated and corrected. Some of this note may be an electronic transcription of spoken  language to printed text. This may permit erroneous, or at times, nonsensical words or phrases to be  inadvertently transcribed. Although I have reviewed the note for such errors, some may still exist.      This document has been electronically signed by Josefina Perez MD on September 15, 2023 12:00 CDT

## 2023-09-16 ENCOUNTER — OUTSIDE FACILITY SERVICE (OUTPATIENT)
Dept: PULMONOLOGY | Facility: CLINIC | Age: 83
End: 2023-09-16
Payer: MEDICARE

## 2023-09-17 ENCOUNTER — OUTSIDE FACILITY SERVICE (OUTPATIENT)
Dept: PULMONOLOGY | Facility: CLINIC | Age: 83
End: 2023-09-17
Payer: MEDICARE

## 2023-09-25 ENCOUNTER — APPOINTMENT (OUTPATIENT)
Dept: CT IMAGING | Facility: HOSPITAL | Age: 83
End: 2023-09-25
Payer: MEDICARE

## 2023-09-25 ENCOUNTER — APPOINTMENT (OUTPATIENT)
Dept: GENERAL RADIOLOGY | Facility: HOSPITAL | Age: 83
End: 2023-09-25
Payer: MEDICARE

## 2023-09-25 ENCOUNTER — HOSPITAL ENCOUNTER (INPATIENT)
Facility: HOSPITAL | Age: 83
LOS: 4 days | Discharge: STILL ADMITTED OR ADMINISTRATIVE | End: 2023-09-30
Attending: EMERGENCY MEDICINE | Admitting: INTERNAL MEDICINE
Payer: MEDICARE

## 2023-09-25 DIAGNOSIS — N39.0 ACUTE UTI: ICD-10-CM

## 2023-09-25 DIAGNOSIS — E83.42 HYPOMAGNESEMIA: ICD-10-CM

## 2023-09-25 DIAGNOSIS — R58 RETROPERITONEAL HEMORRHAGE: ICD-10-CM

## 2023-09-25 DIAGNOSIS — Z74.09 IMPAIRED MOBILITY AND ADLS: ICD-10-CM

## 2023-09-25 DIAGNOSIS — E87.6 HYPOKALEMIA: ICD-10-CM
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
## 2023-09-25 DIAGNOSIS — R41.82 ALTERED MENTAL STATUS, UNSPECIFIED ALTERED MENTAL STATUS TYPE: Primary | ICD-10-CM

## 2023-09-25 DIAGNOSIS — Z74.09 IMPAIRED FUNCTIONAL MOBILITY, BALANCE, GAIT, AND ENDURANCE: ICD-10-CM

## 2023-09-25 DIAGNOSIS — Z78.9 IMPAIRED MOBILITY AND ADLS: ICD-10-CM

## 2023-09-25 LAB
A-A DO2: 36.8 MMHG
ALBUMIN SERPL-MCNC: 2.9 G/DL (ref 3.5–5.2)
ALBUMIN SERPL-MCNC: 3.2 G/DL (ref 3.5–5.2)
ALBUMIN/GLOB SERPL: 0.7 G/DL
ALBUMIN/GLOB SERPL: 0.8 G/DL
ALP SERPL-CCNC: 149 U/L (ref 39–117)
ALP SERPL-CCNC: 155 U/L (ref 39–117)
ALT SERPL W P-5'-P-CCNC: 11 U/L (ref 1–33)
ALT SERPL W P-5'-P-CCNC: 12 U/L (ref 1–33)
ANION GAP SERPL CALCULATED.3IONS-SCNC: 10 MMOL/L (ref 5–15)
ANION GAP SERPL CALCULATED.3IONS-SCNC: 9 MMOL/L (ref 5–15)
ARTERIAL PATENCY WRIST A: POSITIVE
AST SERPL-CCNC: 33 U/L (ref 1–32)
AST SERPL-CCNC: 34 U/L (ref 1–32)
ATMOSPHERIC PRESS: 751 MMHG
BACTERIA UR QL AUTO: ABNORMAL /HPF
BASE EXCESS BLDA CALC-SCNC: 4.9 MMOL/L (ref 0–2)
BASOPHILS # BLD AUTO: 0.06 10*3/MM3 (ref 0–0.2)
BASOPHILS NFR BLD AUTO: 1.2 % (ref 0–1.5)
BDY SITE: ABNORMAL
BILIRUB SERPL-MCNC: 0.6 MG/DL (ref 0–1.2)
BILIRUB SERPL-MCNC: 0.7 MG/DL (ref 0–1.2)
BILIRUB UR QL STRIP: NEGATIVE
BUN SERPL-MCNC: 11 MG/DL (ref 8–23)
BUN SERPL-MCNC: 11 MG/DL (ref 8–23)
BUN/CREAT SERPL: 6.1 (ref 7–25)
BUN/CREAT SERPL: 6.2 (ref 7–25)
CA-I BLD-MCNC: 4.16 MG/DL (ref 4.6–5.6)
CALCIUM SPEC-SCNC: 8.4 MG/DL (ref 8.6–10.5)
CALCIUM SPEC-SCNC: 8.5 MG/DL (ref 8.6–10.5)
CHLORIDE SERPL-SCNC: 91 MMOL/L (ref 98–107)
CHLORIDE SERPL-SCNC: 92 MMOL/L (ref 98–107)
CLARITY UR: ABNORMAL
CO2 SERPL-SCNC: 27 MMOL/L (ref 22–29)
CO2 SERPL-SCNC: 29 MMOL/L (ref 22–29)
COHGB MFR BLD: 2 % (ref 0–5)
COLOR UR: ABNORMAL
CREAT SERPL-MCNC: 1.77 MG/DL (ref 0.57–1)
CREAT SERPL-MCNC: 1.79 MG/DL (ref 0.57–1)
D-LACTATE SERPL-SCNC: 1 MMOL/L (ref 0.5–2)
DEPRECATED RDW RBC AUTO: 49.1 FL (ref 37–54)
EGFRCR SERPLBLD CKD-EPI 2021: 28 ML/MIN/1.73
EGFRCR SERPLBLD CKD-EPI 2021: 28.4 ML/MIN/1.73
EOSINOPHIL # BLD AUTO: 0.11 10*3/MM3 (ref 0–0.4)
EOSINOPHIL NFR BLD AUTO: 2.2 % (ref 0.3–6.2)
ERYTHROCYTE [DISTWIDTH] IN BLOOD BY AUTOMATED COUNT: 14.8 % (ref 12.3–15.4)
FLUAV SUBTYP SPEC NAA+PROBE: NOT DETECTED
FLUBV RNA ISLT QL NAA+PROBE: NOT DETECTED
GLOBULIN UR ELPH-MCNC: 3.9 GM/DL
GLOBULIN UR ELPH-MCNC: 3.9 GM/DL
GLUCOSE BLDA-MCNC: 97 MG/DL (ref 65–95)
GLUCOSE SERPL-MCNC: 95 MG/DL (ref 65–99)
GLUCOSE SERPL-MCNC: 98 MG/DL (ref 65–99)
GLUCOSE UR STRIP-MCNC: NEGATIVE MG/DL
HCO3 BLDA-SCNC: 29.6 MMOL/L (ref 20–26)
HCT VFR BLD AUTO: 37 % (ref 34–46.6)
HCT VFR BLD CALC: 35 % (ref 38–51)
HGB BLD-MCNC: 11.5 G/DL (ref 12–15.9)
HGB BLDA-MCNC: 11.4 G/DL (ref 13.5–17.5)
HGB UR QL STRIP.AUTO: ABNORMAL
HOLD SPECIMEN: NORMAL
HYALINE CASTS UR QL AUTO: ABNORMAL /LPF
IMM GRANULOCYTES # BLD AUTO: 0.04 10*3/MM3 (ref 0–0.05)
IMM GRANULOCYTES NFR BLD AUTO: 0.8 % (ref 0–0.5)
KETONES UR QL STRIP: NEGATIVE
LEUKOCYTE ESTERASE UR QL STRIP.AUTO: ABNORMAL
LYMPHOCYTES # BLD AUTO: 1.24 10*3/MM3 (ref 0.7–3.1)
LYMPHOCYTES NFR BLD AUTO: 24.4 % (ref 19.6–45.3)
Lab: ABNORMAL
MAGNESIUM SERPL-MCNC: 1.5 MG/DL (ref 1.6–2.4)
MCH RBC QN AUTO: 27.9 PG (ref 26.6–33)
MCHC RBC AUTO-ENTMCNC: 31.1 G/DL (ref 31.5–35.7)
MCV RBC AUTO: 89.8 FL (ref 79–97)
METHGB BLD QL: <1 % (ref 0–3)
MODALITY: ABNORMAL
MONOCYTES # BLD AUTO: 0.5 10*3/MM3 (ref 0.1–0.9)
MONOCYTES NFR BLD AUTO: 9.8 % (ref 5–12)
NEUTROPHILS NFR BLD AUTO: 3.13 10*3/MM3 (ref 1.7–7)
NEUTROPHILS NFR BLD AUTO: 61.6 % (ref 42.7–76)
NITRITE UR QL STRIP: POSITIVE
NRBC BLD AUTO-RTO: 0 /100 WBC (ref 0–0.2)
OXYHGB MFR BLDV: 91.4 % (ref 94–99)
PCO2 BLDA: 43 MM HG (ref 35–45)
PCO2 TEMP ADJ BLD: ABNORMAL MM[HG]
PH BLDA: 7.45 PH UNITS (ref 7.35–7.45)
PH UR STRIP.AUTO: 8 [PH] (ref 5–9)
PH, TEMP CORRECTED: ABNORMAL
PLATELET # BLD AUTO: 278 10*3/MM3 (ref 140–450)
PMV BLD AUTO: 8.8 FL (ref 6–12)
PO2 BLDA: 62.4 MM HG (ref 83–108)
PO2 TEMP ADJ BLD: ABNORMAL MM[HG]
POTASSIUM BLDA-SCNC: 2.9 MMOL/L (ref 3.4–4.5)
POTASSIUM SERPL-SCNC: 3 MMOL/L (ref 3.5–5.2)
POTASSIUM SERPL-SCNC: 3.3 MMOL/L (ref 3.5–5.2)
PROCALCITONIN SERPL-MCNC: 0.37 NG/ML (ref 0–0.25)
PROT SERPL-MCNC: 6.8 G/DL (ref 6–8.5)
PROT SERPL-MCNC: 7.1 G/DL (ref 6–8.5)
PROT UR QL STRIP: ABNORMAL
RBC # BLD AUTO: 4.12 10*6/MM3 (ref 3.77–5.28)
RBC # UR STRIP: ABNORMAL /HPF
REF LAB TEST METHOD: ABNORMAL
SAO2 % BLDCOA: 93.8 % (ref 94–99)
SARS-COV-2 RNA RESP QL NAA+PROBE: DETECTED
SODIUM BLDA-SCNC: 131 MMOL/L (ref 136–146)
SODIUM SERPL-SCNC: 129 MMOL/L (ref 136–145)
SODIUM SERPL-SCNC: 129 MMOL/L (ref 136–145)
SP GR UR STRIP: 1.01 (ref 1–1.03)
SQUAMOUS #/AREA URNS HPF: ABNORMAL /HPF
UROBILINOGEN UR QL STRIP: ABNORMAL
VENTILATOR MODE: ABNORMAL
WBC # UR STRIP: ABNORMAL /HPF
WBC NRBC COR # BLD: 5.08 10*3/MM3 (ref 3.4–10.8)
WHOLE BLOOD HOLD COAG: NORMAL
WHOLE BLOOD HOLD SPECIMEN: NORMAL
WHOLE BLOOD HOLD SPECIMEN: NORMAL

## 2023-09-25 PROCEDURE — 87077 CULTURE AEROBIC IDENTIFY: CPT | Performed by: EMERGENCY MEDICINE

## 2023-09-25 PROCEDURE — 36415 COLL VENOUS BLD VENIPUNCTURE: CPT | Performed by: EMERGENCY MEDICINE

## 2023-09-25 PROCEDURE — 71045 X-RAY EXAM CHEST 1 VIEW: CPT

## 2023-09-25 PROCEDURE — G0378 HOSPITAL OBSERVATION PER HR: HCPCS

## 2023-09-25 PROCEDURE — 72125 CT NECK SPINE W/O DYE: CPT

## 2023-09-25 PROCEDURE — 87086 URINE CULTURE/COLONY COUNT: CPT | Performed by: INTERNAL MEDICINE

## 2023-09-25 PROCEDURE — 83605 ASSAY OF LACTIC ACID: CPT | Performed by: EMERGENCY MEDICINE

## 2023-09-25 PROCEDURE — 87636 SARSCOV2 & INF A&B AMP PRB: CPT | Performed by: EMERGENCY MEDICINE

## 2023-09-25 PROCEDURE — 36600 WITHDRAWAL OF ARTERIAL BLOOD: CPT

## 2023-09-25 PROCEDURE — 81001 URINALYSIS AUTO W/SCOPE: CPT | Performed by: EMERGENCY MEDICINE

## 2023-09-25 PROCEDURE — 87077 CULTURE AEROBIC IDENTIFY: CPT | Performed by: INTERNAL MEDICINE

## 2023-09-25 PROCEDURE — 25010000002 CEFTRIAXONE PER 250 MG: Performed by: EMERGENCY MEDICINE

## 2023-09-25 PROCEDURE — 70450 CT HEAD/BRAIN W/O DYE: CPT

## 2023-09-25 PROCEDURE — 80053 COMPREHEN METABOLIC PANEL: CPT | Performed by: EMERGENCY MEDICINE

## 2023-09-25 PROCEDURE — 83050 HGB METHEMOGLOBIN QUAN: CPT

## 2023-09-25 PROCEDURE — 87186 SC STD MICRODIL/AGAR DIL: CPT | Performed by: EMERGENCY MEDICINE

## 2023-09-25 PROCEDURE — 87150 DNA/RNA AMPLIFIED PROBE: CPT | Performed by: EMERGENCY MEDICINE

## 2023-09-25 PROCEDURE — 0 POTASSIUM CHLORIDE 10 MEQ/100ML SOLUTION: Performed by: EMERGENCY MEDICINE

## 2023-09-25 PROCEDURE — 87040 BLOOD CULTURE FOR BACTERIA: CPT | Performed by: EMERGENCY MEDICINE

## 2023-09-25 PROCEDURE — 84145 PROCALCITONIN (PCT): CPT | Performed by: INTERNAL MEDICINE

## 2023-09-25 PROCEDURE — 36415 COLL VENOUS BLD VENIPUNCTURE: CPT

## 2023-09-25 PROCEDURE — 82805 BLOOD GASES W/O2 SATURATION: CPT

## 2023-09-25 PROCEDURE — 80053 COMPREHEN METABOLIC PANEL: CPT | Performed by: INTERNAL MEDICINE

## 2023-09-25 PROCEDURE — 25010000002 MAGNESIUM SULFATE IN D5W 1G/100ML (PREMIX) 1-5 GM/100ML-% SOLUTION: Performed by: EMERGENCY MEDICINE

## 2023-09-25 PROCEDURE — 87186 SC STD MICRODIL/AGAR DIL: CPT | Performed by: INTERNAL MEDICINE

## 2023-09-25 PROCEDURE — 82375 ASSAY CARBOXYHB QUANT: CPT

## 2023-09-25 PROCEDURE — 83735 ASSAY OF MAGNESIUM: CPT | Performed by: EMERGENCY MEDICINE

## 2023-09-25 PROCEDURE — 85025 COMPLETE CBC W/AUTO DIFF WBC: CPT | Performed by: EMERGENCY MEDICINE

## 2023-09-25 PROCEDURE — 99285 EMERGENCY DEPT VISIT HI MDM: CPT

## 2023-09-25 RX ORDER — GABAPENTIN 100 MG/1
100 CAPSULE ORAL 3 TIMES DAILY
Status: DISCONTINUED | OUTPATIENT
Start: 2023-09-25 | End: 2023-10-01 | Stop reason: HOSPADM

## 2023-09-25 RX ORDER — DILTIAZEM HYDROCHLORIDE 60 MG/1
60 TABLET, FILM COATED ORAL 4 TIMES DAILY
Status: DISCONTINUED | OUTPATIENT
Start: 2023-09-25 | End: 2023-09-28

## 2023-09-25 RX ORDER — AMIODARONE HYDROCHLORIDE 200 MG/1
200 TABLET ORAL DAILY
Status: DISCONTINUED | OUTPATIENT
Start: 2023-09-25 | End: 2023-10-01 | Stop reason: HOSPADM

## 2023-09-25 RX ORDER — AMITRIPTYLINE HYDROCHLORIDE 25 MG/1
25 TABLET, FILM COATED ORAL NIGHTLY
COMMUNITY

## 2023-09-25 RX ORDER — GABAPENTIN 100 MG/1
100 CAPSULE ORAL 3 TIMES DAILY
COMMUNITY

## 2023-09-25 RX ORDER — HYDROCODONE BITARTRATE AND ACETAMINOPHEN 5; 325 MG/1; MG/1
1 TABLET ORAL EVERY 6 HOURS PRN
Status: DISCONTINUED | OUTPATIENT
Start: 2023-09-25 | End: 2023-10-01 | Stop reason: HOSPADM

## 2023-09-25 RX ORDER — BUSPIRONE HYDROCHLORIDE 5 MG/1
5 TABLET ORAL 3 TIMES DAILY
COMMUNITY

## 2023-09-25 RX ORDER — HYDROCODONE BITARTRATE AND ACETAMINOPHEN 5; 325 MG/1; MG/1
1 TABLET ORAL EVERY 6 HOURS PRN
COMMUNITY

## 2023-09-25 RX ORDER — AMIODARONE HYDROCHLORIDE 200 MG/1
200 TABLET ORAL DAILY
COMMUNITY

## 2023-09-25 RX ORDER — ONDANSETRON 4 MG/1
4 TABLET, FILM COATED ORAL EVERY 6 HOURS PRN
COMMUNITY

## 2023-09-25 RX ORDER — SODIUM CHLORIDE 0.9 % (FLUSH) 0.9 %
10 SYRINGE (ML) INJECTION EVERY 12 HOURS SCHEDULED
Status: DISCONTINUED | OUTPATIENT
Start: 2023-09-25 | End: 2023-10-01 | Stop reason: HOSPADM

## 2023-09-25 RX ORDER — ENOXAPARIN SODIUM 100 MG/ML
40 INJECTION SUBCUTANEOUS DAILY
Status: DISCONTINUED | OUTPATIENT
Start: 2023-09-25 | End: 2023-09-25

## 2023-09-25 RX ORDER — TRAZODONE HYDROCHLORIDE 50 MG/1
50 TABLET ORAL NIGHTLY
COMMUNITY

## 2023-09-25 RX ORDER — ONDANSETRON 2 MG/ML
4 INJECTION INTRAMUSCULAR; INTRAVENOUS EVERY 6 HOURS PRN
Status: DISCONTINUED | OUTPATIENT
Start: 2023-09-25 | End: 2023-10-01 | Stop reason: HOSPADM

## 2023-09-25 RX ORDER — ACETAMINOPHEN 325 MG/1
650 TABLET ORAL EVERY 4 HOURS PRN
Status: DISCONTINUED | OUTPATIENT
Start: 2023-09-25 | End: 2023-10-01 | Stop reason: HOSPADM

## 2023-09-25 RX ORDER — MIDODRINE HYDROCHLORIDE 5 MG/1
7.5 TABLET ORAL
COMMUNITY

## 2023-09-25 RX ORDER — ACETAMINOPHEN 650 MG/1
650 SUPPOSITORY RECTAL EVERY 4 HOURS PRN
Status: DISCONTINUED | OUTPATIENT
Start: 2023-09-25 | End: 2023-10-01 | Stop reason: HOSPADM

## 2023-09-25 RX ORDER — AMITRIPTYLINE HYDROCHLORIDE 25 MG/1
25 TABLET, FILM COATED ORAL NIGHTLY
Status: DISCONTINUED | OUTPATIENT
Start: 2023-09-25 | End: 2023-10-01 | Stop reason: HOSPADM

## 2023-09-25 RX ORDER — METOPROLOL TARTRATE 50 MG/1
100 TABLET, FILM COATED ORAL EVERY 12 HOURS SCHEDULED
Status: DISCONTINUED | OUTPATIENT
Start: 2023-09-25 | End: 2023-09-28

## 2023-09-25 RX ORDER — ACETAMINOPHEN 160 MG/5ML
650 SOLUTION ORAL EVERY 4 HOURS PRN
Status: DISCONTINUED | OUTPATIENT
Start: 2023-09-25 | End: 2023-10-01 | Stop reason: HOSPADM

## 2023-09-25 RX ORDER — DOCUSATE SODIUM 100 MG/1
100 CAPSULE, LIQUID FILLED ORAL 2 TIMES DAILY
COMMUNITY

## 2023-09-25 RX ORDER — POTASSIUM CHLORIDE 7.45 MG/ML
10 INJECTION INTRAVENOUS
Status: COMPLETED | OUTPATIENT
Start: 2023-09-25 | End: 2023-09-25

## 2023-09-25 RX ORDER — DILTIAZEM HYDROCHLORIDE 60 MG/1
60 TABLET, FILM COATED ORAL 4 TIMES DAILY
COMMUNITY

## 2023-09-25 RX ORDER — DULOXETIN HYDROCHLORIDE 60 MG/1
60 CAPSULE, DELAYED RELEASE ORAL DAILY
COMMUNITY

## 2023-09-25 RX ORDER — ISOSORBIDE MONONITRATE 30 MG/1
30 TABLET, EXTENDED RELEASE ORAL DAILY
Status: DISCONTINUED | OUTPATIENT
Start: 2023-09-25 | End: 2023-10-01 | Stop reason: HOSPADM

## 2023-09-25 RX ORDER — MAGNESIUM SULFATE 1 G/100ML
1 INJECTION INTRAVENOUS ONCE
Status: COMPLETED | OUTPATIENT
Start: 2023-09-25 | End: 2023-09-25

## 2023-09-25 RX ORDER — SODIUM CHLORIDE, SODIUM LACTATE, POTASSIUM CHLORIDE, CALCIUM CHLORIDE 600; 310; 30; 20 MG/100ML; MG/100ML; MG/100ML; MG/100ML
50 INJECTION, SOLUTION INTRAVENOUS CONTINUOUS
Status: DISCONTINUED | OUTPATIENT
Start: 2023-09-25 | End: 2023-09-29

## 2023-09-25 RX ORDER — SODIUM CHLORIDE 0.9 % (FLUSH) 0.9 %
10 SYRINGE (ML) INJECTION AS NEEDED
Status: DISCONTINUED | OUTPATIENT
Start: 2023-09-25 | End: 2023-10-01 | Stop reason: HOSPADM

## 2023-09-25 RX ORDER — SODIUM CHLORIDE 9 MG/ML
40 INJECTION, SOLUTION INTRAVENOUS AS NEEDED
Status: DISCONTINUED | OUTPATIENT
Start: 2023-09-25 | End: 2023-10-01 | Stop reason: HOSPADM

## 2023-09-25 RX ADMIN — AMIODARONE HYDROCHLORIDE 200 MG: 200 TABLET ORAL at 18:07

## 2023-09-25 RX ADMIN — DILTIAZEM HYDROCHLORIDE 60 MG: 60 TABLET, FILM COATED ORAL at 18:07

## 2023-09-25 RX ADMIN — CEFTRIAXONE SODIUM 1000 MG: 1 INJECTION, POWDER, FOR SOLUTION INTRAMUSCULAR; INTRAVENOUS at 12:14

## 2023-09-25 RX ADMIN — METOPROLOL TARTRATE 100 MG: 50 TABLET, FILM COATED ORAL at 18:07

## 2023-09-25 RX ADMIN — POTASSIUM CHLORIDE 10 MEQ: 7.46 INJECTION, SOLUTION INTRAVENOUS at 12:13

## 2023-09-25 RX ADMIN — SODIUM CHLORIDE, POTASSIUM CHLORIDE, SODIUM LACTATE AND CALCIUM CHLORIDE 75 ML/HR: 600; 310; 30; 20 INJECTION, SOLUTION INTRAVENOUS at 10:36

## 2023-09-25 RX ADMIN — AMITRIPTYLINE HYDROCHLORIDE 25 MG: 25 TABLET, FILM COATED ORAL at 21:40

## 2023-09-25 RX ADMIN — ISOSORBIDE MONONITRATE 30 MG: 30 TABLET, EXTENDED RELEASE ORAL at 18:07

## 2023-09-25 RX ADMIN — GABAPENTIN 100 MG: 100 CAPSULE ORAL at 18:07

## 2023-09-25 RX ADMIN — POTASSIUM CHLORIDE 10 MEQ: 7.46 INJECTION, SOLUTION INTRAVENOUS at 10:36

## 2023-09-25 RX ADMIN — MAGNESIUM SULFATE HEPTAHYDRATE 1 G: 1 INJECTION, SOLUTION INTRAVENOUS at 10:36

## 2023-09-25 RX ADMIN — APIXABAN 2.5 MG: 2.5 TABLET, FILM COATED ORAL at 21:40

## 2023-09-25 RX ADMIN — Medication 10 ML: at 18:08

## 2023-09-25 NOTE — ED NOTES
Nursing report ED to floor  Carol Sullivan  83 y.o.  female    HPI:   Chief Complaint   Patient presents with    Altered Mental Status       Admitting doctor:   Paul Martinez MD    Consulting provider(s):  Consults       No orders found from 8/27/2023 to 9/26/2023.             Admitting diagnosis:   The primary encounter diagnosis was Altered mental status, unspecified altered mental status type. Diagnoses of Acute UTI and Fall, initial encounter were also pertinent to this visit.    Code status:   Current Code Status       Date Active Code Status Order ID Comments User Context       Prior            Allergies:   Tuberculin tests, Hydrocodone, and Lortab [hydrocodone-acetaminophen]    Intake and Output    Intake/Output Summary (Last 24 hours) at 9/25/2023 1210  Last data filed at 9/25/2023 1155  Gross per 24 hour   Intake 100 ml   Output --   Net 100 ml       Weight:       09/25/23  0815   Weight: 70.3 kg (155 lb)       Most recent vitals:   Vitals:    09/25/23 0939 09/25/23 1039 09/25/23 1100 09/25/23 1200   BP: 180/77 (!) 181/73 149/74 157/69   Pulse: 88 82 86 84   Resp: 18 18 16 16   Temp:       TempSrc:       SpO2: 92% 97% 95% 96%   Weight:       Height:         Oxygen Therapy: 2 L nasal cannula    Active LDAs/IV Access:   Lines, Drains & Airways       Active LDAs       Name Placement date Placement time Site Days    Peripheral IV 09/25/23 0907 Anterior;Left;Upper Arm 09/25/23  0907  Arm  less than 1    Urethral Catheter Silicone 16 Fr. 08/06/23  1253  -- 49    Hemodialysis Cath Double 08/17/23  0713  Internal Jugular  39                    Labs (abnormal labs have a star):   Labs Reviewed   COVID-19 AND FLU A/B, NP SWAB IN TRANSPORT MEDIA 8-12 HR TAT - Abnormal; Notable for the following components:       Result Value    COVID19 Detected (*)     All other components within normal limits    Narrative:     Fact sheet for providers: https://www.fda.gov/media/137609/download    Fact sheet for patients:  https://www.fda.gov/media/895895/download    Test performed by PCR.  Influenza A and Influenza B negative results should be considered presumptive in samples that have a positive SARS-CoV-2 result.    Competitive inhibition studies showed that SARS-CoV-2 virus, when present at concentrations above 3.6E+04 copies/mL, can inhibit the detection and amplification of influenza A and influenza B virus RNA if present at or below 1.8E+02 copies/mL or 4.9E+02 copies/mL, respectively, and may lead to false negative influenza virus results. If co-infection with influenza A or influenza B virus is suspected in samples with a positive SARS-CoV-2 result, the sample should be re-tested with another FDA cleared, approved, or authorized influenza test, if influenza virus detection would change clinical management.   URINALYSIS W/ MICROSCOPIC IF INDICATED (NO CULTURE) - Abnormal; Notable for the following components:    Color, UA Orange (*)     Appearance, UA Turbid (*)     Blood, UA Large (3+) (*)     Protein, UA >=300 mg/dL (3+) (*)     Leuk Esterase, UA Large (3+) (*)     Nitrite, UA Positive (*)     All other components within normal limits   COMPREHENSIVE METABOLIC PANEL - Abnormal; Notable for the following components:    Creatinine 1.77 (*)     Sodium 129 (*)     Potassium 3.0 (*)     Chloride 91 (*)     Calcium 8.5 (*)     Albumin 3.2 (*)     AST (SGOT) 34 (*)     Alkaline Phosphatase 155 (*)     BUN/Creatinine Ratio 6.2 (*)     eGFR 28.4 (*)     All other components within normal limits    Narrative:     GFR Normal >60  Chronic Kidney Disease <60  Kidney Failure <15    The GFR formula is only valid for adults with stable renal function between ages 18 and 70.   CBC WITH AUTO DIFFERENTIAL - Abnormal; Notable for the following components:    Hemoglobin 11.5 (*)     MCHC 31.1 (*)     Immature Grans % 0.8 (*)     All other components within normal limits   URINALYSIS, MICROSCOPIC ONLY - Abnormal; Notable for the following  components:    RBC, UA Too Numerous to Count (*)     WBC, UA Too Numerous to Count (*)     Bacteria, UA 4+ (*)     Squamous Epithelial Cells, UA Unable to Determine (*)     All other components within normal limits   BLOOD GAS, ARTERIAL W/CO-OXIMETRY - Abnormal; Notable for the following components:    pO2, Arterial 62.4 (*)     HCO3, Arterial 29.6 (*)     Base Excess, Arterial 4.9 (*)     O2 Saturation, Arterial 93.8 (*)     Hemoglobin, Blood Gas 11.4 (*)     Hematocrit, Blood Gas 35.0 (*)     Oxyhemoglobin 91.4 (*)     Sodium, Arterial 131 (*)     Potassium, Arterial 2.9 (*)     Ionized Calcium 4.16 (*)     Glucose, Arterial 97 (*)     All other components within normal limits   MAGNESIUM - Abnormal; Notable for the following components:    Magnesium 1.5 (*)     All other components within normal limits   LACTIC ACID, PLASMA - Normal   BLOOD CULTURE   BLOOD CULTURE   RAINBOW DRAW    Narrative:     The following orders were created for panel order [place] Draw.  Procedure                               Abnormality         Status                     ---------                               -----------         ------                     Green Top (Gel)[[id number]]                                  Final result               Lavender Top[[id number]]                                     Final result               Gold Top - SST[[id number]]                                   Final result               Light Blue Top[[id number]]                                   Final result                 Please view results for these tests on the individual orders.   BLOOD GAS, ARTERIAL   CBC AND DIFFERENTIAL    Narrative:     The following orders were created for panel order CBC & Differential.  Procedure                               Abnormality         Status                     ---------                               -----------         ------                     CBC Auto Differential[[id number]]        Abnormal            Final result                  Please view results for these tests on the individual orders.   GREEN TOP   LAVENDER TOP   GOLD TOP - SST   LIGHT BLUE TOP   LAVENDER TOP   EXTRA TUBES    Narrative:     The following orders were created for panel order Extra Tubes.  Procedure                               Abnormality         Status                     ---------                               -----------         ------                     Lavender Top[[id number]]                                     Final result               Gray Top[[id number]]                                         In process                   Please view results for these tests on the individual orders.   GRAY TOP       Meds given in ED:   Medications   cefTRIAXone (ROCEPHIN) 1000 mg/100 mL 0.9% NS (MBP) (has no administration in time range)   potassium chloride 10 mEq in 100 mL IVPB (10 mEq Intravenous New Bag 9/25/23 1036)   lactated ringers infusion (75 mL/hr Intravenous Currently Infusing 9/25/23 1037)   magnesium sulfate in D5W 1g/100mL (PREMIX) (0 g Intravenous Stopped 9/25/23 1155)     lactated ringers, 75 mL/hr, Last Rate: 75 mL/hr (09/25/23 1037)         NIH Stroke Scale:       Isolation/Infection(s):  No active isolations   COVID (confirmed)     COVID Testing  Collected 9-25-23   Resulted POS    Nursing report ED to floor:  Mental status: Alert to person only  Ambulatory status: x2 assist  Precautions: Enhanced droplet    ED nurse phone extentsiah- [phone]

## 2023-09-25 NOTE — PLAN OF CARE
Problem: Adult Inpatient Plan of Care  Goal: Plan of Care Review  Outcome: Ongoing, Progressing  Goal: Patient-Specific Goal (Individualized)  Outcome: Ongoing, Progressing  Goal: Absence of Hospital-Acquired Illness or Injury  Outcome: Ongoing, Progressing  Goal: Optimal Comfort and Wellbeing  Outcome: Ongoing, Progressing  Goal: Readiness for Transition of Care  Outcome: Ongoing, Progressing  Intervention: Mutually Develop Transition Plan  Recent Flowsheet Documentation  Taken 9/25/2023 1346 by Ayana Cardoza RN  Transportation Anticipated: family or friend will provide  Patient/Family Anticipated Services at Transition: outpatient care  Patient/Family Anticipates Transition to: home with family     Problem: Fall Injury Risk  Goal: Absence of Fall and Fall-Related Injury  Outcome: Ongoing, Progressing  Goal: Absence of Fall and Fall-Related Injury  Outcome: Ongoing, Progressing   Goal Outcome Evaluation:

## 2023-09-25 NOTE — ED NOTES
"Luz [facility] staff, called to report resident is being sent to the ED for further evaluation of AMS.  Luz states the resident fell at approximately 0245 Saturday morning and \"hasn't been the same since the fall.\"  Resident is normally alert & oriented x4, performs ADL's with assist x1, has mitchell catheter, dialysis (Tuesday, Thursday, & Saturday) @[facility].    "

## 2023-09-25 NOTE — ED PROVIDER NOTES
Subjective   History of Present Illness  83 years old female with history of hypertension, hyperlipidemia, anxiety, depression, GERD, recent J stent placement currently having indwelling catheter presented in the ER with chief complaint of altered mental status from nursing home.  Per report patient fell off of the bed 2 days ago and since then not acting herself.  No apparent head injury or limitation range of motion.  Per nursing home patient baseline is ambulatory and alert oriented x4.  Patient denies any pain.  No difficulty breathing.  Patient is unsure why she is in here.  Not a good historian and history is limited.    History provided by:  Patient, EMS personnel and nursing home  History limited by:  Mental status change    Review of Systems   Unable to perform ROS: Mental status change     Past Medical History:   Diagnosis Date    Arthritis     Depression     GERD (gastroesophageal reflux disease)     Hyperlipidemia     Hypertension     Near syncope     Vascular disease        Allergies   Allergen Reactions    Tuberculin Tests Swelling    Hydrocodone Nausea Only    Lortab [Hydrocodone-Acetaminophen] GI Intolerance       Past Surgical History:   Procedure Laterality Date    BLADDER SURGERY      BREAST ABSCESS INCISION AND DRAINAGE Left 8/1/2023    Procedure: BREAST ABSCESS INCISION AND DRAINAGE;  Surgeon: Fox Roper MD;  Location: [facility] OR;  Service: General;  Laterality: Left;    CYSTOSCOPY, RETROGRADE PYELOGRAM AND STENT INSERTION Right 8/6/2023    Procedure: CYSTOSCOPY RETROGRADE PYELOGRAM AND STENT INSERTION;  Surgeon: Ousmane Garcia MD;  Location: [facility] OR;  Service: Urology;  Laterality: Right;    ENDOSCOPY N/A 5/6/2019    Procedure: ESOPHAGOGASTRODUODENOSCOPY;  Surgeon: Alberto Sanchez DO;  Location: [facility] ENDOSCOPY;  Service: Gastroenterology    GALLBLADDER SURGERY      INTERVENTIONAL RADIOLOGY PROCEDURE N/A 8/17/2023    Procedure: tunneled central venous catheter placement;  Surgeon:  "Hardy Yin MD;  Location: [facility]GIO INVASIVE LOCATION;  Service: Interventional Radiology;  Laterality: N/A;    SHOULDER SURGERY      \"bone spurs\"    SUBTOTAL HYSTERECTOMY         Family History   Problem Relation Age of Onset    Hypertension Mother     Diabetes Paternal Aunt     Diabetes Paternal Grandmother     Hypertension Paternal Grandmother     Hypertension Paternal Grandfather        Social History     Socioeconomic History    Marital status:    Tobacco Use    Smoking status: Never    Smokeless tobacco: Never   Vaping Use    Vaping Use: Never used   Substance and Sexual Activity    Alcohol use: No    Drug use: No    Sexual activity: Defer           Objective   Physical Exam  Vitals and nursing note reviewed.   Constitutional:       Appearance: She is well-developed.   HENT:      Head: Normocephalic.      Mouth/Throat:      Mouth: Mucous membranes are moist.   Eyes:      Extraocular Movements: Extraocular movements intact.      Pupils: Pupils are equal, round, and reactive to light.   Cardiovascular:      Rate and Rhythm: Normal rate and regular rhythm.   Pulmonary:      Effort: Pulmonary effort is normal.      Breath sounds: Normal breath sounds.   Abdominal:      Palpations: Abdomen is soft. There is no mass.   Musculoskeletal:         General: Swelling present.      Cervical back: Normal range of motion and neck supple.   Skin:     General: Skin is warm.      Capillary Refill: Capillary refill takes 2 to 3 seconds.   Neurological:      Mental Status: She is alert. She is confused.      Cranial Nerves: No cranial nerve deficit, dysarthria or facial asymmetry.      Sensory: No sensory deficit.       Procedures           ED Course                                           Medical Decision Making  83 years old is evaluated for altered mental status after she fell off of bed 2 days ago.  Patient is awake alert but not fully oriented.  She is moving all 4 extremities.  CT head is negative.  " Does have UTI and started on antibiotics.  Patient has mildly low potassium and magnesium for which she is getting replacement.  Creatinine is improving from recent BLAINE.  Patient has a positive COVID-19 but chest x-ray is negative and patient is fairly asymptomatic.  CT cervical spine is negative as well.  Her symptoms could be secondary to UTI versus related to fall with some element of concussion.  Discussed with Dr. Yeh and patient is being admitted.    Problems Addressed:  Acute UTI: complicated acute illness or injury  Altered mental status, unspecified altered mental status type: complicated acute illness or injury  Fall, initial encounter: complicated acute illness or injury    Amount and/or Complexity of Data Reviewed  Labs: ordered.  Radiology: ordered.    Risk  Prescription drug management.  Decision regarding hospitalization.      Labs Reviewed   COVID-19 AND FLU A/B, NP SWAB IN TRANSPORT MEDIA 8-12 HR TAT - Abnormal; Notable for the following components:       Result Value    COVID19 Detected (*)     All other components within normal limits    Narrative:     Fact sheet for providers: https://www.fda.gov/media/805082/download    Fact sheet for patients: https://www.fda.gov/media/778677/download    Test performed by PCR.  Influenza A and Influenza B negative results should be considered presumptive in samples that have a positive SARS-CoV-2 result.    Competitive inhibition studies showed that SARS-CoV-2 virus, when present at concentrations above 3.6E+04 copies/mL, can inhibit the detection and amplification of influenza A and influenza B virus RNA if present at or below 1.8E+02 copies/mL or 4.9E+02 copies/mL, respectively, and may lead to false negative influenza virus results. If co-infection with influenza A or influenza B virus is suspected in samples with a positive SARS-CoV-2 result, the sample should be re-tested with another FDA cleared, approved, or authorized influenza test, if influenza  virus detection would change clinical management.   URINALYSIS W/ MICROSCOPIC IF INDICATED (NO CULTURE) - Abnormal; Notable for the following components:    Color, UA Orange (*)     Appearance, UA Turbid (*)     Blood, UA Large (3+) (*)     Protein, UA >=300 mg/dL (3+) (*)     Leuk Esterase, UA Large (3+) (*)     Nitrite, UA Positive (*)     All other components within normal limits   COMPREHENSIVE METABOLIC PANEL - Abnormal; Notable for the following components:    Creatinine 1.77 (*)     Sodium 129 (*)     Potassium 3.0 (*)     Chloride 91 (*)     Calcium 8.5 (*)     Albumin 3.2 (*)     AST (SGOT) 34 (*)     Alkaline Phosphatase 155 (*)     BUN/Creatinine Ratio 6.2 (*)     eGFR 28.4 (*)     All other components within normal limits    Narrative:     GFR Normal >60  Chronic Kidney Disease <60  Kidney Failure <15    The GFR formula is only valid for adults with stable renal function between ages 18 and 70.   CBC WITH AUTO DIFFERENTIAL - Abnormal; Notable for the following components:    Hemoglobin 11.5 (*)     MCHC 31.1 (*)     Immature Grans % 0.8 (*)     All other components within normal limits   URINALYSIS, MICROSCOPIC ONLY - Abnormal; Notable for the following components:    RBC, UA Too Numerous to Count (*)     WBC, UA Too Numerous to Count (*)     Bacteria, UA 4+ (*)     Squamous Epithelial Cells, UA Unable to Determine (*)     All other components within normal limits   BLOOD GAS, ARTERIAL W/CO-OXIMETRY - Abnormal; Notable for the following components:    pO2, Arterial 62.4 (*)     HCO3, Arterial 29.6 (*)     Base Excess, Arterial 4.9 (*)     O2 Saturation, Arterial 93.8 (*)     Hemoglobin, Blood Gas 11.4 (*)     Hematocrit, Blood Gas 35.0 (*)     Oxyhemoglobin 91.4 (*)     Sodium, Arterial 131 (*)     Potassium, Arterial 2.9 (*)     Ionized Calcium 4.16 (*)     Glucose, Arterial 97 (*)     All other components within normal limits   MAGNESIUM - Abnormal; Notable for the following components:    Magnesium  "1.5 (*)     All other components within normal limits   COMPREHENSIVE METABOLIC PANEL - Abnormal; Notable for the following components:    Creatinine 1.79 (*)     Sodium 129 (*)     Potassium 3.3 (*)     Chloride 92 (*)     Calcium 8.4 (*)     Albumin 2.9 (*)     AST (SGOT) 33 (*)     Alkaline Phosphatase 149 (*)     BUN/Creatinine Ratio 6.1 (*)     eGFR 28.0 (*)     All other components within normal limits    Narrative:     GFR Normal >60  Chronic Kidney Disease <60  Kidney Failure <15    The GFR formula is only valid for adults with stable renal function between ages 18 and 70.   PROCALCITONIN - Abnormal; Notable for the following components:    Procalcitonin 0.37 (*)     All other components within normal limits    Narrative:     As a Marker for Sepsis (Non-Neonates):    1. <0.5 ng/mL represents a low risk of severe sepsis and/or septic shock.  2. >2 ng/mL represents a high risk of severe sepsis and/or septic shock.    As a Marker for Lower Respiratory Tract Infections that require antibiotic therapy:    PCT on Admission    Antibiotic Therapy       6-12 Hrs later    >0.5                Strongly Recommended  >0.25 - <0.5        Recommended   0.1 - 0.25          Discouraged              Remeasure/reassess PCT  <0.1                Strongly Discouraged     Remeasure/reassess PCT    As 28 day mortality risk marker: \"Change in Procalcitonin Result\" (>80% or <=80%) if Day 0 (or Day 1) and Day 4 values are available. Refer to http://www.[facility]-pct-calculator.com    Change in PCT <=80%  A decrease of PCT levels below or equal to 80% defines a positive change in PCT test result representing a higher risk for 28-day all-cause mortality of patients diagnosed with severe sepsis for septic shock.    Change in PCT >80%  A decrease of PCT levels of more than 80% defines a negative change in PCT result representing a lower risk for 28-day all-cause mortality of patients diagnosed with severe sepsis or septic shock.      LACTIC " ACID, PLASMA - Normal   BLOOD CULTURE   BLOOD CULTURE   URINE CULTURE   RAINBOW DRAW    Narrative:     The following orders were created for panel order [place] Draw.  Procedure                               Abnormality         Status                     ---------                               -----------         ------                     Green Top (Gel)[[id number]]                                  Final result               Lavender Top[[id number]]                                     Final result               Gold Top - SST[[id number]]                                   Final result               Light Blue Top[[id number]]                                   Final result                 Please view results for these tests on the individual orders.   BLOOD GAS, ARTERIAL   CBC AND DIFFERENTIAL    Narrative:     The following orders were created for panel order CBC & Differential.  Procedure                               Abnormality         Status                     ---------                               -----------         ------                     CBC Auto Differential[[id number]]        Abnormal            Final result                 Please view results for these tests on the individual orders.   GREEN TOP   LAVENDER TOP   GOLD TOP - SST   LIGHT BLUE TOP   EXTRA TUBES    Narrative:     The following orders were created for panel order Extra Tubes.  Procedure                               Abnormality         Status                     ---------                               -----------         ------                     Lavender Top[[id number]]                                     Final result               Gray Top[[id number]]                                         Final result                 Please view results for these tests on the individual orders.   LAVENDER TOP   GRAY TOP       CT Head Without Contrast    Result Date: 9/25/2023  Narrative: CT HEAD WO CONTRAST HISTORY: AMS/FALL COMPARISON: CT head 7/20/2023  Automated exposure control was also utilized to decrease patient radiation dose. TECHNIQUE: Helical tomographic images of the brain were obtained without the use of contrast. Multiplanar reformatted images were provided for review. FINDINGS: The paranasal sinuses, mastoid air cells, and inner ears are generally clear. Intraorbital contents are unremarkable. Calvarium demonstrates no gross abnormality. The ventricular and cisternal spaces are prominent, consistent with brain atrophy. No mass effect, intracranial hemorrhage, or extra-axial fluid collections. There are subcortical, deep, and periventricular white matter changes, consistent with chronic small vessel ischemic disease in a patient of this age. Remote lacunar infarcts bilateral caudate heads.     Impression: 1.  No evidence of acute intracranial process. 2. Brain atrophy with changes of microangiopathy. Remote lacunar infarcts bilateral caudate heads.     CT Cervical Spine Without Contrast    Result Date: 9/25/2023  Narrative: CT CERVICAL SPINE WO CONTRAST HISTORY: AMS/FALL COMPARISON: None Automated exposure control was also utilized to decrease patient radiation dose. TECHNIQUE:  Tomographic images of the cervical spine were obtained without the use of IV contrast.  Multiplane are reformatted images were provided for review. FINDINGS: Normal alignment of the cervical vertebra. Mild grade 1 anterior listhesis of C7 on T1, nonspecific but likely degenerative in nature. No fracture or malalignment. Extensive multilevel degenerative disc disease throughout the mid and lower cervical spine.     Impression: 1.  No acute fracture of the cervical spine.     XR Chest 1 View    Result Date: 9/25/2023  Narrative: XR CHEST 1 VIEW HISTORY: ams COMPARISON: AP chest 8/17/2023. FINDINGS: Right IJ dialysis catheter in stable position. The cardiomediastinal silhouette is within normal limits. No focal consolidation. The osseous structures are age-appropriate.      Impression: 1.  No acute intrathoracic process.     US Venous Doppler Lower Extremity Right (duplex)    Result Date: 9/13/2023  Narrative: RIGHT LOWER EXTREMITY VENOUS ULTRASOUND HISTORY: Follow-up deep vein thrombosis. COMPARISON: 09/05/2023. TECHNIQUE: Real-time ultrasound imaging, color and spectral Doppler used to evaluate the deep venous system of the leg(s) from the calf confluence to the common femoral veins. Compression and augmentation techniques were utilized. FINDINGS: Redemonstration of mostly occlusive thrombus throughout the right leg from the peroneal veins to the external iliac vein.  The superior margin of the thrombus could not be visualized.     Impression: No appreciable interval change in the short interval.     US Venous Doppler Lower Extremity Right (duplex)    Result Date: 9/5/2023  Narrative: PROCEDURE:  Ultrasound venous Doppler lower extremity Right. TECHNIQUE: High-resolution gray scale imaging, color flow Doppler, compression were performed from the groin to the calf of the right lower extremity.  Augmentation was performed of the common femoral vein and popliteal vein. FINDINGS: Extensive occlusive deep venous thrombosis within the visualized right common femoral vein, superficial femoral vein, and popliteal vein.         Final diagnoses:   Altered mental status, unspecified altered mental status type   Acute UTI   Fall, initial encounter   Hypokalemia   Hypomagnesemia       ED Disposition  ED Disposition       ED Disposition   Decision to Admit    Condition   --    Comment   Level of Care: Telemetry [5]   Diagnosis: AMS (altered mental status) [[id number]]   Admitting Physician: ABHI DEAN [[id number]]   Attending Physician: ABHI DEAN [[id number]]                 No follow-up provider specified.       Medication List      No changes were made to your prescriptions during this visit.            German Palomo MD  09/25/23 [id number]

## 2023-09-25 NOTE — H&P
[facility] Medicine Admission      Date of Admission: 9/25/2023      Primary Care Physician: Len Seo MD      Chief Complaint: altered mental status    HPI: Patient female, nursing home resident with history of essential hypertension, who for the last 2 days has been presented with altered mental status who fell of the bed 2 days ago; no fractures were reported.   Denies fever, nausea, vomiting     Concurrent Medical History:  has a past medical history of Arthritis, Depression, GERD (gastroesophageal reflux disease), Hyperlipidemia, Hypertension, Near syncope, and Vascular disease.    Past Surgical History:  has a past surgical history that includes Bladder surgery; Subtotal Hysterectomy; Gallbladder surgery; Shoulder surgery; Esophagogastroduodenoscopy (N/A, 5/6/2019); Breast Abscess Incision and Drainage (Left, 8/1/2023); Interventional radiology procedure (N/A, 8/17/2023); and cystoscopy, retrograde pyelogram and stent insertion (Right, 8/6/2023).    Family History: family history includes Diabetes in her paternal aunt and paternal grandmother; Hypertension in her mother, paternal grandfather, and paternal grandmother.     Social History:  reports that she has never smoked. She has never used smokeless tobacco. She reports that she does not drink alcohol and does not use drugs.    Allergies:   Allergies   Allergen Reactions    Tuberculin Tests Swelling    Hydrocodone Nausea Only    Lortab [Hydrocodone-Acetaminophen] GI Intolerance       Medications:   Prior to Admission medications    Medication Sig Start Date End Date Taking? Authorizing Provider   amiodarone (PACERONE) 200 MG tablet Take 1 tablet by mouth Daily.   Yes ProviderRubi MD   amitriptyline (ELAVIL) 25 MG tablet Take 1 tablet by mouth Every Night.   Yes ProviderRubi MD   apixaban (ELIQUIS) 5 MG tablet tablet Take 1 tablet by mouth 2 (Two) Times a Day.   Yes ProviderRubi  MD   busPIRone (BUSPAR) 5 MG tablet Take 1 tablet by mouth 3 (Three) Times a Day.   Yes Rubi Seymour MD   dilTIAZem (CARDIZEM) 60 MG tablet Take 1 tablet by mouth 4 (Four) Times a Day.   Yes Rubi Seymour MD   docusate sodium (COLACE) 100 MG capsule Take 1 capsule by mouth 2 (Two) Times a Day.   Yes Rubi Seymour MD   DULoxetine (CYMBALTA) 60 MG capsule Take 1 capsule by mouth Daily.   Yes Rubi Seymour MD   gabapentin (NEURONTIN) 100 MG capsule Take 1 capsule by mouth 3 (Three) Times a Day.   Yes Rubi Seymour MD   HYDROcodone-acetaminophen (NORCO) 5-325 MG per tablet Take 1 tablet by mouth Every 6 (Six) Hours As Needed.   Yes Rubi Seymour MD   lansoprazole (PREVACID) 30 MG capsule Take 1 capsule by mouth Every 12 (Twelve) Hours. 6/21/21  Yes Rubi Seymour MD   midodrine (PROAMATINE) 5 MG tablet Take 1.5 tablets by mouth 3 (Three) Times a Day Before Meals.   Yes Rubi Seymour MD   ondansetron (ZOFRAN) 4 MG tablet Take 1 tablet by mouth Every 6 (Six) Hours As Needed for Nausea or Vomiting.   Yes Rubi Seymour MD   rosuvastatin (CRESTOR) 40 MG tablet Take 1 tablet by mouth Daily. 6/2/23  Yes Odette Alves MD   traZODone (DESYREL) 50 MG tablet Take 1 tablet by mouth Every Night.   Yes Rubi Seymour MD   acetaminophen (TYLENOL) 500 MG tablet Take 1 tablet by mouth As Needed.    Rubi Seymour MD   isosorbide mononitrate (IMDUR) 30 MG 24 hr tablet Take 1 tablet by mouth Daily. 6/2/23   Odette Alves MD   metoprolol tartrate (LOPRESSOR) 100 MG tablet Take 1 tablet by mouth Every 12 (Twelve) Hours. 8/17/23   Juanito Harrington MD   nystatin (MYCOSTATIN) 890233 UNIT/GM powder Apply  topically to the appropriate area as directed Every 12 (Twelve) Hours. 8/17/23   Juanito Harrington MD   VENTOLIN  (90 Base) MCG/ACT inhaler  10/23/19   Rubi Seymour MD          lactated ringers, 75 mL/hr, Last Rate: 75 mL/hr (09/25/23  1247)        Review of Systems:  Review of Systems   All other systems reviewed and are negative.   Otherwise complete ROS is negative except as mentioned above.    Physical Exam:   Temp:  [97.8 °F (36.6 °C)-98.4 °F (36.9 °C)] 97.8 °F (36.6 °C)  Heart Rate:  [80-88] 88  Resp:  [16-18] 18  BP: (149-187)/(67-77) 187/77  Physical Exam  Constitutional:       Comments: Fragile appearance   HENT:      Head: Normocephalic.      Nose: Nose normal.      Mouth/Throat:      Mouth: Mucous membranes are moist.   Eyes:      Extraocular Movements: Extraocular movements intact.   Cardiovascular:      Rate and Rhythm: Normal rate and regular rhythm.      Heart sounds: Normal heart sounds.   Pulmonary:      Breath sounds: Normal breath sounds.   Abdominal:      General: Bowel sounds are normal.      Palpations: Abdomen is soft.   Musculoskeletal:         General: Normal range of motion.      Cervical back: Normal range of motion.   Skin:     General: Skin is warm.   Neurological:      General: No focal deficit present.      Mental Status: She is alert. Mental status is at baseline.      Comments: Knew the place, year and month.   Psychiatric:         Behavior: Behavior normal.         Results Reviewed:  I have personally reviewed current lab, radiology, and data and agree with results.  Lab Results (last 24 hours)       Procedure Component Value Units Date/Time    Blood Culture - Blood, Arm, Right [[id number]] Collected: 09/25/23 1254    Specimen: Blood from Arm, Right Updated: 09/25/23 1324    Extra Tubes [[id number]] Collected: 09/25/23 0854    Specimen: Blood Updated: 09/25/23 1300    Narrative:      The following orders were created for panel order Extra Tubes.  Procedure                               Abnormality         Status                     ---------                               -----------         ------                     Lavender Top[[id number]]                                     Final result               Barton  Top[[id number]]                                         Final result                 Please view results for these tests on the individual orders.    Gray Top [[id number]] Collected: 09/25/23 0854    Specimen: Blood Updated: 09/25/23 1300     Extra Tube Hold for add-ons.     Comment: Auto resulted.       Blood Culture - Blood, Arm, Right [[id number]] Collected: 09/25/23 1149    Specimen: Blood from Arm, Right Updated: 09/25/23 1206    Lactic Acid, Plasma [[id number]]  (Normal) Collected: 09/25/23 0854    Specimen: Blood Updated: 09/25/23 1006     Lactate 1.0 mmol/L     Magnesium [[id number]]  (Abnormal) Collected: 09/25/23 0851    Specimen: Blood Updated: 09/25/23 1005     Magnesium 1.5 mg/dL     Lavender Top [[id number]] Collected: 09/25/23 0854    Specimen: Blood Updated: 09/25/23 1000     Extra Tube hold for add-on     Comment: Auto resulted       [place] Draw [[id number]] Collected: 09/25/23 0851    Specimen: Blood Updated: 09/25/23 1000    Narrative:      The following orders were created for panel order [place] Draw.  Procedure                               Abnormality         Status                     ---------                               -----------         ------                     Green Top (Gel)[[id number]]                                  Final result               Lavender Top[[id number]]                                     Final result               Gold Top - [facility] [place][[id number]]                                   Final result               Light Blue Top[[id number]]                                   Final result                 Please view results for these tests on the individual orders.    Lavender Top [[id number]] Collected: 09/25/23 0851    Specimen: Blood Updated: 09/25/23 1000     Extra Tube hold for add-on     Comment: Auto resulted       Green Top (Gel) [[id number]] Collected: 09/25/23 0851    Specimen: Blood Updated: 09/25/23 1000     Extra Tube Hold for add-ons.     Comment: Auto resulted.       Gold Top  - [facility] [[id number]] Collected: 09/25/23 0851    Specimen: Blood Updated: 09/25/23 1000     Extra Tube Hold for add-ons.     Comment: Auto resulted.       Light Blue Top [[id number]] Collected: 09/25/23 0851    Specimen: Blood Updated: 09/25/23 1000     Extra Tube Hold for add-ons.     Comment: Auto resulted       Comprehensive Metabolic Panel [[id number]]  (Abnormal) Collected: 09/25/23 0851    Specimen: Blood Updated: 09/25/23 0918     Glucose 98 mg/dL      BUN 11 mg/dL      Creatinine 1.77 mg/dL      Sodium 129 mmol/L      Potassium 3.0 mmol/L      Chloride 91 mmol/L      CO2 29.0 mmol/L      Calcium 8.5 mg/dL      Total Protein 7.1 g/dL      Albumin 3.2 g/dL      ALT (SGPT) 12 U/L      AST (SGOT) 34 U/L      Alkaline Phosphatase 155 U/L      Total Bilirubin 0.7 mg/dL      Globulin 3.9 gm/dL      A/G Ratio 0.8 g/dL      BUN/Creatinine Ratio 6.2     Anion Gap 9.0 mmol/L      eGFR 28.4 mL/min/1.73     Narrative:      GFR Normal >60  Chronic Kidney Disease <60  Kidney Failure <15    The GFR formula is only valid for adults with stable renal function between ages 18 and 70.    Urinalysis, Microscopic Only - Indwelling Urethral Catheter [[id number]]  (Abnormal) Collected: 09/25/23 0840    Specimen: Urine from Indwelling Urethral Catheter Updated: 09/25/23 0907     RBC, UA Too Numerous to Count /HPF      WBC, UA Too Numerous to Count /HPF      Bacteria, UA 4+ /HPF      Squamous Epithelial Cells, UA Unable to Determine /HPF      Hyaline Casts, UA 0-2 /LPF      Methodology Manual Light Microscopy    CBC & Differential [[id number]]  (Abnormal) Collected: 09/25/23 0851    Specimen: Blood Updated: 09/25/23 0902    Narrative:      The following orders were created for panel order CBC & Differential.  Procedure                               Abnormality         Status                     ---------                               -----------         ------                     CBC Auto Differential[[id number]]        Abnormal             Final result                 Please view results for these tests on the individual orders.    CBC Auto Differential [[id number]]  (Abnormal) Collected: 09/25/23 0851    Specimen: Blood Updated: 09/25/23 0902     WBC 5.08 10*3/mm3      RBC 4.12 10*6/mm3      Hemoglobin 11.5 g/dL      Hematocrit 37.0 %      MCV 89.8 fL      MCH 27.9 pg      MCHC 31.1 g/dL      RDW 14.8 %      RDW-SD 49.1 fl      MPV 8.8 fL      Platelets 278 10*3/mm3      Neutrophil % 61.6 %      Lymphocyte % 24.4 %      Monocyte % 9.8 %      Eosinophil % 2.2 %      Basophil % 1.2 %      Immature Grans % 0.8 %      Neutrophils, Absolute 3.13 10*3/mm3      Lymphocytes, Absolute 1.24 10*3/mm3      Monocytes, Absolute 0.50 10*3/mm3      Eosinophils, Absolute 0.11 10*3/mm3      Basophils, Absolute 0.06 10*3/mm3      Immature Grans, Absolute 0.04 10*3/mm3      nRBC 0.0 /100 WBC     Urinalysis With Microscopic If Indicated (No Culture) - Indwelling Urethral Catheter [[id number]]  (Abnormal) Collected: 09/25/23 0840    Specimen: Urine from Indwelling Urethral Catheter Updated: 09/25/23 0858     Color, UA [place]field     Appearance, UA Turbid     pH, UA 8.0     Specific Gravity, UA 1.009     Glucose, UA Negative     Ketones, UA Negative     Bilirubin, UA Negative     Blood, UA Large (3+)     Protein, UA >=300 mg/dL (3+)     Leuk Esterase, UA Large (3+)     Nitrite, UA Positive     Urobilinogen, UA 1.0 E.U./dL    COVID-19 and FLU A/B PCR - Swab, Nasopharynx [[id number]]  (Abnormal) Collected: 09/25/23 0824    Specimen: Swab from Nasopharynx Updated: 09/25/23 0855     COVID19 Detected     Comment: Enhanced Precautions Requested            Influenza A PCR Not Detected     Influenza B PCR Not Detected    Narrative:      Fact sheet for providers: https://www.fda.gov/media/455702/download    Fact sheet for patients: https://www.fda.gov/media/822762/download    Test performed by PCR.  Influenza A and Influenza B negative results should be considered presumptive in samples  that have a positive SARS-CoV-2 result.    Competitive inhibition studies showed that SARS-CoV-2 virus, when present at concentrations above 3.6E+04 copies/mL, can inhibit the detection and amplification of influenza A and influenza B virus RNA if present at or below 1.8E+02 copies/mL or 4.9E+02 copies/mL, respectively, and may lead to false negative influenza virus results. If co-infection with influenza A or influenza B virus is suspected in samples with a positive SARS-CoV-2 result, the sample should be re-tested with another FDA cleared, approved, or authorized influenza test, if influenza virus detection would change clinical management.    Blood Gas, Arterial With Co-Ox [[id number]]  (Abnormal) Collected: 09/25/23 0857    Specimen: Arterial Blood Updated: 09/25/23 0855     Site Right Brachial     Haroon's Test Positive     pH, Arterial 7.446 pH units      pCO2, Arterial 43.0 mm Hg      pO2, Arterial 62.4 mm Hg      Comment: 84 Value below reference range        HCO3, Arterial 29.6 mmol/L      Comment: 83 Value above reference range        Base Excess, Arterial 4.9 mmol/L      Comment: 83 Value above reference range        O2 Saturation, Arterial 93.8 %      Comment: 84 Value below reference range        Hemoglobin, Blood Gas 11.4 g/dL      Comment: 84 Value below reference range        Hematocrit, Blood Gas 35.0 %      Comment: 84 Value below reference range        Oxyhemoglobin 91.4 %      Comment: 84 Value below reference range        Methemoglobin <1.00 %      Comment: 94 Value below reportable range < 1.0        Carboxyhemoglobin 2.0 %      A-a DO2 36.8 mmHg      Sodium, Arterial 131 mmol/L      Comment: 84 Value below reference range        Potassium, Arterial 2.9 mmol/L      Comment: 84 Value below reference range        Ionized Calcium 4.16 mg/dL      Comment: 84 Value below reference range        Glucose, Arterial 97 mg/dL      Barometric Pressure for Blood Gas 751 mmHg      Modality N/A     Ventilator  Mode NA     Collected by KATINA ALONZO. RRT     Comment: Meter: [id number]     :  [id number]        pH, Temp Corrected --     pCO2, Temperature Corrected --     pO2, Temperature Corrected --          Imaging Results (Last 24 Hours)       Procedure Component Value Units Date/Time    CT Cervical Spine Without Contrast [[id number]] Collected: 09/25/23 1045     Updated: 09/25/23 1053    Narrative:      CT CERVICAL SPINE WO CONTRAST    HISTORY: AMS/FALL    COMPARISON: None    Automated exposure control was also utilized to decrease patient radiation dose.    TECHNIQUE:  Tomographic images of the cervical spine were obtained without the  use of IV contrast.  Multiplane are reformatted images were provided for review.    FINDINGS:      Normal alignment of the cervical vertebra.  Mild grade 1 anterior listhesis of C7 on T1, nonspecific but likely degenerative  in nature.      No fracture or malalignment.    Extensive multilevel degenerative disc disease throughout the mid and lower  cervical spine.        Impression:      1.  No acute fracture of the cervical spine.          CT Head Without Contrast [[id number]] Collected: 09/25/23 1043     Updated: 09/25/23 1048    Narrative:      CT HEAD WO CONTRAST    HISTORY: AMS/FALL    COMPARISON: CT head 7/20/2023    Automated exposure control was also utilized to decrease patient radiation dose.    TECHNIQUE: Helical tomographic images of the brain were obtained without the use  of contrast. Multiplanar reformatted images were provided for review.    FINDINGS:        The paranasal sinuses, mastoid air cells, and inner ears are generally clear.    Intraorbital contents are unremarkable.    Calvarium demonstrates no gross abnormality.    The ventricular and cisternal spaces are prominent, consistent with brain  atrophy.    No mass effect, intracranial hemorrhage, or extra-axial fluid collections.    There are subcortical, deep, and periventricular white matter  changes,  consistent with chronic small vessel ischemic disease in a patient of this age.  Remote lacunar infarcts bilateral caudate heads.      Impression:      1.  No evidence of acute intracranial process.  2. Brain atrophy with changes of microangiopathy. Remote lacunar infarcts  bilateral caudate heads.      XR Chest 1 View [[id number]] Collected: 09/25/23 1029     Updated: 09/25/23 1032    Narrative:      XR CHEST 1 VIEW    HISTORY: ams    COMPARISON: AP chest 8/17/2023.    FINDINGS:  Right IJ dialysis catheter in stable position.    The cardiomediastinal silhouette is within normal limits.    No focal consolidation.    The osseous structures are age-appropriate.      Impression:      1.  No acute intrathoracic process.                  Assessment and Plan    Altered mental status      Due to ongoing UTI or head concussion; no fever     CT brain negative     Accidental fall     Stable; PT/OT would be consulted     COVID positive      No symptoms so far; chest x ray unremarkable    UTI     Continue with rocephin     Ordered urine culture    Chronic medical problems     Nursing home resident      Chronic recurrent depression     Essential hypertension    Medical Decision Making  Number and Complexity of problems: one major complex  Differential Diagnosis: pneumonia    Conditions and Status:        Condition is unchanged.     [facility] Data  External documents reviewed: previous medical records  My EKG interpretation: n/a  My plain film interpretation: no acute event   Tests considered but not ordered: none      Discussed with: ED physician and patient      Treatment Plan  See above    Care Planning  Shared decision making: her , Tristan  Code status and discussions: full code    Disposition  Social Determinants of Health that impact treatment or disposition: none  I expect the patient to be discharged: in progress    I confirmed that the patient's Advance Care Plan is present, code status is documented, or  surrogate decision maker is listed in the patient's medical record.       Paul Martinez MD

## 2023-09-26 PROBLEM — R78.81 BACTEREMIA DUE TO GRAM-NEGATIVE BACTERIA: Status: ACTIVE | Noted: 2023-09-26

## 2023-09-26 LAB
ALBUMIN SERPL-MCNC: 2.3 G/DL (ref 3.5–5.2)
ALBUMIN/GLOB SERPL: 0.7 G/DL
ALP SERPL-CCNC: 124 U/L (ref 39–117)
ALT SERPL W P-5'-P-CCNC: 8 U/L (ref 1–33)
ANION GAP SERPL CALCULATED.3IONS-SCNC: 9 MMOL/L (ref 5–15)
AST SERPL-CCNC: 29 U/L (ref 1–32)
BACTERIA BLD CULT: ABNORMAL
BASOPHILS # BLD AUTO: 0.05 10*3/MM3 (ref 0–0.2)
BASOPHILS NFR BLD AUTO: 0.9 % (ref 0–1.5)
BILIRUB SERPL-MCNC: 0.5 MG/DL (ref 0–1.2)
BOTTLE TYPE: ABNORMAL
BUN SERPL-MCNC: 12 MG/DL (ref 8–23)
BUN/CREAT SERPL: 6.5 (ref 7–25)
CALCIUM SPEC-SCNC: 7.7 MG/DL (ref 8.6–10.5)
CHLORIDE SERPL-SCNC: 98 MMOL/L (ref 98–107)
CO2 SERPL-SCNC: 27 MMOL/L (ref 22–29)
CREAT SERPL-MCNC: 1.85 MG/DL (ref 0.57–1)
D-LACTATE SERPL-SCNC: 0.6 MMOL/L (ref 0.5–2)
DEPRECATED RDW RBC AUTO: 49 FL (ref 37–54)
EGFRCR SERPLBLD CKD-EPI 2021: 26.9 ML/MIN/1.73
EOSINOPHIL # BLD AUTO: 0.06 10*3/MM3 (ref 0–0.4)
EOSINOPHIL NFR BLD AUTO: 1.1 % (ref 0.3–6.2)
ERYTHROCYTE [DISTWIDTH] IN BLOOD BY AUTOMATED COUNT: 15.1 % (ref 12.3–15.4)
GLOBULIN UR ELPH-MCNC: 3.1 GM/DL
GLUCOSE SERPL-MCNC: 81 MG/DL (ref 65–99)
HBA1C MFR BLD: 4.9 % (ref 4.8–5.6)
HCT VFR BLD AUTO: 30.1 % (ref 34–46.6)
HGB BLD-MCNC: 9.6 G/DL (ref 12–15.9)
IMM GRANULOCYTES # BLD AUTO: 0.03 10*3/MM3 (ref 0–0.05)
IMM GRANULOCYTES NFR BLD AUTO: 0.6 % (ref 0–0.5)
LYMPHOCYTES # BLD AUTO: 1.33 10*3/MM3 (ref 0.7–3.1)
LYMPHOCYTES NFR BLD AUTO: 24.6 % (ref 19.6–45.3)
MCH RBC QN AUTO: 28.2 PG (ref 26.6–33)
MCHC RBC AUTO-ENTMCNC: 31.9 G/DL (ref 31.5–35.7)
MCV RBC AUTO: 88.5 FL (ref 79–97)
MONOCYTES # BLD AUTO: 0.59 10*3/MM3 (ref 0.1–0.9)
MONOCYTES NFR BLD AUTO: 10.9 % (ref 5–12)
NEUTROPHILS NFR BLD AUTO: 3.35 10*3/MM3 (ref 1.7–7)
NEUTROPHILS NFR BLD AUTO: 61.9 % (ref 42.7–76)
NRBC BLD AUTO-RTO: 0 /100 WBC (ref 0–0.2)
PLATELET # BLD AUTO: 236 10*3/MM3 (ref 140–450)
PMV BLD AUTO: 8.9 FL (ref 6–12)
POTASSIUM SERPL-SCNC: 3.2 MMOL/L (ref 3.5–5.2)
PROT SERPL-MCNC: 5.4 G/DL (ref 6–8.5)
RBC # BLD AUTO: 3.4 10*6/MM3 (ref 3.77–5.28)
SODIUM SERPL-SCNC: 134 MMOL/L (ref 136–145)
WBC NRBC COR # BLD: 5.41 10*3/MM3 (ref 3.4–10.8)

## 2023-09-26 PROCEDURE — 83605 ASSAY OF LACTIC ACID: CPT | Performed by: INTERNAL MEDICINE

## 2023-09-26 PROCEDURE — 85025 COMPLETE CBC W/AUTO DIFF WBC: CPT | Performed by: INTERNAL MEDICINE

## 2023-09-26 PROCEDURE — 83036 HEMOGLOBIN GLYCOSYLATED A1C: CPT | Performed by: INTERNAL MEDICINE

## 2023-09-26 PROCEDURE — G0257 UNSCHED DIALYSIS ESRD PT HOS: HCPCS

## 2023-09-26 PROCEDURE — 25010000002 HEPARIN (PORCINE) PER 1000 UNITS: Performed by: INTERNAL MEDICINE

## 2023-09-26 PROCEDURE — 80053 COMPREHEN METABOLIC PANEL: CPT | Performed by: INTERNAL MEDICINE

## 2023-09-26 PROCEDURE — 25010000002 CEFTRIAXONE PER 250 MG: Performed by: INTERNAL MEDICINE

## 2023-09-26 RX ORDER — HEPARIN SODIUM 1000 [USP'U]/ML
4000 INJECTION, SOLUTION INTRAVENOUS; SUBCUTANEOUS AS NEEDED
Status: DISCONTINUED | OUTPATIENT
Start: 2023-09-26 | End: 2023-10-01 | Stop reason: HOSPADM

## 2023-09-26 RX ORDER — ALBUMIN (HUMAN) 12.5 G/50ML
12.5 SOLUTION INTRAVENOUS AS NEEDED
Status: ACTIVE | OUTPATIENT
Start: 2023-09-26 | End: 2023-09-27

## 2023-09-26 RX ADMIN — DILTIAZEM HYDROCHLORIDE 60 MG: 60 TABLET, FILM COATED ORAL at 14:27

## 2023-09-26 RX ADMIN — DILTIAZEM HYDROCHLORIDE 60 MG: 60 TABLET, FILM COATED ORAL at 17:39

## 2023-09-26 RX ADMIN — GABAPENTIN 100 MG: 100 CAPSULE ORAL at 21:05

## 2023-09-26 RX ADMIN — ISOSORBIDE MONONITRATE 30 MG: 30 TABLET, EXTENDED RELEASE ORAL at 08:34

## 2023-09-26 RX ADMIN — CEFTRIAXONE SODIUM 2000 MG: 2 INJECTION, POWDER, FOR SOLUTION INTRAMUSCULAR; INTRAVENOUS at 14:27

## 2023-09-26 RX ADMIN — HEPARIN SODIUM 4000 UNITS: 1000 INJECTION INTRAVENOUS; SUBCUTANEOUS at 13:44

## 2023-09-26 RX ADMIN — METOPROLOL TARTRATE 100 MG: 50 TABLET, FILM COATED ORAL at 21:05

## 2023-09-26 RX ADMIN — APIXABAN 2.5 MG: 2.5 TABLET, FILM COATED ORAL at 21:05

## 2023-09-26 RX ADMIN — Medication 10 ML: at 21:06

## 2023-09-26 RX ADMIN — SODIUM CHLORIDE, POTASSIUM CHLORIDE, SODIUM LACTATE AND CALCIUM CHLORIDE 75 ML/HR: 600; 310; 30; 20 INJECTION, SOLUTION INTRAVENOUS at 16:53

## 2023-09-26 RX ADMIN — DILTIAZEM HYDROCHLORIDE 60 MG: 60 TABLET, FILM COATED ORAL at 21:05

## 2023-09-26 RX ADMIN — GABAPENTIN 100 MG: 100 CAPSULE ORAL at 08:34

## 2023-09-26 RX ADMIN — METOPROLOL TARTRATE 100 MG: 50 TABLET, FILM COATED ORAL at 08:33

## 2023-09-26 RX ADMIN — Medication 10 ML: at 08:34

## 2023-09-26 RX ADMIN — SODIUM CHLORIDE, POTASSIUM CHLORIDE, SODIUM LACTATE AND CALCIUM CHLORIDE 75 ML/HR: 600; 310; 30; 20 INJECTION, SOLUTION INTRAVENOUS at 03:25

## 2023-09-26 RX ADMIN — APIXABAN 2.5 MG: 2.5 TABLET, FILM COATED ORAL at 08:34

## 2023-09-26 RX ADMIN — AMITRIPTYLINE HYDROCHLORIDE 25 MG: 25 TABLET, FILM COATED ORAL at 21:05

## 2023-09-26 RX ADMIN — DILTIAZEM HYDROCHLORIDE 60 MG: 60 TABLET, FILM COATED ORAL at 08:33

## 2023-09-26 RX ADMIN — AMIODARONE HYDROCHLORIDE 200 MG: 200 TABLET ORAL at 08:34

## 2023-09-26 NOTE — PLAN OF CARE
Problem: Adult Inpatient Plan of Care  Goal: Plan of Care Review  Outcome: Ongoing, Progressing   Goal Outcome Evaluation:                      admit w +COVID 19, AMS w/ +UTI. Noted pt was in hospital in Aug 2023, at which point HD was initiated r/t BLAINE- unsure if still requiring HD treatments. H&P notes pt family concerned about drain at breast ?r/t abscess- ?wound. Alb 2.3L. Cardiac diet, 0% x1. Noted wt hx 7/28/23 156#, 8/16 151# and 9/5 152#. # w/ BMI 23.5. Unsure of intake hx from Aug- RD has concerns for malnutrition. Reviewed Aug notes and RD placed pt on soft/chopped meats and will send will milk and magic cups TID which pt accepted well at that time- she refused Boost per RD notes. RD following.

## 2023-09-26 NOTE — SIGNIFICANT NOTE
09/26/23 1258   OTHER   Discipline occupational therapist   Rehab Time/Intention   Session Not Performed patient unavailable for evaluation   Recommendation   OT - Next Appointment 09/27/23     Pt currently in dialysis. OT will f/u as able.

## 2023-09-26 NOTE — PLAN OF CARE
Problem: Device-Related Complication Risk (Hemodialysis)  Goal: Safe, Effective Therapy Delivery  Outcome: Ongoing, Progressing     Problem: Hemodynamic Instability (Hemodialysis)  Goal: Effective Tissue Perfusion  Outcome: Ongoing, Progressing     Problem: Infection (Hemodialysis)  Goal: Absence of Infection Signs and Symptoms  Outcome: Ongoing, Progressing   Goal Outcome Evaluation:

## 2023-09-26 NOTE — PLAN OF CARE
Problem: Device-Related Complication Risk (Hemodialysis)  Goal: Safe, Effective Therapy Delivery  Outcome: Ongoing, Progressing     Problem: Hemodynamic Instability (Hemodialysis)  Goal: Effective Tissue Perfusion  Outcome: Ongoing, Progressing     Problem: Infection (Hemodialysis)  Goal: Absence of Infection Signs and Symptoms  Outcome: Ongoing, Progressing   Goal Outcome Evaluation:  .  HD tx today, 1L removed, pt tolerated well

## 2023-09-26 NOTE — PROGRESS NOTES
[facility] Medicine Services  INPATIENT PROGRESS NOTE    Length of Stay: 0  Date of Admission: 9/25/2023  Primary Care Physician: Len Seo MD    Subjective   (S) Admitted for altered mental status   Today, she is more alert, answering more questions; no pain reported    Review of Systems   All other systems reviewed and are negative.     All pertinent negatives and positives are as above. All other systems have been reviewed and are negative unless otherwise stated.     Prior to Admission medications    Medication Sig Start Date End Date Taking? Authorizing Provider   amiodarone (PACERONE) 200 MG tablet Take 1 tablet by mouth Daily.   Yes Rubi Seymour MD   amitriptyline (ELAVIL) 25 MG tablet Take 1 tablet by mouth Every Night.   Yes Rubi Seymour MD   apixaban (ELIQUIS) 5 MG tablet tablet Take 1 tablet by mouth 2 (Two) Times a Day.   Yes Rubi Seymour MD   busPIRone (BUSPAR) 5 MG tablet Take 1 tablet by mouth 3 (Three) Times a Day.   Yes Rubi Seymour MD   dilTIAZem (CARDIZEM) 60 MG tablet Take 1 tablet by mouth 4 (Four) Times a Day.   Yes Rubi Seymour MD   docusate sodium (COLACE) 100 MG capsule Take 1 capsule by mouth 2 (Two) Times a Day.   Yes Rubi Seymour MD   DULoxetine (CYMBALTA) 60 MG capsule Take 1 capsule by mouth Daily.   Yes Rubi Seymour MD   gabapentin (NEURONTIN) 100 MG capsule Take 1 capsule by mouth 3 (Three) Times a Day.   Yes Rubi Seymour MD   HYDROcodone-acetaminophen (NORCO) 5-325 MG per tablet Take 1 tablet by mouth Every 6 (Six) Hours As Needed.   Yes Rubi Seymour MD   lansoprazole (PREVACID) 30 MG capsule Take 1 capsule by mouth Every 12 (Twelve) Hours. 6/21/21  Yes Rubi Seymour MD   midodrine (PROAMATINE) 5 MG tablet Take 1.5 tablets by mouth 3 (Three) Times a Day Before Meals.   Yes Rubi Seymour MD   ondansetron (ZOFRAN) 4 MG tablet Take 1 tablet  by mouth Every 6 (Six) Hours As Needed for Nausea or Vomiting.   Yes Rubi Seymour MD   rosuvastatin (CRESTOR) 40 MG tablet Take 1 tablet by mouth Daily. 6/2/23  Yes Odette Alves MD   traZODone (DESYREL) 50 MG tablet Take 1 tablet by mouth Every Night.   Yes Rubi Seymour MD   acetaminophen (TYLENOL) 500 MG tablet Take 1 tablet by mouth As Needed.    Rubi Seymour MD   isosorbide mononitrate (IMDUR) 30 MG 24 hr tablet Take 1 tablet by mouth Daily. 6/2/23   Odette Alves MD   metoprolol tartrate (LOPRESSOR) 100 MG tablet Take 1 tablet by mouth Every 12 (Twelve) Hours. 8/17/23   Juanito Harrington MD   nystatin (MYCOSTATIN) 288616 UNIT/GM powder Apply  topically to the appropriate area as directed Every 12 (Twelve) Hours. 8/17/23   Juanito Harrington MD   VENTOLIN  (90 Base) MCG/ACT inhaler  10/23/19   ProviderRubi MD       amiodarone, 200 mg, Oral, Daily  amitriptyline, 25 mg, Oral, Nightly  apixaban, 2.5 mg, Oral, BID  cefTRIAXone, 1,000 mg, Intravenous, Q24H  dilTIAZem, 60 mg, Oral, 4x Daily  gabapentin, 100 mg, Oral, TID  isosorbide mononitrate, 30 mg, Oral, Daily  metoprolol tartrate, 100 mg, Oral, Q12H  sodium chloride, 10 mL, Intravenous, Q12H      lactated ringers, 75 mL/hr, Last Rate: 75 mL/hr (09/26/23 0929)        Objective    Temp:  [97.4 °F (36.3 °C)-98.2 °F (36.8 °C)] 98 °F (36.7 °C)  Heart Rate:  [63-99] 64  Resp:  [16-20] 18  BP: (119-187)/(48-88) 119/50    Physical Exam  Vitals reviewed.   Constitutional:       Appearance: She is ill-appearing.   HENT:      Head: Normocephalic.      Nose: Nose normal.      Mouth/Throat:      Mouth: Mucous membranes are moist.   Eyes:      Extraocular Movements: Extraocular movements intact.   Cardiovascular:      Rate and Rhythm: Regular rhythm.      Heart sounds: Normal heart sounds.      Comments: Has a vas cath on right IJV  Pulmonary:      Breath sounds: Normal breath sounds.   Abdominal:      General: Bowel sounds are  normal.      Palpations: Abdomen is soft.   Musculoskeletal:      Cervical back: Normal range of motion and neck supple.   Skin:     General: Skin is warm.   Neurological:      General: No focal deficit present.      Mental Status: Mental status is at baseline.   Psychiatric:         Behavior: Behavior normal.       Results Review:  I have reviewed the labs, radiology results, and diagnostic studies.    Laboratory Data:   Results from last 7 days   Lab Units 09/26/23  0642 09/25/23  1410 09/25/23  0857 09/25/23  0851   SODIUM mmol/L 134* 129*  --  129*   SODIUM, ARTERIAL mmol/L  --   --  131*  --    POTASSIUM mmol/L 3.2* 3.3*  --  3.0*   CHLORIDE mmol/L 98 92*  --  91*   CO2 mmol/L 27.0 27.0  --  29.0   BUN mg/dL 12 11  --  11   CREATININE mg/dL 1.85* 1.79*  --  1.77*   GLUCOSE mg/dL 81 95  --  98   GLUCOSE, ARTERIAL mg/dL  --   --  97*  --    CALCIUM mg/dL 7.7* 8.4*  --  8.5*   BILIRUBIN mg/dL 0.5 0.6  --  0.7   ALK PHOS U/L 124* 149*  --  155*   ALT (SGPT) U/L 8 11  --  12   AST (SGOT) U/L 29 33*  --  34*   ANION GAP mmol/L 9.0 10.0  --  9.0     Estimated Creatinine Clearance: 23.8 mL/min (A) (by C-G formula based on SCr of 1.85 mg/dL (H)).  Results from last 7 days   Lab Units 09/25/23  0851   MAGNESIUM mg/dL 1.5*         Results from last 7 days   Lab Units 09/26/23  0642 09/25/23  0851   WBC 10*3/mm3 5.41 5.08   HEMOGLOBIN g/dL 9.6* 11.5*   HEMATOCRIT % 30.1* 37.0   PLATELETS 10*3/mm3 236 278           Culture Data:   Blood Culture   Date Value Ref Range Status   09/25/2023 Abnormal Stain (C)  Preliminary     Urine Culture   Date Value Ref Range Status   09/25/2023 >100,000 CFU/mL Gram Negative Bacilli (A)  Preliminary     No results found for: RESPCX  No results found for: WOUNDCX  No results found for: STOOLCX  No components found for: BODYFLD    Radiology Data:   Imaging Results (Last 24 Hours)       ** No results found for the last 24 hours. **            I have reviewed the patient's current medications.      Assessment/Plan     Altered mental status      Due to ongoing UTI and bacteremia, no fever     CT brain negative      Resolving; likely metabolic encephalopathy; she is more alert now and answering questions     Accidental fall     Stable; PT/OT on the case     COVID positive      No symptoms so far; chest x ray unremarkable     UTI by Gram negative bacilii, likely Klebsiella      Continue with rocephin     pending urine culture    Bacteremia Klebsiella     Increase rocephin to 2 g per day to complete 7-10 days     Suspected source is urine vs vasc cath     Will need blood culture follow up due to vasc cath she has; will order follow up blood culture for am     CKD       On HD?; discussed with nephrology; pending its evaluation     Chronic medical problems     Nursing home resident      Chronic recurrent depression     Essential hypertension     Recent history of right lower occlusive DVT     Medical Decision Making  Number and Complexity of problems: one major complex  Differential Diagnosis: pneumonia     Conditions and Status:        Condition is unchanged.     [facility] Data  External documents reviewed: previous medical records  My EKG interpretation: n/a  My plain film interpretation: no acute event   Tests considered but not ordered: none      Discussed with: ED physician and patient      Treatment Plan  See above     Care Planning  Shared decision making: her , Tristan  Code status and discussions: full code     Disposition  Social Determinants of Health that impact treatment or disposition: none  I expect the patient to be discharged: in progress       I confirmed that the patient's Advance Care Plan is present, code status is documented, or surrogate decision maker is listed in the patient's medical record.     Paul Martinez MD

## 2023-09-26 NOTE — SIGNIFICANT NOTE
09/26/23 1106   OTHER   Discipline physical therapist   Rehab Time/Intention   Session Not Performed patient unavailable for evaluation;other (see comments)  (PT initial evaluation attempted. Patient is currently receiving dialysis. PT to follow up as appropriate.)   Recommendation   PT - Next Appointment 09/27/23

## 2023-09-26 NOTE — PLAN OF CARE
Goal Outcome Evaluation:  Plan of Care Reviewed With: patient           Outcome Evaluation: VSS. Pt confused, but had no complaints during shift, and rested well between care. Plan of care ongoing.

## 2023-09-26 NOTE — PLAN OF CARE
Goal Outcome Evaluation:  Plan of Care Reviewed With: patient        Progress: improving  Outcome Evaluation: Patient Alert, oriented to self, VSS safety maintained. Dialysis performed today with 1L off, tolerated well. No s/s distress.

## 2023-09-26 NOTE — CONSULTS
[facility]  [address] [place] [address]. [address]  T - [phone]  F - [phone]     Consultation         PATIENT  DEMOGRAPHICS   PATIENT NAME: Carol Swift Nedivya                      PHYSICIAN: COY Chong  : 1940  MRN: [id number]    Subjective   SUBJECTIVE   Referring Provider: Dr. Martinez  Reason for Consultation: BLAINE on hemodialysis  History of present illness: This is an 83-year-old female with a past medical history significant for paroxysmal atrial fibrillation, hypertension, lumbar spondylosis, hyperlipidemia, depression, GERD, recent BLAINE on CKD requiring hemodialysis who initially presented to the hospital in July.  She presented at that time with fever, confusion, chills, left breast tenderness and redness.  At the time she was treated for sepsis and UTI as well as BLAINE which was secondary to hydronephrosis caused by a large retroperitoneal hematoma.  Unfortunately her renal failure progressed to the point that she required hemodialysis.  She was subsequently transferred to [facility] and continued her hemodialysis.  Later she was discharged and has been dialyzing at [facility] in [place].  She now presents back to the hospital yesterday with complaints of altered mental status and a fall at her nursing facility.  She is being treated for urinary tract infection as well as COVID-19.  Nephrology is consulted to manage her dialysis needs.    Past Medical History:   Diagnosis Date    Arthritis     Depression     GERD (gastroesophageal reflux disease)     Hyperlipidemia     Hypertension     Near syncope     Vascular disease      Past Surgical History:   Procedure Laterality Date    BLADDER SURGERY      BREAST ABSCESS INCISION AND DRAINAGE Left 2023    Procedure: BREAST ABSCESS INCISION AND DRAINAGE;  Surgeon: Fox Roper MD;  Location: [facility];  Service: General;  Laterality: Left;    CYSTOSCOPY, RETROGRADE PYELOGRAM AND STENT INSERTION Right 2023    Procedure:  "CYSTOSCOPY RETROGRADE PYELOGRAM AND STENT INSERTION;  Surgeon: Ousmane Garcia MD;  Location: [facility] OR;  Service: Urology;  Laterality: Right;    ENDOSCOPY N/A 5/6/2019    Procedure: ESOPHAGOGASTRODUODENOSCOPY;  Surgeon: Alberto Sanchez DO;  Location: [facility] ENDOSCOPY;  Service: Gastroenterology    GALLBLADDER SURGERY      INTERVENTIONAL RADIOLOGY PROCEDURE N/A 8/17/2023    Procedure: tunneled central venous catheter placement;  Surgeon: Hardy Yin MD;  Location: [facility] ANGIO INVASIVE LOCATION;  Service: Interventional Radiology;  Laterality: N/A;    SHOULDER SURGERY      \"bone spurs\"    SUBTOTAL HYSTERECTOMY       Family History   Problem Relation Age of Onset    Hypertension Mother     Diabetes Paternal Aunt     Diabetes Paternal Grandmother     Hypertension Paternal Grandmother     Hypertension Paternal Grandfather      Social History     Tobacco Use    Smoking status: Never    Smokeless tobacco: Never   Vaping Use    Vaping Use: Never used   Substance Use Topics    Alcohol use: No    Drug use: No     Allergies:  Tuberculin tests, Hydrocodone, and Lortab [hydrocodone-acetaminophen]     REVIEW OF SYSTEMS    Review of Systems   Unable to perform ROS: Other     Objective   OBJECTIVE   Vital Signs  Temp:  [97.4 °F (36.3 °C)-98.2 °F (36.8 °C)] 98 °F (36.7 °C)  Heart Rate:  [61-99] 61  Resp:  [16-20] 16  BP: (102-175)/(48-88) 114/49    Flowsheet Rows      Flowsheet Row First Filed Value   Admission Height 165.1 cm (65\") Documented at 09/25/2023 0815   Admission Weight 70.3 kg (155 lb) Documented at 09/25/2023 0815             I/O last 3 completed shifts:  In: 100 [I.V.:100]  Out: 1500 [Urine:1500]    PHYSICAL EXAM    Physical Exam    No exam secondary to COVID-19, discussed with RN.    RESULTS   Results Review:    Results from last 7 days   Lab Units 09/26/23  0642 09/25/23  1410 09/25/23  0857 09/25/23  0851   SODIUM mmol/L 134* 129*  --  129*   SODIUM, ARTERIAL mmol/L  --   --  131*  --  "   POTASSIUM mmol/L 3.2* 3.3*  --  3.0*   CHLORIDE mmol/L 98 92*  --  91*   CO2 mmol/L 27.0 27.0  --  29.0   BUN mg/dL 12 11  --  11   CREATININE mg/dL 1.85* 1.79*  --  1.77*   CALCIUM mg/dL 7.7* 8.4*  --  8.5*   BILIRUBIN mg/dL 0.5 0.6  --  0.7   ALK PHOS U/L 124* 149*  --  155*   ALT (SGPT) U/L 8 11  --  12   AST (SGOT) U/L 29 33*  --  34*   GLUCOSE mg/dL 81 95  --  98   GLUCOSE, ARTERIAL mg/dL  --   --  97*  --        Estimated Creatinine Clearance: 23.8 mL/min (A) (by C-G formula based on SCr of 1.85 mg/dL (H)).    Results from last 7 days   Lab Units 09/25/23  0851   MAGNESIUM mg/dL 1.5*             Results from last 7 days   Lab Units 09/26/23  0642 09/25/23  0851   WBC 10*3/mm3 5.41 5.08   HEMOGLOBIN g/dL 9.6* 11.5*   PLATELETS 10*3/mm3 236 278              MEDICATIONS    amiodarone, 200 mg, Oral, Daily  amitriptyline, 25 mg, Oral, Nightly  apixaban, 2.5 mg, Oral, BID  cefTRIAXone, 2,000 mg, Intravenous, Q24H  dilTIAZem, 60 mg, Oral, 4x Daily  gabapentin, 100 mg, Oral, TID  isosorbide mononitrate, 30 mg, Oral, Daily  metoprolol tartrate, 100 mg, Oral, Q12H  sodium chloride, 10 mL, Intravenous, Q12H      lactated ringers, 75 mL/hr, Last Rate: 75 mL/hr (09/26/23 1156)      Medications Prior to Admission   Medication Sig Dispense Refill Last Dose    amiodarone (PACERONE) 200 MG tablet Take 1 tablet by mouth Daily.   9/24/2023    amitriptyline (ELAVIL) 25 MG tablet Take 1 tablet by mouth Every Night.   9/24/2023    apixaban (ELIQUIS) 5 MG tablet tablet Take 1 tablet by mouth 2 (Two) Times a Day.   9/24/2023    busPIRone (BUSPAR) 5 MG tablet Take 1 tablet by mouth 3 (Three) Times a Day.   9/24/2023    dilTIAZem (CARDIZEM) 60 MG tablet Take 1 tablet by mouth 4 (Four) Times a Day.   9/24/2023    docusate sodium (COLACE) 100 MG capsule Take 1 capsule by mouth 2 (Two) Times a Day.   9/24/2023    DULoxetine (CYMBALTA) 60 MG capsule Take 1 capsule by mouth Daily.   9/24/2023    gabapentin (NEURONTIN) 100 MG capsule Take  1 capsule by mouth 3 (Three) Times a Day.   9/24/2023    HYDROcodone-acetaminophen (NORCO) 5-325 MG per tablet Take 1 tablet by mouth Every 6 (Six) Hours As Needed.   Past Week    lansoprazole (PREVACID) 30 MG capsule Take 1 capsule by mouth Every 12 (Twelve) Hours.   9/24/2023    midodrine (PROAMATINE) 5 MG tablet Take 1.5 tablets by mouth 3 (Three) Times a Day Before Meals.   9/24/2023    ondansetron (ZOFRAN) 4 MG tablet Take 1 tablet by mouth Every 6 (Six) Hours As Needed for Nausea or Vomiting.   Past Week    rosuvastatin (CRESTOR) 40 MG tablet Take 1 tablet by mouth Daily. 90 tablet 3 9/24/2023    traZODone (DESYREL) 50 MG tablet Take 1 tablet by mouth Every Night.   Past Week    acetaminophen (TYLENOL) 500 MG tablet Take 1 tablet by mouth As Needed.       isosorbide mononitrate (IMDUR) 30 MG 24 hr tablet Take 1 tablet by mouth Daily. 90 tablet 3     metoprolol tartrate (LOPRESSOR) 100 MG tablet Take 1 tablet by mouth Every 12 (Twelve) Hours.       nystatin (MYCOSTATIN) 498713 UNIT/GM powder Apply  topically to the appropriate area as directed Every 12 (Twelve) Hours.       VENTOLIN  (90 Base) MCG/ACT inhaler         Assessment & Plan   ASSESSMENT / PLAN      AMS (altered mental status)    1.  BLAINE/ATN- baseline creatinine unknown, creatinine was 1.5 in October 2022 but less than 1 prior to that.  BLAINE secondary to hydronephrosis caused by retroperitoneal hematoma leading to ATN.  Started hemodialysis 8/6/2023.    -Continue hemodialysis today, observe for renal recovery. Cr is better - was around 4 earlier and now 1.7-1.8 range. Will do 2.5 hrs of hd today. Strict intake and out put     2.  COVID-19    3.  Retroperitoneal bleed/hydronephrosis- status post right J stent for hydronephrosis    4.  Hypertension    5.  A-fib     6.  Anemia- hemoglobin acceptable    7.  AMS    8.  Extensive right lower extremity DVT      Thank you for the consult, we will continue to follow the patient.         I discussed the  patients findings and my recommendations with nursing staff      This document has been electronically signed by COY Chong on September 26, 2023 13:22 CDT      For this patient encounter, I have reviewed the Nurse Practitioner's documentation, medical decision making, and treatment plan and personally spent time with the patient.

## 2023-09-26 NOTE — CONSULTS
Adult Nutrition  Assessment/PES    Patient Name:  Carol Sullivan  YOB: 1940  MRN: [id number]  Admit Date:  9/25/2023    Assessment Date:  9/26/2023    Comments:  84yo female admit w +COVID 19, AMS w/ +UTI. Noted pt was in hospital in Aug 2023, at which point HD was initiated r/t BLAINE- unsure if still requiring HD treatments. H&P notes pt family concerned about drain at breast ?r/t abscess- ?wound. Alb 2.3L. Cardiac diet, 0% x1. Noted wt hx 7/28/23 156#, 8/16 151# and 9/5 152#. # w/ BMI 23.5. Unsure of intake hx from Aug- RD has concerns for malnutrition. Reviewed Aug notes and RD placed pt on soft/chopped meats and will send will milk and magic cups TID which pt accepted well at that time- she refused Boost per RD notes. RD to follow hospital course.     Reason for Assessment       Row Name 09/26/23 1221          Reason for Assessment    Reason For Assessment identified at risk by screening criteria     Diagnosis pulmonary disease     Identified At Risk by Screening Criteria reduced oral intake over the last month                    Nutrition/Diet History       Row Name 09/26/23 1222          Nutrition/Diet History    Typical Intake (Food/Fluid/EN/PN) Pt in isolation- pt admit w/ AMS.                    Labs/Tests/Procedures/Meds       Row Name 09/26/23 1225          Labs/Procedures/Meds    Lab Results Reviewed reviewed     Lab Results Comments Glu 81, BUN 12/Cr 1.8H, alb 2.3L        Diagnostic Tests/Procedures    Diagnostic Test/Procedure Reviewed reviewed     Diagnostic Test/Procedures Comments +COVID, ? HD        Medications    Pertinent Medications Reviewed reviewed                      Estimated/Assessed Needs - Anthropometrics       Row Name 09/26/23 1226 09/26/23 0628       Anthropometrics    Weight -- 64.2 kg (141 lb 8 oz)    Weight for Calculation 64 kg (141 lb) --       Estimated/Assessed Needs    Additional Documentation Fluid Requirements (Group);Estimated Calorie Needs  (Group);KCAL/KG (Group);Protein Requirements (Group) --       Estimated Calorie Needs    Estimated Calorie Requirement (kcal/day) 1800 --       KCAL/KG    KCAL/KG 25 Kcal/Kg (kcal);30 Kcal/Kg (kcal) --    25 Kcal/Kg (kcal) 1598.925 --    30 Kcal/Kg (kcal) 1918.71 --       Protein Requirements    Weight Used For Protein Calculations 64 kg (141 lb) --    Est Protein Requirement Amount (gms/kg) 1.2 gm protein --    Estimated Protein Requirements (gms/day) 76.75 --       Fluid Requirements    Fluid Requirements (mL/day) 1800 --    RDA Method (mL) 1800 --                   Nutrition Prescription Ordered       Row Name 09/26/23 1229          Nutrition Prescription PO    Current PO Diet Regular     Common Modifiers Cardiac                    Evaluation of Received Nutrient/Fluid Intake       Row Name 09/26/23 1229          PO Evaluation    Number of Meals 1     % PO Intake 0%                       Problem/Interventions:   Problem 1       Row Name 09/26/23 1229          Nutrition Diagnoses Problem 1    Problem 1 Predicted Suboptimal Intake     Etiology (related to) Medical Diagnosis     Infectious Disease Other (comment)  +COVID     Signs/Symptoms (evidenced by) Report of Mnimal PO Intake                          Intervention Goal       Row Name 09/26/23 1230          Intervention Goal    General Maintain nutrition;Reduce/improve symptoms;Meet nutritional needs for age/condition;Improved nutrition related lab(s)     PO Establish PO     Weight Maintain weight                    Nutrition Intervention       Row Name 09/26/23 1231          Nutrition Intervention    RD/Tech Action Care plan reviewd;Follow Tx progress;Recommend/ordered     Recommended/Ordered Diet;Supplement                    Nutrition Prescription       Row Name 09/26/23 1231          Nutrition Prescription PO    PO Prescription Begin/change diet;Begin/change supplement     Begin/Change Diet to Soft Texture     Texture Chopped     Supplement Magic Cup;Milk      Supplement Frequency 3 times a day     New PO Prescription Ordered? Yes                    Education/Evaluation       Row Name 09/26/23 1231          Education    Education Education not appropriate at this time     Please explain Patient confusion        Monitor/Evaluation    Monitor PO intake;Supplement intake;Pertinent labs;Weight;Skin status;Symptoms     Education Follow-up Reinforce PRN                     Electronically signed by:  Radha Bang RD  09/26/23 12:32 CDT

## 2023-09-27 ENCOUNTER — APPOINTMENT (OUTPATIENT)
Dept: ULTRASOUND IMAGING | Facility: HOSPITAL | Age: 83
End: 2023-09-27
Payer: MEDICARE

## 2023-09-27 LAB
ANION GAP SERPL CALCULATED.3IONS-SCNC: 5 MMOL/L (ref 5–15)
BACTERIA SPEC AEROBE CULT: ABNORMAL
BASOPHILS # BLD AUTO: 0.05 10*3/MM3 (ref 0–0.2)
BASOPHILS NFR BLD AUTO: 1 % (ref 0–1.5)
BUN SERPL-MCNC: 10 MG/DL (ref 8–23)
BUN/CREAT SERPL: 7.2 (ref 7–25)
CALCIUM SPEC-SCNC: 7.6 MG/DL (ref 8.6–10.5)
CHLORIDE SERPL-SCNC: 99 MMOL/L (ref 98–107)
CO2 SERPL-SCNC: 28 MMOL/L (ref 22–29)
CREAT SERPL-MCNC: 1.39 MG/DL (ref 0.57–1)
DEPRECATED RDW RBC AUTO: 49.5 FL (ref 37–54)
EGFRCR SERPLBLD CKD-EPI 2021: 38 ML/MIN/1.73
EOSINOPHIL # BLD AUTO: 0.44 10*3/MM3 (ref 0–0.4)
EOSINOPHIL NFR BLD AUTO: 8.6 % (ref 0.3–6.2)
ERYTHROCYTE [DISTWIDTH] IN BLOOD BY AUTOMATED COUNT: 15.2 % (ref 12.3–15.4)
GLUCOSE SERPL-MCNC: 80 MG/DL (ref 65–99)
HCT VFR BLD AUTO: 28.1 % (ref 34–46.6)
HGB BLD-MCNC: 8.7 G/DL (ref 12–15.9)
IMM GRANULOCYTES # BLD AUTO: 0.02 10*3/MM3 (ref 0–0.05)
IMM GRANULOCYTES NFR BLD AUTO: 0.4 % (ref 0–0.5)
LYMPHOCYTES # BLD AUTO: 1.28 10*3/MM3 (ref 0.7–3.1)
LYMPHOCYTES NFR BLD AUTO: 25 % (ref 19.6–45.3)
MAGNESIUM SERPL-MCNC: 1.7 MG/DL (ref 1.6–2.4)
MCH RBC QN AUTO: 27.7 PG (ref 26.6–33)
MCHC RBC AUTO-ENTMCNC: 31 G/DL (ref 31.5–35.7)
MCV RBC AUTO: 89.5 FL (ref 79–97)
MONOCYTES # BLD AUTO: 0.59 10*3/MM3 (ref 0.1–0.9)
MONOCYTES NFR BLD AUTO: 11.5 % (ref 5–12)
NEUTROPHILS NFR BLD AUTO: 2.73 10*3/MM3 (ref 1.7–7)
NEUTROPHILS NFR BLD AUTO: 53.5 % (ref 42.7–76)
NRBC BLD AUTO-RTO: 0 /100 WBC (ref 0–0.2)
PLATELET # BLD AUTO: 212 10*3/MM3 (ref 140–450)
PMV BLD AUTO: 9.2 FL (ref 6–12)
POTASSIUM SERPL-SCNC: 3.2 MMOL/L (ref 3.5–5.2)
RBC # BLD AUTO: 3.14 10*6/MM3 (ref 3.77–5.28)
SODIUM SERPL-SCNC: 132 MMOL/L (ref 136–145)
WBC NRBC COR # BLD: 5.11 10*3/MM3 (ref 3.4–10.8)

## 2023-09-27 PROCEDURE — 97166 OT EVAL MOD COMPLEX 45 MIN: CPT

## 2023-09-27 PROCEDURE — 97162 PT EVAL MOD COMPLEX 30 MIN: CPT

## 2023-09-27 PROCEDURE — 80048 BASIC METABOLIC PNL TOTAL CA: CPT | Performed by: INTERNAL MEDICINE

## 2023-09-27 PROCEDURE — 87040 BLOOD CULTURE FOR BACTERIA: CPT | Performed by: INTERNAL MEDICINE

## 2023-09-27 PROCEDURE — 83735 ASSAY OF MAGNESIUM: CPT | Performed by: INTERNAL MEDICINE

## 2023-09-27 PROCEDURE — 85025 COMPLETE CBC W/AUTO DIFF WBC: CPT | Performed by: INTERNAL MEDICINE

## 2023-09-27 PROCEDURE — 25010000002 CEFTRIAXONE PER 250 MG: Performed by: INTERNAL MEDICINE

## 2023-09-27 RX ORDER — POTASSIUM CHLORIDE 750 MG/1
40 CAPSULE, EXTENDED RELEASE ORAL ONCE
Status: COMPLETED | OUTPATIENT
Start: 2023-09-27 | End: 2023-09-27

## 2023-09-27 RX ADMIN — APIXABAN 2.5 MG: 2.5 TABLET, FILM COATED ORAL at 08:39

## 2023-09-27 RX ADMIN — GABAPENTIN 100 MG: 100 CAPSULE ORAL at 20:03

## 2023-09-27 RX ADMIN — AMIODARONE HYDROCHLORIDE 200 MG: 200 TABLET ORAL at 08:39

## 2023-09-27 RX ADMIN — SODIUM CHLORIDE, POTASSIUM CHLORIDE, SODIUM LACTATE AND CALCIUM CHLORIDE 75 ML/HR: 600; 310; 30; 20 INJECTION, SOLUTION INTRAVENOUS at 11:44

## 2023-09-27 RX ADMIN — CEFTRIAXONE SODIUM 2000 MG: 2 INJECTION, POWDER, FOR SOLUTION INTRAMUSCULAR; INTRAVENOUS at 11:44

## 2023-09-27 RX ADMIN — DILTIAZEM HYDROCHLORIDE 60 MG: 60 TABLET, FILM COATED ORAL at 08:38

## 2023-09-27 RX ADMIN — APIXABAN 2.5 MG: 2.5 TABLET, FILM COATED ORAL at 20:03

## 2023-09-27 RX ADMIN — DILTIAZEM HYDROCHLORIDE 60 MG: 60 TABLET, FILM COATED ORAL at 11:44

## 2023-09-27 RX ADMIN — DILTIAZEM HYDROCHLORIDE 60 MG: 60 TABLET, FILM COATED ORAL at 20:03

## 2023-09-27 RX ADMIN — ISOSORBIDE MONONITRATE 30 MG: 30 TABLET, EXTENDED RELEASE ORAL at 08:39

## 2023-09-27 RX ADMIN — POTASSIUM CHLORIDE 40 MEQ: 750 CAPSULE, EXTENDED RELEASE ORAL at 17:09

## 2023-09-27 RX ADMIN — Medication 10 ML: at 08:39

## 2023-09-27 RX ADMIN — GABAPENTIN 100 MG: 100 CAPSULE ORAL at 17:09

## 2023-09-27 RX ADMIN — GABAPENTIN 100 MG: 100 CAPSULE ORAL at 08:39

## 2023-09-27 RX ADMIN — METOPROLOL TARTRATE 100 MG: 50 TABLET, FILM COATED ORAL at 20:02

## 2023-09-27 RX ADMIN — DILTIAZEM HYDROCHLORIDE 60 MG: 60 TABLET, FILM COATED ORAL at 17:09

## 2023-09-27 RX ADMIN — AMITRIPTYLINE HYDROCHLORIDE 25 MG: 25 TABLET, FILM COATED ORAL at 20:03

## 2023-09-27 RX ADMIN — METOPROLOL TARTRATE 100 MG: 50 TABLET, FILM COATED ORAL at 08:39

## 2023-09-27 NOTE — PLAN OF CARE
Goal Outcome Evaluation:              Outcome Evaluation: Initial PT evaluation complete, co-evaluation with OT.  Patient is alert and cooperative.  She requires SPV with bed mobility, min Ax1 with transfers and gait, ambulating 5'x2 with FWW, sidestepping L and R along EOB, distance limited by c/o fatigue.  Patient is appropriate for return to SNF for continued rehab upon discharge.  Goals established, continue skilled I/P PT.

## 2023-09-27 NOTE — PLAN OF CARE
Goal Outcome Evaluation:  Plan of Care Reviewed With: patient        Progress: improving  Outcome Evaluation: No new changes

## 2023-09-27 NOTE — THERAPY EVALUATION
Patient Name: Carol Sullivan  : 1940    MRN: [id number]                              Today's Date: 2023       Admit Date: 2023    Visit Dx:     ICD-10-CM ICD-9-CM   1. Altered mental status, unspecified altered mental status type  R41.82 780.97   2. Acute UTI  N39.0 599.0   3. Fall, initial encounter  W19.XXXA E888.9   4. Hypokalemia  E87.6 276.8   5. Hypomagnesemia  E83.42 275.2   6. Impaired functional mobility, balance, gait, and endurance [Z74.09 (ICD-10-CM)]  Z74.09 V49.89   7. Impaired mobility and ADLs [Z74.09, Z78.9 (ICD-10-CM)]  Z74.09 V49.89    Z78.9      Patient Active Problem List   Diagnosis    Hypertension    Hyperlipidemia    Near syncope    GERD (gastroesophageal reflux disease)    Palpitation    PVC (premature ventricular contraction)    Breast calcifications on mammogram    Paroxysmal atrial fibrillation    Peripheral vascular disease, unspecified    Type 2 diabetes mellitus with other specified complication    Long term (current) use of anticoagulants    Encounter for therapeutic drug monitoring    Sepsis    Mastitis    Other hydronephrosis    ESRD (end stage renal disease) on dialysis    Retroperitoneal hemorrhage    AMS (altered mental status)    Bacteremia due to Gram-negative bacteria     Past Medical History:   Diagnosis Date    Arthritis     Depression     GERD (gastroesophageal reflux disease)     Hyperlipidemia     Hypertension     Near syncope     Vascular disease      Past Surgical History:   Procedure Laterality Date    BLADDER SURGERY      BREAST ABSCESS INCISION AND DRAINAGE Left 2023    Procedure: BREAST ABSCESS INCISION AND DRAINAGE;  Surgeon: Fox Roper MD;  Location: [facility];  Service: General;  Laterality: Left;    CYSTOSCOPY, RETROGRADE PYELOGRAM AND STENT INSERTION Right 2023    Procedure: CYSTOSCOPY RETROGRADE PYELOGRAM AND STENT INSERTION;  Surgeon: Ousmane Garcia MD;  Location: [facility];  Service: Urology;  Laterality: Right;     "ENDOSCOPY N/A 5/6/2019    Procedure: ESOPHAGOGASTRODUODENOSCOPY;  Surgeon: Alberto Sanchez DO;  Location: [facility] ENDOSCOPY;  Service: Gastroenterology    GALLBLADDER SURGERY      INTERVENTIONAL RADIOLOGY PROCEDURE N/A 8/17/2023    Procedure: tunneled central venous catheter placement;  Surgeon: Hardy Yin MD;  Location: [facility] ANGIO INVASIVE LOCATION;  Service: Interventional Radiology;  Laterality: N/A;    SHOULDER SURGERY      \"bone spurs\"    SUBTOTAL HYSTERECTOMY        General Information       Row Name 09/27/23 1114          OT Time and Intention    Document Type evaluation  -CM     Mode of Treatment physical therapy;occupational therapy  -CM       Row Name 09/27/23 1114          General Information    Patient Profile Reviewed yes  -CM     Prior Level of Function max assist:;bathing;dressing;all household mobility  -CM     Existing Precautions/Restrictions fall;oxygen therapy device and L/min  -CM     Barriers to Rehab medically complex;previous functional deficit  -CM       Row Name 09/27/23 1114          Living Environment    People in Home facility resident  -CM       Row Name 09/27/23 1114          Home Main Entrance    Number of Stairs, Main Entrance none  -CM       Row Name 09/27/23 1114          Stairs Within Home, Primary    Stairs, Within Home, Primary Pt reports not walking recently. Reports she is Max A for bathing and dressing at the NH, but reports she could do more.  -CM     Number of Stairs, Within Home, Primary none  -CM       Row Name 09/27/23 1114          Cognition    Orientation Status (Cognition) oriented x 4  -CM               User Key  (r) = Recorded By, (t) = Taken By, (c) = Cosigned By      Initials Name Provider Type    CM Josselin Campbell OT Occupational Therapist                     Mobility/ADL's       Row Name 09/27/23 1114          Bed Mobility    Bed Mobility supine-sit;sit-supine  -CM     Supine-Sit [place] (Bed Mobility) supervision  -CM     Sit-Supine " [place] (Bed Mobility) supervision  -CM     Assistive Device (Bed Mobility) head of bed elevated;bed rails  -CM       Row Name 09/27/23 1114          Transfers    Transfers sit-stand transfer;stand-sit transfer  -CM       Row Name 09/27/23 [facility]4          Sit-Stand Transfer    Sit-Stand [place] (Transfers) minimum assist (75% patient effort)  -CM     Assistive Device (Sit-Stand Transfers) walker, front-wheeled  -CM       Row Name 09/27/23 1114          Stand-Sit Transfer    Stand-Sit [place] (Transfers) minimum assist (75% patient effort)  -CM     Assistive Device (Stand-Sit Transfers) walker, front-wheeled  -CM       Row Name 09/27/23 [facility]4          Functional Mobility    Functional Mobility- Ind. Level minimum assist (75% patient effort)  -CM     Functional Mobility- Device walker, front-wheeled  -CM     Functional Mobility-Distance (Feet) 4  -CM       Row Name 09/27/23 [facility]4          Activities of Daily Living    BADL Assessment/Intervention toileting  -CM       Row Name 09/27/23 [facility]4          Toileting Assessment/Training    [place] Level (Toileting) toileting skills;adjust/manage clothing;perform perineal hygiene;dependent (less than 25% patient effort)  -CM     Position (Toileting) supported standing  -CM               User Key  (r) = Recorded By, (t) = Taken By, (c) = Cosigned By      Initials Name Provider Type    Josselin Corcoran OT Occupational Therapist                   Obj/Interventions       Row Name 09/27/23 1114          Sensory Assessment (Somatosensory)    Sensory Assessment (Somatosensory) UE sensation intact  -CM       Row Name 09/27/23 1114          Range of Motion Comprehensive    General Range of Motion bilateral upper extremity ROM WFL  -CM       Row Name 09/27/23 [facility]4          Strength Comprehensive (MMT)    Comment, General Manual Muscle Testing (MMT) Assessment B shoulders 3+/5. B  and elbows 4-/5.  -CM               User Key  (r) = Recorded By, (t) = Taken By, (c) =  Cosigned By      Initials Name Provider Type    CM Josselin Campbell OT Occupational Therapist                   Goals/Plan       Row Name 09/27/23 1151          Transfer Goal 1 (OT)    Activity/Assistive Device (Transfer Goal 1, OT) commode, bedside without drop arms  -CM     [place] Level/Cues Needed (Transfer Goal 1, OT) contact guard required  -CM     Time Frame (Transfer Goal 1, OT) long term goal (LTG);by discharge  -CM     Progress/Outcome (Transfer Goal 1, OT) goal not met  -CM       Row Name 09/27/23 1151          Bathing Goal 1 (OT)    Activity/Device (Bathing Goal 1, OT) lower body bathing  -CM     [place] Level/Cues Needed (Bathing Goal 1, OT) minimum assist (75% or more patient effort)  -CM     Time Frame (Bathing Goal 1, OT) long term goal (LTG);by discharge  -CM     Progress/Outcomes (Bathing Goal 1, OT) goal not met  -CM       Row Name 09/27/23 1151          Toileting Goal 1 (OT)    Activity/Device (Toileting Goal 1, OT) toileting skills, all  -CM     [place] Level/Cues Needed (Toileting Goal 1, OT) set-up required  -CM     Time Frame (Toileting Goal 1, OT) long term goal (LTG);by discharge  -CM     Progress/Outcome (Toileting Goal 1, OT) goal not met  -CM       [facility] Name 09/27/23 1151          Strength Goal 1 (OT)    Strength Goal 1 (OT) Pt will increase BUE strength to 4/5 grossly.  -CM     Time Frame (Strength Goal 1, OT) long term goal (LTG);by discharge  -CM     Progress/Outcome (Strength Goal 1, OT) goal not met  -CM       Row Name 09/27/23 1151          Therapy Assessment/Plan (OT)    Planned Therapy Interventions (OT) activity tolerance training;adaptive equipment training;BADL retraining;cognitive/visual perception retraining;manual therapy/joint mobilization;IADL retraining;functional balance retraining;edema control/reduction;neuromuscular control/coordination retraining;occupation/activity based interventions;ROM/therapeutic exercise;patient/caregiver education/training;passive  ROM/stretching;strengthening exercise;transfer/mobility retraining  -               User Key  (r) = Recorded By, (t) = Taken By, (c) = Cosigned By      Initials Name Provider Type    CM Josselin Campbell OT Occupational Therapist                   Clinical Impression       Row Name 09/27/23 1114          Pain Assessment    Pretreatment Pain Rating 4/10  -CM     Posttreatment Pain Rating 3/10  -CM     Pain Location - Side/Orientation Bilateral  -CM     Pain Location lower  -CM     Pain Location - extremity  -CM     Pain Intervention(s) Repositioned  -CM       Row Name 09/27/23 1114          Plan of Care Review    Plan of Care Reviewed With patient  -CM     Outcome Evaluation OT eval completed. Co-eval with PT. Pt supine in bed upon arrival. Alert and agreeable to therapy. Pt has been at a NH and reports she has been Max  Afor bathing and dressing, but could do more. During session, pt was SPV for bed mobility. Set-up for donning socks EOB. Min A for STS with FWW. Min A for taking steps at the side of the bed. Dep for supported standing bowel jatin-care. Pt was left in bed with exit alarm on and all needs in reach. Pt presents with generalized weakness and decreased activity tolerance, impairing her (I) and safety with ADLs a nd mobility. IP OT will follow. Goals established. Recommend d/c back to SNF with continued rehab.  -       Row Name 09/27/23 1114          Therapy Assessment/Plan (OT)    Patient/Family Therapy Goal Statement (OT) get stronger  -     Rehab Potential (OT) good, to achieve stated therapy goals  -     Criteria for Skilled Therapeutic Interventions Met (OT) yes;meets criteria  -     Therapy Frequency (OT) other (see comments)  5-7 d/wk  -CM     Predicted Duration of Therapy Intervention (OT) until d.c or all goals met  -       Row Name 09/27/23 1114          Therapy Plan Review/Discharge Plan (OT)    Anticipated Discharge Disposition (OT) skilled nursing facility  -       Row Name 09/27/23  1114          Vital Signs    Pre Systolic BP Rehab 133  -CM     Pre Treatment Diastolic BP 62  -CM     Post Systolic BP Rehab 141  -CM     Post Treatment Diastolic BP 64  -CM     Pretreatment Heart Rate (beats/min) 57  -CM     Posttreatment Heart Rate (beats/min) 56  -CM     Pre SpO2 (%) 96  -CM     O2 Delivery Pre Treatment nasal cannula  2lpm  -CM     Post SpO2 (%) 96  -CM     O2 Delivery Post Treatment nasal cannula  2lpm  -CM     Pre Patient Position Supine  -CM     Post Patient Position Supine  -CM       Row Name 09/27/23 1114          Positioning and Restraints    Pre-Treatment Position in bed  -CM     Post Treatment Position bed  -CM     In Bed call light within reach;encouraged to call for assist;exit alarm on;side rails up x2;fowlers  -CM               User Key  (r) = Recorded By, (t) = Taken By, (c) = Cosigned By      Initials Name Provider Type    Josselin Corcoran, [place] Occupational Therapist                   Outcome Measures       Row Name 09/27/23 1114          How much help from another is currently needed...    Putting on and taking off regular lower body clothing? 3  -CM     Bathing (including washing, rinsing, and drying) 3  -CM     Toileting (which includes using toilet bed pan or urinal) 2  -CM     Putting on and taking off regular upper body clothing 3  -CM     Taking care of personal grooming (such as brushing teeth) 4  -CM     Eating meals 4  -CM     AM-PAC 6 Clicks Score (OT) 19  -CM       Row Name 09/27/23 1115 09/27/23 0830       How much help from another person do you currently need...    Turning from your back to your side while in flat bed without using bedrails? 3  -CZ 3  -WL    Moving from lying on back to sitting on the side of a flat bed without bedrails? 3  -CZ 3  -WL    Moving to and from a bed to a chair (including a wheelchair)? 3  -CZ 3  -WL    Standing up from a chair using your arms (e.g., wheelchair, bedside chair)? 3  -CZ 3  -WL    Climbing 3-5 steps with a railing? 2  -CZ 3   -WL    To walk in hospital room? 3  -CZ 3  -WL    AM-PAC 6 Clicks Score (PT) 17  -CZ 18  -WL    Highest level of mobility 5 --> Static standing  -CZ 6 --> Walked 10 steps or more  -      Row Name 09/27/23 1115 09/27/23 1114       Functional Assessment    Outcome Measure Options AM-PAC 6 Clicks Basic Mobility (PT)  -CZ AM-PAC 6 Clicks Daily Activity (OT)  -CM              User Key  (r) = Recorded By, (t) = Taken By, (c) = Cosigned By      Initials Name Provider Type    CZ Dionte Kendrick, PT Physical Therapist    WL Estefania Barnhart, RN Registered Nurse    Josselin Corcoran OT Occupational Therapist                    Occupational Therapy Education       Title: PT OT SLP Therapies (In Progress)       Topic: Occupational Therapy (In Progress)       Point: ADL training (Done)       Description:   Instruct learner(s) on proper safety adaptation and remediation techniques during self care or transfers.   Instruct in proper use of assistive devices.                  Learning Progress Summary             Patient Acceptance, E,TB, VU by CM at 9/27/2023 1154    Comment: OT POC, role of OT, d/c recommendations                         Point: Home exercise program (Not Started)       Description:   Instruct learner(s) on appropriate technique for monitoring, assisting and/or progressing therapeutic exercises/activities.                  Learner Progress:  Not documented in this visit.              Point: Precautions (Not Started)       Description:   Instruct learner(s) on prescribed precautions during self-care and functional transfers.                  Learner Progress:  Not documented in this visit.              Point: Body mechanics (Not Started)       Description:   Instruct learner(s) on proper positioning and spine alignment during self-care, functional mobility activities and/or exercises.                  Learner Progress:  Not documented in this visit.                              User Key       Initials Effective  Dates Name Provider Type Discipline     11/18/22 -  Jsoselin Campbell OT Occupational Therapist OT                  OT Recommendation and Plan  Planned Therapy Interventions (OT): activity tolerance training, adaptive equipment training, BADL retraining, cognitive/visual perception retraining, manual therapy/joint mobilization, IADL retraining, functional balance retraining, edema control/reduction, neuromuscular control/coordination retraining, occupation/activity based interventions, ROM/therapeutic exercise, patient/caregiver education/training, passive ROM/stretching, strengthening exercise, transfer/mobility retraining  Therapy Frequency (OT): other (see comments) (5-7 d/wk)  Plan of Care Review  Plan of Care Reviewed With: patient  Outcome Evaluation: OT eval completed. Co-eval with PT. Pt supine in bed upon arrival. Alert and agreeable to therapy. Pt has been at a NH and reports she has been Max  Afor bathing and dressing, but could do more. During session, pt was SPV for bed mobility. Set-up for donning socks EOB. Min A for STS with FWW. Min A for taking steps at the side of the bed. Dep for supported standing bowel jatin-care. Pt was left in bed with exit alarm on and all needs in reach. Pt presents with generalized weakness and decreased activity tolerance, impairing her (I) and safety with ADLs a nd mobility. IP OT will follow. Goals established. Recommend d/c back to SNF with continued rehab.     Time Calculation:   Evaluation Complexity (OT)  Review Occupational Profile/Medical/Therapy History Complexity: expanded/moderate complexity  Assessment, Occupational Performance/Identification of Deficit Complexity: 3-5 performance deficits  Clinical Decision Making Complexity (OT): detailed assessment/moderate complexity  Overall Complexity of Evaluation (OT): moderate complexity     Time Calculation- OT       Row Name 09/27/23 1155             Time Calculation- OT    OT Start Time 1115  -CM      OT Stop Time  1154  -CM      OT Time Calculation (min) 39 min  -CM      OT Received On 09/27/23  -CM      OT Goal Re-Cert Due Date 10/10/23  -CM         Untimed Charges    OT Eval/Re-eval Minutes 39  -CM         Total Minutes    Untimed Charges Total Minutes 39  -CM       Total Minutes 39  -CM                User Key  (r) = Recorded By, (t) = Taken By, (c) = Cosigned By      Initials Name Provider Type     Josselin Campbell OT Occupational Therapist                  Therapy Charges for Today       Code Description Service Date Service Provider Modifiers Qty    [id number] HC OT EVAL MOD COMPLEXITY 3 9/27/2023 Josselin Campbell OT GO 1                 Josselin Campbell OT  9/27/2023

## 2023-09-27 NOTE — PLAN OF CARE
Goal Outcome Evaluation:  Plan of Care Reviewed With: patient           Outcome Evaluation: OT eval completed. Co-eval with PT. Pt supine in bed upon arrival. Alert and agreeable to therapy. Pt has been at a NH and reports she has been Max  Afor bathing and dressing, but could do more. During session, pt was SPV for bed mobility. Set-up for donning socks EOB. Min A for STS with FWW. Min A for taking steps at the side of the bed. Dep for supported standing bowel jatin-care. Pt was left in bed with exit alarm on and all needs in reach. Pt presents with generalized weakness and decreased activity tolerance, impairing her (I) and safety with ADLs a nd mobility. IP OT will follow. Goals established. Recommend d/c back to SNF with continued rehab.      Anticipated Discharge Disposition (OT): skilled nursing facility

## 2023-09-27 NOTE — THERAPY EVALUATION
Patient Name: Carol Sullivan  : 1940    MRN: [id number]                              Today's Date: 2023       Admit Date: 2023    Visit Dx:     ICD-10-CM ICD-9-CM   1. Altered mental status, unspecified altered mental status type  R41.82 780.97   2. Acute UTI  N39.0 599.0   3. Fall, initial encounter  W19.XXXA E888.9   4. Hypokalemia  E87.6 276.8   5. Hypomagnesemia  E83.42 275.2   6. Impaired functional mobility, balance, gait, and endurance [Z74.09 (ICD-10-CM)]  Z74.09 V49.89     Patient Active Problem List   Diagnosis    Hypertension    Hyperlipidemia    Near syncope    GERD (gastroesophageal reflux disease)    Palpitation    PVC (premature ventricular contraction)    Breast calcifications on mammogram    Paroxysmal atrial fibrillation    Peripheral vascular disease, unspecified    Type 2 diabetes mellitus with other specified complication    Long term (current) use of anticoagulants    Encounter for therapeutic drug monitoring    Sepsis    Mastitis    Other hydronephrosis    ESRD (end stage renal disease) on dialysis    Retroperitoneal hemorrhage    AMS (altered mental status)    Bacteremia due to Gram-negative bacteria     Past Medical History:   Diagnosis Date    Arthritis     Depression     GERD (gastroesophageal reflux disease)     Hyperlipidemia     Hypertension     Near syncope     Vascular disease      Past Surgical History:   Procedure Laterality Date    BLADDER SURGERY      BREAST ABSCESS INCISION AND DRAINAGE Left 2023    Procedure: BREAST ABSCESS INCISION AND DRAINAGE;  Surgeon: Fox Roper MD;  Location: [facility];  Service: General;  Laterality: Left;    CYSTOSCOPY, RETROGRADE PYELOGRAM AND STENT INSERTION Right 2023    Procedure: CYSTOSCOPY RETROGRADE PYELOGRAM AND STENT INSERTION;  Surgeon: Ousmane Garcia MD;  Location: [facility];  Service: Urology;  Laterality: Right;    ENDOSCOPY N/A 2019    Procedure: ESOPHAGOGASTRODUODENOSCOPY;  Surgeon: Alberto Sanchez  "R, DO;  Location: [facility] ENDOSCOPY;  Service: Gastroenterology    GALLBLADDER SURGERY      INTERVENTIONAL RADIOLOGY PROCEDURE N/A 8/17/2023    Procedure: tunneled central venous catheter placement;  Surgeon: Hardy Yin MD;  Location: [facility] ANGIO INVASIVE LOCATION;  Service: Interventional Radiology;  Laterality: N/A;    SHOULDER SURGERY      \"bone spurs\"    SUBTOTAL HYSTERECTOMY        General Information       Row Name 09/27/23 1115          Physical Therapy Time and Intention    Document Type evaluation  -CZ     Mode of Treatment physical therapy;occupational therapy  -CZ       Row Name 09/27/23 1115          General Information    Patient Profile Reviewed yes  -CZ     Prior Level of Function independent:;all household mobility  -CZ     Existing Precautions/Restrictions fall;oxygen therapy device and L/min  -CZ     Barriers to Rehab medically complex  -CZ       Row Name 09/27/23 1115          Stairs Within Home, Primary    Stairs, Within Home, Primary Has not ambulated recently, per patient.  -CZ       Row Name 09/27/23 1115          Cognition    Orientation Status (Cognition) oriented x 4  -CZ       Row Name 09/27/23 1115          Safety Issues, Functional Mobility    Impairments Affecting Function (Mobility) strength;endurance/activity tolerance;balance;pain  -CZ               User Key  (r) = Recorded By, (t) = Taken By, (c) = Cosigned By      Initials Name Provider Type    CZ Dionte Kendrick, PT Physical Therapist                   Mobility       Row Name 09/27/23 1115          Bed Mobility    Bed Mobility supine-sit;sit-supine  -CZ     Supine-Sit [place] (Bed Mobility) supervision  -     Sit-Supine [place] (Bed Mobility) supervision  -     Assistive Device (Bed Mobility) head of bed elevated;bed rails  -       Row Name 09/27/23 1115          Sit-Stand Transfer    Sit-Stand [place] (Transfers) minimum assist (75% patient effort)  -     Assistive Device (Sit-Stand Transfers) " walker, front-wheeled  -CZ       Row Name 09/27/23 1115          Gait/Stairs (Locomotion)    Gonzales Level (Gait) minimum assist (75% patient effort)  -CZ     Assistive Device (Gait) walker, front-wheeled  -     Distance in Feet (Gait) 5'x2, sidestepping L and R along EOB.  -CZ     Comment, (Gait/Stairs) Good posturel, good use of walker, fatigues easily.  -CZ               User Key  (r) = Recorded By, (t) = Taken By, (c) = Cosigned By      Initials Name Provider Type    CZ Dionte Kendrick, PT Physical Therapist                   Obj/Interventions       Row Name 09/27/23 1115          Range of Motion Comprehensive    General Range of Motion bilateral lower extremity ROM WFL  -CZ       Row Name 09/27/23 1115          Strength Comprehensive (MMT)    Comment, General Manual Muscle Testing (MMT) Assessment BLEs: 3+/5 grossly.  -CZ       Row Name 09/27/23 1115          Sensory Assessment (Somatosensory)    Sensory Assessment (Somatosensory) LE sensation intact  -CZ               User Key  (r) = Recorded By, (t) = Taken By, (c) = Cosigned By      Initials Name Provider Type    CZ Dionte Kendrick, PT Physical Therapist                   Goals/Plan       Row Name 09/27/23 1115          Bed Mobility Goal 1 (PT)    Activity/Assistive Device (Bed Mobility Goal 1, PT) sit to supine/supine to sit  -CZ     Gonzales Level/Cues Needed (Bed Mobility Goal 1, PT) modified independence  -CZ     Time Frame (Bed Mobility Goal 1, PT) by discharge  -CZ     Progress/Outcomes (Bed Mobility Goal 1, PT) goal not met  -CZ       Row Name 09/27/23 1115          Transfer Goal 1 (PT)    Activity/Assistive Device (Transfer Goal 1, PT) sit-to-stand/stand-to-sit;bed-to-chair/chair-to-bed;walker, rolling  -CZ     Gonz[place] Level/Cues Needed (Transfer Goal 1, PT) modified independence  -CZ     Time Frame (Transfer Goal 1, PT) by discharge  -CZ     Strategies/Barriers (Transfers Goal 1, PT) Maintain SpO2 >90%.  -CZ     Progress/Outcome  (Transfer Goal 1, PT) goal not met  -CZ       Row Name 09/27/23 1115          Gait Training Goal 1 (PT)    Activity/Assistive Device (Gait Training Goal 1, PT) walker, rolling  -CZ     Jerome Level (Gait Training Goal 1, PT) supervision required  -CZ     Distance (Gait Training Goal 1, PT) 30'x3.  -CZ     Time Frame (Gait Training Goal 1, PT) by discharge  -CZ     Strategies/Barriers (Gait Training Goal 1, PT) Maintain SpO2 >90%.  -CZ     Progress/Outcome (Gait Training Goal 1, PT) goal not met  -CZ       Row Name 09/27/23 1115          ROM Goal 1 (PT)    ROM Goal 1 (PT) Tolerate seated and standing ther ex to BLEs, 20 reps, all joints, all planes.  -CZ     Time Frame (ROM Goal 1, PT) by discharge  -CZ     Strategies/Barriers (ROM Goal 1, PT) Maintain SpO2 >90%.  -CZ     Progress/Outcome (ROM Goal 1, PT) goal not met  -CZ       Row Name 09/27/23 1115          Therapy Assessment/Plan (PT)    Planned Therapy Interventions (PT) balance training;bed mobility training;gait training;patient/family education;transfer training;strengthening;stretching;ROM (range of motion)  -CZ               User Key  (r) = Recorded By, (t) = Taken By, (c) = Cosigned By      Initials Name Provider Type    CZ Dionte Kendrick, PT Physical Therapist                   Clinical Impression       Row Name 09/27/23 1115          Pain    Pretreatment Pain Rating 4/10  -CZ     Posttreatment Pain Rating 3/10  -CZ     Pain Location - Side/Orientation Bilateral  -CZ     Pain Location lower  -CZ     Pain Location - extremity  -CZ     Pre/Posttreatment Pain Comment Reports painful restless leg syndrome.  -CZ     Pain Intervention(s) Repositioned  -CZ       Row Name 09/27/23 1115          Plan of Care Review    Outcome Evaluation Initial PT evaluation complete, co-evaluation with OT.  Patient is alert and cooperative.  She requires SPV with bed mobility, min Ax1 with transfers and gait, ambulating 5'x2 with FWW, sidestepping L and R along EOB,  distance limited by c/o fatigue.  Patient is appropriate for return to SNF for continued rehab upon discharge.  Goals established, continue skilled I/P PT.  -CZ       Row Name 09/27/23 1115          Therapy Assessment/Plan (PT)    Rehab Potential (PT) good, to achieve stated therapy goals  -CZ     Criteria for Skilled Interventions Met (PT) yes;skilled treatment is necessary  -CZ     Therapy Frequency (PT) 5 times/wk  -CZ       Row Name 09/27/23 1115          Vital Signs    Pre Systolic BP Rehab 133  -CZ     Pre Treatment Diastolic BP 63  -CZ     Post Systolic BP Rehab 141  -CZ     Post Treatment Diastolic BP 64  -CZ     Pretreatment Heart Rate (beats/min) 57  -CZ     Posttreatment Heart Rate (beats/min) 56  -CZ     Pre SpO2 (%) 96  -CZ     O2 Delivery Pre Treatment nasal cannula  2 LPM, no O2 at SNF.  -CZ     Post SpO2 (%) 96  -CZ     O2 Delivery Post Treatment nasal cannula  -CZ     Pre Patient Position Supine  -CZ     Post Patient Position Supine  -CZ       Row Name 09/27/23 1115          Positioning and Restraints    Pre-Treatment Position in bed  -CZ     Post Treatment Position bed  -CZ     In Bed supine;call light within reach;encouraged to call for assist;exit alarm on  -CZ               User Key  (r) = Recorded By, (t) = Taken By, (c) = Cosigned By      Initials Name Provider Type    CZ Dionte Kendrick, PT Physical Therapist                   Outcome Measures       Row Name 09/27/23 1115 09/27/23 0830       How much help from another person do you currently need...    Turning from your back to your side while in flat bed without using bedrails? 3  -CZ 3  -WL    Moving from lying on back to sitting on the side of a flat bed without bedrails? 3  -CZ 3  -WL    Moving to and from a bed to a chair (including a wheelchair)? 3  -CZ 3  -WL    Standing up from a chair using your arms (e.g., wheelchair, bedside chair)? 3  -CZ 3  -WL    Climbing 3-5 steps with a railing? 2  -CZ 3  -WL    To walk in hospital room? 3   -CZ 3  -WL    AM-PAC 6 Clicks Score (PT) 17  -CZ 18  -WL    Highest level of mobility 5 --> Static standing  -CZ 6 --> Walked 10 steps or more  -      Row Name 09/27/23 1115 09/27/23 1114       Functional Assessment    Outcome Measure Options AM-PAC 6 Clicks Basic Mobility (PT)  -CZ AM-PAC 6 Clicks Daily Activity (OT)  -              User Key  (r) = Recorded By, (t) = Taken By, (c) = Cosigned By      Initials Name Provider Type    CZ Dionte Kendrick, PT Physical Therapist    Estefania Looney, RN Registered Nurse    Josselin Corcoran, OT Occupational Therapist                                 Physical Therapy Education       Title: PT OT SLP Therapies (In Progress)       Topic: Physical Therapy (In Progress)       Point: Mobility training (Done)       Learning Progress Summary             Patient Acceptance, E, VU by  at 9/27/2023 9158    Comment: PT POC, hand placement with transfers, rehab process.                         Point: Home exercise program (Not Started)       Learner Progress:  Not documented in this visit.              Point: Body mechanics (Not Started)       Learner Progress:  Not documented in this visit.              Point: Precautions (Not Started)       Learner Progress:  Not documented in this visit.                              User Key       Initials Effective Dates Name Provider Type Discipline     07/11/23 -  Dionte Kendrick, PT Physical Therapist PT                  PT Recommendation and Plan  Planned Therapy Interventions (PT): balance training, bed mobility training, gait training, patient/family education, transfer training, strengthening, stretching, ROM (range of motion)  Outcome Evaluation: Initial PT evaluation complete, co-evaluation with OT.  Patient is alert and cooperative.  She requires SPV with bed mobility, min Ax1 with transfers and gait, ambulating 5'x2 with FWW, sidestepping L and R along EOB, distance limited by c/o fatigue.  Patient is appropriate for return to  SNF for continued rehab upon discharge.  Goals established, continue skilled I/P PT.     Time Calculation:   PT Evaluation Complexity  History, PT Evaluation Complexity: 1-2 personal factors and/or comorbidities  Examination of Body Systems (PT Eval Complexity): total of 3 or more elements  Clinical Presentation (PT Evaluation Complexity): evolving  Clinical Decision Making (PT Evaluation Complexity): moderate complexity  Overall Complexity (PT Evaluation Complexity): moderate complexity     PT Charges       Row Name 09/27/23 1153             Time Calculation    Start Time 1115  -CZ      Stop Time 1153  -CZ      Time Calculation (min) 38 min  -CZ      PT Received On 09/27/23  -CZ      PT Goal Re-Cert Due Date 10/10/23  -CZ         Untimed Charges    PT Eval/Re-eval Minutes 38  -CZ         Total Minutes    Untimed Charges Total Minutes 38  -CZ       Total Minutes 38  -CZ                User Key  (r) = Recorded By, (t) = Taken By, (c) = Cosigned By      Initials Name Provider Type    CZ Dionte Kendrick, PT Physical Therapist                  Therapy Charges for Today       Code Description Service Date Service Provider Modifiers Qty    [id number]  PT EVAL MOD COMPLEXITY 3 9/27/2023 Dionte Kendrick, PT GP 1            PT G-Codes  Outcome Measure Options: AM-PAC 6 Clicks Basic Mobility (PT)  AM-PAC 6 Clicks Score (PT): 17  AM-PAC 6 Clicks Score (OT): 19  PT Discharge Summary  Anticipated Discharge Disposition (PT): skilled nursing facility    Dionte Kendrick PT  9/27/2023

## 2023-09-27 NOTE — PLAN OF CARE
Goal Outcome Evaluation:  Plan of Care Reviewed With: patient        Progress: improving  Outcome Evaluation: Patient A&Ox4, VSS safety maintained. Aware of limitations, calls out for assistance as needed. Tobar cath changed today, patient tolerated well.

## 2023-09-27 NOTE — PROGRESS NOTES
[facility] Medicine Services  INPATIENT PROGRESS NOTE      Length of Stay: 1  Date of Admission: 9/25/2023  Primary Care Physician: Len Seo MD    Subjective   Chief Complaint: AMS  HPI:  Doing some better today with no current complaints.     Review of Systems   All pertinent negatives and positives are as above. All other systems have been reviewed and are negative unless otherwise stated.     Objective    As of today 09/27/23  Temp:  [96.9 °F (36.1 °C)-98.9 °F (37.2 °C)] 96.9 °F (36.1 °C)  Heart Rate:  [56-62] 62  Resp:  [16-18] 18  BP: (122-148)/(57-66) 148/66    Physical Exam  Constitutional:       General: She is not in acute distress.     Appearance: She is not toxic-appearing.   HENT:      Head: Normocephalic and atraumatic.      Nose: Nose normal.      Mouth/Throat:      Pharynx: Oropharynx is clear.   Eyes:      Conjunctiva/sclera: Conjunctivae normal.   Cardiovascular:      Rate and Rhythm: Normal rate and regular rhythm.      Heart sounds: Normal heart sounds.   Pulmonary:      Effort: Pulmonary effort is normal. No respiratory distress.      Breath sounds: Normal breath sounds.   Abdominal:      General: Bowel sounds are normal.      Palpations: Abdomen is soft.      Tenderness: There is no abdominal tenderness.   Musculoskeletal:         General: No deformity.   Skin:     General: Skin is warm and dry.      Capillary Refill: Capillary refill takes less than 2 seconds.   Neurological:      General: No focal deficit present.      Mental Status: She is alert and oriented to person, place, and time. Mental status is at baseline.   Psychiatric:         Behavior: Behavior normal.         Thought Content: Thought content normal.         Results Review:  I have reviewed the labs, radiology results, and diagnostic studies.    Laboratory Data:   Results from last 7 days   Lab Units 09/27/23  0527 09/26/23  0642 09/25/23  1410 09/25/23  0857  09/25/23  0851   SODIUM mmol/L 132* 134* 129*  --  129*   SODIUM, ARTERIAL   --   --   --    < >  --    POTASSIUM mmol/L 3.2* 3.2* 3.3*  --  3.0*   CHLORIDE mmol/L 99 98 92*  --  91*   CO2 mmol/L 28.0 27.0 27.0  --  29.0   BUN mg/dL 10 12 11  --  11   CREATININE mg/dL 1.39* 1.85* 1.79*  --  1.77*   GLUCOSE mg/dL 80 81 95  --  98   GLUCOSE, ARTERIAL   --   --   --    < >  --    CALCIUM mg/dL 7.6* 7.7* 8.4*  --  8.5*   BILIRUBIN mg/dL  --  0.5 0.6  --  0.7   ALK PHOS U/L  --  124* 149*  --  155*   ALT (SGPT) U/L  --  8 11  --  12   AST (SGOT) U/L  --  29 33*  --  34*   ANION GAP mmol/L 5.0 9.0 10.0  --  9.0    < > = values in this interval not displayed.     Estimated Creatinine Clearance: 31.6 mL/min (A) (by C-G formula based on SCr of 1.39 mg/dL (H)).  Results from last 7 days   Lab Units 09/27/23  0527 09/25/23  0851   MAGNESIUM mg/dL 1.7 1.5*         Results from last 7 days   Lab Units 09/27/23  0527 09/26/23  0642 09/25/23  0851   WBC 10*3/mm3 5.11 5.41 5.08   HEMOGLOBIN g/dL 8.7* 9.6* 11.5*   HEMATOCRIT % 28.1* 30.1* 37.0   PLATELETS 10*3/mm3 212 236 278           Culture Data:   Blood Culture   Date Value Ref Range Status   09/25/2023 No growth at 2 days  Preliminary   09/25/2023 Gram Negative Bacilli (C)  Preliminary     Urine Culture   Date Value Ref Range Status   09/25/2023 (A)  Final    >100,000 CFU/mL Klebsiella pneumoniae ssp pneumoniae     No results found for: RESPCX  No results found for: WOUNDCX  No results found for: STOOLCX  No components found for: BODYFLD    Radiology Data:   Imaging Results (Last 24 Hours)       ** No results found for the last 24 hours. **            I have reviewed the patient's current medications.     Assessment/Plan     Principal Problem:    AMS (altered mental status)  Active Problems:    Bacteremia due to Gram-negative bacteria  Metabolic encephalopathy secondary to Klebsiella UTI and associated bacteremia  COVID-19 infection  Recent right-sided retroperitoneal hematoma  resulting in right-sided hydronephrosis  BLAINE  Right lower extremity DVT    Plan:  - Continue IV antibiotic therapy with Rocephin 2 g daily for now  - Once blood cultures have resulted with sensitivities and can potentially consider discharge back to SNF on p.o. antibiotics to complete 10 to 14-day course of therapy  - Appreciate nephrology assistance  - PT and OT have been ordered  - DVT prophylaxis with Eliquis  - CODE STATUS: Full      Medical Decision Making  Number and Complexity of problems: 4 high complexity    Conditions and Status:        Condition is improving.     MDM Data  Tests considered but not ordered: None     Decision rules/scores evaluated (example [id number], Wells, etc): None     Discussed with: Patient and nursing     Treatment Plan  As above    Care Planning  Shared decision making: Patient   Code status and discussions: Full    Disposition  Social Determinants of Health that impact treatment or disposition: none  I expect the patient to be discharged to SNF in 1-2 days.       I have utilized all available immediate resources to obtain, update, or review the patient's current medications (including all prescriptions, over-the-counter products, herbals, cannabis/cannabidiol products, and vitamin/mineral/dietary (nutritional) supplements).   I confirmed that the patient's Advance Care Plan is present, code status is documented, or surrogate decision maker is listed in the patient's medical record.      Juanito Harrington MD

## 2023-09-27 NOTE — PROGRESS NOTES
"  [facility]  [address] [place] [address]. [address]  T - [phone]  F - [phone]     Progress Note          PATIENT  DEMOGRAPHICS   PATIENT NAME: Carol Sullivan                      PHYSICIAN: Stephanie Gómez MD  : 1940  MRN: [id number]   LOS: 1 day    Patient Care Team:  Len Seo MD as PCP - General  Blaire Camara APRN as Nurse Practitioner (General Surgery)  Subjective   SUBJECTIVE   No events overnight . No marked soa         Objective   OBJECTIVE   Vital Signs  Temp:  [96.9 °F (36.1 °C)-98.9 °F (37.2 °C)] 96.9 °F (36.1 °C)  Heart Rate:  [56-62] 62  Resp:  [16-18] 18  BP: (122-148)/(57-66) 148/66    Flowsheet Rows      Flowsheet Row First Filed Value   Admission Height 165.1 cm (65\") Documented at 2023 0815   Admission Weight 70.3 kg (155 lb) Documented at 2023 0815             I/O last 3 completed shifts:  In: 600 [P.O.:600]  Out: 1950 [Urine:950]    PHYSICAL EXAM    Physical Exam  Exam not done due to covid  RESULTS   Results Review:    Results from last 7 days   Lab Units 23  0527 23  0642 23  1410 23  0857 23  0851   SODIUM mmol/L 132* 134* 129*  --  129*   SODIUM, ARTERIAL   --   --   --    < >  --    POTASSIUM mmol/L 3.2* 3.2* 3.3*  --  3.0*   CHLORIDE mmol/L 99 98 92*  --  91*   CO2 mmol/L 28.0 27.0 27.0  --  29.0   BUN mg/dL 10  11  --  11   CREATININE mg/dL 1.39* 1.85* 1.79*  --  1.77*   CALCIUM mg/dL 7.6* 7.7* 8.4*  --  8.5*   BILIRUBIN mg/dL  --  0.5 0.6  --  0.7   ALK PHOS U/L  --  124* 149*  --  155*   ALT (SGPT) U/L  --  8 11  --  12   AST (SGOT) U/L  --  29 33*  --  34*   GLUCOSE mg/dL 80 81 95  --  98   GLUCOSE, ARTERIAL   --   --   --    < >  --     < > = values in this interval not displayed.       Estimated Creatinine Clearance: 31.6 mL/min (A) (by C-G formula based on SCr of 1.39 mg/dL (H)).    Results from last 7 days   Lab Units 23  0527 23  0851   MAGNESIUM mg/dL 1.7 1.5*         "     Results from last 7 days   Lab Units 09/27/23  0527 09/26/23  0642 09/25/23  0851   WBC 10*3/mm3 5.11 5.41 5.08   HEMOGLOBIN g/dL 8.7* 9.6* 11.5*   PLATELETS 10*3/mm3 212 236 278               Imaging Results (Last 24 Hours)       ** No results found for the last 24 hours. **             MEDICATIONS    amiodarone, 200 mg, Oral, Daily  amitriptyline, 25 mg, Oral, Nightly  apixaban, 2.5 mg, Oral, BID  cefTRIAXone, 2,000 mg, Intravenous, Q24H  dilTIAZem, 60 mg, Oral, 4x Daily  gabapentin, 100 mg, Oral, TID  isosorbide mononitrate, 30 mg, Oral, Daily  metoprolol tartrate, 100 mg, Oral, Q12H  sodium chloride, 10 mL, Intravenous, Q12H      lactated ringers, 75 mL/hr, Last Rate: 75 mL/hr (09/27/23 1144)        Assessment & Plan   ASSESSMENT / PLAN      AMS (altered mental status)    Bacteremia due to Gram-negative bacteria    1.  BLAINE/ATN- baseline creatinine unknown, creatinine was 1.5 in October 2022 but less than 1 prior to that.  BLAINE secondary to hydronephrosis caused by retroperitoneal hematoma leading to ATN.  Started hemodialysis 8/6/2023.     -Cr is better - was around 4 earlier and now 1.7-1.8 range. No hd plan in am. Give kcl po.      2.  COVID-19     3.  Retroperitoneal bleed/hydronephrosis- status post right J stent for hydronephrosis     4.  Hypertension     5.  A-fib      6.  Anemia- hemoglobin acceptable     7.  AMS     8.  Extensive right lower extremity DVT    9. GNR bacteremia                    This document has been electronically signed by Stephanie Gómez MD on September 27, 2023 16:13 CDT

## 2023-09-28 LAB
ALBUMIN SERPL-MCNC: 2.3 G/DL (ref 3.5–5.2)
ALBUMIN/GLOB SERPL: 0.7 G/DL
ALP SERPL-CCNC: 134 U/L (ref 39–117)
ALT SERPL W P-5'-P-CCNC: 7 U/L (ref 1–33)
ANION GAP SERPL CALCULATED.3IONS-SCNC: 8 MMOL/L (ref 5–15)
AST SERPL-CCNC: 23 U/L (ref 1–32)
BACTERIA SPEC AEROBE CULT: ABNORMAL
BASOPHILS # BLD AUTO: 0.03 10*3/MM3 (ref 0–0.2)
BASOPHILS NFR BLD AUTO: 0.5 % (ref 0–1.5)
BILIRUB SERPL-MCNC: 0.4 MG/DL (ref 0–1.2)
BUN SERPL-MCNC: 14 MG/DL (ref 8–23)
BUN/CREAT SERPL: 8.8 (ref 7–25)
CALCIUM SPEC-SCNC: 8.1 MG/DL (ref 8.6–10.5)
CHLORIDE SERPL-SCNC: 99 MMOL/L (ref 98–107)
CO2 SERPL-SCNC: 25 MMOL/L (ref 22–29)
CREAT SERPL-MCNC: 1.6 MG/DL (ref 0.57–1)
DEPRECATED RDW RBC AUTO: 50.7 FL (ref 37–54)
EGFRCR SERPLBLD CKD-EPI 2021: 32.1 ML/MIN/1.73
EOSINOPHIL # BLD AUTO: 0.43 10*3/MM3 (ref 0–0.4)
EOSINOPHIL NFR BLD AUTO: 7.3 % (ref 0.3–6.2)
ERYTHROCYTE [DISTWIDTH] IN BLOOD BY AUTOMATED COUNT: 15.2 % (ref 12.3–15.4)
GLOBULIN UR ELPH-MCNC: 3.4 GM/DL
GLUCOSE SERPL-MCNC: 138 MG/DL (ref 65–99)
GRAM STN SPEC: ABNORMAL
HCT VFR BLD AUTO: 32.2 % (ref 34–46.6)
HGB BLD-MCNC: 10 G/DL (ref 12–15.9)
IMM GRANULOCYTES # BLD AUTO: 0.02 10*3/MM3 (ref 0–0.05)
IMM GRANULOCYTES NFR BLD AUTO: 0.3 % (ref 0–0.5)
ISOLATED FROM: ABNORMAL
LYMPHOCYTES # BLD AUTO: 1.27 10*3/MM3 (ref 0.7–3.1)
LYMPHOCYTES NFR BLD AUTO: 21.5 % (ref 19.6–45.3)
MAGNESIUM SERPL-MCNC: 1.5 MG/DL (ref 1.6–2.4)
MCH RBC QN AUTO: 28 PG (ref 26.6–33)
MCHC RBC AUTO-ENTMCNC: 31.1 G/DL (ref 31.5–35.7)
MCV RBC AUTO: 90.2 FL (ref 79–97)
MONOCYTES # BLD AUTO: 0.55 10*3/MM3 (ref 0.1–0.9)
MONOCYTES NFR BLD AUTO: 9.3 % (ref 5–12)
NEUTROPHILS NFR BLD AUTO: 3.62 10*3/MM3 (ref 1.7–7)
NEUTROPHILS NFR BLD AUTO: 61.1 % (ref 42.7–76)
NRBC BLD AUTO-RTO: 0 /100 WBC (ref 0–0.2)
PLATELET # BLD AUTO: 283 10*3/MM3 (ref 140–450)
PMV BLD AUTO: 9 FL (ref 6–12)
POTASSIUM SERPL-SCNC: 4.3 MMOL/L (ref 3.5–5.2)
PROT SERPL-MCNC: 5.7 G/DL (ref 6–8.5)
RBC # BLD AUTO: 3.57 10*6/MM3 (ref 3.77–5.28)
SODIUM SERPL-SCNC: 132 MMOL/L (ref 136–145)
WBC NRBC COR # BLD: 5.92 10*3/MM3 (ref 3.4–10.8)

## 2023-09-28 PROCEDURE — 85025 COMPLETE CBC W/AUTO DIFF WBC: CPT | Performed by: FAMILY MEDICINE

## 2023-09-28 PROCEDURE — 25010000002 CEFTRIAXONE PER 250 MG: Performed by: INTERNAL MEDICINE

## 2023-09-28 PROCEDURE — 83735 ASSAY OF MAGNESIUM: CPT | Performed by: INTERNAL MEDICINE

## 2023-09-28 PROCEDURE — 97116 GAIT TRAINING THERAPY: CPT

## 2023-09-28 PROCEDURE — 97530 THERAPEUTIC ACTIVITIES: CPT

## 2023-09-28 PROCEDURE — 80053 COMPREHEN METABOLIC PANEL: CPT | Performed by: FAMILY MEDICINE

## 2023-09-28 RX ADMIN — APIXABAN 2.5 MG: 2.5 TABLET, FILM COATED ORAL at 20:23

## 2023-09-28 RX ADMIN — DILTIAZEM HYDROCHLORIDE 60 MG: 60 TABLET, FILM COATED ORAL at 09:22

## 2023-09-28 RX ADMIN — Medication 10 ML: at 09:43

## 2023-09-28 RX ADMIN — HYDROCODONE BITARTRATE AND ACETAMINOPHEN 1 TABLET: 5; 325 TABLET ORAL at 10:20

## 2023-09-28 RX ADMIN — METOPROLOL TARTRATE 100 MG: 50 TABLET, FILM COATED ORAL at 09:23

## 2023-09-28 RX ADMIN — ISOSORBIDE MONONITRATE 30 MG: 30 TABLET, EXTENDED RELEASE ORAL at 09:23

## 2023-09-28 RX ADMIN — SODIUM CHLORIDE, POTASSIUM CHLORIDE, SODIUM LACTATE AND CALCIUM CHLORIDE 50 ML/HR: 600; 310; 30; 20 INJECTION, SOLUTION INTRAVENOUS at 14:00

## 2023-09-28 RX ADMIN — ACETAMINOPHEN 650 MG: 650 SOLUTION ORAL at 09:22

## 2023-09-28 RX ADMIN — AMIODARONE HYDROCHLORIDE 200 MG: 200 TABLET ORAL at 09:23

## 2023-09-28 RX ADMIN — APIXABAN 2.5 MG: 2.5 TABLET, FILM COATED ORAL at 09:23

## 2023-09-28 RX ADMIN — GABAPENTIN 100 MG: 100 CAPSULE ORAL at 09:23

## 2023-09-28 RX ADMIN — AMITRIPTYLINE HYDROCHLORIDE 25 MG: 25 TABLET, FILM COATED ORAL at 20:23

## 2023-09-28 RX ADMIN — CEFTRIAXONE SODIUM 2000 MG: 2 INJECTION, POWDER, FOR SOLUTION INTRAMUSCULAR; INTRAVENOUS at 14:00

## 2023-09-28 RX ADMIN — GABAPENTIN 100 MG: 100 CAPSULE ORAL at 20:23

## 2023-09-28 NOTE — PROGRESS NOTES
Adult Nutrition  Assessment    Patient Name:  Carol Sullivan  YOB: 1940  MRN: [id number]  Admit Date:  9/25/2023    Assessment Date:  9/28/2023    Comments:  Pt continues w/ treatment for +COVID 19, AMS w/ +UTI and RLE DVT. Nephrology notes no plan for HD at this time although access remains in place. Urology is following d/t recent hx and possible need of cysto. No noted wounds or drains in notes or flowsheet. Alb 2.3L. Cardiac diet, soft w/ chopped meats. Intakes 50% or less. Chocolate milk and magic cups TID to optimize nutrition. Noted wt hx 7/28/23 156#, 8/16 151# and 9/5 152#. Last available wt 9/26  141# w/ BMI 23.5. Noted that HD was initiated in Aug and may be part of wt loss. RD to follow hospital course and wt trend/intakes.             Electronically signed by:  Radha Bang RD  09/28/23 09:05 CDT

## 2023-09-28 NOTE — PROGRESS NOTES
"  [facility]  [address] [place] [address]. [address]  T - [phone]  F - [phone]     Progress Note          PATIENT  DEMOGRAPHICS   PATIENT NAME: Carol Sullivan                      PHYSICIAN: Stephanie Gómez MD  : 1940  MRN: [id number]   LOS: 2 days    Patient Care Team:  Len Seo MD as PCP - General  BeltramiBlaire ribera APRN as Nurse Practitioner (General Surgery)  Subjective   SUBJECTIVE   No events overnight . On 2L O2         Objective   OBJECTIVE   Vital Signs  Temp:  [97 °F (36.1 °C)-98.9 °F (37.2 °C)] 97.5 °F (36.4 °C)  Heart Rate:  [53-60] 60  Resp:  [16-18] 16  BP: (120-139)/(57-63) 126/58    Flowsheet Rows      Flowsheet Row First Filed Value   Admission Height 165.1 cm (65\") Documented at 2023 0815   Admission Weight 70.3 kg (155 lb) Documented at 2023 0815             I/O last 3 completed shifts:  In: 480 [P.O.:480]  Out: 230 [Urine:230]    PHYSICAL EXAM    Physical Exam  Exam not done due to covid  RESULTS   Results Review:    Results from last 7 days   Lab Units 23  0805 23  0527 23  0642 23  1410   SODIUM mmol/L 132* 132* 134* 129*   POTASSIUM mmol/L 4.3 3.2* 3.2* 3.3*   CHLORIDE mmol/L 99 99 98 92*   CO2 mmol/L 25.0 28.0 27.0 27.0   BUN mg/dL 14 10 12 11   CREATININE mg/dL 1.60* 1.39* 1.85* 1.79*   CALCIUM mg/dL 8.1* 7.6* 7.7* 8.4*   BILIRUBIN mg/dL 0.4  --  0.5 0.6   ALK PHOS U/L 134*  --  124* 149*   ALT (SGPT) U/L 7  --  8 11   AST (SGOT) U/L 23  --  29 33*   GLUCOSE mg/dL 138* 80 81 95         Estimated Creatinine Clearance: 27.5 mL/min (A) (by C-G formula based on SCr of 1.6 mg/dL (H)).    Results from last 7 days   Lab Units 23  0805 23  0527 23  0851   MAGNESIUM mg/dL 1.5* 1.7 1.5*               Results from last 7 days   Lab Units 23  0805 23  0527 23  0642 23  0851   WBC 10*3/mm3 5.92 5.11 5.41 5.08   HEMOGLOBIN g/dL 10.0* 8.7* 9.6* 11.5*   PLATELETS 10*3/mm3 283 212 236 " 278                 Imaging Results (Last 24 Hours)       ** No results found for the last 24 hours. **             MEDICATIONS    amiodarone, 200 mg, Oral, Daily  amitriptyline, 25 mg, Oral, Nightly  apixaban, 2.5 mg, Oral, BID  cefTRIAXone, 2,000 mg, Intravenous, Q24H  dilTIAZem, 60 mg, Oral, 4x Daily  gabapentin, 100 mg, Oral, TID  isosorbide mononitrate, 30 mg, Oral, Daily  metoprolol tartrate, 100 mg, Oral, Q12H  sodium chloride, 10 mL, Intravenous, Q12H      lactated ringers, 50 mL/hr, Last Rate: 50 mL/hr (09/27/23 1621)        Assessment & Plan   ASSESSMENT / PLAN      AMS (altered mental status)    Bacteremia due to Gram-negative bacteria    1.  BLAINE/ATN- baseline creatinine unknown, creatinine was 1.5 in October 2022 but less than 1 prior to that.  BLAINE secondary to hydronephrosis caused by retroperitoneal hematoma leading to ATN.  Started hemodialysis 8/6/2023.     -Cr is better - was around 4 earlier and now 1.7-1.8 range. No hd plan plan today.      2.  COVID-19     3.  Retroperitoneal bleed/hydronephrosis- status post right J stent for hydronephrosis     4.  Hypertension     5.  A-fib      6.  Anemia- hemoglobin acceptable     7.  AMS     8.  Extensive right lower extremity DVT    9. GNR bacteremia     10 right J stent and mitchell - plan for cysto and needs right J stent change                   This document has been electronically signed by Stephanie Gómez MD on September 28, 2023 12:22 CDT

## 2023-09-28 NOTE — PLAN OF CARE
Goal Outcome Evaluation:  Plan of Care Reviewed With: patient           Outcome Evaluation: pt agreeable to rx;sup-sit sba with hob elevated-pt sat eob x 15' while preparing room for gt;sit-stand-sit min of 1;amb 12' with rw and min of 1 with several episodes of knees buckling but no lob      Anticipated Discharge Disposition (PT): skilled nursing facility

## 2023-09-28 NOTE — PROGRESS NOTES
[facility] Medicine Services  INPATIENT PROGRESS NOTE      Length of Stay: 2  Date of Admission: 9/25/2023  Primary Care Physician: Len Seo MD    Subjective   Chief Complaint: AMS  HPI:  Pain with her mitchell but otherwise no new complaints. Per report has been having bradycardia in the 30's at times.     Review of Systems   All pertinent negatives and positives are as above. All other systems have been reviewed and are negative unless otherwise stated.     Objective    As of today 09/28/23  Temp:  [96.1 °F (35.6 °C)-98.9 °F (37.2 °C)] 96.1 °F (35.6 °C)  Heart Rate:  [27-64] 49  Resp:  [16-18] 16  BP: ()/(45-89) 112/55    Physical Exam  Constitutional:       General: She is not in acute distress.     Appearance: She is not toxic-appearing.   HENT:      Head: Normocephalic and atraumatic.      Nose: Nose normal.      Mouth/Throat:      Pharynx: Oropharynx is clear.   Eyes:      Conjunctiva/sclera: Conjunctivae normal.   Cardiovascular:      Rate and Rhythm: Normal rate and regular rhythm.      Heart sounds: Normal heart sounds.   Pulmonary:      Effort: Pulmonary effort is normal. No respiratory distress.      Breath sounds: Normal breath sounds.   Abdominal:      General: Bowel sounds are normal.      Palpations: Abdomen is soft.      Tenderness: There is no abdominal tenderness.   Musculoskeletal:         General: No deformity.   Skin:     General: Skin is warm and dry.      Capillary Refill: Capillary refill takes less than 2 seconds.   Neurological:      General: No focal deficit present.      Mental Status: She is alert and oriented to person, place, and time. Mental status is at baseline.   Psychiatric:         Behavior: Behavior normal.         Thought Content: Thought content normal.         Results Review:  I have reviewed the labs, radiology results, and diagnostic studies.    Laboratory Data:   Results from last 7 days   Lab Units  09/28/23  0805 09/27/23  0527 09/26/23  0642 09/25/23  1410   SODIUM mmol/L 132* 132* 134* 129*   POTASSIUM mmol/L 4.3 3.2* 3.2* 3.3*   CHLORIDE mmol/L 99 99 98 92*   CO2 mmol/L 25.0 28.0 27.0 27.0   BUN mg/dL 14 10 12 11   CREATININE mg/dL 1.60* 1.39* 1.85* 1.79*   GLUCOSE mg/dL 138* 80 81 95   CALCIUM mg/dL 8.1* 7.6* 7.7* 8.4*   BILIRUBIN mg/dL 0.4  --  0.5 0.6   ALK PHOS U/L 134*  --  124* 149*   ALT (SGPT) U/L 7  --  8 11   AST (SGOT) U/L 23  --  29 33*   ANION GAP mmol/L 8.0 5.0 9.0 10.0       Estimated Creatinine Clearance: 27.5 mL/min (A) (by C-G formula based on SCr of 1.6 mg/dL (H)).  Results from last 7 days   Lab Units 09/28/23  0805 09/27/23  0527 09/25/23  0851   MAGNESIUM mg/dL 1.5* 1.7 1.5*           Results from last 7 days   Lab Units 09/28/23  0805 09/27/23  0527 09/26/23  0642 09/25/23  0851   WBC 10*3/mm3 5.92 5.11 5.41 5.08   HEMOGLOBIN g/dL 10.0* 8.7* 9.6* 11.5*   HEMATOCRIT % 32.2* 28.1* 30.1* 37.0   PLATELETS 10*3/mm3 283 212 236 278             Culture Data:   Blood Culture   Date Value Ref Range Status   09/27/2023 No growth at 24 hours  Preliminary   09/27/2023 No growth at 24 hours  Preliminary     No results found for: URINECX    No results found for: RESPCX  No results found for: WOUNDCX  No results found for: STOOLCX  No components found for: BODYFLD    Radiology Data:   Imaging Results (Last 24 Hours)       ** No results found for the last 24 hours. **            I have reviewed the patient's current medications.     Assessment/Plan     Principal Problem:    AMS (altered mental status)  Active Problems:    Bacteremia due to Gram-negative bacteria  Metabolic encephalopathy secondary to Klebsiella UTI and associated bacteremia  COVID-19 infection  Recent right-sided retroperitoneal hematoma resulting in right-sided hydronephrosis  BLAINE  Right lower extremity DVT    Plan:  - Continue IV antibiotic therapy with Rocephin 2 g daily for now. BC sensitivities same as as UC.   - Appreciate  Urology assistance, stent exchange planned for this upcoming Saturday.   - Appreciate nephrology assistance, monitoring for ongoing need for HD.  - Stop Cardizem and Metoprolol for now given bradycardic episodes.  Will need to restart at lower dose once HR improved.   - PT and OT have been ordered  - DVT prophylaxis with Eliquis  - CODE STATUS: Full      Medical Decision Making  Number and Complexity of problems: 4 high complexity    Conditions and Status:        Condition is improving.     MDM Data  Tests considered but not ordered: None     Decision rules/scores evaluated (example [id number], [place], etc): None     Discussed with: Patient and nursing     Treatment Plan  As above    Care Planning  Shared decision making: Patient   Code status and discussions: Full    Disposition  Social Determinants of Health that impact treatment or disposition: none  I expect the patient to be discharged to SNF in 1-2 days.       I have utilized all available immediate resources to obtain, update, or review the patient's current medications (including all prescriptions, over-the-counter products, herbals, cannabis/cannabidiol products, and vitamin/mineral/dietary (nutritional) supplements).   I confirmed that the patient's Advance Care Plan is present, code status is documented, or surrogate decision maker is listed in the patient's medical record.      Juanito Harrington MD

## 2023-09-28 NOTE — PLAN OF CARE
Goal Outcome Evaluation:  Plan of Care Reviewed With: patient        Progress: improving  Outcome Evaluation: patient passing small amount of clots in urine, catheter remains patent, vss on 2 L

## 2023-09-28 NOTE — THERAPY TREATMENT NOTE
Acute Care - Physical Therapy Treatment Note  [facility]     Patient Name: Carol Sullivan  : 1940  MRN: [id number]  Today's Date: 2023      Visit Dx:     ICD-10-CM ICD-9-CM   1. Altered mental status, unspecified altered mental status type  R41.82 780.97   2. Acute UTI  N39.0 599.0   3. Fall, initial encounter  W19.XXXA E888.9   4. Hypokalemia  E87.6 276.8   5. Hypomagnesemia  E83.42 275.2   6. Impaired functional mobility, balance, gait, and endurance [Z74.09 (ICD-10-CM)]  Z74.09 V49.89   7. Impaired mobility and ADLs [Z74.09, Z78.9 (ICD-10-CM)]  Z74.09 V49.89    Z78.9      Patient Active Problem List   Diagnosis    Hypertension    Hyperlipidemia    Near syncope    GERD (gastroesophageal reflux disease)    Palpitation    PVC (premature ventricular contraction)    Breast calcifications on mammogram    Paroxysmal atrial fibrillation    Peripheral vascular disease, unspecified    Type 2 diabetes mellitus with other specified complication    Long term (current) use of anticoagulants    Encounter for therapeutic drug monitoring    Sepsis    Mastitis    Other hydronephrosis    ESRD (end stage renal disease) on dialysis    Retroperitoneal hemorrhage    AMS (altered mental status)    Bacteremia due to Gram-negative bacteria     Past Medical History:   Diagnosis Date    Arthritis     Depression     GERD (gastroesophageal reflux disease)     Hyperlipidemia     Hypertension     Near syncope     Vascular disease      Past Surgical History:   Procedure Laterality Date    BLADDER SURGERY      BREAST ABSCESS INCISION AND DRAINAGE Left 2023    Procedure: BREAST ABSCESS INCISION AND DRAINAGE;  Surgeon: Fox Roper MD;  Location: [facility];  Service: General;  Laterality: Left;    CYSTOSCOPY, RETROGRADE PYELOGRAM AND STENT INSERTION Right 2023    Procedure: CYSTOSCOPY RETROGRADE PYELOGRAM AND STENT INSERTION;  Surgeon: Ousmane Garcia MD;  Location: [facility];  Service: Urology;  Laterality: Right;  "   ENDOSCOPY N/A 5/6/2019    Procedure: ESOPHAGOGASTRODUODENOSCOPY;  Surgeon: Alberto Sanchez DO;  Location: [facility] ENDOSCOPY;  Service: Gastroenterology    GALLBLADDER SURGERY      INTERVENTIONAL RADIOLOGY PROCEDURE N/A 8/17/2023    Procedure: tunneled central venous catheter placement;  Surgeon: Hardy Yin MD;  Location: [facility]GIO INVASIVE LOCATION;  Service: Interventional Radiology;  Laterality: N/A;    SHOULDER SURGERY      \"bone spurs\"    SUBTOTAL HYSTERECTOMY       PT Assessment (last 12 hours)       PT Evaluation and Treatment       Row Name 09/28/23 1130          Physical Therapy Time and Intention    Subjective Information complains of;pain  -LN     Document Type therapy note (daily note)  -LN     Mode of Treatment physical therapy  -LN       Row Name 09/28/23 1130          General Information    Patient Profile Reviewed yes  -LN     Existing Precautions/Restrictions fall;oxygen therapy device and L/min  -LN       Row Name 09/28/23 1130          Home Use of Assistive/Adaptive Equipment    Equipment Currently Used at Home none  -LN       Row Name 09/28/23 1130          Pain    Pretreatment Pain Rating 0/10 - no pain  -LN     Posttreatment Pain Rating 3/10  -LN     Pain Location --  from catheter  -LN     Pain Intervention(s) Repositioned  pre med  -LN       Row Name 09/28/23 1130          Cognition    Orientation Status (Cognition) oriented x 4  -LN       Row Name 09/28/23 1130          Range of Motion Comprehensive    General Range of Motion bilateral lower extremity ROM WFL  -LN       Row Name 09/28/23 1130          Bed Mobility    Bed Mobility supine-sit;sit-supine  -LN     Supine-Sit [place] (Bed Mobility) supervision  -LN     Sit-Supine [place] (Bed Mobility) not tested  -LN     Assistive Device (Bed Mobility) head of bed elevated;bed rails  -LN       Row Name 09/28/23 1130          Transfers    Transfers sit-stand transfer;stand-sit transfer  -LN       Row Name 09/28/23 " 1130          Sit-Stand Transfer    Sit-Stand [place] (Transfers) minimum assist (75% patient effort);verbal cues  -LN     Assistive Device (Sit-Stand Transfers) walker, front-wheeled  -LN       Row Name 09/28/23 1130          Stand-Sit Transfer    Stand-Sit [place] (Transfers) minimum assist (75% patient effort);verbal cues  -LN     Assistive Device (Stand-Sit Transfers) walker, front-wheeled  -LN       Row Name 09/28/23 1130          Gait/Stairs (Locomotion)    [place] Level (Gait) minimum assist (75% patient effort)  -LN     Assistive Device (Gait) walker, front-wheeled  -LN     Distance in Feet (Gait) 12  -LN     Pattern (Gait) step-to  -LN     Deviations/Abnormal Patterns (Gait) bilateral deviations;gait speed decreased;stride length decreased  -LN     Bilateral Gait Deviations knee buckling bilaterally;heel strike decreased  -       Row Name 09/28/23 1130          Safety Issues, Functional Mobility    Impairments Affecting Function (Mobility) strength;endurance/activity tolerance;balance;pain  -       Row Name 09/28/23 1130          Balance    Static Sitting Balance standby assist  -LN     Dynamic Sitting Balance standby assist  -LN     Position, Sitting Balance sitting edge of bed  -       Row Name 09/28/23 1130          Motor Skills    Therapeutic Exercise hip;knee;ankle  -       Row Name 09/28/23 1130          Hip (Therapeutic Exercise)    Hip (Therapeutic Exercise) AROM (active range of motion)  -       Row Name 09/28/23 1130          Knee (Therapeutic Exercise)    Knee (Therapeutic Exercise) AROM (active range of motion)  -       Row Name 09/28/23 1130          Ankle (Therapeutic Exercise)    Ankle (Therapeutic Exercise) AROM (active range of motion)  -       Row Name 09/28/23 1130          Respiratory WDL    Respiratory WDL X  -LN     Rhythm/Pattern, Respiratory depth regular;pattern regular;unlabored;no shortness of breath reported  -LN     Expansion/Accessory  Muscles/Retractions no retractions;no use of accessory muscles  -LN     Nailbeds no discoloration  -LN     Mucous Membranes pink;intact  -LN       Row Name 09/28/23 1130          Breath Sounds    Breath Sounds All Fields  -LN     All Lung Fields Breath Sounds Anterior:;diminished  -LN       Row Name 09/28/23 1130          Skin WDL    Skin WDL X  -LN     Skin Temperature warm  -LN     Skin Moisture dry  -LN     Skin Elasticity quick return to original state  -LN     Skin Integrity bruised (ecchymotic)  -LN       Row Name 09/28/23 1130          Wound 08/01/23 1241 Left breast Incision    Wound - Properties Group Placement Date: 08/01/23  -CP Placement Time: 1241  -CP Present on Hospital Admission: Y  -CP Side: Left  -CP Location: breast  -CP Primary Wound Type: Incision  -CP Additional Comments: packed with 1/4 inch iodoform, 4x4's, silk tape  -CP    Closure GINA  -LN     Periwound intact  -LN     Retired Wound - Properties Group Placement Date: 08/01/23  -CP Placement Time: 1241  -CP Present on Hospital Admission: Y  -CP Side: Left  -CP Location: breast  -CP Primary Wound Type: Incision  -CP Additional Comments: packed with 1/4 inch iodoform, 4x4's, silk tape  -CP    Retired Wound - Properties Group Date first assessed: 08/01/23  -CP Time first assessed: 1241  -CP Present on Hospital Admission: Y  -CP Side: Left  -CP Location: breast  -CP Primary Wound Type: Incision  -CP Additional Comments: packed with 1/4 inch iodoform, 4x4's, silk tape  -CP      Row Name             Wound 07/28/23 1945 Bilateral midline sternal MASD (Moisture associated skin damage)    Wound - Properties Group Placement Date: 07/28/23  -CC (r)  AW (c) Placement Time: 1945  -CC (r)  AW (c) Present on Hospital Admission: Y  -CC (r)  AW (c) Side: Bilateral  -CC (r)  AW (c) Orientation: midline  -CC (r)  AW (c) Location: sternal  -CC (r)  AW (c) Primary Wound Type: MASD  -CC (r)  AW (c)    Retired Wound - Properties Group Placement Date: 07/28/23  -CC  (r)  AW (c) Placement Time: 1945  -CC (r)  AW (c) Present on Hospital Admission: Y  -CC (r)  AW (c) Side: Bilateral  -CC (r)  AW (c) Orientation: midline  -CC (r)  AW (c) Location: sternal  -CC (r)  AW (c) Primary Wound Type: MASD  -CC (r)  AW (c)    Retired Wound - Properties Group Date first assessed: 07/28/23  -CC (r)  AW (c) Time first assessed: 1945  -CC (r)  AW (c) Present on Hospital Admission: Y  -CC (r)  AW (c) Side: Bilateral  -CC (r)  AW (c) Location: sternal  -CC (r)  AW (c) Primary Wound Type: MASD  -CC (r)  AW (c)      Row Name 09/28/23 1130          Coping    Observed Emotional State calm;cooperative  -LN     Involvement in Care not present at bedside  -LN       Row Name 09/28/23 1130          Plan of Care Review    Plan of Care Reviewed With patient  -LN     Outcome Evaluation pt agreeable to rx;sup-sit sba with hob elevated-pt sat eob x 15' while preparing room for gt;sit-stand-sit min of 1;amb 12' with rw and min of 1 with several episodes of knees buckling but no lob  -LN       Row Name 09/28/23 1130          Vital Signs    Pre Systolic BP Rehab 106  -LN     Pre Treatment Diastolic BP 66  -LN     Post Systolic BP Rehab 104  -LN     Post Treatment Diastolic BP 55  -LN     Pretreatment Heart Rate (beats/min) 57  -LN     Posttreatment Heart Rate (beats/min) 53  -LN     Pre SpO2 (%) 92  -LN     O2 Delivery Pre Treatment supplemental O2  -LN     Post SpO2 (%) 97  -LN     Pre Patient Position Side Lying  -LN     Intra Patient Position Standing  -LN     Post Patient Position Sitting  -LN       Row Name 09/28/23 1130          Positioning and Restraints    Post Treatment Position chair  -LN     In Chair notified nsg;sitting;call light within reach;encouraged to call for assist;exit alarm on  -LN       Row Name 09/28/23 1130          Therapy Assessment/Plan (PT)    Rehab Potential (PT) good, to achieve stated therapy goals  -LN     Criteria for Skilled Interventions Met (PT) yes;skilled treatment is  necessary  -LN     Therapy Frequency (PT) 5 times/wk  -LN       Row Name 09/28/23 1130          Bed Mobility Goal 1 (PT)    Activity/Assistive Device (Bed Mobility Goal 1, PT) sit to supine/supine to sit  -LN     [place] Level/Cues Needed (Bed Mobility Goal 1, PT) modified independence  -LN     Time Frame (Bed Mobility Goal 1, PT) by discharge  -LN     Progress/Outcomes (Bed Mobility Goal 1, PT) goal not met  -LN       Row Name 09/28/23 1130          Transfer Goal 1 (PT)    Activity/Assistive Device (Transfer Goal 1, PT) sit-to-stand/stand-to-sit;bed-to-chair/chair-to-bed;walker, rolling  -LN     [place] Level/Cues Needed (Transfer Goal 1, PT) modified independence  -LN     Time Frame (Transfer Goal 1, PT) by discharge  -LN     Strategies/Barriers (Transfers Goal 1, PT) Maintain SpO2 >90%.  -LN     Progress/Outcome (Transfer Goal 1, PT) goal not met  -LN       Row Name 09/28/23 1130          Gait Training Goal 1 (PT)    Activity/Assistive Device (Gait Training Goal 1, PT) walker, rolling  -LN     [place] Level (Gait Training Goal 1, PT) supervision required  -LN     Distance (Gait Training Goal 1, PT) 30'x3.  -LN     Time Frame (Gait Training Goal 1, PT) by discharge  -LN     Strategies/Barriers (Gait Training Goal 1, PT) Maintain SpO2 >90%.  -LN     Progress/Outcome (Gait Training Goal 1, PT) goal not met  -LN       Row Name 09/28/23 1130          ROM Goal 1 (PT)    ROM Goal 1 (PT) Tolerate seated and standing ther ex to BLEs, 20 reps, all joints, all planes.  -LN     Time Frame (ROM Goal 1, PT) by discharge  -LN     Strategies/Barriers (ROM Goal 1, PT) Maintain SpO2 >90%.  -LN     Progress/Outcome (ROM Goal 1, PT) goal not met  -LN               User Key  (r) = Recorded By, (t) = Taken By, (c) = Cosigned By      Initials Name Provider Type    LN Fanta Herbert PTA Physical Therapist Assistant    GLENDY Larsen, Shadi Olivares RN Registered Nurse    Krys Pope RN Registered Nurse    CP  Trish Vásquez, RN Registered Nurse                    Physical Therapy Education       Title: PT OT SLP Therapies (In Progress)       Topic: Physical Therapy (In Progress)       Point: Mobility training (Done)       Learning Progress Summary             Patient Acceptance, GRACIE, VU by ASHER at 9/27/2023 1148    Comment: PT POC, hand placement with transfers, rehab process.                         Point: Home exercise program (Not Started)       Learner Progress:  Not documented in this visit.              Point: Body mechanics (Not Started)       Learner Progress:  Not documented in this visit.              Point: Precautions (Not Started)       Learner Progress:  Not documented in this visit.                              User Key       Initials Effective Dates Name Provider Type Discipline     07/11/23 -  Dinote Kendrick, PT Physical Therapist PT                  PT Recommendation and Plan  Anticipated Discharge Disposition (PT): skilled nursing facility  Therapy Frequency (PT): 5 times/wk  Plan of Care Reviewed With: patient  Outcome Evaluation: pt agreeable to rx;sup-sit sba with hob elevated-pt sat eob x 15' while preparing room for gt;sit-stand-sit min of 1;amb 12' with rw and min of 1 with several episodes of knees buckling but no lob   Outcome Measures       Row Name 09/28/23 1130             How much help from another person do you currently need...    Turning from your back to your side while in flat bed without using bedrails? 3  -LN      Moving from lying on back to sitting on the side of a flat bed without bedrails? 3  -LN      Moving to and from a bed to a chair (including a wheelchair)? 3  -LN      Standing up from a chair using your arms (e.g., wheelchair, bedside chair)? 3  -LN      Climbing 3-5 steps with a railing? 2  -LN      To walk in hospital room? 3  -LN      AM-PAC 6 Clicks Score (PT) 17  -LN         Functional Assessment    Outcome Measure Options AM-PAC 6 Clicks Basic Mobility (PT)  -LN                 User Key  (r) = Recorded By, (t) = Taken By, (c) = Cosigned By      Initials Name Provider Type    LN Fanta Herbert, PTA Physical Therapist Assistant                     Time Calculation:    PT Charges       Row Name 09/28/23 1317             Time Calculation    Start Time 1130  -LN      Stop Time 1215  -LN      Time Calculation (min) 45 min  -LN      PT Received On 09/28/23  -LN         Time Calculation- PT    Total Timed Code Minutes- PT 45 minute(s)  -LN                User Key  (r) = Recorded By, (t) = Taken By, (c) = Cosigned By      Initials Name Provider Type    LN Keon, Fanta S, PTA Physical Therapist Assistant                  Therapy Charges for Today       Code Description Service Date Service Provider Modifiers Qty    98078968845 HC GAIT TRAINING EA 15 MIN 9/28/2023 Fanta Herbert PTA GP 1    34211837199 HC PT THERAPEUTIC ACT EA 15 MIN 9/28/2023 Fanta Herbert PTA GP 2            PT G-Codes  Outcome Measure Options: AM-PAC 6 Clicks Basic Mobility (PT)  AM-PAC 6 Clicks Score (PT): 17  AM-PAC 6 Clicks Score (OT): 19    Fanta Herbert PTA  9/28/2023

## 2023-09-29 LAB
ANION GAP SERPL CALCULATED.3IONS-SCNC: 6 MMOL/L (ref 5–15)
BUN SERPL-MCNC: 15 MG/DL (ref 8–23)
BUN/CREAT SERPL: 8.7 (ref 7–25)
CALCIUM SPEC-SCNC: 7.9 MG/DL (ref 8.6–10.5)
CHLORIDE SERPL-SCNC: 100 MMOL/L (ref 98–107)
CO2 SERPL-SCNC: 26 MMOL/L (ref 22–29)
CREAT SERPL-MCNC: 1.73 MG/DL (ref 0.57–1)
EGFRCR SERPLBLD CKD-EPI 2021: 29.2 ML/MIN/1.73
GLUCOSE SERPL-MCNC: 78 MG/DL (ref 65–99)
POTASSIUM SERPL-SCNC: 4.2 MMOL/L (ref 3.5–5.2)
SODIUM SERPL-SCNC: 132 MMOL/L (ref 136–145)

## 2023-09-29 PROCEDURE — 80048 BASIC METABOLIC PNL TOTAL CA: CPT | Performed by: FAMILY MEDICINE

## 2023-09-29 PROCEDURE — 97530 THERAPEUTIC ACTIVITIES: CPT

## 2023-09-29 PROCEDURE — 97110 THERAPEUTIC EXERCISES: CPT

## 2023-09-29 PROCEDURE — 25010000002 MAGNESIUM SULFATE IN D5W 1G/100ML (PREMIX) 1-5 GM/100ML-% SOLUTION: Performed by: INTERNAL MEDICINE

## 2023-09-29 PROCEDURE — 25010000002 CEFTRIAXONE PER 250 MG: Performed by: INTERNAL MEDICINE

## 2023-09-29 PROCEDURE — 94799 UNLISTED PULMONARY SVC/PX: CPT

## 2023-09-29 PROCEDURE — 94760 N-INVAS EAR/PLS OXIMETRY 1: CPT

## 2023-09-29 RX ORDER — MAGNESIUM SULFATE 1 G/100ML
1 INJECTION INTRAVENOUS ONCE
Status: COMPLETED | OUTPATIENT
Start: 2023-09-29 | End: 2023-09-29

## 2023-09-29 RX ADMIN — AMIODARONE HYDROCHLORIDE 200 MG: 200 TABLET ORAL at 08:24

## 2023-09-29 RX ADMIN — ISOSORBIDE MONONITRATE 30 MG: 30 TABLET, EXTENDED RELEASE ORAL at 08:24

## 2023-09-29 RX ADMIN — METOPROLOL TARTRATE 25 MG: 25 TABLET, FILM COATED ORAL at 21:17

## 2023-09-29 RX ADMIN — DILTIAZEM HYDROCHLORIDE 30 MG: 30 TABLET ORAL at 21:17

## 2023-09-29 RX ADMIN — GABAPENTIN 100 MG: 100 CAPSULE ORAL at 17:30

## 2023-09-29 RX ADMIN — Medication 10 ML: at 21:18

## 2023-09-29 RX ADMIN — APIXABAN 2.5 MG: 2.5 TABLET, FILM COATED ORAL at 21:18

## 2023-09-29 RX ADMIN — CEFTRIAXONE SODIUM 2000 MG: 2 INJECTION, POWDER, FOR SOLUTION INTRAMUSCULAR; INTRAVENOUS at 12:48

## 2023-09-29 RX ADMIN — MAGNESIUM SULFATE HEPTAHYDRATE 1 G: 1 INJECTION, SOLUTION INTRAVENOUS at 14:37

## 2023-09-29 RX ADMIN — HYDROCODONE BITARTRATE AND ACETAMINOPHEN 1 TABLET: 5; 325 TABLET ORAL at 02:49

## 2023-09-29 RX ADMIN — GABAPENTIN 100 MG: 100 CAPSULE ORAL at 08:24

## 2023-09-29 RX ADMIN — APIXABAN 2.5 MG: 2.5 TABLET, FILM COATED ORAL at 08:24

## 2023-09-29 RX ADMIN — AMITRIPTYLINE HYDROCHLORIDE 25 MG: 25 TABLET, FILM COATED ORAL at 21:18

## 2023-09-29 RX ADMIN — GABAPENTIN 100 MG: 100 CAPSULE ORAL at 21:18

## 2023-09-29 RX ADMIN — Medication 10 ML: at 08:27

## 2023-09-29 NOTE — PROGRESS NOTES
[facility] Medicine Services  INPATIENT PROGRESS NOTE      Length of Stay: 3  Date of Admission: 9/25/2023  Primary Care Physician: Len Seo MD    Subjective   Chief Complaint: AMS  HPI: Patient denies any complaints or concerns during my evaluation.  Heart rate improved.    Review of Systems   All pertinent negatives and positives are as above. All other systems have been reviewed and are negative unless otherwise stated.     Objective    As of today 09/29/23  Temp:  [96.1 °F (35.6 °C)-98.9 °F (37.2 °C)] 98.9 °F (37.2 °C)  Heart Rate:  [48-71] 63  Resp:  [16-18] 16  BP: (112-159)/(55-72) 122/57    Physical Exam  Constitutional:       General: She is not in acute distress.     Appearance: She is not toxic-appearing.   HENT:      Head: Normocephalic and atraumatic.      Nose: Nose normal.      Mouth/Throat:      Pharynx: Oropharynx is clear.   Eyes:      Conjunctiva/sclera: Conjunctivae normal.   Cardiovascular:      Rate and Rhythm: Normal rate and regular rhythm.      Heart sounds: Normal heart sounds.   Pulmonary:      Effort: Pulmonary effort is normal. No respiratory distress.      Breath sounds: Normal breath sounds.   Abdominal:      General: Bowel sounds are normal.      Palpations: Abdomen is soft.      Tenderness: There is no abdominal tenderness.   Musculoskeletal:         General: No deformity.   Skin:     General: Skin is warm and dry.      Capillary Refill: Capillary refill takes less than 2 seconds.   Neurological:      General: No focal deficit present.      Mental Status: She is alert and oriented to person, place, and time. Mental status is at baseline.   Psychiatric:         Behavior: Behavior normal.         Thought Content: Thought content normal.         Results Review:  I have reviewed the labs, radiology results, and diagnostic studies.    Laboratory Data:   Results from last 7 days   Lab Units 09/29/23  0919 09/28/23  0805  09/27/23  0527 09/26/23  0642 09/25/23  1410   SODIUM mmol/L 132* 132* 132* 134* 129*   POTASSIUM mmol/L 4.2 4.3 3.2* 3.2* 3.3*   CHLORIDE mmol/L 100 99 99 98 92*   CO2 mmol/L 26.0 25.0 28.0 27.0 27.0   BUN mg/dL 15 14 10 12 11   CREATININE mg/dL 1.73* 1.60* 1.39* 1.85* 1.79*   GLUCOSE mg/dL 78 138* 80 81 95   CALCIUM mg/dL 7.9* 8.1* 7.6* 7.7* 8.4*   BILIRUBIN mg/dL  --  0.4  --  0.5 0.6   ALK PHOS U/L  --  134*  --  124* 149*   ALT (SGPT) U/L  --  7  --  8 11   AST (SGOT) U/L  --  23  --  29 33*   ANION GAP mmol/L 6.0 8.0 5.0 9.0 10.0       Estimated Creatinine Clearance: 25.4 mL/min (A) (by C-G formula based on SCr of 1.73 mg/dL (H)).  Results from last 7 days   Lab Units 09/28/23  0805 09/27/23  0527 09/25/23  0851   MAGNESIUM mg/dL 1.5* 1.7 1.5*           Results from last 7 days   Lab Units 09/28/23  0805 09/27/23  0527 09/26/23  0642 09/25/23  0851   WBC 10*3/mm3 5.92 5.11 5.41 5.08   HEMOGLOBIN g/dL 10.0* 8.7* 9.6* 11.5*   HEMATOCRIT % 32.2* 28.1* 30.1* 37.0   PLATELETS 10*3/mm3 283 212 236 278             Culture Data:   Blood Culture   Date Value Ref Range Status   09/27/2023 No growth at 2 days  Preliminary   09/27/2023 No growth at 2 days  Preliminary     No results found for: URINECX    No results found for: RESPCX  No results found for: WOUNDCX  No results found for: STOOLCX  No components found for: BODYFLD    Radiology Data:   Imaging Results (Last 24 Hours)       ** No results found for the last 24 hours. **            I have reviewed the patient's current medications.     Assessment/Plan     Principal Problem:    AMS (altered mental status)  Active Problems:    Bacteremia due to Gram-negative bacteria  Metabolic encephalopathy secondary to Klebsiella UTI and associated bacteremia  COVID-19 infection  Recent right-sided retroperitoneal hematoma resulting in right-sided hydronephrosis  BLAINE  Right lower extremity DVT    Plan:  - Continue IV antibiotic therapy with Rocephin 2 g daily for now. BC  sensitivities same as as UC.   - Appreciate Urology assistance, stent exchange planned for this upcoming Saturday.   - Appreciate nephrology assistance, monitoring for ongoing need for HD.  - Heart rate improved today after stopping Cardizem and metoprolol yesterday.  These medications will need to be restarted and we will plan to take this with low-dose of both this evening with close observation and monitoring.  - PT and OT have been ordered  - DVT prophylaxis with Eliquis  - CODE STATUS: Full      Medical Decision Making  Number and Complexity of problems: 4 high complexity    Conditions and Status:        Condition is improving.     MDM Data  Tests considered but not ordered: None     Decision rules/scores evaluated (example [id number], Wells, etc): None     Discussed with: Patient and nursing     Treatment Plan  As above    Care Planning  Shared decision making: Patient   Code status and discussions: Full    Disposition  Social Determinants of Health that impact treatment or disposition: none  I expect the patient to be discharged to SNF in 1-2 days.       I have utilized all available immediate resources to obtain, update, or review the patient's current medications (including all prescriptions, over-the-counter products, herbals, cannabis/cannabidiol products, and vitamin/mineral/dietary (nutritional) supplements).   I confirmed that the patient's Advance Care Plan is present, code status is documented, or surrogate decision maker is listed in the patient's medical record.      Juanito Harrington MD

## 2023-09-29 NOTE — PLAN OF CARE
Goal Outcome Evaluation:  Plan of Care Reviewed With: patient           Outcome Evaluation:  (pt not cooperative this am but with much encouragement from spouse and staff agreed to eob and ex;arom gabriel le's with vc's for technique;sit-stand-sit  min of 1-side stepped up to hob with gabriel knees buckling with each step)      Anticipated Discharge Disposition (PT): skilled nursing facility

## 2023-09-29 NOTE — PROGRESS NOTES
"  [facility]  [address] [place] [address]. [address]  T - [phone]  F - [phone]     Progress Note          PATIENT  DEMOGRAPHICS   PATIENT NAME: Carol Sullivan                      PHYSICIAN: Stephanie Gómez MD  : 1940  MRN: [id number]   LOS: 3 days    Patient Care Team:  Len Seo MD as PCP - General  CharlestonBlaire APRN as Nurse Practitioner (General Surgery)  Subjective   SUBJECTIVE   No events overnight . Resting at present       Objective   OBJECTIVE   Vital Signs  Temp:  [96.1 °F (35.6 °C)-98.9 °F (37.2 °C)] 98.9 °F (37.2 °C)  Heart Rate:  [40-71] 63  Resp:  [16-18] 16  BP: (106-159)/(53-72) 122/57    Flowsheet Rows      Flowsheet Row First Filed Value   Admission Height 165.1 cm (65\") Documented at 2023   Admission Weight 70.3 kg (155 lb) Documented at 2023 0815             I/O last 3 completed shifts:  In: 720 [P.O.:720]  Out: 1100 [Urine:1100]    PHYSICAL EXAM    Physical Exam  Constitutional:       Appearance: She is well-developed.   HENT:      Head: Normocephalic.   Eyes:      Pupils: Pupils are equal, round, and reactive to light.   Cardiovascular:      Rate and Rhythm: Normal rate and regular rhythm.      Heart sounds: Normal heart sounds.   Pulmonary:      Effort: Pulmonary effort is normal.      Breath sounds: Normal breath sounds.   Abdominal:      General: Bowel sounds are normal.      Palpations: Abdomen is soft.   Musculoskeletal:         General: No swelling.   Skin:     Coloration: Skin is not jaundiced.   Neurological:      General: No focal deficit present.      Mental Status: She is alert and oriented to person, place, and time.       RESULTS   Results Review:    Results from last 7 days   Lab Units 23  0919 23  0805 23  0527 23  0642 23  1410   SODIUM mmol/L 132* 132* 132* 134* 129*   POTASSIUM mmol/L 4.2 4.3 3.2* 3.2* 3.3*   CHLORIDE mmol/L 100 99 99 98 92*   CO2 mmol/L 26.0 25.0 28.0 27.0 27.0 "   BUN mg/dL 15 14 10 12 11   CREATININE mg/dL 1.73* 1.60* 1.39* 1.85* 1.79*   CALCIUM mg/dL 7.9* 8.1* 7.6* 7.7* 8.4*   BILIRUBIN mg/dL  --  0.4  --  0.5 0.6   ALK PHOS U/L  --  134*  --  124* 149*   ALT (SGPT) U/L  --  7  --  8 11   AST (SGOT) U/L  --  23  --  29 33*   GLUCOSE mg/dL 78 138* 80 81 95         Estimated Creatinine Clearance: 25.4 mL/min (A) (by C-G formula based on SCr of 1.73 mg/dL (H)).    Results from last 7 days   Lab Units 09/28/23  0805 09/27/23  0527 09/25/23  0851   MAGNESIUM mg/dL 1.5* 1.7 1.5*               Results from last 7 days   Lab Units 09/28/23  0805 09/27/23  0527 09/26/23  0642 09/25/23  0851   WBC 10*3/mm3 5.92 5.11 5.41 5.08   HEMOGLOBIN g/dL 10.0* 8.7* 9.6* 11.5*   PLATELETS 10*3/mm3 283 212 236 278                 Imaging Results (Last 24 Hours)       ** No results found for the last 24 hours. **             MEDICATIONS    amiodarone, 200 mg, Oral, Daily  amitriptyline, 25 mg, Oral, Nightly  apixaban, 2.5 mg, Oral, BID  cefTRIAXone, 2,000 mg, Intravenous, Q24H  gabapentin, 100 mg, Oral, TID  isosorbide mononitrate, 30 mg, Oral, Daily  sodium chloride, 10 mL, Intravenous, Q12H      lactated ringers, 50 mL/hr, Last Rate: 50 mL/hr (09/28/23 1400)        Assessment & Plan   ASSESSMENT / PLAN      AMS (altered mental status)    Bacteremia due to Gram-negative bacteria    1.  BLAINE/ATN- baseline creatinine unknown, creatinine was 1.5 in October 2022 but less than 1 prior to that.  BLAINE secondary to hydronephrosis caused by retroperitoneal hematoma leading to ATN.  Started hemodialysis 8/6/2023.     -Cr is better - was around 4 earlier and now 1.7-1.8 range. No hd plan . Recovering. Keep permcath on discharge      2.  COVID-19     3.  Retroperitoneal bleed/hydronephrosis- status post right J stent for hydronephrosis     4.  Hypertension     5.  A-fib      6.  Anemia- hemoglobin acceptable     7.  AMS     8.  Extensive right lower extremity DVT    9. GNR bacteremia     10 right J stent and  mitchell - plan for cysto and needs right J stent change    11 hypomagnesemia replace it                    This document has been electronically signed by Stephanie Gómez MD on September 29, 2023 13:38 CDT

## 2023-09-29 NOTE — THERAPY TREATMENT NOTE
Acute Care - Physical Therapy Treatment Note  [facility]     Patient Name: Carol Sullivan  : 1940  MRN: [id number]  Today's Date: 2023      Visit Dx:     ICD-10-CM ICD-9-CM   1. Altered mental status, unspecified altered mental status type  R41.82 780.97   2. Acute UTI  N39.0 599.0   3. Fall, initial encounter  W19.XXXA E888.9   4. Hypokalemia  E87.6 276.8   5. Hypomagnesemia  E83.42 275.2   6. Impaired functional mobility, balance, gait, and endurance [Z74.09 (ICD-10-CM)]  Z74.09 V49.89   7. Impaired mobility and ADLs [Z74.09, Z78.9 (ICD-10-CM)]  Z74.09 V49.89    Z78.9      Patient Active Problem List   Diagnosis    Hypertension    Hyperlipidemia    Near syncope    GERD (gastroesophageal reflux disease)    Palpitation    PVC (premature ventricular contraction)    Breast calcifications on mammogram    Paroxysmal atrial fibrillation    Peripheral vascular disease, unspecified    Type 2 diabetes mellitus with other specified complication    Long term (current) use of anticoagulants    Encounter for therapeutic drug monitoring    Sepsis    Mastitis    Other hydronephrosis    ESRD (end stage renal disease) on dialysis    Retroperitoneal hemorrhage    AMS (altered mental status)    Bacteremia due to Gram-negative bacteria     Past Medical History:   Diagnosis Date    Arthritis     Depression     GERD (gastroesophageal reflux disease)     Hyperlipidemia     Hypertension     Near syncope     Vascular disease      Past Surgical History:   Procedure Laterality Date    BLADDER SURGERY      BREAST ABSCESS INCISION AND DRAINAGE Left 2023    Procedure: BREAST ABSCESS INCISION AND DRAINAGE;  Surgeon: Fox Roper MD;  Location: [facility];  Service: General;  Laterality: Left;    CYSTOSCOPY, RETROGRADE PYELOGRAM AND STENT INSERTION Right 2023    Procedure: CYSTOSCOPY RETROGRADE PYELOGRAM AND STENT INSERTION;  Surgeon: Ousmane Garcia MD;  Location: [facility];  Service: Urology;  Laterality: Right;  "   ENDOSCOPY N/A 2019    Procedure: ESOPHAGOGASTRODUODENOSCOPY;  Surgeon: Alberto Sanchez DO;  Location: [facility] ENDOSCOPY;  Service: Gastroenterology    GALLBLADDER SURGERY      INTERVENTIONAL RADIOLOGY PROCEDURE N/A 2023    Procedure: tunneled central venous catheter placement;  Surgeon: Hardy Yin MD;  Location: [facility] ANGIO INVASIVE LOCATION;  Service: Interventional Radiology;  Laterality: N/A;    SHOULDER SURGERY      \"bone spurs\"    SUBTOTAL HYSTERECTOMY       PT Assessment (last 12 hours)       PT Evaluation and Treatment       Row Name 23 1140          Physical Therapy Time and Intention    Subjective Information complains of;fatigue  -LN     Document Type therapy note (daily note)  -LN     Mode of Treatment physical therapy  -LN     Comment nsg states pt not as responsive this am even to sternal rub-she says when awakened pt just says she wants to sleep  -LN       Row Name 23 1140          General Information    Patient Profile Reviewed yes  -LN     Existing Precautions/Restrictions fall;oxygen therapy device and L/min  -LN       Row Name 23 1140          Home Use of Assistive/Adaptive Equipment    Equipment Currently Used at Home none  -LN       Row Name 23 1140          Pain    Pretreatment Pain Rating 0/10 - no pain  -LN     Posttreatment Pain Rating 0/10 - no pain  -LN       Row Name 23 1140          Cognition    Orientation Status (Cognition) oriented to;person  said she was at home-couldn't  give correct   -LN       Row Name 23 1140          Range of Motion Comprehensive    General Range of Motion bilateral lower extremity ROM WFL  -LN       Row Name 23 1140          Bed Mobility    Bed Mobility supine-sit;sit-supine  -LN     Supine-Sit [place] (Bed Mobility) supervision  -LN     Sit-Supine [place] (Bed Mobility) supervision  -LN     Assistive Device (Bed Mobility) head of bed elevated;bed rails  -LN       Row Name " 09/29/23 1140          Transfers    Transfers sit-stand transfer;stand-sit transfer  -LN       Row Name 09/29/23 1140          Sit-Stand Transfer    Sit-Stand [place] (Transfers) minimum assist (75% patient effort);verbal cues  -LN     Assistive Device (Sit-Stand Transfers) --  -LN       Row Name 09/29/23 1140          Stand-Sit Transfer    Stand-Sit [place] (Transfers) minimum assist (75% patient effort);verbal cues  -LN     Assistive Device (Stand-Sit Transfers) --  -[facility] Name 09/29/23 1140          Gait/Stairs (Locomotion)    [place] Level (Gait) minimum assist (75% patient effort)  -LN     Assistive Device (Gait) --  hha of 1  -LN     Distance in Feet (Gait) 3-4 side steps  -LN     Pattern (Gait) --  -LN     Deviations/Abnormal Patterns (Gait) bilateral deviations;gait speed decreased;stride length decreased  -LN     Bilateral Gait Deviations knee buckling bilaterally;heel strike decreased  -LN       Row Name 09/29/23 1140          Safety Issues, Functional Mobility    Impairments Affecting Function (Mobility) strength;endurance/activity tolerance;balance;pain  -LN       Row Name 09/29/23 1140          Balance    Static Sitting Balance contact guard  -LN     Dynamic Sitting Balance contact guard  -LN     Position, Sitting Balance sitting edge of bed;supported  -LN       Row Name 09/29/23 1140          Hip (Therapeutic Exercise)    Hip (Therapeutic Exercise) AROM (active range of motion);isometric exercises  -LN     Hip AROM (Therapeutic Exercise) bilateral;flexion  -LN     Hip Isometrics (Therapeutic Exercise) bilateral;aDduction  -LN       Row Name 09/29/23 1140          Knee (Therapeutic Exercise)    Knee (Therapeutic Exercise) AROM (active range of motion)  -LN     Knee AROM (Therapeutic Exercise) bilateral;LAQ (long arc quad)  -[facility] Name 09/29/23 1140          Ankle (Therapeutic Exercise)    Ankle (Therapeutic Exercise) AROM (active range of motion)  -LN     Ankle AROM  (Therapeutic Exercise) bilateral;dorsiflexion;plantarflexion  -LN       Row Name 09/29/23 1140          Respiratory WDL    Respiratory WDL X  -LN     Rhythm/Pattern, Respiratory depth regular;pattern regular;unlabored;no shortness of breath reported  -LN     Expansion/Accessory Muscles/Retractions no retractions;no use of accessory muscles  -LN     Nailbeds no discoloration  -LN     Mucous Membranes pink;intact  -LN       Row Name 09/29/23 1140          Breath Sounds    Breath Sounds All Fields  -LN     All Lung Fields Breath Sounds Anterior:;diminished  -LN       Row Name 09/29/23 1140          Skin WDL    Skin WDL X  -LN     Skin Temperature warm  -LN     Skin Moisture dry  -LN     Skin Elasticity quick return to original state  -LN     Skin Integrity bruised (ecchymotic)  -LN       Row Name 09/29/23 1140          Wound 08/01/23 1241 Left breast Incision    Wound - Properties Group Placement Date: 08/01/23  -CP Placement Time: 1241  -CP Present on Hospital Admission: Y  -CP Side: Left  -CP Location: breast  -CP Primary Wound Type: Incision  -CP Additional Comments: packed with 1/4 inch iodoform, 4x4's, silk tape  -CP    Closure GINA  -LN     Periwound intact  -LN     Retired Wound - Properties Group Placement Date: 08/01/23  -CP Placement Time: 1241  -CP Present on Hospital Admission: Y  -CP Side: Left  -CP Location: breast  -CP Primary Wound Type: Incision  -CP Additional Comments: packed with 1/4 inch iodoform, 4x4's, silk tape  -CP    Retired Wound - Properties Group Date first assessed: 08/01/23  -CP Time first assessed: 1241  -CP Present on Hospital Admission: Y  -CP Side: Left  -CP Location: breast  -CP Primary Wound Type: Incision  -CP Additional Comments: packed with 1/4 inch iodoform, 4x4's, silk tape  -CP      Row Name             Wound 07/28/23 1945 Bilateral midline sternal MASD (Moisture associated skin damage)    Wound - Properties Group Placement Date: 07/28/23  -CC (r)  AW (c) Placement Time: 1945   -CC (r)  AW (c) Present on Hospital Admission: Y  -CC (r)  AW (c) Side: Bilateral  -CC (r)  AW (c) Orientation: midline  -CC (r)  AW (c) Location: sternal  -CC (r)  AW (c) Primary Wound Type: MASD  -CC (r)  AW (c)    Retired Wound - Properties Group Placement Date: 07/28/23  -CC (r)  AW (c) Placement Time: 1945  -CC (r)  AW (c) Present on Hospital Admission: Y  -CC (r)  AW (c) Side: Bilateral  -CC (r)  AW (c) Orientation: midline  -CC (r)  AW (c) Location: sternal  -CC (r)  AW (c) Primary Wound Type: MASD  -CC (r)  AW (c)    Retired Wound - Properties Group Date first assessed: 07/28/23  -CC (r)  AW (c) Time first assessed: 1945  -CC (r)  AW (c) Present on Hospital Admission: Y  -CC (r)  AW (c) Side: Bilateral  -CC (r)  AW (c) Location: sternal  -CC (r)  AW (c) Primary Wound Type: MASD  -CC (r)  AW (c)      Row Name 09/29/23 1140          Coping    Observed Emotional State calm;cooperative  -LN     Involvement in Care not present at bedside  -LN       Row Name 09/29/23 1140          Plan of Care Review    Plan of Care Reviewed With patient  -LN     Outcome Evaluation --  pt not cooperative this am but with much encouragement from spouse and staff agreed to eob and ex;arom gabriel le's with vc's for technique;sit-stand-sit  min of 1-side stepped up to hob with gabriel knees buckling with each step  -LN       Row Name 09/29/23 1140          Vital Signs    Pre Systolic BP Rehab 163  -LN     Pre Treatment Diastolic BP 75  -LN     Post Systolic BP Rehab 178  -LN     Post Treatment Diastolic BP 81  -LN     Pretreatment Heart Rate (beats/min) 63  -LN     Posttreatment Heart Rate (beats/min) 95  -LN     Pre SpO2 (%) 100  -LN     O2 Delivery Pre Treatment supplemental O2  -LN     Post SpO2 (%) 100  -LN     O2 Delivery Post Treatment supplemental O2  -LN     Pre Patient Position Supine  -LN     Intra Patient Position Standing  -LN     Post Patient Position Supine  -LN       Row Name 09/29/23 1140          Positioning and Restraints     In Bed supine;call light within reach;encouraged to call for assist;exit alarm on;with family/caregiver  -LN       Row Name 09/29/23 1140          Therapy Assessment/Plan (PT)    Rehab Potential (PT) good, to achieve stated therapy goals  -LN     Criteria for Skilled Interventions Met (PT) yes;skilled treatment is necessary  -LN     Therapy Frequency (PT) 5 times/wk  -LN       Row Name 09/29/23 1140          Bed Mobility Goal 1 (PT)    Activity/Assistive Device (Bed Mobility Goal 1, PT) sit to supine/supine to sit  -LN     [place] Level/Cues Needed (Bed Mobility Goal 1, PT) modified independence  -LN     Time Frame (Bed Mobility Goal 1, PT) by discharge  -LN     Progress/Outcomes (Bed Mobility Goal 1, PT) goal not met  -LN       Row Name 09/29/23 1140          Transfer Goal 1 (PT)    Activity/Assistive Device (Transfer Goal 1, PT) sit-to-stand/stand-to-sit;bed-to-chair/chair-to-bed;walker, rolling  -LN     [place]/Cues Needed (Transfer Goal 1, PT) modified independence  -LN     Time Frame (Transfer Goal 1, PT) by discharge  -LN     Strategies/Barriers (Transfers Goal 1, PT) Maintain SpO2 >90%.  -LN     Progress/Outcome (Transfer Goal 1, PT) goal not met  -LN       Row Name 09/29/23 1140          Gait Training Goal 1 (PT)    Activity/Assistive Device (Gait Training Goal 1, PT) walker, rolling  -LN     [place] Level (Gait Training Goal 1, PT) supervision required  -LN     Distance (Gait Training Goal 1, PT) 30'x3.  -LN     Time Frame (Gait Training Goal 1, PT) by discharge  -LN     Strategies/Barriers (Gait Training Goal 1, PT) Maintain SpO2 >90%.  -LN     Progress/Outcome (Gait Training Goal 1, PT) goal not met  -LN       Row Name 09/29/23 1140          ROM Goal 1 (PT)    ROM Goal 1 (PT) Tolerate seated and standing ther ex to BLEs, 20 reps, all joints, all planes.  -LN     Time Frame (ROM Goal 1, PT) by discharge  -LN     Strategies/Barriers (ROM Goal 1, PT) Maintain SpO2 >90%.  -LN      Progress/Outcome (ROM Goal 1, PT) goal not met  -LN               User Key  (r) = Recorded By, (t) = Taken By, (c) = Cosigned By      Initials Name Provider Type    LN Fanta Herbert, AUDREY Physical Therapist Assistant    Shadi Alvarado, RN Registered Nurse    Krys Pope RN Registered Nurse    Trish Summers RN Registered Nurse                    Physical Therapy Education       Title: PT OT SLP Therapies (In Progress)       Topic: Physical Therapy (In Progress)       Point: Mobility training (Done)       Learning Progress Summary             Patient Acceptance, E, VU by  at 9/27/2023 1148    Comment: PT POC, hand placement with transfers, rehab process.                         Point: Home exercise program (Not Started)       Learner Progress:  Not documented in this visit.              Point: Body mechanics (Not Started)       Learner Progress:  Not documented in this visit.              Point: Precautions (Not Started)       Learner Progress:  Not documented in this visit.                              User Key       Initials Effective Dates Name Provider Type Discipline     07/11/23 -  Dionte Kendrick, PT Physical Therapist PT                  PT Recommendation and Plan  Anticipated Discharge Disposition (PT): skilled nursing facility  Therapy Frequency (PT): 5 times/wk  Plan of Care Reviewed With: patient  Outcome Evaluation:  (pt not cooperative this am but with much encouragement from spouse and staff agreed to eob and ex;arom gabriel le's with vc's for technique;sit-stand-sit  min of 1-side stepped up to hob with gabriel knees buckling with each step)   Outcome Measures       Row Name 09/29/23 1140 09/28/23 1130          How much help from another person do you currently need...    Turning from your back to your side while in flat bed without using bedrails? 3  -LN 3  -LN     Moving from lying on back to sitting on the side of a flat bed without bedrails? 3  -LN 3  -LN     Moving to and  from a bed to a chair (including a wheelchair)? 2  -LN 3  -LN     Standing up from a chair using your arms (e.g., wheelchair, bedside chair)? 3  -LN 3  -LN     Climbing 3-5 steps with a railing? 2  -LN 2  -LN     To walk in hospital room? 2  -LN 3  -LN     AM-PAC 6 Clicks Score (PT) 15  -LN 17  -LN        Functional Assessment    Outcome Measure Options AM-PAC 6 Clicks Basic Mobility (PT)  -LN AM-PAC 6 Clicks Basic Mobility (PT)  -LN               User Key  (r) = Recorded By, (t) = Taken By, (c) = Cosigned By      Initials Name Provider Type    LN Keon, Fanta S, PTA Physical Therapist Assistant                     Time Calculation:    PT Charges       Row Name 09/29/23 1307             Time Calculation    Start Time 1140  -LN      Stop Time 1213  -LN      Time Calculation (min) 33 min  -LN      PT Received On 09/29/23  -LN         Time Calculation- PT    Total Timed Code Minutes- PT 33 minute(s)  -LN                User Key  (r) = Recorded By, (t) = Taken By, (c) = Cosigned By      Initials Name Provider Type    LN Keon, Fanta S, PTA Physical Therapist Assistant                  Therapy Charges for Today       Code Description Service Date Service Provider Modifiers Qty    10099524217 HC GAIT TRAINING EA 15 MIN 9/28/2023 Keon, Fanta S, PTA GP 1    62189122362 HC PT THERAPEUTIC ACT EA 15 MIN 9/28/2023 Keon, Fanta S, PTA GP 2    85747026540 HC PT THERAPEUTIC ACT EA 15 MIN 9/29/2023 Keon, Fanta S, PTA GP 1    11632702077 HC PT THER PROC EA 15 MIN 9/29/2023 Keon, Fanta S, PTA GP 1            PT G-Codes  Outcome Measure Options: AM-PAC 6 Clicks Basic Mobility (PT)  AM-PAC 6 Clicks Score (PT): 15  AM-PAC 6 Clicks Score (OT): 19    Fanta S Keon PTA  9/29/2023

## 2023-09-29 NOTE — THERAPY TREATMENT NOTE
Patient Name: Carol Sullivan  : 1940    MRN: [id number]                              Today's Date: 2023       Admit Date: 2023    Visit Dx:     ICD-10-CM ICD-9-CM   1. Altered mental status, unspecified altered mental status type  R41.82 780.97   2. Acute UTI  N39.0 599.0   3. Fall, initial encounter  W19.XXXA E888.9   4. Hypokalemia  E87.6 276.8   5. Hypomagnesemia  E83.42 275.2   6. Impaired functional mobility, balance, gait, and endurance [Z74.09 (ICD-10-CM)]  Z74.09 V49.89   7. Impaired mobility and ADLs [Z74.09, Z78.9 (ICD-10-CM)]  Z74.09 V49.89    Z78.9      Patient Active Problem List   Diagnosis    Hypertension    Hyperlipidemia    Near syncope    GERD (gastroesophageal reflux disease)    Palpitation    PVC (premature ventricular contraction)    Breast calcifications on mammogram    Paroxysmal atrial fibrillation    Peripheral vascular disease, unspecified    Type 2 diabetes mellitus with other specified complication    Long term (current) use of anticoagulants    Encounter for therapeutic drug monitoring    Sepsis    Mastitis    Other hydronephrosis    ESRD (end stage renal disease) on dialysis    Retroperitoneal hemorrhage    AMS (altered mental status)    Bacteremia due to Gram-negative bacteria     Past Medical History:   Diagnosis Date    Arthritis     Depression     GERD (gastroesophageal reflux disease)     Hyperlipidemia     Hypertension     Near syncope     Vascular disease      Past Surgical History:   Procedure Laterality Date    BLADDER SURGERY      BREAST ABSCESS INCISION AND DRAINAGE Left 2023    Procedure: BREAST ABSCESS INCISION AND DRAINAGE;  Surgeon: Fox Roper MD;  Location: [facility];  Service: General;  Laterality: Left;    CYSTOSCOPY, RETROGRADE PYELOGRAM AND STENT INSERTION Right 2023    Procedure: CYSTOSCOPY RETROGRADE PYELOGRAM AND STENT INSERTION;  Surgeon: Ousmane Garcia MD;  Location: [facility];  Service: Urology;  Laterality: Right;     "ENDOSCOPY N/A 5/6/2019    Procedure: ESOPHAGOGASTRODUODENOSCOPY;  Surgeon: Alberto Sanchez DO;  Location: [facility] ENDOSCOPY;  Service: Gastroenterology    GALLBLADDER SURGERY      INTERVENTIONAL RADIOLOGY PROCEDURE N/A 8/17/2023    Procedure: tunneled central venous catheter placement;  Surgeon: Hardy Yin MD;  Location: [facility] ANGIO INVASIVE LOCATION;  Service: Interventional Radiology;  Laterality: N/A;    SHOULDER SURGERY      \"bone spurs\"    SUBTOTAL HYSTERECTOMY        General Information       Row Name 09/29/23 1402          OT Time and Intention    Document Type therapy note (daily note)  -CS     Mode of Treatment occupational therapy  -CS       Row Name 09/29/23 1402          General Information    Patient Profile Reviewed yes  -CS     Existing Precautions/Restrictions fall;oxygen therapy device and L/min  -CS       Row Name 09/29/23 1402          Cognition    Orientation Status (Cognition) oriented to;person  -CS       Row Name 09/29/23 1402          Safety Issues, Functional Mobility    Impairments Affecting Function (Mobility) strength;endurance/activity tolerance;balance;pain  -CS               User Key  (r) = Recorded By, (t) = Taken By, (c) = Cosigned By      Initials Name Provider Type    CS Allie Lim COTA Occupational Therapist Assistant                     Mobility/ADL's       Row Name 09/29/23 1402          Bed Mobility    Supine-Sit [place] (Bed Mobility) supervision  -CS     Sit-Supine [place] (Bed Mobility) supervision  -CS     Assistive Device (Bed Mobility) head of bed elevated;bed rails  -CS               User Key  (r) = Recorded By, (t) = Taken By, (c) = Cosigned By      Initials Name Provider Type    CS Allie Lim COTA Occupational Therapist Assistant                   Obj/Interventions       Row Name 09/29/23 1402          Shoulder (Therapeutic Exercise)    Shoulder (Therapeutic Exercise) AROM (active range of motion);strengthening exercise  -CS  "    Shoulder AROM (Therapeutic Exercise) bilateral;flexion;extension;aBduction;aDduction;horizontal aBduction/aDduction;scapular protraction;scapular retraction  -CS     Shoulder Strengthening (Therapeutic Exercise) resistance band;yellow  -       Row Name 09/29/23 1402          Elbow/Forearm (Therapeutic Exercise)    Elbow/Forearm (Therapeutic Exercise) AROM (active range of motion)  -CS     Elbow/Forearm AROM (Therapeutic Exercise) bilateral;flexion;extension  -       Row Name 09/29/23 1402          Motor Skills    Therapeutic Exercise shoulder;elbow/forearm  -CS               User Key  (r) = Recorded By, (t) = Taken By, (c) = Cosigned By      Initials Name Provider Type    CS Allie Lim COTA Occupational Therapist Assistant                   Goals/Plan       Row Name 09/29/23 1402          Transfer Goal 1 (OT)    Activity/Assistive Device (Transfer Goal 1, OT) commode, bedside without drop arms  -CS     [place] Level/Cues Needed (Transfer Goal 1, OT) contact guard required  -CS     Time Frame (Transfer Goal 1, OT) long term goal (LTG);by discharge  -CS     Progress/Outcome (Transfer Goal 1, OT) goal not met  -       Row Name 09/29/23 1402          Bathing Goal 1 (OT)    Activity/Device (Bathing Goal 1, OT) lower body bathing  -CS     [place] Level/Cues Needed (Bathing Goal 1, OT) minimum assist (75% or more patient effort)  -CS     Time Frame (Bathing Goal 1, OT) long term goal (LTG);by discharge  -CS     Progress/Outcomes (Bathing Goal 1, OT) goal not met  -       Row Name 09/29/23 1402          Toileting Goal 1 (OT)    Activity/Device (Toileting Goal 1, OT) toileting skills, all  -CS     [place] Level/Cues Needed (Toileting Goal 1, OT) set-up required  -CS     Time Frame (Toileting Goal 1, OT) long term goal (LTG);by discharge  -CS     Progress/Outcome (Toileting Goal 1, OT) goal not met  -       Row Name 09/29/23 1402          Strength Goal 1 (OT)    Strength Goal 1 (OT) Pt will  increase BUE strength to 4/5 grossly.  -CS     Time Frame (Strength Goal 1, OT) long term goal (LTG);by discharge  -CS     Progress/Outcome (Strength Goal 1, OT) goal not met  -CS               User Key  (r) = Recorded By, (t) = Taken By, (c) = Cosigned By      Initials Name Provider Type    Allie Woodward COTA Occupational Therapist Assistant                   Clinical Impression       Row Name 09/29/23 1402          Pain Assessment    Pretreatment Pain Rating 0/10 - no pain  -CS     Posttreatment Pain Rating 0/10 - no pain  -CS       Row Name 09/29/23 1402          Plan of Care Review    Plan of Care Reviewed With patient  -CS     Progress improving  -CS     Outcome Evaluation Pt tolerated tx well this date. Pt was SBA with bed mobility. Pt gave good effort with UE ther ex. PRN rest breaks. No goals met this tx. Continue OT POC.  -CS       Row Name 09/29/23 1402          Therapy Assessment/Plan (OT)    Rehab Potential (OT) good, to achieve stated therapy goals  -CS     Criteria for Skilled Therapeutic Interventions Met (OT) yes;meets criteria  -CS       Row Name 09/29/23 1402          Therapy Plan Review/Discharge Plan (OT)    Anticipated Discharge Disposition (OT) skilled nursing facility  -CS       Row Name 09/29/23 1402          Vital Signs    Pre Patient Position Supine  -CS     Intra Patient Position Sitting  -CS     Post Patient Position Supine  -CS       Row Name 09/29/23 1402          Positioning and Restraints    Pre-Treatment Position in bed  -CS     Post Treatment Position bed  -CS     In Bed fowlers;call light within reach;encouraged to call for assist;exit alarm on  -CS               User Key  (r) = Recorded By, (t) = Taken By, (c) = Cosigned By      Initials Name Provider Type    Allie Woodward COTA Occupational Therapist Assistant                   Outcome Measures       Row Name 09/29/23 1402          How much help from another is currently needed...    Putting on and taking off  regular lower body clothing? 3  -CS     Bathing (including washing, rinsing, and drying) 3  -CS     Toileting (which includes using toilet bed pan or urinal) 2  -CS     Putting on and taking off regular upper body clothing 3  -CS     Taking care of personal grooming (such as brushing teeth) 4  -CS     Eating meals 4  -CS     AM-PAC 6 Clicks Score (OT) 19  -CS       Row Name 09/29/23 1140 09/29/23 0942       How much help from another person do you currently need...    Turning from your back to your side while in flat bed without using bedrails? 3  -LN 3  -MD    Moving from lying on back to sitting on the side of a flat bed without bedrails? 3  -LN 3  -MD    Moving to and from a bed to a chair (including a wheelchair)? 2  -LN 3  -MD    Standing up from a chair using your arms (e.g., wheelchair, bedside chair)? 3  -LN 3  -MD    Climbing 3-5 steps with a railing? 2  -LN 2  -MD    To walk in hospital room? 2  -LN 3  -MD    AM-PAC 6 Clicks Score (PT) 15  -LN 17  -MD    Highest level of mobility 4 --> Transferred to chair/commode  -LN 5 --> Static standing  -MD      Row Name 09/29/23 1140          Functional Assessment    Outcome Measure Options AM-PAC 6 Clicks Basic Mobility (PT)  -LN               User Key  (r) = Recorded By, (t) = Taken By, (c) = Cosigned By      Initials Name Provider Type    LN Fanta Herbert, PTA Physical Therapist Assistant    Allie Woodward COTA Occupational Therapist Assistant    Kevon Melendrez, LPN Licensed Nurse                    Occupational Therapy Education       Title: PT OT SLP Therapies (In Progress)       Topic: Occupational Therapy (In Progress)       Point: ADL training (Done)       Description:   Instruct learner(s) on proper safety adaptation and remediation techniques during self care or transfers.   Instruct in proper use of assistive devices.                  Learning Progress Summary             Patient Acceptance, E,TB, VU by CM at 9/27/2023 1706    Comment: OT POC,  role of OT, d/c recommendations                         Point: Home exercise program (Not Started)       Description:   Instruct learner(s) on appropriate technique for monitoring, assisting and/or progressing therapeutic exercises/activities.                  Learner Progress:  Not documented in this visit.              Point: Precautions (Not Started)       Description:   Instruct learner(s) on prescribed precautions during self-care and functional transfers.                  Learner Progress:  Not documented in this visit.              Point: Body mechanics (Not Started)       Description:   Instruct learner(s) on proper positioning and spine alignment during self-care, functional mobility activities and/or exercises.                  Learner Progress:  Not documented in this visit.                              User Key       Initials Effective Dates Name Provider Type Discipline     11/18/22 -  Josselin Campbell OT Occupational Therapist OT                  OT Recommendation and Plan     Plan of Care Review  Plan of Care Reviewed With: patient  Progress: improving  Outcome Evaluation: Pt tolerated tx well this date. Pt was SBA with bed mobility. Pt gave good effort with UE ther ex. PRN rest breaks. No goals met this tx. Continue OT POC.     Time Calculation:         Time Calculation- OT       Row Name 09/29/23 1548             Time Calculation- OT    OT Start Time 1402  -CS      OT Stop Time 1440  -CS      OT Time Calculation (min) 38 min  -CS      Total Timed Code Minutes- OT 38 minute(s)  -CS      OT Received On 09/29/23  -CS         Timed Charges    06212 - OT Therapeutic Exercise Minutes 23  -CS      92935 - OT Self Care/Mgmt Minutes 15  -CS         Total Minutes    Timed Charges Total Minutes 38  -CS       Total Minutes 38  -CS                User Key  (r) = Recorded By, (t) = Taken By, (c) = Cosigned By      Initials Name Provider Type    CS Allie Lim COTA Occupational Therapist Assistant                   Therapy Charges for Today       Code Description Service Date Service Provider Modifiers Qty    [id number]  OT THER PROC EA 15 MIN 9/29/2023 Allie Lim COTA GO 2    [id number]  OT THERAPEUTIC ACT EA 15 MIN 9/29/2023 Allie Lim COTA GO 1                 QUIQUE Trujillo  9/29/2023

## 2023-09-29 NOTE — PROGRESS NOTES
LOS: 3 days     Patient Care Team:  Len Seo MD as PCP - General  Blaire Camara APRN as Nurse Practitioner (General Surgery)      Subjective     Retained right J stent urinary retention    Objective       Vital Signs  Temp:  [96.1 °F (35.6 °C)-98.9 °F (37.2 °C)] 98.9 °F (37.2 °C)  Heart Rate:  [27-71] 63  Resp:  [16-18] 16  BP: ()/(45-89) 122/57    Physical Exam:        General Appearance:  Sleeping     Respiratory:    UNLABORED RESPIRATIONS.     Abdomen:     SOFT.       Genitourinary: Urine clear     Rectal:     DEFERRED       Results Review:       Imaging Results (Last 24 Hours)       ** No results found for the last 24 hours. **          Lab Results (last 24 hours)       Procedure Component Value Units Date/Time    Blood Culture - Blood, Wrist, Left [[id number]]  (Normal) Collected: 09/27/23 0526    Specimen: Blood from Wrist, Left Updated: 09/29/23 0545     Blood Culture No growth at 2 days    Blood Culture - Blood, Hand, Right [[id number]]  (Normal) Collected: 09/27/23 0527    Specimen: Blood from Hand, Right Updated: 09/29/23 0545     Blood Culture No growth at 2 days    Blood Culture - Blood, Arm, Right [[id number]]  (Normal) Collected: 09/25/23 1254    Specimen: Blood from Arm, Right Updated: 09/28/23 1330     Blood Culture No growth at 3 days    Comprehensive Metabolic Panel [[id number]]  (Abnormal) Collected: 09/28/23 0805    Specimen: Blood Updated: 09/28/23 0855     Glucose 138 mg/dL      BUN 14 mg/dL      Creatinine 1.60 mg/dL      Sodium 132 mmol/L      Potassium 4.3 mmol/L      Chloride 99 mmol/L      CO2 25.0 mmol/L      Calcium 8.1 mg/dL      Total Protein 5.7 g/dL      Albumin 2.3 g/dL      ALT (SGPT) 7 U/L      AST (SGOT) 23 U/L      Alkaline Phosphatase 134 U/L      Total Bilirubin 0.4 mg/dL      Globulin 3.4 gm/dL      A/G Ratio 0.7 g/dL      BUN/Creatinine Ratio 8.8     Anion Gap 8.0 mmol/L      eGFR 32.1 mL/min/1.73     Narrative:      GFR Normal >60  Chronic Kidney Disease  <60  Kidney Failure <15    The GFR formula is only valid for adults with stable renal function between ages 18 and 70.    Magnesium [[id number]]  (Abnormal) Collected: 09/28/23 0805    Specimen: Blood Updated: 09/28/23 0855     Magnesium 1.5 mg/dL     CBC & Differential [[id number]]  (Abnormal) Collected: 09/28/23 0805    Specimen: Blood Updated: 09/28/23 0823    Narrative:      The following orders were created for panel order CBC & Differential.  Procedure                               Abnormality         Status                     ---------                               -----------         ------                     CBC Auto Differential[[id number]]        Abnormal            Final result                 Please view results for these tests on the individual orders.    CBC Auto Differential [[id number]]  (Abnormal) Collected: 09/28/23 0805    Specimen: Blood Updated: 09/28/23 0823     WBC 5.92 10*3/mm3      RBC 3.57 10*6/mm3      Hemoglobin 10.0 g/dL      Hematocrit 32.2 %      MCV 90.2 fL      MCH 28.0 pg      MCHC 31.1 g/dL      RDW 15.2 %      RDW-SD 50.7 fl      MPV 9.0 fL      Platelets 283 10*3/mm3      Neutrophil % 61.1 %      Lymphocyte % 21.5 %      Monocyte % 9.3 %      Eosinophil % 7.3 %      Basophil % 0.5 %      Immature Grans % 0.3 %      Neutrophils, Absolute 3.62 10*3/mm3      Lymphocytes, Absolute 1.27 10*3/mm3      Monocytes, Absolute 0.55 10*3/mm3      Eosinophils, Absolute 0.43 10*3/mm3      Basophils, Absolute 0.03 10*3/mm3      Immature Grans, Absolute 0.02 10*3/mm3      nRBC 0.0 /100 WBC               I reviewed the patient's new clinical results.  I reviewed the patient's new imaging results and agree with the interpretation.  I reviewed the patient's other test results and agree with the interpretation        Assessment:    Retained right J stent for retroperitoneal bleed with urinary retention    Plan:     Change J stent this weekend      Ousmane Garcia MD  09/29/23  07:57 CDT

## 2023-09-29 NOTE — PLAN OF CARE
Goal Outcome Evaluation:  Plan of Care Reviewed With: patient        Progress: improving  Outcome Evaluation: Pt tolerated tx well this date. Pt was SBA with bed mobility. Pt gave good effort with UE ther ex. PRN rest breaks. No goals met this tx. Continue OT POC.      Anticipated Discharge Disposition (OT): skilled nursing facility

## 2023-09-30 ENCOUNTER — APPOINTMENT (OUTPATIENT)
Dept: GENERAL RADIOLOGY | Facility: HOSPITAL | Age: 83
End: 2023-09-30
Payer: MEDICARE

## 2023-09-30 ENCOUNTER — ANESTHESIA EVENT (OUTPATIENT)
Dept: PERIOP | Facility: HOSPITAL | Age: 83
End: 2023-09-30
Payer: MEDICARE

## 2023-09-30 ENCOUNTER — ANESTHESIA (OUTPATIENT)
Dept: PERIOP | Facility: HOSPITAL | Age: 83
End: 2023-09-30
Payer: MEDICARE

## 2023-09-30 ENCOUNTER — APPOINTMENT (OUTPATIENT)
Dept: CT IMAGING | Facility: HOSPITAL | Age: 83
End: 2023-09-30
Payer: MEDICARE

## 2023-09-30 VITALS
WEIGHT: 141.5 LBS | HEART RATE: 62 BPM | SYSTOLIC BLOOD PRESSURE: 145 MMHG | HEIGHT: 65 IN | RESPIRATION RATE: 18 BRPM | DIASTOLIC BLOOD PRESSURE: 67 MMHG | TEMPERATURE: 97 F | BODY MASS INDEX: 23.57 KG/M2 | OXYGEN SATURATION: 100 %

## 2023-09-30 LAB
ANION GAP SERPL CALCULATED.3IONS-SCNC: 7 MMOL/L (ref 5–15)
BACTERIA SPEC AEROBE CULT: NORMAL
BUN SERPL-MCNC: 14 MG/DL (ref 8–23)
BUN/CREAT SERPL: 8.7 (ref 7–25)
CALCIUM SPEC-SCNC: 8.1 MG/DL (ref 8.6–10.5)
CHLORIDE SERPL-SCNC: 100 MMOL/L (ref 98–107)
CO2 SERPL-SCNC: 27 MMOL/L (ref 22–29)
CREAT SERPL-MCNC: 1.61 MG/DL (ref 0.57–1)
EGFRCR SERPLBLD CKD-EPI 2021: 31.8 ML/MIN/1.73
GLUCOSE SERPL-MCNC: 79 MG/DL (ref 65–99)
POTASSIUM SERPL-SCNC: 4.1 MMOL/L (ref 3.5–5.2)
SODIUM SERPL-SCNC: 134 MMOL/L (ref 136–145)

## 2023-09-30 PROCEDURE — 70450 CT HEAD/BRAIN W/O DYE: CPT

## 2023-09-30 PROCEDURE — 72040 X-RAY EXAM NECK SPINE 2-3 VW: CPT

## 2023-09-30 PROCEDURE — 25010000002 CEFTRIAXONE PER 250 MG: Performed by: INTERNAL MEDICINE

## 2023-09-30 PROCEDURE — 72170 X-RAY EXAM OF PELVIS: CPT

## 2023-09-30 PROCEDURE — 72100 X-RAY EXAM L-S SPINE 2/3 VWS: CPT

## 2023-09-30 PROCEDURE — 80048 BASIC METABOLIC PNL TOTAL CA: CPT | Performed by: FAMILY MEDICINE

## 2023-09-30 PROCEDURE — 72072 X-RAY EXAM THORAC SPINE 3VWS: CPT

## 2023-09-30 RX ORDER — METOPROLOL TARTRATE 50 MG/1
50 TABLET, FILM COATED ORAL EVERY 12 HOURS SCHEDULED
Status: DISCONTINUED | OUTPATIENT
Start: 2023-09-30 | End: 2023-10-01 | Stop reason: HOSPADM

## 2023-09-30 RX ADMIN — APIXABAN 2.5 MG: 2.5 TABLET, FILM COATED ORAL at 20:36

## 2023-09-30 RX ADMIN — METOPROLOL TARTRATE 50 MG: 50 TABLET, FILM COATED ORAL at 20:29

## 2023-09-30 RX ADMIN — GABAPENTIN 100 MG: 100 CAPSULE ORAL at 16:00

## 2023-09-30 RX ADMIN — GABAPENTIN 100 MG: 100 CAPSULE ORAL at 20:29

## 2023-09-30 RX ADMIN — APIXABAN 2.5 MG: 2.5 TABLET, FILM COATED ORAL at 12:55

## 2023-09-30 RX ADMIN — GABAPENTIN 100 MG: 100 CAPSULE ORAL at 12:55

## 2023-09-30 RX ADMIN — METOPROLOL TARTRATE 25 MG: 25 TABLET, FILM COATED ORAL at 09:09

## 2023-09-30 RX ADMIN — Medication 10 ML: at 20:29

## 2023-09-30 RX ADMIN — ISOSORBIDE MONONITRATE 30 MG: 30 TABLET, EXTENDED RELEASE ORAL at 09:09

## 2023-09-30 RX ADMIN — Medication 10 ML: at 09:10

## 2023-09-30 RX ADMIN — CEFTRIAXONE SODIUM 2000 MG: 2 INJECTION, POWDER, FOR SOLUTION INTRAMUSCULAR; INTRAVENOUS at 12:55

## 2023-09-30 RX ADMIN — AMITRIPTYLINE HYDROCHLORIDE 25 MG: 25 TABLET, FILM COATED ORAL at 20:28

## 2023-09-30 RX ADMIN — DILTIAZEM HYDROCHLORIDE 30 MG: 30 TABLET ORAL at 20:28

## 2023-09-30 RX ADMIN — AMIODARONE HYDROCHLORIDE 200 MG: 200 TABLET ORAL at 09:09

## 2023-09-30 RX ADMIN — DILTIAZEM HYDROCHLORIDE 30 MG: 30 TABLET ORAL at 09:09

## 2023-09-30 NOTE — NURSING NOTE
Transporter came to RN and stated that she was helping patient to the stretcher and that the patient fell. RN and Charge RN entered room, and patient was sitting in floor. Vitals were obtained and stable. No skin issues noted and patient denied pain. Patient stated that she did graze her head on the wall but no signs of bleeding and she stated that it did not hurt. TRA [facility] was called and orders were placed. OR was notified that patient fell and that she was getting scans to move her surgery to back of list.  notified and on unit.

## 2023-09-30 NOTE — PROGRESS NOTES
LOS: 4 days     Patient Care Team:  Len Seo MD as PCP - General  Blaire Camara APRN as Nurse Practitioner (General Surgery)      Subjective     Retained right J stent    Objective       Vital Signs  Temp:  [98.3 °F (36.8 °C)-99.2 °F (37.3 °C)] 98.5 °F (36.9 °C)  Heart Rate:  [69-79] 79  Resp:  [18] 18  BP: (150-162)/(58-72) 150/58    Physical Exam:        General Appearance:  No acute distress     Respiratory:    UNLABORED RESPIRATIONS.     Abdomen:     SOFT.       Genitourinary: Tobar in place     Rectal:     DEFERRED       Results Review:       Imaging Results (Last 24 Hours)       ** No results found for the last 24 hours. **          Lab Results (last 24 hours)       Procedure Component Value Units Date/Time    Basic Metabolic Panel [[id number]]  (Abnormal) Collected: 09/30/23 0522    Specimen: Blood Updated: 09/30/23 0611     Glucose 79 mg/dL      BUN 14 mg/dL      Creatinine 1.61 mg/dL      Sodium 134 mmol/L      Potassium 4.1 mmol/L      Chloride 100 mmol/L      CO2 27.0 mmol/L      Calcium 8.1 mg/dL      BUN/Creatinine Ratio 8.7     Anion Gap 7.0 mmol/L      eGFR 31.8 mL/min/1.73     Narrative:      GFR Normal >60  Chronic Kidney Disease <60  Kidney Failure <15    The GFR formula is only valid for adults with stable renal function between ages 18 and 70.    Blood Culture - Blood, Wrist, Left [[id number]]  (Normal) Collected: 09/27/23 0526    Specimen: Blood from Wrist, Left Updated: 09/30/23 0546     Blood Culture No growth at 3 days    Blood Culture - Blood, Hand, Right [[id number]]  (Normal) Collected: 09/27/23 0527    Specimen: Blood from Hand, Right Updated: 09/30/23 0546     Blood Culture No growth at 3 days    Blood Culture - Blood, Arm, Right [[id number]]  (Normal) Collected: 09/25/23 1254    Specimen: Blood from Arm, Right Updated: 09/29/23 1330     Blood Culture No growth at 4 days    Basic Metabolic Panel [[id number]]  (Abnormal) Collected: 09/29/23 0919    Specimen: Blood Updated:  09/29/23 0954     Glucose 78 mg/dL      BUN 15 mg/dL      Creatinine 1.73 mg/dL      Sodium 132 mmol/L      Potassium 4.2 mmol/L      Chloride 100 mmol/L      CO2 26.0 mmol/L      Calcium 7.9 mg/dL      BUN/Creatinine Ratio 8.7     Anion Gap 6.0 mmol/L      eGFR 29.2 mL/min/1.73     Narrative:      GFR Normal >60  Chronic Kidney Disease <60  Kidney Failure <15    The GFR formula is only valid for adults with stable renal function between ages 18 and 70.              I reviewed the patient's new clinical results.  I reviewed the patient's new imaging results and agree with the interpretation.  I reviewed the patient's other test results and agree with the interpretation        Assessment:    Retained right J stent    Plan:     Cystoscopy retrograde J stent change risk and benefits discussed      Ousmane Garcia MD  09/30/23  09:08 CDT

## 2023-09-30 NOTE — PROGRESS NOTES
[facility] Medicine Services  INPATIENT PROGRESS NOTE      Length of Stay: 4  Date of Admission: 9/25/2023  Primary Care Physician: Len Seo MD    Subjective   Chief Complaint: AMS  HPI: Patient presented to the emergency department on 9/25/2023 with chief complaint of altered mental status, fall out of bed 2 days prior at SNF with no reported injuries.  ED work-up revealed UTI, COVID-positive but asymptomatic.  Admitted for altered mental status, UTI.  Found to be bacteremia related to Klebsiella UTI based on urine and blood cultures while inpatient.  She also had episodes of bradycardia while inpatient.    9/30/2023: Patient's bradycardia has improved.  This morning when she was getting up to transfer to wheelchair to go down to the OR for stent change with Dr. Garcia she stumbled, landing against the wall with her back to the wall, striking her head on the wall and then sliding down to the ground.  Patient herself claims it was a very minor hit to her head and she slid gently to the ground.  She denies any significant head pain, other injuries.    Review of Systems   All pertinent negatives and positives are as above. All other systems have been reviewed and are negative unless otherwise stated.     Objective    As of today 09/30/23  Temp:  [98.3 °F (36.8 °C)-99.2 °F (37.3 °C)] 98.5 °F (36.9 °C)  Heart Rate:  [69-79] 79  Resp:  [18] 18  BP: (150-162)/(58-72) 150/58    Physical Exam  Constitutional:       General: She is not in acute distress.     Appearance: She is not toxic-appearing.   HENT:      Head: Normocephalic and atraumatic.      Nose: Nose normal.      Mouth/Throat:      Pharynx: Oropharynx is clear.   Eyes:      Conjunctiva/sclera: Conjunctivae normal.   Cardiovascular:      Rate and Rhythm: Normal rate and regular rhythm.      Heart sounds: Normal heart sounds.   Pulmonary:      Effort: Pulmonary effort is normal. No respiratory distress.       Breath sounds: Normal breath sounds.   Abdominal:      General: Bowel sounds are normal.      Palpations: Abdomen is soft.      Tenderness: There is no abdominal tenderness.   Musculoskeletal:         General: No deformity.   Skin:     General: Skin is warm and dry.      Capillary Refill: Capillary refill takes less than 2 seconds.   Neurological:      General: No focal deficit present.      Mental Status: She is alert and oriented to person, place, and time. Mental status is at baseline.   Psychiatric:         Behavior: Behavior normal.         Thought Content: Thought content normal.         Results Review:  I have reviewed the labs, radiology results, and diagnostic studies.    Laboratory Data:   Results from last 7 days   Lab Units 09/30/23  0522 09/29/23  0919 09/28/23  0805 09/27/23  0527 09/26/23  0642 09/25/23  1410   SODIUM mmol/L 134* 132* 132*   < > 134* 129*   POTASSIUM mmol/L 4.1 4.2 4.3   < > 3.2* 3.3*   CHLORIDE mmol/L 100 100 99   < > 98 92*   CO2 mmol/L 27.0 26.0 25.0   < > 27.0 27.0   BUN mg/dL 14 15 14   < > 12 11   CREATININE mg/dL 1.61* 1.73* 1.60*   < > 1.85* 1.79*   GLUCOSE mg/dL 79 78 138*   < > 81 95   CALCIUM mg/dL 8.1* 7.9* 8.1*   < > 7.7* 8.4*   BILIRUBIN mg/dL  --   --  0.4  --  0.5 0.6   ALK PHOS U/L  --   --  134*  --  124* 149*   ALT (SGPT) U/L  --   --  7  --  8 11   AST (SGOT) U/L  --   --  23  --  29 33*   ANION GAP mmol/L 7.0 6.0 8.0   < > 9.0 10.0    < > = values in this interval not displayed.       Estimated Creatinine Clearance: 27.3 mL/min (A) (by C-G formula based on SCr of 1.61 mg/dL (H)).  Results from last 7 days   Lab Units 09/28/23  0805 09/27/23  0527 09/25/23  0851   MAGNESIUM mg/dL 1.5* 1.7 1.5*           Results from last 7 days   Lab Units 09/28/23  0805 09/27/23  0527 09/26/23  0642 09/25/23  0851   WBC 10*3/mm3 5.92 5.11 5.41 5.08   HEMOGLOBIN g/dL 10.0* 8.7* 9.6* 11.5*   HEMATOCRIT % 32.2* 28.1* 30.1* 37.0   PLATELETS 10*3/mm3 283 212 236 278              Culture Data:   No results found for: BLOODCX    No results found for: URINECX    No results found for: RESPCX  No results found for: WOUNDCX  No results found for: STOOLCX  No components found for: BODYFLD    Radiology Data:   Imaging Results (Last 24 Hours)       ** No results found for the last 24 hours. **            I have reviewed the patient's current medications.     Assessment/Plan     Principal Problem:    AMS (altered mental status)  Active Problems:    Retroperitoneal hemorrhage    Bacteremia due to Gram-negative bacteria  Metabolic encephalopathy secondary to Klebsiella UTI and associated bacteremia  COVID-19 infection  Recent right-sided retroperitoneal hematoma resulting in right-sided hydronephrosis  BLAINE  Right lower extremity DVT  Fall    Plan:  - Continue IV antibiotic therapy with Rocephin 2 g daily for now. BC sensitivities same as as UC.   - Appreciate Urology assistance, stent exchange planned for today, delayed because of her fall earlier this morning.  - Appreciate nephrology assistance, monitoring for ongoing need for HD.  - Heart rate improved blood pressure has been elevated today.  Increase metoprolol to 50 mg twice daily, half of her normal dose and Cardizem remains at half normal dose.  Continue to monitor  - Accidental stumble this morning, striking her head, minor head as reported by patient.  Denies pain.  CT head, x-ray cervical/thoracic/lumbar pending.  - PT and OT have been ordered  - DVT prophylaxis with Eliquis  - CODE STATUS: Full  -Plan to keep permacath on discharge.      Medical Decision Making  Number and Complexity of problems: 4 high complexity    Conditions and Status:        Condition is improving.     MDM Data  Tests considered but not ordered: None     Decision rules/scores evaluated (example [id number], Wells, etc): None     Discussed with: Patient and nursing     Treatment Plan  As above    Care Planning  Shared decision making: Patient   Code status and  discussions: Full    Disposition  Social Determinants of Health that impact treatment or disposition: none  I expect the patient to be discharged to SNF in 1-2 days.       I have utilized all available immediate resources to obtain, update, or review the patient's current medications (including all prescriptions, over-the-counter products, herbals, cannabis/cannabidiol products, and vitamin/mineral/dietary (nutritional) supplements).   I confirmed that the patient's Advance Care Plan is present, code status is documented, or surrogate decision maker is listed in the patient's medical record.      Payton Akers PA-C

## 2023-09-30 NOTE — ANESTHESIA PREPROCEDURE EVALUATION
Anesthesia Evaluation     Patient summary reviewed and Nursing notes reviewed   no history of anesthetic complications:   NPO Solid Status: Waived due to emergency  NPO Liquid Status: Waived due to emergency           Airway   Mallampati: II  TM distance: >3 FB  Neck ROM: full  possible difficult intubation  Dental    (+) lower dentures and upper dentures    Pulmonary - negative pulmonary ROS    breath sounds clear to auscultation  (-) shortness of breath, not a smoker    ROS comment: FINDINGS:  There may be a small left pleural effusion.  Mild central pulmonary vascular  congestion noted.  No pneumothorax.  Cardiomediastinal silhouette is  unremarkable.       Cardiovascular     ECG reviewed  PT is on anticoagulation therapy  Patient on routine beta blocker and Beta blocker given within 24 hours of surgery  Rhythm: irregular  Rate: normal    (+) hypertension, dysrhythmias Paroxysmal Atrial Fib, Atrial Fib, GARNICA, PVD, hyperlipidemia  (-) past MI, angina, murmur, cardiac stents    ROS comment: · Estimated EF = 61%.  · Left ventricular systolic function is normal.  · Left ventricular diastolic dysfunction (grade I) consistent with impaired relaxation.  · Left atrial cavity size is mildly dilated.  · Normal left atrial volume noted. Left atrial cavity size is mildly dilated. No evidence of a left atrial thrombus present. No evidence of a left atrial mass present. No evidence of a patent foramen ovale. Saline test results are negative.     Atrial fibrillation  Cannot rule out Anteroseptal infarct , age undetermined  Abnormal ECG  When compared with ECG of 28-JUL-2023 15:18,  No significant change was found      Neuro/Psych  (+) psychiatric history Depression  GI/Hepatic/Renal/Endo    (+) obesity, GERD well controlled, renal disease (Creatinine 1.46) ARF    Musculoskeletal     Abdominal   (+) obese   Substance History - negative use     OB/GYN negative ob/gyn ROS         Other   arthritis,     ROS/Med Hx Other: HGB 6.6 K+  4.8                Anesthesia Plan    ASA 3 - emergent     general and MAC     intravenous induction     Anesthetic plan, risks, benefits, and alternatives have been provided, discussed and informed consent has been obtained with: patient, spouse/significant other, sibling and child.  Pre-procedure education provided  Plan discussed with CRNA.    CODE STATUS:    Level Of Support Discussed With: Patient  Code Status (Patient has no pulse and is not breathing): CPR (Attempt to Resuscitate)  Medical Interventions (Patient has pulse or is breathing): Full Support

## 2023-09-30 NOTE — PLAN OF CARE
Goal Outcome Evaluation:           Progress: improving  Outcome Evaluation: Patient is still requiring oxygen. Continues to deny pain. VS stable.

## 2023-09-30 NOTE — SIGNIFICANT NOTE
09/30/23 1430   OTHER   Discipline physical therapy assistant   Rehab Time/Intention   Session Not Performed other (see comments)  (RN stated pt fell this am, awaiting test results @ this time)

## 2023-09-30 NOTE — PROGRESS NOTES
"    [facility]  [address] [place] [address]. [id number]  T - [phone]  F - [phone]     Progress Note          PATIENT  DEMOGRAPHICS   PATIENT NAME: Carol Sullivan                      PHYSICIAN: Cris Moreno MD  : 1940  MRN: [id number]   LOS: 4 days    Patient Care Team:  Len Seo MD as PCP - General  Blaire Camara APRN as Nurse Practitioner (General Surgery)  Subjective   SUBJECTIVE   No acute events overnight. Had a fall this morning. Still confused. Discussed with RN.         Objective   OBJECTIVE   Vital Signs  Temp:  [98.3 °F (36.8 °C)-99.2 °F (37.3 °C)] 98.6 °F (37 °C)  Heart Rate:  [69-86] 86  Resp:  [18] 18  BP: (150-199)/(58-92) 158/73    Flowsheet Rows      Flowsheet Row First Filed Value   Admission Height 165.1 cm (65\") Documented at 2023 0815   Admission Weight 70.3 kg (155 lb) Documented at 2023 0815             I/O last 3 completed shifts:  In: 300 [P.O.:300]  Out: 1400 [Urine:1400]    PHYSICAL EXAM    Physical Exam    Exam not performed due to COVID precautions.     RESULTS   Results Review:    Results from last 7 days   Lab Units 23  0522 23  0919 23  0805 23  0527 23  0642 23  1410   SODIUM mmol/L 134* 132* 132*   < > 134* 129*   POTASSIUM mmol/L 4.1 4.2 4.3   < > 3.2* 3.3*   CHLORIDE mmol/L 100 100 99   < > 98 92*   CO2 mmol/L 27.0 26.0 25.0   < > 27.0 27.0   BUN mg/dL 14 15 14   < > 12 11   CREATININE mg/dL 1.61* 1.73* 1.60*   < > 1.85* 1.79*   CALCIUM mg/dL 8.1* 7.9* 8.1*   < > 7.7* 8.4*   BILIRUBIN mg/dL  --   --  0.4  --  0.5 0.6   ALK PHOS U/L  --   --  134*  --  124* 149*   ALT (SGPT) U/L  --   --  7  --  8 11   AST (SGOT) U/L  --   --  23  --  29 33*   GLUCOSE mg/dL 79 78 138*   < > 81 95    < > = values in this interval not displayed.       Estimated Creatinine Clearance: 27.3 mL/min (A) (by C-G formula based on SCr of 1.61 mg/dL (H)).    Results from last 7 days   Lab Units " 09/28/23  0805 09/27/23  0527 09/25/23  0851   MAGNESIUM mg/dL 1.5* 1.7 1.5*             Results from last 7 days   Lab Units 09/28/23  0805 09/27/23  0527 09/26/23  0642 09/25/23  0851   WBC 10*3/mm3 5.92 5.11 5.41 5.08   HEMOGLOBIN g/dL 10.0* 8.7* 9.6* 11.5*   PLATELETS 10*3/mm3 283 212 236 278               Imaging Results (Last 24 Hours)       Procedure Component Value Units Date/Time    XR Pelvis 1 or 2 View [[id number]] Resulted: 09/30/23 1237     Updated: 09/30/23 1238    XR Spine Lumbar 2 or 3 View [[id number]] Resulted: 09/30/23 1237     Updated: 09/30/23 1238    XR Spine Thoracic 3 View [[id number]] Resulted: 09/30/23 1237     Updated: 09/30/23 1238    XR Spine Cervical 2 or 3 View [[id number]] Resulted: 09/30/23 1236     Updated: 09/30/23 1237    CT Head Without Contrast [[id number]] Resulted: 09/30/23 1153     Updated: 09/30/23 1154             MEDICATIONS    amiodarone, 200 mg, Oral, Daily  amitriptyline, 25 mg, Oral, Nightly  apixaban, 2.5 mg, Oral, BID  cefTRIAXone, 2,000 mg, Intravenous, Q24H  dilTIAZem, 30 mg, Oral, Q12H  gabapentin, 100 mg, Oral, TID  isosorbide mononitrate, 30 mg, Oral, Daily  metoprolol tartrate, 25 mg, Oral, Q12H  sodium chloride, 10 mL, Intravenous, Q12H           Assessment & Plan   ASSESSMENT / PLAN      AMS (altered mental status)    Retroperitoneal hemorrhage    Bacteremia due to Gram-negative bacteria    1.  BLAINE/ATN: Baseline creatinine unknown  - Creatinine was 1.5 in October 2022 but less than 1 prior to that.  BLAINE secondary to hydronephrosis caused by retroperitoneal hematoma leading to ATN.  Started hemodialysis 8/6/2023.   - Creatinine was around 4 earlier this month. Now stable at 1.7-1.8 mg/dl range. Holding HD.      2.  COVID-19     3.  Retroperitoneal bleed/hydronephrosis:  - status post right J stent for hydronephrosis     4.  Hypertension:  - Blood pressure is acceptable.      5.  A-fib      6.  Anemia:  - Hemoglobin is acceptable at 10.0     7.  AMS     8.   Extensive right lower extremity DVT     9. GNR bacteremia :  - On ceftriaxone     10. Right J stent and mitchell:  - Plan for cysto and J stent change. Urology following.            This document has been electronically signed by Cris Moreno MD on September 30, 2023 13:12 CDT

## 2023-09-30 NOTE — PROGRESS NOTES
LOS: 4 days     Patient Care Team:  Len Seo MD as PCP - General  Blaire Camara APRN as Nurse Practitioner (General Surgery)      Subjective     Retained right J stent    Objective patient had fall in transport this morning      Vital Signs  Temp:  [98.3 °F (36.8 °C)-99.2 °F (37.3 °C)] 98.6 °F (37 °C)  Heart Rate:  [69-86] 86  Resp:  [18] 18  BP: (150-199)/(58-92) 158/73    Physical Exam:        General Appearance:  No acute distress     Respiratory:    UNLABORED RESPIRATIONS.     Abdomen:     SOFT.       Genitourinary: Tobar draining well urine clear     Rectal:     DEFERRED       Results Review:       Imaging Results (Last 24 Hours)       ** No results found for the last 24 hours. **          Lab Results (last 24 hours)       Procedure Component Value Units Date/Time    Basic Metabolic Panel [[id number]]  (Abnormal) Collected: 09/30/23 0522    Specimen: Blood Updated: 09/30/23 0611     Glucose 79 mg/dL      BUN 14 mg/dL      Creatinine 1.61 mg/dL      Sodium 134 mmol/L      Potassium 4.1 mmol/L      Chloride 100 mmol/L      CO2 27.0 mmol/L      Calcium 8.1 mg/dL      BUN/Creatinine Ratio 8.7     Anion Gap 7.0 mmol/L      eGFR 31.8 mL/min/1.73     Narrative:      GFR Normal >60  Chronic Kidney Disease <60  Kidney Failure <15    The GFR formula is only valid for adults with stable renal function between ages 18 and 70.    Blood Culture - Blood, Wrist, Left [[id number]]  (Normal) Collected: 09/27/23 0526    Specimen: Blood from Wrist, Left Updated: 09/30/23 0546     Blood Culture No growth at 3 days    Blood Culture - Blood, Hand, Right [[id number]]  (Normal) Collected: 09/27/23 0527    Specimen: Blood from Hand, Right Updated: 09/30/23 0546     Blood Culture No growth at 3 days    Blood Culture - Blood, Arm, Right [[id number]]  (Normal) Collected: 09/25/23 1254    Specimen: Blood from Arm, Right Updated: 09/29/23 1330     Blood Culture No growth at 4 days              I reviewed the patient's new clinical  results.  I reviewed the patient's new imaging results and agree with the interpretation.  I reviewed the patient's other test results and agree with the interpretation        Assessment:    Retained J stent with recent fall    Plan:     We will hold off on replacing J stent to make sure she is physically okay      Ousmane Garcia MD  09/30/23  11:25 CDT

## 2023-09-30 NOTE — PLAN OF CARE
Goal Outcome Evaluation:  Plan of Care Reviewed With: patient        Progress: improving  Outcome Evaluation: Patient resting. still requiring oxygen. VS stable. will continue to monitor patient

## 2023-10-02 LAB
BACTERIA SPEC AEROBE CULT: NORMAL
BACTERIA SPEC AEROBE CULT: NORMAL

## (undated) DEVICE — PK MAJ PROC LF 60

## (undated) DEVICE — BIOPATCH™ ANTIMICROBIAL DRESSING WITH CHLORHEXIDINE GLUCONATE IS A HYDROPHILLIC POLYURETHANE ABSORPTIVE FOAM WITH CHLORHEXIDINE GLUCONATE (CHG) WHICH INHIBITS BACTERIAL GROWTH UNDER THE DRESSING. THE DRESSING IS INTENDED TO BE USED TO ABSORB EXUDATE, COVER A WOUND CAUSED BY VASCULAR AND NONVASCULAR PERCUTANEOUS MEDICAL DEVICES DURING SURGERY, AS WELL AS REDUCE LOCAL INFECTION AND COLONIZATION OF MICROORGANISMS.: Brand: BIOPATCH

## (undated) DEVICE — ADHS LIQ MASTISOL 2/3ML

## (undated) DEVICE — SOL IRR NACL 0.9PCT 3000ML

## (undated) DEVICE — STERILE POLYISOPRENE POWDER-FREE SURGICAL GLOVES WITH EMOLLIENT COATING: Brand: PROTEXIS

## (undated) DEVICE — GLV SURG SENSICARE PI ORTHO SZ7.5 LF STRL

## (undated) DEVICE — SUT VICRYL 3/0 J663H

## (undated) DEVICE — PK CYSTO LF 60

## (undated) DEVICE — PATIENT RETURN ELECTRODE, SINGLE-USE, CONTACT QUALITY MONITORING, ADULT, WITH 9FT CORD, FOR PATIENTS WEIGING OVER 33LBS. (15KG): Brand: MEGADYNE

## (undated) DEVICE — STERILE POLYISOPRENE POWDER-FREE SURGICAL GLOVES: Brand: PROTEXIS

## (undated) DEVICE — SHEET, T, LAPAROTOMY, STERILE: Brand: MEDLINE

## (undated) DEVICE — GLV SURG SENSICARE PI ORTHO SZ6.5 LF STRL

## (undated) DEVICE — SOL IRR NACL 0.9PCT BT 1000ML

## (undated) DEVICE — CANN SMPL SOFTECH BIFLO ETCO2 A/M 7FT

## (undated) DEVICE — GLV SURG SENSICARE PI PF LF 7 GRN STRL

## (undated) DEVICE — GLV SURG NEOLON 2G PF LF 6.5 STRL

## (undated) DEVICE — ADHS SKIN PREMIERPRO EXOFIN TOPICAL HI/VISC .5ML

## (undated) DEVICE — PK ANGIO LF 60

## (undated) DEVICE — GLV SURG SENSICARE PI LF PF 7.5 GRN STRL

## (undated) DEVICE — SPNG GZ WOVN 4X4IN 12PLY 10/BX STRL

## (undated) DEVICE — GW PTFE FIX/CORE FLXTIP .038 3X150CM

## (undated) DEVICE — DRAINBAG,ANTI-REFLUX TOWER,L/F,2000ML,LL: Brand: MEDLINE

## (undated) DEVICE — INTENDED FOR TISSUE SEPARATION, AND OTHER PROCEDURES THAT REQUIRE A SHARP SURGICAL BLADE TO PUNCTURE OR CUT.: Brand: BARD-PARKER ® DISPOSABLE SCALPELS

## (undated) DEVICE — GLV SURG SIGNATURE ESSENTIAL PF LTX SZ6

## (undated) DEVICE — CATH URETRL OPN/END 5F70CM

## (undated) DEVICE — GLIDEPATH HEMODIALYSIS CATH, ST, DL 14.5 FR. 19CM: Brand: GLIDEPATH LONG-TERM HEMODIALYSIS CATHETER WITH PRELOADED STYLET

## (undated) DEVICE — SAFESECURE,SECUREMENT,FOLEY CATH,STERILE: Brand: MEDLINE

## (undated) DEVICE — SUT ETHLN 2/0 FS 18IN 664H

## (undated) DEVICE — SINGLE-USE BIOPSY FORCEPS: Brand: RADIAL JAW 4

## (undated) DEVICE — STRIP PACKING W IODOFORM 1/4

## (undated) DEVICE — CONTAINER,SPECIMEN,OR STERILE,4OZ: Brand: MEDLINE

## (undated) DEVICE — CATHETER,FOLEY,100%SILICONE,16FR,10ML,LF: Brand: MEDLINE

## (undated) DEVICE — SOL IRRG H2O PL/BG 1000ML STRL

## (undated) DEVICE — SYR LL TP 10ML STRL

## (undated) DEVICE — KT INTRO MINISTICK MAX W/GW PALLADIUM ECHO 4F 21G 7CM

## (undated) DEVICE — STRIP,CLOSURE,WOUND,MEDI-STRIP,1/2X4: Brand: MEDLINE

## (undated) DEVICE — BITEBLOCK ENDO W/STRAP 60F A/ LF DISP

## (undated) DEVICE — TBG PENCL TELESCP MEGADYNE SMOKE EVAC 10FT